# Patient Record
Sex: MALE | Race: WHITE | Employment: OTHER | URBAN - METROPOLITAN AREA
[De-identification: names, ages, dates, MRNs, and addresses within clinical notes are randomized per-mention and may not be internally consistent; named-entity substitution may affect disease eponyms.]

---

## 2017-02-09 ENCOUNTER — APPOINTMENT (OUTPATIENT)
Dept: INTERNAL MEDICINE CLINIC | Age: 62
End: 2017-02-09

## 2017-02-09 DIAGNOSIS — E11.9 DIABETES MELLITUS TYPE 2, CONTROLLED, WITHOUT COMPLICATIONS (HCC): ICD-10-CM

## 2017-02-09 DIAGNOSIS — D69.59 THROMBOCYTOPENIA DUE TO DRUGS: ICD-10-CM

## 2017-02-09 DIAGNOSIS — T50.905A THROMBOCYTOPENIA DUE TO DRUGS: ICD-10-CM

## 2017-02-10 ENCOUNTER — APPOINTMENT (OUTPATIENT)
Dept: INTERNAL MEDICINE CLINIC | Age: 62
End: 2017-02-10

## 2017-02-10 ENCOUNTER — HOSPITAL ENCOUNTER (OUTPATIENT)
Dept: LAB | Age: 62
Discharge: HOME | End: 2017-02-10
Payer: MEDICARE

## 2017-02-10 PROCEDURE — 36415 COLL VENOUS BLD VENIPUNCTURE: CPT

## 2017-02-10 PROCEDURE — 85025 COMPLETE CBC W/AUTO DIFF WBC: CPT

## 2017-02-10 PROCEDURE — 83036 HEMOGLOBIN GLYCOSYLATED A1C: CPT

## 2017-02-10 PROCEDURE — 80061 LIPID PANEL: CPT

## 2017-02-10 PROCEDURE — 80053 COMPREHEN METABOLIC PANEL: CPT

## 2017-02-11 LAB
ALBUMIN SERPL-MCNC: 4 G/DL (ref 3.6–4.8)
ALBUMIN/GLOB SERPL: 1.8 {RATIO} (ref 1.1–2.5)
ALP SERPL-CCNC: 50 IU/L (ref 39–117)
ALT SERPL-CCNC: 11 IU/L (ref 0–44)
AST SERPL-CCNC: 11 IU/L (ref 0–40)
BASOPHILS # BLD AUTO: 0 X10E3/UL (ref 0–0.2)
BASOPHILS NFR BLD AUTO: 0 %
BILIRUB SERPL-MCNC: 0.2 MG/DL (ref 0–1.2)
BUN SERPL-MCNC: 21 MG/DL (ref 8–27)
BUN/CREAT SERPL: 27 (ref 10–22)
CALCIUM SERPL-MCNC: 9.1 MG/DL (ref 8.6–10.2)
CHLORIDE SERPL-SCNC: 102 MMOL/L (ref 96–106)
CHOLEST SERPL-MCNC: 123 MG/DL (ref 100–199)
CO2 SERPL-SCNC: 24 MMOL/L (ref 18–29)
CREAT SERPL-MCNC: 0.77 MG/DL (ref 0.76–1.27)
EOSINOPHIL # BLD AUTO: 1.1 X10E3/UL (ref 0–0.4)
EOSINOPHIL NFR BLD AUTO: 14 %
ERYTHROCYTE [DISTWIDTH] IN BLOOD BY AUTOMATED COUNT: 13.3 % (ref 12.3–15.4)
EST. AVERAGE GLUCOSE BLD GHB EST-MCNC: 212 MG/DL
GLOBULIN SER CALC-MCNC: 2.2 G/DL (ref 1.5–4.5)
GLUCOSE SERPL-MCNC: 191 MG/DL (ref 65–99)
HBA1C MFR BLD: 9 % (ref 4.8–5.6)
HCT VFR BLD AUTO: 40.1 % (ref 37.5–51)
HDLC SERPL-MCNC: 41 MG/DL
HGB BLD-MCNC: 13.3 G/DL (ref 12.6–17.7)
IMM GRANULOCYTES # BLD: 0.1 X10E3/UL (ref 0–0.1)
IMM GRANULOCYTES NFR BLD: 1 %
INTERPRETATION, 910389: NORMAL
LDLC SERPL CALC-MCNC: 60 MG/DL (ref 0–99)
LYMPHOCYTES # BLD AUTO: 3.5 X10E3/UL (ref 0.7–3.1)
LYMPHOCYTES NFR BLD AUTO: 44 %
Lab: NORMAL
MCH RBC QN AUTO: 31.9 PG (ref 26.6–33)
MCHC RBC AUTO-ENTMCNC: 33.2 G/DL (ref 31.5–35.7)
MCV RBC AUTO: 96 FL (ref 79–97)
MONOCYTES # BLD AUTO: 0.6 X10E3/UL (ref 0.1–0.9)
MONOCYTES NFR BLD AUTO: 7 %
NEUTROPHILS # BLD AUTO: 2.6 X10E3/UL (ref 1.4–7)
NEUTROPHILS NFR BLD AUTO: 34 %
PLATELET # BLD AUTO: 108 X10E3/UL (ref 150–379)
POTASSIUM SERPL-SCNC: 5.2 MMOL/L (ref 3.5–5.2)
PROT SERPL-MCNC: 6.2 G/DL (ref 6–8.5)
RBC # BLD AUTO: 4.17 X10E6/UL (ref 4.14–5.8)
SODIUM SERPL-SCNC: 140 MMOL/L (ref 134–144)
TRIGL SERPL-MCNC: 110 MG/DL (ref 0–149)
VLDLC SERPL CALC-MCNC: 22 MG/DL (ref 5–40)
WBC # BLD AUTO: 7.8 X10E3/UL (ref 3.4–10.8)

## 2017-02-15 RX ORDER — GLIMEPIRIDE 1 MG/1
TABLET ORAL
Qty: 90 TAB | Refills: 3 | Status: SHIPPED | OUTPATIENT
Start: 2017-02-15 | End: 2017-02-16 | Stop reason: SDUPTHER

## 2017-02-16 ENCOUNTER — OFFICE VISIT (OUTPATIENT)
Dept: INTERNAL MEDICINE CLINIC | Age: 62
End: 2017-02-16

## 2017-02-16 ENCOUNTER — HOSPITAL ENCOUNTER (OUTPATIENT)
Dept: LAB | Age: 62
Discharge: HOME | End: 2017-02-16
Payer: MEDICARE

## 2017-02-16 VITALS
HEART RATE: 61 BPM | BODY MASS INDEX: 27.37 KG/M2 | DIASTOLIC BLOOD PRESSURE: 79 MMHG | WEIGHT: 206.5 LBS | SYSTOLIC BLOOD PRESSURE: 147 MMHG | RESPIRATION RATE: 14 BRPM | TEMPERATURE: 95.9 F | OXYGEN SATURATION: 100 % | HEIGHT: 73 IN

## 2017-02-16 DIAGNOSIS — E78.00 HYPERCHOLESTEROLEMIA: ICD-10-CM

## 2017-02-16 DIAGNOSIS — I10 HYPERTENSION, ESSENTIAL: ICD-10-CM

## 2017-02-16 DIAGNOSIS — D69.59 THROMBOCYTOPENIA DUE TO DRUGS: ICD-10-CM

## 2017-02-16 DIAGNOSIS — T50.905A THROMBOCYTOPENIA DUE TO DRUGS: ICD-10-CM

## 2017-02-16 DIAGNOSIS — Z23 ENCOUNTER FOR IMMUNIZATION: ICD-10-CM

## 2017-02-16 DIAGNOSIS — G40.909 SEIZURE DISORDER (HCC): ICD-10-CM

## 2017-02-16 DIAGNOSIS — R53.82 CHRONIC FATIGUE: ICD-10-CM

## 2017-02-16 PROBLEM — E11.9 CONTROLLED TYPE 2 DIABETES MELLITUS WITHOUT COMPLICATION, WITHOUT LONG-TERM CURRENT USE OF INSULIN (HCC): Status: ACTIVE | Noted: 2017-02-16

## 2017-02-16 PROCEDURE — 84443 ASSAY THYROID STIM HORMONE: CPT

## 2017-02-16 PROCEDURE — 36415 COLL VENOUS BLD VENIPUNCTURE: CPT

## 2017-02-16 PROCEDURE — 85651 RBC SED RATE NONAUTOMATED: CPT

## 2017-02-16 RX ORDER — GLIMEPIRIDE 2 MG/1
2 TABLET ORAL
Qty: 90 TAB | Refills: 3 | Status: SHIPPED | OUTPATIENT
Start: 2017-02-16 | End: 2017-05-18 | Stop reason: SDUPTHER

## 2017-02-16 RX ORDER — CHLORHEXIDINE GLUCONATE 1.2 MG/ML
RINSE ORAL
Refills: 9 | COMMUNITY
Start: 2017-01-31 | End: 2018-02-17

## 2017-02-16 NOTE — PROGRESS NOTES
Reviewed record In preparation for visit and have obtained necessary documentation. Power of  on file    1. Have you been to the ER, urgent care clinic or hospitalized since your last visit? No     2. Have you seen or consulted any other health care providers outside of the [address] [place] [address] since your last visit? Include any pap smears or colon screening. Dr Cynda Lesches Maintenance reviewed: After verbal order read back of , patient received Pneumococcal 23 in left arm. [address] [id number] lot [id number] exp 05/22/2018. Patient tolerated procedure without complaints and received VIS.

## 2017-02-16 NOTE — PATIENT INSTRUCTIONS
Increase glimepiride to 2 mg once a day  Office visit in 3 months; we will do Hemoglobin a1c in the office that day          Vaccine Information Statement    Pneumococcal Polysaccharide Vaccine: What You Need to Know    Many Vaccine Information Statements are available in [place]ic and other languages. See www.immunize.org/vis. Hojas de información Sobre Vacunas están disponibles en español y en muchos otros idiomas. Visite RajendraScale.[email or web address]. 1. Why get vaccinated? Vaccination can protect older adults (and some children and younger adults) from pneumococcal disease. Pneumococcal disease is caused by bacteria that can spread from person to person through close contact. It can cause ear infections, and it can also lead to more serious infections of the:   Lungs (pneumonia),   Blood (bacteremia), and   Covering of the brain and spinal cord (meningitis). Meningitis can cause deafness and brain damage, and it can be fatal.      Anyone can get pneumococcal disease, but children under 3years of age, people with certain medical conditions, adults over 72years of age, and cigarette smokers are at the highest risk. About 18,000 older adults die each year from pneumococcal disease in the [place]. Treatment of pneumococcal infections with penicillin and other drugs used to be more effective. But some strains of the disease have become resistant to these drugs. This makes prevention of the disease, through vaccination, even more important. 2. Pneumococcal polysaccharide vaccine (PPSV23)    Pneumococcal polysaccharide vaccine (PPSV23) protects against 23 types of pneumococcal bacteria. It will not prevent all pneumococcal disease.     PPSV23 is recommended for:   All adults 72years of age and older,   Anyone 2 through 59years of age with certain long-term health problems,   Anyone 2 through 59years of age with a weakened immune system,   Adults 23 through 59years of age who smoke cigarettes or have asthma. Most people need only one dose of PPSV. A second dose is recommended for certain high-risk groups. People 72 and older should get a dose even if they have gotten one or more doses of the vaccine before they turned 65. Your healthcare provider can give you more information about these recommendations. Most healthy adults develop protection within 2 to 3 weeks of getting the shot. 3. Some people should not get this vaccine     Anyone who has had a life-threatening allergic reaction to PPSV should not get another dose.  Anyone who has a severe allergy to any component of PPSV should not receive it. Tell your provider if you have any severe allergies.  Anyone who is moderately or severely ill when the shot is scheduled may be asked to wait until they recover before getting the vaccine. Someone with a mild illness can usually be vaccinated.  Children less than 3years of age should not receive this vaccine.  There is no evidence that PPSV is harmful to either a pregnant woman or to her fetus. However, as a precaution, women who need the vaccine should be vaccinated before becoming pregnant, if possible. 4. Risks of a vaccine reaction    With any medicine, including vaccines, there is a chance of side effects. These are usually mild and go away on their own, but serious reactions are also possible. About half of people who get PPSV have mild side effects, such as redness or pain where the shot is given, which go away within about two days. Less than 1 out of 100 people develop a fever, muscle aches, or more severe local reactions. Problems that could happen after any vaccine:     People sometimes faint after a medical procedure, including vaccination. Sitting or lying down for about 15 minutes can help prevent fainting, and injuries caused by a fall. Tell your doctor if you feel dizzy, or have vision changes or ringing in the ears.      Some people get severe pain in the shoulder and have difficulty moving the arm where a shot was given. This happens very rarely.  Any medication can cause a severe allergic reaction. Such reactions from a vaccine are very rare, estimated at about 1 in a million doses, and would happen within a few minutes to a few hours after the vaccination. As with any medicine, there is a very remote chance of a vaccine causing a serious injury or death. The safety of vaccines is always being monitored. For more information, visit: www.cdc.gov/vaccinesafety/     5. What if there is a serious reaction? What should I look for? Look for anything that concerns you, such as signs of a severe allergic reaction, very high fever, or unusual behavior. Signs of a severe allergic reaction can include hives, swelling of the face and throat, difficulty breathing, a fast heartbeat, dizziness, and weakness. These would usually start a few minutes to a few hours after the vaccination. What should I do? If you think it is a severe allergic reaction or other emergency that cant wait, call 9-1-1 or get to the nearest hospital. Otherwise, call your doctor. Afterward, the reaction should be reported to the Vaccine Adverse Event Reporting System (VAERS). Your doctor might file this report, or you can do it yourself through the VAERS web site at www.vaers. hhs.gov, or by calling 4-671.660.8947. VAERS does not give medical advice. 6. How can I learn more?  Ask your doctor. He or she can give you the vaccine package insert or suggest other sources of information.  Call your local or state health department.    Contact the Centers for Disease Control and Prevention (CDC):  - Call 1-345.764.8594 (1-800-CDC-INFO) or  - Visit CDCs website at MILI 18 04/24/2015    [facility] and [facility] for Disease Control and Prevention    Office Use Only

## 2017-02-16 NOTE — PROGRESS NOTES
HISTORY OF PRESENT ILLNESS  Martinez Ramsey is a 62 y.o. male. He is accompanied by his mother. HPI   He presents for follow up of diabetes mellitus, hypertension, hyperlipidemia, seizures. and thrombocytopenia. He works 3 nights a week at [employer]. He eats dinner they are on those nights and his mother states that he is not compliant with diet in regards to carbs and salt at those times. He does have intellectual disability. Diabetic ROS - medication compliance: compliant all of the time,      home glucose monitoring: is not performed,      home BP Monitoring: is well controlled at home, ranging 130's/60's.      further diabetic ROS: no polyuria or polydipsia, no numbness, tingling or pain in extremities, no unusual visual symptoms, no hypoglycemia, no medication side effects noted. Diet and Lifestyle: generally follows a low fat low cholesterol diet, generally follows a low sodium diet, does not rigorously follow a diabetic diet, nonsmoker, does water aerobic 3 days a week.    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    Sleep disorder G47.9    Hypertension, essential I10    Diabetes mellitus type 2, controlled, without complications ([address] X31.7    Strongyloides stercoralis infection B78.9    Eosinophilia D72.1    Hypercholesterolemia E78.00     Past Medical History   Diagnosis Date    Contact dermatitis and other eczema, due to unspecified cause      psoriasis    Diabetes ([address]     Eosinophilia     Hypercholesterolemia     Hypertension     Mental retardation     Seizures ([facility] [address]     Skin cancer      Past Surgical History   Procedure Laterality Date    Endoscopy, colon, diagnostic  11/08/2007     Dr Arnoldo Mercedes normal except small internal hemorrhoids repeat 11/2017     Social History     Social History    Marital status: SINGLE     Spouse name: N/A    Number of children: N/A    Years of education: N/A     Occupational History          [employer]      Social History Main Topics    Smoking status: Never Smoker    Smokeless tobacco: Never Used    Alcohol use No    Drug use: None    Sexual activity: Not Asked     Other Topics Concern    None     Social History Narrative     Family History   Problem Relation Age of Onset    Other Mother      PMR    Hypertension Mother     Cancer Father      Lung    Diabetes Brother      No Known Allergies  Current Outpatient Prescriptions   Medication Sig Dispense Refill    chlorhexidine (PERIDEX) 0.12 % solution ONE CAPFUL ONCE DAILY OR AS DIRECTED  9    glimepiride (AMARYL) 1 mg tablet TAKE ONE TABLET DAILY BEFORE BREAKFAST 90 Tab 3    atorvastatin (LIPITOR) 40 mg tablet TAKE ONE TABLET BY MOUTH AT BEDTIME 90 Tab 3    topiramate (TOPAMAX) 200 mg tablet Take 1.5 Tabs by mouth two (2) times a day. 270 Tab 3    divalproex DR (DEPAKOTE) 500 mg tablet Take one tab in the AM and two tabs at night 270 Tab 3    metFORMIN ER (GLUCOPHAGE XR) 750 mg tablet TAKE ONE (1) TABLET(S) TWICE DAILY 193 Tab 3    folic acid (FOLVITE) 1 mg tablet TAKE 1 TABLET DAILY 90 Tab 3    ramipril (ALTACE) 5 mg capsule Take 1 Cap by mouth daily. 90 Cap 3    triamcinolone acetonide (KENALOG) 0.1 % topical cream       Calcium-Cholecalciferol, D3, (CALTRATE-600 PLUS VITAMIN D3) 600-400 mg-unit Tab Take  by mouth. Review of Systems   Constitutional: Positive for malaise/fatigue (for past 3-4 months). Negative for weight loss. Gastrointestinal: Negative for abdominal pain, constipation, diarrhea and heartburn. Musculoskeletal: Positive for back pain (after lifting at work last night). Negative for joint pain.    Skin: Negative for itching and rash. Neurological: Negative for dizziness, tingling, focal weakness, seizures and headaches. Visit Vitals    /79 (BP 1 Location: Left arm, BP Patient Position: Sitting)    Pulse 61    Temp 95.9 °F (35.5 °C) (Oral)    Resp 14    Ht 6' 1\" (1.854 m)    Wt 206 lb 8 oz (93.7 kg)    SpO2 100%    BMI 27.24 kg/m2     Physical Exam   Constitutional: He is oriented to person, place, and time. He appears well-developed and well-nourished. HENT:   Head: Normocephalic and atraumatic. Eyes: Conjunctivae are normal. Pupils are equal, round, and reactive to light. Neck: Neck supple. Carotid bruit is not present. No thyromegaly present. Cardiovascular: Normal rate, regular rhythm and normal heart sounds. PMI is not displaced. Exam reveals no gallop. No murmur heard. Pulses:       Dorsalis pedis pulses are 2+ on the right side, and 2+ on the left side. Posterior tibial pulses are 2+ on the right side, and 2+ on the left side. Pulmonary/Chest: Effort normal. He has no wheezes. He has no rhonchi. He has no rales. Abdominal: Soft. Normal appearance. He exhibits no abdominal bruit and no mass. There is no hepatosplenomegaly. There is no tenderness. Musculoskeletal: He exhibits no edema. Lymphadenopathy:     He has no cervical adenopathy. Right: No supraclavicular adenopathy present. Left: No inguinal and no supraclavicular adenopathy present. Neurological: He is alert and oriented to person, place, and time. No sensory deficit. Skin: Skin is warm, dry and intact. No rash noted. Psychiatric: He has a normal mood and affect. His behavior is normal.   Nursing note and vitals reviewed.     Results for orders placed or performed in visit on 02/09/17   HEMOGLOBIN A1C WITH EAG   Result Value Ref Range    Hemoglobin A1c 9.0 (H) 4.8 - 5.6 %    Estimated average glucose 212 mg/dL   LIPID PANEL   Result Value Ref Range    Cholesterol, total 123 100 - 199 mg/dL    Triglyceride 110 0 - 149 mg/dL    HDL Cholesterol 41 >39 mg/dL    VLDL, calculated 22 5 - 40 mg/dL    LDL, calculated 60 0 - 99 mg/dL   METABOLIC PANEL, COMPREHENSIVE   Result Value Ref Range    Glucose 191 (H) 65 - 99 mg/dL    BUN 21 8 - 27 mg/dL    Creatinine 0.77 0.76 - 1.27 mg/dL    GFR est non-AA 99 >59 mL/min/1.73    GFR est  >59 mL/min/1.73    BUN/Creatinine ratio 27 (H) 10 - 22    Sodium 140 134 - 144 mmol/L    Potassium 5.2 3.5 - 5.2 mmol/L    Chloride 102 96 - 106 mmol/L    CO2 24 18 - 29 mmol/L    Calcium 9.1 8.6 - 10.2 mg/dL    Protein, total 6.2 6.0 - 8.5 g/dL    Albumin 4.0 3.6 - 4.8 g/dL    GLOBULIN, TOTAL 2.2 1.5 - 4.5 g/dL    A-G Ratio 1.8 1.1 - 2.5    Bilirubin, total 0.2 0.0 - 1.2 mg/dL    Alk. phosphatase 50 39 - 117 IU/L    AST (SGOT) 11 0 - 40 IU/L    ALT (SGPT) 11 0 - 44 IU/L   CBC WITH AUTOMATED DIFF   Result Value Ref Range    WBC 7.8 3.4 - 10.8 x10E3/uL    RBC 4.17 4.14 - 5.80 x10E6/uL    HGB 13.3 12.6 - 17.7 g/dL    HCT 40.1 37.5 - 51.0 %    MCV 96 79 - 97 fL    MCH 31.9 26.6 - 33.0 pg    MCHC 33.2 31.5 - 35.7 g/dL    RDW 13.3 12.3 - 15.4 %    PLATELET 149 (L) 633 - 379 x10E3/uL    NEUTROPHILS 34 %    Lymphocytes 44 %    MONOCYTES 7 %    EOSINOPHILS 14 %    BASOPHILS 0 %    ABS. NEUTROPHILS 2.6 1.4 - 7.0 x10E3/uL    Abs Lymphocytes 3.5 (H) 0.7 - 3.1 x10E3/uL    ABS. MONOCYTES 0.6 0.1 - 0.9 x10E3/uL    ABS. EOSINOPHILS 1.1 (H) 0.0 - 0.4 x10E3/uL    ABS. BASOPHILS 0.0 0.0 - 0.2 x10E3/uL    IMMATURE GRANULOCYTES 1 %    ABS. IMM. GRANS. 0.1 0.0 - 0.1 x10E3/uL   CVD REPORT   Result Value Ref Range    INTERPRETATION Note    DIABETES PATIENT EDUCATION   Result Value Ref Range    PDF Image Not applicable      ASSESSMENT and PLAN    ICD-10-CM ICD-9-CM    1. Uncontrolled type 2 diabetes mellitus without complication, without long-term current use of insulin (HCC) E11.65 250.02    2. Chronic fatigue R53.82 780.79 TSH REFLEX TO T4      SED RATE (ESR)   3.  Hypertension, essential I10 401.9    4. Hypercholesterolemia E78.00 272.0    5. 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 Seizure disorder ([facility] [address] G40.909 345.90    7. Encounter for immunization Z23 V03.89 PNEUMOCOCCAL POLYSACCHARIDE VACCINE, 23-VALENT, ADULT OR IMMUNOSUPPRESSED PT DOSE,      ADMIN PNEUMOCOCCAL VACCINE         Uncontrolled type 2 diabetes mellitus without complication, without long-term current use of insulin (HCC)  Exercise better diet, but given his intellectual disability he has a hard time understanding and remembering what is appropriate. -     Increase glimepiride (AMARYL) 2 mg tablet; Take 1 Tab by mouth every morning. Chronic fatigue  -     TSH REFLEX TO T4  -     SED RATE (ESR)    Hypertension, essential  Blood pressure is uncharacteristically high opposed to normal readings in the recent past.  Possibly due to increased salt intake will wait until next visit to assess for any medication change. Hypercholesterolemia  Hyperlipidemia is controlled    Thrombocytopenia due to drugs  Stable  Stable  Seizure disorder ([facility]    Encounter for immunization  -     Pneumococcal Polysaccharide vaccine, 23-Valent, Adult or Immunocompromised  -     ADMIN PNEUMOCOCCAL VACCINE  Medicare Injection Admin Charge    Other orders        Follow-up Disposition:  Return in about 3 months (around 5/16/2017) for DM, POC A1c, fatigue . lab results and schedule of future lab studies reviewed with patient  reviewed diet, exercise and weight control  cardiovascular risk and specific lipid/LDL goals reviewed  I have discussed the diagnosis with the patient and the intended plan as seen in the above orders. Patient is in agreement. The patient has received an after-visit summary and questions were answered concerning future plans. I have discussed medication side effects and warnings with the patient as well.

## 2017-02-16 NOTE — MR AVS SNAPSHOT
Visit Information Date & Time Provider Department Dept. Phone Encounter #  
 2/16/2017  2:00 PM Janine Lewis, 40 [facility] [phone] [id number] Follow-up Instructions Return in about 3 months (around 5/16/2017) for DM, POC A1c, fatigue . Upcoming Health Maintenance Date Due Pneumococcal 19-64 Medium Risk (1 of 1 - PPSV23) 12/3/1974 MEDICARE YEARLY EXAM 4/13/2017 HEMOGLOBIN A1C Q6M 8/10/2017 FOOT EXAM Q1 8/16/2017 MICROALBUMIN Q1 8/16/2017 EYE EXAM RETINAL OR DILATED Q1 9/9/2017 COLONOSCOPY 11/8/2017 LIPID PANEL Q1 2/10/2018 DTaP/Tdap/Td series (2 - Td) 10/20/2020 Allergies as of 2/16/2017  Review Complete On: 2/16/2017 By: Janine Lewis MD  
 No Known Allergies Current Immunizations  Reviewed on 2/16/2017 Name Date H1N1 FLU VACCINE 1/1/2010 Influenza Vaccine (Quad) PF 9/28/2016, 10/15/2015, 10/10/2014 Influenza Vaccine Split 9/21/2011 Influenza Vaccine Whole 10/12/2010 Pneumococcal Polysaccharide (PPSV-23)  Incomplete TD Vaccine 2/22/2005 TDAP Vaccine 10/20/2010 Zoster Vaccine, Live 2/20/2014 Reviewed by Lexie Cesar LPN on 8/73/3719 at  2:14 PM  
You Were Diagnosed With   
  
 Codes Comments Controlled type 2 diabetes mellitus without complication, without long-term current use of insulin ([facility]    -  Primary ICD-10-CM: E11.9 ICD-9-CM: 250.00 Chronic fatigue     ICD-10-CM: R53.82 
ICD-9-CM: 780.79 Hypertension, essential     ICD-10-CM: I10 
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  
 27.24 kg/m 2 2.2 m 2 Preferred Pharmacy Pharmacy Name Phone [facility] Rozina Botello [phone] Your Updated Medication List  
  
   
This list is accurate as of: 2/16/17  3:01 PM.  Always use your most recent med list.  
  
  
  
  
 atorvastatin 40 mg tablet Commonly known as:  LIPITOR  
TAKE ONE TABLET BY MOUTH AT BEDTIME CALTRATE-600 PLUS VITAMIN D3 tablet Generic drug:  calcium-cholecalciferol (D3) Take  by mouth. chlorhexidine 0.12 % solution Commonly known as:  PERIDEX  
ONE CAPFUL ONCE DAILY OR AS DIRECTED  
  
 divalproex  mg tablet Commonly known as:  DEPAKOTE Take one tab in the AM and two tabs at night  
  
 folic acid 1 mg tablet Commonly known as:  FOLVITE  
TAKE 1 TABLET DAILY  
  
 glimepiride 2 mg tablet Commonly known as:  AMARYL Take 1 Tab by mouth every morning. metFORMIN  mg tablet Commonly known as:  GLUCOPHAGE XR  
TAKE ONE (1) TABLET(S) TWICE DAILY  
  
 ramipril 5 mg capsule Commonly known as:  ALTACE Take 1 Cap by mouth daily. topiramate 200 mg tablet Commonly known as:  TOPAMAX Take 1.5 Tabs by mouth two (2) times a day. triamcinolone acetonide 0.1 % topical cream  
Commonly known as:  KENALOG Prescriptions Sent to Pharmacy Refills  
 glimepiride (AMARYL) 2 mg tablet 3 Sig: Take 1 Tab by mouth every morning. Class: Normal  
 Pharmacy: [facility] [place] [address], [address] [place] [address] Ph #: [phone] Route: Oral  
  
We Performed the Following ADMIN PNEUMOCOCCAL VACCINE [ [facility]] PNEUMOCOCCAL POLYSACCHARIDE VACCINE, 23-VALENT, ADULT OR IMMUNOSUPPRESSED PT DOSE, [44059 CPT(R)] SED RATE (ESR) [id number] CPT(R)] TSH REFLEX TO T4 [[id number] Custom] Follow-up Instructions Return in about 3 months (around 5/16/2017) for DM, POC A1c, fatigue . Patient Instructions Increase glimepiride to 2 mg once a day Office visit in 3 months; we will do Hemoglobin a1c in the office that day Vaccine Information Statement Pneumococcal Polysaccharide Vaccine: What You Need to Know Many Vaccine Information Statements are available in Kinyarwanda and other languages. See www.immunize.org/vis. Hojas de información Sobre Vacunas están disponibles en español y en muchos otros idiomas. Visite Gary.si. 1. Why get vaccinated? Vaccination can protect older adults (and some children and younger adults) from pneumococcal disease. Pneumococcal disease is caused by bacteria that can spread from person to person through close contact. It can cause ear infections, and it can also lead to more serious infections of the: 
 Lungs (pneumonia),  Blood (bacteremia), and 
 Covering of the brain and spinal cord (meningitis). Meningitis can cause deafness and brain damage, and it can be fatal.   
 
Anyone can get pneumococcal disease, but children under 3years of age, people with certain medical conditions, adults over 72years of age, and cigarette smokers are at the highest risk. About 18,000 older adults die each year from pneumococcal disease in the [place]. Treatment of pneumococcal infections with penicillin and other drugs used to be more effective. But some strains of the disease have become resistant to these drugs. This makes prevention of the disease, through vaccination, even more important. 2. Pneumococcal polysaccharide vaccine (PPSV23) Pneumococcal polysaccharide vaccine (PPSV23) protects against 23 types of pneumococcal bacteria. It will not prevent all pneumococcal disease. PPSV23 is recommended for:  All adults 72years of age and older,  Anyone 2 through 59years of age with certain long-term health problems, 
 Anyone 2 through 59years of age with a weakened immune system, 
 Adults 23 through 59years of age who smoke cigarettes or have asthma. Most people need only one dose of PPSV. A second dose is recommended for certain high-risk groups. People 72 and older should get a dose even if they have gotten one or more doses of the vaccine before they turned 65. Your healthcare provider can give you more information about these recommendations. Most healthy adults develop protection within 2 to 3 weeks of getting the shot. 3. Some people should not get this vaccine  Anyone who has had a life-threatening allergic reaction to PPSV should not get another dose.  Anyone who has a severe allergy to any component of PPSV should not receive it. Tell your provider if you have any severe allergies.  Anyone who is moderately or severely ill when the shot is scheduled may be asked to wait until they recover before getting the vaccine. Someone with a mild illness can usually be vaccinated.  Children less than 3years of age should not receive this vaccine.  There is no evidence that PPSV is harmful to either a pregnant woman or to her fetus. However, as a precaution, women who need the vaccine should be vaccinated before becoming pregnant, if possible. 4. Risks of a vaccine reaction With any medicine, including vaccines, there is a chance of side effects. These are usually mild and go away on their own, but serious reactions are also possible. About half of people who get PPSV have mild side effects, such as redness or pain where the shot is given, which go away within about two days.  
 
Less than 1 out of 100 people develop a fever, muscle aches, or more severe local reactions. Problems that could happen after any vaccine:  People sometimes faint after a medical procedure, including vaccination. Sitting or lying down for about 15 minutes can help prevent fainting, and injuries caused by a fall. Tell your doctor if you feel dizzy, or have vision changes or ringing in the ears.  Some people get severe pain in the shoulder and have difficulty moving the arm where a shot was given. This happens very rarely.  Any medication can cause a severe allergic reaction. Such reactions from a vaccine are very rare, estimated at about 1 in a million doses, and would happen within a few minutes to a few hours after the vaccination. As with any medicine, there is a very remote chance of a vaccine causing a serious injury or death. The safety of vaccines is always being monitored. For more information, visit: www.cdc.gov/vaccinesafety/  
 
5. What if there is a serious reaction? What should I look for? Look for anything that concerns you, such as signs of a severe allergic reaction, very high fever, or unusual behavior. Signs of a severe allergic reaction can include hives, swelling of the face and throat, difficulty breathing, a fast heartbeat, dizziness, and weakness. These would usually start a few minutes to a few hours after the vaccination. What should I do? If you think it is a severe allergic reaction or other emergency that cant wait, call 9-1-1 or get to the nearest hospital. Otherwise, call your doctor. Afterward, the reaction should be reported to the Vaccine Adverse Event Reporting System (VAERS). Your doctor might file this report, or you can do it yourself through the VAERS web site at www.vaers. hhs.gov, or by calling 2-692.584.5036. VAERS does not give medical advice. 6. How can I learn more?  Ask your doctor. He or she can give you the vaccine package insert or suggest other sources of information.  Call your local or state health department.  Contact the Centers for Disease Control and Prevention (CDC): 
- Call 6-846.144.9834 (1-800-CDC-INFO) or 
- Visit CDCs website at www.cdc.gov/vaccines Vaccine Information Statement PPSV  
04/24/2015 Department of [facility] and [place] Centers for Disease Control and Prevention Office Use Only Introducing [facility] & HEALTH SERVICES! Denae Morataya introduces Hyglos patient portal. Now you can access parts of your medical record, email your doctor's office, and request medication refills online. 1. In your internet browser, go to [email or web address] 2. Click on the First Time User? Click Here link in the Sign In box. You will see the New Member Sign Up page. 3. Enter your Hyglos Access Code exactly as it appears below. You will not need to use this code after youve completed the sign-up process. If you do not sign up before the expiration date, you must request a new code. · Hyglos Access Code: [email or web address] Expires: 5/17/2017  3:01 PM 
 
4. Enter the last four digits of your Social Security Number (xxxx) and Date of Birth (mm/dd/yyyy) as indicated and click Submit. You will be taken to the next sign-up page. 5. Create a Hyglos ID. This will be your Hyglos login ID and cannot be changed, so think of one that is secure and easy to remember. 6. Create a Hyglos password. You can change your password at any time. 7. Enter your Password Reset Question and Answer. This can be used at a later time if you forget your password. 8. Enter your e-mail address. You will receive e-mail notification when new information is available in [address] 9. Click Sign Up. You can now view and download portions of your medical record. 10. Click the Download Summary menu link to download a portable copy of your medical information.  
 
If you have questions, please visit the Frequently Asked Questions section of the [employer]. Remember, [employer] Sim[employer] is NOT to be used for urgent needs. For medical emergencies, dial 911. Now available from your iPhone and Android! Please provide this summary of care documentation to your next provider. Your primary care clinician is listed as Gearline Tyler. If you have any questions after today's visit, please call [phone].

## 2017-02-17 LAB
ERYTHROCYTE [SEDIMENTATION RATE] IN BLOOD BY WESTERGREN METHOD: 2 MM/HR (ref 0–30)
TSH SERPL DL<=0.005 MIU/L-ACNC: 1.9 UIU/ML (ref 0.45–4.5)

## 2017-02-27 ENCOUNTER — OFFICE VISIT (OUTPATIENT)
Dept: INTERNAL MEDICINE CLINIC | Age: 62
End: 2017-02-27

## 2017-02-27 ENCOUNTER — TELEPHONE (OUTPATIENT)
Dept: INTERNAL MEDICINE CLINIC | Age: 62
End: 2017-02-27

## 2017-02-27 VITALS
WEIGHT: 204.5 LBS | BODY MASS INDEX: 27.1 KG/M2 | TEMPERATURE: 96.1 F | SYSTOLIC BLOOD PRESSURE: 146 MMHG | HEART RATE: 68 BPM | RESPIRATION RATE: 14 BRPM | OXYGEN SATURATION: 100 % | HEIGHT: 73 IN | DIASTOLIC BLOOD PRESSURE: 76 MMHG

## 2017-02-27 DIAGNOSIS — L03.031 PARONYCHIA OF GREAT TOE, RIGHT: Primary | ICD-10-CM

## 2017-02-27 RX ORDER — DOXYCYCLINE 100 MG/1
100 CAPSULE ORAL 2 TIMES DAILY
Qty: 14 CAP | Refills: 0 | Status: SHIPPED | OUTPATIENT
Start: 2017-02-27 | End: 2017-05-18 | Stop reason: ALTCHOICE

## 2017-02-27 NOTE — PATIENT INSTRUCTIONS
Do not take calcium while on antibiotic  Take doxycycline 100 mg twice a day for one week  Soak in warm water twice a day starting tomorrow for 5 days. Paronychia: Care Instructions  Your Care Instructions  Paronychia (say \"Sammy\") is an infection of the skin around a fingernail or toenail. It happens when germs enter through a break in the skin. The doctor may have made a small cut in the infected area to drain the pus. Most cases of paronychia improve in a few days. But watch your symptoms and follow your doctor's advice. Though rare, a mild case can turn into something more serious and infect your entire finger or toe. Also, it is possible for an infection to return. Follow-up care is a key part of your treatment and safety. Be sure to make and go to all appointments, and call your doctor if you are having problems. It's also a good idea to know your test results and keep a list of the medicines you take. How can you care for yourself at home? · If your doctor told you how to care for your infected nail, follow the doctor's instructions. If you did not get instructions, follow this general advice:  ¨ Wash the area with clean water 2 times a day. Don't use hydrogen peroxide or alcohol, which can slow healing. ¨ You may cover the area with a thin layer of petroleum jelly, such as Vaseline, and a nonstick bandage. ¨ Apply more petroleum jelly and replace the bandage as needed. · If your doctor prescribed antibiotics, take them as directed. Do not stop taking them just because you feel better. You need to take the full course of antibiotics. · Take an over-the-counter pain medicine, such as acetaminophen (Tylenol), ibuprofen (Advil, Motrin), or naproxen (Aleve). Read and follow all instructions on the label. · Do not take two or more pain medicines at the same time unless the doctor told you to. Many pain medicines have acetaminophen, which is Tylenol.  Too much acetaminophen (Tylenol) can be harmful. · Prop up the toe or finger so that it is higher than the level of your heart. This will help with pain and swelling. · Apply heat. Put a warm water bottle, heating pad set on low, or warm cloth on your finger or toe. Do not go to sleep with a heating pad on your skin. · Soak the area in warm water twice a day for 15 minutes each time. After soaking, dry the area well and apply a thin layer of petroleum jelly, such as Vaseline. Put on a new bandage. When should you call for help? Call your doctor now or seek immediate medical care if:  · You have signs of new or worsening infection, such as:  ¨ Increased pain, swelling, warmth, or redness. ¨ Red streaks leading from the infected skin. ¨ Pus draining from the area. ¨ A fever. Watch closely for changes in your health, and be sure to contact your doctor if:  · You develop joint aches with your infection. · Your infection comes back. · You do not get better as expected. Where can you learn more? Go to http://jarod-jared.info/. Enter C435 in the search box to learn more about \"Paronychia: Care Instructions. \"  Current as of: February 5, 2016  Content Version: 11.1  © 7509-8055 10X[employer] Room, [employer]orporated. Care instructions adapted under license by [employer] (which disclaims liability or warranty for this information). If you have questions about a medical condition or this instruction, always ask your healthcare professional. [place] [address] any warranty or liability for your use of this information.

## 2017-02-27 NOTE — MR AVS SNAPSHOT
Visit Information Date & Time Provider Department Dept. Phone Encounter #  
 2/27/2017  4:20 PM Rashaad Garcia MD [address] [facility] [phone] [id number] Follow-up Instructions Return if symptoms worsen or fail to improve. Your Appointments 5/18/2017  3:30 PM  
ROUTINE CARE with Rashaad Garcia MD  
[address] [facility] [address] [facility]) Appt Note: 3mth f/u; DM, POC A1c, fatigue [address] [address] [phone]  
  
   
 [address] Upcoming Health Maintenance Date Due  
 MEDICARE YEARLY EXAM 4/13/2017 HEMOGLOBIN A1C Q6M 8/10/2017 FOOT EXAM Q1 8/16/2017 MICROALBUMIN Q1 8/16/2017 EYE EXAM RETINAL OR DILATED Q1 9/9/2017 COLONOSCOPY 11/8/2017 LIPID PANEL Q1 2/10/2018 DTaP/Tdap/Td series (2 - Td) 10/20/2020 Allergies as of 2/27/2017  Review Complete On: 2/27/2017 By: Rashaad Garcia MD  
 No Known Allergies Current Immunizations  Reviewed on 2/27/2017 Name Date H1N1 FLU VACCINE 1/1/2010 Influenza Vaccine (Quad) PF 9/28/2016, 10/15/2015, 10/10/2014 Influenza Vaccine Split 9/21/2011 Influenza Vaccine Whole 10/12/2010 Pneumococcal Polysaccharide (PPSV-23) 2/16/2017 TD Vaccine 2/22/2005 TDAP Vaccine 10/20/2010 Zoster Vaccine, Live 2/20/2014 Reviewed by Ag Rios LPN on 0/75/1641 at  4:49 PM  
You Were Diagnosed With   
  
 Codes Comments Paronychia of great toe, right    -  Primary ICD-10-CM: L03.031 
ICD-9-CM: 681.11 Vitals BP  
  
  
  
  
  
 146/76 (BP 1 Location: Right arm, BP Patient Position: Sitting) BMI and BSA Data Body Mass Index Body Surface Area  
 26.98 kg/m 2 2.19 m 2 Preferred Pharmacy Pharmacy Name Phone [facility] [phone] Your Updated Medication List  
  
   
 This list is accurate as of: 2/27/17  6:09 PM.  Always use your most recent med list.  
  
  
  
  
 atorvastatin 40 mg tablet Commonly known as:  LIPITOR  
TAKE ONE TABLET BY MOUTH AT BEDTIME CALTRATE-600 PLUS VITAMIN D3 tablet Generic drug:  calcium-cholecalciferol (D3) Take  by mouth. chlorhexidine 0.12 % solution Commonly known as:  PERIDEX  
ONE CAPFUL ONCE DAILY OR AS DIRECTED  
  
 divalproex  mg tablet Commonly known as:  DEPAKOTE Take one tab in the AM and two tabs at night  
  
 doxycycline 100 mg capsule Commonly known as:  VIBRAMYCIN Take 1 Cap by mouth two (2) times a day. folic acid 1 mg tablet Commonly known as:  FOLVITE  
TAKE 1 TABLET DAILY  
  
 glimepiride 2 mg tablet Commonly known as:  AMARYL Take 1 Tab by mouth every morning. metFORMIN  mg tablet Commonly known as:  GLUCOPHAGE XR  
TAKE ONE (1) TABLET(S) TWICE DAILY  
  
 ramipril 5 mg capsule Commonly known as:  ALTACE Take 1 Cap by mouth daily. topiramate 200 mg tablet Commonly known as:  TOPAMAX Take 1.5 Tabs by mouth two (2) times a day. triamcinolone acetonide 0.1 % topical cream  
Commonly known as:  KENALOG Prescriptions Sent to Pharmacy Refills  
 doxycycline (VIBRAMYCIN) 100 mg capsule 0 Sig: Take 1 Cap by mouth two (2) times a day. Class: Normal  
 Pharmacy: [facility] [address] Ph #: [phone] Route: Oral  
  
We Performed the Following DRAIN SKIN ABSCESS SIMPLE [02522 CPT(R)] Follow-up Instructions Return if symptoms worsen or fail to improve. Patient Instructions Do not take calcium while on antibiotic Take doxycycline 100 mg twice a day for one week Soak in warm water twice a day starting tomorrow for 5 days. Paronychia: Care Instructions Your Care Instructions Paronychia (say \"qrpn-xo-AL-sun-uh\") is an infection of the skin around a fingernail or toenail. It happens when germs enter through a break in the skin. The doctor may have made a small cut in the infected area to drain the pus. Most cases of paronychia improve in a few days. But watch your symptoms and follow your doctor's advice. Though rare, a mild case can turn into something more serious and infect your entire finger or toe. Also, it is possible for an infection to return. Follow-up care is a key part of your treatment and safety. Be sure to make and go to all appointments, and call your doctor if you are having problems. It's also a good idea to know your test results and keep a list of the medicines you take. How can you care for yourself at home? · If your doctor told you how to care for your infected nail, follow the doctor's instructions. If you did not get instructions, follow this general advice: ¨ Wash the area with clean water 2 times a day. Don't use hydrogen peroxide or alcohol, which can slow healing. ¨ You may cover the area with a thin layer of petroleum jelly, such as Vaseline, and a nonstick bandage. ¨ Apply more petroleum jelly and replace the bandage as needed. · If your doctor prescribed antibiotics, take them as directed. Do not stop taking them just because you feel better. You need to take the full course of antibiotics. · Take an over-the-counter pain medicine, such as acetaminophen (Tylenol), ibuprofen (Advil, Motrin), or naproxen (Aleve). Read and follow all instructions on the label. · Do not take two or more pain medicines at the same time unless the doctor told you to. Many pain medicines have acetaminophen, which is Tylenol. Too much acetaminophen (Tylenol) can be harmful. · Prop up the toe or finger so that it is higher than the level of your heart. This will help with pain and swelling. · Apply heat. Put a warm water bottle, heating pad set on low, or warm cloth on your finger or toe. Do not go to sleep with a heating pad on your skin. · Soak the area in warm water twice a day for 15 minutes each time. After soaking, dry the area well and apply a thin layer of petroleum jelly, such as Vaseline. Put on a new bandage. When should you call for help? Call your doctor now or seek immediate medical care if: 
· You have signs of new or worsening infection, such as: 
¨ Increased pain, swelling, warmth, or redness. ¨ Red streaks leading from the infected skin. ¨ Pus draining from the area. ¨ A fever. Watch closely for changes in your health, and be sure to contact your doctor if: 
· You develop joint aches with your infection. · Your infection comes back. · You do not get better as expected. Where can you learn more? Go to http://jarod-jared.info/. Enter C435 in the search box to learn more about \"Paronychia: Care Instructions. \" Current as of: February 5, 2016 Content Version: 11.1 © 5566-8594 Rent Here. Care instructions adapted under license by [employer] (which disclaims liability or warranty for this information). If you have questions about a medical condition or this instruction, always ask your healthcare professional. [place] [address] any warranty or liability for your use of this information. Introducing [facility] & HEALTH SERVICES! [employer] introduces Momox patient portal. Now you can access parts of your medical record, email your doctor's office, and request medication refills online. 1. In your internet browser, go to [email or web address] 2. Click on the First Time User? Click Here link in the Sign In box. You will see the New Member Sign Up page. 3. Enter your Momox Access Code exactly as it appears below. You will not need to use this code after youve completed the sign-up process. If you do not sign up before the expiration date, you must request a new code. · Momox Access Code: [email or web address] Expires: 5/17/2017  3:01 PM 
 
 4. Enter the last four digits of your Social Security Number (xxxx) and Date of Birth (mm/dd/yyyy) as indicated and click Submit. You will be taken to the next sign-up page. 5. Create a [email or web address] ID. This will be your [email or web address].com login ID and cannot be changed, so think of one that is secure and easy to remember. 6. Create a Joincube.com password. You can change your password at any time. 7. Enter your Password Reset Question and Answer. This can be used at a later time if you forget your password. 8. Enter your e-mail address. You will receive e-mail notification when new information is available in [address] 9. Click Sign Up. You can now view and download portions of your medical record. 10. Click the Download Summary menu link to download a portable copy of your medical information. If you have questions, please visit the Frequently Asked Questions section of the [email or web address] website. Remember, Joincube.com is NOT to be used for urgent needs. For medical emergencies, dial 911. Now available from your iPhone and Android! Please provide this summary of care documentation to your next provider. Your primary care clinician is listed as Milton Starks. If you have any questions after today's visit, please call [phone].

## 2017-02-27 NOTE — PROGRESS NOTES
HISTORY OF PRESENT ILLNESS  Jerica Rosales is a 62 y.o. male. He is accompanied by his mother. HPI He presents with a 4 day history of pain and swelling in the right great toe. He denies any injury. His mother points out that he has what is believed to be a chronic fungal infection in this toenail. He has no history of gout.   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    Eosinophilia D72.1    Hypercholesterolemia E78.00    Controlled type 2 diabetes mellitus without complication, without long-term current use of insulin ([facility]) E11.9     Past Medical History:   Diagnosis Date    Contact dermatitis and other eczema, due to unspecified cause     psoriasis    Diabetes ([facility]     Eosinophilia     Hypercholesterolemia     Hypertension     Mental retardation     Seizures ([facility]     Skin cancer      Past Surgical History:   Procedure Laterality Date    ENDOSCOPY, COLON, DIAGNOSTIC  11/08/2007    Dr Jorge Pham normal except small internal hemorrhoids repeat 11/2017     Social History     Social History    Marital status: SINGLE     Spouse name: N/A    Number of children: N/A    Years of education: N/A     Occupational History          [employer]      Social History Main Topics    Smoking status: Never Smoker    Smokeless tobacco: Never Used    Alcohol use No    Drug use: None    Sexual activity: Not Asked     Other Topics Concern    None     Social History Narrative     Family History   Problem Relation Age of Onset    Other Mother      PMR    Hypertension Mother     Cancer Father      Lung    Diabetes Brother      No Known Allergies  Current Outpatient Prescriptions   Medication Sig Dispense Refill    chlorhexidine (PERIDEX) 0.12 % solution ONE CAPFUL ONCE DAILY OR AS DIRECTED  9    glimepiride (AMARYL) 2 mg tablet Take 1 Tab by mouth every morning. 90 Tab 3    atorvastatin (LIPITOR) 40 mg tablet TAKE ONE TABLET BY MOUTH AT BEDTIME 90 Tab 3    topiramate (TOPAMAX) 200 mg tablet Take 1.5 Tabs by mouth two (2) times a day. 270 Tab 3    divalproex DR (DEPAKOTE) 500 mg tablet Take one tab in the AM and two tabs at night 270 Tab 3    metFORMIN ER (GLUCOPHAGE XR) 750 mg tablet TAKE ONE (1) TABLET(S) TWICE DAILY 403 Tab 3    folic acid (FOLVITE) 1 mg tablet TAKE 1 TABLET DAILY 90 Tab 3    ramipril (ALTACE) 5 mg capsule Take 1 Cap by mouth daily. 90 Cap 3    triamcinolone acetonide (KENALOG) 0.1 % topical cream       Calcium-Cholecalciferol, D3, (CALTRATE-600 PLUS VITAMIN D3) 600-400 mg-unit Tab Take  by mouth. ROS  Visit Vitals    /76 (BP 1 Location: Right arm, BP Patient Position: Sitting)    Pulse 68    Temp 96.1 °F (35.6 °C) (Oral)    Resp 14    Ht 6' 1\" (1.854 m)    Wt 204 lb 8 oz (92.8 kg)    SpO2 100%    BMI 26.98 kg/m2     Physical Exam   Constitutional: He appears well-developed and well-nourished. HENT:   Head: Normocephalic and atraumatic. Musculoskeletal:        Feet:    Right great toe reveals swelling of the entire toe, erythema that encompasses most of the dorsal surface to the midpoint of the proximal phalanx. On the medial aspects of the toe at the nail there is a 2 x 3 mm area that looks white. The toe is most tender at that spot. Nursing note and vitals reviewed.     [address]  OFFICE PROCEDURE PROGRESS NOTE        Chart reviewed for the following:   Harish Bynum MD, have reviewed the History, Physical and updated the Allergic reactions for [address] performed immediately prior to start of procedure:   I, Levi Julian MD, have performed the following reviews on Manuel Marcus prior to the start of the procedure:            * Patient was identified by name and date of birth   * Agreement on procedure being performed was verified  * Risks and Benefits explained to the patient  * Procedure site verified and marked as necessary  * Patient was positioned for comfort  * Consent was signed and verified     Time: 7954      Date of procedure: 2/27/2017    Procedure performed by:  Maricruz Lindquist MD    Provider assisted by: Taran Cotter LPN    Patient assisted by: mother    How tolerated by patient: tolerated the procedure well with no complications    Post Procedural Pain Scale: 0 - No Hurt    Comments: none    Incision and drainage procedure:  After obtaining informed consent for incision and drainage of paronychia right great toe, a digital block was applied in the usual fashion using a total of 5 mL's of 1% Xylocaine. When adequate anesthesia had been obtained, the toe was painted with Betadine. An incision was made in the medial aspect of the great toe in the region where the white area was present. A moderate amount of purulent material drained and was collected for culture. Betadine was then washed from the toe. A bandage was applied. Patient tolerated procedure well. ASSESSMENT and PLAN    ICD-10-CM ICD-9-CM    1. Paronychia of great toe, right L03.031 681.11 doxycycline (VIBRAMYCIN) 100 mg capsule      DRAIN SKIN ABSCESS SIMPLE      AEROBIC BACTERIAL CULTURE         Paronychia of great toe, right  -     doxycycline (VIBRAMYCIN) 100 mg capsule; Take 1 Cap by mouth two (2) times a day. -     DRAIN SKIN ABSCESS SIMPLE  -     AEROBIC BACTERIAL CULTURE      Follow-up Disposition:  Return if symptoms worsen or fail to improve. Begin warm soaks twice a day tomorrow and continue for 3-5 days. May return to work when he can comfortably wear shoes. I have discussed the diagnosis with the patient and the intended plan as seen in the above orders. Patient is in agreement. The patient has received an after-visit summary and questions were answered concerning future plans. I have discussed medication side effects and warnings with the patient as well.

## 2017-02-27 NOTE — PROGRESS NOTES
Reviewed record In preparation for visit and have obtained necessary documentation. Medical Power of  on file. 1. Have you been to the ER, urgent care clinic or hospitalized since your last visit? No     2. Have you seen or consulted any other health care providers outside of the [address] [place] [address] since your last visit? Include any pap smears or colon screening.  No    Health Maintenance reviewed:

## 2017-02-28 ENCOUNTER — HOSPITAL ENCOUNTER (OUTPATIENT)
Dept: LAB | Age: 62
Discharge: HOME | End: 2017-02-28
Payer: MEDICARE

## 2017-02-28 PROCEDURE — 87186 SC STD MICRODIL/AGAR DIL: CPT

## 2017-02-28 PROCEDURE — 87070 CULTURE OTHR SPECIMN AEROBIC: CPT

## 2017-03-02 LAB — BACTERIA SPEC AEROBE CULT: ABNORMAL

## 2017-03-03 NOTE — PROGRESS NOTES
Inform patient's mother that culture shows bacteria is sensitive to prescribed antibiotic. Result is Staph aureus (not MRSA).

## 2017-04-19 RX ORDER — RAMIPRIL 5 MG/1
5 CAPSULE ORAL DAILY
Qty: 90 CAP | Refills: 3 | Status: SHIPPED | OUTPATIENT
Start: 2017-04-19 | End: 2018-05-07 | Stop reason: SDUPTHER

## 2017-05-15 RX ORDER — FOLIC ACID 1 MG/1
TABLET ORAL
Qty: 90 TAB | Refills: 3 | Status: SHIPPED | OUTPATIENT
Start: 2017-05-15 | End: 2018-06-11 | Stop reason: SDUPTHER

## 2017-05-15 RX ORDER — METFORMIN HYDROCHLORIDE 750 MG/1
TABLET, EXTENDED RELEASE ORAL
Qty: 180 TAB | Refills: 3 | Status: SHIPPED | OUTPATIENT
Start: 2017-05-15 | End: 2018-06-11 | Stop reason: SDUPTHER

## 2017-05-18 ENCOUNTER — OFFICE VISIT (OUTPATIENT)
Dept: INTERNAL MEDICINE CLINIC | Age: 62
End: 2017-05-18

## 2017-05-18 VITALS
OXYGEN SATURATION: 96 % | RESPIRATION RATE: 14 BRPM | BODY MASS INDEX: 27.1 KG/M2 | SYSTOLIC BLOOD PRESSURE: 132 MMHG | WEIGHT: 204.5 LBS | DIASTOLIC BLOOD PRESSURE: 68 MMHG | HEART RATE: 66 BPM | HEIGHT: 73 IN | TEMPERATURE: 97.8 F

## 2017-05-18 DIAGNOSIS — I10 HYPERTENSION, ESSENTIAL: ICD-10-CM

## 2017-05-18 DIAGNOSIS — Z00.00 MEDICARE ANNUAL WELLNESS VISIT, SUBSEQUENT: Primary | ICD-10-CM

## 2017-05-18 DIAGNOSIS — E78.00 HYPERCHOLESTEROLEMIA: ICD-10-CM

## 2017-05-18 DIAGNOSIS — F79 MENTAL RETARDATION: ICD-10-CM

## 2017-05-18 LAB — HBA1C MFR BLD HPLC: 8.1 % (ref 4.8–5.6)

## 2017-05-18 RX ORDER — GLIMEPIRIDE 4 MG/1
4 TABLET ORAL
Qty: 90 TAB | Refills: 3 | Status: SHIPPED | OUTPATIENT
Start: 2017-05-18 | End: 2017-08-24 | Stop reason: SDUPTHER

## 2017-05-18 NOTE — MR AVS SNAPSHOT
Visit Information Date & Time Provider Department Dept. Phone Encounter #  
 5/18/2017  3:30 PM Hector Hopkins MD Pola Mcdonnell [phone] [id number] Follow-up Instructions Return in about 3 months (around 8/18/2017) for DM, Poc A1c  . Upcoming Health Maintenance Date Due  
 MEDICARE YEARLY EXAM 4/13/2017 COLONOSCOPY 11/8/2017 INFLUENZA AGE 9 TO ADULT 8/1/2017 HEMOGLOBIN A1C Q6M 8/10/2017 FOOT EXAM Q1 8/16/2017 MICROALBUMIN Q1 8/16/2017 EYE EXAM RETINAL OR DILATED Q1 9/9/2017 LIPID PANEL Q1 2/10/2018 DTaP/Tdap/Td series (2 - Td) 10/20/2020 Allergies as of 5/18/2017  Review Complete On: 5/18/2017 By: Hector Hopkins MD  
 No Known Allergies Current Immunizations  Reviewed on 5/18/2017 Name Date H1N1 FLU VACCINE 1/1/2010 Influenza Vaccine (Quad) PF 9/28/2016, 10/15/2015, 10/10/2014 Influenza Vaccine Split 9/21/2011 Influenza Vaccine Whole 10/12/2010 Pneumococcal Polysaccharide (PPSV-23) 2/16/2017 TD Vaccine 2/22/2005 TDAP Vaccine 10/20/2010 Zoster Vaccine, Live 2/20/2014 Reviewed by Teto Pierson LPN on 5/93/3825 at  3:40 PM  
You Were Diagnosed With   
  
 Codes Comments Medicare annual wellness visit, subsequent    -  Primary ICD-10-CM: Z00.00 ICD-9-CM: V70.0 Controlled type 2 diabetes mellitus without complication, without long-term current use of insulin ([facility]     ICD-10-CM: E11.9 ICD-9-CM: 250.00 Hypertension, essential     ICD-10-CM: I10 
ICD-9-CM: 401.9 Vitals BP Pulse Temp Resp Height(growth percentile) Weight(growth percentile) 132/68 (BP 1 Location: Right arm, BP Patient Position: Sitting) 66 97.8 °F (36.6 °C) (Oral) 14 6' 1\" (1.854 m) 204 lb 8 oz (92.8 kg) SpO2 BMI Smoking Status 96% 26.98 kg/m2 Never Smoker BMI and BSA Data Body Mass Index Body Surface Area  
 26.98 kg/m 2 2.19 m 2 Preferred Pharmacy Pharmacy Name Phone [facility] [address] [phone] Your Updated Medication List  
  
   
This list is accurate as of: 5/18/17  4:15 PM.  Always use your most recent med list.  
  
  
  
  
 atorvastatin 40 mg tablet Commonly known as:  LIPITOR  
TAKE ONE TABLET BY MOUTH AT BEDTIME CALTRATE-600 PLUS VITAMIN D3 tablet Generic drug:  calcium-cholecalciferol (D3) Take  by mouth. chlorhexidine 0.12 % solution Commonly known as:  PERIDEX  
ONE CAPFUL ONCE DAILY OR AS DIRECTED  
  
 divalproex  mg tablet Commonly known as:  DEPAKOTE Take one tab in the AM and two tabs at night  
  
 folic acid 1 mg tablet Commonly known as:  FOLVITE  
TAKE ONE TABLET BY MOUTH DAILY  
  
 glimepiride 4 mg tablet Commonly known as:  AMARYL Take 1 Tab by mouth every morning. metFORMIN  mg tablet Commonly known as:  GLUCOPHAGE XR  
TAKE ONE TABLET BY MOUTH TWO TIMES A DAY  
  
 ramipril 5 mg capsule Commonly known as:  ALTACE Take 1 Cap by mouth daily. topiramate 200 mg tablet Commonly known as:  TOPAMAX Take 1.5 Tabs by mouth two (2) times a day. triamcinolone acetonide 0.1 % topical cream  
Commonly known as:  KENALOG Prescriptions Sent to Pharmacy Refills  
 glimepiride (AMARYL) 4 mg tablet 3 Sig: Take 1 Tab by mouth every morning. Class: Normal  
 Pharmacy: [facility] [address] #: [phone] Route: Oral  
  
We Performed the Following AMB POC HEMOGLOBIN A1C [31732 CPT(R)] Follow-up Instructions Return in about 3 months (around 8/18/2017) for DM, Poc A1c  . Patient Instructions Increase glimepiride to 4 mg daily Return in 3 months. We will do hemoglobin A1 c in the office that day. We will also need a urine sample. The best way to stay healthy is to live a healthy lifestyle.  A healthy lifestyle includes regular exercise, eating a well-balanced diet, keeping a healthy weight and not smoking. Regular physical exams and screening tests are another important way to take care of yourself. Preventive exams provided by health care providers can find health problems early when treatment works best and can keep you from getting certain diseases or illnesses. Preventive services include exams, lab tests, screenings, shots, monitoring and information to help you take care of your own health. All people over 65 should have a pneumonia shot. Pneumonia shots are usually only needed once in a lifetime unless your doctor decides differently. In addition to your physical exam, some screening tests are recommended: 
 
All people over 65 should have a yearly flu shot. People over 65 are at medium to high risk for Hepatitis B. Three shots are needed for complete protection. Bone mass measurement (dexa scan) is recommended every two years. Diabetes Mellitus screening is recommended every year. Glaucoma is an eye disease caused by high pressure in the eye. An eye exam is recommended every year. Cardiovascular screening tests that check your cholesterol and other blood fat (lipid) levels are recommended every five years. Colorectal Cancer screening tests help to find pre-cancerous polyps (growths in the colon) so they can be removed before they turn into cancer. Tests ordered for screening depend on your personal and family history risk factors. Prostate Cancer Screening (annually up to age 76) Screening for breast cancer is recommended yearly with a Mammogram. 
 
Screening for cervical and vaginal cancer is recommended with a pelvic and Pap test every two years. However if you have had an abnormal pap in the past  three years or at high risk for cervical or vaginal cancer Medicare will cover a pap test and a pelvic exam every year. Here is a list of your current Health Maintenance items with a due date: 
Health Maintenance Due Topic Date Due  
 Annual Well Visit  04/13/2017  Colonoscopy  11/08/2017 Nutrition Tips for Diabetes: After Your Visit Your Care Instructions A healthy diet is important to manage diabetes. It helps you lose weight (if you need to) and keep it off. It gives you the nutrition and energy your body needs and helps prevent heart disease. But a diet for diabetes does not mean that you have to eat special foods. You can eat what your family eats, including occasional sweets and other favorites. But you do have to pay attention to how often you eat and how much you eat of certain foods. The right plan for you will give you meals that help you keep your blood sugar at healthy levels. Try to eat a variety of foods and to spread carbohydrate throughout the day. Carbohydrate raises blood sugar higher and more quickly than any other nutrient does. Carbohydrate is found in sugar, breads and cereals, fruit, starchy vegetables such as potatoes and corn, and milk and yogurt. You may want to work with a dietitian or diabetes educator to help you plan meals and snacks. A dietitian or diabetes educator also can help you lose weight if that is one of your goals. The following tips can help you enjoy your meals and stay healthy. Follow-up care is a key part of your treatment and safety. Be sure to make and go to all appointments, and call your doctor if you are having problems. Its also a good idea to know your test results and keep a list of the medicines you take. How can you care for yourself at home? · Learn which foods have carbohydrate and how much carbohydrate to eat. A dietitian or diabetes educator can help you learn to keep track of how much carbohydrate you eat. · Spread carbohydrate throughout the day. Eat some carbohydrate at all meals, but do not eat too much at any one time. · Plan meals to include food from all the food groups. These are the food groups and some example portion sizes: ¨ Grains: 1 slice of bread (1 ounce), ½ cup of cooked cereal, and 1/3 cup of cooked pasta or rice. These have about 15 grams of carbohydrate in a serving. Choose whole grains such as whole wheat bread or crackers, oatmeal, and brown rice more often than refined grains. ¨ Fruit: 1 small fresh fruit, such as an apple or orange; ½ of a banana; ½ cup of chopped, cooked, or canned fruit; ½ cup of fruit juice; 1 cup of melon or raspberries; and 2 tablespoons of dried fruit. These have about 15 grams of carbohydrate in a serving. ¨ Dairy: 1 cup of nonfat or low-fat milk and 2/3 cup of plain yogurt. These have about 15 grams of carbohydrate in a serving. ¨ Protein foods: Beef, chicken, turkey, fish, eggs, tofu, cheese, cottage cheese, and peanut butter. A serving size of meat is 3 ounces, which is about the size of a deck of cards. Examples of meat substitute serving sizes (equal to 1 ounce of meat) are 1/4 cup of cottage cheese, 1 egg, 1 tablespoon of peanut butter, and ½ cup of tofu. These have very little or no carbohydrate per serving. ¨ Vegetables: Starchy vegetables such as ½ cup of cooked dried beans, peas, potatoes, or corn have about 15 grams of carbohydrate. Nonstarchy vegetables have very little carbohydrate, such as 1 cup of raw leafy vegetables (such as spinach), ½ cup of other vegetables (cooked or chopped), and 3/4 cup of vegetable juice. · Use the plate format to plan meals. It is a good, quick way to make sure that you have a balanced meal. It also helps you spread carbohydrate throughout the day. You divide your plate by types of foods. Put vegetables on half the plate, meat or meat substitutes on one-quarter of the plate, and a grain or starchy vegetable (such as brown rice or a potato) in the final quarter of the plate.  To this you can add a small piece of fruit and 1 cup of milk or yogurt, depending on how much carbohydrate you are supposed to eat at a meal. 
· Talk to your dietitian or diabetes educator about ways to add limited amounts of sweets into your meal plan. You can eat these foods now and then, as long as you include the amount of carbohydrate they have in your daily carbohydrate allowance. · If you drink alcohol, limit it to no more than 1 drink a day for women and 2 drinks a day for men. If you are pregnant, no amount of alcohol is known to be safe. · Protein, fat, and fiber do not raise blood sugar as much as carbohydrate does. If you eat a lot of these nutrients in a meal, your blood sugar will rise more slowly than it would otherwise. · Limit saturated fats, such as those from meat and dairy products. Try to replace it with monounsaturated fat, such as olive oil. This is a healthier choice because people who have diabetes are at higher-than-average risk of heart disease. But use a modest amount of olive oil. A tablespoon of olive oil has 14 grams of fat and 120 calories. · Exercise lowers blood sugar. If you take insulin by shots or pump, you can use less than you would if you were not exercising. Keep in mind that timing matters. If you exercise within 1 hour after a meal, your body may need less insulin for that meal than it would if you exercised 3 hours after the meal. Test your blood sugar to find out how exercise affects your need for insulin. · Exercise on most days of the week. Aim for at least 30 minutes. Exercise helps you stay at a healthy weight and helps your body use insulin. Walking is an easy way to get exercise. Gradually increase the amount you walk every day. You also may want to swim, bike, or do other activities. When you eat out · Learn to estimate the serving sizes of foods that have carbohydrate. If you measure food at home, it will be easier to estimate the amount in a serving of restaurant food. · If the meal you order has too much carbohydrate (such as potatoes, corn, or baked beans), ask to have a low-carbohydrate food instead. Ask for a salad or green vegetables. · If you use insulin, check your blood sugar before and after eating out to help you plan how much to eat in the future. · If you eat more carbohydrate at a meal than you had planned, take a walk or do other exercise. This will help lower your blood sugar. Where can you learn more? Go to SnapRetail.be Enter F014 in the search box to learn more about \"Nutrition Tips for Diabetes: After Your Visit. \"  
© 5689-7703 Healthwise, [employer]. Care instructions adapted under license by [employer] (which disclaims liability or warranty for this information). This care instruction is for use with your licensed healthcare professional. If you have questions about a medical condition or this instruction, always ask your healthcare professional. [address] any warranty or liability for your use of this information. Content Version: 19.2.434738; Current as of: June 4, 2014 Introducing [facility]! [employer] introduces [employer] patient portal. Now you can access parts of your medical record, email your doctor's office, and request medication refills online. 1. In your internet browser, go to [email or web address] 2. Click on the First Time User? Click Here link in the Sign In box. You will see the New Member Sign Up page. 3. Enter your [employer]p Access Code exactly as it appears below. You will not need to use this code after youve completed the sign-up process. If you do not sign up before the expiration date, you must request a new code. · [employer]p Access Code: [email or web address] Expires: 8/16/2017  4:15 PM 
 
4. Enter the last four digits of your Social Security Number (xxxx) and Date of Birth (mm/dd/yyyy) as indicated and click Submit.  You will be taken to the next sign-up page. 5. Create a [employer] ID. This will be your [employer] login ID and cannot be changed, so think of one that is secure and easy to remember. 6. Create a [employer] password. You can change your password at any time. 7. Enter your Password Reset Question and Answer. This can be used at a later time if you forget your password. 8. Enter your e-mail address. You will receive e-mail notification when new information is available in [address]. 9. Click Sign Up. You can now view and download portions of your medical record. 10. Click the Download Summary menu link to download a portable copy of your medical information. If you have questions, please visit the Frequently Asked Questions section of the [email or web address] website. Remember, [email or web address] is NOT to be used for urgent needs. For medical emergencies, dial 911. Now available from your iPhone and Android! Please provide this summary of care documentation to your next provider. Your primary care clinician is listed as Shamar Lawson. If you have any questions after today's visit, please call [phone].

## 2017-05-18 NOTE — PROGRESS NOTES
HISTORY OF PRESENT ILLNESS  Alexi Harris is a 62 y.o. male. HPI He presents for follow up of diabetes mellitus, hypertension and hyperlipidemia. 3 months ago Hgb A1c eliezer from 6.9 to 9.0. Blood pressure was high at 146/72. Diabetic ROS - medication compliance: compliant all of the time,      home glucose monitoring: is not performed,      home BP Monitoring: is not measured at home. further diabetic ROS: no polyuria or polydipsia, no numbness, tingling or pain in extremities, no unusual visual symptoms, no hypoglycemia, no medication side effects noted. Diet and Lifestyle: generally follows a low fat low cholesterol diet, generally follows a low sodium diet, follows a diabetic diet regularly, exercises sporadically, nonsmoker  Cardiovascular ROS:  He denies palpitations, exertional chest pressure/discomfort, lower extremity edema, dyspnea on exertion, dizziness.      Patient Active Problem List   Diagnosis Code    Mental retardation F79    [facility] G40.909    Psoriasis L40.9    Venous stasis of lower extremity I87.8    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    Sleep disorder G47.9    Hypertension, essential I10    Strongyloides stercoralis infection B78.9    Eosinophilia D72.1    Hypercholesterolemia E78.00    Controlled type 2 diabetes mellitus without complication, without long-term current use of insulin (HCC) E11.9     Past Medical History:   Diagnosis Date    Contact dermatitis and other eczema, due to unspecified cause     psoriasis    Diabetes [facility]     Eosinophilia     Hypercholesterolemia     Hypertension     Mental retardation     [facility]izures ([facility]     Skin cancer      Past Surgical History:   Procedure Laterality Date    ENDOSCOPY, COLON, DIAGNOSTIC  11/08/2007    Dr Eloisa Palacios normal except small internal hemorrhoids repeat 11/2017     Social History     Social History    Marital status: SINGLE     Spouse name: N/A    Number of children: N/A    Years of education: N/A     Occupational History          [employer]      Social History Main Topics    Smoking status: Never Smoker    Smokeless tobacco: Never Used    Alcohol use No    Drug use: None    Sexual activity: Not Asked     Other Topics Concern    None     Social History Narrative     Family History   Problem Relation Age of Onset    Other Mother      PMR    Hypertension Mother     Cancer Father      Lung    Diabetes Brother      No Known Allergies  Current Outpatient Prescriptions   Medication Sig Dispense Refill    folic acid (FOLVITE) 1 mg tablet TAKE ONE TABLET BY MOUTH DAILY 90 Tab 3    metFORMIN ER (GLUCOPHAGE XR) 750 mg tablet TAKE ONE TABLET BY MOUTH TWO TIMES A  Tab 3    ramipril (ALTACE) 5 mg capsule Take 1 Cap by mouth daily. 90 Cap 3    chlorhexidine (PERIDEX) 0.12 % solution ONE CAPFUL ONCE DAILY OR AS DIRECTED  9    glimepiride (AMARYL) 2 mg tablet Take 1 Tab by mouth every morning. 90 Tab 3    atorvastatin (LIPITOR) 40 mg tablet TAKE ONE TABLET BY MOUTH AT BEDTIME 90 Tab 3    topiramate (TOPAMAX) 200 mg tablet Take 1.5 Tabs by mouth two (2) times a day. 270 Tab 3    divalproex DR (DEPAKOTE) 500 mg tablet Take one tab in the AM and two tabs at night 270 Tab 3    triamcinolone acetonide (KENALOG) 0.1 % topical cream       Calcium-Cholecalciferol, D3, (CALTRATE-600 PLUS VITAMIN D3) 600-400 mg-unit Tab Take  by mouth. ROS       Visit Vitals    /68 (BP 1 Location: Right arm, BP Patient Position: Sitting)    Pulse 66    Temp 97.8 °F (36.6 °C) (Oral)    Resp 14    Ht 6' 1\" (1.854 m)    Wt 204 lb 8 oz (92.8 kg)    SpO2 96%    BMI 26.98 kg/m2     Physical Exam   Constitutional: He is oriented to person, place, and time. He appears well-developed and well-nourished. HENT:   Head: Normocephalic and atraumatic. Eyes: Conjunctivae are normal. Pupils are equal, round, and reactive to light. Neck: Neck supple. Carotid bruit is not present.  No thyromegaly present. Cardiovascular: Normal rate, regular rhythm and normal heart sounds. PMI is not displaced. Exam reveals no gallop. No murmur heard. Pulses:       Dorsalis pedis pulses are 2+ on the right side, and 2+ on the left side. Posterior tibial pulses are 2+ on the right side, and 2+ on the left side. Pulmonary/Chest: Effort normal. He has no wheezes. He has no rhonchi. He has no rales. Abdominal: Soft. Normal appearance. He exhibits no abdominal bruit and no mass. There is no hepatosplenomegaly. There is no tenderness. Musculoskeletal: He exhibits no edema. Lymphadenopathy:     He has no cervical adenopathy. Right: No supraclavicular adenopathy present. Left: No inguinal and no supraclavicular adenopathy present. Neurological: He is alert and oriented to person, place, and time. No sensory deficit. Skin: Skin is warm, dry and intact. No rash noted. Psychiatric: He has a normal mood and affect. His behavior is normal.   Nursing note and vitals reviewed.     Results for orders placed or performed in visit on 05/18/17   AMB POC HEMOGLOBIN A1C   Result Value Ref Range    Hemoglobin A1c (POC) 8.1 (A) 4.8 - 5.6 %     Lab Results   Component Value Date/Time    Cholesterol, total 123 02/10/2017 08:48 AM    HDL Cholesterol 41 02/10/2017 08:48 AM    LDL, calculated 60 02/10/2017 08:48 AM    VLDL, calculated 22 02/10/2017 08:48 AM    Triglyceride 110 02/10/2017 08:48 AM    CHOL/HDL Ratio 2.8 03/31/2009 08:40 AM     Lab Results   Component Value Date/Time    Sodium 140 02/10/2017 08:48 AM    Potassium 5.2 02/10/2017 08:48 AM    Chloride 102 02/10/2017 08:48 AM    CO2 24 02/10/2017 08:48 AM    Anion gap 8 07/06/2015 04:17 PM    Glucose 191 02/10/2017 08:48 AM    BUN 21 02/10/2017 08:48 AM    Creatinine 0.77 02/10/2017 08:48 AM    BUN/Creatinine ratio 27 02/10/2017 08:48 AM    GFR est  02/10/2017 08:48 AM    GFR est non-AA 99 02/10/2017 08:48 AM    Calcium 9.1 02/10/2017 08:48 AM    Bilirubin, total 0.2 02/10/2017 08:48 AM    AST (SGOT) 11 02/10/2017 08:48 AM    Alk. phosphatase 50 02/10/2017 08:48 AM    Protein, total 6.2 02/10/2017 08:48 AM    Albumin 4.0 02/10/2017 08:48 AM    Globulin 3.0 07/06/2015 04:17 PM    A-G Ratio 1.8 02/10/2017 08:48 AM    ALT (SGPT) 11 02/10/2017 08:48 AM         ASSESSMENT and PLAN    ICD-10-CM ICD-9-CM    1. Medicare annual wellness visit, subsequent Z00.00 V70.0    2. Uncontrolled type 2 diabetes mellitus without complication, without long-term current use of insulin ([facility]) E11.65 250.02 AMB POC HEMOGLOBIN A1C      glimepiride (AMARYL) 4 mg tablet   3. Hypertension, essential I10 401.9    4. Hypercholesterolemia E78.00 272.0    5. Mental retardation F79 319          Medicare annual wellness visit, subsequent    Uncontrolled type 2 diabetes mellitus without complication, without long-term current use of insulin ([facility]  Improved but not at goal.  -     AMB POC HEMOGLOBIN A1C  -     Increase glimepiride (AMARYL) 4 mg tablet; Take 1 Tab by mouth every morning. Hypertension, essential  Hypertension is controlled    Hypercholesterolemia  Hyperlipidemia is controlled    Mental retardation  Not able to execute advance directive. Follow-up Disposition:  Return in about 3 months (around 8/18/2017) for DM, Poc A1c  .  lab results and schedule of future lab studies reviewed with patient  reviewed diet, exercise and weight control  cardiovascular risk and specific lipid/LDL goals reviewed  I have discussed the diagnosis with the patient and the intended plan as seen in the above orders. Patient is in agreement. The patient has received an after-visit summary and questions were answered concerning future plans. I have discussed medication side effects and warnings with the patient as well.

## 2017-05-18 NOTE — PROGRESS NOTES
Reviewed record In preparation for visit and have obtained necessary documentation. Advanced directive / living will on file. 1. Have you been to the ER, urgent care clinic or hospitalized since your last visit? No     2. Have you seen or consulted any other health care providers outside of the [address] [place] [address] since your last visit? Include any pap smears or colon screening.  No    Health Maintenance reviewed:

## 2017-05-18 NOTE — PATIENT INSTRUCTIONS
Increase glimepiride to 4 mg daily  Return in 3 months. We will do hemoglobin A1 c in the office that day. We will also need a urine sample. The best way to stay healthy is to live a healthy lifestyle. A healthy lifestyle includes regular exercise, eating a well-balanced diet, keeping a healthy weight and not smoking. Regular physical exams and screening tests are another important way to take care of yourself. Preventive exams provided by health care providers can find health problems early when treatment works best and can keep you from getting certain diseases or illnesses. Preventive services include exams, lab tests, screenings, shots, monitoring and information to help you take care of your own health. All people over 65 should have a pneumonia shot. Pneumonia shots are usually only needed once in a lifetime unless your doctor decides differently. In addition to your physical exam, some screening tests are recommended:    All people over 65 should have a yearly flu shot. People over 65 are at medium to high risk for Hepatitis B. Three shots are needed for complete protection. Bone mass measurement (dexa scan) is recommended every two years. Diabetes Mellitus screening is recommended every year. Glaucoma is an eye disease caused by high pressure in the eye. An eye exam is recommended every year. Cardiovascular screening tests that check your cholesterol and other blood fat (lipid) levels are recommended every five years. Colorectal Cancer screening tests help to find pre-cancerous polyps (growths in the colon) so they can be removed before they turn into cancer. Tests ordered for screening depend on your personal and family history risk factors. Prostate Cancer Screening (annually up to age 76)    Screening for breast cancer is recommended yearly with a Mammogram.    Screening for cervical and vaginal cancer is recommended with a pelvic and Pap test every two years. However if you have had an abnormal pap in the past  three years or at high risk for cervical or vaginal cancer Medicare will cover a pap test and a pelvic exam every year. Here is a list of your current Health Maintenance items with a due date:  Health Maintenance Due   Topic Date Due    Annual Well Visit  04/13/2017    Colonoscopy  11/08/2017       Nutrition Tips for Diabetes: After Your Visit  Your Care Instructions  A healthy diet is important to manage diabetes. It helps you lose weight (if you need to) and keep it off. It gives you the nutrition and energy your body needs and helps prevent heart disease. But a diet for diabetes does not mean that you have to eat special foods. You can eat what your family eats, including occasional sweets and other favorites. But you do have to pay attention to how often you eat and how much you eat of certain foods. The right plan for you will give you meals that help you keep your blood sugar at healthy levels. Try to eat a variety of foods and to spread carbohydrate throughout the day. Carbohydrate raises blood sugar higher and more quickly than any other nutrient does. Carbohydrate is found in sugar, breads and cereals, fruit, starchy vegetables such as potatoes and corn, and milk and yogurt. You may want to work with a dietitian or diabetes educator to help you plan meals and snacks. A dietitian or diabetes educator also can help you lose weight if that is one of your goals. The following tips can help you enjoy your meals and stay healthy. Follow-up care is a key part of your treatment and safety. Be sure to make and go to all appointments, and call your doctor if you are having problems. Its also a good idea to know your test results and keep a list of the medicines you take. How can you care for yourself at home? · Learn which foods have carbohydrate and how much carbohydrate to eat.  A dietitian or diabetes educator can help you learn to keep track of how much carbohydrate you eat. · Spread carbohydrate throughout the day. Eat some carbohydrate at all meals, but do not eat too much at any one time. · Plan meals to include food from all the food groups. These are the food groups and some example portion sizes:  ¨ Grains: 1 slice of bread (1 ounce), ½ cup of cooked cereal, and 1/3 cup of cooked pasta or rice. These have about 15 grams of carbohydrate in a serving. Choose whole grains such as whole wheat bread or crackers, oatmeal, and brown rice more often than refined grains. ¨ Fruit: 1 small fresh fruit, such as an apple or orange; ½ of a banana; ½ cup of chopped, cooked, or canned fruit; ½ cup of fruit juice; 1 cup of melon or raspberries; and 2 tablespoons of dried fruit. These have about 15 grams of carbohydrate in a serving. ¨ Dairy: 1 cup of nonfat or low-fat milk and 2/3 cup of plain yogurt. These have about 15 grams of carbohydrate in a serving. ¨ Protein foods: Beef, chicken, turkey, fish, eggs, tofu, cheese, cottage cheese, and peanut butter. A serving size of meat is 3 ounces, which is about the size of a deck of cards. Examples of meat substitute serving sizes (equal to 1 ounce of meat) are 1/4 cup of cottage cheese, 1 egg, 1 tablespoon of peanut butter, and ½ cup of tofu. These have very little or no carbohydrate per serving. ¨ Vegetables: Starchy vegetables such as ½ cup of cooked dried beans, peas, potatoes, or corn have about 15 grams of carbohydrate. Nonstarchy vegetables have very little carbohydrate, such as 1 cup of raw leafy vegetables (such as spinach), ½ cup of other vegetables (cooked or chopped), and 3/4 cup of vegetable juice. · Use the plate format to plan meals. It is a good, quick way to make sure that you have a balanced meal. It also helps you spread carbohydrate throughout the day. You divide your plate by types of foods.  Put vegetables on half the plate, meat or meat substitutes on one-quarter of the plate, and a grain or starchy vegetable (such as brown rice or a potato) in the final quarter of the plate. To this you can add a small piece of fruit and 1 cup of milk or yogurt, depending on how much carbohydrate you are supposed to eat at a meal.  · Talk to your dietitian or diabetes educator about ways to add limited amounts of sweets into your meal plan. You can eat these foods now and then, as long as you include the amount of carbohydrate they have in your daily carbohydrate allowance. · If you drink alcohol, limit it to no more than 1 drink a day for women and 2 drinks a day for men. If you are pregnant, no amount of alcohol is known to be safe. · Protein, fat, and fiber do not raise blood sugar as much as carbohydrate does. If you eat a lot of these nutrients in a meal, your blood sugar will rise more slowly than it would otherwise. · Limit saturated fats, such as those from meat and dairy products. Try to replace it with monounsaturated fat, such as olive oil. This is a healthier choice because people who have diabetes are at higher-than-average risk of heart disease. But use a modest amount of olive oil. A tablespoon of olive oil has 14 grams of fat and 120 calories. · Exercise lowers blood sugar. If you take insulin by shots or pump, you can use less than you would if you were not exercising. Keep in mind that timing matters. If you exercise within 1 hour after a meal, your body may need less insulin for that meal than it would if you exercised 3 hours after the meal. Test your blood sugar to find out how exercise affects your need for insulin. · Exercise on most days of the week. Aim for at least 30 minutes. Exercise helps you stay at a healthy weight and helps your body use insulin. Walking is an easy way to get exercise. Gradually increase the amount you walk every day. You also may want to swim, bike, or do other activities. When you eat out  · Learn to estimate the serving sizes of foods that have carbohydrate.  If you measure food at home, it will be easier to estimate the amount in a serving of restaurant food. · If the meal you order has too much carbohydrate (such as potatoes, corn, or baked beans), ask to have a low-carbohydrate food instead. Ask for a salad or green vegetables. · If you use insulin, check your blood sugar before and after eating out to help you plan how much to eat in the future. · If you eat more carbohydrate at a meal than you had planned, take a walk or do other exercise. This will help lower your blood sugar. Where can you learn more? Go to [employer]  Enter Z194 in the search box to learn more about \"Nutrition Tips for Diabetes: After Your Visit. \"   © 0433-6659 [employer]. Care instructions adapted under license by Emely Patel (which disclaims liability or warranty for this information). This care instruction is for use with your licensed healthcare professional. If you have questions about a medical condition or this instruction, always ask your healthcare professional. [place] [address] any warranty or liability for your use of this information.   Content Version: 84.7.325352; Current as of: June 4, 2014

## 2017-05-18 NOTE — PROGRESS NOTES
This is a Subsequent Medicare Annual Wellness Visit providing Personalized Prevention Plan Services (PPPS) (Performed 12 months after initial AWV and PPPS )    I have reviewed the patient's medical history in detail and updated the computerized patient record. History     Past Medical History:   Diagnosis Date    Contact dermatitis and other eczema, due to unspecified cause     psoriasis    Diabetes ([facility]     Eosinophilia     Hypercholesterolemia     Hypertension     Mental retardation     Seizures ([facility]     Skin cancer       Past Surgical History:   Procedure Laterality Date    ENDOSCOPY, COLON, DIAGNOSTIC  11/08/2007    Dr Awa Woods normal except small internal hemorrhoids repeat 11/2017     Current Outpatient Prescriptions   Medication Sig Dispense Refill    folic acid (FOLVITE) 1 mg tablet TAKE ONE TABLET BY MOUTH DAILY 90 Tab 3    metFORMIN ER (GLUCOPHAGE XR) 750 mg tablet TAKE ONE TABLET BY MOUTH TWO TIMES A  Tab 3    ramipril (ALTACE) 5 mg capsule Take 1 Cap by mouth daily. 90 Cap 3    chlorhexidine (PERIDEX) 0.12 % solution ONE CAPFUL ONCE DAILY OR AS DIRECTED  9    glimepiride (AMARYL) 2 mg tablet Take 1 Tab by mouth every morning. 90 Tab 3    atorvastatin (LIPITOR) 40 mg tablet TAKE ONE TABLET BY MOUTH AT BEDTIME 90 Tab 3    topiramate (TOPAMAX) 200 mg tablet Take 1.5 Tabs by mouth two (2) times a day. 270 Tab 3    divalproex DR (DEPAKOTE) 500 mg tablet Take one tab in the AM and two tabs at night 270 Tab 3    triamcinolone acetonide (KENALOG) 0.1 % topical cream       Calcium-Cholecalciferol, D3, (CALTRATE-600 PLUS VITAMIN D3) 600-400 mg-unit Tab Take  by mouth.        No Known Allergies  Family History   Problem Relation Age of Onset    Other Mother      PMR    Hypertension Mother     Cancer Father      Lung    Diabetes Brother      Social History   Substance Use Topics    Smoking status: Never Smoker    Smokeless tobacco: Never Used    Alcohol use No     Patient Active Problem List   Diagnosis Code    Mental retardation F79    Seizure disorder ([facility] G40.909    Psoriasis L40.9    Venous stasis of lower extremity I87.8    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    Sleep disorder G47.9    Hypertension, essential I10    Strongyloides stercoralis infection B78.9    Eosinophilia D72.1    Hypercholesterolemia E78.00    Controlled type 2 diabetes mellitus without complication, without long-term current use of insulin (HCC) E11.9       Depression Risk Factor Screening:     PHQ over the last two weeks 8/16/2016   Little interest or pleasure in doing things Not at all   Feeling down, depressed or hopeless Not at all   Total Score PHQ 2 0     Alcohol Risk Factor Screening: On any occasion during the past 3 months, have you had more than 4 drinks containing alcohol? No    Do you average more than 14 drinks per week? No      Functional Ability and Level of Safety:     Hearing Loss   none    Activities of Daily Living   Self-care. Requires assistance with: no ADLs    Fall Risk   No flowsheet data found. Abuse Screen   Patient is not abused    Review of Systems   Not required    Physical Examination     Evaluation of Cognitive Function:  Mood/affect:  happy  Appearance: age appropriate, casually dressed and within normal Limits  Family member/caregiver input: mother  Cognition is abnormal due to intellectual disability. Memory appears intact.      Patient Care Team:  Reji Covarrubias MD as PCP - General (Internal Medicine)  Kathleen Reddy OD (Optometry)    Advice/Referrals/Counseling   Education and counseling provided:  Are appropriate based on today's review and evaluation      Assessment/Plan   See other note this date

## 2017-05-21 NOTE — ACP (ADVANCE CARE PLANNING)
Due to intellectual disability, he is not able to understand enough to execute advance directive. His mother has medical power of  with secondary decision makers being brothers Deon Mortensen and Delaney Gonsalez and sister-in-law [facility].

## 2017-08-24 ENCOUNTER — OFFICE VISIT (OUTPATIENT)
Dept: INTERNAL MEDICINE CLINIC | Age: 62
End: 2017-08-24

## 2017-08-24 ENCOUNTER — CLINICAL SUPPORT (OUTPATIENT)
Dept: INTERNAL MEDICINE CLINIC | Age: 62
End: 2017-08-24

## 2017-08-24 VITALS
WEIGHT: 202 LBS | BODY MASS INDEX: 26.77 KG/M2 | SYSTOLIC BLOOD PRESSURE: 139 MMHG | OXYGEN SATURATION: 99 % | HEART RATE: 67 BPM | HEIGHT: 73 IN | TEMPERATURE: 95.4 F | DIASTOLIC BLOOD PRESSURE: 79 MMHG | RESPIRATION RATE: 16 BRPM

## 2017-08-24 DIAGNOSIS — E78.00 HYPERCHOLESTEROLEMIA: ICD-10-CM

## 2017-08-24 DIAGNOSIS — I10 HYPERTENSION, ESSENTIAL: ICD-10-CM

## 2017-08-24 DIAGNOSIS — Z23 ENCOUNTER FOR IMMUNIZATION: ICD-10-CM

## 2017-08-24 DIAGNOSIS — G40.909 SEIZURE DISORDER (HCC): ICD-10-CM

## 2017-08-24 LAB
ALBUMIN UR QL STRIP: 10 MG/L
CREATININE, URINE POC: 100 MG/DL
HBA1C MFR BLD HPLC: 8 % (ref 4.8–5.6)
MICROALBUMIN/CREAT RATIO POC: <30 MG/G

## 2017-08-24 RX ORDER — GLIMEPIRIDE 4 MG/1
6 TABLET ORAL
Qty: 90 TAB | Refills: 3
Start: 2017-08-24 | End: 2017-11-28 | Stop reason: SDUPTHER

## 2017-08-24 NOTE — MR AVS SNAPSHOT
Visit Information Date & Time Provider Department Dept. Phone Encounter #  
 8/24/2017 10:00 AM Chikis Faith MD [address] [place], [place] [id number] Follow-up Instructions Return in about 3 months (around 11/24/2017) for DM, HTN. Upcoming Health Maintenance Date Due INFLUENZA AGE 9 TO ADULT 8/1/2017 FOOT EXAM Q1 8/16/2017 EYE EXAM RETINAL OR DILATED Q1 9/9/2017 COLONOSCOPY 11/8/2017 LIPID PANEL Q1 2/10/2018 HEMOGLOBIN A1C Q6M 2/24/2018 MEDICARE YEARLY EXAM 5/19/2018 MICROALBUMIN Q1 8/24/2018 DTaP/Tdap/Td series (2 - Td) 10/20/2020 Allergies as of 8/24/2017  Review Complete On: 8/24/2017 By: Chikis Faith MD  
 No Known Allergies Current Immunizations  Reviewed on 8/24/2017 Name Date H1N1 FLU VACCINE 1/1/2010 Influenza Vaccine (Quad) PF  Incomplete, 9/28/2016, 10/15/2015, 10/10/2014 Influenza Vaccine Split 9/21/2011 Influenza Vaccine Whole 10/12/2010 Pneumococcal Polysaccharide (PPSV-23) 2/16/2017 TD Vaccine 2/22/2005 TDAP Vaccine 10/20/2010 Zoster Vaccine, Live 2/20/2014 Reviewed by Wilber Cote LPN on 4/81/5447 at 89:92 AM  
You Were Diagnosed With   
  
 Codes Comments Controlled type 2 diabetes mellitus without complication, without long-term current use of insulin ([facility]    -  Primary ICD-10-CM: E11.9 ICD-9-CM: 250.00 Encounter for immunization     ICD-10-CM: V70 ICD-9-CM: V03.89 Uncontrolled type 2 diabetes mellitus without complication, without long-term current use of insulin ([facility]     ICD-10-CM: E11.65 ICD-9-CM: 250.02 Hypertension, essential     ICD-10-CM: I10 
ICD-9-CM: 401.9 Seizure disorder ([facility]     ICD-10-CM: G40.909 ICD-9-CM: 345.90 Hypercholesterolemia     ICD-10-CM: E78.00 ICD-9-CM: 272.0 Vitals BP Pulse Temp Resp Height(growth percentile) Weight(growth percentile) 139/79 (BP 1 Location: Right arm, BP Patient Position: Sitting) 67 95.4 °F (35.2 °C) (Oral) 16 6' 1\" (1.854 m) 202 lb (91.6 kg) SpO2 BMI Smoking Status 99% 26.65 kg/m2 Never Smoker BMI and BSA Data Body Mass Index Body Surface Area  
 26.65 kg/m 2 2.17 m 2 Preferred Pharmacy Pharmacy Name Phone [facility] [address] [facility], [address] [phone] Your Updated Medication List  
  
   
This list is accurate as of: 8/24/17 11:02 AM.  Always use your most recent med list.  
  
  
  
  
 atorvastatin 40 mg tablet Commonly known as:  LIPITOR  
TAKE ONE TABLET BY MOUTH AT BEDTIME CALTRATE-600 PLUS VITAMIN D3 tablet Generic drug:  calcium-cholecalciferol (D3) Take  by mouth. chlorhexidine 0.12 % solution Commonly known as:  PERIDEX  
ONE CAPFUL ONCE DAILY OR AS DIRECTED  
  
 divalproex  mg tablet Commonly known as:  DEPAKOTE Take one tab in the AM and two tabs at night  
  
 folic acid 1 mg tablet Commonly known as:  FOLVITE  
TAKE ONE TABLET BY MOUTH DAILY  
  
 glimepiride 4 mg tablet Commonly known as:  AMARYL Take 1.5 Tabs by mouth every morning. metFORMIN  mg tablet Commonly known as:  GLUCOPHAGE XR  
TAKE ONE TABLET BY MOUTH TWO TIMES A DAY  
  
 ramipril 5 mg capsule Commonly known as:  ALTACE Take 1 Cap by mouth daily. topiramate 200 mg tablet Commonly known as:  TOPAMAX Take 1.5 Tabs by mouth two (2) times a day. triamcinolone acetonide 0.1 % topical cream  
Commonly known as:  KENALOG We Performed the Following ADMIN INFLUENZA VIRUS VAC [ [facility]] AMB POC HEMOGLOBIN A1C [86712 CPT(R)] AMB POC URINE, MICROALBUMIN, SEMIQUANT (3 RESULTS) [98008 CPT(R)]  DIABETES FOOT EXAM [HM7 Custom] INFLUENZA VIRUS VAC QUAD,SPLIT,PRESV FREE SYRINGE 3/> YRS IM [id number] CPT(R)] REFERRAL TO NEUROLOGY [DKL16 Custom] Follow-up Instructions Return in about 3 months (around 11/24/2017) for DM, HTN. Referral Information Referral ID Referred By Referred To  
  
 [id number] Linette SANDS2 Jordin Chan MD   
   [address] [facility] [address] Phone: [phone] Fax: [phone] Visits Status Start Date End Date 1 New Request 8/24/17 8/24/18 If your referral has a status of pending review or denied, additional information will be sent to support the outcome of this decision. Patient Instructions Vaccine Information Statement Influenza (Flu) Vaccine (Inactivated or Recombinant): What you need to know Many Vaccine Information Statements are available in Lithuanian and other languages. See www.immunize.org/vis Hojas de Información Sobre Vacunas están disponibles en Español y en muchos otros idiomas. Visite www.immunize.org/vis 1. Why get vaccinated? Influenza (flu) is a contagious disease that spreads around the [facility] every year, usually between October and May. Flu is caused by influenza viruses, and is spread mainly by coughing, sneezing, and close contact. Anyone can get flu. Flu strikes suddenly and can last several days. Symptoms vary by age, but can include: 
 fever/chills  sore throat  muscle aches  fatigue  cough  headache  runny or stuffy nose Flu can also lead to pneumonia and blood infections, and cause diarrhea and seizures in children. If you have a medical condition, such as heart or lung disease, flu can make it worse. Flu is more dangerous for some people. Infants and young children, people 72years of age and older, pregnant women, and people with certain health conditions or a weakened immune system are at greatest risk. Each year thousands of people in the [facility] die from flu, and many more are hospitalized.   
 
Flu vaccine can: 
 keep you from getting flu, 
  make flu less severe if you do get it, and 
 keep you from spreading flu to your family and other people. 2. Inactivated and recombinant flu vaccines A dose of flu vaccine is recommended every flu season. Children 6 months through 6years of age may need two doses during the same flu season. Everyone else needs only one dose each flu season. Some inactivated flu vaccines contain a very small amount of a mercury-based preservative called thimerosal. Studies have not shown thimerosal in vaccines to be harmful, but flu vaccines that do not contain thimerosal are available. There is no live flu virus in flu shots. They cannot cause the flu. There are many flu viruses, and they are always changing. Each year a new flu vaccine is made to protect against three or four viruses that are likely to cause disease in the upcoming flu season. But even when the vaccine doesnt exactly match these viruses, it may still provide some protection Flu vaccine cannot prevent: 
 flu that is caused by a virus not covered by the vaccine, or 
 illnesses that look like flu but are not. It takes about 2 weeks for protection to develop after vaccination, and protection lasts through the flu season. 3. Some people should not get this vaccine Tell the person who is giving you the vaccine:  If you have any severe, life-threatening allergies. If you ever had a life-threatening allergic reaction after a dose of flu vaccine, or have a severe allergy to any part of this vaccine, you may be advised not to get vaccinated. Most, but not all, types of flu vaccine contain a small amount of egg protein.  If you ever had Guillain-Barré Syndrome (also called GBS). Some people with a history of GBS should not get this vaccine. This should be discussed with your doctor.  If you are not feeling well.    
It is usually okay to get flu vaccine when you have a mild illness, but you might be asked to come back when you feel better. 4. Risks of a vaccine reaction With any medicine, including vaccines, there is a chance of reactions. These are usually mild and go away on their own, but serious reactions are also possible. Most people who get a flu shot do not have any problems with it. Minor problems following a flu shot include:  
 soreness, redness, or swelling where the shot was given  hoarseness  sore, red or itchy eyes  cough  fever  aches  headache  itching  fatigue If these problems occur, they usually begin soon after the shot and last 1 or 2 days. More serious problems following a flu shot can include the following:  There may be a small increased risk of Guillain-Barré Syndrome (GBS) after inactivated flu vaccine. This risk has been estimated at 1 or 2 additional cases per million people vaccinated. This is much lower than the risk of severe complications from flu, which can be prevented by flu vaccine.  Young children who get the flu shot along with pneumococcal vaccine (PCV13) and/or DTaP vaccine at the same time might be slightly more likely to have a seizure caused by fever. Ask your doctor for more information. Tell your doctor if a child who is getting flu vaccine has ever had a seizure. Problems that could happen after any injected vaccine:  People sometimes faint after a medical procedure, including vaccination. Sitting or lying down for about 15 minutes can help prevent fainting, and injuries caused by a fall. Tell your doctor if you feel dizzy, or have vision changes or ringing in the ears.  Some people get severe pain in the shoulder and have difficulty moving the arm where a shot was given. This happens very rarely.  Any medication can cause a severe allergic reaction.  Such reactions from a vaccine are very rare, estimated at about 1 in a million doses, and would happen within a few minutes to a few hours after the vaccination. As with any medicine, there is a very remote chance of a vaccine causing a serious injury or death. The safety of vaccines is always being monitored. For more information, visit: www.cdc.gov/vaccinesafety/ 
 
5. What if there is a serious reaction? What should I look for?  Look for anything that concerns you, such as signs of a severe allergic reaction, very high fever, or unusual behavior. Signs of a severe allergic reaction can include hives, swelling of the face and throat, difficulty breathing, a fast heartbeat, dizziness, and weakness  usually within a few minutes to a few hours after the vaccination. What should I do?  If you think it is a severe allergic reaction or other emergency that cant wait, call 9-1-1 and get the person to the nearest hospital. Otherwise, call your doctor.  Reactions should be reported to the Vaccine Adverse Event Reporting System (VAERS). Your doctor should file this report, or you can do it yourself through  the VAERS web site at www.vaers. [facility].gov, or by calling 0-212.863.8307. VAERS does not give medical advice. 6. The National Vaccine Injury Compensation Program 
 
The [facility] Vaccine Injury Compensation Program (VICP) is a federal program that was created to compensate people who may have been injured by certain vaccines. Persons who believe they may have been injured by a vaccine can learn about the program and about filing a claim by calling 8-191.577.3241 or visiting the [employer] website at www.[facility].gov/vaccinecompensation. There is a time limit to file a claim for compensation. 7. How can I learn more?  Ask your healthcare provider. He or she can give you the vaccine package insert or suggest other sources of information.  Call your local or state health department.  
 Contact the Centers for Disease Control and Prevention (CDC): 
 - Call 0-508.832.7824 (7-319-BBA-INFO) or 
- Visit CDCs website at www.cdc.gov/flu Vaccine Information Statement Inactivated Influenza Vaccine 8/7/2015 
42 SANJEEV Nieto 866NA-[id number] [facility] and [employer] Centers for Disease Control and Prevention Office Use Only Introducing [facility] [place] [facility] HE[facility] SERVICES! [employer] introduces [employer] patient portal. Now you can access parts of your medical record, email your doctor's office, and request medication refills online. 1. In your internet browser, go to [email or web address] 2. Click on the First Time User? Click Here link in the Sign In box. You will see the New Member Sign Up page. 3. Enter your [employer]iSense Access Code exactly as it appears below. You will not need to use this code after youve completed the sign-up process. If you do not sign up before the expiration date, you must request a new code. · CritiSense Access Code: [email or web address] Expires: 11/22/2017 10:44 AM 
 
4. Enter the last four digits of your Social Security Number (xxxx) and Date of Birth (mm/dd/yyyy) as indicated and click Submit. You will be taken to the next sign-up page. 5. Create a CritiSense ID. This will be your CritiSense login ID and cannot be changed, so think of one that is secure and easy to remember. 6. Create a [employer] password. You can change your password at any time. 7. Enter your Password Reset Question and Answer. This can be used at a later time if you forget your password. 8. Enter your e-mail address. You will receive e-mail notification when new information is available in [address] 9. Click Sign Up. You can now view and download portions of your medical record. 10. Click the Download Summary menu link to download a portable copy of your medical information. If you have questions, please visit the Frequently Asked Questions section of the [employer] website.  Remember, CritiSense is NOT to be used for urgent needs. For medical emergencies, dial 911. Now available from your iPhone and Android! Please provide this summary of care documentation to your next provider. Your primary care clinician is listed as Michael Pedraza. If you have any questions after today's visit, please call [phone].

## 2017-08-24 NOTE — PROGRESS NOTES
HISTORY OF PRESENT ILLNESS  Arnoldo Romeo is a 62 y.o. male. HPI  He presents for follow up of diabetes mellitus, diabetes, hypertension and hyperlipidemia. Diabetic ROS - medication compliance: compliant all of the time,      home glucose monitoring: is not performed,      home BP Monitoring: is not measured at home. further diabetic ROS: no polyuria or polydipsia, no numbness, tingling or pain in extremities, no unusual visual symptoms, no medication side effects noted, has noted excessive thirstiness and frequent urination. Diet and Lifestyle: generally follows a low fat low cholesterol diet, generally follows a low sodium diet, follows a diabetic diet regularly, exercises sporadically, nonsmoker  Cardiovascular ROS:  He denies palpitations, exertional chest pressure/discomfort, lower extremity edema, dyspnea on exertion, dizziness     He has a seizure disorder and used to follow with Dr Lawrence Celestin, but she is now located on   [place]. No seizure in years.      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.9    Eosinophilia D72.1    Hypercholesterolemia E78.00    Controlled type 2 diabetes mellitus without complication, without long-term current use of insulin (HCC) E11.9     Past Medical History:   Diagnosis Date    Contact dermatitis and other eczema, due to unspecified cause     psoriasis    Diabetes ([facility]     Eosinophilia     Hypercholesterolemia     Hypertension     Mental retardation     [facility]     Skin cancer      Past Surgical History:   Procedure Laterality Date    ENDOSCOPY, COLON, DIAGNOSTIC  11/08/2007    Dr Hurst Men normal except small internal hemorrhoids repeat 11/2017     Social History     Social History  Marital status: SINGLE     Spouse name: N/A    Number of children: N/A    Years of education: N/A     Occupational History          [employer]      Social History Main Topics    Smoking status: Never Smoker    Smokeless tobacco: Never Used    Alcohol use No    Drug use: None    Sexual activity: Not Asked     Other Topics Concern    None     Social History Narrative     Family History   Problem Relation Age of Onset    Other Mother      PMR    Hypertension Mother     Cancer Father      Lung    Diabetes Brother      No Known Allergies  Current Outpatient Prescriptions   Medication Sig Dispense Refill    glimepiride (AMARYL) 4 mg tablet Take 1 Tab by mouth every morning. 90 Tab 3    folic acid (FOLVITE) 1 mg tablet TAKE ONE TABLET BY MOUTH DAILY 90 Tab 3    metFORMIN ER (GLUCOPHAGE XR) 750 mg tablet TAKE ONE TABLET BY MOUTH TWO TIMES A  Tab 3    ramipril (ALTACE) 5 mg capsule Take 1 Cap by mouth daily. 90 Cap 3    chlorhexidine (PERIDEX) 0.12 % solution ONE CAPFUL ONCE DAILY OR AS DIRECTED  9    atorvastatin (LIPITOR) 40 mg tablet TAKE ONE TABLET BY MOUTH AT BEDTIME 90 Tab 3    topiramate (TOPAMAX) 200 mg tablet Take 1.5 Tabs by mouth two (2) times a day. 270 Tab 3    divalproex DR (DEPAKOTE) 500 mg tablet Take one tab in the AM and two tabs at night 270 Tab 3    triamcinolone acetonide (KENALOG) 0.1 % topical cream       Calcium-Cholecalciferol, D3, (CALTRATE-600 PLUS VITAMIN D3) 600-400 mg-unit Tab Take  by mouth. Review of Systems   Constitutional: Negative for malaise/fatigue and weight loss. Gastrointestinal: Negative for constipation, diarrhea and heartburn. Musculoskeletal: Negative for back pain and joint pain. Neurological: Negative for dizziness, tingling and focal weakness.      Visit Vitals    /79 (BP 1 Location: Right arm, BP Patient Position: Sitting)    Pulse 67    Temp 95.4 °F (35.2 °C) (Oral)    Resp 16    Ht 6' 1\" (1.854 m)    Wt 202 lb (91.6 kg)  SpO2 99%    BMI 26.65 kg/m2     Physical Exam   Constitutional: He is oriented to person, place, and time. He appears well-developed and well-nourished. HENT:   Head: Normocephalic and atraumatic. Eyes: Conjunctivae are normal. Pupils are equal, round, and reactive to light. Neck: Neck supple. Carotid bruit is not present. No thyromegaly present. Cardiovascular: Normal rate, regular rhythm and normal heart sounds. PMI is not displaced. Exam reveals no gallop. No murmur heard. Pulses:       Dorsalis pedis pulses are 2+ on the right side, and 2+ on the left side. Posterior tibial pulses are 2+ on the right side, and 2+ on the left side. Pulmonary/Chest: Effort normal. He has no wheezes. He has no rhonchi. He has no rales. Abdominal: Soft. Normal appearance. He exhibits no abdominal bruit and no mass. There is no hepatosplenomegaly. There is no tenderness. Musculoskeletal: He exhibits no edema. Lymphadenopathy:     He has no cervical adenopathy. Right: No supraclavicular adenopathy present. Left: No supraclavicular adenopathy present. Neurological: He is alert and oriented to person, place, and time. No sensory deficit. Skin: Skin is warm, dry and intact. No rash noted. Psychiatric: He has a normal mood and affect. His behavior is normal.   Nursing note and vitals reviewed.   Diabetic foot exam:     Left: Reflexes absent     Vibratory sensation diminished    Proprioception normal   Sharp/dull discrimination normal    Filament test 4/6   Pulse DP: 2+ (normal)   Pulse PT: 2+ (normal)   Deformities: None  Right: Reflexes absent   Vibratory sensation diminished   Proprioception normal   Sharp/dull discrimination normal   Filament test 4/6   Pulse DP: 2+ (normal)   Pulse PT: 2+ (normal)   Deformities: None    Results for orders placed or performed in visit on 08/24/17   AMB POC HEMOGLOBIN A1C   Result Value Ref Range    Hemoglobin A1c (POC) 8.0 (A) 4.8 - 5.6 %   AMB POC URINE, MICROALBUMIN, SEMIQUANT (3 RESULTS)   Result Value Ref Range    ALBUMIN, URINE POC 10 Negative mg/L    CREATININE, URINE  mg/dL    Microalbumin/creat ratio (POC) <30 MG/G     Lab Results   Component Value Date/Time    Cholesterol, total 123 02/10/2017 08:48 AM    HDL Cholesterol 41 02/10/2017 08:48 AM    LDL, calculated 60 02/10/2017 08:48 AM    VLDL, calculated 22 02/10/2017 08:48 AM    Triglyceride 110 02/10/2017 08:48 AM    CHOL/HDL Ratio 2.8 03/31/2009 08:40 AM     Lab Results   Component Value Date/Time    Sodium 140 02/10/2017 08:48 AM    Potassium 5.2 02/10/2017 08:48 AM    Chloride 102 02/10/2017 08:48 AM    CO2 24 02/10/2017 08:48 AM    Anion gap 8 07/06/2015 04:17 PM    Glucose 191 02/10/2017 08:48 AM    BUN 21 02/10/2017 08:48 AM    Creatinine 0.77 02/10/2017 08:48 AM    BUN/Creatinine ratio 27 02/10/2017 08:48 AM    GFR est  02/10/2017 08:48 AM    GFR est non-AA 99 02/10/2017 08:48 AM    Calcium 9.1 02/10/2017 08:48 AM    Bilirubin, total 0.2 02/10/2017 08:48 AM    AST (SGOT) 11 02/10/2017 08:48 AM    Alk. phosphatase 50 02/10/2017 08:48 AM    Protein, total 6.2 02/10/2017 08:48 AM    Albumin 4.0 02/10/2017 08:48 AM    Globulin 3.0 07/06/2015 04:17 PM    A-G Ratio 1.8 02/10/2017 08:48 AM    ALT (SGPT) 11 02/10/2017 08:48 AM       ASSESSMENT and PLAN    ICD-10-CM ICD-9-CM    1. Controlled type 2 diabetes mellitus without complication, without long-term current use of insulin (HCC) E11.9 250.00 AMB POC HEMOGLOBIN A1C      AMB POC URINE, MICROALBUMIN, SEMIQUANT (3 RESULTS)       DIABETES FOOT EXAM   2. Encounter for immunization Z23 V03.89 INFLUENZA VIRUS VAC QUAD,SPLIT,PRESV FREE SYRINGE 3/> YRS IM      ADMIN INFLUENZA VIRUS VAC   3. Uncontrolled type 2 diabetes mellitus without complication, without long-term current use of insulin (HCC) E11.65 250.02 glimepiride (AMARYL) 4 mg tablet   4. Hypertension, essential I10 401.9    5.  [facility] G40.909 345.90 REFERRAL TO NEUROLOGY   6. Hypercholesterolemia E78.00 272.0      Diagnoses and all orders for this visit:    1. Controlled type 2 diabetes mellitus without complication, without long-term current use of insulin ([facility])  A1c improved to 8.0 from 8.1.        -     Inc glimepiride (AMARYL) 4 mg tablet; Take 1.5 Tabs by mouth every morning.  -     AMB POC HEMOGLOBIN A1C  -     AMB POC URINE, MICROALBUMIN, SEMIQUANT (3 RESULTS)  -      DIABETES FOOT EXAM    2. Encounter for immunization  -     Influenza virus vaccine (QUADRIVALENT PRES FREE) IM 3 years and older  -     Administration fee () for Medicare insured patient      3.. Hypertension, essential  Hypertension is controlled    4.. Seizure disorder ([facility]  -     REFERRAL TO NEUROLOGY    6. Hypercholesterolemia  Hyperlipidemia is controlled      Follow-up Disposition:  Return in about 3 months (around 11/24/2017) for DM, HTN.  lab results and schedule of future lab studies reviewed with patient  reviewed diet, exercise and weight control  cardiovascular risk and specific lipid/LDL goals reviewed   I have discussed the diagnosis with the patient and the intended plan as seen in the above orders. Patient is in agreement. The patient has received an after-visit summary and questions were answered concerning future plans. I have discussed medication side effects and warnings with the patient as well.

## 2017-08-24 NOTE — PATIENT INSTRUCTIONS
Vaccine Information Statement    Influenza (Flu) Vaccine (Inactivated or Recombinant): What you need to know    Many Vaccine Information Statements are available in Portuguese and other languages. See www.immunize.org/vis  Hojas de Información Sobre Vacunas están disponibles en Español y en muchos otros idiomas. Visite www.immunize.org/vis    1. Why get vaccinated? Influenza (flu) is a contagious disease that spreads around the [place] every year, usually between October and May. Flu is caused by influenza viruses, and is spread mainly by coughing, sneezing, and close contact. Anyone can get flu. Flu strikes suddenly and can last several days. Symptoms vary by age, but can include:   fever/chills   sore throat   muscle aches   fatigue   cough   headache    runny or stuffy nose    Flu can also lead to pneumonia and blood infections, and cause diarrhea and seizures in children. If you have a medical condition, such as heart or lung disease, flu can make it worse. Flu is more dangerous for some people. Infants and young children, people 72years of age and older, pregnant women, and people with certain health conditions or a weakened immune system are at greatest risk. Each year thousands of people in the [facility] die from flu, and many more are hospitalized. Flu vaccine can:   keep you from getting flu,   make flu less severe if you do get it, and   keep you from spreading flu to your family and other people. 2. Inactivated and recombinant flu vaccines    A dose of flu vaccine is recommended every flu season. Children 6 months through 6years of age may need two doses during the same flu season. Everyone else needs only one dose each flu season.        Some inactivated flu vaccines contain a very small amount of a mercury-based preservative called thimerosal. Studies have not shown thimerosal in vaccines to be harmful, but flu vaccines that do not contain thimerosal are available. There is no live flu virus in flu shots. They cannot cause the flu. There are many flu viruses, and they are always changing. Each year a new flu vaccine is made to protect against three or four viruses that are likely to cause disease in the upcoming flu season. But even when the vaccine doesnt exactly match these viruses, it may still provide some protection    Flu vaccine cannot prevent:   flu that is caused by a virus not covered by the vaccine, or   illnesses that look like flu but are not. It takes about 2 weeks for protection to develop after vaccination, and protection lasts through the flu season. 3. Some people should not get this vaccine    Tell the person who is giving you the vaccine:     If you have any severe, life-threatening allergies. If you ever had a life-threatening allergic reaction after a dose of flu vaccine, or have a severe allergy to any part of this vaccine, you may be advised not to get vaccinated. Most, but not all, types of flu vaccine contain a small amount of egg protein.  If you ever had Guillain-Barré Syndrome (also called GBS). Some people with a history of GBS should not get this vaccine. This should be discussed with your doctor.  If you are not feeling well. It is usually okay to get flu vaccine when you have a mild illness, but you might be asked to come back when you feel better. 4. Risks of a vaccine reaction    With any medicine, including vaccines, there is a chance of reactions. These are usually mild and go away on their own, but serious reactions are also possible. Most people who get a flu shot do not have any problems with it.      Minor problems following a flu shot include:    soreness, redness, or swelling where the shot was given     hoarseness   sore, red or itchy eyes   cough   fever   aches   headache   itching   fatigue  If these problems occur, they usually begin soon after the shot and last 1 or 2 days. More serious problems following a flu shot can include the following:     There may be a small increased risk of Guillain-Barré Syndrome (GBS) after inactivated flu vaccine. This risk has been estimated at 1 or 2 additional cases per million people vaccinated. This is much lower than the risk of severe complications from flu, which can be prevented by flu vaccine.  Young children who get the flu shot along with pneumococcal vaccine (PCV13) and/or DTaP vaccine at the same time might be slightly more likely to have a seizure caused by fever. Ask your doctor for more information. Tell your doctor if a child who is getting flu vaccine has ever had a seizure. Problems that could happen after any injected vaccine:      People sometimes faint after a medical procedure, including vaccination. Sitting or lying down for about 15 minutes can help prevent fainting, and injuries caused by a fall. Tell your doctor if you feel dizzy, or have vision changes or ringing in the ears.  Some people get severe pain in the shoulder and have difficulty moving the arm where a shot was given. This happens very rarely.  Any medication can cause a severe allergic reaction. Such reactions from a vaccine are very rare, estimated at about 1 in a million doses, and would happen within a few minutes to a few hours after the vaccination. As with any medicine, there is a very remote chance of a vaccine causing a serious injury or death. The safety of vaccines is always being monitored. For more information, visit: www.cdc.gov/vaccinesafety/    5. What if there is a serious reaction? What should I look for?  Look for anything that concerns you, such as signs of a severe allergic reaction, very high fever, or unusual behavior.     Signs of a severe allergic reaction can include hives, swelling of the face and throat, difficulty breathing, a fast heartbeat, dizziness, and weakness  usually within a few minutes to a few hours after the vaccination. What should I do?  If you think it is a severe allergic reaction or other emergency that cant wait, call 9-1-1 and get the person to the nearest hospital. Otherwise, call your doctor.  Reactions should be reported to the Vaccine Adverse Event Reporting System (VAERS). Your doctor should file this report, or you can do it yourself through  the VAERS web site at www.vaers. [facility].gov, or by calling 2-331.472.8465. VAERS does not give medical advice. 6. The National Vaccine Injury Compensation Program    The [facility] Vaccine Injury Compensation Program (VICP) is a federal program that was created to compensate people who may have been injured by certain vaccines. Persons who believe they may have been injured by a vaccine can learn about the program and about filing a claim by calling 1-670.933.6656 or visiting the [facility] website at www.[facility].gov/vaccinecompensation. There is a time limit to file a claim for compensation. 7. How can I learn more?  Ask your healthcare provider. He or she can give you the vaccine package insert or suggest other sources of information.  Call your local or state health department.  Contact the Centers for Disease Control and Prevention (CDC):  - Call 8-734.718.3814 (1-800-CDC-INFO) or  - Visit CDCs website at www.cdc.gov/flu    Vaccine Information Statement   Inactivated Influenza Vaccine   8/7/2015  42 SANJEEV Peña [id number]    Department of Health and Human Services  Centers for Disease Control and Prevention    Office Use Only

## 2017-08-24 NOTE — PROGRESS NOTES
Reviewed record In preparation for visit and have obtained necessary documentation. Advanced directive / living will / Nguyễn Herndon on file. 1. Have you been to the ER, urgent care clinic or hospitalized since your last visit? No     2. Have you seen or consulted any other health care providers outside of the [address] [place] [address] since your last visit? Include any pap smears or colon screening. No    Vitals reviewed with provider. Health Maintenance reviewed: After verbal order read back of , patient received Flu Shot in left arm,  [address] 47841-739-22 Lot [id number] Exp 4/30/18. Patient tolerated procedure without complaints and received VIS.

## 2017-09-25 ENCOUNTER — APPOINTMENT (OUTPATIENT)
Dept: GENERAL RADIOLOGY | Age: 62
End: 2017-09-25
Attending: FAMILY MEDICINE

## 2017-09-25 ENCOUNTER — TELEPHONE (OUTPATIENT)
Dept: INTERNAL MEDICINE CLINIC | Age: 62
End: 2017-09-25

## 2017-09-25 ENCOUNTER — HOSPITAL ENCOUNTER (EMERGENCY)
Age: 62
Discharge: HOME | End: 2017-09-25
Attending: FAMILY MEDICINE

## 2017-09-25 VITALS
OXYGEN SATURATION: 97 % | TEMPERATURE: 97.7 F | RESPIRATION RATE: 20 BRPM | HEIGHT: 72 IN | BODY MASS INDEX: 26.71 KG/M2 | HEART RATE: 67 BPM | WEIGHT: 197.2 LBS | DIASTOLIC BLOOD PRESSURE: 74 MMHG | SYSTOLIC BLOOD PRESSURE: 163 MMHG

## 2017-09-25 DIAGNOSIS — R19.7 DIARRHEA, UNSPECIFIED TYPE: ICD-10-CM

## 2017-09-25 DIAGNOSIS — M25.511 ACUTE PAIN OF RIGHT SHOULDER: Primary | ICD-10-CM

## 2017-09-25 NOTE — TELEPHONE ENCOUNTER
Per Silva:    Dr. Azam Pennington  Received:  Today       [address] [facility] Office Pool                     [place] Grace, mother, stated the pt is having trouble with his right shoulder, and wants to know if she should schedule an appointment with the practice or urgent care.     Shaina Roy [phone]

## 2017-09-25 NOTE — UC PROVIDER NOTE
Patient is a 62 y.o. male presenting with shoulder pain and diarrhea. The history is provided by a relative (mother). Shoulder Pain    Incident onset: pain started about 1 month ago; NKI. The right shoulder is affected. The pain is mild. The pain has been constant since onset. The pain does not radiate. There is no history of shoulder injury. He has no other injuries. There is no history of shoulder surgery. Pertinent negatives include no numbness, no muscle weakness and no tingling. Diarrhea    This is a chronic problem. Episode onset: 1 year ago - occurs every 2-3 weeks lasting for about 2 days. The problem has not changed since onset. The patient is experiencing no pain. Associated symptoms include diarrhea and arthralgias. Pertinent negatives include no anorexia, no fever, no nausea, no vomiting and no chest pain.         Past Medical History:   Diagnosis Date    Contact dermatitis and other eczema, due to unspecified cause     psoriasis    Diabetes ([facility] [address]     Eosinophilia     Hypercholesterolemia     Hypertension     Mental retardation     Seizures (HCC)     Skin cancer         Past Surgical History:   Procedure Laterality Date    ENDOSCOPY, COLON, DIAGNOSTIC  11/08/2007    Dr Gabbie Benedict normal except small internal hemorrhoids repeat 11/2017         Family History   Problem Relation Age of Onset    Other Mother      PMR    Hypertension Mother     Cancer Father      Lung    Diabetes Brother         Social History     Social History    Marital status: SINGLE     Spouse name: N/A    Number of children: N/A    Years of education: N/A     Occupational History          [employer]      Social History Main Topics    Smoking status: Never Smoker    Smokeless tobacco: Never Used    Alcohol use No    Drug use: Not on file    Sexual activity: Not on file     Other Topics Concern    Not on file     Social History Narrative                ALLERGIES: Review of patient's allergies indicates no known allergies. Review of Systems   Constitutional: Negative for chills and fever. Respiratory: Negative for shortness of breath and wheezing. Cardiovascular: Negative for chest pain and palpitations. Gastrointestinal: Positive for diarrhea. Negative for anorexia, nausea and vomiting. Musculoskeletal: Positive for arthralgias. Skin: Negative for wound. Neurological: Negative for tingling and numbness. Vitals:    09/25/17 1105   BP: 163/74   Pulse: 67   Resp: 20   Temp: 97.7 °F (36.5 °C)   SpO2: 97%   Weight: 89.4 kg (197 lb 3.2 oz)   Height: 6' (1.829 m)       Physical Exam   Constitutional: He appears well-developed and well-nourished. No distress. Musculoskeletal:        Right shoulder: He exhibits decreased range of motion (unable to abduct more than 90 degrees), tenderness (humeral head) and pain. He exhibits no swelling, no effusion, no crepitus, no deformity, no laceration, no spasm, normal pulse and normal strength. Neurological: He is alert. Skin: He is not diaphoretic. Psychiatric: He has a normal mood and affect. His behavior is normal. Judgment and thought content normal.   Nursing note and vitals reviewed. MDM     Differential Diagnosis; Clinical Impression; Plan:     CLINICAL IMPRESSION:  Acute pain of right shoulder  (primary encounter diagnosis) - frozen shoulder  Diarrhea, unspecified type - possible IBS    Plan:  1. Continue Ibuprofen prn; f/u with ortho - will need PT  2. Increase fluids, F/u with pcp regarding diarrhea  Amount and/or Complexity of Data Reviewed:   Tests in the radiology section of CPT®:  Ordered and reviewed  Risk of Significant Complications, Morbidity, and/or Mortality:   Presenting problems: Moderate  Diagnostic procedures: Moderate  Management options:   Moderate  Progress:   Patient progress:  Stable      Procedures      Study Result      EXAM:  XR SHOULDER RT AP/LAT MIN 2 V     INDICATION:   pain for 2 weeks no injury.     COMPARISON: None.     FINDINGS: 2 views of the right shoulder demonstrate no fracture, dislocation or  other acute abnormality. A couple tiny periarticular calcification demonstrated.     IMPRESSION  IMPRESSION:  No acute abnormality.

## 2017-09-25 NOTE — DISCHARGE INSTRUCTIONS
Diarrhea: Care Instructions  Your Care Instructions    Diarrhea is loose, watery stools (bowel movements). The exact cause is often hard to find. Sometimes diarrhea is your body's way of getting rid of what caused an upset stomach. Viruses, food poisoning, and many medicines can cause diarrhea. Some people get diarrhea in response to emotional stress, anxiety, or certain foods. Almost everyone has diarrhea now and then. It usually isn't serious, and your stools will return to normal soon. The important thing to do is replace the fluids you have lost, so you can prevent dehydration. The doctor has checked you carefully, but problems can develop later. If you notice any problems or new symptoms, get medical treatment right away. Follow-up care is a key part of your treatment and safety. Be sure to make and go to all appointments, and call your doctor if you are having problems. It's also a good idea to know your test results and keep a list of the medicines you take. How can you care for yourself at home? · Watch for signs of dehydration, which means your body has lost too much water. Dehydration is a serious condition and should be treated right away. Signs of dehydration are:  ¨ Increasing thirst and dry eyes and mouth. ¨ Feeling faint or lightheaded. ¨ Darker urine, and a smaller amount of urine than normal.  · To prevent dehydration, drink plenty of fluids, enough so that your urine is light yellow or clear like water. Choose water and other caffeine-free clear liquids until you feel better. If you have kidney, heart, or liver disease and have to limit fluids, talk with your doctor before you increase the amount of fluids you drink. · Begin eating small amounts of mild foods the next day, if you feel like it. ¨ Try yogurt that has live cultures of Lactobacillus. (Check the label.)  ¨ Avoid spicy foods, fruits, alcohol, and caffeine until 48 hours after all symptoms are gone.   ¨ Avoid chewing gum that contains sorbitol. ¨ Avoid dairy products (except for yogurt with Lactobacillus) while you have diarrhea and for 3 days after symptoms are gone. · The doctor may recommend that you take over-the-counter medicine, such as loperamide (Imodium), if you still have diarrhea after 6 hours. Read and follow all instructions on the label. Do not use this medicine if you have bloody diarrhea, a high fever, or other signs of serious illness. Call your doctor if you think you are having a problem with your medicine. When should you call for help? Call 911 anytime you think you may need emergency care. For example, call if:  · You passed out (lost consciousness). · Your stools are maroon or very bloody. Call your doctor now or seek immediate medical care if:  · You are dizzy or lightheaded, or you feel like you may faint. · Your stools are black and look like tar, or they have streaks of blood. · You have new or worse belly pain. · You have symptoms of dehydration, such as:  ¨ Dry eyes and a dry mouth. ¨ Passing only a little dark urine. ¨ Feeling thirstier than usual.  · You have a new or higher fever. Watch closely for changes in your health, and be sure to contact your doctor if:  · Your diarrhea is getting worse. · You see pus in the diarrhea. · You are not getting better after 2 days (48 hours). Where can you learn more? Go to http://jarod-jared.info/. Enter L769 in the search box to learn more about \"Diarrhea: Care Instructions. \"  Current as of: March 20, 2017  Content Version: 11.3  © 1018-9415 [employer]. Care instructions adapted under license by [employer] (which disclaims liability or warranty for this information). If you have questions about a medical condition or this instruction, always ask your healthcare professional. [address] any warranty or liability for your use of this information.          Shoulder Pain: Care Instructions  Your Care Instructions    You can hurt your shoulder by using it too much during an activity, such as fishing or baseball. It can also happen as part of the everyday wear and tear of getting older. Shoulder injuries can be slow to heal, but your shoulder should get better with time. Your doctor may recommend a sling to rest your shoulder. If you have injured your shoulder, you may need testing and treatment. Follow-up care is a key part of your treatment and safety. Be sure to make and go to all appointments, and call your doctor if you are having problems. It's also a good idea to know your test results and keep a list of the medicines you take. How can you care for yourself at home? · Take pain medicines exactly as directed. ¨ If the doctor gave you a prescription medicine for pain, take it as prescribed. ¨ If you are not taking a prescription pain medicine, ask your doctor if you can take an over-the-counter medicine. ¨ Do not take two or more pain medicines at the same time unless the doctor told you to. Many pain medicines contain acetaminophen, which is Tylenol. Too much acetaminophen (Tylenol) can be harmful. · If your doctor recommends that you wear a sling, use it as directed. Do not take it off before your doctor tells you to. · Put ice or a cold pack on the sore area for 10 to 20 minutes at a time. Put a thin cloth between the ice and your skin. · If there is no swelling, you can put moist heat, a heating pad, or a warm cloth on your shoulder. Some doctors suggest alternating between hot and cold. · Rest your shoulder for a few days. If your doctor recommends it, you can then begin gentle exercise of the shoulder, but do not lift anything heavy. When should you call for help? Call 911 anytime you think you may need emergency care. For example, call if:  · You have chest pain or pressure. This may occur with:  ¨ Sweating. ¨ Shortness of breath. ¨ Nausea or vomiting.   ¨ Pain that spreads from the chest to the neck, jaw, or one or both shoulders or arms. ¨ Dizziness or lightheadedness. ¨ A fast or uneven pulse. After calling 911, chew 1 adult-strength aspirin. Wait for an ambulance. Do not try to drive yourself. · Your arm or hand is cool or pale or changes color. Call your doctor now or seek immediate medical care if:  · You have signs of infection, such as:  ¨ Increased pain, swelling, warmth, or redness in your shoulder. ¨ Red streaks leading from a place on your shoulder. ¨ Pus draining from an area of your shoulder. ¨ Swollen lymph nodes in your neck, armpits, or groin. ¨ A fever. Watch closely for changes in your health, and be sure to contact your doctor if:  · You cannot use your shoulder. · Your shoulder does not get better as expected. Where can you learn more? Go to http://jarod-jared.info/. Enter V259 in the search box to learn more about \"Shoulder Pain: Care Instructions. \"  Current as of: March 21, 2017  Content Version: 11.3  © 1844-5160 [employer]. Care instructions adapted under license by [employer] (which disclaims liability or warranty for this information). If you have questions about a medical condition or this instruction, always ask your healthcare professional. [place] [address] any warranty or liability for your use of this information. Frozen Shoulder: Care Instructions  Your Care Instructions    Frozen shoulder is stiffness, pain, and trouble moving your shoulder. It may happen after an injury or overuse, or from a disease such as diabetes or a stroke. You may have pain that keeps you from using your shoulder. However, you need to move your shoulder. If you do not move it, it will get more stiff and sore. Your doctor may order an X-ray to make sure there is not another cause for your stiff shoulder.   You can treat frozen shoulder with heat, stretching, over-the-counter pain medicines, and physical therapy. Your doctor also may inject medicine into your shoulder to reduce pain and swelling. It can take a year or more to get better. Surgery is almost never needed. Follow-up care is a key part of your treatment and safety. Be sure to make and go to all appointments, and call your doctor if you are having problems. It's also a good idea to know your test results and keep a list of the medicines you take. How can you care for yourself at home? · Take pain medicines exactly as directed. ¨ If the doctor gave you a prescription medicine for pain, take it as prescribed. ¨ If you are not taking a prescription pain medicine, ask your doctor if you can take an over-the-counter medicine. · Put a heating pad set on low or a warm, wet towel wrapped in plastic on your shoulder. The heat may make it easier to stretch your shoulder. · Follow your doctor's advice for stretches and exercises. · Go to physical therapy if your doctor suggests it. · Try these stretching exercises to reduce stiffness if your doctor says it is okay. Do the exercises slowly to avoid injury. Put a warm, wet towel on your shoulder before exercising. Stop any exercise that increases pain. ¨ Pendulum exercise. While leaning forward and holding onto a table or the back of a chair with your good arm, bend at the waist, allowing your affected arm to hang straight down. Swing the affected arm back and forth like a pendulum, then in circles that start small and gradually grow larger as pain allows. Try this for about 2 or 3 minutes, several times a day. Once pain begins to go away, you can do this exercise while holding a 1- or 2-pound weight. ¨ Wall climbing (to the side). Stand with your side to a wall so that your fingers can just touch it. Then turn so your body is turned slightly toward the wall. Walk the fingers of your injured arm up the wall as high as pain permits. Hold that position for a count of 15 to 30 seconds. Walk your fingers down to the starting position. Repeat 2 to 4 times, trying to reach higher each time. ¨ Wall climbing (to the front). Face a wall, standing so your fingers can just touch it. Walk the fingers of your affected arm up the wall as high as pain permits. Hold that position for a count of 15 to 30 seconds. Slowly walk your fingers to the starting position. Repeat 2 to 4 times, trying to reach higher each time. When should you call for help? Call your doctor now or seek immediate medical care if:  · You have severe pain. · Your arm is cool or pale or changes color. · You have tingling or numbness in your arm. Watch closely for changes in your health, and be sure to contact your doctor if:  · You have increased pain. · You have new pain that develops in another area. For example, you have pain in your arm, hand, or elbow. · You do not get better as expected. Where can you learn more? Go to http://jarod-jared.info/. Enter M169 in the search box to learn more about \"Frozen Shoulder: Care Instructions. \"  Current as of: March 21, 2017  Content Version: 11.3  © 3387-9643 [employer]. Care instructions adapted under license by [employer] (which disclaims liability or warranty for this information). If you have questions about a medical condition or this instruction, always ask your healthcare professional. [address] any warranty or liability for your use of this information. Frozen Shoulder: Exercises  Your Care Instructions  Here are some examples of typical rehabilitation exercises for your condition. Start each exercise slowly. Ease off the exercise if you start to have pain. Your doctor or physical therapist will tell you when you can start these exercises and which ones will work best for you. How to do the exercises  Neck stretches    1. Look straight ahead, and tip your right ear to your right shoulder.  Do not let your left shoulder rise up as you tip your head to the right. 2. Hold 15 to 30 seconds. 3. Tilt your head to the left. Do not let your right shoulder rise up as you tip your head to the left. 4. Hold for 15 to 30 seconds. 5. Repeat 2 to 4 times to each side. Shoulder rolls    1. Sit comfortably with your feet shoulder-width apart. You can also do this exercise while standing. 2. Roll your shoulders up, then back, and then down in a smooth, circular motion. 3. Repeat 2 to 4 times. Shoulder flexion (lying down)    Note: For this exercise, you will need a wand. To make a wand, use a piece of PVC pipe or a broom handle with the broom removed. Make the wand about a foot wider than your shoulders. 1. Lie on your back, holding a wand with your hands. Your palms should face down as you hold the wand. Place your hands slightly wider than your shoulders. 2. Keeping your elbows straight, slowly raise your arms over your head until you feel a stretch in your shoulders, upper back, and chest.  3. Hold 15 to 30 seconds. 4. Repeat 2 to 4 times. Shoulder rotation (lying down)    Note: To make a wand, use a piece of PVC pipe or a broom handle with the broom removed. Make the wand about a foot wider than your shoulders. 1. Lie on your back and hold a wand in both hands with your elbows bent and your palms up. 2. Keeping your elbows close to your body, move the wand across your body toward the arm that has pain. 3. Hold for 15 to 30 seconds. 4. Repeat 2 to 4 times. Shoulder internal rotation with towel    1. Roll up a towel lengthwise. Hold the towel above and behind your head with the arm that is not sore. 2. With your sore arm, reach behind your back and grasp the towel. 3. Using the arm above your head, pull the towel upward until you feel a stretch on the front and outside of your sore shoulder. 4. Hold 15 to 30 seconds. 5. Relax and move the towel back down to the starting position.   6. Repeat 2 to 4 times.  Shoulder blade squeeze    1. While standing with your arms at your sides, squeeze your shoulder blades together. Do not raise your shoulders up as you are squeezing. 2. Hold for 6 seconds. 3. Repeat 8 to 12 times. Follow-up care is a key part of your treatment and safety. Be sure to make and go to all appointments, and call your doctor if you are having problems. It's also a good idea to know your test results and keep a list of the medicines you take. Where can you learn more? Go to http://jarod-jared.info/. Enter R144 in the search box to learn more about \"Frozen Shoulder: Exercises. \"  Current as of: March 21, 2017  Content Version: 11.3  © 6731-4700 [employer]. Care instructions adapted under license by [employer] (which disclaims liability or warranty for this information). If you have questions about a medical condition or this instruction, always ask your healthcare professional. [place] [address] any warranty or liability for your use of this information.

## 2017-10-25 DIAGNOSIS — G40.909 SEIZURE DISORDER (HCC): ICD-10-CM

## 2017-10-26 DIAGNOSIS — G40.909 SEIZURE DISORDER (HCC): ICD-10-CM

## 2017-10-26 RX ORDER — DIVALPROEX SODIUM 500 MG/1
TABLET, DELAYED RELEASE ORAL
Qty: 270 TAB | Refills: 3 | Status: SHIPPED | OUTPATIENT
Start: 2017-10-26 | End: 2018-11-12 | Stop reason: SDUPTHER

## 2017-10-26 RX ORDER — TOPIRAMATE 200 MG/1
400 TABLET ORAL 2 TIMES DAILY
Qty: 360 TAB | Refills: 3 | OUTPATIENT
Start: 2017-10-26

## 2017-10-26 RX ORDER — DIVALPROEX SODIUM 500 MG/1
TABLET, DELAYED RELEASE ORAL
Qty: 270 TAB | Refills: 3 | OUTPATIENT
Start: 2017-10-26

## 2017-10-26 RX ORDER — TOPIRAMATE 200 MG/1
400 TABLET ORAL 2 TIMES DAILY
Qty: 360 TAB | Refills: 3 | Status: SHIPPED | OUTPATIENT
Start: 2017-10-26 | End: 2017-11-28 | Stop reason: SDUPTHER

## 2017-11-28 ENCOUNTER — OFFICE VISIT (OUTPATIENT)
Dept: INTERNAL MEDICINE CLINIC | Age: 62
End: 2017-11-28

## 2017-11-28 VITALS
SYSTOLIC BLOOD PRESSURE: 129 MMHG | HEIGHT: 72 IN | WEIGHT: 203 LBS | HEART RATE: 76 BPM | OXYGEN SATURATION: 97 % | RESPIRATION RATE: 20 BRPM | TEMPERATURE: 97.5 F | BODY MASS INDEX: 27.5 KG/M2 | DIASTOLIC BLOOD PRESSURE: 76 MMHG

## 2017-11-28 DIAGNOSIS — E78.00 HYPERCHOLESTEROLEMIA: ICD-10-CM

## 2017-11-28 DIAGNOSIS — E11.9 CONTROLLED TYPE 2 DIABETES MELLITUS WITHOUT COMPLICATION, WITHOUT LONG-TERM CURRENT USE OF INSULIN (HCC): Primary | ICD-10-CM

## 2017-11-28 DIAGNOSIS — G40.909 SEIZURE DISORDER (HCC): ICD-10-CM

## 2017-11-28 DIAGNOSIS — I10 HYPERTENSION, ESSENTIAL: ICD-10-CM

## 2017-11-28 LAB — HBA1C MFR BLD HPLC: 9.8 % (ref 4.8–5.6)

## 2017-11-28 RX ORDER — TOPIRAMATE 200 MG/1
TABLET ORAL
Qty: 360 TAB | Refills: 3 | Status: SHIPPED | OUTPATIENT
Start: 2017-11-28 | End: 2019-02-10 | Stop reason: SDUPTHER

## 2017-11-28 RX ORDER — GLIMEPIRIDE 4 MG/1
6 TABLET ORAL
Qty: 90 TAB | Refills: 3 | Status: SHIPPED | OUTPATIENT
Start: 2017-11-28 | End: 2017-11-28 | Stop reason: SDUPTHER

## 2017-11-28 RX ORDER — GLIMEPIRIDE 4 MG/1
8 TABLET ORAL
Qty: 180 TAB | Refills: 3 | Status: SHIPPED | OUTPATIENT
Start: 2017-11-28 | End: 2019-01-20 | Stop reason: SDUPTHER

## 2017-11-28 RX ORDER — PIOGLITAZONEHYDROCHLORIDE 30 MG/1
30 TABLET ORAL DAILY
Qty: 90 TAB | Refills: 3 | Status: SHIPPED | OUTPATIENT
Start: 2017-11-28 | End: 2018-02-17

## 2017-11-28 NOTE — MR AVS SNAPSHOT
Visit Information Date & Time Provider Department Dept. Phone Encounter #  
 11/28/2017 10:00 AM Rk Palmer MD [address] [place] [address] [phone] [id number] Follow-up Instructions Return in about 3 months (around 2/28/2018) for DM, HTN, chol, Fasting lab one week prior . Upcoming Health Maintenance Date Due  
 EYE EXAM RETINAL OR DILATED Q1 9/9/2017 COLONOSCOPY 11/8/2017 LIPID PANEL Q1 2/10/2018 MEDICARE YEARLY EXAM 5/19/2018 HEMOGLOBIN A1C Q6M 5/28/2018 FOOT EXAM Q1 8/24/2018 MICROALBUMIN Q1 8/24/2018 DTaP/Tdap/Td series (2 - Td) 10/20/2020 Allergies as of 11/28/2017  Review Complete On: 11/28/2017 By: Rk Palmer MD  
 No Known Allergies Current Immunizations  Reviewed on 11/28/2017 Name Date H1N1 FLU VACCINE 1/1/2010 Influenza Vaccine (Quad) PF 8/24/2017, 9/28/2016, 10/15/2015, 10/10/2014 Influenza Vaccine Split 9/21/2011 Influenza Vaccine Whole 10/12/2010 Pneumococcal Polysaccharide (PPSV-23) 2/16/2017 TD Vaccine 2/22/2005 TDAP Vaccine 10/20/2010 Zoster Vaccine, Live 2/20/2014 Reviewed by Jovanny Rojas LPN on 05/56/3438 at 10:04 AM  
You Were Diagnosed With   
  
 Codes Comments Controlled type 2 diabetes mellitus without complication, without long-term current use of insulin ([facility]    -  Primary ICD-10-CM: E11.9 ICD-9-CM: 250.00 Hypertension, essential     ICD-10-CM: I10 
ICD-9-CM: 401.9 Hypercholesterolemia     ICD-10-CM: E78.00 ICD-9-CM: 272.0 Seizure disorder ([facility]     ICD-10-CM: G40.909 ICD-9-CM: 345.90 Vitals BP Pulse Temp Resp Height(growth percentile) Weight(growth percentile) 129/76 (BP 1 Location: Right arm, BP Patient Position: Sitting) 76 97.5 °F (36.4 °C) (Oral) 20 6' (1.829 m) 203 lb (92.1 kg) SpO2 BMI Smoking Status 97% 27.53 kg/m2 Never Smoker BMI and BSA Data  Body Mass Index Body Surface Area  
 27.53 kg/m 2 2.16 m 2  
 Preferred Pharmacy Pharmacy Name Phone [facility], [address] [phone] Your Updated Medication List  
  
   
This list is accurate as of: 17 11:13 AM.  Always use your most recent med list.  
  
  
  
  
 atorvastatin 40 mg tablet Commonly known as:  LIPITOR  
TAKE ONE TABLET BY MOUTH AT BEDTIME CALTRATE-600 PLUS VITAMIN D3 tablet Generic drug:  calcium-cholecalciferol (D3) Take  by mouth. chlorhexidine 0.12 % solution Commonly known as:  PERIDEX  
ONE CAPFUL ONCE DAILY OR AS DIRECTED  
  
 divalproex  mg tablet Commonly known as:  DEPAKOTE Take one tab in the AM and two tabs at night  
  
 folic acid 1 mg tablet Commonly known as:  FOLVITE  
TAKE ONE TABLET BY MOUTH DAILY  
  
 glimepiride 4 mg tablet Commonly known as:  AMARYL Take 2 Tabs by mouth every morning. metFORMIN  mg tablet Commonly known as:  GLUCOPHAGE XR  
TAKE ONE TABLET BY MOUTH TWO TIMES A DAY pioglitazone 30 mg tablet Commonly known as:  ACTOS Take 1 Tab by mouth daily. ramipril 5 mg capsule Commonly known as:  ALTACE Take 1 Cap by mouth daily. topiramate 200 mg tablet Commonly known as:  TOPAMAX  
1.5 tablets twice a day  
  
 triamcinolone acetonide 0.1 % topical cream  
Commonly known as:  KENALOG Prescriptions Sent to Pharmacy Refills  
 topiramate (TOPAMAX) 200 mg tablet 3 Si.5 tablets twice a day Class: Normal  
 Pharmacy: [id number], [address] Ph #: [phone]  
 glimepiride (AMARYL) 4 mg tablet 3 Sig: Take 2 Tabs by mouth every morning. Class: Normal  
 Pharmacy: [facility], [address] Ph #: [phone] Route: Oral  
 pioglitazone (ACTOS) 30 mg tablet 3 Sig: Take 1 Tab by mouth daily.   
 Class: Normal  
 Pharmacy: [facility] [address] [facility], [address] Ph #: [phone] Route: Oral  
  
We Performed the Following AMB POC HEMOGLOBIN A1C [44366 CPT(R)] Follow-up Instructions Return in about 3 months (around 2/28/2018) for DM, HTN, chol, Fasting lab one week prior . To-Do List   
 02/28/2018 Lab:  HEMOGLOBIN A1C WITH EAG   
  
 02/28/2018 Lab:  LIPID PANEL   
  
 02/28/2018 Lab:  METABOLIC PANEL, COMPREHENSIVE Patient Instructions Increase glimepiride to 8 mg daily (two 4 mg tablets) Add pioglitazone 30 mg daily (AM or evening) Office visit in 4 months with fasting lab work a week before visit. Learning About Diabetes Food Guidelines Your Care Instructions Meal planning is important to manage diabetes. It helps keep your blood sugar at a target level (which you set with your doctor). You don't have to eat special foods. You can eat what your family eats, including sweets once in a while. But you do have to pay attention to how often you eat and how much you eat of certain foods. You may want to work with a dietitian or a certified diabetes educator (CDE) to help you plan meals and snacks. A dietitian or CDE can also help you lose weight if that is one of your goals. What should you know about eating carbs? Managing the amount of carbohydrate (carbs) you eat is an important part of healthy meals when you have diabetes. Carbohydrate is found in many foods. · Learn which foods have carbs. And learn the amounts of carbs in different foods. ¨ Bread, cereal, pasta, and rice have about 15 grams of carbs in a serving. A serving is 1 slice of bread (1 ounce), ½ cup of cooked cereal, or 1/3 cup of cooked pasta or rice. ¨ Fruits have 15 grams of carbs in a serving.  A serving is 1 small fresh fruit, such as an apple or orange; ½ of a banana; ½ cup of cooked or canned fruit; ½ cup of fruit juice; 1 cup of melon or raspberries; or 2 tablespoons of dried fruit. ¨ Milk and no-sugar-added yogurt have 15 grams of carbs in a serving. A serving is 1 cup of milk or 2/3 cup of no-sugar-added yogurt. ¨ Starchy vegetables have 15 grams of carbs in a serving. A serving is ½ cup of mashed potatoes or sweet potato; 1 cup winter squash; ½ of a small baked potato; ½ cup of cooked beans; or ½ cup cooked corn or green peas. · Learn how much carbs to eat each day and at each meal. A dietitian or CDE can teach you how to keep track of the amount of carbs you eat. This is called carbohydrate counting. · If you are not sure how to count carbohydrate grams, use the Plate Method to plan meals. It is a good, quick way to make sure that you have a balanced meal. It also helps you spread carbs throughout the day. ¨ Divide your plate by types of foods. Put non-starchy vegetables on half the plate, meat or other protein food on one-quarter of the plate, and a grain or starchy vegetable in the final quarter of the plate. To this you can add a small piece of fruit and 1 cup of milk or yogurt, depending on how many carbs you are supposed to eat at a meal. 
· Try to eat about the same amount of carbs at each meal. Do not \"save up\" your daily allowance of carbs to eat at one meal. 
· Proteins have very little or no carbs per serving. Examples of proteins are beef, chicken, turkey, fish, eggs, tofu, cheese, cottage cheese, and peanut butter. A serving size of meat is 3 ounces, which is about the size of a deck of cards. Examples of meat substitute serving sizes (equal to 1 ounce of meat) are 1/4 cup of cottage cheese, 1 egg, 1 tablespoon of peanut butter, and ½ cup of tofu. How can you eat out and still eat healthy? · Learn to estimate the serving sizes of foods that have carbohydrate. If you measure food at home, it will be easier to estimate the amount in a serving of restaurant food.  
· If the meal you order has too much carbohydrate (such as potatoes, corn, or baked beans), ask to have a low-carbohydrate food instead. Ask for a salad or green vegetables. · If you use insulin, check your blood sugar before and after eating out to help you plan how much to eat in the future. · If you eat more carbohydrate at a meal than you had planned, take a walk or do other exercise. This will help lower your blood sugar. What else should you know? · Limit saturated fat, such as the fat from meat and dairy products. This is a healthy choice because people who have diabetes are at higher risk of heart disease. So choose lean cuts of meat and nonfat or low-fat dairy products. Use olive or canola oil instead of butter or shortening when cooking. · Don't skip meals. Your blood sugar may drop too low if you skip meals and take insulin or certain medicines for diabetes. · Check with your doctor before you drink alcohol. Alcohol can cause your blood sugar to drop too low. Alcohol can also cause a bad reaction if you take certain diabetes medicines. Follow-up care is a key part of your treatment and safety. Be sure to make and go to all appointments, and call your doctor if you are having problems. It's also a good idea to know your test results and keep a list of the medicines you take. Where can you learn more? Go to http://jarod-jared.info/. Enter Z321 in the search box to learn more about \"Learning About Diabetes Food Guidelines. \" Current as of: March 13, 2017 Content Version: 11.4 © 1663-6691 [employer]. Care instructions adapted under license by [employer] (which disclaims liability or warranty for this information). If you have questions about a medical condition or this instruction, always ask your healthcare professional. [address] any warranty or liability for your use of this information. Introducing [facility]!    
 Amna Zavala introduces [employer] patient portal. Now you can access parts of your medical record, email your doctor's office, and request medication refills online. 1. In your internet browser, go to [email or web address] 2. Click on the First Time User? Click Here link in the Sign In box. You will see the New Member Sign Up page. 3. Enter your Points[employer] Access Code exactly as it appears below. You will not need to use this code after youve completed the sign-up process. If you do not sign up before the expiration date, you must request a new code. · [employer] Access Code: [email or web address] Expires: 2/26/2018 11:13 AM 
 
4. Enter the last four digits of your Social Security Number (xxxx) and Date of Birth (mm/dd/yyyy) as indicated and click Submit. You will be taken to the next sign-up page. 5. Create a PointsHound ID. This will be your PointsH[employer] login ID and cannot be changed, so think of one that is secure and easy to remember. 6. Create a PointsH[employer] password. You can change your password at any time. 7. Enter your Password Reset Question and Answer. This can be used at a later time if you forget your password. 8. Enter your e-mail address. You will receive e-mail notification when new information is available in [address] 9. Click Sign Up. You can now view and download portions of your medical record. 10. Click the Download Summary menu link to download a portable copy of your medical information. If you have questions, please visit the Frequently Asked Questions section of the [employer] website. Remember, [employer] is NOT to be used for urgent needs. For medical emergencies, dial 911. Now available from your iPhone and Android! Please provide this summary of care documentation to your next provider. Your primary care clinician is listed as Nikko Bull. If you have any questions after today's visit, please call [phone].

## 2017-11-28 NOTE — PROGRESS NOTES
Annie Blank    Chief Complaint   Patient presents with    Diabetes    Blood Pressure Check       Reviewed record In preparation for visit and have obtained necessary documentation. Advanced directive / living will on file. 1. Have you been to the ER, urgent care clinic or hospitalized since your last visit?  9/25/17    2. Have you seen or consulted any other health care providers outside of the [address] [place] [address] since your last visit? Include any pap smears or colon screening. DR Angeline Murrieta reviewed with provider.     Health Maintenance reviewed:

## 2017-11-28 NOTE — PROGRESS NOTES
HISTORY OF PRESENT ILLNESS  Quinn Hernandez is a 62 y.o. male. He is accompanied by his mother. HPI  He presents for follow up of diabetes mellitus, hypertension and hyperlipidemia. Diabetic ROS - medication compliance: compliant all of the time,      home glucose monitoring: is not performed,      home BP Monitoring: is well controlled at home, ranging 120's/70's.      further diabetic ROS: no polyuria or polydipsia, no numbness, tingling or pain in extremities, no unusual visual symptoms, no hypoglycemia, no medication side effects noted. Diet and Lifestyle: exercises sporadically, nonsmoker   His mother tries to control his diet, but has no control when he is working at [employer]. He favors pasta, potatoes (fries and chips), other starchy foods. Breakfast oatmeal, banana, hot chocolate or eggs, toast and sausage  Lunch: Soup and sandwich   Supper: Salad, vegetables, fish or chicken   Cardiovascular ROS:  He denies palpitations, orthopnea, exertional chest pressure/discomfort, claudication, lower extremity edema, dyspnea on exertion, dizziness    Seizures have been well controlled.      Patient Active Problem List   Diagnosis Code    Mental retardation F79    Seizure disorder (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     Eosinophilia     Hypercholesterolemia     Hypertension     Mental retardation     Seizures ([facility]     Skin cancer      Past Surgical History:   Procedure Laterality Date  ENDOSCOPY, COLON, DIAGNOSTIC  11/08/2007    Dr Kendrick Clark normal except small internal hemorrhoids repeat 11/2017     Social History     Social History    Marital status: SINGLE     Spouse name: N/A    Number of children: N/A    Years of education: N/A     Occupational History          [employer]      Social History Main Topics    Smoking status: Never Smoker    Smokeless tobacco: Never Used    Alcohol use No    Drug use: None    Sexual activity: Not Asked     Other Topics Concern    None     Social History Narrative     Family History   Problem Relation Age of Onset    Other Mother      PMR    Hypertension Mother     Cancer Father      Lung    Diabetes Brother      No Known Allergies  Current Outpatient Prescriptions   Medication Sig Dispense Refill    glimepiride (AMARYL) 4 mg tablet Take 1.5 Tabs by mouth every morning. 90 Tab 3    topiramate (TOPAMAX) 200 mg tablet 1.5 tablets twice a day 360 Tab 3    divalproex DR (DEPAKOTE) 500 mg tablet Take one tab in the AM and two tabs at night 896 Tab 3    folic acid (FOLVITE) 1 mg tablet TAKE ONE TABLET BY MOUTH DAILY 90 Tab 3    metFORMIN ER (GLUCOPHAGE XR) 750 mg tablet TAKE ONE TABLET BY MOUTH TWO TIMES A  Tab 3    ramipril (ALTACE) 5 mg capsule Take 1 Cap by mouth daily. 90 Cap 3    chlorhexidine (PERIDEX) 0.12 % solution ONE CAPFUL ONCE DAILY OR AS DIRECTED  9    atorvastatin (LIPITOR) 40 mg tablet TAKE ONE TABLET BY MOUTH AT BEDTIME 90 Tab 3    triamcinolone acetonide (KENALOG) 0.1 % topical cream       Calcium-Cholecalciferol, D3, (CALTRATE-600 PLUS VITAMIN D3) 600-400 mg-unit Tab Take  by mouth. Review of Systems   Constitutional: Negative for malaise/fatigue and weight loss. Gastrointestinal: Negative for constipation, diarrhea and heartburn. Musculoskeletal: Positive for joint pain (right shoulder). Negative for back pain. Neurological: Negative for dizziness, tingling and focal weakness.      Visit Vitals    /76 (BP 1 Location: Right arm, BP Patient Position: Sitting)    Pulse 76    Temp 97.5 °F (36.4 °C) (Oral)    Resp 20    Ht 6' (1.829 m)    Wt 203 lb (92.1 kg)    SpO2 97%    BMI 27.53 kg/m2     Physical Exam   Constitutional: He is oriented to person, place, and time. He appears well-developed and well-nourished. HENT:   Head: Normocephalic and atraumatic. Eyes: Conjunctivae are normal. Pupils are equal, round, and reactive to light. Neck: Neck supple. Carotid bruit is not present. No thyromegaly present. Cardiovascular: Normal rate, regular rhythm and normal heart sounds. PMI is not displaced. Exam reveals no gallop. No murmur heard. Pulses:       Dorsalis pedis pulses are 2+ on the right side, and 2+ on the left side. Posterior tibial pulses are 2+ on the right side, and 2+ on the left side. Pulmonary/Chest: Effort normal. He has no wheezes. He has no rhonchi. He has no rales. Abdominal: Soft. Normal appearance. He exhibits no abdominal bruit and no mass. There is no hepatosplenomegaly. There is no tenderness. Musculoskeletal: He exhibits no edema. Lymphadenopathy:     He has no cervical adenopathy. Right: No supraclavicular adenopathy present. Left: No supraclavicular adenopathy present. Neurological: He is alert and oriented to person, place, and time. No sensory deficit. Skin: Skin is warm, dry and intact. No rash noted. Psychiatric: He has a normal mood and affect. His behavior is normal.   Nursing note and vitals reviewed.     Results for orders placed or performed in visit on 11/28/17   AMB POC HEMOGLOBIN A1C   Result Value Ref Range    Hemoglobin A1c (POC) 9.8 (A) 4.8 - 5.6 %     Lab Results   Component Value Date/Time    Cholesterol, total 123 02/10/2017 08:48 AM    HDL Cholesterol 41 02/10/2017 08:48 AM    LDL, calculated 60 02/10/2017 08:48 AM    VLDL, calculated 22 02/10/2017 08:48 AM    Triglyceride 110 02/10/2017 08:48 AM    CHOL/HDL Ratio 2.8 03/31/2009 08:40 AM     Lab Results   Component Value Date/Time    Sodium 140 02/10/2017 08:48 AM    Potassium 5.2 02/10/2017 08:48 AM    Chloride 102 02/10/2017 08:48 AM    CO2 24 02/10/2017 08:48 AM    Anion gap 8 07/06/2015 04:17 PM    Glucose 191 02/10/2017 08:48 AM    BUN 21 02/10/2017 08:48 AM    Creatinine 0.77 02/10/2017 08:48 AM    BUN/Creatinine ratio 27 02/10/2017 08:48 AM    GFR est  02/10/2017 08:48 AM    GFR est non-AA 99 02/10/2017 08:48 AM    Calcium 9.1 02/10/2017 08:48 AM    Bilirubin, total 0.2 02/10/2017 08:48 AM    AST (SGOT) 11 02/10/2017 08:48 AM    Alk. phosphatase 50 02/10/2017 08:48 AM    Protein, total 6.2 02/10/2017 08:48 AM    Albumin 4.0 02/10/2017 08:48 AM    Globulin 3.0 07/06/2015 04:17 PM    A-G Ratio 1.8 02/10/2017 08:48 AM    ALT (SGPT) 11 02/10/2017 08:48 AM       ASSESSMENT and PLAN    ICD-10-CM ICD-9-CM    1. Controlled type 2 diabetes mellitus without complication, without long-term current use of insulin (HCC) E11.9 250.00 AMB POC HEMOGLOBIN A1C      glimepiride (AMARYL) 4 mg tablet      pioglitazone (ACTOS) 30 mg tablet      HEMOGLOBIN A1C WITH EAG      DISCONTINUED: glimepiride (AMARYL) 4 mg tablet   2. Hypertension, essential I10 401.9    3. Hypercholesterolemia E78.00 272.0 LIPID PANEL      METABOLIC PANEL, COMPREHENSIVE   4. Seizure disorder (HCC) G40.909 345.90 topiramate (TOPAMAX) 200 mg tablet     Diagnoses and all orders for this visit:    1. Controlled type 2 diabetes mellitus without complication, without long-term current use of insulin (HCC)  Diet control is hard due to limited intellectual capacities. We have reviewed many times, as has his mother, but he does not retain the information.   -     AMB POC HEMOGLOBIN A1C  -    Increase glimepiride (AMARYL) 4 mg tablet; Take 2 Tabs by mouth every morning.  -    Add pioglitazone (ACTOS) 30 mg tablet; Take 1 Tab by mouth daily.  -     HEMOGLOBIN A1C WITH EAG; Future    2.  Hypertension, essential  Hypertension is controlled    3. Hypercholesterolemia  Hyperlipidemia is controlled  -     LIPID PANEL; Future  -     METABOLIC PANEL, COMPREHENSIVE; Future    4. Seizure disorder (HCC)  -     topiramate (TOPAMAX) 200 mg tablet; 1.5 tablets twice a day      Follow-up Disposition:  Return in about 3 months (around 2/28/2018) for DM, HTN, chol, Fasting lab one week prior . lab results and schedule of future lab studies reviewed with patient  reviewed diet, exercise and weight control  cardiovascular risk and specific lipid/LDL goals reviewed  I have discussed the diagnosis with the patient and the intended plan as seen in the above orders. Patient is in agreement. The patient has received an after-visit summary and questions were answered concerning future plans. I have discussed medication side effects and warnings with the patient as well. 40 minutes was spent with patient, more than half in diabetes counseling.

## 2017-11-28 NOTE — PATIENT INSTRUCTIONS
Increase glimepiride to 8 mg daily (two 4 mg tablets)  Add pioglitazone 30 mg daily (AM or evening)  Office visit in 4 months with fasting lab work a week before visit. Learning About Diabetes Food Guidelines  Your Care Instructions    Meal planning is important to manage diabetes. It helps keep your blood sugar at a target level (which you set with your doctor). You don't have to eat special foods. You can eat what your family eats, including sweets once in a while. But you do have to pay attention to how often you eat and how much you eat of certain foods. You may want to work with a dietitian or a certified diabetes educator (CDE) to help you plan meals and snacks. A dietitian or CDE can also help you lose weight if that is one of your goals. What should you know about eating carbs? Managing the amount of carbohydrate (carbs) you eat is an important part of healthy meals when you have diabetes. Carbohydrate is found in many foods. · Learn which foods have carbs. And learn the amounts of carbs in different foods. ¨ Bread, cereal, pasta, and rice have about 15 grams of carbs in a serving. A serving is 1 slice of bread (1 ounce), ½ cup of cooked cereal, or 1/3 cup of cooked pasta or rice. ¨ Fruits have 15 grams of carbs in a serving. A serving is 1 small fresh fruit, such as an apple or orange; ½ of a banana; ½ cup of cooked or canned fruit; ½ cup of fruit juice; 1 cup of melon or raspberries; or 2 tablespoons of dried fruit. ¨ Milk and no-sugar-added yogurt have 15 grams of carbs in a serving. A serving is 1 cup of milk or 2/3 cup of no-sugar-added yogurt. ¨ Starchy vegetables have 15 grams of carbs in a serving. A serving is ½ cup of mashed potatoes or sweet potato; 1 cup winter squash; ½ of a small baked potato; ½ cup of cooked beans; or ½ cup cooked corn or green peas.   · Learn how much carbs to eat each day and at each meal. A dietitian or CDE can teach you how to keep track of the amount of carbs you eat. This is called carbohydrate counting. · If you are not sure how to count carbohydrate grams, use the Plate Method to plan meals. It is a good, quick way to make sure that you have a balanced meal. It also helps you spread carbs throughout the day. ¨ Divide your plate by types of foods. Put non-starchy vegetables on half the plate, meat or other protein food on one-quarter of the plate, and a grain or starchy vegetable in the final quarter of the plate. To this you can add a small piece of fruit and 1 cup of milk or yogurt, depending on how many carbs you are supposed to eat at a meal.  · Try to eat about the same amount of carbs at each meal. Do not \"save up\" your daily allowance of carbs to eat at one meal.  · Proteins have very little or no carbs per serving. Examples of proteins are beef, chicken, turkey, fish, eggs, tofu, cheese, cottage cheese, and peanut butter. A serving size of meat is 3 ounces, which is about the size of a deck of cards. Examples of meat substitute serving sizes (equal to 1 ounce of meat) are 1/4 cup of cottage cheese, 1 egg, 1 tablespoon of peanut butter, and ½ cup of tofu. How can you eat out and still eat healthy? · Learn to estimate the serving sizes of foods that have carbohydrate. If you measure food at home, it will be easier to estimate the amount in a serving of restaurant food. · If the meal you order has too much carbohydrate (such as potatoes, corn, or baked beans), ask to have a low-carbohydrate food instead. Ask for a salad or green vegetables. · If you use insulin, check your blood sugar before and after eating out to help you plan how much to eat in the future. · If you eat more carbohydrate at a meal than you had planned, take a walk or do other exercise. This will help lower your blood sugar. What else should you know? · Limit saturated fat, such as the fat from meat and dairy products.  This is a healthy choice because people who have diabetes are at higher risk of heart disease. So choose lean cuts of meat and nonfat or low-fat dairy products. Use olive or canola oil instead of butter or shortening when cooking. · Don't skip meals. Your blood sugar may drop too low if you skip meals and take insulin or certain medicines for diabetes. · Check with your doctor before you drink alcohol. Alcohol can cause your blood sugar to drop too low. Alcohol can also cause a bad reaction if you take certain diabetes medicines. Follow-up care is a key part of your treatment and safety. Be sure to make and go to all appointments, and call your doctor if you are having problems. It's also a good idea to know your test results and keep a list of the medicines you take. Where can you learn more? Go to http://jarod-jared.info/. Enter Z860 in the search box to learn more about \"Learning About Diabetes Food Guidelines. \"  Current as of: March 13, 2017  Content Version: 11.4  © 5389-2724 Healthwise, [employer]. Care instructions adapted under license by [employer] (which disclaims liability or warranty for this information). If you have questions about a medical condition or this instruction, always ask your healthcare professional. [address] any warranty or liability for your use of this information.

## 2018-01-02 RX ORDER — ATORVASTATIN CALCIUM 40 MG/1
TABLET, FILM COATED ORAL
Qty: 90 TAB | Refills: 2 | Status: SHIPPED | OUTPATIENT
Start: 2018-01-02 | End: 2018-10-22 | Stop reason: SDUPTHER

## 2018-02-17 ENCOUNTER — HOSPITAL ENCOUNTER (INPATIENT)
Age: 63
LOS: 12 days | Discharge: NURSING, REHAB OR LONG-TERM CARE | DRG: 193 | End: 2018-03-01
Attending: EMERGENCY MEDICINE | Admitting: HOSPITALIST
Payer: MEDICARE

## 2018-02-17 ENCOUNTER — APPOINTMENT (OUTPATIENT)
Dept: GENERAL RADIOLOGY | Age: 63
DRG: 193 | End: 2018-02-17
Attending: EMERGENCY MEDICINE
Payer: MEDICARE

## 2018-02-17 DIAGNOSIS — J96.01 ACUTE RESPIRATORY FAILURE WITH HYPOXIA (HCC): Primary | ICD-10-CM

## 2018-02-17 DIAGNOSIS — G40.909 SEIZURE DISORDER (HCC): ICD-10-CM

## 2018-02-17 DIAGNOSIS — A41.9 SEPSIS, DUE TO UNSPECIFIED ORGANISM: ICD-10-CM

## 2018-02-17 DIAGNOSIS — J11.1 INFLUENZA: ICD-10-CM

## 2018-02-17 PROBLEM — J96.00 ACUTE RESPIRATORY FAILURE (HCC): Status: ACTIVE | Noted: 2018-02-17

## 2018-02-17 LAB
ALBUMIN SERPL-MCNC: 2.8 G/DL (ref 3.5–5)
ALBUMIN/GLOB SERPL: 0.7 {RATIO} (ref 1.1–2.2)
ALP SERPL-CCNC: 45 U/L (ref 45–117)
ALT SERPL-CCNC: 14 U/L (ref 12–78)
ANION GAP SERPL CALC-SCNC: 12 MMOL/L (ref 5–15)
APPEARANCE UR: CLEAR
AST SERPL-CCNC: 14 U/L (ref 15–37)
BACTERIA URNS QL MICRO: ABNORMAL /HPF
BASOPHILS # BLD: 0 K/UL (ref 0–0.1)
BASOPHILS NFR BLD: 0 % (ref 0–1)
BILIRUB SERPL-MCNC: 0.5 MG/DL (ref 0.2–1)
BILIRUB UR QL: NEGATIVE
BUN SERPL-MCNC: 25 MG/DL (ref 6–20)
BUN/CREAT SERPL: 23 (ref 12–20)
CALCIUM SERPL-MCNC: 8.6 MG/DL (ref 8.5–10.1)
CHLORIDE SERPL-SCNC: 101 MMOL/L (ref 97–108)
CO2 SERPL-SCNC: 21 MMOL/L (ref 21–32)
COLOR UR: ABNORMAL
CREAT SERPL-MCNC: 1.09 MG/DL (ref 0.7–1.3)
DIFFERENTIAL METHOD BLD: ABNORMAL
EOSINOPHIL # BLD: 0 K/UL (ref 0–0.4)
EOSINOPHIL NFR BLD: 0 % (ref 0–7)
EPITH CASTS URNS QL MICRO: ABNORMAL /LPF
ERYTHROCYTE [DISTWIDTH] IN BLOOD BY AUTOMATED COUNT: 12.7 % (ref 11.5–14.5)
FLUAV AG NPH QL IA: POSITIVE
FLUBV AG NOSE QL IA: NEGATIVE
GLOBULIN SER CALC-MCNC: 3.9 G/DL (ref 2–4)
GLUCOSE BLD STRIP.AUTO-MCNC: 190 MG/DL (ref 65–100)
GLUCOSE BLD STRIP.AUTO-MCNC: 246 MG/DL (ref 65–100)
GLUCOSE SERPL-MCNC: 352 MG/DL (ref 65–100)
GLUCOSE UR STRIP.AUTO-MCNC: >1000 MG/DL
HCT VFR BLD AUTO: 39.1 % (ref 36.6–50.3)
HGB BLD-MCNC: 12.8 G/DL (ref 12.1–17)
HGB UR QL STRIP: NEGATIVE
HYALINE CASTS URNS QL MICRO: ABNORMAL /LPF (ref 0–5)
IMM GRANULOCYTES # BLD: 0.1 K/UL (ref 0–0.04)
IMM GRANULOCYTES NFR BLD AUTO: 1 % (ref 0–0.5)
KETONES UR QL STRIP.AUTO: ABNORMAL MG/DL
LACTATE SERPL-SCNC: 1.3 MMOL/L (ref 0.4–2)
LEUKOCYTE ESTERASE UR QL STRIP.AUTO: NEGATIVE
LYMPHOCYTES # BLD: 1.5 K/UL (ref 0.8–3.5)
LYMPHOCYTES NFR BLD: 16 % (ref 12–49)
MCH RBC QN AUTO: 30.3 PG (ref 26–34)
MCHC RBC AUTO-ENTMCNC: 32.7 G/DL (ref 30–36.5)
MCV RBC AUTO: 92.4 FL (ref 80–99)
MONOCYTES # BLD: 1.1 K/UL (ref 0–1)
MONOCYTES NFR BLD: 11 % (ref 5–13)
NEUTS SEG # BLD: 6.9 K/UL (ref 1.8–8)
NEUTS SEG NFR BLD: 72 % (ref 32–75)
NITRITE UR QL STRIP.AUTO: NEGATIVE
NRBC # BLD: 0 K/UL (ref 0–0.01)
NRBC BLD-RTO: 0 PER 100 WBC
PH UR STRIP: 6 [PH] (ref 5–8)
PLATELET # BLD AUTO: 76 K/UL (ref 150–400)
PMV BLD AUTO: 10.5 FL (ref 8.9–12.9)
POTASSIUM SERPL-SCNC: 4.2 MMOL/L (ref 3.5–5.1)
PROT SERPL-MCNC: 6.7 G/DL (ref 6.4–8.2)
PROT UR STRIP-MCNC: NEGATIVE MG/DL
RBC # BLD AUTO: 4.23 M/UL (ref 4.1–5.7)
RBC #/AREA URNS HPF: ABNORMAL /HPF (ref 0–5)
SERVICE CMNT-IMP: ABNORMAL
SERVICE CMNT-IMP: ABNORMAL
SODIUM SERPL-SCNC: 134 MMOL/L (ref 136–145)
SP GR UR REFRACTOMETRY: 1.02 (ref 1–1.03)
UA: UC IF INDICATED,UAUC: ABNORMAL
UROBILINOGEN UR QL STRIP.AUTO: 1 EU/DL (ref 0.2–1)
WBC # BLD AUTO: 9.6 K/UL (ref 4.1–11.1)
WBC URNS QL MICRO: ABNORMAL /HPF (ref 0–4)

## 2018-02-17 PROCEDURE — 82962 GLUCOSE BLOOD TEST: CPT

## 2018-02-17 PROCEDURE — 87804 INFLUENZA ASSAY W/OPTIC: CPT | Performed by: EMERGENCY MEDICINE

## 2018-02-17 PROCEDURE — 77030005538 HC CATH URETH FOL44 BARD -B

## 2018-02-17 PROCEDURE — 74011250636 HC RX REV CODE- 250/636: Performed by: EMERGENCY MEDICINE

## 2018-02-17 PROCEDURE — 77030005518 HC CATH URETH FOL 2W BARD -B

## 2018-02-17 PROCEDURE — 87040 BLOOD CULTURE FOR BACTERIA: CPT | Performed by: EMERGENCY MEDICINE

## 2018-02-17 PROCEDURE — 71045 X-RAY EXAM CHEST 1 VIEW: CPT

## 2018-02-17 PROCEDURE — 94640 AIRWAY INHALATION TREATMENT: CPT

## 2018-02-17 PROCEDURE — 96361 HYDRATE IV INFUSION ADD-ON: CPT

## 2018-02-17 PROCEDURE — 74011250636 HC RX REV CODE- 250/636: Performed by: HOSPITALIST

## 2018-02-17 PROCEDURE — 94761 N-INVAS EAR/PLS OXIMETRY MLT: CPT

## 2018-02-17 PROCEDURE — 36415 COLL VENOUS BLD VENIPUNCTURE: CPT | Performed by: EMERGENCY MEDICINE

## 2018-02-17 PROCEDURE — 80053 COMPREHEN METABOLIC PANEL: CPT | Performed by: EMERGENCY MEDICINE

## 2018-02-17 PROCEDURE — 65660000000 HC RM CCU STEPDOWN

## 2018-02-17 PROCEDURE — 87086 URINE CULTURE/COLONY COUNT: CPT | Performed by: HOSPITALIST

## 2018-02-17 PROCEDURE — 74011636637 HC RX REV CODE- 636/637: Performed by: HOSPITALIST

## 2018-02-17 PROCEDURE — 74011250637 HC RX REV CODE- 250/637: Performed by: EMERGENCY MEDICINE

## 2018-02-17 PROCEDURE — 83605 ASSAY OF LACTIC ACID: CPT | Performed by: EMERGENCY MEDICINE

## 2018-02-17 PROCEDURE — 81001 URINALYSIS AUTO W/SCOPE: CPT | Performed by: HOSPITALIST

## 2018-02-17 PROCEDURE — 77010033678 HC OXYGEN DAILY

## 2018-02-17 PROCEDURE — 77030032490 HC SLV COMPR SCD KNE COVD -B

## 2018-02-17 PROCEDURE — 74011250637 HC RX REV CODE- 250/637: Performed by: HOSPITALIST

## 2018-02-17 PROCEDURE — 96365 THER/PROPH/DIAG IV INF INIT: CPT

## 2018-02-17 PROCEDURE — 99285 EMERGENCY DEPT VISIT HI MDM: CPT

## 2018-02-17 PROCEDURE — 85025 COMPLETE CBC W/AUTO DIFF WBC: CPT | Performed by: EMERGENCY MEDICINE

## 2018-02-17 PROCEDURE — 74011000250 HC RX REV CODE- 250: Performed by: HOSPITALIST

## 2018-02-17 RX ORDER — INSULIN LISPRO 100 [IU]/ML
INJECTION, SOLUTION INTRAVENOUS; SUBCUTANEOUS
Status: DISCONTINUED | OUTPATIENT
Start: 2018-02-17 | End: 2018-03-01 | Stop reason: HOSPADM

## 2018-02-17 RX ORDER — INSULIN LISPRO 100 [IU]/ML
INJECTION, SOLUTION INTRAVENOUS; SUBCUTANEOUS
Status: DISCONTINUED | OUTPATIENT
Start: 2018-02-17 | End: 2018-02-17

## 2018-02-17 RX ORDER — ONDANSETRON 2 MG/ML
4 INJECTION INTRAMUSCULAR; INTRAVENOUS
Status: DISCONTINUED | OUTPATIENT
Start: 2018-02-17 | End: 2018-03-01 | Stop reason: HOSPADM

## 2018-02-17 RX ORDER — SODIUM CHLORIDE 9 MG/ML
125 INJECTION, SOLUTION INTRAVENOUS CONTINUOUS
Status: DISCONTINUED | OUTPATIENT
Start: 2018-02-17 | End: 2018-02-18

## 2018-02-17 RX ORDER — SODIUM CHLORIDE 0.9 % (FLUSH) 0.9 %
5-10 SYRINGE (ML) INJECTION AS NEEDED
Status: DISCONTINUED | OUTPATIENT
Start: 2018-02-17 | End: 2018-03-01 | Stop reason: HOSPADM

## 2018-02-17 RX ORDER — GLIMEPIRIDE 4 MG/1
8 TABLET ORAL
Status: DISCONTINUED | OUTPATIENT
Start: 2018-02-18 | End: 2018-03-01 | Stop reason: HOSPADM

## 2018-02-17 RX ORDER — CODEINE PHOSPHATE AND GUAIFENESIN 10; 100 MG/5ML; MG/5ML
10 SOLUTION ORAL
Status: COMPLETED | OUTPATIENT
Start: 2018-02-17 | End: 2018-02-17

## 2018-02-17 RX ORDER — FOLIC ACID 1 MG/1
1 TABLET ORAL DAILY
Status: DISCONTINUED | OUTPATIENT
Start: 2018-02-18 | End: 2018-03-01 | Stop reason: HOSPADM

## 2018-02-17 RX ORDER — OSELTAMIVIR PHOSPHATE 75 MG/1
75 CAPSULE ORAL
Status: COMPLETED | OUTPATIENT
Start: 2018-02-17 | End: 2018-02-17

## 2018-02-17 RX ORDER — ENOXAPARIN SODIUM 100 MG/ML
40 INJECTION SUBCUTANEOUS EVERY 24 HOURS
Status: DISCONTINUED | OUTPATIENT
Start: 2018-02-17 | End: 2018-02-17

## 2018-02-17 RX ORDER — OSELTAMIVIR PHOSPHATE 75 MG/1
75 CAPSULE ORAL EVERY 12 HOURS
Status: COMPLETED | OUTPATIENT
Start: 2018-02-17 | End: 2018-02-21

## 2018-02-17 RX ORDER — GUAIFENESIN/DEXTROMETHORPHAN 100-10MG/5
10 SYRUP ORAL
Status: DISCONTINUED | OUTPATIENT
Start: 2018-02-17 | End: 2018-03-01 | Stop reason: HOSPADM

## 2018-02-17 RX ORDER — DEXTROSE 50 % IN WATER (D50W) INTRAVENOUS SYRINGE
12.5-25 AS NEEDED
Status: DISCONTINUED | OUTPATIENT
Start: 2018-02-17 | End: 2018-03-01 | Stop reason: HOSPADM

## 2018-02-17 RX ORDER — ACETAMINOPHEN 325 MG/1
650 TABLET ORAL
Status: DISCONTINUED | OUTPATIENT
Start: 2018-02-17 | End: 2018-03-01 | Stop reason: HOSPADM

## 2018-02-17 RX ORDER — ATORVASTATIN CALCIUM 40 MG/1
40 TABLET, FILM COATED ORAL
Status: DISCONTINUED | OUTPATIENT
Start: 2018-02-17 | End: 2018-03-01 | Stop reason: HOSPADM

## 2018-02-17 RX ORDER — DIVALPROEX SODIUM 250 MG/1
500 TABLET, DELAYED RELEASE ORAL DAILY
Status: DISCONTINUED | OUTPATIENT
Start: 2018-02-18 | End: 2018-03-01 | Stop reason: HOSPADM

## 2018-02-17 RX ORDER — TOPIRAMATE 100 MG/1
300 TABLET, FILM COATED ORAL 2 TIMES DAILY
Status: DISCONTINUED | OUTPATIENT
Start: 2018-02-17 | End: 2018-03-01 | Stop reason: HOSPADM

## 2018-02-17 RX ORDER — DIVALPROEX SODIUM 250 MG/1
1000 TABLET, DELAYED RELEASE ORAL EVERY EVENING
Status: DISCONTINUED | OUTPATIENT
Start: 2018-02-17 | End: 2018-03-01 | Stop reason: HOSPADM

## 2018-02-17 RX ORDER — ACETAMINOPHEN 325 MG/1
650 TABLET ORAL
Status: COMPLETED | OUTPATIENT
Start: 2018-02-17 | End: 2018-02-17

## 2018-02-17 RX ORDER — MAGNESIUM SULFATE 100 %
4 CRYSTALS MISCELLANEOUS AS NEEDED
Status: DISCONTINUED | OUTPATIENT
Start: 2018-02-17 | End: 2018-03-01 | Stop reason: HOSPADM

## 2018-02-17 RX ORDER — FERROUS SULFATE, DRIED 160(50) MG
1 TABLET, EXTENDED RELEASE ORAL DAILY
Status: DISCONTINUED | OUTPATIENT
Start: 2018-02-18 | End: 2018-03-01 | Stop reason: HOSPADM

## 2018-02-17 RX ORDER — SODIUM CHLORIDE 0.9 % (FLUSH) 0.9 %
5-10 SYRINGE (ML) INJECTION EVERY 8 HOURS
Status: DISCONTINUED | OUTPATIENT
Start: 2018-02-17 | End: 2018-02-24

## 2018-02-17 RX ORDER — METFORMIN HYDROCHLORIDE 750 MG/1
750 TABLET, EXTENDED RELEASE ORAL 2 TIMES DAILY
Status: DISCONTINUED | OUTPATIENT
Start: 2018-02-17 | End: 2018-02-20

## 2018-02-17 RX ORDER — TRIAMCINOLONE ACETONIDE 1 MG/G
CREAM TOPICAL
Status: DISCONTINUED | OUTPATIENT
Start: 2018-02-17 | End: 2018-03-01 | Stop reason: HOSPADM

## 2018-02-17 RX ORDER — IPRATROPIUM BROMIDE AND ALBUTEROL SULFATE 2.5; .5 MG/3ML; MG/3ML
3 SOLUTION RESPIRATORY (INHALATION)
Status: DISCONTINUED | OUTPATIENT
Start: 2018-02-17 | End: 2018-02-18

## 2018-02-17 RX ADMIN — IPRATROPIUM BROMIDE AND ALBUTEROL SULFATE 3 ML: .5; 3 SOLUTION RESPIRATORY (INHALATION) at 22:29

## 2018-02-17 RX ADMIN — ACETAMINOPHEN 650 MG: 325 TABLET ORAL at 11:59

## 2018-02-17 RX ADMIN — AZITHROMYCIN 500 MG: 500 INJECTION, POWDER, LYOPHILIZED, FOR SOLUTION INTRAVENOUS at 12:46

## 2018-02-17 RX ADMIN — Medication 10 ML: at 20:41

## 2018-02-17 RX ADMIN — CEFTRIAXONE SODIUM 1 G: 1 INJECTION, POWDER, FOR SOLUTION INTRAMUSCULAR; INTRAVENOUS at 11:59

## 2018-02-17 RX ADMIN — IPRATROPIUM BROMIDE AND ALBUTEROL SULFATE 3 ML: .5; 3 SOLUTION RESPIRATORY (INHALATION) at 17:01

## 2018-02-17 RX ADMIN — INSULIN HUMAN 7 UNITS: 100 INJECTION, SOLUTION PARENTERAL at 17:01

## 2018-02-17 RX ADMIN — SODIUM CHLORIDE 500 ML: 900 INJECTION, SOLUTION INTRAVENOUS at 11:59

## 2018-02-17 RX ADMIN — OSELTAMIVIR PHOSPHATE 75 MG: 75 CAPSULE ORAL at 12:46

## 2018-02-17 RX ADMIN — Medication 10 ML: at 17:01

## 2018-02-17 RX ADMIN — SODIUM CHLORIDE 1000 ML: 900 INJECTION, SOLUTION INTRAVENOUS at 11:59

## 2018-02-17 RX ADMIN — ATORVASTATIN CALCIUM 40 MG: 40 TABLET, FILM COATED ORAL at 20:43

## 2018-02-17 RX ADMIN — INSULIN LISPRO 3 UNITS: 100 INJECTION, SOLUTION INTRAVENOUS; SUBCUTANEOUS at 17:01

## 2018-02-17 RX ADMIN — METFORMIN HYDROCHLORIDE 750 MG: 750 TABLET, EXTENDED RELEASE ORAL at 20:45

## 2018-02-17 RX ADMIN — OSELTAMIVIR PHOSPHATE 75 MG: 75 CAPSULE ORAL at 20:41

## 2018-02-17 RX ADMIN — SODIUM CHLORIDE 125 ML/HR: 900 INJECTION, SOLUTION INTRAVENOUS at 18:38

## 2018-02-17 RX ADMIN — GUAIFENESIN AND CODEINE PHOSPHATE 10 ML: 100; 10 SOLUTION ORAL at 12:46

## 2018-02-17 RX ADMIN — TOPIRAMATE 300 MG: 100 TABLET, FILM COATED ORAL at 20:42

## 2018-02-17 RX ADMIN — DIVALPROEX SODIUM 1000 MG: 250 TABLET, DELAYED RELEASE ORAL at 18:35

## 2018-02-17 NOTE — PROGRESS NOTES
Pharmacy Automatic Renal Dosing Protocol - Antimicrobials    Indication for Antimicrobials: Influenza A    Current Regimen of Each Antimicrobial:  Oseltamivir 75mg po BID x5 days  (Start Date ; Day # 1 of 5)      Significant Cultures:   Results for Iris Ip (MRN [id number]) as of 2018 15:00   Ref. Range   2018 12:20   Influenza A Antigen Latest Ref Range: NEG     POSITIVE (A)   Influenza B Antigen Latest Ref Range: NEG     NEGATIVE   Prostate Specific Ag Latest Ref Range: 0.0 - 4.0 ng/mL      INFLUENZA A & B AG (RAPID TEST) Unknown   Rpt (A)       Paralysis, amputations, malnutrition:     Labs:  Recent Labs      18   1121   CREA  1.09   BUN  25*   WBC  9.6     Temp (24hrs), Av.7 °F (38.2 °C), Min:100.2 °F (37.9 °C), Max:101.1 °F (38.4 °C)      Creatinine Clearance (mL/min) or Dialysis: 83  No results found for: PCT    Impression/Plan:   · Oseltamivir is dosed appropriately  · Antimicrobial stop date 5 days; done     Pharmacy will follow daily and adjust medications as appropriate for renal function and/or serum levels. Thank you,  Savage Ren [facility]          Recommended duration of therapy  http://[facility]/[place]/[facility]/Pharmacy/Clinical%20Companion/Duration%20of%20ABX%20therapy. docx    Renal Dosing  http://[facility]/[place]/[facility]/Pharmacy/Clinical%20Companion/Renal%[email or web address]. pdf

## 2018-02-17 NOTE — IP AVS SNAPSHOT
[address] [facility] 
[phone] Patient: Sasha Melendez MRN: [id number] FOA:69/0/0609 You are allergic to the following No active allergies Recent Documentation Height Weight BMI Smoking Status 1.88 m 106.6 kg 30.17 kg/m2 Never Smoker Emergency Contacts  (Rel.) Home Phone Work Phone Mobile Phone Taylor Sarmiento (Parent) [phone] -- -- Titi Patel [phone] -- [phone] Leslye Grande [phone] About your hospitalization You were admitted on:  February 17, 2018 You last received care in the:  [address] You were discharged on:  March 1, 2018 Why you were hospitalized Your primary diagnosis was:  Acute Respiratory Failure (Hcc) Your diagnoses also included:  Influenza, Uncontrolled Type 2 Diabetes Mellitus (Hcc), Mental Retardation, Seizure Disorder (Hcc) Providers Seen During Your Hospitalization Provider Specialty Primary office phone Andrade Pop MD Emergency Medicine [phone] Laura Garcia MD Internal Medicine [phone] Yrn Sesay MD Internal Medicine [phone] Jameson Hare MD Hospitalist [phone] Nando Hill DO Internal Medicine [phone] Your Primary Care Physician (PCP) Primary Care Physician Office Phone Office Fax [address] [facility], [address] [place] [address] Road [phone] Follow-up Information Follow up With Details Comments Contact Info [address] [place] [address])   [address] [facility] [address] [phone] Stephanie Leiva MD   [address] [facility] [address] [phone] My Medications TAKE these medications as instructed Instructions Each Dose to Equal  
 Morning Noon Evening Bedtime  
 atorvastatin 40 mg tablet Commonly known as:  LIPITOR Your last dose was: Your next dose is: TAKE ONE TABLET BY MOUTH AT BEDTIME CALTRATE-600 PLUS VITAMIN D3 tablet Generic drug:  calcium-cholecalciferol (D3) Your last dose was: Your next dose is: Take  by mouth. divalproex  mg tablet Commonly known as:  DEPAKOTE Your last dose was: Your next dose is: Take one tab in the AM and two tabs at night  
     
   
   
   
  
 folic acid 1 mg tablet Commonly known as:  Google Your last dose was: Your next dose is: TAKE ONE TABLET BY MOUTH DAILY  
     
   
   
   
  
 glimepiride 4 mg tablet Commonly known as:  AMARYL Your last dose was: Your next dose is: Take 2 Tabs by mouth every morning. 8 mg  
    
   
   
   
  
 linagliptin 5 mg tablet Commonly known as:  Melody Schulz Start taking on:  3/2/2018 Your last dose was: Your next dose is: Take 1 Tab by mouth Daily (before breakfast). 5 mg  
    
   
   
   
  
 metFORMIN  mg tablet Commonly known as:  GLUCOPHAGE XR Your last dose was: Your next dose is: TAKE ONE TABLET BY MOUTH TWO TIMES A DAY  
     
   
   
   
  
 ramipril 5 mg capsule Commonly known as:  ALTACE Your last dose was: Your next dose is: Take 1 Cap by mouth daily. 5 mg  
    
   
   
   
  
 tamsulosin 0.4 mg capsule Commonly known as:  FLOMAX Start taking on:  3/2/2018 Your last dose was: Your next dose is: Take 1 Cap by mouth daily. 0.4 mg  
    
   
   
   
  
 topiramate 200 mg tablet Commonly known as:  TOPAMAX Your last dose was: Your next dose is:    
   
   
 1.5 tablets twice a day  
     
   
   
   
  
 triamcinolone acetonide 0.1 % topical cream  
Commonly known as:  KENALOG Your last dose was: Your next dose is:    
   
   
      
   
   
   
  
  
  
Where to Get Your Medications These medications were sent to [facility] [address], [address]  [address], [address] Phone:  [phone]  
  linagliptin 5 mg tablet  
 tamsulosin 0.4 mg capsule Discharge Instructions None Discharge Orders None ACO Transitions of Care Introducing [place] [address] Sasha Latham offers a voluntary care coordination program to provide high quality service and care to [facility] fee-for-service beneficiaries. Ruth Vidal was designed to help you enhance your health and well-being through the following services: ? Transitions of Care  support for individuals who are transitioning from one care setting to another (example: Hospital to home). ? Chronic and Complex Care Coordination  support for individuals and caregivers of those with serious or chronic illnesses or with more than one chronic (ongoing) condition and those who take a number of different medications. If you meet specific medical criteria, a [address] [place] [address] Rd may call you directly to coordinate your care with your primary care physician and your other care providers. For questions about the [facility] programs, please, contact your physicians office. For general questions or additional information about Accountable Care Organizations: 
Please visit www.medicare.gov/acos. html or call 1-800-MEDICARE (8-178.483.9454) TTY users should call 0-838.948.9034. Introducing [facility]! [employer] introduces [employer] patient portal. Now you can access parts of your medical record, email your doctor's office, and request medication refills online. 1. In your internet browser, go to [email or web address] 2. Click on the First Time User? Click Here link in the Sign In box. You will see the New Member Sign Up page. 3. Enter your InC[employer]d Access Code exactly as it appears below. You will not need to use this code after youve completed the sign-up process. If you do not sign up before the expiration date, you must request a new code. · In[employer] Access Code: [email or web address] Expires: 5/30/2018  8:14 AM 
 
4. Enter the last four digits of your Social Security Number (xxxx) and Date of Birth (mm/dd/yyyy) as indicated and click Submit. You will be taken to the next sign-up page. 5. Create a InCrowd ID. This will be your InCrowd login ID and cannot be changed, so think of one that is secure and easy to remember. 6. Create a InC[employer]d password. You can change your password at any time. 7. Enter your Password Reset Question and Answer. This can be used at a later time if you forget your password. 8. Enter your e-mail address. You will receive e-mail notification when new information is available in [address] 9. Click Sign Up. You can now view and download portions of your medical record. 10. Click the Download Summary menu link to download a portable copy of your medical information. If you have questions, please visit the Frequently Asked Questions section of the InC[employer] website. Remember, In[employer] is NOT to be used for urgent needs. For medical emergencies, dial 911. Now available from your iPhone and Android! General Information Please provide this summary of care documentation to your next provider. Patient Signature:  ____________________________________________________________ Date:  ____________________________________________________________  
  
Jayesh [facility] Provider Signature:  ____________________________________________________________ Date:  ____________________________________________________________

## 2018-02-17 NOTE — ED NOTES
Bedside shift change report given to Wesley Manrique RN (oncoming nurse) by Bryant Blue RN (offgoing nurse). Report included the following information SBAR, Kardex, ED Summary, Intake/Output, MAR and Recent Results.

## 2018-02-17 NOTE — ED PROVIDER NOTES
EMERGENCY DEPARTMENT HISTORY AND PHYSICAL EXAM      Date: 2/17/2018  Patient Name: Drew Zavala    History of Presenting Illness     Chief Complaint   Patient presents with    Lethargy     pt comes via EMS for lethargy and general weakness. Recent diagnosis of Flu, pt has broken R clavicle, diabetic , HTN, Special Needs lives with mother. History Provided By: Patient and EMS    HPI: Drew Zavala, 65 y.o. male with PMHx significant for DM, HTN, Seizures, mental retardation, and flu, presents via EMS to the ED with cc of increased lethargy x this morning. Per EMS pt had slid out of bed and was unable to get up when he called them. Pt also c/o N/V/D, some abdominal pain, coughing, and SOB. Per EMS pt was febrile on route and his BG was 320. Pt was recently diagnosed with the flu as well. Pt specifically denies any CP, lightheadedness, dizziness. Social Hx: - Tobacco (-), - EtOH (-), - illicit drug use (-)    PCP: Michoacano Dominguez MD    There are no other complaints, changes, or physical findings at this time. Current Facility-Administered Medications   Medication Dose Route Frequency Provider Last Rate Last Dose    cefTRIAXone (ROCEPHIN) 1 g/50 mL NS IVPB  1 g IntraVENous NOW Sarika Kurtz MD   1 g at 02/17/18 1159    azithromycin (ZITHROMAX) 500 mg in 0.9% sodium chloride 250 mL IVPB  500 mg IntraVENous NOW Sarika Kurtz MD        oseltamivir (TAMIFLU) capsule 75 mg  75 mg Oral NOW Sarika Kurtz MD        guaiFENesin-codeine (ROBITUSSIN AC) 100-10 mg/5 mL solution 10 mL  10 mL Oral NOW Sarika Kurtz MD         Current Outpatient Prescriptions   Medication Sig Dispense Refill    atorvastatin (LIPITOR) 40 mg tablet TAKE ONE TABLET BY MOUTH AT BEDTIME 90 Tab 2    topiramate (TOPAMAX) 200 mg tablet 1.5 tablets twice a day 360 Tab 3    glimepiride (AMARYL) 4 mg tablet Take 2 Tabs by mouth every morning.  180 Tab 3    divalproex DR (DEPAKOTE) 500 mg tablet Take one tab in the AM and two tabs at night 593 Tab 3    folic acid (FOLVITE) 1 mg tablet TAKE ONE TABLET BY MOUTH DAILY 90 Tab 3    metFORMIN ER (GLUCOPHAGE XR) 750 mg tablet TAKE ONE TABLET BY MOUTH TWO TIMES A  Tab 3    ramipril (ALTACE) 5 mg capsule Take 1 Cap by mouth daily. 90 Cap 3    Calcium-Cholecalciferol, D3, (CALTRATE-600 PLUS VITAMIN D3) 600-400 mg-unit Tab Take  by mouth.  chlorhexidine (PERIDEX) 0.12 % solution ONE CAPFUL ONCE DAILY OR AS DIRECTED  9    triamcinolone acetonide (KENALOG) 0.1 % topical cream          Past History     Past Medical History:  Past Medical History:   Diagnosis Date    Contact dermatitis and other eczema, due to unspecified cause     psoriasis    Diabetes ([facility] [address]     Eosinophilia     Hypercholesterolemia     Hypertension     Mental retardation     Seizures ([facility] [address]     Skin cancer        Past Surgical History:  Past Surgical History:   Procedure Laterality Date    ENDOSCOPY, COLON, DIAGNOSTIC  11/08/2007    Dr Mariam Arteaga normal except small internal hemorrhoids repeat 11/2017       Family History:  Family History   Problem Relation Age of Onset    Other Mother      PMR    Hypertension Mother     Cancer Father      Lung    Diabetes Brother        Social History:  Social History   Substance Use Topics    Smoking status: Never Smoker    Smokeless tobacco: Never Used    Alcohol use No       Allergies:  No Known Allergies      Review of Systems   Review of Systems   Constitutional: Positive for fever. Negative for chills and diaphoresis. + lethargy   HENT: Negative. Negative for congestion, ear pain, sore throat and trouble swallowing. Eyes: Negative. Negative for photophobia, pain, redness and visual disturbance. Respiratory: Positive for cough and shortness of breath. Negative for chest tightness and wheezing. Cardiovascular: Negative. Negative for chest pain and palpitations.    Gastrointestinal: Positive for abdominal pain, diarrhea, nausea and vomiting. Negative for blood in stool. Genitourinary: Negative for dysuria and frequency. Musculoskeletal: Negative. Negative for back pain, joint swelling and neck pain. Skin: Negative. Neurological: Negative. Negative for seizures, syncope and headaches. Psychiatric/Behavioral: Negative. Negative for behavioral problems and confusion. The patient is not nervous/anxious. All other systems reviewed and are negative. Physical Exam   Physical Exam   Constitutional: He appears well-developed and well-nourished. HENT:   Head: Normocephalic and atraumatic. Eyes: Conjunctivae and EOM are normal.   Neck: Normal range of motion. Neck supple. Cardiovascular: Normal rate and regular rhythm. Not tachycardic. Pulmonary/Chest: Effort normal. No respiratory distress. Crackles to the left lower base. O2 saturation is 87% on room air   Abdominal: Soft. He exhibits no distension. There is no tenderness. Abdomen is soft and NT. Musculoskeletal: Normal range of motion. He exhibits edema. 1+ pitting edema   Neurological: He is alert. Answers all questions appropriately but is slow to respond   Skin: Skin is warm and dry. Warm to the touch   Psychiatric: He has a normal mood and affect. Nursing note and vitals reviewed. Diagnostic Study Results     Labs -     Recent Results (from the past 12 hour(s))   CBC WITH AUTOMATED DIFF    Collection Time: 02/17/18 11:21 AM   Result Value Ref Range    WBC 9.6 4.1 - 11.1 K/uL    RBC 4.23 4. 10 - 5.70 M/uL    HGB 12.8 12.1 - 17.0 g/dL    HCT 39.1 36.6 - 50.3 %    MCV 92.4 80.0 - 99.0 FL    MCH 30.3 26.0 - 34.0 PG    MCHC 32.7 30.0 - 36.5 g/dL    RDW 12.7 11.5 - 14.5 %    PLATELET 76 (L) 849 - 400 K/uL    MPV 10.5 8.9 - 12.9 FL    NRBC 0.0 0  WBC    ABSOLUTE NRBC 0.00 0.00 - 0.01 K/uL    NEUTROPHILS 72 32 - 75 %    LYMPHOCYTES 16 12 - 49 %    MONOCYTES 11 5 - 13 %    EOSINOPHILS 0 0 - 7 %    BASOPHILS 0 0 - 1 %    IMMATURE GRANULOCYTES 1 (H) 0.0 - 0.5 %    ABS. NEUTROPHILS 6.9 1.8 - 8.0 K/UL    ABS. LYMPHOCYTES 1.5 0.8 - 3.5 K/UL    ABS. MONOCYTES 1.1 (H) 0.0 - 1.0 K/UL    ABS. EOSINOPHILS 0.0 0.0 - 0.4 K/UL    ABS. BASOPHILS 0.0 0.0 - 0.1 K/UL    ABS. IMM. GRANS. 0.1 (H) 0.00 - 0.04 K/UL    DF AUTOMATED     METABOLIC PANEL, COMPREHENSIVE    Collection Time: 02/17/18 11:21 AM   Result Value Ref Range    Sodium 134 (L) 136 - 145 mmol/L    Potassium 4.2 3.5 - 5.1 mmol/L    Chloride 101 97 - 108 mmol/L    CO2 21 21 - 32 mmol/L    Anion gap 12 5 - 15 mmol/L    Glucose 352 (H) 65 - 100 mg/dL    BUN 25 (H) 6 - 20 MG/DL    Creatinine 1.09 0.70 - 1.30 MG/DL    BUN/Creatinine ratio 23 (H) 12 - 20      GFR est AA >60 >60 ml/min/1.73m2    GFR est non-AA >60 >60 ml/min/1.73m2    Calcium 8.6 8.5 - 10.1 MG/DL    Bilirubin, total 0.5 0.2 - 1.0 MG/DL    ALT (SGPT) 14 12 - 78 U/L    AST (SGOT) 14 (L) 15 - 37 U/L    Alk. phosphatase 45 45 - 117 U/L    Protein, total 6.7 6.4 - 8.2 g/dL    Albumin 2.8 (L) 3.5 - 5.0 g/dL    Globulin 3.9 2.0 - 4.0 g/dL    A-G Ratio 0.7 (L) 1.1 - 2.2     LACTIC ACID    Collection Time: 02/17/18 11:21 AM   Result Value Ref Range    Lactic acid 1.3 0.4 - 2.0 MMOL/L       Radiologic Studies -   XR CHEST PORT   Final Result        CT Results  (Last 48 hours)    None        CXR Results  (Last 48 hours)               02/17/18 1138  XR CHEST PORT Final result    Impression:   impression: No acute infiltrate. Narrative:  Clinical indication: Crackles. Portable AP erect view of the chest is obtained. The heart size is normal. There   is no acute infiltrate. Medical Decision Making   I am the first provider for this patient. I reviewed the vital signs, available nursing notes, past medical history, past surgical history, family history and social history. Vital Signs-Reviewed the patient's vital signs.   Patient Vitals for the past 12 hrs:   Temp Pulse Resp BP SpO2   02/17/18 1113 (!) 101.1 °F (38.4 °C) - - - - 02/17/18 1110 - - - 138/82 93 %   02/17/18 1108 - 89 30 - (!) 86 %   02/17/18 1058 100.2 °F (37.9 °C) - - - -       Pulse Oximetry Analysis - 95% on 4 liters O2 via nasal cannula    Cardiac Monitor:   Rate: 87 bpm  Rhythm: Normal Sinus Rhythm      Records Reviewed: Nursing Notes and Old Medical Records    Provider Notes (Medical Decision Making):   Patient presents with fever, tachycardia and concerns for infection. Most likely Flu vs. PNA. Pt is hypoxic and will likely need admission. DDx: sepsis 2/2 UTI, PNA, intraabdominal infection (colitis, appendicitis, cholecystitis),  infectious diarrhea, meningitis, soft tissue infection, septic arthritis, flu/viral prodrome. Will follow sepsis protocol and order set by obtaining fluids, antibiotics, labs, lactate, EKG and frequently reassessing hemodynamic status on the patient. Will hold off on aggressive fluid hydration unless patient shows signs of severe sepsis or shock    ED Course:   Initial assessment performed. The patients presenting problems have been discussed, and they are in agreement with the care plan formulated and outlined with them. I have encouraged them to ask questions as they arise throughout their visit. CONSULT NOTE:   12:03 PM  Irma Molina M.D spoke with Dr. Sol Correa,   Specialty: Hospitalist  Discussed pt's hx, disposition, and available diagnostic and imaging results. Reviewed care plans. Consultant will evaluate pt for admission. Written by Dany Kowalski ED Scribe, as dictated by Irma Molina M.D. Critical Care Time:   None. Disposition:  ADMIT NOTE:  12:07 PM  Patient is being admitted to the hospital by Dr. Sol Correa. The results of their tests and reasons for their admission have been discussed with them and/or available family. They convey agreement and understanding for the need to be admitted and for their admission diagnosis.   Consultation has been made with the inpatient physician specialist for hospitalization. PLAN:  1. Current Discharge Medication List        2. Follow-up Information     None        Return to ED if worse     Diagnosis     Clinical Impression:   1. Acute respiratory failure with hypoxia ([address]    2. Influenza    3. Sepsis, due to unspecified organism [facility])        Attestations: This note is prepared by Aristeo Colmenares acting as Scribe for ALLIE Storey M.D   : The scribe's documentation has been prepared under my direction and personally reviewed by me in its entirety. I confirm that the note above accurately reflects all work, treatment, procedures, and medical decision making performed by me.

## 2018-02-17 NOTE — ED NOTES
Pt arrived via EMS for c/o lethargy with fall today. EMS report pt recently diagnosed with the Flu and has been been lethargic and weak with diarrhea. EMS reports Tachy in the 100's with fever of 102 and shortness of breath at 90% on room air. On arrival Pt is 86% on room air with rectal temp of 101.1F and pt is 88 HR with RR of 30. Placed on 4l nc sating at 94%. Code purple initiated. Sung Askewer, Mother of pt is POA and at bedside. Reports Pt started Wednesday with sore throat, headache, exhaustion, fever  Greater than 100. Took 1 tylenol of unknown dose at 0900, frequent urination.

## 2018-02-17 NOTE — H&P
Hospitalist Admission Note    NAME: Krystal Graves   :  1955   MRN:  [id number]     Date/Time:  2018 1:50 PM    Patient PCP: Anneliese Strauss MD  ______________________________________________________________________   Assessment & Plan:  Acute hypoxic respiratory failure, POA  Influenza A  Acute bronchospasm due to flu  --86% RA, requiring 4L O2, RR 30.    --droplet precaution, tamiflu 75mg bid x 9 more doses. Robitussin DM prn.  --duoneb q4h. Could add steroid if breathing or hypoxia worsens but would like to avoid this if possible since DM is uncontrolled at baseline and this would make BS worse  --CXR reviewed and although read as no acute infiltrate, there is increased interstitial prominence and possible early LLL infiltrate. Will get PA and lateral CXR tomorrow after IV hydration. Given azithromycin and rocephin earlier in ER prior to + flu result. DM type 2, uncontrolled  Dehydration  --. A1c was 9.8 2017.  --continue metformin XR 750mg bid and amaryl 8mg daily. Consider adding third oral agent or basal insulin. --IV insulin 7 units x 1.  --high dose SSI.  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 Check depakote level, ammonia level    Hyperlipidemia  --continue lipitor    Chronic thrombocytopenia, suspect due to depakote    Psoriasis  --continue triamcinolone prn    Generalized weakness, difficulty standing  --consult pt    Obesity  Body mass index is 30.17 kg/(m^2). Code:  full  DVT prophylaxis: SCD  Surrogate decision maker: Mother Taylor        Subjective:   CHIEF COMPLAINT:  Fever, cough, diarrhea    HISTORY OF PRESENT ILLNESS:     Krystal Graves is a 65 y.o.  male with mental retardation, DM type 2, HTN, seizure disorder brought to ER by EMS for above symptoms and weakness with difficulty standing. History provided by mother.     Patient went out on limousine ride with a group of people last Friday 2/9 and by 2/14 became sick with sorethroat, fever, headache, frequent urination. Mother been giving tylenol and hot tea with honey and yesterday temp improved to 101. Today had fever again 102.4, weak, difficulty with standing, slid off bed and mother unable to help patient get up. Caregiver had called mother saying other people in the group became sick on Tuesday with flu. EMS noted patient was too weak to walk, 93-94% RA, fever 102.1, . Patient had flu vaccine this season. No hx chronic lung problem. Mother denies patient being diagnosed with flu (she has not taken him to get any medical attention until now). We were asked to admit for work up and evaluation of the above problems. Past Medical History:   Diagnosis Date    Contact dermatitis and other eczema, due to unspecified cause     psoriasis    Diabetes ([address]     Eosinophilia     Hypercholesterolemia     Hypertension     Mental retardation     Seizures (HCC)     Skin cancer       Past Surgical History:   Procedure Laterality Date    ENDOSCOPY, COLON, DIAGNOSTIC  11/08/2007    Dr Mondragon Breath normal except small internal hemorrhoids repeat 11/2017     Social History   Substance Use Topics    Smoking status: Never Smoker    Smokeless tobacco: Never Used    Alcohol use No      Family History   Problem Relation Age of Onset    Other Mother      PMR    Hypertension Mother     Cancer Father      Lung    Diabetes Brother      No Known Allergies     Prior to Admission medications    Medication Sig Start Date End Date Taking? Authorizing Provider   atorvastatin (LIPITOR) 40 mg tablet TAKE ONE TABLET BY MOUTH AT BEDTIME 1/2/18  Yes Lizet Dailey MD   topiramate (TOPAMAX) 200 mg tablet 1.5 tablets twice a day 11/28/17  Yes Domo Mckay MD   glimepiride (AMARYL) 4 mg tablet Take 2 Tabs by mouth every morning.  11/28/17  Yes Domo Mckay MD   divalproex  (DEPAKOTE) 500 mg tablet Take one tab in the AM and two tabs at night 10/26/17  Yes Jana Nazario MD   folic acid (FOLVITE) 1 mg tablet TAKE ONE TABLET BY MOUTH DAILY 5/15/17  Yes Jana Nazario MD   metFORMIN ER (GLUCOPHAGE XR) 750 mg tablet TAKE ONE TABLET BY MOUTH TWO TIMES A DAY 5/15/17  Yes Jana Nazario MD   ramipril (ALTACE) 5 mg capsule Take 1 Cap by mouth daily. 17  Yes Jana Nazario MD   Calcium-Cholecalciferol, D3, (CALTRATE-600 PLUS VITAMIN D3) 600-400 mg-unit Tab Take  by mouth. Yes Historical Provider   chlorhexidine (PERIDEX) 0.12 % solution ONE CAPFUL ONCE DAILY OR AS DIRECTED 17   Historical Provider   triamcinolone acetonide (KENALOG) 0.1 % topical cream  16   Historical Provider     REVIEW OF SYSTEMS:  POSITIVE= Bold.   Negative = normal text  General:  fever, chills, sweats, generalized weakness, weight loss/gain, loss of appetite  Eyes:  blurred vision, eye pain, loss of vision, diplopia  Ear Nose and Throat:  rhinorrhea, pharyngitis  Respiratory:   cough, sputum production, SOB, wheezing, TREVINO, pleuritic pain  Cardiology:  chest pain, palpitations, orthopnea, PND, edema, syncope   Gastrointestinal:  abdominal pain, N/V, dysphagia, diarrhea, constipation, bleeding  Genitourinary:  frequency, urgency, dysuria, hematuria, incontinence  Muskuloskeletal :  arthralgia, myalgia  Hematology:  easy bruising, bleeding, lymphadenopathy  Dermatological:  rash, ulceration, pruritis  Endocrine:  hot flashes or polydipsia  Neurological:  headache, dizziness, confusion, focal weakness, paresthesia, memory loss, gait disturbance  Psychological: anxiety, depression, agitation      Objective:   VITALS:    Visit Vitals    /62    Pulse 81    Temp (!) 101.1 °F (38.4 °C)    Resp 21    Ht 6' 2\" (1.88 m)    Wt 106.6 kg (235 lb)    SpO2 95%    BMI 30.17 kg/m2     Temp (24hrs), Av.7 °F (38.2 °C), Min:100.2 °F (37.9 °C), Max:101.1 °F (38.4 °C)    Body mass index is 30.17 kg/(m^2). PHYSICAL EXAM:    General:    Alert, cooperative, no distress, appears stated age. Oriented to self. Frequent cough, audible wheezing. Quiet, speak only when spoken to. HEENT: Atraumatic, anicteric sclerae, pink conjunctivae     No oral ulcers, mucosa moist, throat clear. Hearing intact. Neck:  Supple, symmetrical,  thyroid: non tender  Lungs:   Diffuse wheezing, rhonchi. No rales. Chest wall:  No tenderness  No Accessory muscle use. Heart:   Regular  rhythm,  No  murmur   No gallop. No edema. Abdomen:   Soft, mild distended, tympanic. Bowel sounds normal. No masses  Extremities: No cyanosis. No clubbing  Skin:     Not pale Not Jaundiced  No rashes   Psych:  Not depressed. Not anxious or agitated. Neurologic: EOMs intact. No facial asymmetry. No aphasia or slurred speech.  Symmetrical strength 4/5, Alert and oriented X self  Peripheral pulse: Bilateral, Radial, 1+  Capillary refill:  normal    IMAGING RESULTS:   []       I have personally reviewed the actual   []     CXR  []     CT scan  CXR: increased interstitial prominence, possible mild LLL infiltrate  CT :  EKG:   ________________________________________________________________________  Care Plan discussed with:    Comments   Patient y    Family  y    RN     Care Manager                    Consultant:      ________________________________________________________________________  Prophylaxis:  GI none   DVT SCD   ________________________________________________________________________  Recommended Disposition:   Home with Family y   HH/PT/OT/RN ?   SNF/LTC    FANNY    ________________________________________________________________________  Code Status:  Full Code y   DNR/DNI    ________________________________________________________________________  TOTAL TIME:  60 minutes      Comments    y Reviewed previous records     ______________________________________________________________________  Zoë Acre, MD      Procedures: see electronic medical records for all procedures/Xrays and details which were not copied into this note but were reviewed prior to creation of Plan. LAB DATA REVIEWED:    Recent Results (from the past 24 hour(s))   CBC WITH AUTOMATED DIFF    Collection Time: 02/17/18 11:21 AM   Result Value Ref Range    WBC 9.6 4.1 - 11.1 K/uL    RBC 4.23 4. 10 - 5.70 M/uL    HGB 12.8 12.1 - 17.0 g/dL    HCT 39.1 36.6 - 50.3 %    MCV 92.4 80.0 - 99.0 FL    MCH 30.3 26.0 - 34.0 PG    MCHC 32.7 30.0 - 36.5 g/dL    RDW 12.7 11.5 - 14.5 %    PLATELET 76 (L) 472 - 400 K/uL    MPV 10.5 8.9 - 12.9 FL    NRBC 0.0 0  WBC    ABSOLUTE NRBC 0.00 0.00 - 0.01 K/uL    NEUTROPHILS 72 32 - 75 %    LYMPHOCYTES 16 12 - 49 %    MONOCYTES 11 5 - 13 %    EOSINOPHILS 0 0 - 7 %    BASOPHILS 0 0 - 1 %    IMMATURE GRANULOCYTES 1 (H) 0.0 - 0.5 %    ABS. NEUTROPHILS 6.9 1.8 - 8.0 K/UL    ABS. LYMPHOCYTES 1.5 0.8 - 3.5 K/UL    ABS. MONOCYTES 1.1 (H) 0.0 - 1.0 K/UL    ABS. EOSINOPHILS 0.0 0.0 - 0.4 K/UL    ABS. BASOPHILS 0.0 0.0 - 0.1 K/UL    ABS. IMM. GRANS. 0.1 (H) 0.00 - 0.04 K/UL    DF AUTOMATED     METABOLIC PANEL, COMPREHENSIVE    Collection Time: 02/17/18 11:21 AM   Result Value Ref Range    Sodium 134 (L) 136 - 145 mmol/L    Potassium 4.2 3.5 - 5.1 mmol/L    Chloride 101 97 - 108 mmol/L    CO2 21 21 - 32 mmol/L    Anion gap 12 5 - 15 mmol/L    Glucose 352 (H) 65 - 100 mg/dL    BUN 25 (H) 6 - 20 MG/DL    Creatinine 1.09 0.70 - 1.30 MG/DL    BUN/Creatinine ratio 23 (H) 12 - 20      GFR est AA >60 >60 ml/min/1.73m2    GFR est non-AA >60 >60 ml/min/1.73m2    Calcium 8.6 8.5 - 10.1 MG/DL    Bilirubin, total 0.5 0.2 - 1.0 MG/DL    ALT (SGPT) 14 12 - 78 U/L    AST (SGOT) 14 (L) 15 - 37 U/L    Alk.  phosphatase 45 45 - 117 U/L    Protein, total 6.7 6.4 - 8.2 g/dL    Albumin 2.8 (L) 3.5 - 5.0 g/dL    Globulin 3.9 2.0 - 4.0 g/dL    A-G Ratio 0.7 (L) 1.1 - 2.2     LACTIC ACID    Collection Time: 02/17/18 11:21 AM   Result Value Ref Range    Lactic acid 1.3 0.4 - 2.0 MMOL/L   INFLUENZA A & B AG (RAPID TEST)    Collection Time: 02/17/18 12:20 PM   Result Value Ref Range    Influenza A Antigen POSITIVE (A) NEG      Influenza B Antigen NEGATIVE  NEG

## 2018-02-17 NOTE — ED NOTES
Med rec provided by Pt's mother who also reports pt has broken clavicle on the right and frozen right shoulder from injury in October.

## 2018-02-18 ENCOUNTER — APPOINTMENT (OUTPATIENT)
Dept: GENERAL RADIOLOGY | Age: 63
DRG: 193 | End: 2018-02-18
Attending: HOSPITALIST
Payer: MEDICARE

## 2018-02-18 LAB
AMMONIA PLAS-SCNC: 12 UMOL/L
ANION GAP SERPL CALC-SCNC: 8 MMOL/L (ref 5–15)
BASOPHILS # BLD: 0 K/UL (ref 0–0.1)
BASOPHILS NFR BLD: 0 % (ref 0–1)
BUN SERPL-MCNC: 26 MG/DL (ref 6–20)
BUN/CREAT SERPL: 25 (ref 12–20)
CALCIUM SERPL-MCNC: 8.2 MG/DL (ref 8.5–10.1)
CHLORIDE SERPL-SCNC: 108 MMOL/L (ref 97–108)
CO2 SERPL-SCNC: 22 MMOL/L (ref 21–32)
CREAT SERPL-MCNC: 1.06 MG/DL (ref 0.7–1.3)
DIFFERENTIAL METHOD BLD: ABNORMAL
EOSINOPHIL # BLD: 0 K/UL (ref 0–0.4)
EOSINOPHIL NFR BLD: 0 % (ref 0–7)
ERYTHROCYTE [DISTWIDTH] IN BLOOD BY AUTOMATED COUNT: 13.2 % (ref 11.5–14.5)
GLUCOSE BLD STRIP.AUTO-MCNC: 184 MG/DL (ref 65–100)
GLUCOSE BLD STRIP.AUTO-MCNC: 227 MG/DL (ref 65–100)
GLUCOSE BLD STRIP.AUTO-MCNC: 229 MG/DL (ref 65–100)
GLUCOSE BLD STRIP.AUTO-MCNC: 233 MG/DL (ref 65–100)
GLUCOSE SERPL-MCNC: 243 MG/DL (ref 65–100)
HCT VFR BLD AUTO: 37.9 % (ref 36.6–50.3)
HGB BLD-MCNC: 11.8 G/DL (ref 12.1–17)
IMM GRANULOCYTES # BLD: 0 K/UL (ref 0–0.04)
IMM GRANULOCYTES NFR BLD AUTO: 0 % (ref 0–0.5)
LYMPHOCYTES # BLD: 2 K/UL (ref 0.8–3.5)
LYMPHOCYTES NFR BLD: 21 % (ref 12–49)
MCH RBC QN AUTO: 30.3 PG (ref 26–34)
MCHC RBC AUTO-ENTMCNC: 31.1 G/DL (ref 30–36.5)
MCV RBC AUTO: 97.2 FL (ref 80–99)
MONOCYTES # BLD: 0.7 K/UL (ref 0–1)
MONOCYTES NFR BLD: 7 % (ref 5–13)
NEUTS BAND NFR BLD MANUAL: 11 %
NEUTS SEG # BLD: 6.7 K/UL (ref 1.8–8)
NEUTS SEG NFR BLD: 61 % (ref 32–75)
NRBC # BLD: 0 K/UL (ref 0–0.01)
NRBC BLD-RTO: 0 PER 100 WBC
PLATELET # BLD AUTO: 70 K/UL (ref 150–400)
PMV BLD AUTO: 10.1 FL (ref 8.9–12.9)
POTASSIUM SERPL-SCNC: 4.1 MMOL/L (ref 3.5–5.1)
RBC # BLD AUTO: 3.9 M/UL (ref 4.1–5.7)
RBC MORPH BLD: ABNORMAL
SERVICE CMNT-IMP: ABNORMAL
SODIUM SERPL-SCNC: 138 MMOL/L (ref 136–145)
VALPROATE SERPL-MCNC: 91 UG/ML (ref 50–100)
WBC # BLD AUTO: 9.4 K/UL (ref 4.1–11.1)
WBC MORPH BLD: ABNORMAL

## 2018-02-18 PROCEDURE — 80164 ASSAY DIPROPYLACETIC ACD TOT: CPT | Performed by: HOSPITALIST

## 2018-02-18 PROCEDURE — 77030029684 HC NEB SM VOL KT MONA -A

## 2018-02-18 PROCEDURE — 74011250636 HC RX REV CODE- 250/636: Performed by: HOSPITALIST

## 2018-02-18 PROCEDURE — 71046 X-RAY EXAM CHEST 2 VIEWS: CPT

## 2018-02-18 PROCEDURE — 51798 US URINE CAPACITY MEASURE: CPT

## 2018-02-18 PROCEDURE — 94640 AIRWAY INHALATION TREATMENT: CPT

## 2018-02-18 PROCEDURE — 74011000250 HC RX REV CODE- 250: Performed by: HOSPITALIST

## 2018-02-18 PROCEDURE — 77030011943

## 2018-02-18 PROCEDURE — 74011250637 HC RX REV CODE- 250/637: Performed by: HOSPITALIST

## 2018-02-18 PROCEDURE — 82140 ASSAY OF AMMONIA: CPT | Performed by: HOSPITALIST

## 2018-02-18 PROCEDURE — 77010033678 HC OXYGEN DAILY

## 2018-02-18 PROCEDURE — 80048 BASIC METABOLIC PNL TOTAL CA: CPT | Performed by: HOSPITALIST

## 2018-02-18 PROCEDURE — 74011636637 HC RX REV CODE- 636/637: Performed by: INTERNAL MEDICINE

## 2018-02-18 PROCEDURE — 82962 GLUCOSE BLOOD TEST: CPT

## 2018-02-18 PROCEDURE — 74011636637 HC RX REV CODE- 636/637: Performed by: HOSPITALIST

## 2018-02-18 PROCEDURE — 74011250637 HC RX REV CODE- 250/637: Performed by: INTERNAL MEDICINE

## 2018-02-18 PROCEDURE — 36415 COLL VENOUS BLD VENIPUNCTURE: CPT | Performed by: HOSPITALIST

## 2018-02-18 PROCEDURE — 65660000000 HC RM CCU STEPDOWN

## 2018-02-18 PROCEDURE — 85025 COMPLETE CBC W/AUTO DIFF WBC: CPT | Performed by: HOSPITALIST

## 2018-02-18 PROCEDURE — 74011000250 HC RX REV CODE- 250: Performed by: INTERNAL MEDICINE

## 2018-02-18 RX ORDER — IPRATROPIUM BROMIDE AND ALBUTEROL SULFATE 2.5; .5 MG/3ML; MG/3ML
3 SOLUTION RESPIRATORY (INHALATION)
Status: DISCONTINUED | OUTPATIENT
Start: 2018-02-18 | End: 2018-02-20

## 2018-02-18 RX ORDER — INSULIN GLARGINE 100 [IU]/ML
5 INJECTION, SOLUTION SUBCUTANEOUS
Status: DISCONTINUED | OUTPATIENT
Start: 2018-02-18 | End: 2018-02-19

## 2018-02-18 RX ORDER — TAMSULOSIN HYDROCHLORIDE 0.4 MG/1
0.4 CAPSULE ORAL DAILY
Status: DISCONTINUED | OUTPATIENT
Start: 2018-02-18 | End: 2018-03-01 | Stop reason: HOSPADM

## 2018-02-18 RX ADMIN — TOPIRAMATE 300 MG: 100 TABLET, FILM COATED ORAL at 17:51

## 2018-02-18 RX ADMIN — OSELTAMIVIR PHOSPHATE 75 MG: 75 CAPSULE ORAL at 20:37

## 2018-02-18 RX ADMIN — IPRATROPIUM BROMIDE AND ALBUTEROL SULFATE 3 ML: .5; 3 SOLUTION RESPIRATORY (INHALATION) at 19:48

## 2018-02-18 RX ADMIN — TOPIRAMATE 300 MG: 100 TABLET, FILM COATED ORAL at 10:04

## 2018-02-18 RX ADMIN — INSULIN LISPRO 4 UNITS: 100 INJECTION, SOLUTION INTRAVENOUS; SUBCUTANEOUS at 10:13

## 2018-02-18 RX ADMIN — DIVALPROEX SODIUM 500 MG: 250 TABLET, DELAYED RELEASE ORAL at 10:03

## 2018-02-18 RX ADMIN — Medication 10 ML: at 20:36

## 2018-02-18 RX ADMIN — INSULIN LISPRO 4 UNITS: 100 INJECTION, SOLUTION INTRAVENOUS; SUBCUTANEOUS at 17:50

## 2018-02-18 RX ADMIN — GLIMEPIRIDE 8 MG: 4 TABLET ORAL at 10:03

## 2018-02-18 RX ADMIN — IPRATROPIUM BROMIDE AND ALBUTEROL SULFATE 3 ML: .5; 3 SOLUTION RESPIRATORY (INHALATION) at 15:30

## 2018-02-18 RX ADMIN — TAMSULOSIN HYDROCHLORIDE 0.4 MG: 0.4 CAPSULE ORAL at 16:43

## 2018-02-18 RX ADMIN — FOLIC ACID 1 MG: 1 TABLET ORAL at 10:04

## 2018-02-18 RX ADMIN — SODIUM CHLORIDE 125 ML/HR: 900 INJECTION, SOLUTION INTRAVENOUS at 02:39

## 2018-02-18 RX ADMIN — Medication 10 ML: at 21:53

## 2018-02-18 RX ADMIN — Medication 10 ML: at 05:30

## 2018-02-18 RX ADMIN — CALCIUM CARBONATE 500 MG (1,250 MG)-VITAMIN D3 200 UNIT TABLET 1 TABLET: at 10:04

## 2018-02-18 RX ADMIN — METFORMIN HYDROCHLORIDE 750 MG: 750 TABLET, EXTENDED RELEASE ORAL at 10:02

## 2018-02-18 RX ADMIN — IPRATROPIUM BROMIDE AND ALBUTEROL SULFATE 3 ML: .5; 3 SOLUTION RESPIRATORY (INHALATION) at 03:35

## 2018-02-18 RX ADMIN — IPRATROPIUM BROMIDE AND ALBUTEROL SULFATE 3 ML: .5; 3 SOLUTION RESPIRATORY (INHALATION) at 23:35

## 2018-02-18 RX ADMIN — DIVALPROEX SODIUM 1000 MG: 250 TABLET, DELAYED RELEASE ORAL at 17:50

## 2018-02-18 RX ADMIN — IPRATROPIUM BROMIDE AND ALBUTEROL SULFATE 3 ML: .5; 3 SOLUTION RESPIRATORY (INHALATION) at 07:50

## 2018-02-18 RX ADMIN — OSELTAMIVIR PHOSPHATE 75 MG: 75 CAPSULE ORAL at 10:04

## 2018-02-18 RX ADMIN — ATORVASTATIN CALCIUM 40 MG: 40 TABLET, FILM COATED ORAL at 21:52

## 2018-02-18 RX ADMIN — IPRATROPIUM BROMIDE AND ALBUTEROL SULFATE 3 ML: .5; 3 SOLUTION RESPIRATORY (INHALATION) at 11:19

## 2018-02-18 RX ADMIN — INSULIN LISPRO 4 UNITS: 100 INJECTION, SOLUTION INTRAVENOUS; SUBCUTANEOUS at 11:30

## 2018-02-18 RX ADMIN — INSULIN GLARGINE 5 UNITS: 100 INJECTION, SOLUTION SUBCUTANEOUS at 21:53

## 2018-02-18 RX ADMIN — METFORMIN HYDROCHLORIDE 750 MG: 750 TABLET, EXTENDED RELEASE ORAL at 18:06

## 2018-02-18 NOTE — PROGRESS NOTES
Patient was bladder scanned and showed 700 mL of retained urine. Dr. Manuel Hernández was called and made aware. Mitchell catheter was ordered.

## 2018-02-18 NOTE — ED NOTES
TRANSFER - OUT REPORT:    Verbal report given to Amy Khan RN(name) on Lon Valencia  being transferred to Oncology room 1140(unit) for routine progression of care       Report consisted of patients Situation, Background, Assessment and   Recommendations(SBAR). Information from the following report(s) SBAR, Kardex, ED Summary, Intake/Output, MAR and Recent Results was reviewed with the receiving nurse. Lines:   Peripheral IV 02/17/18 Left Antecubital (Active)   Site Assessment Clean, dry, & intact 2/17/2018 11:20 AM   Phlebitis Assessment 0 2/17/2018 11:20 AM   Infiltration Assessment 0 2/17/2018 11:20 AM   Dressing Status Clean, dry, & intact 2/17/2018 11:20 AM   Dressing Type Transparent;Tape 2/17/2018 11:20 AM   Hub Color/Line Status Patent; Flushed 2/17/2018 11:20 AM   Action Taken Blood drawn 2/17/2018 11:20 AM       Peripheral IV 02/17/18 Right Antecubital (Active)   Site Assessment Clean, dry, & intact 2/17/2018 12:40 PM   Phlebitis Assessment 0 2/17/2018 12:40 PM   Infiltration Assessment 0 2/17/2018 12:40 PM   Dressing Status Clean, dry, & intact 2/17/2018 12:40 PM        Opportunity for questions and clarification was provided.       Patient transported with:   [place]

## 2018-02-18 NOTE — ROUTINE PROCESS
Oncology Nursing Communication Tool  9:45 AM  2/18/2018     Bedside shift change report given to Ivelisse Mast RN (incoming nurse) by Janae Jones RN (outgoing nurse) on Marlon Comas. Report included the following information SBAR, Kardex, Intake/Output, MAR and Recent Results. Shift Summary: report received from Margie in the ED at [facility]. Pt to the floor via stretcher at 1935. Pt able to walk from stretcher to bed with assist. Pt has hx of MR & no family present. Unable to complete database. Admit assessment done & dual skin assessment done with HARISH Partida. Pt has redness/rash to groin areas and mult scattered patches of red, flaky skin r/t psoriasis. Also has a few abrasions/scars. Bed alarm on & functional. Pt instructed not to get up w/o assist & to use call bell. Pt able to demonstrate how to call RN. Pt c/o unable to void even when up to bsc. Bladder scan of >723ml. On call MD notified & pt st. cathed as per orders for 700ml, pt tolerated well. NSR on tele. Issues for physician to address: urinary retention. Oncology Shift Note   Admission Date 2/17/2018   Admission Diagnosis Influenza  Acute respiratory failure ([address]   Code Status Full Code   Consults None      Cardiac Monitoring [x] Yes [] No      Purposeful Hourly Rounding [x] Yes    Kalia Score Total Score: 5   Kalia score 3 or > [x] Bed Alarm [] Avasys [] 1:1 sitter [] Patient refused (Place signed refusal form in chart)      Pain Managed [x] Yes [] No    Key Pain Meds     The patient is on no pain meds. Influenza Vaccine             Oxygen needs?  [] Room air Oxygen @  []1L    [x]2L    []3L   []4L    []5L   []6L     Use home O2? [] Yes [x] No  Perform O2 challenge test using  smartphrase (.oxygenchallenge)      Last bowel movement Last Bowel Movement Date: 02/17/18  bowel movement      Urinary Catheter             LDAs               Peripheral IV 02/17/18 Left Antecubital (Active)   Site Assessment Clean, dry, & intact 2/18/2018  2:50 AM   Phlebitis Assessment 0 2/18/2018  2:50 AM   Infiltration Assessment 0 2/18/2018  2:50 AM   Dressing Status Clean, dry, & intact 2/18/2018  2:50 AM   Dressing Type Tape;Transparent 2/18/2018  2:50 AM   Hub Color/Line Status Pink; Infusing 2/18/2018  2:50 AM   Action Taken Blood drawn 2/17/2018 11:20 AM       Peripheral IV 02/17/18 Right Antecubital (Active)   Site Assessment Clean, dry, & intact 2/18/2018  2:50 AM   Phlebitis Assessment 0 2/18/2018  2:50 AM   Infiltration Assessment 0 2/18/2018  2:50 AM   Dressing Status Clean, dry, & intact 2/18/2018  2:50 AM   Dressing Type Tape;Transparent 2/18/2018  2:50 AM   Hub Color/Line Status Pink;Capped 2/18/2018  2:50 AM                         Readmission Risk Assessment Tool Score Low Risk            11       Total Score        5 Pt. Coverage (Medicare=5 , Medicaid, or Self-Pay=4)    6 Charlson Comorbidity Score (Age + Comorbid Conditions)        Criteria that do not apply:    Has Seen PCP in Last 6 Months (Yes=3, No=0)    . Living with Significant Other. Assisted Living. LTAC. SNF.  or   Rehab    Patient Length of Stay (>5 days = 3)    IP Visits Last 12 Months (1-3=4, 4=9, >4=11)       Expected Length of Stay - - -   Actual Length of Stay 1          Natalie Reeder RN

## 2018-02-18 NOTE — PROGRESS NOTES
Hospitalist Progress Note    NAME: Uvaldo Carr   :  1955   MRN:  [id number]       Assessment / Plan:  Acute hypoxic respiratory failure, POA  Influenza A causing Acute bronchospasm   -continue Tamiflu and wean O2 as tolerated  -change Duonebs to albuterol nebs given urinary retention  -today's PA and Lat CXR shows atelectasis    DM type 2, uncontrolled-- A1c was 9.8 2017. 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 Depakote level 91, ammonia wnl     Hyperlipidemia  -continue lipitor     Chronic thrombocytopenia, ?due to depakote  -monitor    Urinary Retention: overnight from  into ; likely from ipratropium (see changes above  -start Flomax  -place richards (repeated straight caths needed), voiding trial in 24-48 hours    Psoriasis  -continue triamcinolone prn     Generalized weakness, difficulty standing  -Consult PT/OT  -check B12 level     Obesity: Body mass index is 30.17 kg/(m^2).     Code:  Full  DVT prophylaxis: SCD  Surrogate decision maker: Mother Taylor      Body mass index is 30.17 kg/(m^2). Subjective:     Chief Complaint / Reason for Physician Visit: follow-up respiratory failure  Patient seen for follow-up  Issues with urinary retention last pm  Case discussed with RN    Review of Systems:  Symptom Y/N Comments  Symptom Y/N Comments   Fever/Chills    Chest Pain n    Poor Appetite    Edema     Cough y   Abdominal Pain     Sputum    Joint Pain     SOB/TREVINO    Pruritis/Rash     Nausea/vomit    Tolerating PT/OT     Diarrhea    Tolerating Diet y    Constipation    Other       Could NOT obtain due to:      Objective:     VITALS:   Last 24hrs VS reviewed since prior progress note.  Most recent are:  Patient Vitals for the past 24 hrs:   Temp Pulse Resp BP SpO2   18 1140 97.5 °F (36.4 °C) 80 24 144/67 92 %   02/18/18 1121 - - - - 94 %   02/18/18 0805 98.6 °F (37 °C) 84 22 133/63 93 %   02/18/18 0752 - - - - 93 %   02/18/18 0335 - - - - 91 %   02/18/18 0314 98.7 °F (37.1 °C) 87 24 136/54 93 %   02/17/18 2335 98.5 °F (36.9 °C) 91 24 127/51 91 %   02/17/18 2228 - - - - 91 %   02/17/18 1935 (!) 101.4 °F (38.6 °C) 84 26 131/56 91 %   02/17/18 1900 - 85 30 129/55 92 %   02/17/18 1859 99.5 °F (37.5 °C) - - - -   02/17/18 1830 - 90 25 112/77 93 %   02/17/18 1815 - 86 29 126/57 92 %   02/17/18 1800 - 87 26 118/59 (!) 89 %   02/17/18 1747 - 96 30 144/57 (!) 88 %   02/17/18 1731 - 94 25 126/62 (!) 89 %   02/17/18 1700 - 82 22 (!) 121/99 94 %   02/17/18 1645 - 86 29 (!) 129/94 96 %   02/17/18 1630 - 83 24 138/72 98 %   02/17/18 1615 - 78 22 128/72 92 %   02/17/18 1600 - 73 20 120/69 90 %   02/17/18 1330 - 78 23 112/76 94 %   02/17/18 1315 - 81 21 107/62 95 %       Intake/Output Summary (Last 24 hours) at 02/18/18 1305  Last data filed at 02/18/18 1136   Gross per 24 hour   Intake          1643.75 ml   Output              750 ml   Net           893.75 ml        PHYSICAL EXAM:  General: WD, WN. Alert, cooperative, no acute distress but ill appearing    EENT:  EOMI. Anicteric sclerae. MMM  Resp:  Mild wheezing on anterior and alteral assessment. No accessory muscle use  CV:  Regular  rhythm,  No edema  GI:  Soft, Non distended, Non tender.  +Bowel sounds  Neurologic:  Alert, answers simple questions,   Psych:   Poor insight. Not anxious nor agitated at this time  Skin:  Psoriasis noted.   No jaundice    Reviewed most current lab test results and cultures  YES  Reviewed most current radiology test results   YES  Review and summation of old records today    NO  Reviewed patient's current orders and MAR    YES  PMH/SH reviewed - no change compared to H&P  ________________________________________________________________________  Care Plan discussed with:    Comments   Patient x    Family      RN x    Care Manager Consultant                        Multidiciplinary team rounds were held today with , nursing, pharmacist and clinical coordinator. Patient's plan of care was discussed; medications were reviewed and discharge planning was addressed. ________________________________________________________________________  Total NON critical care TIME:  25   Minutes    Total CRITICAL CARE TIME Spent:   Minutes non procedure based      Comments   >50% of visit spent in counseling and coordination of care     ________________________________________________________________________  Lakhwinder Underwood MD     Procedures: see electronic medical records for all procedures/Xrays and details which were not copied into this note but were reviewed prior to creation of Plan. LABS:  I reviewed today's most current labs and imaging studies.   Pertinent labs include:  Recent Labs      02/18/18   0332  02/17/18   1121   WBC  9.4  9.6   HGB  11.8*  12.8   HCT  37.9  39.1   PLT  70*  76*     Recent Labs      02/18/18   0332  02/17/18   1121   NA  138  134*   K  4.1  4.2   CL  108  101   CO2  22  21   GLU  243*  352*   BUN  26*  25*   CREA  1.06  1.09   CA  8.2*  8.6   ALB   --   2.8*   TBILI   --   0.5   SGOT   --   14*   ALT   --   14       Signed: Lakhwinder Underwood MD

## 2018-02-19 LAB
ALBUMIN SERPL-MCNC: 2.2 G/DL (ref 3.5–5)
ALBUMIN/GLOB SERPL: 0.6 {RATIO} (ref 1.1–2.2)
ALP SERPL-CCNC: 33 U/L (ref 45–117)
ALT SERPL-CCNC: 15 U/L (ref 12–78)
ANION GAP SERPL CALC-SCNC: 10 MMOL/L (ref 5–15)
AST SERPL-CCNC: 21 U/L (ref 15–37)
BACTERIA SPEC CULT: NORMAL
BASOPHILS # BLD: 0 K/UL (ref 0–0.1)
BASOPHILS NFR BLD: 0 % (ref 0–1)
BILIRUB SERPL-MCNC: 0.5 MG/DL (ref 0.2–1)
BUN SERPL-MCNC: 25 MG/DL (ref 6–20)
BUN/CREAT SERPL: 31 (ref 12–20)
CALCIUM SERPL-MCNC: 8.4 MG/DL (ref 8.5–10.1)
CC UR VC: NORMAL
CHLORIDE SERPL-SCNC: 106 MMOL/L (ref 97–108)
CO2 SERPL-SCNC: 21 MMOL/L (ref 21–32)
CREAT SERPL-MCNC: 0.81 MG/DL (ref 0.7–1.3)
DIFFERENTIAL METHOD BLD: ABNORMAL
EOSINOPHIL # BLD: 0 K/UL (ref 0–0.4)
EOSINOPHIL NFR BLD: 0 % (ref 0–7)
ERYTHROCYTE [DISTWIDTH] IN BLOOD BY AUTOMATED COUNT: 13.1 % (ref 11.5–14.5)
GLOBULIN SER CALC-MCNC: 3.5 G/DL (ref 2–4)
GLUCOSE BLD STRIP.AUTO-MCNC: 140 MG/DL (ref 65–100)
GLUCOSE BLD STRIP.AUTO-MCNC: 223 MG/DL (ref 65–100)
GLUCOSE BLD STRIP.AUTO-MCNC: 247 MG/DL (ref 65–100)
GLUCOSE BLD STRIP.AUTO-MCNC: 257 MG/DL (ref 65–100)
GLUCOSE SERPL-MCNC: 205 MG/DL (ref 65–100)
HCT VFR BLD AUTO: 32.2 % (ref 36.6–50.3)
HGB BLD-MCNC: 10.4 G/DL (ref 12.1–17)
IMM GRANULOCYTES # BLD: 0 K/UL (ref 0–0.04)
IMM GRANULOCYTES NFR BLD AUTO: 0 % (ref 0–0.5)
LYMPHOCYTES # BLD: 2.4 K/UL (ref 0.8–3.5)
LYMPHOCYTES NFR BLD: 35 % (ref 12–49)
MCH RBC QN AUTO: 30.3 PG (ref 26–34)
MCHC RBC AUTO-ENTMCNC: 32.3 G/DL (ref 30–36.5)
MCV RBC AUTO: 93.9 FL (ref 80–99)
MONOCYTES # BLD: 0.6 K/UL (ref 0–1)
MONOCYTES NFR BLD: 9 % (ref 5–13)
NEUTS SEG # BLD: 3.9 K/UL (ref 1.8–8)
NEUTS SEG NFR BLD: 56 % (ref 32–75)
NRBC # BLD: 0 K/UL (ref 0–0.01)
NRBC BLD-RTO: 0 PER 100 WBC
PLATELET # BLD AUTO: 58 K/UL (ref 150–400)
PMV BLD AUTO: 9.9 FL (ref 8.9–12.9)
POTASSIUM SERPL-SCNC: 3.4 MMOL/L (ref 3.5–5.1)
PROT SERPL-MCNC: 5.7 G/DL (ref 6.4–8.2)
RBC # BLD AUTO: 3.43 M/UL (ref 4.1–5.7)
SERVICE CMNT-IMP: ABNORMAL
SERVICE CMNT-IMP: NORMAL
SODIUM SERPL-SCNC: 137 MMOL/L (ref 136–145)
VIT B12 SERPL-MCNC: 489 PG/ML (ref 211–911)
WBC # BLD AUTO: 7 K/UL (ref 4.1–11.1)

## 2018-02-19 PROCEDURE — G8988 SELF CARE GOAL STATUS: HCPCS | Performed by: OCCUPATIONAL THERAPIST

## 2018-02-19 PROCEDURE — 94640 AIRWAY INHALATION TREATMENT: CPT

## 2018-02-19 PROCEDURE — 97530 THERAPEUTIC ACTIVITIES: CPT | Performed by: OCCUPATIONAL THERAPIST

## 2018-02-19 PROCEDURE — 97161 PT EVAL LOW COMPLEX 20 MIN: CPT

## 2018-02-19 PROCEDURE — 77010033678 HC OXYGEN DAILY

## 2018-02-19 PROCEDURE — 65660000000 HC RM CCU STEPDOWN

## 2018-02-19 PROCEDURE — 74011636637 HC RX REV CODE- 636/637: Performed by: INTERNAL MEDICINE

## 2018-02-19 PROCEDURE — 82962 GLUCOSE BLOOD TEST: CPT

## 2018-02-19 PROCEDURE — 87493 C DIFF AMPLIFIED PROBE: CPT | Performed by: INTERNAL MEDICINE

## 2018-02-19 PROCEDURE — 97116 GAIT TRAINING THERAPY: CPT

## 2018-02-19 PROCEDURE — 36415 COLL VENOUS BLD VENIPUNCTURE: CPT | Performed by: INTERNAL MEDICINE

## 2018-02-19 PROCEDURE — 74011636637 HC RX REV CODE- 636/637: Performed by: HOSPITALIST

## 2018-02-19 PROCEDURE — 82607 VITAMIN B-12: CPT | Performed by: INTERNAL MEDICINE

## 2018-02-19 PROCEDURE — 80053 COMPREHEN METABOLIC PANEL: CPT | Performed by: INTERNAL MEDICINE

## 2018-02-19 PROCEDURE — 85025 COMPLETE CBC W/AUTO DIFF WBC: CPT | Performed by: INTERNAL MEDICINE

## 2018-02-19 PROCEDURE — 74011000250 HC RX REV CODE- 250: Performed by: INTERNAL MEDICINE

## 2018-02-19 PROCEDURE — 74011250637 HC RX REV CODE- 250/637: Performed by: INTERNAL MEDICINE

## 2018-02-19 PROCEDURE — 74011250637 HC RX REV CODE- 250/637: Performed by: HOSPITALIST

## 2018-02-19 PROCEDURE — G8987 SELF CARE CURRENT STATUS: HCPCS | Performed by: OCCUPATIONAL THERAPIST

## 2018-02-19 PROCEDURE — 97165 OT EVAL LOW COMPLEX 30 MIN: CPT | Performed by: OCCUPATIONAL THERAPIST

## 2018-02-19 RX ORDER — INSULIN GLARGINE 100 [IU]/ML
8 INJECTION, SOLUTION SUBCUTANEOUS
Status: DISCONTINUED | OUTPATIENT
Start: 2018-02-19 | End: 2018-02-20

## 2018-02-19 RX ORDER — POTASSIUM CHLORIDE 750 MG/1
40 TABLET, FILM COATED, EXTENDED RELEASE ORAL
Status: COMPLETED | OUTPATIENT
Start: 2018-02-19 | End: 2018-02-19

## 2018-02-19 RX ADMIN — CALCIUM CARBONATE 500 MG (1,250 MG)-VITAMIN D3 200 UNIT TABLET 1 TABLET: at 09:11

## 2018-02-19 RX ADMIN — GLIMEPIRIDE 8 MG: 4 TABLET ORAL at 11:26

## 2018-02-19 RX ADMIN — INSULIN LISPRO 4 UNITS: 100 INJECTION, SOLUTION INTRAVENOUS; SUBCUTANEOUS at 13:51

## 2018-02-19 RX ADMIN — FOLIC ACID 1 MG: 1 TABLET ORAL at 09:11

## 2018-02-19 RX ADMIN — OSELTAMIVIR PHOSPHATE 75 MG: 75 CAPSULE ORAL at 21:32

## 2018-02-19 RX ADMIN — GUAIFENESIN AND DEXTROMETHORPHAN 10 ML: 100; 10 SYRUP ORAL at 00:53

## 2018-02-19 RX ADMIN — Medication 10 ML: at 22:46

## 2018-02-19 RX ADMIN — IPRATROPIUM BROMIDE AND ALBUTEROL SULFATE 3 ML: .5; 3 SOLUTION RESPIRATORY (INHALATION) at 16:19

## 2018-02-19 RX ADMIN — Medication 10 ML: at 05:07

## 2018-02-19 RX ADMIN — INSULIN GLARGINE 8 UNITS: 100 INJECTION, SOLUTION SUBCUTANEOUS at 22:45

## 2018-02-19 RX ADMIN — POTASSIUM CHLORIDE 40 MEQ: 750 TABLET, EXTENDED RELEASE ORAL at 09:11

## 2018-02-19 RX ADMIN — INSULIN LISPRO 4 UNITS: 100 INJECTION, SOLUTION INTRAVENOUS; SUBCUTANEOUS at 22:45

## 2018-02-19 RX ADMIN — TOPIRAMATE 300 MG: 100 TABLET, FILM COATED ORAL at 19:27

## 2018-02-19 RX ADMIN — TOPIRAMATE 300 MG: 100 TABLET, FILM COATED ORAL at 09:11

## 2018-02-19 RX ADMIN — ATORVASTATIN CALCIUM 40 MG: 40 TABLET, FILM COATED ORAL at 21:32

## 2018-02-19 RX ADMIN — DIVALPROEX SODIUM 500 MG: 250 TABLET, DELAYED RELEASE ORAL at 09:11

## 2018-02-19 RX ADMIN — METFORMIN HYDROCHLORIDE 750 MG: 750 TABLET, EXTENDED RELEASE ORAL at 21:32

## 2018-02-19 RX ADMIN — OSELTAMIVIR PHOSPHATE 75 MG: 75 CAPSULE ORAL at 09:11

## 2018-02-19 RX ADMIN — Medication 10 ML: at 13:52

## 2018-02-19 RX ADMIN — IPRATROPIUM BROMIDE AND ALBUTEROL SULFATE 3 ML: .5; 3 SOLUTION RESPIRATORY (INHALATION) at 12:20

## 2018-02-19 RX ADMIN — DIVALPROEX SODIUM 1000 MG: 250 TABLET, DELAYED RELEASE ORAL at 19:27

## 2018-02-19 RX ADMIN — METFORMIN HYDROCHLORIDE 750 MG: 750 TABLET, EXTENDED RELEASE ORAL at 09:12

## 2018-02-19 RX ADMIN — IPRATROPIUM BROMIDE AND ALBUTEROL SULFATE 3 ML: .5; 3 SOLUTION RESPIRATORY (INHALATION) at 08:41

## 2018-02-19 RX ADMIN — IPRATROPIUM BROMIDE AND ALBUTEROL SULFATE 3 ML: .5; 3 SOLUTION RESPIRATORY (INHALATION) at 03:46

## 2018-02-19 RX ADMIN — INSULIN LISPRO 4 UNITS: 100 INJECTION, SOLUTION INTRAVENOUS; SUBCUTANEOUS at 09:09

## 2018-02-19 RX ADMIN — IPRATROPIUM BROMIDE AND ALBUTEROL SULFATE 3 ML: .5; 3 SOLUTION RESPIRATORY (INHALATION) at 21:16

## 2018-02-19 RX ADMIN — TAMSULOSIN HYDROCHLORIDE 0.4 MG: 0.4 CAPSULE ORAL at 09:11

## 2018-02-19 NOTE — PROGRESS NOTES
Problem: Self Care Deficits Care Plan (Adult)  Goal: *Acute Goals and Plan of Care (Insert Text)  Occupational Therapy Goals  Initiated 2/19/2018  1. Patient will perform grooming while standing at the sink with supervision/standby assist within 7 day(s). 2.  Patient will perform bathing with contact guard assist within 7 day(s). 3.  Patient will perform lower body dressing with supervision/standby assist within 7 day(s). 4.  Patient will perform toilet transfers with supervision/set-up within 7 day(s). 5.  Patient will perform all aspects of toileting with contact guard assist within 7 day(s). Occupational Therapy EVALUATION  Patient: Katelynn Fox (89 y.o. male)  Date: 2/19/2018  Primary Diagnosis: Influenza  Acute respiratory failure (HCC)        Precautions:  Fall (Droplet)    ASSESSMENT :  Based on the objective data described below, the patient presents with deficits in self-care, primarily due to decreased activity tolerance, decreased dynamic balance, decreased safety, general weakness, and decreased cognition/command following (history of MR). He lives at home with his mother but is currently requiring Mod A to S for all basic self-care. He will benefit from skilled OT treatment to maximize independence and safety in ADL. Recommend brief stay in a SNF for rehab prior to returning home. Patient will benefit from skilled intervention to address the above impairments.   Patients rehabilitation potential is considered to be Good  Factors which may influence rehabilitation potential include:   [x]             None noted  []             Mental ability/status  []             Medical condition  []             Home/family situation and support systems  []             Safety awareness  []             Pain tolerance/management  []             Other:      PLAN :  Recommendations and Planned Interventions:  [x]               Self Care Training                  [x]        Therapeutic Activities  [x] Functional Mobility Training    [x]        Cognitive Retraining  [x]               Therapeutic Exercises           [x]        Endurance Activities  [x]               Balance Training                   []        Neuromuscular Re-Education  []               Visual/Perceptual Training     [x]   Home Safety Training  [x]               Patient Education                 [x]        Family Training/Education  []               Other (comment):    Frequency/Duration: Patient will be followed by occupational therapy 3 times a week to address goals. Discharge Recommendations: [facility] Sykes  Further Equipment Recommendations for Discharge: defer to facility     SUBJECTIVE:   Patient stated Eunice [place].  (limited responses to questions)    OBJECTIVE DATA SUMMARY:   HISTORY:   Past Medical History:   Diagnosis Date    Contact dermatitis and other eczema, due to unspecified cause     psoriasis    [facility] [address]     Eosinophilia     Hypercholesterolemia     Hypertension     Mental retardation     Seizures ([facility] [address]     Skin cancer      Past Surgical History:   Procedure Laterality Date    ENDOSCOPY, COLON, DIAGNOSTIC  11/08/2007    Dr Wilfrido Alberto normal except small internal hemorrhoids repeat 11/2017       Prior Level of Function/Environment/Context: Up until the past week, his mother reports he was mostly supervision level for self-care. He does not typically use an assistive device, but she let him use a walker just prior to coming in. Patient fractured his right clavicle in October and now has a frozen shoulder. He was going to outpatient PT but has very limited shoulder ROM.    Occupations in which the patient is/was successful, what are the barriers preventing that success:   Performance Patterns (routines, roles, habits, and rituals):   Personal Interests and/or values:   Expanded or extensive additional review of patient history:     Home Situation  Home Environment: Private residence  [place] Residence: One story  Living Alone: No  Support Systems: Parent  Current DME Used/Available at Home: Walker, rolling  [x]  Right hand dominant   []  Left hand dominant (however, he prefers using his left non-dominant UE now)    EXAMINATION OF PERFORMANCE DEFICITS:  Cognitive/Behavioral Status:  Neurologic State: Alert;Confused  Orientation Level: Oriented to place;Oriented to person;Disoriented to time;Disoriented to situation  Cognition: Decreased command following; Impaired decision making  Perception: Appears intact  Perseveration: No perseveration noted  Safety/Judgement: Awareness of environment;Decreased awareness of need for safety      Hearing: Auditory  Auditory Impairment: None    Vision/Perceptual:    Not tested                                Range of Motion:  AROM: Generally decreased, functional                         Strength:  Strength: Generally decreased, functional                Coordination:     Fine Motor Skills-Upper: Left Intact; Right Intact (grossly)    Gross Motor Skills-Upper: Left Intact; Right Impaired (right shoulder injury in October)    Balance:  Sitting: Intact  Standing: Impaired; With support  Standing - Static: Constant support; Fair  Standing - Dynamic : Fair    Functional Mobility and Transfers for ADLs:  Bed Mobility:  Supine to Sit: Stand-by asssistance  Sit to Supine: Stand-by asssistance  Scooting: Supervision; Additional time    Transfers:  Sit to Stand: Moderate assistance  Stand to Sit: Minimum assistance  Toilet Transfer : Minimum assistance    ADL Assessment:  Feeding: Supervision    Oral Facial Hygiene/Grooming: Supervision (in bed)    Bathing: Moderate assistance    Upper Body Dressing: Supervision    Lower Body Dressing: Moderate assistance    Toileting:  Moderate assistance                Functional Measure:  Barthel Index:    Bathin  Bladder: 10  Bowels: 10  Groomin  Dressin  Feeding: 10  Mobility: 0  Stairs: 0  Toilet Use: 5  Transfer (Bed to Chair and Back): 10  Total: 50       Barthel and G-code impairment scale:  Percentage of impairment CH  0% CI  1-19% CJ  20-39% CK  40-59% CL  60-79% CM  80-99% CN  100%   Barthel Score 0-100 100 99-80 79-60 59-40 20-39 1-19   0   Barthel Score 0-20 20 17-19 13-16 9-12 5-8 1-4 0      The Barthel ADL Index: Guidelines  1. The index should be used as a record of what a patient does, not as a record of what a patient could do. 2. The main aim is to establish degree of independence from any help, physical or verbal, however minor and for whatever reason. 3. The need for supervision renders the patient not independent. 4. A patient's performance should be established using the best available evidence. Asking the patient, friends/relatives and nurses are the usual sources, but direct observation and common sense are also important. However direct testing is not needed. 5. Usually the patient's performance over the preceding 24-48 hours is important, but occasionally longer periods will be relevant. 6. Middle categories imply that the patient supplies over 50 per cent of the effort. 7. Use of aids to be independent is allowed. Marisela Andrew., Barthel, D.W. ([id number]). Functional evaluation: the Barthel Index. [facility] (14)2. Ester Colvin cara RUDI Chappell, [address] Micah Little., [place], [address] [place] [address] (1999). Measuring the change indisability after inpatient rehabilitation; comparison of the responsiveness of the Barthel Index and Functional Lane Measure. Journal of Neurology, Neurosurgery, and Psychiatry, 66(4), 833-049. Jahaira Bass, N.HARRIETT.A, MARILEE Shah, & Jeff Crespo, M.A. (2004.) Assessment of post-stroke quality of life in cost-effectiveness studies: The usefulness of the Barthel Index and the EuroQoL-5D. Quality of Life Research, 13, 600-20       G codes:   In compliance with CMSs Claims Based Outcome Reporting, the following G-code set was chosen for this patient based on their primary functional limitation being treated: The outcome measure chosen to determine the severity of the functional limitation was the Barthel Index with a score of 50/100 which was correlated with the impairment scale. ? Self Care:     - CURRENT STATUS: CK - 40%-59% impaired, limited or restricted    - GOAL STATUS: CJ - 20%-39% impaired, limited or restricted    - D/C STATUS:  ---------------To be determined---------------     Occupational Therapy Evaluation Charge Determination   History Examination Decision-Making   LOW Complexity : Brief history review  MEDIUM Complexity : 3-5 performance deficits relating to physical, cognitive , or psychosocial skils that result in activity limitations and / or participation restrictions MEDIUM Complexity : Patient may present with comorbidities that affect occupational performnce. Miniml to moderate modification of tasks or assistance (eg, physical or verbal ) with assesment(s) is necessary to enable patient to complete evaluation       Based on the above components, the patient evaluation is determined to be of the following complexity level: LOW   Pain:  Pain Scale 1: Numeric (0 - 10)  Pain Intensity 1: 0              Activity Tolerance:   Fair  After treatment:   [] Patient left in no apparent distress sitting up in chair  [x] Patient left in no apparent distress in bed  [x] Call bell left within reach  [x] Nursing notified  [] Caregiver present  [x] Bed alarm activated    COMMUNICATION/EDUCATION:   The patients plan of care was discussed with: Physical Therapist and Registered Nurse. [x] Home safety education was provided and the patient/caregiver indicated understanding. [] Patient/family have participated as able in goal setting and plan of care. [] Patient/family agree to work toward stated goals and plan of care. [] Patient understands intent and goals of therapy, but is neutral about his/her participation.   [x] Patient is unable to participate in goal setting and plan of care. This patients plan of care is appropriate for delegation to FARTUN.     Thank you for this referral.  Naty Tavarez OTR/L  Time Calculation: 25 mins

## 2018-02-19 NOTE — PROGRESS NOTES
SPEECH THERAPY SCREENING:  SERVICES ARE NOT INDICATED AT THIS TIME    [facility] therapy based on results obtained during the nursing admission assessment. The patients chart was reviewed and the patient is not appropriate for a skilled therapy evaluation at this time unless he is not tolerating his diet. Please consult speech therapy if any therapy needs arise. Thank you.     Parveen Quinn, SLP

## 2018-02-19 NOTE — PROGRESS NOTES
ADULT PROTOCOL: JET AEROSOL ASSESSMENT    Patient  Uvaldo Carr     65 y.o.   male     2/18/2018  10:39 PM    Breath Sounds Pre Procedure: Right Breath Sounds: Coarse                               Left Breath Sounds: Coarse    Breath Sounds Post Procedure: Right Breath Sounds: Coarse                                 Left Breath Sounds: Coarse    Breathing pattern: Pre procedure Breathing Pattern: Regular          Post procedure Breathing Pattern: Regular    Heart Rate: Pre procedure Pulse: 78           Post procedure Pulse: 81    Resp Rate: Pre procedure Respirations: 18           Post procedure Respirations: 18      Cough: Pre procedure Cough: Non-productive               Post procedure Cough: Non-productive    Oxygen: O2 Device: Nasal cannula   Flow rate (L/min) 2     Changed: NO    SpO2: Pre procedure SpO2: 96 %   with oxygen              Post procedure SpO2: 96 %  with oxygen    Nebulizer Therapy: Current medications Aerosolized Medications: DuoNeb      Changed: NO    Smoking History: never    Problem List:   Patient Active Problem List   Diagnosis Code    Mental retardation F79    Seizure disorder (HCC) G40.909    Psoriasis L40.9    Venous stasis of lower extremity I87.8    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, T50.905A    Sleep disorder G47.9    Hypertension, essential I10    Strongyloides stercoralis infection B78.9    Eosinophilia D72.1    Hypercholesterolemia E78.00    Controlled type 2 diabetes mellitus without complication, without long-term current use of insulin (HCC) E11.9    Acute respiratory failure (HCC) J96.00    Influenza J11.1    Uncontrolled type 2 diabetes mellitus ([facility] [address] E11.65       Respiratory Therapist: Andrea Kothari RT

## 2018-02-19 NOTE — PROGRESS NOTES
Hospitalist Progress Note    NAME: Ravi Lundy   :  1955   MRN:  [id number]       Assessment / Plan:  Acute hypoxic respiratory failure, POA  Influenza A causing Acute bronchospasm   -continue Tamiflu and wean O2 as tolerated; await PT/OT eval  -Duonebs changed to albuterol nebs given urinary retention  - PA and Lat CXR shows atelectasis-->incentive spirometry  -home O2 eval today    DM type 2, uncontrolled-- A1c was 9.8 2017. 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 Depakote level 91, ammonia wnl     Hyperlipidemia  -continue lipitor     Chronic thrombocytopenia, ?due to depakote  -monitor, plts a little lower at 58 K today    Urinary Retention: overnight from  into ; likely from ipratropium (see changes above  -continue Flomax  -richards place on ; voiding trial today    Psoriasis  -continue triamcinolone prn     Generalized weakness, difficulty standing  -Consult PT/OT  -B12 level pending     Hypokalemia:  -replete and monitor    Obesity: Body mass index is 30.17 kg/(m^2).     Code:  Full  DVT prophylaxis: SCD  Surrogate decision maker: Mother Taylor      Body mass index is 30.17 kg/(m^2). Subjective:     Chief Complaint / Reason for Physician Visit: follow-up respiratory failure  Patient seen for follow-up  Denies complaints   Richards in place  Breathing is improving    Review of Systems:  Symptom Y/N Comments  Symptom Y/N Comments   Fever/Chills    Chest Pain n    Poor Appetite    Edema     Cough y   Abdominal Pain n    Sputum    Joint Pain     SOB/TREVINO n   Pruritis/Rash     Nausea/vomit    Tolerating PT/OT     Diarrhea    Tolerating Diet     Constipation    Other       Could NOT obtain due to:      Objective:     VITALS:   Last 24hrs VS reviewed since prior progress note.  Most recent are:  Patient Vitals for the past 24 hrs:   Temp Pulse Resp BP SpO2   02/19/18 0417 98.6 °F (37 °C) 74 18 133/63 92 %   02/19/18 0346 - - - - 92 %   02/18/18 2349 98.4 °F (36.9 °C) 69 20 117/67 93 %   02/18/18 2335 - - - - 93 %   02/18/18 1948 - - - - 96 %   02/18/18 1922 98 °F (36.7 °C) 75 20 135/60 96 %   02/18/18 1624 98.8 °F (37.1 °C) 78 24 139/72 90 %   02/18/18 1140 97.5 °F (36.4 °C) 80 24 144/67 92 %   02/18/18 1121 - - - - 94 %   02/18/18 0805 98.6 °F (37 °C) 84 22 133/63 93 %   02/18/18 0752 - - - - 93 %       Intake/Output Summary (Last 24 hours) at 02/19/18 0716  Last data filed at 02/19/18 0602   Gross per 24 hour   Intake                0 ml   Output             1750 ml   Net            -1750 ml        PHYSICAL EXAM:  General: WD, WN. Alert, cooperative, no acute distress but ill appearing    EENT:  EOMI. Anicteric sclerae. MMM  Resp:  CTAB anterior and alteral assessment. No accessory muscle use  CV:  Regular  rhythm,  No edema  GI:  Soft, Non distended, Non tender.  +Bowel sounds  Neurologic:  Alert, answers simple questions,   Psych:   Poor insight. Not anxious nor agitated at this time  Skin:  Psoriasis noted. No jaundice    Reviewed most current lab test results and cultures  YES  Reviewed most current radiology test results   YES  Review and summation of old records today    NO  Reviewed patient's current orders and MAR    YES  PMH/SH reviewed - no change compared to H&P  ________________________________________________________________________  Care Plan discussed with:    Comments   Patient x    Family      RN x    Care Manager x    Consultant                        Multidiciplinary team rounds were held today with , nursing, pharmacist and clinical coordinator. Patient's plan of care was discussed; medications were reviewed and discharge planning was addressed.      ________________________________________________________________________  Total NON critical care TIME:  20 Minutes    Total CRITICAL CARE TIME Spent:   Minutes non procedure based      Comments   >50% of visit spent in counseling and coordination of care     ________________________________________________________________________  Daniel Colin MD     Procedures: see electronic medical records for all procedures/Xrays and details which were not copied into this note but were reviewed prior to creation of Plan. LABS:  I reviewed today's most current labs and imaging studies.   Pertinent labs include:  Recent Labs      02/19/18   0434  02/18/18   0332  02/17/18   1121   WBC  7.0  9.4  9.6   HGB  10.4*  11.8*  12.8   HCT  32.2*  37.9  39.1   PLT  58*  70*  76*     Recent Labs      02/19/18   0434  02/18/18   0332  02/17/18   1121   NA  137  138  134*   K  3.4*  4.1  4.2   CL  106  108  101   CO2  21  22  21   GLU  205*  243*  352*   BUN  25*  26*  25*   CREA  0.81  1.06  1.09   CA  8.4*  8.2*  8.6   ALB  2.2*   --   2.8*   TBILI  0.5   --   0.5   SGOT  21   --   14*   ALT  15   --   14       Signed: Daniel Colin MD

## 2018-02-19 NOTE — INTERDISCIPLINARY ROUNDS
Oncology Interdisciplinary rounds were held today to discuss patient plan of care and outcomes. The following members were present: Nursing, Case Management, Pharmacy and Dietary.     Actual Length of Stay: 2         Expected Length of Stay:                 Plan for Day        Mobility        Plan for Stay      Plan for Way   Continue Tamiflu  Antibiotics Up with assistance Tamiflu through 2/21 Home with mother

## 2018-02-19 NOTE — ROUTINE PROCESS
Oncology Nursing Communication Tool  8:31 AM  2/19/2018     Bedside shift change report given to Asha Yang RN (incoming nurse) by Khushboo Lovelace RN (outgoing nurse) on [facility]. Report included the following information SBAR, Kardex, Intake/Output, MAR and Recent Results. Shift Summary: pt reoriented to richards cath frequently as pt said he needed to void. Richards draining well. 1 loose BM overnight. Encouraged po fluids. Bath & bed done. Tele in SR with some PVCs. Issues for physician to address: none         Oncology Shift Note   Admission Date 2/17/2018   Admission Diagnosis Influenza  Acute respiratory failure ([facility] [address]   Code Status Full Code   Consults None      Cardiac Monitoring [x] Yes [] No      Purposeful Hourly Rounding [x] Yes    Kalia Score Total Score: 5   Kalia score 3 or > [x] Bed Alarm [] Avasys [] 1:1 sitter [] Patient refused (Place signed refusal form in chart)      Pain Managed [x] Yes [] No    Key Pain Meds     The patient is on no pain meds. Influenza Vaccine Received Flu Vaccine for Current Season (usually Sept-March): Yes           Oxygen needs?  [] Room air Oxygen @  []1L    [x]2L    []3L   []4L    []5L   []6L     Use home O2? [] Yes [x] No  Perform O2 challenge test using  smartphrase (.oxygenchallenge)      Last bowel movement Last Bowel Movement Date: 02/18/18  bowel movement      Urinary Catheter Urinary Catheter 02/18/18 Richards - Temperature-Criteria for Appropriate Use: Obstruction/retention     Urinary Catheter 02/18/18 Richarsd - Temperature-Urine Output (mL): 200 ml     LDAs               Peripheral IV 02/17/18 Left Antecubital (Active)   Site Assessment Clean, dry, & intact 2/19/2018  5:35 AM   Phlebitis Assessment 0 2/19/2018  5:35 AM   Infiltration Assessment 0 2/19/2018  5:35 AM   Dressing Status Clean, dry, & intact 2/19/2018  5:35 AM   Dressing Type Tape;Transparent 2/19/2018  5:35 AM   Hub Color/Line Status Pink;Capped;Flushed 2/19/2018  5:35 AM   Action Taken Blood drawn 2/17/2018 11:20 AM       Peripheral IV 02/17/18 Right Antecubital (Active)   Site Assessment Clean, dry, & intact 2/19/2018  5:35 AM   Phlebitis Assessment 0 2/19/2018  5:35 AM   Infiltration Assessment 0 2/19/2018  5:35 AM   Dressing Status Clean, dry, & intact 2/19/2018  5:35 AM   Dressing Type Tape;Transparent 2/19/2018  5:35 AM   Hub Color/Line Status Pink;Capped;Flushed 2/19/2018  5:35 AM                         Readmission Risk Assessment Tool Score Low Risk            11       Total Score        5 Pt. Coverage (Medicare=5 , Medicaid, or Self-Pay=4)    6 Charlson Comorbidity Score (Age + Comorbid Conditions)        Criteria that do not apply:    Has Seen PCP in Last 6 Months (Yes=3, No=0)    . Living with Significant Other. Assisted Living. LTAC. SNF.  or   Rehab    Patient Length of Stay (>5 days = 3)    IP Visits Last 12 Months (1-3=4, 4=9, >4=11)       Expected Length of Stay - - -   Actual Length of Stay 2          Annemarie Hubbard RN

## 2018-02-19 NOTE — PROGRESS NOTES
Oncology Nursing Communication Tool  7:23 PM  2/18/2018     Bedside shift change report given to Oscar Parry RN (incoming nurse) by Michael Fine RN (outgoing nurse) on [facility]. Report included the following information SBAR and Kardex. Shift Summary: richards inserted for retention. cxr done today. Issues for physician to address: none         Oncology Shift Note   Admission Date 2/17/2018   Admission Diagnosis Influenza  Acute respiratory failure ([address]   Code Status Full Code   Consults None      Cardiac Monitoring [] Yes [] No      Purposeful Hourly Rounding [] Yes    Kalia Score Total Score: 5   Kalia score 3 or > [] Bed Alarm [] Avasys [] 1:1 sitter [] Patient refused (Place signed refusal form in chart)      Pain Managed [] Yes [] No    Key Pain Meds     The patient is on no pain meds. Influenza Vaccine             Oxygen needs? [] Room air Oxygen @  []1L    []2L    []3L   []4L    []5L   []6L     Use home O2? [] Yes [] No  Perform O2 challenge test using  smartphrase (.oxygenchallenge)      Last bowel movement Last Bowel Movement Date: 02/18/18  bowel movement      Urinary Catheter Urinary Catheter 02/18/18 Richards - Temperature-Criteria for Appropriate Use: Obstruction/retention     Urinary Catheter 02/18/18 Richards - Temperature-Urine Output (mL): 1100 ml     LDAs               Peripheral IV 02/17/18 Left Antecubital (Active)   Site Assessment Clean, dry, & intact 2/18/2018  3:13 PM   Phlebitis Assessment 0 2/18/2018  3:13 PM   Infiltration Assessment 0 2/18/2018  3:13 PM   Dressing Status Clean, dry, & intact 2/18/2018  3:13 PM   Dressing Type Transparent 2/18/2018  3:13 PM   Hub Color/Line Status Pink; Infusing 2/18/2018  3:13 PM   Action Taken Blood drawn 2/17/2018 11:20 AM       Peripheral IV 02/17/18 Right Antecubital (Active)   Site Assessment Clean, dry, & intact 2/18/2018  3:13 PM   Phlebitis Assessment 0 2/18/2018  3:13 PM   Infiltration Assessment 0 2/18/2018 3:13 PM   Dressing Status Clean, dry, & intact 2/18/2018  3:13 PM   Dressing Type Transparent 2/18/2018  3:13 PM   Hub Color/Line Status Pink;Capped 2/18/2018  3:13 PM                         Readmission Risk Assessment Tool Score Low Risk            11       Total Score        5 Pt. Coverage (Medicare=5 , Medicaid, or Self-Pay=4)    6 Charlson Comorbidity Score (Age + Comorbid Conditions)        Criteria that do not apply:    Has Seen PCP in Last 6 Months (Yes=3, No=0)    . Living with Significant Other. Assisted Living. LTAC. SNF.  or   Rehab    Patient Length of Stay (>5 days = 3)    IP Visits Last 12 Months (1-3=4, 4=9, >4=11)       Expected Length of Stay - - -   Actual Length of Stay 916 Echo Andrade RN

## 2018-02-19 NOTE — DIABETES MGMT
DTC Progress Note    Recommendations/ Comments: Noted lantus increased for tonight. Consider maximizing metformin if pt can tolerate higher doses. Pt not appropriate to see for elevated a1c. Current hospital DM medication: amaryl 8mg daily, metformin ER 750mg ac b/d, humalog correction and lantus 8units Qhs.     Chart reviewed on Jacile Schaefer. Patient is a 65 y.o. male with known Type 2 Diabetes on amaryl 8mg Qam and metformin ER 750mg ac b/d at home. A1c:   Lab Results   Component Value Date/Time    Hemoglobin A1c 9.0 (H) 02/10/2017 08:48 AM    Hemoglobin A1c 7.7 (H) 04/05/2016 10:21 AM       Recent Glucose Results: Lab Results   Component Value Date/Time     (H) 02/19/2018 04:34 AM    GLUCPOC 223 (H) 02/19/2018 07:46 AM    GLUCPOC 184 (H) 02/18/2018 09:20 PM    GLUCPOC 233 (H) 02/18/2018 04:58 PM        Lab Results   Component Value Date/Time    Creatinine 0.81 02/19/2018 04:34 AM     Estimated Creatinine Clearance: 124.6 mL/min (based on Cr of 0.81). Active Orders   Diet    DIET DIABETIC CONSISTENT CARB Regular        PO intake: Patient Vitals for the past 72 hrs:   % Diet Eaten   02/19/18 0924 75 %       Will continue to follow as needed.     Thank you  APRYL MckoyN, RN, [address]

## 2018-02-19 NOTE — PROGRESS NOTES
Problem: Mobility Impaired (Adult and Pediatric)  Goal: *Acute Goals and Plan of Care (Insert Text)  Physical Therapy Goals  Initiated 2/19/2018  1. Patient will move from supine to sit and sit to supine  in bed with independence within 7 day(s). 2.  Patient will transfer from bed to chair and chair to bed with supervision/set-up using the least restrictive device within 7 day(s). 3.  Patient will perform sit to stand with supervision/set-up within 7 day(s). 4.  Patient will ambulate with supervision/set-up for 100 feet with the least restrictive device within 7 day(s). physical Therapy EVALUATION  Patient: Prakash Manley (58 y.o. male)  Date: 2/19/2018  Primary Diagnosis: Influenza  Acute respiratory failure (HCC)        Precautions:   Fall (Droplet)    ASSESSMENT :  Based on the objective data described below, the patient presents with generalized weakness, decreased functional mobility, impaired balance and gait s/p admission for the flu and sepsis. Patient received supine in bed and agreeable to therapy. Patient oriented to self and location. Patient reported he lives at home with his mother and uses a rolling walker for mobility. Patient with difficulty fulling describing home set-up and if he needs assistance for ADLs or mobility tasks. Patient completed supine to sit with standby assist and additional time and cues. Pt performed sit<>Stand with RW with moderate assistance and hand over hand assist to place hands on the bed surface to prevent pulling up on the RW for support. Patient ambulated within the room with RW with min-mod assist, demonstrating narrow BRUCE, decreased ronal, path deviations, and increased trunk sway. Patient reporting the need to use the urinal and required total assist to hold urinal in place while pt kept bilateral hands on the RW for balance and support. Patient assisted back to supine position with supervision.  Patient will continue to benefit from PT to progress mobility, improve tolerance to activity and reach highest level of independence. At this time, pt is functioning below baseline mobility status and will benefit from SNF placement upon discharge. .    Patient will benefit from skilled intervention to address the above impairments. Patients rehabilitation potential is considered to be Fair  Factors which may influence rehabilitation potential include:   []         None noted  [x]         Mental ability/status  [x]         Medical condition  []         Home/family situation and support systems  []         Safety awareness  []         Pain tolerance/management  []         Other:      PLAN :  Recommendations and Planned Interventions:  [x]           Bed Mobility Training             [x]    Neuromuscular Re-Education  [x]           Transfer Training                   []    Orthotic/Prosthetic Training  [x]           Gait Training                         []    Modalities  [x]           Therapeutic Exercises           []    Edema Management/Control  [x]           Therapeutic Activities            [x]    Patient and Family Training/Education  []           Other (comment):    Frequency/Duration: Patient will be followed by physical therapy  4 times a week to address goals. Discharge Recommendations: Skilled Nursing Facility  Further Equipment Recommendations for Discharge: TBD at SNF     SUBJECTIVE:   Patient stated I'm doing fine.     OBJECTIVE DATA SUMMARY:   HISTORY:    Past Medical History:   Diagnosis Date    Contact dermatitis and other eczema, due to unspecified cause     psoriasis    Diabetes ([facility] [address]     Eosinophilia     Hypercholesterolemia     Hypertension     Mental retardation     Seizures ([facility] [address]     Skin cancer      Past Surgical History:   Procedure Laterality Date    ENDOSCOPY, COLON, DIAGNOSTIC  11/08/2007    Dr Venecia Rowley normal except small internal hemorrhoids repeat 11/2017     Prior Level of Function/Home Situation: see above  Personal factors and/or comorbidities impacting plan of care:     Home Situation  Home Environment: Private residence  One/Two Story Residence: One story  Living Alone: No  Support Systems: Parent  Current DME Used/Available at Home: Walker, rolling    EXAMINATION/PRESENTATION/DECISION MAKING:   Critical Behavior:  Neurologic State: Alert, Confused  Orientation Level: Oriented to place, Oriented to person, Disoriented to time, Disoriented to situation  Cognition: Decreased command following, Impaired decision making  Safety/Judgement: Awareness of environment, Decreased awareness of need for safety  Hearing: Auditory  Auditory Impairment: None  Skin:    Edema:   Range Of Motion:  AROM: Generally decreased, functional                       Strength:    Strength: Generally decreased, functional                    Tone & Sensation:                                  Coordination:     Vision:      Functional Mobility:  Bed Mobility:     Supine to Sit: Stand-by asssistance  Sit to Supine: Stand-by asssistance  Scooting: Supervision; Additional time  Transfers:  Sit to Stand: Moderate assistance  Stand to Sit: Minimum assistance                       Balance:   Sitting: Intact  Standing: Impaired; With support  Standing - Static: Constant support; Fair  Standing - Dynamic : Fair  Ambulation/Gait Training:  Distance (ft): 12 Feet (ft)  Assistive Device: Gait belt;Walker, rolling  Ambulation - Level of Assistance: Minimal assistance; Moderate assistance        Gait Abnormalities: Decreased step clearance; Path deviations;Trunk sway increased        Base of Support: Narrowed     Speed/Doris: Pace decreased (<100 feet/min); Shuffled  Step Length: Right shortened;Left shortened                     Stairs:               Therapeutic Exercises:       Functional Measure:  Tinetti test:    Sitting Balance: 1  Arises: 0  Attempts to Rise: 0  Immediate Standing Balance: 1  Standing Balance: 0  Nudged: 0  Eyes Closed: 0  Turn 360 Degrees - Continuous/Discontinuous: 0  Turn 360 Degrees - Steady/Unsteady: 0  Sitting Down: 1  Balance Score: 3  Indication of Gait: 1  R Step Length/Height: 1  L Step Length/Height: 1  R Foot Clearance: 1  L Foot Clearance: 1  Step Symmetry: 1  Step Continuity: 1  Path: 1  Trunk: 0  Walking Time: 1  Gait Score: 9  Total Score: 12       Tinetti Test and G-code impairment scale:  Percentage of Impairment CH    0%   CI    1-19% CJ    20-39% CK    40-59% CL    60-79% CM    80-99% CN     100%   Tinetti  Score 0-28 28 23-27 17-22 12-16 6-11 1-5 0       Tinetti Tool Score Risk of Falls  <19 = High Fall Risk  19-24 = Moderate Fall Risk  25-28 = Low Fall Risk  Tinetti ME. Performance-Oriented Assessment of Mobility Problems in Elderly Patients. [facility] 66; [id number]. (Scoring Description: PT Bulletin Feb. 10, 1993)    Older adults: Eli Dunlap et al, 2009; n = 1000 [facility] elderly evaluated with ABC, JAM, ADL, and IADL)  · Mean JAM score for males aged 69-68 years = 26.21(3.40)  · Mean JAM score for females age 69-68 years = 25.16(4.30)  · Mean JAM score for males over 80 years = 23.29(6.02)  · Mean JAM score for females over 80 years = 17.20(8.32)         G codes: In compliance with CMSs Claims Based Outcome Reporting, the following G-code set was chosen for this patient based on their primary functional limitation being treated: The outcome measure chosen to determine the severity of the functional limitation was the Tinetti with a score of 12/28 which was correlated with the impairment scale.     ? Mobility - Walking and Moving Around:     - CURRENT STATUS: CK - 40%-59% impaired, limited or restricted    - GOAL STATUS: CJ - 20%-39% impaired, limited or restricted    - D/C STATUS:  ---------------To be determined---------------      Based on the above components, the patient evaluation is determined to be of the following complexity level: LOW     Pain:  Pain Scale 1: Numeric (0 - 10)  Pain Intensity 1: 0 Activity Tolerance:   Fair. VSS. Patient on 2L of oxygen and O2 sats: >92%  Please refer to the flowsheet for vital signs taken during this treatment. After treatment:   []         Patient left in no apparent distress sitting up in chair  [x]         Patient left in no apparent distress in bed  [x]         Call bell left within reach  [x]         Nursing notified  []         Caregiver present  [x]         Bed alarm activated    COMMUNICATION/EDUCATION:   The patients plan of care was discussed with: Occupational Therapist and Registered Nurse.  []         Fall prevention education was provided and the patient/caregiver indicated understanding. []         Patient/family have participated as able in goal setting and plan of care. []         Patient/family agree to work toward stated goals and plan of care. []         Patient understands intent and goals of therapy, but is neutral about his/her participation. [x]         Patient is unable to participate in goal setting and plan of care.     Thank you for this referral.  Felipe Freeman, PT , DPT   Time Calculation: 19 mins

## 2018-02-20 LAB
ALBUMIN SERPL-MCNC: 2.2 G/DL (ref 3.5–5)
ALBUMIN/GLOB SERPL: 0.6 {RATIO} (ref 1.1–2.2)
ALP SERPL-CCNC: 41 U/L (ref 45–117)
ALT SERPL-CCNC: 58 U/L (ref 12–78)
ANION GAP SERPL CALC-SCNC: 10 MMOL/L (ref 5–15)
AST SERPL-CCNC: 69 U/L (ref 15–37)
BASOPHILS # BLD: 0 K/UL (ref 0–0.1)
BASOPHILS NFR BLD: 0 % (ref 0–1)
BILIRUB SERPL-MCNC: 0.4 MG/DL (ref 0.2–1)
BUN SERPL-MCNC: 19 MG/DL (ref 6–20)
BUN/CREAT SERPL: 24 (ref 12–20)
C DIFF TOX GENS STL QL NAA+PROBE: NEGATIVE
CALCIUM SERPL-MCNC: 8.4 MG/DL (ref 8.5–10.1)
CHLORIDE SERPL-SCNC: 109 MMOL/L (ref 97–108)
CO2 SERPL-SCNC: 19 MMOL/L (ref 21–32)
CREAT SERPL-MCNC: 0.78 MG/DL (ref 0.7–1.3)
DIFFERENTIAL METHOD BLD: ABNORMAL
EOSINOPHIL # BLD: 0.1 K/UL (ref 0–0.4)
EOSINOPHIL NFR BLD: 1 % (ref 0–7)
ERYTHROCYTE [DISTWIDTH] IN BLOOD BY AUTOMATED COUNT: 13.1 % (ref 11.5–14.5)
GLOBULIN SER CALC-MCNC: 3.9 G/DL (ref 2–4)
GLUCOSE BLD STRIP.AUTO-MCNC: 130 MG/DL (ref 65–100)
GLUCOSE BLD STRIP.AUTO-MCNC: 160 MG/DL (ref 65–100)
GLUCOSE BLD STRIP.AUTO-MCNC: 194 MG/DL (ref 65–100)
GLUCOSE BLD STRIP.AUTO-MCNC: 281 MG/DL (ref 65–100)
GLUCOSE SERPL-MCNC: 251 MG/DL (ref 65–100)
HCT VFR BLD AUTO: 35.2 % (ref 36.6–50.3)
HGB BLD-MCNC: 11.3 G/DL (ref 12.1–17)
IMM GRANULOCYTES # BLD: 0.1 K/UL (ref 0–0.04)
IMM GRANULOCYTES NFR BLD AUTO: 1 % (ref 0–0.5)
LYMPHOCYTES # BLD: 1.3 K/UL (ref 0.8–3.5)
LYMPHOCYTES NFR BLD: 18 % (ref 12–49)
MCH RBC QN AUTO: 30.2 PG (ref 26–34)
MCHC RBC AUTO-ENTMCNC: 32.1 G/DL (ref 30–36.5)
MCV RBC AUTO: 94.1 FL (ref 80–99)
MONOCYTES # BLD: 0.7 K/UL (ref 0–1)
MONOCYTES NFR BLD: 10 % (ref 5–13)
NEUTS SEG # BLD: 5 K/UL (ref 1.8–8)
NEUTS SEG NFR BLD: 70 % (ref 32–75)
NRBC # BLD: 0 K/UL (ref 0–0.01)
NRBC BLD-RTO: 0 PER 100 WBC
PLATELET # BLD AUTO: 67 K/UL (ref 150–400)
PMV BLD AUTO: 10.6 FL (ref 8.9–12.9)
POTASSIUM SERPL-SCNC: 3.9 MMOL/L (ref 3.5–5.1)
PROT SERPL-MCNC: 6.1 G/DL (ref 6.4–8.2)
RBC # BLD AUTO: 3.74 M/UL (ref 4.1–5.7)
SERVICE CMNT-IMP: ABNORMAL
SODIUM SERPL-SCNC: 138 MMOL/L (ref 136–145)
WBC # BLD AUTO: 7.2 K/UL (ref 4.1–11.1)

## 2018-02-20 PROCEDURE — 82962 GLUCOSE BLOOD TEST: CPT

## 2018-02-20 PROCEDURE — 36415 COLL VENOUS BLD VENIPUNCTURE: CPT | Performed by: INTERNAL MEDICINE

## 2018-02-20 PROCEDURE — 94640 AIRWAY INHALATION TREATMENT: CPT

## 2018-02-20 PROCEDURE — 74011250637 HC RX REV CODE- 250/637: Performed by: INTERNAL MEDICINE

## 2018-02-20 PROCEDURE — 74011636637 HC RX REV CODE- 636/637: Performed by: HOSPITALIST

## 2018-02-20 PROCEDURE — 74011000250 HC RX REV CODE- 250: Performed by: INTERNAL MEDICINE

## 2018-02-20 PROCEDURE — 85025 COMPLETE CBC W/AUTO DIFF WBC: CPT | Performed by: INTERNAL MEDICINE

## 2018-02-20 PROCEDURE — 65660000000 HC RM CCU STEPDOWN

## 2018-02-20 PROCEDURE — 77010033678 HC OXYGEN DAILY

## 2018-02-20 PROCEDURE — 80053 COMPREHEN METABOLIC PANEL: CPT | Performed by: INTERNAL MEDICINE

## 2018-02-20 PROCEDURE — 74011250637 HC RX REV CODE- 250/637: Performed by: HOSPITALIST

## 2018-02-20 PROCEDURE — 97530 THERAPEUTIC ACTIVITIES: CPT

## 2018-02-20 PROCEDURE — 97116 GAIT TRAINING THERAPY: CPT

## 2018-02-20 RX ORDER — METFORMIN HYDROCHLORIDE 500 MG/1
1000 TABLET, EXTENDED RELEASE ORAL 2 TIMES DAILY
Status: DISCONTINUED | OUTPATIENT
Start: 2018-02-20 | End: 2018-03-01 | Stop reason: HOSPADM

## 2018-02-20 RX ORDER — IPRATROPIUM BROMIDE AND ALBUTEROL SULFATE 2.5; .5 MG/3ML; MG/3ML
3 SOLUTION RESPIRATORY (INHALATION)
Status: DISCONTINUED | OUTPATIENT
Start: 2018-02-21 | End: 2018-02-27

## 2018-02-20 RX ADMIN — INSULIN LISPRO 7 UNITS: 100 INJECTION, SOLUTION INTRAVENOUS; SUBCUTANEOUS at 09:34

## 2018-02-20 RX ADMIN — TAMSULOSIN HYDROCHLORIDE 0.4 MG: 0.4 CAPSULE ORAL at 09:35

## 2018-02-20 RX ADMIN — IPRATROPIUM BROMIDE AND ALBUTEROL SULFATE 3 ML: .5; 3 SOLUTION RESPIRATORY (INHALATION) at 03:40

## 2018-02-20 RX ADMIN — IPRATROPIUM BROMIDE AND ALBUTEROL SULFATE 3 ML: .5; 3 SOLUTION RESPIRATORY (INHALATION) at 09:39

## 2018-02-20 RX ADMIN — METFORMIN HYDROCHLORIDE 750 MG: 750 TABLET, EXTENDED RELEASE ORAL at 10:27

## 2018-02-20 RX ADMIN — OSELTAMIVIR PHOSPHATE 75 MG: 75 CAPSULE ORAL at 22:47

## 2018-02-20 RX ADMIN — ATORVASTATIN CALCIUM 40 MG: 40 TABLET, FILM COATED ORAL at 22:47

## 2018-02-20 RX ADMIN — OSELTAMIVIR PHOSPHATE 75 MG: 75 CAPSULE ORAL at 09:35

## 2018-02-20 RX ADMIN — DIVALPROEX SODIUM 500 MG: 250 TABLET, DELAYED RELEASE ORAL at 09:35

## 2018-02-20 RX ADMIN — Medication 10 ML: at 22:47

## 2018-02-20 RX ADMIN — Medication 10 ML: at 13:43

## 2018-02-20 RX ADMIN — INSULIN LISPRO 3 UNITS: 100 INJECTION, SOLUTION INTRAVENOUS; SUBCUTANEOUS at 16:53

## 2018-02-20 RX ADMIN — IPRATROPIUM BROMIDE AND ALBUTEROL SULFATE 3 ML: .5; 3 SOLUTION RESPIRATORY (INHALATION) at 11:50

## 2018-02-20 RX ADMIN — FOLIC ACID 1 MG: 1 TABLET ORAL at 09:35

## 2018-02-20 RX ADMIN — IPRATROPIUM BROMIDE AND ALBUTEROL SULFATE 3 ML: .5; 3 SOLUTION RESPIRATORY (INHALATION) at 19:32

## 2018-02-20 RX ADMIN — IPRATROPIUM BROMIDE AND ALBUTEROL SULFATE 3 ML: .5; 3 SOLUTION RESPIRATORY (INHALATION) at 00:53

## 2018-02-20 RX ADMIN — GUAIFENESIN AND DEXTROMETHORPHAN 10 ML: 100; 10 SYRUP ORAL at 02:52

## 2018-02-20 RX ADMIN — TOPIRAMATE 300 MG: 100 TABLET, FILM COATED ORAL at 17:54

## 2018-02-20 RX ADMIN — DIVALPROEX SODIUM 1000 MG: 250 TABLET, DELAYED RELEASE ORAL at 17:54

## 2018-02-20 RX ADMIN — CALCIUM CARBONATE 500 MG (1,250 MG)-VITAMIN D3 200 UNIT TABLET 1 TABLET: at 09:35

## 2018-02-20 RX ADMIN — METFORMIN HYDROCHLORIDE 1000 MG: 500 TABLET, EXTENDED RELEASE ORAL at 17:54

## 2018-02-20 RX ADMIN — TOPIRAMATE 300 MG: 100 TABLET, FILM COATED ORAL at 09:35

## 2018-02-20 RX ADMIN — INSULIN LISPRO 3 UNITS: 100 INJECTION, SOLUTION INTRAVENOUS; SUBCUTANEOUS at 11:42

## 2018-02-20 RX ADMIN — LINAGLIPTIN 5 MG: 5 TABLET, FILM COATED ORAL at 13:43

## 2018-02-20 RX ADMIN — GLIMEPIRIDE 8 MG: 4 TABLET ORAL at 10:26

## 2018-02-20 NOTE — INTERDISCIPLINARY ROUNDS
Oncology Interdisciplinary rounds were held today to discuss patient plan of care and outcomes. The following members were present: Nursing, Case Management, Pharmacy and Dietary.     Actual Length of Stay: 3    DRG GLOS: 4.5    Expected Length of Stay: 4d 12h                Plan for Day        Mobility        Plan for Stay      Plan for Way   Monitor blood sugar Up to chair  Continue current [facility] 2/21

## 2018-02-20 NOTE — PROGRESS NOTES
Hospitalist Progress Note    NAME: Rimma Banks   :  1955   MRN:  [id number]       Assessment / Plan:  Acute hypoxic respiratory failure, POA  Influenza A causing Acute bronchospasm   -continue Tamiflu (completing Tamiflu tomorrow) and wean O2 as tolerated; await PT/OT eval  -Duonebs changed to albuterol nebs given urinary retention  - PA and Lat CXR shows atelectasis-->incentive spirometry  -so far tolerating room air. DM type 2, uncontrolled-- A1c was 9.8 2017. 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 Depakote level 91, ammonia wnl     Hyperlipidemia  -continue lipitor     Chronic thrombocytopenia, ?due to depakote  -monitor, plts a little lower at 58 K today    Urinary Retention: overnight from  into ; likely from ipratropium (see changes above  -continue Flomax  -richards place on ; voiding trial today    Psoriasis  -continue triamcinolone prn     Generalized weakness, difficulty standing  -Consult PT/OT  -B12 level pending     Hypokalemia:  -replete and monitor    Obesity: Body mass index is 30.17 kg/(m^2).     Code:  Full  DVT prophylaxis: SCD  Surrogate decision maker: Mother Taylor      Body mass index is 30.17 kg/(m^2). Patient doing fine may be able to D/c to SNF tomorrow       Subjective:     Chief Complaint / Reason for Physician Visit: follow-up respiratory failure  \"I feel better\"    Review of Systems:  Symptom Y/N Comments  Symptom Y/N Comments   Fever/Chills    Chest Pain n    Poor Appetite    Edema     Cough y   Abdominal Pain n    Sputum    Joint Pain     SOB/TREVINO n   Pruritis/Rash     Nausea/vomit    Tolerating PT/OT     Diarrhea    Tolerating Diet y    Constipation    Other       Could NOT obtain due to:      Objective:     VITALS:   Last 24hrs VS reviewed since prior progress note.  Most recent are:  Patient Vitals for the past 24 hrs:   Temp Pulse Resp BP SpO2   02/20/18 1115 99.1 °F (37.3 °C) 74 18 141/66 92 %   02/20/18 0939 - - - - 91 %   02/20/18 0800 98.1 °F (36.7 °C) 76 18 136/68 (!) 88 %   02/20/18 0340 - - - - 93 %   02/20/18 0300 98.9 °F (37.2 °C) 82 20 139/61 93 %   02/20/18 0053 - - - - 94 %   02/19/18 2330 99.5 °F (37.5 °C) 74 18 142/77 94 %   02/19/18 2118 - - - - 95 %   02/19/18 2022 98.6 °F (37 °C) 71 20 153/78 94 %   02/19/18 1619 - - - - 96 %   02/19/18 1616 97.5 °F (36.4 °C) 87 18 133/70 94 %   02/19/18 1220 - - - - 93 %       Intake/Output Summary (Last 24 hours) at 02/20/18 1137  Last data filed at 02/20/18 0520   Gross per 24 hour   Intake                0 ml   Output              653 ml   Net             -653 ml        PHYSICAL EXAM:  General: WD, WN. Alert, cooperative, no acute distress   EENT:  EOMI. Anicteric sclerae. MMM  Resp:  CTAB anterior and alteral assessment. No accessory muscle use  CV:  Regular  rhythm,  No edema  GI:  Soft, Non distended, Non tender.  +Bowel sounds  Neurologic:  Alert, answers simple questions,   Psych:   Poor insight. Not anxious nor agitated at this time  Skin:  Psoriasis noted. No jaundice    Reviewed most current lab test results and cultures  YES  Reviewed most current radiology test results   YES  Review and summation of old records today    NO  Reviewed patient's current orders and MAR    YES  PMH/SH reviewed - no change compared to H&P  ________________________________________________________________________  Care Plan discussed with:    Comments   Patient x    Family      RN x    Care Manager x    Consultant                        Multidiciplinary team rounds were held today with , nursing, pharmacist and clinical coordinator. Patient's plan of care was discussed; medications were reviewed and discharge planning was addressed.      ________________________________________________________________________  Total NON critical care TIME:  20 Minutes    Total CRITICAL CARE TIME Spent:   Minutes non procedure based      Comments   >50% of visit spent in counseling and coordination of care     ________________________________________________________________________  Yamilex Nichole MD     Procedures: see electronic medical records for all procedures/Xrays and details which were not copied into this note but were reviewed prior to creation of Plan. LABS:  I reviewed today's most current labs and imaging studies.   Pertinent labs include:  Recent Labs      02/20/18 0555 02/19/18   0434  02/18/18   0332   WBC  7.2  7.0  9.4   HGB  11.3*  10.4*  11.8*   HCT  35.2*  32.2*  37.9   PLT  67*  58*  70*     Recent Labs      02/20/18 0555 02/19/18   0434  02/18/18   0332   NA  138  137  138   K  3.9  3.4*  4.1   CL  109*  106  108   CO2  19*  21  22   GLU  251*  205*  243*   BUN  19  25*  26*   CREA  0.78  0.81  1.06   CA  8.4*  8.4*  8.2*   ALB  2.2*  2.2*   --    TBILI  0.4  0.5   --    SGOT  69*  21   --    ALT  58  15   --        Signed: Yamilex Nichole MD

## 2018-02-20 NOTE — PROGRESS NOTES
Oncology Nursing Communication Tool  7:35 AM  2/20/2018     Bedside shift change report given to Martha Gomez RN (incoming nurse) by Iker Purvis RN (outgoing nurse) on Cherylene Battles. Report included the following information SBAR. Shift Summary:       Issues for physician to address: Oncology Shift Note   Admission Date 2/17/2018   Admission Diagnosis Influenza  Acute respiratory failure ([address]   Code Status Full Code   Consults None      Cardiac Monitoring [] Yes [] No      Purposeful Hourly Rounding [] Yes    Kalia Score Total Score: 5   Kalia score 3 or > [] Bed Alarm [] Avasys [] 1:1 sitter [] Patient refused (Place signed refusal form in chart)      Pain Managed [] Yes [] No    Key Pain Meds     The patient is on no pain meds. Influenza Vaccine Received Flu Vaccine for Current Season (usually Sept-March): Yes           Oxygen needs?  [] Room air Oxygen @  []1L    []2L    []3L   []4L    []5L   []6L     Use home O2? [] Yes [] No  Perform O2 challenge test using  smartphrase (.oxygenchallenge)      Last bowel movement Last Bowel Movement Date: 02/19/18  bowel movement      Urinary Catheter [REMOVED] Urinary Catheter 02/18/18 Mitchell - Temperature-Criteria for Appropriate Use: Obstruction/retention     [REMOVED] Urinary Catheter 02/18/18 Mitchell - Temperature-Urine Output (mL): 420 ml     LDAs               Peripheral IV 02/17/18 Left Antecubital (Active)   Site Assessment Clean, dry, & intact 2/20/2018  3:00 AM   Phlebitis Assessment 0 2/20/2018  3:00 AM   Infiltration Assessment 0 2/20/2018  3:00 AM   Dressing Status Clean, dry, & intact 2/20/2018  3:00 AM   Dressing Type Tape;Transparent 2/20/2018  3:00 AM   Hub Color/Line Status Pink;Capped;Flushed;Patent 2/20/2018  3:00 AM   Action Taken Blood drawn 2/17/2018 11:20 AM       Peripheral IV 02/17/18 Right Antecubital (Active)   Site Assessment Clean, dry, & intact 2/20/2018  3:00 AM   Phlebitis Assessment 0 2/20/2018  3:00 AM   Infiltration Assessment 0 2/20/2018  3:00 AM   Dressing Status Clean, dry, & intact 2/20/2018  3:00 AM   Dressing Type Tape;Transparent 2/20/2018  3:00 AM   Hub Color/Line Status Pink;Capped;Flushed;Patent 2/20/2018  3:00 AM                         Readmission Risk Assessment Tool Score Low Risk            11       Total Score        5 Pt. Coverage (Medicare=5 , Medicaid, or Self-Pay=4)    6 Charlson Comorbidity Score (Age + Comorbid Conditions)        Criteria that do not apply:    Has Seen PCP in Last 6 Months (Yes=3, No=0)    . Living with Significant Other. Assisted Living. LTAC. SNF.  or   Rehab    Patient Length of Stay (>5 days = 3)    IP Visits Last 12 Months (1-3=4, 4=9, >4=11)       Expected Length of Stay 4d 12h   Actual Length of Stay 3          Hakeem Hampton RN

## 2018-02-20 NOTE — PROGRESS NOTES
Problem: Mobility Impaired (Adult and Pediatric)  Goal: *Acute Goals and Plan of Care (Insert Text)  Physical Therapy Goals  Initiated 2/19/2018  1. Patient will move from supine to sit and sit to supine  in bed with independence within 7 day(s). 2.  Patient will transfer from bed to chair and chair to bed with supervision/set-up using the least restrictive device within 7 day(s). 3.  Patient will perform sit to stand with supervision/set-up within 7 day(s). 4.  Patient will ambulate with supervision/set-up for 100 feet with the least restrictive device within 7 day(s). physical Therapy TREATMENT  Patient: Jenniffer Adhikari (54 y.o. male)  Date: 2/20/2018  Diagnosis: Influenza  Acute respiratory failure ([address] Acute respiratory failure ([address]       Precautions: Fall (Droplet)  Chart, physical therapy assessment, plan of care and goals were reviewed. ASSESSMENT:  Patient received supine in bed and agreeable to therapy. Patient tolerated session fairly well. Patient now on room air and spO2: 93% at rest and with activity. Patient completed supine to sit with CGA and verbal cueing for technique to reach across midline to use bed railing to assist. Patient completed sit<>stand with RW from lowest bed height and required multiple attempts and mod assist to assume standing position. Patient ambulated with RW with min assist x 2. Patient demonstrated narrow BRUCE, bilateral knees in flexed position during stance phase, difficulty directing RW properly which required max assist to manage to avoid obstacles. Patient returned to seated edge of bed with min assist due to poor eccentric lowering. Pt practiced sit<>Stand from elevated bed height with RW x 5 trials which improved to min assist, but still demonstrating impaired carryover to reach back for bed railing and proper hand placement prior to initiating transfer. Pt assisted back to supine position with HOB elevated.  Patient encouraged to sit on the EOB for all meals as there is limiting space in the room for a chair. Patient will continue to benefit from PT to progress mobility, improve strength and balance and to return to prior level of function. At this time, pt will need SNF placement upon discharge as he is requiring 2 person assist for transfers and gait. Progression toward goals:  []    Improving appropriately and progressing toward goals  [x]    Improving slowly and progressing toward goals  []    Not making progress toward goals and plan of care will be adjusted     PLAN:  Patient continues to benefit from skilled intervention to address the above impairments. Continue treatment per established plan of care. Discharge Recommendations:  Aguilar Sykes  Further Equipment Recommendations for Discharge:  TBD     SUBJECTIVE:   Patient stated I'm feeling better.     OBJECTIVE DATA SUMMARY:   Critical Behavior:  Neurologic State: Alert, Confused  Orientation Level: Oriented to person, Oriented to place, Disoriented to situation, Disoriented to time  Cognition: Follows commands  Safety/Judgement: Awareness of environment, Decreased awareness of need for safety  Functional Mobility Training:  Bed Mobility:     Supine to Sit: Contact guard assistance  Sit to Supine: Contact guard assistance           Transfers:  Sit to Stand: Minimum assistance (mod assist from lower surfaces)  Stand to Sit: Minimum assistance        X 5 reps with RW         Balance:  Sitting: Intact  Standing: Impaired; With support  Standing - Static: Constant support; Fair  Standing - Dynamic : Fair  Ambulation/Gait Training:  Distance (ft): 15 Feet (ft)  Assistive Device: Gait belt;Walker, rolling  Ambulation - Level of Assistance: Minimal assistance;Assist x2        Gait Abnormalities: Decreased step clearance; Path deviations        Base of Support: Narrowed     Speed/Doris: Pace decreased (<100 feet/min); Shuffled  Step Length: Right shortened;Left shortened       Pain:  Pain Scale 1: Numeric (0 - 10)  Pain Intensity 1: 0              Activity Tolerance:   VSS on room air  Please refer to the flowsheet for vital signs taken during this treatment.   After treatment:   []    Patient left in no apparent distress sitting up in chair  [x]    Patient left in no apparent distress in bed  [x]    Call bell left within reach  [x]    Nursing notified  []    Caregiver present  [x]    Bed alarm activated    COMMUNICATION/COLLABORATION:   The patients plan of care was discussed with: Registered Nurse    Dylon Barker PT, DPT   Time Calculation: 23 mins

## 2018-02-20 NOTE — PROGRESS NOTES
Oncology Nursing Communication Tool  8:46 PM  2/19/2018     Bedside shift change report given to Marina Nicole RN (incoming nurse) by Jaspreet Trivedi RN (outgoing nurse) on [facility]. Report included the following information SBAR and Kardex. Shift Summary: multiple bowel movements today. Stat c-diff test ordered. Issues for physician to address: none         Oncology Shift Note   Admission Date 2/17/2018   Admission Diagnosis Influenza  Acute respiratory failure ([facility] [address]   Code Status Full Code   Consults None      Cardiac Monitoring [] Yes [] No      Purposeful Hourly Rounding [] Yes    Kalia Score Total Score: 5   Kalia score 3 or > [] Bed Alarm [] Avasys [] 1:1 sitter [] Patient refused (Place signed refusal form in chart)      Pain Managed [] Yes [] No    Key Pain Meds     The patient is on no pain meds. Influenza Vaccine Received Flu Vaccine for Current Season (usually Sept-March): Yes           Oxygen needs?  [] Room air Oxygen @  []1L    []2L    []3L   []4L    []5L   []6L     Use home O2? [] Yes [] No  Perform O2 challenge test using  smartphrase (.oxygenchallenge)      Last bowel movement Last Bowel Movement Date: 02/19/18  bowel movement      Urinary Catheter [REMOVED] Urinary Catheter 02/18/18 Mitchell - Temperature-Criteria for Appropriate Use: Obstruction/retention     [REMOVED] Urinary Catheter 02/18/18 Mitchell - Temperature-Urine Output (mL): 420 ml     LDAs               Peripheral IV 02/17/18 Left Antecubital (Active)   Site Assessment Clean, dry, & intact 2/19/2018  7:08 PM   Phlebitis Assessment 0 2/19/2018  3:15 PM   Infiltration Assessment 0 2/19/2018  3:15 PM   Dressing Status Clean, dry, & intact 2/19/2018  3:15 PM   Dressing Type Transparent 2/19/2018  3:15 PM   Hub Color/Line Status Pink;Capped 2/19/2018  3:15 PM   Action Taken Blood drawn 2/17/2018 11:20 AM       Peripheral IV 02/17/18 Right Antecubital (Active)   Site Assessment Clean, dry, & intact 2/19/2018  7:08 PM   Phlebitis Assessment 0 2/19/2018  3:15 PM   Infiltration Assessment 0 2/19/2018  3:15 PM   Dressing Status Clean, dry, & intact 2/19/2018  3:15 PM   Dressing Type Transparent 2/19/2018  3:15 PM   Hub Color/Line Status Pink;Capped 2/19/2018  3:15 PM                         Readmission Risk Assessment Tool Score Low Risk            11       Total Score        5 Pt. Coverage (Medicare=5 , Medicaid, or Self-Pay=4)    6 Charlson Comorbidity Score (Age + Comorbid Conditions)        Criteria that do not apply:    Has Seen PCP in Last 6 Months (Yes=3, No=0)    . Living with Significant Other. Assisted Living. LTAC. SNF.  or   Rehab    Patient Length of Stay (>5 days = 3)    IP Visits Last 12 Months (1-3=4, 4=9, >4=11)       Expected Length of Stay 4d 12h   Actual Length of Stay 2          Kalyani Diaz RN

## 2018-02-20 NOTE — PROGRESS NOTES
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 Patient has been living with his mother as his primary caregiver. Mother reports patient is usually up independently at home and can take himself to the bathroom and feed himself. Mother - Curly Ruiz - is primary decision maker and caregiver. Mother was offered a list of skilled nursing facilities to review and FOC offered (signed form on chart). [facility] sent to [facility] per her request for short term rehab. Plan is for patient to return home w/ his mother and extended familiy support. Per mother, patient has [facility] Medicaid - [id number]. Mother states patient is very active with [facility] and has a Medicaid waiver and  - [employer]. She states they are supposed to have up to 480 hours/year, but \"have not used much at all\". Patient had a recent right shoulder injury and broken collar bone which is healing and he was undergoing PT for that prior to this illness. She states she would like to have patient continue his shoulder therapy while at rehab if possible. CM is unsure of orthopedist working with patient. Mother denies previous home health or SNF stay. Care Management Interventions  PCP Verified by CM:  Yes (Dr. Ness Gonzalez)  Transition of Care Consult (CM Consult): Discharge Planning (Pt has recommended SNF - freedom of choice offered to mother)  MyChart Signup: No  Discharge Durable Medical Equipment: No (Patient currently has no home DME)  Physical Therapy Consult: Yes  Occupational Therapy Consult: Yes  Speech Therapy Consult: No  Current Support Network: Lives with Caregiver, Relative's Home (Patient lives w/ his mother as his primary caregiver - his brother lives 2 houses away)  Confirm Follow Up Transport: Family  Plan discussed with Pt/Family/Caregiver: Yes (Discussed plan of care w/ patient's mother.)  Freedom of Choice Offered:  Yes    Junaid Bills RN, BSN, ACM   - Medical Oncology  [phone]

## 2018-02-21 LAB
GLUCOSE BLD STRIP.AUTO-MCNC: 107 MG/DL (ref 65–100)
GLUCOSE BLD STRIP.AUTO-MCNC: 160 MG/DL (ref 65–100)
GLUCOSE BLD STRIP.AUTO-MCNC: 229 MG/DL (ref 65–100)
GLUCOSE BLD STRIP.AUTO-MCNC: 95 MG/DL (ref 65–100)
SERVICE CMNT-IMP: ABNORMAL
SERVICE CMNT-IMP: NORMAL

## 2018-02-21 PROCEDURE — 97116 GAIT TRAINING THERAPY: CPT

## 2018-02-21 PROCEDURE — 74011250637 HC RX REV CODE- 250/637: Performed by: INTERNAL MEDICINE

## 2018-02-21 PROCEDURE — 65660000000 HC RM CCU STEPDOWN

## 2018-02-21 PROCEDURE — 74011250637 HC RX REV CODE- 250/637: Performed by: HOSPITALIST

## 2018-02-21 PROCEDURE — 94640 AIRWAY INHALATION TREATMENT: CPT

## 2018-02-21 PROCEDURE — 82962 GLUCOSE BLOOD TEST: CPT

## 2018-02-21 PROCEDURE — 74011000250 HC RX REV CODE- 250: Performed by: INTERNAL MEDICINE

## 2018-02-21 PROCEDURE — 97530 THERAPEUTIC ACTIVITIES: CPT

## 2018-02-21 PROCEDURE — 97535 SELF CARE MNGMENT TRAINING: CPT | Performed by: OCCUPATIONAL THERAPIST

## 2018-02-21 PROCEDURE — 97530 THERAPEUTIC ACTIVITIES: CPT | Performed by: OCCUPATIONAL THERAPIST

## 2018-02-21 PROCEDURE — 74011636637 HC RX REV CODE- 636/637: Performed by: HOSPITALIST

## 2018-02-21 RX ADMIN — DIVALPROEX SODIUM 500 MG: 250 TABLET, DELAYED RELEASE ORAL at 09:46

## 2018-02-21 RX ADMIN — GLIMEPIRIDE 8 MG: 4 TABLET ORAL at 09:46

## 2018-02-21 RX ADMIN — METFORMIN HYDROCHLORIDE 1000 MG: 500 TABLET, EXTENDED RELEASE ORAL at 09:46

## 2018-02-21 RX ADMIN — IPRATROPIUM BROMIDE AND ALBUTEROL SULFATE 3 ML: .5; 3 SOLUTION RESPIRATORY (INHALATION) at 01:05

## 2018-02-21 RX ADMIN — ATORVASTATIN CALCIUM 40 MG: 40 TABLET, FILM COATED ORAL at 21:20

## 2018-02-21 RX ADMIN — TAMSULOSIN HYDROCHLORIDE 0.4 MG: 0.4 CAPSULE ORAL at 09:46

## 2018-02-21 RX ADMIN — Medication 10 ML: at 21:21

## 2018-02-21 RX ADMIN — METFORMIN HYDROCHLORIDE 1000 MG: 500 TABLET, EXTENDED RELEASE ORAL at 17:13

## 2018-02-21 RX ADMIN — INSULIN LISPRO 3 UNITS: 100 INJECTION, SOLUTION INTRAVENOUS; SUBCUTANEOUS at 09:46

## 2018-02-21 RX ADMIN — IPRATROPIUM BROMIDE AND ALBUTEROL SULFATE 3 ML: .5; 3 SOLUTION RESPIRATORY (INHALATION) at 16:32

## 2018-02-21 RX ADMIN — OSELTAMIVIR PHOSPHATE 75 MG: 75 CAPSULE ORAL at 09:46

## 2018-02-21 RX ADMIN — INSULIN LISPRO 4 UNITS: 100 INJECTION, SOLUTION INTRAVENOUS; SUBCUTANEOUS at 12:32

## 2018-02-21 RX ADMIN — LINAGLIPTIN 5 MG: 5 TABLET, FILM COATED ORAL at 09:46

## 2018-02-21 RX ADMIN — TOPIRAMATE 300 MG: 100 TABLET, FILM COATED ORAL at 09:46

## 2018-02-21 RX ADMIN — FOLIC ACID 1 MG: 1 TABLET ORAL at 09:46

## 2018-02-21 RX ADMIN — GUAIFENESIN AND DEXTROMETHORPHAN 10 ML: 100; 10 SYRUP ORAL at 14:36

## 2018-02-21 RX ADMIN — DIVALPROEX SODIUM 1000 MG: 250 TABLET, DELAYED RELEASE ORAL at 17:13

## 2018-02-21 RX ADMIN — CALCIUM CARBONATE 500 MG (1,250 MG)-VITAMIN D3 200 UNIT TABLET 1 TABLET: at 09:46

## 2018-02-21 RX ADMIN — IPRATROPIUM BROMIDE AND ALBUTEROL SULFATE 3 ML: .5; 3 SOLUTION RESPIRATORY (INHALATION) at 09:12

## 2018-02-21 RX ADMIN — Medication 10 ML: at 12:33

## 2018-02-21 RX ADMIN — OSELTAMIVIR PHOSPHATE 75 MG: 75 CAPSULE ORAL at 21:20

## 2018-02-21 RX ADMIN — IPRATROPIUM BROMIDE AND ALBUTEROL SULFATE 3 ML: .5; 3 SOLUTION RESPIRATORY (INHALATION) at 19:08

## 2018-02-21 RX ADMIN — TOPIRAMATE 300 MG: 100 TABLET, FILM COATED ORAL at 17:13

## 2018-02-21 NOTE — PROGRESS NOTES
Oncology Nursing Communication Tool  0715 AM  2/21/2018     Bedside shift change report given to Yissel Avendano RN (incoming nurse) by Efra Covarrubias RN (outgoing nurse) on [facility] Flower. Report included the following information SBAR. Shift Summary:       Issues for physician to address: Oncology Shift Note   Admission Date 2/17/2018   Admission Diagnosis Influenza  Acute respiratory failure ([address]   Code Status Full Code   Consults None      Cardiac Monitoring [] Yes [] No      Purposeful Hourly Rounding [] Yes    Kalia Score Total Score: 5   Kalia score 3 or > [] Bed Alarm [] Avasys [] 1:1 sitter [] Patient refused (Place signed refusal form in chart)      Pain Managed [] Yes [] No    Key Pain Meds     The patient is on no pain meds. Influenza Vaccine Received Flu Vaccine for Current Season (usually Sept-March): Yes           Oxygen needs?  [] Room air Oxygen @  []1L    []2L    []3L   []4L    []5L   []6L     Use home O2? [] Yes [] No  Perform O2 challenge test using  smartphrase (.oxygenchallenge)      Last bowel movement Last Bowel Movement Date: 02/19/18  bowel movement      Urinary Catheter [REMOVED] Urinary Catheter 02/18/18 Mitchell - Temperature-Criteria for Appropriate Use: Obstruction/retention     [REMOVED] Urinary Catheter 02/18/18 Mitchell - Temperature-Urine Output (mL): 420 ml     LDAs               Peripheral IV 02/17/18 Left Antecubital (Active)   Site Assessment Clean, dry, & intact 2/21/2018  4:03 AM   Phlebitis Assessment 0 2/21/2018  4:03 AM   Infiltration Assessment 0 2/21/2018  4:03 AM   Dressing Status Clean, dry, & intact 2/21/2018  4:03 AM   Dressing Type Tape;Transparent 2/21/2018  4:03 AM   Hub Color/Line Status Pink;Capped;Flushed;Patent 2/21/2018  4:03 AM   Action Taken Blood drawn 2/17/2018 11:20 AM       Peripheral IV 02/17/18 Right Antecubital (Active)   Site Assessment Clean, dry, & intact 2/21/2018  4:03 AM   Phlebitis Assessment 0 2/21/2018  4:03 AM   Infiltration Assessment 0 2/21/2018  4:03 AM   Dressing Status Clean, dry, & intact 2/21/2018  4:03 AM   Dressing Type Tape;Transparent 2/21/2018  4:03 AM   Hub Color/Line Status Pink;Capped;Flushed;Patent 2/21/2018  4:03 AM                         Readmission Risk Assessment Tool Score Medium Risk            15       Total Score        9 Pt. Coverage (Medicare=5 , Medicaid, or Self-Pay=4)    6 Charlson Comorbidity Score (Age + Comorbid Conditions)        Criteria that do not apply:    Has Seen PCP in Last 6 Months (Yes=3, No=0)    . Living with Significant Other. Assisted Living. LTAC. SNF.  or   Rehab    Patient Length of Stay (>5 days = 3)    IP Visits Last 12 Months (1-3=4, 4=9, >4=11)       Expected Length of Stay 4d 12h   Actual Length of Stay [address] [facility], RN

## 2018-02-21 NOTE — PROGRESS NOTES
2:00 PM- Bedside report received from HARISH Polanco including SBAR, Kardex, [facility], and Recent Results. Assumed care of patient. 2:42 PM Patient in room continually coughing. Asked patient if he wanted PRN Robitussin, patient agreed. Robitussin given. Will monitor patient. Oncology Nursing Communication Tool  6:30 PM  2/21/2018     Bedside shift change report given to Andra Guzmán RN (incoming nurse) by Stacy Prince RN (outgoing nurse) on [facility]. Report included the following information SBAR, Kardex, Procedure Summary, Intake/Output, MAR and Recent Results. Shift Summary: Patient rested comfortably throughout shift. Issues for physician to address: Discharge planning         Oncology Shift Note   Admission Date 2/17/2018   Admission Diagnosis Influenza  Acute respiratory failure ([facility] [address] [facility]   Code Status Full Code   Consults None      Cardiac Monitoring [x] Yes [] No      Purposeful Hourly Rounding [x] Yes    Kalia Score Total Score: 5   Kalia score 3 or > [x] Bed Alarm [] Avasys [] 1:1 sitter [] Patient refused (Place signed refusal form in chart)      Pain Managed [] Yes [] No    Key Pain Meds     The patient is on no pain meds. Influenza Vaccine Received Flu Vaccine for Current Season (usually Sept-March): Yes           Oxygen needs?  [x] Room air Oxygen @  []1L    []2L    []3L   []4L    []5L   []6L     Use home O2? [] Yes [x] No  Perform O2 challenge test using  smartphrase (.oxygenchallenge)      Last bowel movement Last Bowel Movement Date: 02/19/18  bowel movement      Urinary Catheter [REMOVED] Urinary Catheter 02/18/18 Mitchell - Temperature-Criteria for Appropriate Use: Obstruction/retention     [REMOVED] Urinary Catheter 02/18/18 Mitchell - Temperature-Urine Output (mL): 420 ml     LDAs               Peripheral IV 02/17/18 Left Antecubital (Active)   Site Assessment Clean, dry, & intact 2/21/2018  2:04 PM   Phlebitis Assessment 0 2/21/2018  2:04 PM   Infiltration Assessment 0 2/21/2018  2:04 PM   Dressing Status Clean, dry, & intact 2/21/2018  2:04 PM   Dressing Type Tape;Transparent 2/21/2018  2:04 PM   Hub Color/Line Status Pink;Capped 2/21/2018  2:04 PM   Action Taken Blood drawn 2/17/2018 11:20 AM       Peripheral IV 02/17/18 Right Antecubital (Active)   Site Assessment Clean, dry, & intact 2/21/2018  2:04 PM   Phlebitis Assessment 0 2/21/2018  2:04 PM   Infiltration Assessment 0 2/21/2018  2:04 PM   Dressing Status Clean, dry, & intact 2/21/2018  2:04 PM   Dressing Type Tape;Transparent 2/21/2018  2:04 PM   Hub Color/Line Status Pink;Capped 2/21/2018  2:04 PM                         Readmission Risk Assessment Tool Score Medium Risk            15       Total Score        9 Pt. Coverage (Medicare=5 , Medicaid, or Self-Pay=4)    6 Charlson Comorbidity Score (Age + Comorbid Conditions)        Criteria that do not apply:    Has Seen PCP in Last 6 Months (Yes=3, No=0)    . Living with Significant Other. Assisted Living. LTAC. SNF.  or   Rehab    Patient Length of Stay (>5 days = 3)    IP Visits Last 12 Months (1-3=4, 4=9, >4=11)       Expected Length of Stay 4d 12h   Actual Length of Stay One Tory Marc RN

## 2018-02-21 NOTE — INTERDISCIPLINARY ROUNDS
Oncology Interdisciplinary rounds were held today to discuss patient plan of care and outcomes. The following members were present: Nursing, Case Management, Pharmacy and Dietary. Actual Length of Stay: 4    DRG GLOS: 4.5    Expected Length of Stay: 4d 12h                Plan for Day        Mobility        Plan for Stay      Plan for Way   Work with PT   Up with PT/OT Continue current teratment plan Needs level 2  5-7 days?

## 2018-02-21 NOTE — PROGRESS NOTES
CM spoke to patient's SW at [facility]. Patient has 480 hours of support services per year, but they are needed for his everyday . SW has recommended SNF placement if family unable to care for patient. CM will speak to patient's mother and brothers to seek out their options for bringing him home with [facility]. If family unable to care for patient at home, CM will send Level 2 and seek placement.     Blanca Ch, RN, BSN, ACM   - Medical Oncology  [phone]

## 2018-02-21 NOTE — PROGRESS NOTES
Problem: Mobility Impaired (Adult and Pediatric)  Goal: *Acute Goals and Plan of Care (Insert Text)  Physical Therapy Goals  Initiated 2/19/2018  1. Patient will move from supine to sit and sit to supine  in bed with independence within 7 day(s). 2.  Patient will transfer from bed to chair and chair to bed with supervision/set-up using the least restrictive device within 7 day(s). 3.  Patient will perform sit to stand with supervision/set-up within 7 day(s). 4.  Patient will ambulate with supervision/set-up for 100 feet with the least restrictive device within 7 day(s). physical Therapy TREATMENT  Patient: Swetha Rivera (5 y.o. male)  Date: 2/21/2018  Diagnosis: Influenza  Acute respiratory failure ([address] Acute respiratory failure ([address]       Precautions: Fall (Droplet)  Chart, physical therapy assessment, plan of care and goals were reviewed. ASSESSMENT:  Patient continues to require min-mod A of 2 for short distance activity. While patient is pleasant, presenting mentally likely at ID baseline, some of session's limitations appear behavioral as he reports frequently 'I can't' although able to complete with simple urging. Patient, whether due to anxiety vs behavior vs weakness initially presented with significant whole body tremors. This did not overtly impact his stability, but certainly activity tolerance although over time and with simple distraction and motivational cueing these largely subsided to allow for transition from min A to CGA for gait with RW device. Patient visibly fatigued, TREVINO at conclusion although VSS RA. Concluded in chair with all needs met. Progression toward goals:  []    Improving appropriately and progressing toward goals  [x]    Improving slowly and progressing toward goals  []    Not making progress toward goals and plan of care will be adjusted     PLAN:  Patient continues to benefit from skilled intervention to address the above impairments.   Continue treatment per established plan of care. Discharge Recommendations:  Aguilar Sykes  Further Equipment Recommendations for Discharge:  defer     SUBJECTIVE:   Patient stated [facility], [facility] there.     OBJECTIVE DATA SUMMARY:   Critical Behavior:  Neurologic State: Alert  Orientation Level: Oriented to person, Oriented to place  Cognition: Follows commands, Impaired decision making, Poor safety awareness  Safety/Judgement: Decreased awareness of need for safety, Decreased insight into deficits  Functional Mobility Training:  Bed Mobility:  Rolling: Moderate assistance;Assist x2  Supine to Sit: Moderate assistance;Assist x2     Scooting: Supervision        Transfers:  Sit to Stand: Minimum assistance  Stand to Sit: Minimum assistance        Bed to Chair: Minimum assistance (ambulating with a RW)                    Balance:  Sitting: Intact  Standing: Impaired; With support (RW)  Standing - Static: Fair  Standing - Dynamic : Fair  Ambulation/Gait Training:  Distance (ft): 30 Feet (ft)  Assistive Device: Gait belt;Walker, rolling  Ambulation - Level of Assistance: Contact guard assistance;Minimal assistance        Gait Abnormalities: Decreased step clearance; Other (generalized tremors )        Base of Support: Narrowed     Speed/Doris: Pace decreased (<100 feet/min)  Step Length: Left shortened;Right shortened        Interventions: Tactile cues; Verbal cues; Safety awareness training         Significant tremors improved over time - behavioral vs anxiety? Pain:  Pain Scale 1: Numeric (0 - 10)  Pain Intensity 1: 0              Activity Tolerance:   Improving; VSS RA throughout     Please refer to the flowsheet for vital signs taken during this treatment.   After treatment:   [x]    Patient left in no apparent distress sitting up in chair  []    Patient left in no apparent distress in bed  [x]    Call bell left within reach  [x]    Nursing present  []    Caregiver present  [x]    Bed alarm activated    COMMUNICATION/COLLABORATION:   The patients plan of care was discussed with: Occupational Therapist and Registered Nurse    Wilmer Escamilla, PT, DPT, LULÚ      Time Calculation: 29 mins

## 2018-02-21 NOTE — PROGRESS NOTES
ADULT PROTOCOL: JET AEROSOL  REASSESSMENT    Patient  Baljinder Fiore     65 y.o.   male     2/20/2018  10:15 PM    Breath Sounds Pre Procedure: Right Breath Sounds: Coarse                               Left Breath Sounds: Coarse    Breath Sounds Post Procedure: Right Breath Sounds: Coarse                                 Left Breath Sounds: Coarse    Breathing pattern: Pre procedure Breathing Pattern: Regular          Post procedure Breathing Pattern: Regular    Heart Rate: Pre procedure Pulse: 67           Post procedure Pulse: 68    Resp Rate: Pre procedure Respirations: 18           Post procedure Respirations: 18      Cough: Pre procedure Cough: Non-productive               Post procedure Cough: Non-productive      Oxygen: O2 Device: Room air        Changed: YES, pt previously on 2L/min    SpO2: Pre procedure SpO2: 91 %   without oxygen              Post procedure SpO2: 90 %  without oxygen    Nebulizer Therapy: Current medications Aerosolized Medications: DuoNeb      Changed: YES, pt changed to Q6    Smoking History: never    Problem List:   Patient Active Problem List   Diagnosis Code    Mental retardation F79    Seizure disorder (HCC) G40.909    Psoriasis L40.9    Venous stasis of lower extremity I87.8    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, T50.905A    Sleep disorder G47.9    Hypertension, essential I10    Strongyloides stercoralis infection 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

## 2018-02-21 NOTE — PROGRESS NOTES
Hospitalist Progress Note    NAME: Liu Son   :  1955   MRN:  [id number]       Assessment / Plan:  Acute hypoxic respiratory failure, POA  Resolved, so far tolerating room air. Influenza A causing Acute bronchospasm   -continue Tamiflu (completing Tamiflu today)   -Duonebs changed to albuterol nebs given urinary retention  - PA and Lat CXR shows atelectasis-->incentive spirometry  -so far tolerating room air. DM type 2, uncontrolled-- A1c was 9.8 2017. Dehydration  -increase  metformin XR 1000 mg bid , c/w amaryl  -added  Tradjenta  So far better control  -continue SSI, HbA1C 9.0  Mental retardation due to anoxia at childbirth  Seizure disorder  -continue topamax and depakote. Depakote level 91, ammonia wnl   Due to MR for SNF placement I was informed that patient will need Level 2. But documentation has not been sent yet. Hyperlipidemia  -continue lipitor  Chronic thrombocytopenia, ?due to depakote  -monitor, plts a little lower at 58 K today  Urinary Retention: overnight from  into ; likely from ipratropium (see changes above  -continue Flomax  -richards place on   Psoriasis  -continue triamcinolone prn  Generalized weakness, difficulty standing  -Consult PT/OT  -B12 level pending  Hypokalemia:  -replete and monitor  Obesity: Body mass index is 30.17 kg/(m^2). Code:  Full  DVT prophylaxis: SCD  Surrogate decision maker:   Mother Curly Ruiz    Patient doing fine may be able to D/c to SNF whenever level 2 is completed       Subjective:     Chief Complaint / Reason for Physician Visit: follow-up respiratory failure  \"I feel much better\"    Review of Systems:  Symptom Y/N Comments  Symptom Y/N Comments   Fever/Chills    Chest Pain n    Poor Appetite    Edema     Cough y   Abdominal Pain n    Sputum    Joint Pain     SOB/TREVINO n   Pruritis/Rash     Nausea/vomit    Tolerating PT/OT     Diarrhea    Tolerating Diet y    Constipation    Other       Could NOT obtain due to: Objective:     VITALS:   Last 24hrs VS reviewed since prior progress note. Most recent are:  Patient Vitals for the past 24 hrs:   Temp Pulse Resp BP SpO2   02/21/18 0749 98.9 °F (37.2 °C) 64 18 126/69 92 %   02/21/18 0300 99.2 °F (37.3 °C) 71 18 143/71 94 %   02/21/18 0105 - - - - 92 %   02/20/18 2346 98.9 °F (37.2 °C) 70 18 144/76 91 %   02/20/18 1932 98.2 °F (36.8 °C) 72 18 138/75 91 %   02/20/18 1555 98.1 °F (36.7 °C) 69 18 133/72 90 %   02/20/18 1151 - - - - 99 %   02/20/18 1115 99.1 °F (37.3 °C) 74 18 141/66 92 %   02/20/18 0939 - - - - 91 %       Intake/Output Summary (Last 24 hours) at 02/21/18 0852  Last data filed at 02/20/18 1215   Gross per 24 hour   Intake              240 ml   Output                0 ml   Net              240 ml        PHYSICAL EXAM:  General: WD, WN. Alert, cooperative, no acute distress   EENT:  EOMI. Anicteric sclerae. MMM  Resp:  Coarse BS, rhonchi  CV:  Regular  rhythm,  No edema  GI:  Soft, Non distended, Non tender.  +Bowel sounds  Neurologic:  Alert, answers simple questions,   Psych:   Poor insight. Not anxious nor agitated at this time  Skin:  Psoriasis noted. No jaundice    Reviewed most current lab test results and cultures  YES  Reviewed most current radiology test results   YES  Review and summation of old records today    NO  Reviewed patient's current orders and MAR    YES  PMH/ reviewed - no change compared to H&P  ________________________________________________________________________  Care Plan discussed with:    Comments   Patient x    Family  y mother   RN x    Care Manager x    Consultant                        Multidiciplinary team rounds were held today with , nursing, pharmacist and clinical coordinator. Patient's plan of care was discussed; medications were reviewed and discharge planning was addressed.      ________________________________________________________________________  Total NON critical care TIME:  20 Minutes    Total CRITICAL CARE TIME Spent:   Minutes non procedure based      Comments   >50% of visit spent in counseling and coordination of care y    ________________________________________________________________________  Efrain Napier MD     Procedures: see electronic medical records for all procedures/Xrays and details which were not copied into this note but were reviewed prior to creation of Plan. LABS:  I reviewed today's most current labs and imaging studies.   Pertinent labs include:  Recent Labs      02/20/18   0555  02/19/18   0434   WBC  7.2  7.0   HGB  11.3*  10.4*   HCT  35.2*  32.2*   PLT  67*  58*     Recent Labs      02/20/18   0555  02/19/18   0434   NA  138  137   K  3.9  3.4*   CL  109*  106   CO2  19*  21   GLU  251*  205*   BUN  19  25*   CREA  0.78  0.81   CA  8.4*  8.4*   ALB  2.2*  2.2*   TBILI  0.4  0.5   SGOT  69*  21   ALT  58  15       Signed: Efrain Napier MD

## 2018-02-21 NOTE — PROGRESS NOTES
Problem: Self Care Deficits Care Plan (Adult)  Goal: *Acute Goals and Plan of Care (Insert Text)  Occupational Therapy Goals  Initiated 2/19/2018  1. Patient will perform grooming while standing at the sink with supervision/standby assist within 7 day(s). 2.  Patient will perform bathing with contact guard assist within 7 day(s). 3.  Patient will perform lower body dressing with supervision/standby assist within 7 day(s). 4.  Patient will perform toilet transfers with supervision/set-up within 7 day(s). 5.  Patient will perform all aspects of toileting with contact guard assist within 7 day(s). Occupational Therapy TREATMENT  Patient: Juan C Briceño (4 y.o. male)  Date: 2/21/2018  Diagnosis: Influenza  Acute respiratory failure ([facility] Acute respiratory failure ([facility] [address]       Precautions: Fall (Droplet)    ASSESSMENT:  Patient's is slowly improving, but his functional performance tends to fluctuate when compared to last treatment session. He declined from a SBA level for bed mobility last session to requiring mod A today. Sit to stand improved from mod A to a min A level. Patient frequently stating, \" I can't do it\" when asked to perform ADL related tasks during this session. With coaxing and cueing for attention, sequencing and problem solving the patient was able to complete dressing ADLs with min to mod A, grooming with CGA, and toileting with supervision to min A. Patient able to remain sitting up in chair at end of session, demonstrating fair tolerance for increased functional activity. At this point he will need SNF rehab after discharge to maximize his independence prior to returning home.    Progression toward goals:  []       Improving appropriately and progressing toward goals  [x]       Improving slowly and progressing toward goals  []       Not making progress toward goals and plan of care will be adjusted     PLAN:  Patient continues to benefit from skilled intervention to address the above impairments. Continue treatment per established plan of care. Discharge Recommendations:  Aguilar Sykes  Further Equipment Recommendations for Discharge:  TBD         OBJECTIVE DATA SUMMARY:   Cognitive/Behavioral Status:  Neurologic State: Alert  Orientation Level: Oriented to person;Oriented to place  Cognition: Follows commands; Impaired decision making;Poor safety awareness        Safety/Judgement: Decreased awareness of need for safety;Decreased insight into deficits  Functional Mobility and Transfers for ADLs:  Bed Mobility:  Rolling: Moderate assistance;Assist x2  Supine to Sit: Moderate assistance;Assist x2     Scooting: Supervision  Transfers:  Sit to Stand: Minimum assistance     Bed to Chair: Minimum assistance (ambulating with a RW)          Toilet Transfer : Minimum assistance (using grab bar)           Bathroom Mobility: Minimum assistance (ambulating with RW)      Balance:  Sitting: Intact  Standing: Impaired  Standing - Static: Fair  Standing - Dynamic : Fair  ADL Intervention:  Grooming  Washing Hands: Contact guard assistance  Cues: Tactile cues provided;Verbal cues provided;Visual cues provided (for sequencing and attention)    Upper Body Dressing Assistance  Shirt simulation with hospital gown: Moderate assistance; Compensatory technique training (for attention, sequencing, problem solving)  Cues: Tactile cues provided;Verbal cues provided;Visual cues provided    Lower Body Dressing Assistance  Socks: Minimum assistance (to christopher, supervision to doff. )  Cues: Don;Verbal cues provided;Visual cues provided    Toileting  Bladder Hygiene: Supervision/set-up  Bowel Hygiene: Supervision/set-up  Clothing Management: Minimum assistance  Cues: Verbal cues provided    Cognitive Retraining  Problem Solving: Deductive reason;General alternative solution; Identifying the problem  Organizing/Sequencing: Breaking task down; Two step sequence  Attention to Task: Distractibility; Single task  Following Commands:  Follows one step commands/directions  Safety/Judgement: Decreased awareness of need for safety;Decreased insight into deficits  Cues: Tactile cues provided;Verbal cues provided;Visual cues provided    Pain:  Pain Scale 1: Numeric (0 - 10)  Pain Intensity 1: 0     Activity Tolerance:   Fair for functional activity    After treatment:   [x] Patient left in no apparent distress sitting up in chair  [] Patient left in no apparent distress in bed  [x] Call bell left within reach  [x] Nursing notified  [] Caregiver present  [x] Bed alarm activated    COMMUNICATION/COLLABORATION:   The patients plan of care was discussed with: Physical Therapist    MARV Ponce/L  Time Calculation: 30 mins

## 2018-02-22 LAB
ALBUMIN SERPL-MCNC: 2.2 G/DL (ref 3.5–5)
ALBUMIN/GLOB SERPL: 0.6 {RATIO} (ref 1.1–2.2)
ALP SERPL-CCNC: 43 U/L (ref 45–117)
ALT SERPL-CCNC: 67 U/L (ref 12–78)
ANION GAP SERPL CALC-SCNC: 8 MMOL/L (ref 5–15)
AST SERPL-CCNC: 57 U/L (ref 15–37)
BACTERIA SPEC CULT: NORMAL
BASOPHILS # BLD: 0 K/UL (ref 0–0.1)
BASOPHILS NFR BLD: 0 % (ref 0–1)
BILIRUB SERPL-MCNC: 0.5 MG/DL (ref 0.2–1)
BUN SERPL-MCNC: 32 MG/DL (ref 6–20)
BUN/CREAT SERPL: 42 (ref 12–20)
CALCIUM SERPL-MCNC: 8.6 MG/DL (ref 8.5–10.1)
CHLORIDE SERPL-SCNC: 110 MMOL/L (ref 97–108)
CO2 SERPL-SCNC: 21 MMOL/L (ref 21–32)
CREAT SERPL-MCNC: 0.77 MG/DL (ref 0.7–1.3)
DIFFERENTIAL METHOD BLD: ABNORMAL
EOSINOPHIL # BLD: 0.5 K/UL (ref 0–0.4)
EOSINOPHIL NFR BLD: 8 % (ref 0–7)
ERYTHROCYTE [DISTWIDTH] IN BLOOD BY AUTOMATED COUNT: 13.2 % (ref 11.5–14.5)
GLOBULIN SER CALC-MCNC: 4 G/DL (ref 2–4)
GLUCOSE BLD STRIP.AUTO-MCNC: 118 MG/DL (ref 65–100)
GLUCOSE BLD STRIP.AUTO-MCNC: 151 MG/DL (ref 65–100)
GLUCOSE BLD STRIP.AUTO-MCNC: 155 MG/DL (ref 65–100)
GLUCOSE BLD STRIP.AUTO-MCNC: 166 MG/DL (ref 65–100)
GLUCOSE SERPL-MCNC: 146 MG/DL (ref 65–100)
HCT VFR BLD AUTO: 36.1 % (ref 36.6–50.3)
HGB BLD-MCNC: 11.6 G/DL (ref 12.1–17)
IMM GRANULOCYTES # BLD: 0 K/UL (ref 0–0.04)
IMM GRANULOCYTES NFR BLD AUTO: 0 % (ref 0–0.5)
LYMPHOCYTES # BLD: 2.2 K/UL (ref 0.8–3.5)
LYMPHOCYTES NFR BLD: 35 % (ref 12–49)
MAGNESIUM SERPL-MCNC: 2 MG/DL (ref 1.6–2.4)
MCH RBC QN AUTO: 29.9 PG (ref 26–34)
MCHC RBC AUTO-ENTMCNC: 32.1 G/DL (ref 30–36.5)
MCV RBC AUTO: 93 FL (ref 80–99)
MONOCYTES # BLD: 0.6 K/UL (ref 0–1)
MONOCYTES NFR BLD: 10 % (ref 5–13)
NEUTS BAND NFR BLD MANUAL: 7 %
NEUTS SEG # BLD: 2.9 K/UL (ref 1.8–8)
NEUTS SEG NFR BLD: 40 % (ref 32–75)
NRBC # BLD: 0 K/UL (ref 0–0.01)
NRBC BLD-RTO: 0 PER 100 WBC
PLATELET # BLD AUTO: 101 K/UL (ref 150–400)
PMV BLD AUTO: 10.1 FL (ref 8.9–12.9)
POTASSIUM SERPL-SCNC: 3.7 MMOL/L (ref 3.5–5.1)
PROT SERPL-MCNC: 6.2 G/DL (ref 6.4–8.2)
RBC # BLD AUTO: 3.88 M/UL (ref 4.1–5.7)
RBC MORPH BLD: ABNORMAL
SERVICE CMNT-IMP: ABNORMAL
SERVICE CMNT-IMP: NORMAL
SODIUM SERPL-SCNC: 139 MMOL/L (ref 136–145)
WBC # BLD AUTO: 6.2 K/UL (ref 4.1–11.1)

## 2018-02-22 PROCEDURE — 97530 THERAPEUTIC ACTIVITIES: CPT

## 2018-02-22 PROCEDURE — 80053 COMPREHEN METABOLIC PANEL: CPT | Performed by: INTERNAL MEDICINE

## 2018-02-22 PROCEDURE — 94640 AIRWAY INHALATION TREATMENT: CPT

## 2018-02-22 PROCEDURE — 85025 COMPLETE CBC W/AUTO DIFF WBC: CPT | Performed by: INTERNAL MEDICINE

## 2018-02-22 PROCEDURE — 83735 ASSAY OF MAGNESIUM: CPT | Performed by: INTERNAL MEDICINE

## 2018-02-22 PROCEDURE — 74011250637 HC RX REV CODE- 250/637: Performed by: HOSPITALIST

## 2018-02-22 PROCEDURE — 65660000000 HC RM CCU STEPDOWN

## 2018-02-22 PROCEDURE — 74011636637 HC RX REV CODE- 636/637: Performed by: HOSPITALIST

## 2018-02-22 PROCEDURE — 36415 COLL VENOUS BLD VENIPUNCTURE: CPT | Performed by: INTERNAL MEDICINE

## 2018-02-22 PROCEDURE — 74011000250 HC RX REV CODE- 250: Performed by: INTERNAL MEDICINE

## 2018-02-22 PROCEDURE — 82962 GLUCOSE BLOOD TEST: CPT

## 2018-02-22 PROCEDURE — 74011250637 HC RX REV CODE- 250/637: Performed by: INTERNAL MEDICINE

## 2018-02-22 PROCEDURE — 97116 GAIT TRAINING THERAPY: CPT

## 2018-02-22 RX ADMIN — INSULIN LISPRO 3 UNITS: 100 INJECTION, SOLUTION INTRAVENOUS; SUBCUTANEOUS at 08:27

## 2018-02-22 RX ADMIN — TOPIRAMATE 300 MG: 100 TABLET, FILM COATED ORAL at 17:29

## 2018-02-22 RX ADMIN — Medication 10 ML: at 22:19

## 2018-02-22 RX ADMIN — DIVALPROEX SODIUM 500 MG: 250 TABLET, DELAYED RELEASE ORAL at 08:43

## 2018-02-22 RX ADMIN — TOPIRAMATE 300 MG: 100 TABLET, FILM COATED ORAL at 08:43

## 2018-02-22 RX ADMIN — Medication 10 ML: at 16:46

## 2018-02-22 RX ADMIN — METFORMIN HYDROCHLORIDE 1000 MG: 500 TABLET, EXTENDED RELEASE ORAL at 08:43

## 2018-02-22 RX ADMIN — INSULIN LISPRO 3 UNITS: 100 INJECTION, SOLUTION INTRAVENOUS; SUBCUTANEOUS at 12:44

## 2018-02-22 RX ADMIN — IPRATROPIUM BROMIDE AND ALBUTEROL SULFATE 3 ML: .5; 3 SOLUTION RESPIRATORY (INHALATION) at 19:41

## 2018-02-22 RX ADMIN — LINAGLIPTIN 5 MG: 5 TABLET, FILM COATED ORAL at 08:28

## 2018-02-22 RX ADMIN — FOLIC ACID 1 MG: 1 TABLET ORAL at 08:43

## 2018-02-22 RX ADMIN — GUAIFENESIN AND DEXTROMETHORPHAN 10 ML: 100; 10 SYRUP ORAL at 16:46

## 2018-02-22 RX ADMIN — IPRATROPIUM BROMIDE AND ALBUTEROL SULFATE 3 ML: .5; 3 SOLUTION RESPIRATORY (INHALATION) at 13:42

## 2018-02-22 RX ADMIN — IPRATROPIUM BROMIDE AND ALBUTEROL SULFATE 3 ML: .5; 3 SOLUTION RESPIRATORY (INHALATION) at 08:54

## 2018-02-22 RX ADMIN — INSULIN LISPRO 3 UNITS: 100 INJECTION, SOLUTION INTRAVENOUS; SUBCUTANEOUS at 17:28

## 2018-02-22 RX ADMIN — METFORMIN HYDROCHLORIDE 1000 MG: 500 TABLET, EXTENDED RELEASE ORAL at 17:28

## 2018-02-22 RX ADMIN — GLIMEPIRIDE 8 MG: 4 TABLET ORAL at 08:28

## 2018-02-22 RX ADMIN — IPRATROPIUM BROMIDE AND ALBUTEROL SULFATE 3 ML: .5; 3 SOLUTION RESPIRATORY (INHALATION) at 01:08

## 2018-02-22 RX ADMIN — Medication 10 ML: at 06:00

## 2018-02-22 RX ADMIN — ATORVASTATIN CALCIUM 40 MG: 40 TABLET, FILM COATED ORAL at 22:18

## 2018-02-22 RX ADMIN — CALCIUM CARBONATE 500 MG (1,250 MG)-VITAMIN D3 200 UNIT TABLET 1 TABLET: at 08:43

## 2018-02-22 RX ADMIN — TAMSULOSIN HYDROCHLORIDE 0.4 MG: 0.4 CAPSULE ORAL at 08:43

## 2018-02-22 RX ADMIN — DIVALPROEX SODIUM 1000 MG: 250 TABLET, DELAYED RELEASE ORAL at 17:28

## 2018-02-22 NOTE — PROGRESS NOTES
Hospitalist Progress Note    NAME: Yanet Barkley   :  1955   MRN:  [id number]       Assessment / Plan:  Acute hypoxic respiratory failure, POA  Resolved, so far tolerating room air. No distress  Influenza A causing Acute bronchospasm   -continue Tamiflu (completed Tamiflu for 5 days)   -Duonebs changed to albuterol nebs given urinary retention  - PA and Lat CXR shows atelectasis-->incentive spirometry  -so far tolerating room air. DM type 2, uncontrolled-- A1c was 9.8 2017.  -increased  metformin XR 1000 mg bid , c/w amaryl  -added  Tradjenta  So far better control  -continue SSI, HbA1C 9.0  Dehydration: so far resolved. 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 Depakote level 91, ammonia wnl   Due to MR for SNF placement I was informed that patient will need Level 2. Application for Level 2 was sent as per  process can take up to a week. Hyperlipidemia continue lipitor  Chronic thrombocytopenia, ?due to depakote  -monitor, plts are trending up, monitor. Urinary Retention: overnight from  into ; likely from ipratropium (see changes above  -continue Flomax  -richards was D/Tacho  Psoriasis continue triamcinolone prn  Generalized weakness, difficulty standing  -Consult PT/OT  Hypokalemia: replete and monitor  Obesity: Body mass index is 30.17 kg/(m^2). Code:  Full  DVT prophylaxis: SCD  Surrogate decision maker:   Mother Og Zuniga    Patient doing fine may be able to D/c to SNF whenever level 2 is completed       Subjective:     Chief Complaint / Reason for Physician Visit: follow-up respiratory failure  \"I want to go home\"    Review of Systems:  Symptom Y/N Comments  Symptom Y/N Comments   Fever/Chills    Chest Pain n    Poor Appetite    Edema     Cough y   Abdominal Pain n    Sputum    Joint Pain     SOB/TREVINO n   Pruritis/Rash     Nausea/vomit    Tolerating PT/OT     Diarrhea    Tolerating Diet y    Constipation    Other Could NOT obtain due to:      Objective:     VITALS:   Last 24hrs VS reviewed since prior progress note. Most recent are:  Patient Vitals for the past 24 hrs:   Temp Pulse Resp BP SpO2   02/22/18 1120 99.2 °F (37.3 °C) 69 18 121/70 93 %   02/22/18 0855 - - - - 90 %   02/22/18 0800 98.1 °F (36.7 °C) 69 18 127/79 92 %   02/22/18 0323 98.3 °F (36.8 °C) 70 16 147/75 90 %   02/22/18 0108 - - - - 90 %   02/22/18 0022 98.5 °F (36.9 °C) 72 16 135/74 91 %   02/21/18 2018 97.8 °F (36.6 °C) 73 16 153/77 98 %   02/21/18 1908 - - - - 92 %   02/21/18 1632 - - - - 91 %   02/21/18 1437 98.5 °F (36.9 °C) 70 18 143/70 94 %     No intake or output data in the 24 hours ending 02/22/18 1141     PHYSICAL EXAM:  General: WD, WN. Alert, cooperative, no acute distress   EENT:  EOMI. Anicteric sclerae. MMM  Resp:  Coarse BS, rhonchi  CV:  Regular  rhythm,  No edema  GI:  Soft, Non distended, Non tender.  +Bowel sounds  Neurologic:  Alert, answers simple questions,   Psych:   Poor insight. Not anxious nor agitated at this time  Skin:  Psoriasis noted. No jaundice    Reviewed most current lab test results and cultures  YES  Reviewed most current radiology test results   YES  Review and summation of old records today    NO  Reviewed patient's current orders and MAR    YES  PMH/ reviewed - no change compared to H&P  ________________________________________________________________________  Care Plan discussed with:    Comments   Patient x    Family  y mother   RN x    Care Manager x    Consultant                        Multidiciplinary team rounds were held today with , nursing, pharmacist and clinical coordinator. Patient's plan of care was discussed; medications were reviewed and discharge planning was addressed.      ________________________________________________________________________  Total NON critical care TIME:  15 Minutes    Total CRITICAL CARE TIME Spent:   Minutes non procedure based      Comments   >50% of visit spent in counseling and coordination of care y    ________________________________________________________________________  Zara Knight MD     Procedures: see electronic medical records for all procedures/Xrays and details which were not copied into this note but were reviewed prior to creation of Plan. LABS:  I reviewed today's most current labs and imaging studies.   Pertinent labs include:  Recent Labs      02/22/18 0318 02/20/18   0555   WBC  6.2  7.2   HGB  11.6*  11.3*   HCT  36.1*  35.2*   PLT  101*  67*     Recent Labs      02/22/18 0318  02/20/18   0555   NA  139  138   K  3.7  3.9   CL  110*  109*   CO2  21  19*   GLU  146*  251*   BUN  32*  19   CREA  0.77  0.78   CA  8.6  8.4*   MG  2.0   --    ALB  2.2*  2.2*   TBILI  0.5  0.4   SGOT  57*  69*   ALT  67  58       Signed: Zara Knight MD

## 2018-02-22 NOTE — INTERDISCIPLINARY ROUNDS
Oncology Interdisciplinary rounds were held today to discuss patient plan of care and outcomes. The following members were present: Nursing, Case Management, Pharmacy and Dietary. Actual Length of Stay: 5    DRG GLOS: 4.5    Expected Length of Stay: 4d 12h                Plan for Day        Mobility        Plan for Stay      Plan for Way   Continue current treaqtment Up with assistance  PT   Up in chair for meals comntinue current treatment plan TBD- placement? Vs home?

## 2018-02-22 NOTE — PROGRESS NOTES
ADULT PROTOCOL: JET AEROSOL  REASSESSMENT    Patient  Liu Son     65 y.o.   male     2/21/2018  11:26 PM    Breath Sounds Pre Procedure: Right Breath Sounds: Coarse                               Left Breath Sounds: Coarse    Breath Sounds Post Procedure: Right Breath Sounds: Coarse                                 Left Breath Sounds: Coarse    Breathing pattern: Pre procedure Breathing Pattern: Regular          Post procedure Breathing Pattern: Regular    Heart Rate: Pre procedure Pulse: 68           Post procedure Pulse: 70    Resp Rate: Pre procedure Respirations: 20           Post procedure Respirations: 20            Cough: Pre procedure Cough: Non-productive               Post procedure Cough: Non-productive    Oxygen: O2 Device: Room air       Changed: NO    SpO2: Pre procedure SpO2: 92 %   without oxygen              Post procedure SpO2: 92 %  without oxygen    Nebulizer Therapy: Current medications Aerosolized Medications: DuoNeb      Changed: NO    Smoking History: never    Problem List:   Patient Active Problem List   Diagnosis Code    Mental retardation F79    Seizure disorder (HCC) G40.909    Psoriasis L40.9    Venous stasis of lower extremity I87.8    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, T50.905A    Sleep disorder G47.9    Hypertension, essential I10    Strongyloides stercoralis infection B78.9    Eosinophilia D72.1    Hypercholesterolemia E78.00    Controlled type 2 diabetes mellitus without complication, without long-term current use of insulin (HCC) E11.9    Acute respiratory failure (HCC) J96.00    Influenza J11.1    Uncontrolled type [facility] mellitus ([facility] E11.65       Respiratory Therapist: Yahir Hernandez, RT

## 2018-02-22 NOTE — PROGRESS NOTES
Oncology Nursing Communication Tool  6:49 PM  2/22/2018     Bedside and Verbal shift change report given to HARISH valladares (incoming nurse) by Janine Sanabria (outgoing nurse) on [facility]. Report included the following information SBAR, Kardex, Procedure Summary, Intake/Output, MAR, Accordion and Recent Results. Shift Summary: pt rested comfortably throughout shift      Issues for physician to address: Oncology Shift Note   Admission Date 2/17/2018   Admission Diagnosis Influenza  Acute respiratory failure ([address]   Code Status Full Code   Consults None      Cardiac Monitoring [] Yes [] No      Purposeful Hourly Rounding [] Yes    Kalia Score Total Score: 5   Kalia score 3 or > [] Bed Alarm [] Avasys [] 1:1 sitter [] Patient refused (Place signed refusal form in chart)      Pain Managed [] Yes [] No    Key Pain Meds     The patient is on no pain meds. Influenza Vaccine Received Flu Vaccine for Current Season (usually Sept-March): Yes           Oxygen needs?  [] Room air Oxygen @  []1L    []2L    []3L   []4L    []5L   []6L     Use home O2? [] Yes [] No  Perform O2 challenge test using  smartphrase (.oxygenchallenge)      Last bowel movement Last Bowel Movement Date: 02/19/18  bowel movement      Urinary Catheter [REMOVED] Urinary Catheter 02/18/18 Mitchell - Temperature-Criteria for Appropriate Use: Obstruction/retention     [REMOVED] Urinary Catheter 02/18/18 Mitchell - Temperature-Urine Output (mL): 420 ml     LDAs               Peripheral IV 02/17/18 Left Antecubital (Active)   Site Assessment Clean, dry, & intact 2/22/2018  4:40 PM   Phlebitis Assessment 0 2/22/2018  4:40 PM   Infiltration Assessment 0 2/22/2018  4:40 PM   Dressing Status Clean, dry, & intact 2/22/2018  4:40 PM   Dressing Type Transparent;Tape 2/22/2018  4:40 PM   Hub Color/Line Status Pink;Flushed;Patent 2/22/2018  4:40 PM   Action Taken Blood drawn 2/17/2018 11:20 AM       Peripheral IV 02/17/18 Right Antecubital (Active)   Site Assessment Clean, dry, & intact 2/22/2018  4:40 PM   Phlebitis Assessment 0 2/22/2018  4:40 PM   Infiltration Assessment 0 2/22/2018  4:40 PM   Dressing Status Clean, dry, & intact 2/22/2018  4:40 PM   Dressing Type Transparent;Tape 2/22/2018  4:40 PM   Hub Color/Line Status Pink;Patent 2/22/2018  4:40 PM                         Readmission Risk Assessment Tool Score Medium Risk            18       Total Score        3 Patient Length of Stay (>5 days = 3)    9 Pt. Coverage (Medicare=5 , Medicaid, or Self-Pay=4)    6 Charlson Comorbidity Score (Age + Comorbid Conditions)        Criteria that do not apply:    Has Seen PCP in Last 6 Months (Yes=3, No=0)    . Living with Significant Other. Assisted Living. LTAC. SNF.  or   Rehab    IP Visits Last 12 Months (1-3=4, 4=9, >4=11)       Expected Length of Stay 4d 12h   Actual Length of Stay [address]

## 2018-02-22 NOTE — PROGRESS NOTES
Oncology Nursing Communication Tool  7:32 AM  2/22/2018     Bedside shift change report given to Yesenia Rabago RN (incoming nurse) by Shantell Mejia (outgoing nurse) on [facility]. Report included the following information SBAR, Kardex, Intake/Output, MAR and Recent Results. Shift Summary: slept most of the night, labs drawn. Incontinent of urine x1. Calm and cooperative. Issues for physician to address: d/c         Oncology Shift Note   Admission Date 2/17/2018   Admission Diagnosis Influenza  Acute respiratory failure ([facility] [address]   Code Status Full Code   Consults None      Cardiac Monitoring [x] Yes [] No      Purposeful Hourly Rounding [x] Yes    Kalia Score Total Score: 5   Kalia score 3 or > [x] Bed Alarm [] Avasys [] 1:1 sitter [] Patient refused (Place signed refusal form in chart)      Pain Managed [x] Yes [] No    Key Pain Meds     The patient is on no pain meds. Influenza Vaccine Received Flu Vaccine for Current Season (usually Sept-March): Yes           Oxygen needs?  [x] Room air Oxygen @  []1L    []2L    []3L   []4L    []5L   []6L     Use home O2? [] Yes [] No  Perform O2 challenge test using  smartphrase (.oxygenchallenge)      Last bowel movement Last Bowel Movement Date: 02/19/18  bowel movement      Urinary Catheter [REMOVED] Urinary Catheter 02/18/18 Mitchell - Temperature-Criteria for Appropriate Use: Obstruction/retention     [REMOVED] Urinary Catheter 02/18/18 Mitchell - Temperature-Urine Output (mL): 420 ml     LDAs               Peripheral IV 02/17/18 Left Antecubital (Active)   Site Assessment Clean, dry, & intact 2/22/2018  3:23 AM   Phlebitis Assessment 0 2/22/2018  3:23 AM   Infiltration Assessment 0 2/22/2018  3:23 AM   Dressing Status Clean, dry, & intact 2/21/2018  8:38 PM   Dressing Type Transparent;Tape 2/22/2018  3:23 AM   Hub Color/Line Status Pink;Flushed;Patent 2/22/2018  3:23 AM   Action Taken Blood drawn 2/17/2018 11:20 AM       Peripheral IV 02/17/18 Right Antecubital (Active)   Site Assessment Clean, dry, & intact 2/22/2018  3:23 AM   Phlebitis Assessment 0 2/22/2018  3:23 AM   Infiltration Assessment 0 2/22/2018  3:23 AM   Dressing Status Clean, dry, & intact 2/22/2018  3:23 AM   Dressing Type Tape;Transparent 2/22/2018  3:23 AM   Hub Color/Line Status Pink;Flushed;Patent 2/22/2018  3:23 AM                         Readmission Risk Assessment Tool Score Medium Risk            15       Total Score        9 Pt. Coverage (Medicare=5 , Medicaid, or Self-Pay=4)    6 Charlson Comorbidity Score (Age + Comorbid Conditions)        Criteria that do not apply:    Has Seen PCP in Last 6 Months (Yes=3, No=0)    . Living with Significant Other. Assisted Living. LTAC. SNF.  or   Rehab    Patient Length of Stay (>5 days = 3)    IP Visits Last 12 Months (1-3=4, 4=9, >4=11)       Expected Length of Stay 4d 12h   Actual Length of Stay [address]

## 2018-02-22 NOTE — PROGRESS NOTES
Problem: Mobility Impaired (Adult and Pediatric)  Goal: *Acute Goals and Plan of Care (Insert Text)  Physical Therapy Goals  Initiated 2/19/2018  1. Patient will move from supine to sit and sit to supine  in bed with independence within 7 day(s). 2.  Patient will transfer from bed to chair and chair to bed with supervision/set-up using the least restrictive device within 7 day(s). 3.  Patient will perform sit to stand with supervision/set-up within 7 day(s). 4.  Patient will ambulate with supervision/set-up for 100 feet with the least restrictive device within 7 day(s). physical Therapy TREATMENT  Patient: Juan C Briceño (57 y.o. male)  Date: 2/22/2018  Diagnosis: Influenza  Acute respiratory failure ([facility] [address] Acute respiratory failure ([facility] [address]       Precautions: Fall (Droplet)  Chart, physical therapy assessment, plan of care and goals were reviewed. ASSESSMENT:  Pt cleared by nurse to mobilize. Pt received in bed supine with mom in room. Pt agreeable to therapy. Pt performed supine to sit at min A x1 and mod A x1 with cueing for hand placement. Pt performed sit to stand transfer at mod A and min A x2. Pt requiring constant  cueing for hand placement. Once standing pt with tremors causing pt to seem as if he's buckling but he is able to maintain standing. Pt ambulated 60ft with RW at [facility] x2 and mod A for walker management for turns. Pt with no complaints of pain and able to reported when he felt tired. Pt requiring cueing to stay close to walker for safety pt urbano to correct for short times. Pt performed sit to supine at [facility] with additional time. Pt unable to bridge to get brief under hips but able to roll with min A and cueing. Pt with increased activity tolerance today. Pt will benefit from SNF rehab to improve safety and mobility.    Progression toward goals:  []    Improving appropriately and progressing toward goals  [x]    Improving slowly and progressing toward goals  []    Not making progress toward goals and plan of care will be adjusted     PLAN:  Patient continues to benefit from skilled intervention to address the above impairments. Continue treatment per established plan of care. Discharge Recommendations:  Skilled Nursing Facility  Further Equipment Recommendations for Discharge:  TBD by rehab     SUBJECTIVE:   Patient stated Jose Donaldson had two guys walk with me yesterday.     OBJECTIVE DATA SUMMARY:   Critical Behavior:  Neurologic State: Alert  Orientation Level: Oriented to person, Oriented to place, Disoriented to situation, Disoriented to time  Cognition: Decreased attention/concentration, Follows commands, Impaired decision making  Safety/Judgement: Decreased awareness of need for safety, Decreased insight into deficits  Functional Mobility Training:  Bed Mobility:     Supine to Sit: Minimum assistance;Assist x2 (with vcs)  Sit to Supine: Contact guard assistance; Additional time           Transfers:  Sit to Stand: Moderate assistance;Minimum assistance;Assist x2  Stand to Sit: Contact guard assistance;Assist x2                             Balance:  Sitting: Intact  Standing: Impaired; With support  Standing - Static: Fair;Constant support  Standing - Dynamic : Fair  Ambulation/Gait Training:  Distance (ft): 60 Feet (ft)  Assistive Device: Gait belt;Walker, rolling  Ambulation - Level of Assistance: Contact guard assistance;Assist x2 (mod/Sd for walker management )        Gait Abnormalities: Decreased step clearance (with tremors)        Base of Support: Narrowed     Speed/Doris: Pace decreased (<100 feet/min)  Step Length: Left shortened;Right shortened        Interventions: Tactile cues; Verbal cues; Safety awareness training     Pain:  Pain Scale 1: Numeric (0 - 10)  Pain Intensity 1: 0              Activity Tolerance:   Pt with improved activity tolerance   After treatment:   []    Patient left in no apparent distress sitting up in chair  [x]    Patient left in no apparent distress in bed  [x] Call bell left within reach  [x]    Nursing notified  [x]    Caregiver present  [x]    Bed alarm activated    COMMUNICATION/COLLABORATION:   The patients plan of care was discussed with: Registered Nurse    Buffy Barrios   Time Calculation: 23 mins

## 2018-02-22 NOTE — PROGRESS NOTES
Spiritual Care Assessment/Progress Notes    Vinita Buckley [id number]  [id number]    1955  65 y.o.  male    Patient Telephone Number: [phone] (home)   Holiness Affiliation: No Judaism   Language: English   Extended Emergency Contact Information  Primary Emergency Contact: [facility] [place] [facility] Phone: [phone]  Relation: Parent  Secondary Emergency Contact: Demetrius Henriquez Phone: [phone]  Mobile Phone: [phone]  Relation: None   Patient Active Problem List    Diagnosis Date Noted    Uncontrolled type 2 diabetes mellitus ([facility] [address] 02/18/2018    Acute respiratory failure ([facility] [address] 02/17/2018    Influenza 02/17/2018    Controlled type 2 diabetes mellitus without complication, without long-term current use of insulin ([facility] [address] 02/16/2017    Hypercholesterolemia 08/16/2016    Eosinophilia 04/16/2015    Strongyloides stercoralis infection 08/28/2014    Hypertension, essential 02/21/2013    Thrombocytopenia due to drugs 08/23/2012    Sleep disorder 08/23/2012    Venous stasis of lower extremity 08/19/2010    Mental retardation 01/22/2010    Seizure disorder ([facility] [address] 01/22/2010    Psoriasis 01/22/2010        Date: 2/22/2018       Level of Holiness/Spiritual Activity:  []         Involved in jonatan tradition/spiritual practice    []         Not involved in jonatan tradition/spiritual practice  []         Spiritually oriented    []         Claims no spiritual orientation    []         seeking spiritual identity  []         Feels alienated from Sabianist practice/tradition  []         Feels angry about Sabianist practice/tradition  []         Spirituality/Sabianist tradition   a resource for coping at this time.   [x]         Not able to assess due to medical condition    Services Provided Today:  []         crisis intervention    []         reading Scriptures  []         spiritual assessment    []         prayer  []         empathic listening/emotional support  []         rites and rituals (cite in comments)  []         life review     []         Pentecostal support  []         theological development   []         advocacy  []         ethical dialog     []         blessing  []         bereavement support    []         support to family  []         anticipatory grief support   []         help with AMD  []         spiritual guidance    []         meditation      Spiritual Care Needs  []         Emotional Support  []         Spiritual/Religion Care  []         Loss/Adjustment  []         Advocacy/Referral                /Ethics  []         No needs expressed at               this time  []         Other: (note in               comments)  Spiritual Care Plan  []         Follow up visits with               pt/family  []         Provide materials  []         Schedule sacraments  []         Contact Community               Clergy  []         Follow up as needed  []         Other: (note in               comments)     Comments:  Attempted visit on Oncology unit for spiritual assessment. Patient was sleeping. No visitors present. Left a pastoral care card. Chaplains will follow as able.     Aashish Michelle MPS, [address] [facility], [address] [place] [address] Road Paging Service  [phone]

## 2018-02-22 NOTE — PROGRESS NOTES
CM has sent [facility] to [facility] for Level 2 for SNF placement for rehab. Patient has been accepted to [facility] and bed being held. CM will follow-up on Level 2 for date of placment. He will continue to work with PT for strengthening while waiting discharge.     Shani Latham RN, BSN, ACM   - Medical Oncology  [phone]

## 2018-02-23 LAB
GLUCOSE BLD STRIP.AUTO-MCNC: 149 MG/DL (ref 65–100)
GLUCOSE BLD STRIP.AUTO-MCNC: 163 MG/DL (ref 65–100)
GLUCOSE BLD STRIP.AUTO-MCNC: 163 MG/DL (ref 65–100)
GLUCOSE BLD STRIP.AUTO-MCNC: 183 MG/DL (ref 65–100)
SERVICE CMNT-IMP: ABNORMAL

## 2018-02-23 PROCEDURE — 65660000000 HC RM CCU STEPDOWN

## 2018-02-23 PROCEDURE — 74011250637 HC RX REV CODE- 250/637: Performed by: INTERNAL MEDICINE

## 2018-02-23 PROCEDURE — 94640 AIRWAY INHALATION TREATMENT: CPT

## 2018-02-23 PROCEDURE — 74011000250 HC RX REV CODE- 250: Performed by: INTERNAL MEDICINE

## 2018-02-23 PROCEDURE — 82962 GLUCOSE BLOOD TEST: CPT

## 2018-02-23 PROCEDURE — 97116 GAIT TRAINING THERAPY: CPT

## 2018-02-23 PROCEDURE — 74011250637 HC RX REV CODE- 250/637: Performed by: HOSPITALIST

## 2018-02-23 PROCEDURE — 74011636637 HC RX REV CODE- 636/637: Performed by: HOSPITALIST

## 2018-02-23 PROCEDURE — 97110 THERAPEUTIC EXERCISES: CPT

## 2018-02-23 RX ADMIN — IPRATROPIUM BROMIDE AND ALBUTEROL SULFATE 3 ML: .5; 3 SOLUTION RESPIRATORY (INHALATION) at 21:16

## 2018-02-23 RX ADMIN — FOLIC ACID 1 MG: 1 TABLET ORAL at 08:38

## 2018-02-23 RX ADMIN — TAMSULOSIN HYDROCHLORIDE 0.4 MG: 0.4 CAPSULE ORAL at 08:38

## 2018-02-23 RX ADMIN — GUAIFENESIN AND DEXTROMETHORPHAN 10 ML: 100; 10 SYRUP ORAL at 18:00

## 2018-02-23 RX ADMIN — Medication 10 ML: at 22:02

## 2018-02-23 RX ADMIN — IPRATROPIUM BROMIDE AND ALBUTEROL SULFATE 3 ML: .5; 3 SOLUTION RESPIRATORY (INHALATION) at 07:26

## 2018-02-23 RX ADMIN — Medication 10 ML: at 15:21

## 2018-02-23 RX ADMIN — DIVALPROEX SODIUM 1000 MG: 250 TABLET, DELAYED RELEASE ORAL at 17:59

## 2018-02-23 RX ADMIN — INSULIN LISPRO 3 UNITS: 100 INJECTION, SOLUTION INTRAVENOUS; SUBCUTANEOUS at 12:54

## 2018-02-23 RX ADMIN — LINAGLIPTIN 5 MG: 5 TABLET, FILM COATED ORAL at 08:38

## 2018-02-23 RX ADMIN — INSULIN LISPRO 3 UNITS: 100 INJECTION, SOLUTION INTRAVENOUS; SUBCUTANEOUS at 08:37

## 2018-02-23 RX ADMIN — DIVALPROEX SODIUM 500 MG: 250 TABLET, DELAYED RELEASE ORAL at 08:38

## 2018-02-23 RX ADMIN — IPRATROPIUM BROMIDE AND ALBUTEROL SULFATE 3 ML: .5; 3 SOLUTION RESPIRATORY (INHALATION) at 13:07

## 2018-02-23 RX ADMIN — METFORMIN HYDROCHLORIDE 1000 MG: 500 TABLET, EXTENDED RELEASE ORAL at 08:38

## 2018-02-23 RX ADMIN — TOPIRAMATE 300 MG: 100 TABLET, FILM COATED ORAL at 08:37

## 2018-02-23 RX ADMIN — GLIMEPIRIDE 8 MG: 4 TABLET ORAL at 08:38

## 2018-02-23 RX ADMIN — METFORMIN HYDROCHLORIDE 1000 MG: 500 TABLET, EXTENDED RELEASE ORAL at 18:00

## 2018-02-23 RX ADMIN — GUAIFENESIN AND DEXTROMETHORPHAN 10 ML: 100; 10 SYRUP ORAL at 22:03

## 2018-02-23 RX ADMIN — ATORVASTATIN CALCIUM 40 MG: 40 TABLET, FILM COATED ORAL at 22:02

## 2018-02-23 RX ADMIN — IPRATROPIUM BROMIDE AND ALBUTEROL SULFATE 3 ML: .5; 3 SOLUTION RESPIRATORY (INHALATION) at 01:05

## 2018-02-23 RX ADMIN — TOPIRAMATE 300 MG: 100 TABLET, FILM COATED ORAL at 18:00

## 2018-02-23 RX ADMIN — CALCIUM CARBONATE 500 MG (1,250 MG)-VITAMIN D3 200 UNIT TABLET 1 TABLET: at 08:38

## 2018-02-23 RX ADMIN — INSULIN LISPRO 3 UNITS: 100 INJECTION, SOLUTION INTRAVENOUS; SUBCUTANEOUS at 17:58

## 2018-02-23 NOTE — PROGRESS NOTES
Ketty Harmon [facility] can accept him when Level 2 is complete which will be next week. They still have his bed ready.

## 2018-02-23 NOTE — PROGRESS NOTES
Problem: Mobility Impaired (Adult and Pediatric)  Goal: *Acute Goals and Plan of Care (Insert Text)  Physical Therapy Goals  Initiated 2/19/2018  1. Patient will move from supine to sit and sit to supine  in bed with independence within 7 day(s). 2.  Patient will transfer from bed to chair and chair to bed with supervision/set-up using the least restrictive device within 7 day(s). 3.  Patient will perform sit to stand with supervision/set-up within 7 day(s). 4.  Patient will ambulate with supervision/set-up for 100 feet with the least restrictive device within 7 day(s). physical Therapy TREATMENT  Patient: Davis Han (68 y.o. male)  Date: 2/23/2018  Diagnosis: Influenza, Acute respiratory failure ([facility] [address]       Precautions: Fall   Chart, physical therapy assessment, plan of care and goals were reviewed. ASSESSMENT: pt progressing towards goals, no LOB but very tremulous, no SOB, does fair with transfers,  ther-ex and bed mob, max vc's for safety and proper RW use, will need SNF rehab for increased strength and endurance. Progression toward goals:  []      Improving appropriately and progressing toward goals  [x]      Improving slowly and progressing toward goals  []      Not making progress toward goals and plan of care will be adjusted     PLAN:  Patient continues to benefit from skilled intervention to address the above impairments. Continue treatment per established plan of care.   Discharge Recommendations:  [facility]  Further Equipment Recommendations for Discharge:  rolling walker     OBJECTIVE DATA SUMMARY:     Critical Behavior:  Neurologic State: Alert, Confused, Eyes open spontaneously  Orientation Level: Oriented to person, Oriented to place  Cognition: Follows commands, Decreased attention/concentration, Impaired decision making  Safety/Judgement: Decreased awareness of need for safety, Decreased insight into deficits     Functional Mobility Training:  Bed Mobility:  Supine to Sit: Stand-by assistance; Additional time  Scooting: Stand-by assistance; Additional time  Level of Assistance: Stand-by assistance   Interventions: Verbal cues     Transfers:  Sit to Stand: Minimum assistance;Assist x1  Stand to Sit: Minimum assistance;Assist x1  Bed to Chair: Contact guard assistance; Additional time  Interventions: Tactile cues; Verbal cues  Level of Assistance: Minimum assistance     Balance:  Sitting: Intact; Without support  Standing: Intact; With support  Standing - Static: Fair;Constant support  Standing - Dynamic : Fair     Ambulation/Gait Training:  Distance (ft): 80 Feet (ft)  Assistive Device: Gait belt;Walker, rolling  Ambulation - Level of Assistance: Contact guard assistance;Assist x1  Gait Abnormalities: Decreased step clearance  Right Side Weight Bearing: Full  Left Side Weight Bearing: Full  Base of Support: Narrowed  Speed/Doris: Pace decreased (<100 feet/min)  Step Length: Left shortened;Right shortened  Interventions: Tactile cues; Verbal cues    Therapeutic Exercises:   sitting  EXERCISE   Sets   Reps   Active Active Assist   Passive   Comments   Ankle pumps 1 10 [x] [] [] bilat   Heel raises 1 10 [x] [] [] \"   Toe tap 1 10 [x] [] [] \"   Knee ext 1 10 [x] [] [] \"   Hip flex 1 10 [x] [] [] \"     Pain:  Pain Scale 1: Numeric (0 - 10)  Pain Intensity 1: 0    Activity Tolerance: poor    After treatment:   [x] Patient left in no apparent distress sitting up in chair  [] Patient left in no apparent distress in bed  [x] Call bell left within reach  [x] Nursing notified  [] Caregiver present  [x] Bed alarm activated    COMMUNICATION/COLLABORATION:   The patients plan of care was discussed with: Registered Nurse    Buzz Somers PTA   Time Calculation: 25 mins

## 2018-02-23 NOTE — PROGRESS NOTES
Hospitalist Progress Note    NAME: Drew Zavala   :  1955   MRN:  [id number]       Assessment / Plan:  Acute hypoxic respiratory failure, POA  Resolved, so far tolerating room air. No distress  Influenza A causing Acute bronchospasm   -continue Tamiflu (completed Tamiflu for 5 days)   -Duonebs changed to albuterol nebs given urinary retention  - PA and Lat CXR shows atelectasis--> c/w incentive spirometry  -so far tolerating room air. DM type 2, uncontrolled-- A1c was 9.8 2017.  -increased  metformin XR 1000 mg bid , c/w amaryl  -added  Tradjenta  So far better control  -continue SSI, HbA1C 9.0  Dehydration: so far resolved. 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 Depakote level 91, ammonia wnl   Due to MR for SNF placement I was informed that patient will need Level 2. Application for Level 2 was sent as per  process can take up to a week. Hyperlipidemia continue lipitor  Chronic thrombocytopenia, ?due to depakote  -monitor, plts are trending up, monitor. Urinary Retention: overnight from  into ; likely from ipratropium (see changes above  -continue Flomax  -richards was D/Tacho  Psoriasis continue triamcinolone prn  Generalized weakness, difficulty standing  -Consult PT/OT  Hypokalemia: replete and monitor  Obesity: Body mass index is 30.17 kg/(m^2). Code:  Full  DVT prophylaxis: SCD  Surrogate decision maker:   Mother Taylor    Patient doing fine may be able to D/c to SNF whenever level 2 is completed  No new issues, apparently will have an State evaluation today if approved for SNF will try to get him once auth is obtained if for some reason is not approved, will need to go home with home Health services     Subjective:     Chief Complaint / Reason for Physician Visit: follow-up respiratory failure  \"When I can go home\"    Review of Systems:  Symptom Y/N Comments  Symptom Y/N Comments   Fever/Chills    Chest Pain n Poor Appetite    Edema     Cough y   Abdominal Pain n    Sputum    Joint Pain     SOB/TREVINO n   Pruritis/Rash     Nausea/vomit    Tolerating PT/OT     Diarrhea    Tolerating Diet y    Constipation    Other       Could NOT obtain due to:      Objective:     VITALS:   Last 24hrs VS reviewed since prior progress note. Most recent are:  Patient Vitals for the past 24 hrs:   Temp Pulse Resp BP SpO2   02/23/18 0800 97.8 °F (36.6 °C) (!) 55 18 124/75 94 %   02/23/18 0726 - - - - 90 %   02/23/18 0313 97.6 °F (36.4 °C) 71 18 137/66 90 %   02/22/18 2315 97.2 °F (36.2 °C) 72 18 136/71 -   02/22/18 1930 97.5 °F (36.4 °C) 74 18 154/72 96 %   02/22/18 1620 99.4 °F (37.4 °C) 73 18 132/72 90 %   02/22/18 1342 - - - - 90 %     No intake or output data in the 24 hours ending 02/23/18 1145     PHYSICAL EXAM:  General: WD, WN. Alert, cooperative, no acute distress   EENT:  EOMI. Anicteric sclerae. MMM  Resp:  Coarse BS, rhonchi  CV:  Regular  rhythm,  No edema  GI:  Soft, Non distended, Non tender.  +Bowel sounds  Neurologic:  Alert, answers simple questions,   Psych:   Poor insight. Not anxious nor agitated at this time  Skin:  Psoriasis noted. No jaundice    Reviewed most current lab test results and cultures  YES  Reviewed most current radiology test results   YES  Review and summation of old records today    NO  Reviewed patient's current orders and MAR    YES  PMH/ reviewed - no change compared to H&P  ________________________________________________________________________  Care Plan discussed with:    Comments   Patient x    Family  y mother   RN x    Care Manager x    Consultant                        Multidiciplinary team rounds were held today with , nursing, pharmacist and clinical coordinator. Patient's plan of care was discussed; medications were reviewed and discharge planning was addressed.      ________________________________________________________________________  Total NON critical care TIME:  15 Minutes    Total CRITICAL CARE TIME Spent:   Minutes non procedure based      Comments   >50% of visit spent in counseling and coordination of care y    ________________________________________________________________________  Alfredo Estrada MD     Procedures: see electronic medical records for all procedures/Xrays and details which were not copied into this note but were reviewed prior to creation of Plan. LABS:  I reviewed today's most current labs and imaging studies.   Pertinent labs include:  Recent Labs      02/22/18 0318   WBC  6.2   HGB  11.6*   HCT  36.1*   PLT  101*     Recent Labs      02/22/18 0318   NA  139   K  3.7   CL  110*   CO2  21   GLU  146*   BUN  32*   CREA  0.77   CA  8.6   MG  2.0   ALB  2.2*   TBILI  0.5   SGOT  57*   ALT  67       Signed: Alfredo Estrada MD

## 2018-02-23 NOTE — PROGRESS NOTES
Initial Nutrition Assessment:    INTERVENTIONS/RECOMMENDATIONS:   · Continue current diet  · Glucerna daily     ASSESSMENT:   Chart reviewed, medically noted for PMH shown below. Assessing pt due to LOS. Family member in room and report pt not eating as well as he usually does. BG elevated but < 180 mg/dL. Will try glucern shakes to help meet kcal and protein needs. No weight loss PTA. Past Medical History:   Diagnosis Date    Contact dermatitis and other eczema, due to unspecified cause     psoriasis    Diabetes ([address]     Eosinophilia     Hypercholesterolemia     Hypertension     Mental retardation     Seizures (HCC)     Skin cancer        Diet Order: Consistent carb  % Eaten:  No data found.     Pertinent Medications: [x]Reviewed: metformin, folic acid, os-cem, humalog,   Pertinent Labs: [x]Reviewed:   Food Allergies: [x]NKFA  []Other   Last BM: 2/21  Edema:        []RUE   []LUE   []RLE   []LLE      Pressure Injury:      [] Stage I   [] Stage II   [] Stage III   [] Stage IV      Wt Readings from Last 30 Encounters:   02/17/18 106.6 kg (235 lb)   11/28/17 92.1 kg (203 lb)   09/25/17 89.4 kg (197 lb 3.2 oz)   08/24/17 91.6 kg (202 lb)   05/18/17 92.8 kg (204 lb 8 oz)   02/27/17 92.8 kg (204 lb 8 oz)   02/16/17 93.7 kg (206 lb 8 oz)   08/16/16 93 kg (205 lb)   04/12/16 94.1 kg (207 lb 8 oz)   02/10/16 91.2 kg (201 lb)   01/20/16 91.2 kg (201 lb)   10/30/15 89.4 kg (197 lb)   10/15/15 90.3 kg (199 lb)   10/08/15 89 kg (196 lb 4.8 oz)   07/14/15 89.6 kg (197 lb 8 oz)   07/09/15 89.4 kg (197 lb)   07/06/15 95.3 kg (210 lb)   04/16/15 91.9 kg (202 lb 8 oz)   12/19/14 90.3 kg (199 lb)   08/21/14 92.5 kg (204 lb)   02/20/14 95.3 kg (210 lb)   08/27/13 89.4 kg (197 lb)   07/09/13 87.1 kg (192 lb)   07/05/13 88.3 kg (194 lb 10.7 oz)   02/21/13 94.8 kg (209 lb)   08/23/12 91.2 kg (201 lb)   03/22/12 96.6 kg (213 lb)   09/21/11 93.9 kg (207 lb)   06/21/11 98.4 kg (217 lb)   10/20/10 96.6 kg (213 lb) Anthropometrics:   Height: 6' 2\" (188 cm) Weight: 106.6 kg (235 lb)   IBW (%IBW):   ( ) UBW (%UBW):   (  %)   Last Weight Metrics:  Weight Loss Metrics 2/17/2018 11/28/2017 9/25/2017 8/24/2017 5/18/2017 2/27/2017 2/16/2017   Today's Wt 235 lb 203 lb 197 lb 3.2 oz 202 lb 204 lb 8 oz 204 lb 8 oz 206 lb 8 oz   BMI 30.17 kg/m2 27.53 kg/m2 26.75 kg/m2 26.65 kg/m2 26.98 kg/m2 26.98 kg/m2 27.24 kg/m2       BMI: Body mass index is 30.17 kg/(m^2). This BMI is indicative of:   []Underweight    []Normal    []Overweight    [x] Obesity   [] Extreme Obesity (BMI>40)     Estimated Nutrition Needs (Based on):   2300 Kcals/day (BMR: 1933 x 1.2) , 85 g (0.8 g/kg) Protein  Carbohydrate: At Least 130 g/day  Fluids: 2300 mL/day (1ml/kcal) or per primary team    NUTRITION DIAGNOSES:   Problem:  Inadequate protein-energy intake      Etiology: related to decreased appetite     Signs/Symptoms: as evidenced by family member report of decreased PO intake      NUTRITION INTERVENTIONS:  Meals/Snacks: General/healthful diet   Supplements: Commercial supplement              GOAL:   consume >50% of meals and ONS in 3-5 days    LEARNING NEEDS (Diet, Food/Nutrient-Drug Interaction):    [x] None Identified   [] Identified and Education Provided/Documented   [] Identified and Pt declined/was not appropriate     Cultureal, Synagogue, OR Ethnic Dietary Needs:    [x] None Identified   [] Identified and Addressed     [x] Interdisciplinary Care Plan Reviewed/Documented    [x] Discharge Planning:  Consistent carb diet     MONITORING /EVALUATION:      Food/Nutrient Intake Outcomes:  Total energy intake  Physical Signs/Symptoms Outcomes: Weight/weight change, Electrolyte and renal profile, GI, Glucose profile    NUTRITION RISK:    [] High              [x] Moderate           []  Low  []  Minimal/Uncompromised    PT SEEN FOR:    []  MD Consult: []Calorie Count      []Diabetic Diet Education        []Diet Education     []Electrolyte Management     []General Nutrition Management and Supplements     []Management of Tube Feeding     []TPN Recommendations    []  RN Referral:  []MST score >=2     []Enteral/Parenteral Nutrition PTA     []Pregnant: Gestational DM or Multigestation     []Pressure Ulcer/Wound Care needs        []  Low BMI  [x]  LOS Referral       Korey Beverly RDN  Pager [phone]  Weekend Pager [phone]

## 2018-02-23 NOTE — PROGRESS NOTES
Oncology Nursing Communication Tool  0715 AM  2/23/2018     Bedside shift change report given to Gary Melgoza RN (incoming nurse) by Giulia Simmons RN (outgoing nurse) on [facility]. Report included the following information SBAR. Shift Summary:       Issues for physician to address: Oncology Shift Note   Admission Date 2/17/2018   Admission Diagnosis Influenza  Acute respiratory failure ([address]   Code Status Full Code   Consults None      Cardiac Monitoring [] Yes [] No      Purposeful Hourly Rounding [] Yes    Kalia Score Total Score: 5   Kalia score 3 or > [] Bed Alarm [] Avasys [] 1:1 sitter [] Patient refused (Place signed refusal form in chart)      Pain Managed [] Yes [] No    Key Pain Meds     The patient is on no pain meds. Influenza Vaccine Received Flu Vaccine for Current Season (usually Sept-March): Yes           Oxygen needs?  [] Room air Oxygen @  []1L    []2L    []3L   []4L    []5L   []6L     Use home O2? [] Yes [] No  Perform O2 challenge test using  smartphrase (.oxygenchallenge)      Last bowel movement Last Bowel Movement Date: 02/21/18  bowel movement      Urinary Catheter [REMOVED] Urinary Catheter 02/18/18 Mitchell - Temperature-Criteria for Appropriate Use: Obstruction/retention     [REMOVED] Urinary Catheter 02/18/18 Mitchell - Temperature-Urine Output (mL): 420 ml     LDAs               Peripheral IV 02/17/18 Left Antecubital (Active)   Site Assessment Clean, dry, & intact 2/22/2018 11:06 PM   Phlebitis Assessment 0 2/22/2018 11:06 PM   Infiltration Assessment 0 2/22/2018 11:06 PM   Dressing Status Clean, dry, & intact 2/22/2018 11:06 PM   Dressing Type Tape;Transparent 2/22/2018 11:06 PM   Hub Color/Line Status Pink;Capped;Flushed;Patent 2/22/2018 11:06 PM   Action Taken Blood drawn 2/17/2018 11:20 AM       Peripheral IV 02/17/18 Right Antecubital (Active)   Site Assessment Clean, dry, & intact 2/22/2018 11:06 PM   Phlebitis Assessment 0 2/22/2018 11:06 PM   Infiltration Assessment 0 2/22/2018 11:06 PM   Dressing Status Clean, dry, & intact 2/22/2018 11:06 PM   Dressing Type Tape;Transparent 2/22/2018 11:06 PM   Hub Color/Line Status Pink;Capped;Flushed;Patent 2/22/2018 11:06 PM                         Readmission Risk Assessment Tool Score Medium Risk            18       Total Score        3 Patient Length of Stay (>5 days = 3)    9 Pt. Coverage (Medicare=5 , Medicaid, or Self-Pay=4)    6 Charlson Comorbidity Score (Age + Comorbid Conditions)        Criteria that do not apply:    Has Seen PCP in Last 6 Months (Yes=3, No=0)    . Living with Significant Other. Assisted Living. LTAC. SNF.  or   Rehab    IP Visits Last 12 Months (1-3=4, 4=9, >4=11)       Expected Length of Stay 4d 12h   Actual Length of Stay Levi Hammonds RN

## 2018-02-24 LAB
GLUCOSE BLD STRIP.AUTO-MCNC: 153 MG/DL (ref 65–100)
GLUCOSE BLD STRIP.AUTO-MCNC: 155 MG/DL (ref 65–100)
GLUCOSE BLD STRIP.AUTO-MCNC: 218 MG/DL (ref 65–100)
GLUCOSE BLD STRIP.AUTO-MCNC: 254 MG/DL (ref 65–100)
SERVICE CMNT-IMP: ABNORMAL

## 2018-02-24 PROCEDURE — 74011250637 HC RX REV CODE- 250/637: Performed by: HOSPITALIST

## 2018-02-24 PROCEDURE — 74011000250 HC RX REV CODE- 250: Performed by: INTERNAL MEDICINE

## 2018-02-24 PROCEDURE — 74011250637 HC RX REV CODE- 250/637: Performed by: INTERNAL MEDICINE

## 2018-02-24 PROCEDURE — 94640 AIRWAY INHALATION TREATMENT: CPT

## 2018-02-24 PROCEDURE — 65660000000 HC RM CCU STEPDOWN

## 2018-02-24 PROCEDURE — 82962 GLUCOSE BLOOD TEST: CPT

## 2018-02-24 PROCEDURE — 74011636637 HC RX REV CODE- 636/637: Performed by: HOSPITALIST

## 2018-02-24 RX ADMIN — IPRATROPIUM BROMIDE AND ALBUTEROL SULFATE 3 ML: .5; 3 SOLUTION RESPIRATORY (INHALATION) at 14:23

## 2018-02-24 RX ADMIN — CALCIUM CARBONATE 500 MG (1,250 MG)-VITAMIN D3 200 UNIT TABLET 1 TABLET: at 10:17

## 2018-02-24 RX ADMIN — IPRATROPIUM BROMIDE AND ALBUTEROL SULFATE 3 ML: .5; 3 SOLUTION RESPIRATORY (INHALATION) at 02:23

## 2018-02-24 RX ADMIN — IPRATROPIUM BROMIDE AND ALBUTEROL SULFATE 3 ML: .5; 3 SOLUTION RESPIRATORY (INHALATION) at 22:15

## 2018-02-24 RX ADMIN — Medication 10 ML: at 21:55

## 2018-02-24 RX ADMIN — TAMSULOSIN HYDROCHLORIDE 0.4 MG: 0.4 CAPSULE ORAL at 10:18

## 2018-02-24 RX ADMIN — Medication 10 ML: at 14:30

## 2018-02-24 RX ADMIN — LINAGLIPTIN 5 MG: 5 TABLET, FILM COATED ORAL at 10:17

## 2018-02-24 RX ADMIN — GUAIFENESIN AND DEXTROMETHORPHAN 10 ML: 100; 10 SYRUP ORAL at 18:58

## 2018-02-24 RX ADMIN — GLIMEPIRIDE 8 MG: 4 TABLET ORAL at 10:17

## 2018-02-24 RX ADMIN — INSULIN LISPRO 3 UNITS: 100 INJECTION, SOLUTION INTRAVENOUS; SUBCUTANEOUS at 08:03

## 2018-02-24 RX ADMIN — INSULIN LISPRO 4 UNITS: 100 INJECTION, SOLUTION INTRAVENOUS; SUBCUTANEOUS at 21:51

## 2018-02-24 RX ADMIN — IPRATROPIUM BROMIDE AND ALBUTEROL SULFATE 3 ML: .5; 3 SOLUTION RESPIRATORY (INHALATION) at 07:49

## 2018-02-24 RX ADMIN — METFORMIN HYDROCHLORIDE 1000 MG: 500 TABLET, EXTENDED RELEASE ORAL at 18:08

## 2018-02-24 RX ADMIN — FOLIC ACID 1 MG: 1 TABLET ORAL at 10:19

## 2018-02-24 RX ADMIN — DIVALPROEX SODIUM 1000 MG: 250 TABLET, DELAYED RELEASE ORAL at 18:08

## 2018-02-24 RX ADMIN — METFORMIN HYDROCHLORIDE 1000 MG: 500 TABLET, EXTENDED RELEASE ORAL at 10:19

## 2018-02-24 RX ADMIN — DIVALPROEX SODIUM 500 MG: 250 TABLET, DELAYED RELEASE ORAL at 10:17

## 2018-02-24 RX ADMIN — TOPIRAMATE 300 MG: 100 TABLET, FILM COATED ORAL at 18:08

## 2018-02-24 RX ADMIN — INSULIN LISPRO 3 UNITS: 100 INJECTION, SOLUTION INTRAVENOUS; SUBCUTANEOUS at 16:37

## 2018-02-24 RX ADMIN — ATORVASTATIN CALCIUM 40 MG: 40 TABLET, FILM COATED ORAL at 21:52

## 2018-02-24 RX ADMIN — INSULIN LISPRO 4 UNITS: 100 INJECTION, SOLUTION INTRAVENOUS; SUBCUTANEOUS at 12:21

## 2018-02-24 RX ADMIN — TOPIRAMATE 300 MG: 100 TABLET, FILM COATED ORAL at 10:18

## 2018-02-24 NOTE — PROGRESS NOTES
Oncology Nursing Communication Tool  7:02 PM  2/23/2018     Bedside and Verbal shift change report given to Eriberto Moreno RN (incoming nurse) by Amanda Murray (outgoing nurse) on [facility]. Report included the following information SBAR, Kardex, Procedure Summary, Intake/Output, MAR, Accordion and Recent Results. Shift Summary: Pt rested comfortably      Issues for physician to address: discharge       Oncology Shift Note   Admission Date 2/17/2018   Admission Diagnosis Influenza  Acute respiratory failure ([address]   Code Status Full Code   Consults None      Cardiac Monitoring [] Yes [] No      Purposeful Hourly Rounding [] Yes    Kalia Score Total Score: 5   Kalia score 3 or > [] Bed Alarm [] Avasys [] 1:1 sitter [] Patient refused (Place signed refusal form in chart)      Pain Managed [] Yes [] No    Key Pain Meds     The patient is on no pain meds. Influenza Vaccine Received Flu Vaccine for Current Season (usually Sept-March): Yes           Oxygen needs?  [] Room air Oxygen @  []1L    []2L    []3L   []4L    []5L   []6L     Use home O2? [] Yes [] No  Perform O2 challenge test using  smartphrase (.oxygenchallenge)      Last bowel movement Last Bowel Movement Date: 02/21/18  bowel movement      Urinary Catheter [REMOVED] Urinary Catheter 02/18/18 Mitchell - Temperature-Criteria for Appropriate Use: Obstruction/retention  Condom Catheter 02/23/18-Criteria for Appropriate Use: Comfort Care     [REMOVED] Urinary Catheter 02/18/18 Mitchell - Temperature-Urine Output (mL): 420 ml     LDAs               Peripheral IV 02/17/18 Left Antecubital (Active)   Site Assessment Clean, dry, & intact 2/23/2018  3:21 PM   Phlebitis Assessment 0 2/23/2018  3:21 PM   Infiltration Assessment 0 2/23/2018  3:21 PM   Dressing Status Clean, dry, & intact 2/23/2018  3:21 PM   Dressing Type Transparent;Tape 2/23/2018  3:21 PM   Hub Color/Line Status Pink;Flushed;Patent 2/23/2018  3:21 PM   Action Taken Blood drawn 2/17/2018 11:20 AM       Peripheral IV 02/17/18 Right Antecubital (Active)   Site Assessment Clean, dry, & intact 2/23/2018  3:21 PM   Phlebitis Assessment 0 2/23/2018  3:21 PM   Infiltration Assessment 0 2/23/2018  3:21 PM   Dressing Status Clean, dry, & intact 2/23/2018  3:21 PM   Dressing Type Transparent;Tape 2/23/2018  3:21 PM   Hub Color/Line Status Pink;Flushed;Patent 2/23/2018  3:21 PM          Condom Catheter 02/23/18 (Active)   Criteria for Appropriate Use Comfort Care 2/23/2018  3:21 PM   Status Draining 2/23/2018  3:21 PM   Site Condition No abnormalities 2/23/2018  3:21 PM   Drainage Tube Clipped to Bed Yes 2/23/2018  3:21 PM   Catheter Secured to Thigh No 2/23/2018  3:21 PM   Tamper Seal Intact No 2/23/2018  3:21 PM   Bag Below Bladder/Not on Floor Yes 2/23/2018  3:21 PM   Lack of Dependent Loop in Tubing Yes 2/23/2018  3:21 PM   Drainage Bag Less Than Half Full Yes 2/23/2018  3:21 PM   Sterile Solution Used for  Irrigation N/A 2/23/2018  3:21 PM                   Readmission Risk Assessment Tool Score Medium Risk            18       Total Score        3 Patient Length of Stay (>5 days = 3)    9 Pt. Coverage (Medicare=5 , Medicaid, or Self-Pay=4)    6 Charlson Comorbidity Score (Age + Comorbid Conditions)        Criteria that do not apply:    Has Seen PCP in Last 6 Months (Yes=3, No=0)    . Living with Significant Other. Assisted Living. LTAC. SNF.  or   Rehab    IP Visits Last 12 Months (1-3=4, 4=9, >4=11)       Expected Length of Stay 4d 12h   Actual Length of Stay [facility]

## 2018-02-24 NOTE — PROGRESS NOTES
ADULT PROTOCOL: JET AEROSOL  REASSESSMENT    Patient  Cody Benedict     65 y.o.   male     2/24/2018  2:28 PM    Breath Sounds Pre Procedure: Right Breath Sounds: Diminished                               Left Breath Sounds: Diminished    Breath Sounds Post Procedure: Right Breath Sounds: Diminished                                 Left Breath Sounds: Diminished    Breathing pattern: Pre procedure Breathing Pattern: Regular          Post procedure Breathing Pattern: Regular    Heart Rate: Pre procedure Pulse: 74           Post procedure Pulse: 69    Resp Rate: Pre procedure Respirations: 20           Post procedure Respirations: 16    \        Cough: Pre procedure Cough: Non-productive               Post procedure Cough: Non-productive  \\Oxygen: O2 Device: Room air  \  \    SpO2: Pre procedure SpO2: 94 % \              Post procedure SpO2: 92 %  \    Nebulizer Therapy: Current medications Aerosolized Medications: DuoNeb      Changed:\no      Problem List:   Patient Active Problem List   Diagnosis Code    Mental retardation F79    Seizure disorder (HCC) G40.909    Psoriasis L40.9    Venous stasis of lower extremity I87.8    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, T50.905A    Sleep disorder G47.9    Hypertension, essential I10    Strongyloides stercoralis infection B78.9    Eosinophilia D72.1    Hypercholesterolemia E78.00    Controlled type 2 diabetes mellitus without complication, without long-term current use of insulin (HCC) E11.9    Acute respiratory failure ([facility]) J96.00    Influenza J11.1    Uncontrolled type 2 diabetes mellitus ([facility] E11.65       Respiratory Therapist: Christine Castellanos RT

## 2018-02-24 NOTE — PROGRESS NOTES
Hospitalist Progress Note    NAME: Curt Hearn   :  1955   MRN:  [id number]       Assessment / Plan:  Acute hypoxic respiratory failure, POA  Resolved, so far tolerating room air. No distress  Influenza A causing Acute bronchospasm   -completed Tamiflu ( 5 days)   -Duonebs changed to albuterol nebs given urinary retention  - PA and Lat CXR shows atelectasis--> c/w incentive spirometry  -so far tolerating room air. DM type 2, uncontrolled-- A1c was 9.8 2017.  -c/w  metformin XR 1000 mg bid , c/w amaryl  -added  Tradjenta  So far better control  -continue SSI, HbA1C 9.0  Dehydration: so far resolved. 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 Depakote level 91, ammonia wnl   Due to MR for SNF placement I was informed that patient will need Level 2. Application for Level 2 was sent as per  process can take up to a week. Hyperlipidemia continue lipitor  Chronic thrombocytopenia, ?due to depakote  -monitor, plts are trending up, monitor. Urinary Retention: overnight from  into ; likely from ipratropium (see changes above  -continue Flomax  -richards was D/Tacho  Psoriasis continue triamcinolone prn  Generalized weakness, difficulty standing  -Consult PT/OT  Hypokalemia: replete and monitor  Obesity: Body mass index is 30.17 kg/(m^2). Code:  Full  DVT prophylaxis: SCD  Surrogate decision maker:   Mother Taylor    Patient doing fine may be able to D/c to SNF whenever level 2 is completed  No new issues, apparently staff in charge of doing the evaluation for level 2 did not got here as per patients family, will keep waiting     Subjective:     Chief Complaint / Reason for Physician Visit: follow-up respiratory failure  \"I feel OK\"    Review of Systems:  Symptom Y/N Comments  Symptom Y/N Comments   Fever/Chills    Chest Pain n    Poor Appetite    Edema     Cough y   Abdominal Pain n    Sputum    Joint Pain     SOB/TREVINO n Pruritis/Rash     Nausea/vomit    Tolerating PT/OT     Diarrhea    Tolerating Diet y    Constipation    Other       Could NOT obtain due to:      Objective:     VITALS:   Last 24hrs VS reviewed since prior progress note. Most recent are:  Patient Vitals for the past 24 hrs:   Temp Pulse Resp BP SpO2   02/24/18 0900 97.7 °F (36.5 °C) 72 18 151/82 90 %   02/24/18 0749 - - - - 91 %   02/24/18 0323 98.6 °F (37 °C) 73 18 138/75 91 %   02/24/18 0222 - - - - 92 %   02/23/18 2350 97.9 °F (36.6 °C) 72 18 144/75 91 %   02/23/18 2115 - - - - 93 %   02/23/18 2055 97 °F (36.1 °C) 72 18 142/78 92 %   02/23/18 1622 97.4 °F (36.3 °C) 75 18 128/68 93 %   02/23/18 1231 98.1 °F (36.7 °C) 74 18 128/77 92 %       Intake/Output Summary (Last 24 hours) at 02/24/18 1058  Last data filed at 02/23/18 2056   Gross per 24 hour   Intake                0 ml   Output             1000 ml   Net            -1000 ml        PHYSICAL EXAM:  General: WD, WN. Alert, cooperative, no acute distress   EENT:  EOMI. Anicteric sclerae. MMM  Resp:  Coarse BS, rhonchi  CV:  Regular  rhythm,  No edema  GI:  Soft, Non distended, Non tender.  +Bowel sounds  Neurologic:  Alert, answers simple questions,   Psych:   Poor insight. Not anxious nor agitated at this time  Skin:  Psoriasis noted. No jaundice    Reviewed most current lab test results and cultures  YES  Reviewed most current radiology test results   YES  Review and summation of old records today    NO  Reviewed patient's current orders and MAR    YES  PMH/SH reviewed - no change compared to H&P  ________________________________________________________________________  Care Plan discussed with:    Comments   Patient x    Family  y mother   RN x    Care Manager x    Consultant                        Multidiciplinary team rounds were held today with , nursing, pharmacist and clinical coordinator. Patient's plan of care was discussed; medications were reviewed and discharge planning was addressed. ________________________________________________________________________  Total NON critical care TIME:  15 Minutes    Total CRITICAL CARE TIME Spent:   Minutes non procedure based      Comments   >50% of visit spent in counseling and coordination of care y    ________________________________________________________________________  Suhail Collins MD     Procedures: see electronic medical records for all procedures/Xrays and details which were not copied into this note but were reviewed prior to creation of Plan. LABS:  I reviewed today's most current labs and imaging studies.   Pertinent labs include:  Recent Labs      02/22/18 0318   WBC  6.2   HGB  11.6*   HCT  36.1*   PLT  101*     Recent Labs      02/22/18 0318   NA  139   K  3.7   CL  110*   CO2  21   GLU  146*   BUN  32*   CREA  0.77   CA  8.6   MG  2.0   ALB  2.2*   TBILI  0.5   SGOT  57*   ALT  67       Signed: Suhail Collins MD

## 2018-02-24 NOTE — PROGRESS NOTES
ADULT PROTOCOL: JET AEROSOL ASSESSMENT    Patient  Carolina Velasco     65 y.o.   male     2/23/2018  9:35 PM    Breath Sounds Pre Procedure: Right Breath Sounds: Diminished                               Left Breath Sounds: Diminished    Breath Sounds Post Procedure: Right Breath Sounds: Diminished                                 Left Breath Sounds: Diminished    Breathing pattern: Pre procedure Breathing Pattern: Regular          Post procedure Breathing Pattern: Regular    Heart Rate: Pre procedure Pulse: 70           Post procedure Pulse: 74    Resp Rate: Pre procedure Respirations: 18           Post procedure Respirations: 18    Cough: Pre procedure Cough: Non-productive, Dry               Post procedure Cough: Non-productive    Suctioned: NO    Oxygen: O2 Device: Room air        Changed: NO    SpO2: Pre procedure SpO2: 93 %   without oxygen              Post procedure SpO2: 94 %  without oxygen    Nebulizer Therapy: Current medications Aerosolized Medications: DuoNeb      Changed: NO    Smoking History: never smoked      Problem List:   Patient Active Problem List   Diagnosis Code    Mental retardation F79    Seizure disorder (HCC) G40.909    Psoriasis L40.9    Venous stasis of lower extremity I87.8    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, T50.905A    Sleep disorder G47.9    Hypertension, essential I10    Strongyloides stercoralis infection B78.9    Eosinophilia D72.1    Hypercholesterolemia E78.00    Controlled type 2 diabetes mellitus without complication, without long-term current use of insulin (HCC) E11.9    Acute respiratory failure ([facility]) J96.00    Influenza J11.1    Uncontrolled type 2 diabetes mellitus ([facility] E11.65       Respiratory Therapist: Natalya Worrell RT

## 2018-02-24 NOTE — PROGRESS NOTES
Oncology Nursing Communication Tool  0720 AM  2/24/2018     Bedside shift change report given to MARCE Jang RN (incoming nurse) by Dodie Chadwick RN (outgoing nurse) on [facility]. Report included the following information SBAR. Shift Summary:       Issues for physician to address: Oncology Shift Note   Admission Date 2/17/2018   Admission Diagnosis Influenza  Acute respiratory failure ([address]   Code Status Full Code   Consults None      Cardiac Monitoring [] Yes [] No      Purposeful Hourly Rounding [] Yes    Kalia Score Total Score: 5   Kalia score 3 or > [] Bed Alarm [] Avasys [] 1:1 sitter [] Patient refused (Place signed refusal form in chart)      Pain Managed [] Yes [] No    Key Pain Meds     The patient is on no pain meds. Influenza Vaccine Received Flu Vaccine for Current Season (usually Sept-March): Yes           Oxygen needs?  [] Room air Oxygen @  []1L    []2L    []3L   []4L    []5L   []6L     Use home O2? [] Yes [] No  Perform O2 challenge test using  smartphrase (.oxygenchallenge)      Last bowel movement Last Bowel Movement Date: 02/23/18  bowel movement      Urinary Catheter [REMOVED] Urinary Catheter 02/18/18 Mitchell - Temperature-Criteria for Appropriate Use: Obstruction/retention  Condom Catheter 02/23/18-Criteria for Appropriate Use: Comfort Care     [REMOVED] Urinary Catheter 02/18/18 Mitchell - Temperature-Urine Output (mL): 420 ml  Condom Catheter 02/23/18-Urine Output (mL): 1000 ml     LDAs               Peripheral IV 02/17/18 Left Antecubital (Active)   Site Assessment Clean, dry, & intact 2/24/2018  3:22 AM   Phlebitis Assessment 0 2/24/2018  3:22 AM   Infiltration Assessment 0 2/24/2018  3:22 AM   Dressing Status Clean, dry, & intact 2/24/2018  3:22 AM   Dressing Type Tape;Transparent 2/24/2018  3:22 AM   Hub Color/Line Status Pink;Capped;Flushed;Patent 2/24/2018  3:22 AM   Action Taken Blood drawn 2/17/2018 11:20 AM       Peripheral IV 02/17/18 Right Antecubital (Active)   Site Assessment Clean, dry, & intact 2/24/2018  3:22 AM   Phlebitis Assessment 0 2/24/2018  3:22 AM   Infiltration Assessment 0 2/24/2018  3:22 AM   Dressing Status Clean, dry, & intact 2/24/2018  3:22 AM   Dressing Type Tape;Transparent 2/24/2018  3:22 AM   Hub Color/Line Status Pink;Capped;Flushed;Patent 2/24/2018  3:22 AM          Condom Catheter 02/23/18 (Active)   Criteria for Appropriate Use Comfort Care 2/24/2018  3:22 AM   Status Draining;Patent 2/24/2018  3:22 AM   Site Condition No abnormalities 2/24/2018  3:22 AM   Drainage Tube Clipped to Bed Yes 2/24/2018  3:22 AM   Catheter Secured to Thigh No 2/24/2018  3:22 AM   Tamper Seal Intact No 2/24/2018  3:22 AM   Bag Below Bladder/Not on Floor Yes 2/24/2018  3:22 AM   Lack of Dependent Loop in Tubing Yes 2/24/2018  3:22 AM   Drainage Bag Less Than Half Full Yes 2/24/2018  3:22 AM   Sterile Solution Used for  Irrigation N/A 2/24/2018  3:22 AM   Urine Output (mL) 1000 ml 2/23/2018  8:56 PM                   Readmission Risk Assessment Tool Score Medium Risk            18       Total Score        3 Patient Length of Stay (>5 days = 3)    9 Pt. Coverage (Medicare=5 , Medicaid, or Self-Pay=4)    6 Charlson Comorbidity Score (Age + Comorbid Conditions)        Criteria that do not apply:    Has Seen PCP in Last 6 Months (Yes=3, No=0)    . Living with Significant Other. Assisted Living. LTAC. SNF.  or   Rehab    IP Visits Last 12 Months (1-3=4, 4=9, >4=11)       Expected Length of Stay 4d 12h   Actual Length of Stay 184 CALLIE Chan RN

## 2018-02-25 LAB
GLUCOSE BLD STRIP.AUTO-MCNC: 137 MG/DL (ref 65–100)
GLUCOSE BLD STRIP.AUTO-MCNC: 162 MG/DL (ref 65–100)
GLUCOSE BLD STRIP.AUTO-MCNC: 171 MG/DL (ref 65–100)
GLUCOSE BLD STRIP.AUTO-MCNC: 209 MG/DL (ref 65–100)
SERVICE CMNT-IMP: ABNORMAL

## 2018-02-25 PROCEDURE — 74011250637 HC RX REV CODE- 250/637: Performed by: INTERNAL MEDICINE

## 2018-02-25 PROCEDURE — 94640 AIRWAY INHALATION TREATMENT: CPT

## 2018-02-25 PROCEDURE — 65660000000 HC RM CCU STEPDOWN

## 2018-02-25 PROCEDURE — 74011000250 HC RX REV CODE- 250: Performed by: INTERNAL MEDICINE

## 2018-02-25 PROCEDURE — 74011636637 HC RX REV CODE- 636/637: Performed by: HOSPITALIST

## 2018-02-25 PROCEDURE — 77010033678 HC OXYGEN DAILY

## 2018-02-25 PROCEDURE — 82962 GLUCOSE BLOOD TEST: CPT

## 2018-02-25 PROCEDURE — 74011250637 HC RX REV CODE- 250/637: Performed by: HOSPITALIST

## 2018-02-25 RX ORDER — IPRATROPIUM BROMIDE AND ALBUTEROL SULFATE 2.5; .5 MG/3ML; MG/3ML
SOLUTION RESPIRATORY (INHALATION)
Status: DISPENSED
Start: 2018-02-25 | End: 2018-02-26

## 2018-02-25 RX ADMIN — METFORMIN HYDROCHLORIDE 1000 MG: 500 TABLET, EXTENDED RELEASE ORAL at 09:51

## 2018-02-25 RX ADMIN — FOLIC ACID 1 MG: 1 TABLET ORAL at 09:50

## 2018-02-25 RX ADMIN — IPRATROPIUM BROMIDE AND ALBUTEROL SULFATE 3 ML: .5; 3 SOLUTION RESPIRATORY (INHALATION) at 09:12

## 2018-02-25 RX ADMIN — IPRATROPIUM BROMIDE AND ALBUTEROL SULFATE 3 ML: .5; 3 SOLUTION RESPIRATORY (INHALATION) at 21:37

## 2018-02-25 RX ADMIN — INSULIN LISPRO 4 UNITS: 100 INJECTION, SOLUTION INTRAVENOUS; SUBCUTANEOUS at 12:19

## 2018-02-25 RX ADMIN — DIVALPROEX SODIUM 1000 MG: 250 TABLET, DELAYED RELEASE ORAL at 18:08

## 2018-02-25 RX ADMIN — GUAIFENESIN AND DEXTROMETHORPHAN 10 ML: 100; 10 SYRUP ORAL at 02:09

## 2018-02-25 RX ADMIN — CALCIUM CARBONATE 500 MG (1,250 MG)-VITAMIN D3 200 UNIT TABLET 1 TABLET: at 09:51

## 2018-02-25 RX ADMIN — INSULIN LISPRO 3 UNITS: 100 INJECTION, SOLUTION INTRAVENOUS; SUBCUTANEOUS at 18:08

## 2018-02-25 RX ADMIN — TOPIRAMATE 300 MG: 100 TABLET, FILM COATED ORAL at 09:51

## 2018-02-25 RX ADMIN — ATORVASTATIN CALCIUM 40 MG: 40 TABLET, FILM COATED ORAL at 21:57

## 2018-02-25 RX ADMIN — METFORMIN HYDROCHLORIDE 1000 MG: 500 TABLET, EXTENDED RELEASE ORAL at 18:08

## 2018-02-25 RX ADMIN — GUAIFENESIN AND DEXTROMETHORPHAN 10 ML: 100; 10 SYRUP ORAL at 23:50

## 2018-02-25 RX ADMIN — IPRATROPIUM BROMIDE AND ALBUTEROL SULFATE 3 ML: .5; 3 SOLUTION RESPIRATORY (INHALATION) at 15:13

## 2018-02-25 RX ADMIN — DIVALPROEX SODIUM 500 MG: 250 TABLET, DELAYED RELEASE ORAL at 09:51

## 2018-02-25 RX ADMIN — LINAGLIPTIN 5 MG: 5 TABLET, FILM COATED ORAL at 09:50

## 2018-02-25 RX ADMIN — TOPIRAMATE 300 MG: 100 TABLET, FILM COATED ORAL at 18:07

## 2018-02-25 RX ADMIN — TAMSULOSIN HYDROCHLORIDE 0.4 MG: 0.4 CAPSULE ORAL at 09:50

## 2018-02-25 RX ADMIN — GLIMEPIRIDE 8 MG: 4 TABLET ORAL at 09:50

## 2018-02-25 RX ADMIN — GUAIFENESIN AND DEXTROMETHORPHAN 10 ML: 100; 10 SYRUP ORAL at 16:07

## 2018-02-25 RX ADMIN — IPRATROPIUM BROMIDE AND ALBUTEROL SULFATE 3 ML: .5; 3 SOLUTION RESPIRATORY (INHALATION) at 03:22

## 2018-02-25 RX ADMIN — INSULIN LISPRO 3 UNITS: 100 INJECTION, SOLUTION INTRAVENOUS; SUBCUTANEOUS at 07:56

## 2018-02-25 NOTE — PROGRESS NOTES
Consult noted and not completed so Oncology CM can see: [facility] plan for tomorrow (2/26) to SNF.   CM noted that Pt has been accepted to [facility]. CM called [phone] to the SNF and left VM for them to let them know that Pt may be ready to transition to SNF on 2/26/18. CM will continue to monitor discharge plan.      Meryl Otero, [address]   Ext [phone]

## 2018-02-25 NOTE — ROUTINE PROCESS
Oncology Nursing Communication Tool  8:04 AM  2018     Bedside shift change report given to Nazia Santiago RN (incoming nurse) by Valdez Milner RN (outgoing nurse) on . Report included the following information SBAR, Kardex, Intake/Output and Recent Results. Shift Summary: Tele orders noted to be  during report & IVs due for resite. On call MD informed & orders given to d/c tele & IVs. No labs ordered. Pt for probable d/c Monday. Issues for physician to address: none         Oncology Shift Note   Admission Date 2018   Admission Diagnosis Influenza  Acute respiratory failure ([facility] [address]   Code Status Full Code   Consults None      Cardiac Monitoring [x] Yes [x] No      Purposeful Hourly Rounding [x] Yes    Kalia Score Total Score: 5   Kalia score 3 or > [x] Bed Alarm [] Avasys [] 1:1 sitter [] Patient refused (Place signed refusal form in chart)      Pain Managed [x] Yes [] No    Key Pain Meds     The patient is on no pain meds. Influenza Vaccine Received Flu Vaccine for Current Season (usually Sept-March): Yes           Oxygen needs?  [] Room air Oxygen @  []1L    [x]2L    []3L   []4L    []5L   []6L     Use home O2? [] Yes [x] No  Perform O2 challenge test using  smartphrase (.oxygenchallenge)      Last bowel movement Last Bowel Movement Date: 18  bowel movement      Urinary Catheter [REMOVED] Urinary Catheter 18 Mitchell - Temperature-Criteria for Appropriate Use: Obstruction/retention  Condom Catheter 18-Criteria for Appropriate Use: Strict I/Os     [REMOVED] Urinary Catheter 18 Mitchell - Temperature-Urine Output (mL): 420 ml  Condom Catheter 18-Urine Output (mL): 375 ml     LDAs                     Condom Catheter 18 (Active)   Criteria for Appropriate Use Strict I/Os 2018  2:12 AM   Status Draining;Patent 2018  2:12 AM   Site Condition No abnormalities 2018  2:12 AM   Drainage Tube Clipped to Bed Yes 2/25/2018  2:12 AM   Catheter Secured to Thigh No 2/25/2018  2:12 AM   Tamper Seal Intact No 2/25/2018  2:12 AM   Bag Below Bladder/Not on Floor Yes 2/25/2018  2:12 AM   Lack of Dependent Loop in Tubing Yes 2/25/2018  2:12 AM   Drainage Bag Less Than Half Full Yes 2/25/2018  2:12 AM   Sterile Solution Used for  Irrigation N/A 2/25/2018  2:12 AM   Urine Output (mL) 375 ml 2/25/2018  6:56 AM                   Readmission Risk Assessment Tool Score Medium Risk            18       Total Score        3 Patient Length of Stay (>5 days = 3)    9 Pt. Coverage (Medicare=5 , Medicaid, or Self-Pay=4)    6 Charlson Comorbidity Score (Age + Comorbid Conditions)        Criteria that do not apply:    Has Seen PCP in Last 6 Months (Yes=3, No=0)    . Living with Significant Other. Assisted Living. LTAC. SNF.  or   Rehab    IP Visits Last 12 Months (1-3=4, 4=9, >4=11)       Expected Length of Stay 4d 12h   Actual Length of Stay [address], RN

## 2018-02-25 NOTE — PROGRESS NOTES
Oncology Nursing Communication Tool  7:03 PM  2/24/2018     Bedside shift change report given to Shmuel Moe RN (incoming nurse) by Juanita Moseley RN (outgoing nurse) on Cherylene Battles. Report included the following information SBAR. Shift Summary:       Issues for physician to address: Oncology Shift Note   Admission Date 2/17/2018   Admission Diagnosis Influenza  Acute respiratory failure ([address]   Code Status Full Code   Consults None      Cardiac Monitoring [x] Yes [] No      Purposeful Hourly Rounding [] Yes    Kalia Score Total Score: 5   Kalia score 3 or > [] Bed Alarm [] Avasys [] 1:1 sitter [] Patient refused (Place signed refusal form in chart)      Pain Managed [x] Yes [] No    Key Pain Meds     The patient is on no pain meds. Influenza Vaccine Received Flu Vaccine for Current Season (usually Sept-March): Yes           Oxygen needs?  [x] Room air Oxygen @  []1L    []2L    []3L   []4L    []5L   []6L     Use home O2? [] Yes [] No  Perform O2 challenge test using  smartphrase (.oxygenchallenge)      Last bowel movement Last Bowel Movement Date: 02/23/18  bowel movement      Urinary Catheter [REMOVED] Urinary Catheter 02/18/18 Mitchell - Temperature-Criteria for Appropriate Use: Obstruction/retention  Condom Catheter 02/23/18-Criteria for Appropriate Use: Comfort Care     [REMOVED] Urinary Catheter 02/18/18 Mitchell - Temperature-Urine Output (mL): 420 ml  Condom Catheter 02/23/18-Urine Output (mL): 1000 ml     LDAs               Peripheral IV 02/17/18 Left Antecubital (Active)   Site Assessment Clean, dry, & intact 2/24/2018  8:18 AM   Phlebitis Assessment 0 2/24/2018  8:18 AM   Infiltration Assessment 0 2/24/2018  8:18 AM   Dressing Status Clean, dry, & intact 2/24/2018  8:18 AM   Dressing Type Tape;Transparent 2/24/2018  8:18 AM   Hub Color/Line Status Pink 2/24/2018  8:18 AM   Action Taken Blood drawn 2/17/2018 11:20 AM   Alcohol Cap Used Yes 2/24/2018  8:18 AM Peripheral IV 02/17/18 Right Antecubital (Active)   Site Assessment Clean, dry, & intact 2/24/2018  8:18 AM   Phlebitis Assessment 0 2/24/2018  8:18 AM   Infiltration Assessment 0 2/24/2018  8:18 AM   Dressing Status Clean, dry, & intact 2/24/2018  8:18 AM   Dressing Type Tape;Transparent 2/24/2018  8:18 AM   Hub Color/Line Status Pink 2/24/2018  8:18 AM   Alcohol Cap Used Yes 2/24/2018  8:18 AM          Condom Catheter 02/23/18 (Active)   Criteria for Appropriate Use Comfort Care 2/24/2018  3:22 AM   Status Draining;Patent 2/24/2018  3:22 AM   Site Condition No abnormalities 2/24/2018  3:22 AM   Drainage Tube Clipped to Bed Yes 2/24/2018  3:22 AM   Catheter Secured to Thigh No 2/24/2018  3:22 AM   Tamper Seal Intact No 2/24/2018  3:22 AM   Bag Below Bladder/Not on Floor Yes 2/24/2018  3:22 AM   Lack of Dependent Loop in Tubing Yes 2/24/2018  3:22 AM   Drainage Bag Less Than Half Full Yes 2/24/2018  3:22 AM   Sterile Solution Used for  Irrigation N/A 2/24/2018  3:22 AM   Urine Output (mL) 1000 ml 2/24/2018  1:06 PM                   Readmission Risk Assessment Tool Score Medium Risk            18       Total Score        3 Patient Length of Stay (>5 days = 3)    9 Pt. Coverage (Medicare=5 , Medicaid, or Self-Pay=4)    6 Charlson Comorbidity Score (Age + Comorbid Conditions)        Criteria that do not apply:    Has Seen PCP in Last 6 Months (Yes=3, No=0)    . Living with Significant Other. Assisted Living. LTAC. SNF.  or   Rehab    IP Visits Last 12 Months (1-3=4, 4=9, >4=11)       Expected Length of Stay 4d 12h   Actual Length of Stay Kelly Pereyra RN

## 2018-02-26 LAB
GLUCOSE BLD STRIP.AUTO-MCNC: 124 MG/DL (ref 65–100)
GLUCOSE BLD STRIP.AUTO-MCNC: 173 MG/DL (ref 65–100)
GLUCOSE BLD STRIP.AUTO-MCNC: 203 MG/DL (ref 65–100)
GLUCOSE BLD STRIP.AUTO-MCNC: 207 MG/DL (ref 65–100)
SERVICE CMNT-IMP: ABNORMAL

## 2018-02-26 PROCEDURE — 65660000000 HC RM CCU STEPDOWN

## 2018-02-26 PROCEDURE — 97116 GAIT TRAINING THERAPY: CPT

## 2018-02-26 PROCEDURE — 77030029684 HC NEB SM VOL KT MONA -A

## 2018-02-26 PROCEDURE — 94640 AIRWAY INHALATION TREATMENT: CPT

## 2018-02-26 PROCEDURE — 82962 GLUCOSE BLOOD TEST: CPT

## 2018-02-26 PROCEDURE — 97110 THERAPEUTIC EXERCISES: CPT

## 2018-02-26 PROCEDURE — 74011000250 HC RX REV CODE- 250

## 2018-02-26 PROCEDURE — 74011636637 HC RX REV CODE- 636/637: Performed by: HOSPITALIST

## 2018-02-26 PROCEDURE — 74011250637 HC RX REV CODE- 250/637: Performed by: INTERNAL MEDICINE

## 2018-02-26 PROCEDURE — 74011250637 HC RX REV CODE- 250/637: Performed by: HOSPITALIST

## 2018-02-26 PROCEDURE — 74011000250 HC RX REV CODE- 250: Performed by: INTERNAL MEDICINE

## 2018-02-26 RX ORDER — IPRATROPIUM BROMIDE AND ALBUTEROL SULFATE 2.5; .5 MG/3ML; MG/3ML
SOLUTION RESPIRATORY (INHALATION)
Status: COMPLETED
Start: 2018-02-26 | End: 2018-02-26

## 2018-02-26 RX ADMIN — INSULIN LISPRO 2 UNITS: 100 INJECTION, SOLUTION INTRAVENOUS; SUBCUTANEOUS at 21:22

## 2018-02-26 RX ADMIN — FOLIC ACID 1 MG: 1 TABLET ORAL at 10:09

## 2018-02-26 RX ADMIN — IPRATROPIUM BROMIDE AND ALBUTEROL SULFATE 3 ML: .5; 3 SOLUTION RESPIRATORY (INHALATION) at 04:51

## 2018-02-26 RX ADMIN — DIVALPROEX SODIUM 1000 MG: 250 TABLET, DELAYED RELEASE ORAL at 18:44

## 2018-02-26 RX ADMIN — IPRATROPIUM BROMIDE AND ALBUTEROL SULFATE 3 ML: .5; 3 SOLUTION RESPIRATORY (INHALATION) at 21:30

## 2018-02-26 RX ADMIN — TOPIRAMATE 300 MG: 100 TABLET, FILM COATED ORAL at 18:44

## 2018-02-26 RX ADMIN — LINAGLIPTIN 5 MG: 5 TABLET, FILM COATED ORAL at 10:09

## 2018-02-26 RX ADMIN — IPRATROPIUM BROMIDE AND ALBUTEROL SULFATE 3 ML: .5; 3 SOLUTION RESPIRATORY (INHALATION) at 04:00

## 2018-02-26 RX ADMIN — ATORVASTATIN CALCIUM 40 MG: 40 TABLET, FILM COATED ORAL at 21:22

## 2018-02-26 RX ADMIN — METFORMIN HYDROCHLORIDE 1000 MG: 500 TABLET, EXTENDED RELEASE ORAL at 18:44

## 2018-02-26 RX ADMIN — TAMSULOSIN HYDROCHLORIDE 0.4 MG: 0.4 CAPSULE ORAL at 10:09

## 2018-02-26 RX ADMIN — TOPIRAMATE 300 MG: 100 TABLET, FILM COATED ORAL at 10:09

## 2018-02-26 RX ADMIN — DIVALPROEX SODIUM 500 MG: 250 TABLET, DELAYED RELEASE ORAL at 10:09

## 2018-02-26 RX ADMIN — METFORMIN HYDROCHLORIDE 1000 MG: 500 TABLET, EXTENDED RELEASE ORAL at 10:09

## 2018-02-26 RX ADMIN — IPRATROPIUM BROMIDE AND ALBUTEROL SULFATE 3 ML: .5; 3 SOLUTION RESPIRATORY (INHALATION) at 14:52

## 2018-02-26 RX ADMIN — CALCIUM CARBONATE 500 MG (1,250 MG)-VITAMIN D3 200 UNIT TABLET 1 TABLET: at 10:09

## 2018-02-26 RX ADMIN — GUAIFENESIN AND DEXTROMETHORPHAN 10 ML: 100; 10 SYRUP ORAL at 12:53

## 2018-02-26 RX ADMIN — GUAIFENESIN AND DEXTROMETHORPHAN 10 ML: 100; 10 SYRUP ORAL at 06:37

## 2018-02-26 RX ADMIN — INSULIN LISPRO 3 UNITS: 100 INJECTION, SOLUTION INTRAVENOUS; SUBCUTANEOUS at 10:08

## 2018-02-26 RX ADMIN — GUAIFENESIN AND DEXTROMETHORPHAN 10 ML: 100; 10 SYRUP ORAL at 21:27

## 2018-02-26 RX ADMIN — INSULIN LISPRO 4 UNITS: 100 INJECTION, SOLUTION INTRAVENOUS; SUBCUTANEOUS at 12:41

## 2018-02-26 RX ADMIN — IPRATROPIUM BROMIDE AND ALBUTEROL SULFATE 3 ML: .5; 3 SOLUTION RESPIRATORY (INHALATION) at 08:30

## 2018-02-26 RX ADMIN — GLIMEPIRIDE 8 MG: 4 TABLET ORAL at 10:09

## 2018-02-26 NOTE — PROGRESS NOTES
Hospitalist Progress Note    NAME: Daryl Nicole   :  1955   MRN:  [id number]       Assessment / Plan:  Acute hypoxic respiratory failure, POA  Resolved, so far tolerating room air. No distress  Influenza A causing Acute bronchospasm   -completed Tamiflu ( 5 days)   -Duonebs changed to albuterol nebs given urinary retention  - PA and Lat CXR shows atelectasis--> c/w incentive spirometry  -so far tolerating room air. DM type 2, uncontrolled-- A1c was 9.8 2017.  -c/w  metformin XR 1000 mg bid , c/w amaryl  -added  Tradjenta  So far better control  -continue SSI, HbA1C 9.0  Dehydration: so far resolved. 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 Depakote level 91, ammonia wnl   Due to MR for SNF placement I was informed that patient will need Level 2. Application for Level 2 was sent as per CM process can take up to a week. Hyperlipidemia continue lipitor  Chronic thrombocytopenia, ?due to depakote  -monitor, plts are trending up, monitor. Urinary Retention: overnight from  into ; likely from ipratropium (see changes above  -continue Flomax  -richards was D/Tacho  Psoriasis continue triamcinolone prn  Generalized weakness, difficulty standing  -Consult PT/OT  Hypokalemia: replete and monitor  Obesity: Body mass index is 30.17 kg/(m^2). Code:  Full  DVT prophylaxis: SCD  Surrogate decision maker: Mother Muna Ramos    Patient doing fine may be able to D/c to SNF whenever level 2 is completed  No new issues, level 2 still not completed as per CM needs to wait until the end of the week, mother can not manage patient at home.      Subjective:     Chief Complaint / Reason for Physician Visit: follow-up respiratory failure  \"I feel OK\"    Review of Systems:  Symptom Y/N Comments  Symptom Y/N Comments   Fever/Chills    Chest Pain n    Poor Appetite    Edema     Cough y   Abdominal Pain n    Sputum    Joint Pain     SOB/TREVINO n   Pruritis/Rash Nausea/vomit    Tolerating PT/OT     Diarrhea    Tolerating Diet y    Constipation    Other       Could NOT obtain due to:      Objective:     VITALS:   Last 24hrs VS reviewed since prior progress note. Most recent are:  Patient Vitals for the past 24 hrs:   Temp Pulse Resp BP SpO2   02/26/18 0830 - - - - 93 %   02/26/18 0720 99 °F (37.2 °C) 72 18 128/71 92 %   02/26/18 0449 - - - - 92 %   02/25/18 2347 98.4 °F (36.9 °C) 79 20 126/63 93 %   02/25/18 2133 - - - - 92 %   02/25/18 1909 98.6 °F (37 °C) 83 18 120/73 94 %   02/25/18 1622 98.9 °F (37.2 °C) 81 20 (!) 129/93 93 %   02/25/18 1513 - - - - 92 %       Intake/Output Summary (Last 24 hours) at 02/26/18 1102  Last data filed at 02/26/18 0634   Gross per 24 hour   Intake              200 ml   Output             1051 ml   Net             -851 ml        PHYSICAL EXAM:  General: WD, WN. Alert, cooperative, no acute distress   EENT:  EOMI. Anicteric sclerae. MMM  Resp:  Coarse BS, rhonchi  CV:  Regular  rhythm,  No edema  GI:  Soft, Non distended, Non tender.  +Bowel sounds  Neurologic:  Alert, answers simple questions,   Psych:   Poor insight. Not anxious nor agitated at this time  Skin:  Psoriasis noted. No jaundice    Reviewed most current lab test results and cultures  YES  Reviewed most current radiology test results   YES  Review and summation of old records today    NO  Reviewed patient's current orders and MAR    YES  PMH/ reviewed - no change compared to H&P  ________________________________________________________________________  Care Plan discussed with:    Comments   Patient x    Family  y mother   RN x    Care Manager x    Consultant                        Multidiciplinary team rounds were held today with , nursing, pharmacist and clinical coordinator. Patient's plan of care was discussed; medications were reviewed and discharge planning was addressed.      ________________________________________________________________________  Total NON critical care TIME:  10 Minutes    Total CRITICAL CARE TIME Spent:   Minutes non procedure based      Comments   >50% of visit spent in counseling and coordination of care y    ________________________________________________________________________  Jameson Hare MD     Procedures: see electronic medical records for all procedures/Xrays and details which were not copied into this note but were reviewed prior to creation of Plan. LABS:  I reviewed today's most current labs and imaging studies. Pertinent labs include:  No results for input(s): WBC, HGB, HCT, PLT, HGBEXT, HCTEXT, PLTEXT, HGBEXT, HCTEXT, PLTEXT in the last 72 hours. No results for input(s): NA, K, CL, CO2, GLU, BUN, CREA, CA, MG, PHOS, ALB, TBIL, TBILI, SGOT, ALT, INR in the last 72 hours.     No lab exists for component: [place], INREXT    Signed: Jameson Hare MD

## 2018-02-26 NOTE — ROUTINE PROCESS
Oncology Nursing Communication Tool  6:00 AM  2/26/2018     Bedside shift change report given to Salud Addison RN (incoming nurse) by Janae Jones RN (outgoing nurse) on Marlon Comas. Report included the following information SBAR, Kardex, Intake/Output, MAR and Recent Results. Shift Summary: pt for possible discharge today. Unclear if to home with his mom or SNF? Pt's mom would like MD to call her this am after rounds. BMs yesterday, condom cath in use. No iv, no tele. Issues for physician to address: discharge disposition? Call pt's mom with d/c details. Oncology Shift Note   Admission Date 2/17/2018   Admission Diagnosis Influenza  Acute respiratory failure ([facility] [address]   Code Status Full Code   Consults None      Cardiac Monitoring [] Yes [x] No      Purposeful Hourly Rounding [x] Yes    Kalia Score Total Score: 5   Kalia score 3 or > [x] Bed Alarm [] Avasys [] 1:1 sitter [] Patient refused (Place signed refusal form in chart)      Pain Managed [x] Yes [] No    Key Pain Meds     The patient is on no pain meds. Influenza Vaccine Received Flu Vaccine for Current Season (usually Sept-March): Yes           Oxygen needs?  [x] Room air Oxygen @  []1L    []2L    []3L   []4L    []5L   []6L     Use home O2? [] Yes [x] No  Perform O2 challenge test using  smartphrase (.oxygenchallenge)      Last bowel movement Last Bowel Movement Date: 02/26/18  bowel movement      Urinary Catheter [REMOVED] Urinary Catheter 02/18/18 Mitchell - Temperature-Criteria for Appropriate Use: Obstruction/retention  Condom Catheter 02/23/18-Criteria for Appropriate Use: Strict I/Os     [REMOVED] Urinary Catheter 02/18/18 Mitchell - Temperature-Urine Output (mL): 420 ml  Condom Catheter 02/23/18-Urine Output (mL): 600 ml     LDAs                     Condom Catheter 02/23/18 (Active)   Criteria for Appropriate Use Strict I/Os 2/25/2018  7:49 PM   Status Draining;Patent 2/25/2018  7:49 PM   Site Condition No abnormalities 2/25/2018  7:49 PM   Drainage Tube Clipped to Bed Yes 2/25/2018  7:49 PM   Catheter Secured to Thigh No 2/25/2018  7:49 PM   Tamper Seal Intact No 2/25/2018  7:49 PM   Bag Below Bladder/Not on Floor Yes 2/25/2018  7:49 PM   Lack of Dependent Loop in Tubing Yes 2/25/2018  7:49 PM   Drainage Bag Less Than Half Full Yes 2/25/2018  7:49 PM   Sterile Solution Used for  Irrigation N/A 2/25/2018  7:49 PM   Urine Output (mL) 600 ml 2/25/2018 11:27 PM                   Readmission Risk Assessment Tool Score Medium Risk            18       Total Score        3 Patient Length of Stay (>5 days = 3)    9 Pt. Coverage (Medicare=5 , Medicaid, or Self-Pay=4)    6 Charlson Comorbidity Score (Age + Comorbid Conditions)        Criteria that do not apply:    Has Seen PCP in Last 6 Months (Yes=3, No=0)    . Living with Significant Other. Assisted Living. LTAC. SNF.  or   Rehab    IP Visits Last 12 Months (1-3=4, 4=9, >4=11)       Expected Length of Stay 4d 12h   Actual Length of Stay [address], RN

## 2018-02-26 NOTE — INTERDISCIPLINARY ROUNDS
Oncology Interdisciplinary rounds were held today to discuss patient plan of care and outcomes. The following members were present: Nursing, Case Management, Pharmacy and Dietary.     Actual Length of Stay: 9    DRG GLOS: 4.5    Expected Length of Stay: 4d 12h                Plan for Day        Mobility        Plan for Stay      Plan for Way   Continue current plan Up with assistance PT/OT Continue current treatment plan [facility] 3/1 for Rehab

## 2018-02-26 NOTE — PROGRESS NOTES
CM contacted [facility] regarding Level 2 - patient was assessed last week and [facility] expects decision to be confirmed 2/28 or 3/1 with FAX to be sent to . Patient could not be discharged to SNF without Level 2 approval prior to that time. If patient able to discharge home with his mother/family - he would need home health arrangements. Rico Barron RN, BSN, [facility]   - Medical Oncology  [phone]    ADDENDUM:    CM spoke to patient's mother and his mental health  - Kavtiha Morris - with [facility]. Patient will need to go to rehab prior to going home due to mother's recent illness and patient needing increased assistance. Will expect discharge to [facility] when FAX received from [facility] on 2/28 or 3/1/2018.     Rico Barron RN, BSN, [facility]   - Medical Oncology  [phone]

## 2018-02-26 NOTE — PROGRESS NOTES
Problem: Mobility Impaired (Adult and Pediatric)  Goal: *Acute Goals and Plan of Care (Insert Text)  Physical Therapy Goals  Initiated 2/19/2018  1. Patient will move from supine to sit and sit to supine  in bed with independence within 7 day(s). 2.  Patient will transfer from bed to chair and chair to bed with supervision/set-up using the least restrictive device within 7 day(s). 3.  Patient will perform sit to stand with supervision/set-up within 7 day(s). 4.  Patient will ambulate with supervision/set-up for 100 feet with the least restrictive device within 7 day(s). physical Therapy TREATMENT: WEEKLY REASSESSMENT  Patient: Radha Horn (78 y.o. male)  Date: 2/26/2018  Diagnosis: Influenza  Acute respiratory failure ([facility] [address] Acute respiratory failure (HCC)       Precautions: Fall (Droplet)  Chart, physical therapy assessment, plan of care and goals were reviewed. ASSESSMENT:  Pt supine in bed with his mother and mental health advisor present in room. Advisor and mother both concerned pt had not been up and working with therapy since his admission however pt has been seen by PT 5 times and OT twice. Reviewed last sessions with advisor and mother. Pt moving well today requiring at most Min A for mobility. Received clarification from mother on pt's mobility. PTA pt ambulated without gait impairments and was not using RW. Pt cuts the lawn and carries wood into the house. Pt performed AROM sitting on the EOB prior to standing. CGA for bed mobility, Min A to stand to RW and Min A to stand without RW for stability. Initial gait training with walker however pt shows poor management of walker(pushing to quickly, too far forwards, feet hitting the outside of the walker feet). Seated rest break before ambulating second trial with HHA and no AD. Pt displays wide BRUCE and high guard UE positioning. Pt left up in chair at end of session. Plan for discharge to rehab when approval comes through.   Patient's progression toward goals since last assessment: Ambulating increased distance w/ and W/o AD     PLAN:  Goals have been updated based on progression since last assessment. Patient continues to benefit from skilled intervention to address the above impairments. Continue to follow the patient 4 times a week to address goals. Planned Interventions:  [x]              Bed Mobility Training             [x]       Neuromuscular Re-Education  [x]              Transfer Training                   []       Orthotic/Prosthetic Training  [x]              Gait Training                         []       Modalities  [x]              Therapeutic Exercises           []       Edema Management/Control  [x]              Therapeutic Activities            [x]       Patient and Family Training/Education  []              Other (comment):  Discharge Recommendations: Skilled Nursing Facility  Further Equipment Recommendations for Discharge: None needed     SUBJECTIVE:   Patient stated 1,2,10.    OBJECTIVE DATA SUMMARY:   Critical Behavior:  Neurologic State: Alert, Confused  Orientation Level: Oriented to person, Oriented to place  Cognition: Appropriate safety awareness, Follows commands  Safety/Judgement: Decreased awareness of need for safety, Decreased insight into deficits    Strength:                          Functional Mobility Training:  Bed Mobility:     Supine to Sit: Contact guard assistance  Sit to Supine: Contact guard assistance           Transfers:  Sit to Stand: Minimum assistance (rocking for momentum)  Stand to Sit: Minimum assistance                             Balance:  Sitting: Intact  Standing: Impaired; With support  Standing - Static: Good;Constant support  Standing - Dynamic : Fair  Ambulation/Gait Training:  Distance (ft): 75 Feet (ft) (75ft with RW, 150 without RW)  Assistive Device: Gait belt;Walker, rolling; Other (comment) (HHA)  Ambulation - Level of Assistance: Minimal assistance (Min A with RW and W/O RW) Gait Abnormalities: Decreased step clearance;Trunk sway increased (ER LEs, high guard hand positioning)        Base of Support: Widened     Speed/Doris: Fluctuations  Step Length: Right shortened;Left shortened              Therapeutic Exercises:   Seated Marching, Heel Raises, LAQ x 10 reps erasmo  Seated shoulder flexion, shrugs, punches x 5 reps        Activity Tolerance:   Good  Please refer to the flowsheet for vital signs taken during this treatment.   After treatment:   [x]  Patient left in no apparent distress sitting up in chair  []  Patient left in no apparent distress in bed  [x]  Call bell left within reach  [x]  Nursing notified  [x]  Caregiver present  []  Bed alarm activated    COMMUNICATION/COLLABORATION:   The patients plan of care was discussed with: Registered Nurse    Caitie Whitaker PT   Time Calculation: 46 mins

## 2018-02-27 LAB
ALBUMIN SERPL-MCNC: 2.4 G/DL (ref 3.5–5)
ALBUMIN/GLOB SERPL: 0.6 {RATIO} (ref 1.1–2.2)
ALP SERPL-CCNC: 54 U/L (ref 45–117)
ALT SERPL-CCNC: 48 U/L (ref 12–78)
ANION GAP SERPL CALC-SCNC: 11 MMOL/L (ref 5–15)
AST SERPL-CCNC: 20 U/L (ref 15–37)
BASOPHILS # BLD: 0.1 K/UL (ref 0–0.1)
BASOPHILS NFR BLD: 1 % (ref 0–1)
BILIRUB SERPL-MCNC: 0.4 MG/DL (ref 0.2–1)
BUN SERPL-MCNC: 17 MG/DL (ref 6–20)
BUN/CREAT SERPL: 25 (ref 12–20)
CALCIUM SERPL-MCNC: 8.6 MG/DL (ref 8.5–10.1)
CHLORIDE SERPL-SCNC: 109 MMOL/L (ref 97–108)
CO2 SERPL-SCNC: 19 MMOL/L (ref 21–32)
CREAT SERPL-MCNC: 0.68 MG/DL (ref 0.7–1.3)
DIFFERENTIAL METHOD BLD: ABNORMAL
EOSINOPHIL # BLD: 0.9 K/UL (ref 0–0.4)
EOSINOPHIL NFR BLD: 8 % (ref 0–7)
ERYTHROCYTE [DISTWIDTH] IN BLOOD BY AUTOMATED COUNT: 13.5 % (ref 11.5–14.5)
GLOBULIN SER CALC-MCNC: 3.8 G/DL (ref 2–4)
GLUCOSE BLD STRIP.AUTO-MCNC: 132 MG/DL (ref 65–100)
GLUCOSE BLD STRIP.AUTO-MCNC: 135 MG/DL (ref 65–100)
GLUCOSE BLD STRIP.AUTO-MCNC: 155 MG/DL (ref 65–100)
GLUCOSE BLD STRIP.AUTO-MCNC: 206 MG/DL (ref 65–100)
GLUCOSE SERPL-MCNC: 130 MG/DL (ref 65–100)
HCT VFR BLD AUTO: 40.3 % (ref 36.6–50.3)
HGB BLD-MCNC: 12.9 G/DL (ref 12.1–17)
IMM GRANULOCYTES # BLD: 0 K/UL (ref 0–0.04)
IMM GRANULOCYTES NFR BLD AUTO: 0 % (ref 0–0.5)
LYMPHOCYTES # BLD: 4.4 K/UL (ref 0.8–3.5)
LYMPHOCYTES NFR BLD: 39 % (ref 12–49)
MAGNESIUM SERPL-MCNC: 1.9 MG/DL (ref 1.6–2.4)
MCH RBC QN AUTO: 30.2 PG (ref 26–34)
MCHC RBC AUTO-ENTMCNC: 32 G/DL (ref 30–36.5)
MCV RBC AUTO: 94.4 FL (ref 80–99)
METAMYELOCYTES NFR BLD MANUAL: 4 %
MONOCYTES # BLD: 0.6 K/UL (ref 0–1)
MONOCYTES NFR BLD: 5 % (ref 5–13)
MYELOCYTES NFR BLD MANUAL: 1 %
NEUTS BAND NFR BLD MANUAL: 8 %
NEUTS SEG # BLD: 4.7 K/UL (ref 1.8–8)
NEUTS SEG NFR BLD: 34 % (ref 32–75)
NRBC # BLD: 0 K/UL (ref 0–0.01)
NRBC BLD-RTO: 0 PER 100 WBC
PLATELET # BLD AUTO: 180 K/UL (ref 150–400)
PMV BLD AUTO: 9.3 FL (ref 8.9–12.9)
POTASSIUM SERPL-SCNC: 4.1 MMOL/L (ref 3.5–5.1)
PROT SERPL-MCNC: 6.2 G/DL (ref 6.4–8.2)
RBC # BLD AUTO: 4.27 M/UL (ref 4.1–5.7)
RBC MORPH BLD: ABNORMAL
SERVICE CMNT-IMP: ABNORMAL
SODIUM SERPL-SCNC: 139 MMOL/L (ref 136–145)
WBC # BLD AUTO: 11.3 K/UL (ref 4.1–11.1)

## 2018-02-27 PROCEDURE — 82962 GLUCOSE BLOOD TEST: CPT

## 2018-02-27 PROCEDURE — 85025 COMPLETE CBC W/AUTO DIFF WBC: CPT | Performed by: INTERNAL MEDICINE

## 2018-02-27 PROCEDURE — 94640 AIRWAY INHALATION TREATMENT: CPT

## 2018-02-27 PROCEDURE — 83735 ASSAY OF MAGNESIUM: CPT | Performed by: INTERNAL MEDICINE

## 2018-02-27 PROCEDURE — 74011636637 HC RX REV CODE- 636/637: Performed by: HOSPITALIST

## 2018-02-27 PROCEDURE — 97535 SELF CARE MNGMENT TRAINING: CPT | Performed by: OCCUPATIONAL THERAPIST

## 2018-02-27 PROCEDURE — 97116 GAIT TRAINING THERAPY: CPT | Performed by: PHYSICAL THERAPIST

## 2018-02-27 PROCEDURE — 36415 COLL VENOUS BLD VENIPUNCTURE: CPT | Performed by: INTERNAL MEDICINE

## 2018-02-27 PROCEDURE — 97530 THERAPEUTIC ACTIVITIES: CPT | Performed by: OCCUPATIONAL THERAPIST

## 2018-02-27 PROCEDURE — 74011250637 HC RX REV CODE- 250/637: Performed by: INTERNAL MEDICINE

## 2018-02-27 PROCEDURE — 80053 COMPREHEN METABOLIC PANEL: CPT | Performed by: INTERNAL MEDICINE

## 2018-02-27 PROCEDURE — 74011250637 HC RX REV CODE- 250/637: Performed by: HOSPITALIST

## 2018-02-27 PROCEDURE — 65660000000 HC RM CCU STEPDOWN

## 2018-02-27 PROCEDURE — 74011000250 HC RX REV CODE- 250: Performed by: INTERNAL MEDICINE

## 2018-02-27 RX ADMIN — GUAIFENESIN AND DEXTROMETHORPHAN 10 ML: 100; 10 SYRUP ORAL at 14:45

## 2018-02-27 RX ADMIN — IPRATROPIUM BROMIDE AND ALBUTEROL SULFATE 3 ML: .5; 3 SOLUTION RESPIRATORY (INHALATION) at 09:13

## 2018-02-27 RX ADMIN — DIVALPROEX SODIUM 500 MG: 250 TABLET, DELAYED RELEASE ORAL at 08:43

## 2018-02-27 RX ADMIN — LINAGLIPTIN 5 MG: 5 TABLET, FILM COATED ORAL at 08:44

## 2018-02-27 RX ADMIN — TOPIRAMATE 300 MG: 100 TABLET, FILM COATED ORAL at 08:43

## 2018-02-27 RX ADMIN — INSULIN LISPRO 3 UNITS: 100 INJECTION, SOLUTION INTRAVENOUS; SUBCUTANEOUS at 08:41

## 2018-02-27 RX ADMIN — INSULIN LISPRO 4 UNITS: 100 INJECTION, SOLUTION INTRAVENOUS; SUBCUTANEOUS at 12:33

## 2018-02-27 RX ADMIN — ATORVASTATIN CALCIUM 40 MG: 40 TABLET, FILM COATED ORAL at 21:20

## 2018-02-27 RX ADMIN — GLIMEPIRIDE 8 MG: 4 TABLET ORAL at 08:43

## 2018-02-27 RX ADMIN — TAMSULOSIN HYDROCHLORIDE 0.4 MG: 0.4 CAPSULE ORAL at 08:44

## 2018-02-27 RX ADMIN — GUAIFENESIN AND DEXTROMETHORPHAN 10 ML: 100; 10 SYRUP ORAL at 21:37

## 2018-02-27 RX ADMIN — METFORMIN HYDROCHLORIDE 1000 MG: 500 TABLET, EXTENDED RELEASE ORAL at 08:44

## 2018-02-27 RX ADMIN — METFORMIN HYDROCHLORIDE 1000 MG: 500 TABLET, EXTENDED RELEASE ORAL at 18:13

## 2018-02-27 RX ADMIN — DIVALPROEX SODIUM 1000 MG: 250 TABLET, DELAYED RELEASE ORAL at 18:13

## 2018-02-27 RX ADMIN — FOLIC ACID 1 MG: 1 TABLET ORAL at 08:44

## 2018-02-27 RX ADMIN — TOPIRAMATE 300 MG: 100 TABLET, FILM COATED ORAL at 18:13

## 2018-02-27 RX ADMIN — CALCIUM CARBONATE 500 MG (1,250 MG)-VITAMIN D3 200 UNIT TABLET 1 TABLET: at 08:44

## 2018-02-27 RX ADMIN — IPRATROPIUM BROMIDE AND ALBUTEROL SULFATE 3 ML: .5; 3 SOLUTION RESPIRATORY (INHALATION) at 03:34

## 2018-02-27 NOTE — PROGRESS NOTES
Occupational Therapy Goals  Initiated 2/19/2018, Goals remain appropriate as per 2/27 weekly reevaluation. 1.  Patient will perform grooming while standing at the sink with supervision/standby assist within 7 day(s). 2.  Patient will perform bathing with contact guard assist within 7 day(s). 3.  Patient will perform lower body dressing with supervision/standby assist within 7 day(s). 4.  Patient will perform toilet transfers with supervision/set-up within 7 day(s). 5.  Patient will perform all aspects of toileting with contact guard assist within 7 day(s). Occupational Therapy TREATMENT: WEEKLY REASSESSMENT  Patient: Davis Han (89 y.o. male)  Date: 2/27/2018  Diagnosis: Influenza  Acute respiratory failure ([address] Acute respiratory failure ([facility] [address]       Precautions: Fall (Droplet)  Chart, occupational therapy assessment, plan of care, and goals were reviewed. ASSESSMENT:  Patient requiring some coaxing to participate in ADLs, but did so as requested. He remains limited in his overall functional independence due to continued GW, decreased activity tolerance, decreased safety awareness and decreased balance. He was overall able to perform bed mobility at a supervision level, is CGA for transfers and ambulation, performed  grooming with CGA and was as much as mod A for toileting and LB dressing. Cueing needed for safety during mobility, and for attention and problem solving during ADLs. Balance was fair during standing ADLs and ambulation with a RW, but no significant LOB occurred during this session. At this point he continues to benefit from OT services and will need SNF rehab after discharge.    Progression toward goals:  []          Improving appropriately and progressing toward goals  [x]          Improving slowly and progressing toward goals  []          Not making progress toward goals and plan of care will be adjusted     PLAN:  Goals have been updated based on progression since last assessment. Patient continues to benefit from skilled intervention to address the above impairments. Continue to follow patient 4 times a week to address goals. Planned Interventions:  [x]                  Self Care Training                  []           Therapeutic Activities  [x]                  Functional Mobility Training    [x]           Cognitive Retraining  [x]                  Therapeutic Exercises           [x]           Endurance Activities  [x]                  Balance Training                   []           Neuromuscular Re-Education  []                  Visual/Perceptual Training     [x]      Home Safety Training  [x]                  Patient Education                 []           Family Training/Education  []                  Other (comment):  Discharge Recommendations: Skilled Nursing Facility  Further Equipment Recommendations for Discharge: TBD       OBJECTIVE DATA SUMMARY:   Cognitive/Behavioral Status:  Neurologic State: Alert  Orientation Level: Oriented to person;Oriented to place  Cognition: Follows commands; Impaired decision making; Appropriate for age attention/concentration        Safety/Judgement: Decreased awareness of need for safety  Functional Mobility and Transfers for ADLs:   Bed Mobility:  Rolling: Supervision  Supine to Sit: Supervision     Scooting: Supervision    Transfers:  Sit to Stand: Minimum assistance   Functional Transfers  Toilet Transfer : Minimum assistance (using grab bars)  Cues: Tactile cues provided;Verbal cues provided;Visual cues provided  Adaptive Equipment: Grab bars    Balance:  Sitting: Intact  Standing: Impaired  Standing - Static: Good  Standing - Dynamic : Fair (for grooming/toileting/dressing and for ambulation with a RW)  ADL Intervention:  Grooming  Grooming Assistance: Contact guard assistance (standing at sink)  Washing Hands: Contact guard assistance  Brushing/Combing Hair: Contact guard assistance  Cues: Tactile cues provided;Verbal cues provided;Visual cues provided    Lower Body Dressing Assistance  Underpants: Minimum assistance (mod A to doff)  Socks: Supervision/set-up (to christopher loose sock, moderate assistance to christopher tight socks )  Cues: Doff;Don;Tactile cues provided;Verbal cues provided;Visual cues provided    Toileting  Toileting Assistance: Moderate assistance  Bowel Hygiene: Moderate assistance  Clothing Management: Moderate assistance  Cues: Tactile cues provided;Verbal cues provided;Visual cues provided   Patient became bowel incontinent during this session. Cognitive Retraining  Problem Solving: Deductive reason; Identifying the problem  Following Commands: Follows two step commands/directions  Safety/Judgement: Decreased awareness of need for safety  Cues: Tactile cues provided;Verbal cues provided;Visual cues provided      Barthel Index:    Bathin  Bladder: 0  Bowels: 5  Groomin  Dressin  Feeding: 10  Mobility: 0  Stairs: 0  Toilet Use: 0  Transfer (Bed to Chair and Back): 10  Total: 30       Barthel and G-code impairment scale:  Percentage of impairment CH  0% CI  1-19% CJ  20-39% CK  40-59% CL  60-79% CM  80-99% CN  100%   Barthel Score 0-100 100 99-80 79-60 59-40 20-39 1-19   0   Barthel Score 0-20 20 17-19 13-16 9-12 5-8 1-4 0      The Barthel ADL Index: Guidelines  1. The index should be used as a record of what a patient does, not as a record of what a patient could do. 2. The main aim is to establish degree of independence from any help, physical or verbal, however minor and for whatever reason. 3. The need for supervision renders the patient not independent. 4. A patient's performance should be established using the best available evidence. Asking the patient, friends/relatives and nurses are the usual sources, but direct observation and common sense are also important. However direct testing is not needed.   5. Usually the patient's performance over the preceding 24-48 hours is important, but occasionally longer periods will be relevant. 6. Middle categories imply that the patient supplies over 50 per cent of the effort. 7. Use of aids to be independent is allowed. Iris Clement., Barthel, DAbisaiW. ([id number]). Functional evaluation: the Barthel Index. [facility] (14)2. Norval Low cara [place], J.J.M.F, Anthony Del Valle., Helena Ruelas., [place], [address] (1999). Measuring the change indisability after inpatient rehabilitation; comparison of the responsiveness of the Barthel Index and Functional [place] Measure. Journal of Neurology, Neurosurgery, and Psychiatry, 66(4), 562-273. Lorena Jenkins, N.J.A, MARILEE Shah, & Genesis Suarez MRICHI. (2004.) Assessment of post-stroke quality of life in cost-effectiveness studies: The usefulness of the Barthel Index and the EuroQoL-5D. Quality of Life Research, 13, 427-43     Pain:  Pain Scale 1: Numeric (0 - 10)  Pain Intensity 1: 0                Activity Tolerance:    Good for functional activity. Please refer to the flowsheet for vital signs taken during this treatment.   After treatment:   [x] Patient left in no apparent distress sitting up in chair  [] Patient left in no apparent distress in bed  [x] Call bell left within reach  [x] Nursing notified  [] Caregiver present  [x] Bed alarm activated    COMMUNICATION/COLLABORATION:   The patients plan of care was discussed with: Registered Nurse    MARV Ramon/L  Time Calculation: 47 mins

## 2018-02-27 NOTE — PROGRESS NOTES
CM met with patient's mother and another family member (female) regarding complaints about delay in patient going to SNF. Nurse manager for Oncology was also present. Family was assured that Level 2 was sent in timely manner and evaluation had been completed. Sharron reported to CM that decision would be received by FAX on 2/28 or 3/1/2018 for possible discharge to SNF that day. Family was also assured that UAI and Level 2 were completed as soon as possible when discharge planning was taking place. Family was offered patient's return home with home health and also use of his waiver hours with [facility], but family refused. Mother states she was also recovering from recent illness and could not \"handle\" patient at present time. Mother states that prior to hospitalization, patient was ambulating independently and she is \"afraid he is just getting weaker\". Patient has been working with PT and walking in hallways on a regular basis. CM received call from Patient Advocate this morning stating family is planning to meet with them today to review same complaints about delay in Level 2/PASSR process for placement.     Estrellita Reynolds, RN, BSN, ACM   - Medical Oncology  [phone]

## 2018-02-27 NOTE — PROGRESS NOTES
Problem: Mobility Impaired (Adult and Pediatric)  Goal: *Acute Goals and Plan of Care (Insert Text)  Physical Therapy Goals  Initiated 2/19/2018, Re-assessed 2/26/2018-all goals remain appropriate continue progressing towards supervision to Mod I level  1. Patient will move from supine to sit and sit to supine  in bed with independence within 7 day(s). 2.  Patient will transfer from bed to chair and chair to bed with supervision/set-up using the least restrictive device within 7 day(s). 3.  Patient will perform sit to stand with supervision/set-up within 7 day(s). 4.  Patient will ambulate with supervision/set-up for 100 feet with the least restrictive device within 7 day(s). physical Therapy TREATMENT  Patient: Lon Valencia (27 y.o. male)  Date: 2/27/2018  Diagnosis: Influenza  Acute respiratory failure ([facility] [address] Acute respiratory failure ([facility] [address]       Precautions: Fall (Droplet)  Chart, physical therapy assessment, plan of care and goals were reviewed. ASSESSMENT:  Patient unable to ambulate as far as yesterday due to patient's reports of fatigue. Up in chair upon arrival.  Sit to stand with CGA. Patient ambulated 110' with rolling walker and CGA, with some assistance when turning around. Patient declined to ambulate without walker and declined to ambulate further. Upon return to chair, O2 sats 94% on room air. Lowered walker and patient ambulated short distance; seemed a better fit. Recommend SNF at discharge if appropriate assistance not available at home. Progression toward goals:  [x]    Improving appropriately and progressing toward goals  []    Improving slowly and progressing toward goals  []    Not making progress toward goals and plan of care will be adjusted     PLAN:  Patient continues to benefit from skilled intervention to address the above impairments. Continue treatment per established plan of care.   Discharge Recommendations:  [address] Recommendations for Discharge:  none     SUBJECTIVE:   Patient stated I can walk.     OBJECTIVE DATA SUMMARY:   Critical Behavior:  Neurologic State: Confused, Drowsy  Orientation Level: Oriented to person, Disoriented to place, Disoriented to situation, Disoriented to time  Cognition: Decreased attention/concentration, Follows commands, Impaired decision making  Safety/Judgement: Decreased awareness of need for safety, Decreased insight into deficits  Functional Mobility Training:  Bed Mobility:      Up in chair upon arrival.              Transfers:  Sit to Stand: Contact guard assistance  Stand to Sit: Contact guard assistance        Bed to Chair: Contact guard assistance                    Balance:  Sitting: Intact  Standing: Impaired  Standing - Static: Good;Constant support  Standing - Dynamic : Fair (at times good)  Ambulation/Gait Training:  Distance (ft): 120 Feet (ft)  Assistive Device: Gait belt;Walker, rolling  Ambulation - Level of Assistance: Contact guard assistance        Gait Abnormalities: Decreased step clearance        Base of Support: Widened     Speed/Doris: Pace decreased (<100 feet/min)  Step Length: Left shortened;Right shortened        Interventions: Verbal cues                        Pain:  Pain Scale 1: Numeric (0 - 10)  Pain Intensity 1: 0              Activity Tolerance:   fair  Please refer to the flowsheet for vital signs taken during this treatment.   After treatment:   [x]    Patient left in no apparent distress sitting up in chair  []    Patient left in no apparent distress in bed  [x]    Call bell left within reach  [x]    Nursing notified  []    Caregiver present  []    Bed alarm activated    COMMUNICATION/COLLABORATION:   The patients plan of care was discussed with: Registered Nurse    Flossie Jeans, PT   Time Calculation: 12 mins

## 2018-02-27 NOTE — PROGRESS NOTES
Oncology Nursing Communication Tool  7:05 AM  2/27/2018     Bedside shift change report given to Yuridia Garza RN (incoming nurse) by Juanjose Tejeda (outgoing nurse) on Rothman Seaman. Report included the following information SBAR, Kardex, Intake/Output, MAR and Recent Results. Shift Summary: slept through most of the night, changed brief x1. Issues for physician to address: d/c, placement         Oncology Shift Note   Admission Date 2/17/2018   Admission Diagnosis Influenza  Acute respiratory failure ([address]   Code Status Full Code   Consults None      Cardiac Monitoring [] Yes [x] No      Purposeful Hourly Rounding [x] Yes    Kalia Score Total Score: 4   Kalia score 3 or > [x] Bed Alarm [] Avasys [] 1:1 sitter [] Patient refused (Place signed refusal form in chart)      Pain Managed [x] Yes [] No    Key Pain Meds     The patient is on no pain meds. Influenza Vaccine Received Flu Vaccine for Current Season (usually Sept-March): Yes           Oxygen needs?  [x] Room air Oxygen @  []1L    []2L    []3L   []4L    []5L   []6L     Use home O2? [] Yes [] No  Perform O2 challenge test using  smartphrase (.oxygenchallenge)      Last bowel movement Last Bowel Movement Date: 02/26/18  bowel movement      Urinary Catheter [REMOVED] Urinary Catheter 02/18/18 Mitchell - Temperature-Criteria for Appropriate Use: Obstruction/retention  Condom Catheter 02/23/18-Criteria for Appropriate Use: Strict I/Os     [REMOVED] Urinary Catheter 02/18/18 Mitchell - Temperature-Urine Output (mL): 420 ml  Condom Catheter 02/23/18-Urine Output (mL): 450 ml     LDAs                     Condom Catheter 02/23/18 (Active)   Criteria for Appropriate Use Strict I/Os 2/26/2018 11:53 PM   Status Draining;Patent 2/26/2018 11:53 PM   Site Condition No abnormalities 2/26/2018 11:53 PM   Drainage Tube Clipped to Bed Yes 2/26/2018 11:53 PM   Catheter Secured to Thigh No 2/26/2018  2:19 AM   Tamper Seal Intact No 2/26/2018 11:53 PM Bag Below Bladder/Not on Floor Yes 2/26/2018 11:53 PM   Lack of Dependent Loop in Tubing Yes 2/26/2018 11:53 PM   Drainage Bag Less Than Half Full Yes 2/26/2018 11:53 PM   Sterile Solution Used for  Irrigation N/A 2/26/2018 11:53 PM   Urine Output (mL) 450 ml 2/26/2018  6:34 AM                   Readmission Risk Assessment Tool Score Medium Risk            18       Total Score        3 Patient Length of Stay (>5 days = 3)    9 Pt. Coverage (Medicare=5 , Medicaid, or Self-Pay=4)    6 Charlson Comorbidity Score (Age + Comorbid Conditions)        Criteria that do not apply:    Has Seen PCP in Last 6 Months (Yes=3, No=0)    . Living with Significant Other. Assisted Living. LTAC. SNF.  or   Rehab    IP Visits Last 12 Months (1-3=4, 4=9, >4=11)       Expected Length of Stay 4d 12h   Actual Length of Stay [address] [place] [address]

## 2018-02-27 NOTE — PROGRESS NOTES
Hospitalist Progress Note    NAME: Jaciel Schaeefr   :  1955   MRN:  [id number]       Assessment / Plan:  Acute hypoxic respiratory failure, POA    2/2 recent Influenza, resolved, saturations mid 90s on RA    Influenza A causing Acute bronchospasm   -completed Tamiflu ( 5 days)   -DC nebs  -C/w incentive spirometry for atelectasis, ambulation    MR/Developmental Delay  -level 2 application for placement pending    Chronic cough  -unclear etiology  -mother reports present since 2017  -will DC nebs, may worsen bronchospasm    DM type 2, improved control  -- A1c was 9.8 2017.  -c/w  home metformin & amaryl. Tradjenta added this admission w improved control  -continue SSI, HbA1C 9.0    Seizure disorder   -c/wDepakote/topamax. HLD  -cont lipitor    BPH/urinary retention  -c/w tamsulosin    Psoriasis  -c/w triamcinolone prn    Obesity: Body mass index is 30.17 kg/(m^2). Code:  Full  DVT prophylaxis: SCD  Surrogate decision maker:  Mother Taylor     Subjective:     Chief Complaint / Reason for Physician Visit  Hypoxic resp failure POA 2/2 Flu A, completed treatment    Patient doing well this AM, sitting in bedside chair, mother is present and provides some history. Pt denies any SOB, saturations adequate on RA. Pt noted to have non-productive cough, mother states it's been present for a few months. No other acute issues    Discussed with RN events overnight. Review of Systems:  Symptom Y/N Comments  Symptom Y/N Comments   Fever/Chills n   Chest Pain n    Poor Appetite n   Edema nn    Cough y   Abdominal Pain     Sputum n   Joint Pain     SOB/TREVINO n   Pruritis/Rash     Nausea/vomit n   Tolerating PT/OT     Diarrhea    Tolerating Diet     Constipation    Other       Could NOT obtain due to:      Objective:     VITALS:   Last 24hrs VS reviewed since prior progress note.  Most recent are:  Patient Vitals for the past 24 hrs:   Temp Pulse Resp BP SpO2   18 1502 97.8 °F (36.6 °C) 80 16 116/71 94 %   02/27/18 0915 - - - - 93 %   02/27/18 0748 97.8 °F (36.6 °C) 70 16 145/68 92 %   02/27/18 0332 - - - - 93 %   02/26/18 2320 98.7 °F (37.1 °C) 79 18 143/80 92 %   02/26/18 2129 - - - - 93 %   02/26/18 2000 97.4 °F (36.3 °C) 76 16 135/79 92 %   02/1955 97.8 °F (36.6 °C) 74 19 144/76 93 %       Intake/Output Summary (Last 24 hours) at 02/27/18 1801  Last data filed at 02/26/18 2300   Gross per 24 hour   Intake                0 ml   Output             1000 ml   Net            -1000 ml        PHYSICAL EXAM:  General: WD, WN. Alert, cooperative, no acute distress    EENT:  EOMI. Anicteric sclerae. MMM  Resp:  CTA bilaterally, no wheezing or rales. No accessory muscle use  CV:  Regular  rhythm,  No edema  GI:  Soft, Non distended, Non tender.  +Bowel sounds  Neurologic:  Alert and oriented X 3, normal speech,   Psych:   Limited insight. Not anxious nor agitated  Skin:  No rashes. No jaundice    Reviewed most current lab test results and cultures  YES  Reviewed most current radiology test results   YES  Review and summation of old records today    NO  Reviewed patient's current orders and MAR    YES  PMH/ reviewed - no change compared to H&P  ________________________________________________________________________  Care Plan discussed with:    Comments   Patient y    Family  y    RN y    Care Manager y    Consultant                        Multidiciplinary team rounds were held today with , nursing, pharmacist and clinical coordinator. Patient's plan of care was discussed; medications were reviewed and discharge planning was addressed.      ________________________________________________________________________  Total NON critical care TIME: 30   Minutes    Total CRITICAL CARE TIME Spent:   Minutes non procedure based      Comments   >50% of visit spent in counseling and coordination of care     ________________________________________________________________________  Sebastien Blount DO Procedures: see electronic medical records for all procedures/Xrays and details which were not copied into this note but were reviewed prior to creation of Plan. LABS:  I reviewed today's most current labs and imaging studies.   Pertinent labs include:  Recent Labs      02/27/18   0353   WBC  11.3*   HGB  12.9   HCT  40.3   PLT  180     Recent Labs      02/27/18   0353   NA  139   K  4.1   CL  109*   CO2  19*   GLU  130*   BUN  17   CREA  0.68*   CA  8.6   MG  1.9   ALB  2.4*   TBILI  0.4   SGOT  20   ALT  48       Signed: Elijah Liu DO

## 2018-02-27 NOTE — INTERDISCIPLINARY ROUNDS
Oncology Interdisciplinary rounds were held today to discuss patient plan of care and outcomes. The following members were present: Nursing, Case Management, Pharmacy and Dietary.     Actual Length of Stay: 10    DRG GLOS: 4.5    Expected Length of Stay: 4d 12h                Plan for Day        Mobility        Plan for Stay      Plan for Way   Amulbate in hallway with PT Up with assistance Continue current treatment Pending Level 2 approval for SNF placement

## 2018-02-27 NOTE — PROGRESS NOTES
Nutrition Assessment:    INTERVENTIONS/RECOMMENDATIONS:   Meals/Snacks: General/healthful diet: Will monitor intake based on [facility] Soft update. Will assist in arranging meals based on food preferences. Supplements: Commercial supplement:  Increased Glucerna Chocolate from once daily to TID with meals. ASSESSMENT:   2/27:  Chart reviewed; med noted for acute resp failure and influenza A. Hx of DM with elevated BG levels. H/o MR due to anoxia at childbirth. PO intake has been inconsistent. Mother reports that she tries to order meals and pt responds \"yes\" to most foods but when they arrive, he does not want them. More recently, likes chilled soups over ice (i.e. Tomato soup), soft foods like whipped potatoes, and chilled fruit/pudding. Meats have been a challenge. Family is receptive to trying the [facility] Soft consistency incase she cannot order all meals. I assisted with dinner selection. Also, would like to increase Chocolate Glucerna from once daily to TID with all meals. Family also expressed concerns with elevated BG levels and limited activity. Would like for RN or PT to get pt up and moving more frequent than when admitted. Goals for discharge is to a SNF, possibly by Thursday 3/1. Diet Order: Mechanical soft  % Eaten:  No data found. Pertinent Medications: [x] Reviewed []Other:  Pertinent Labs: [x]Reviewed  []Other: -206  Food Allergies: [x]None []Other:     Last BM: 2/26   [x]Active     []Hyperactive  []Hypoactive       [] Absent  BS  Skin:    [x] Intact   [] Incision  [] Breakdown   []Edema   []Other:    Anthropometrics: Height: 6' 2\" (188 cm) Weight: 106.6 kg (235 lb)    IBW (%IBW):   ( ) UBW (%UBW):   (  %)    BMI: Body mass index is 30.17 kg/(m^2).     This BMI is indicative of:  []Underweight   []Normal   []Overweight   [] Obesity   [] Extreme Obesity (BMI>40)  Last Weight Metrics:  Weight Loss Metrics 2/17/2018 11/28/2017 9/25/2017 8/24/2017 5/18/2017 2/27/2017 2/16/2017 Today's Wt 235 lb 203 lb 197 lb 3.2 oz 202 lb 204 lb 8 oz 204 lb 8 oz 206 lb 8 oz   BMI 30.17 kg/m2 27.53 kg/m2 26.75 kg/m2 26.65 kg/m2 26.98 kg/m2 26.98 kg/m2 27.24 kg/m2       Estimated Nutrition Needs (Based on): 2300 Kcals/day (BMR: 1933 x 1.2) , 85 g (0.8 g/kg) Protein  Carbohydrate: At Least 130 g/day  Fluids: per MD mL/day    NUTRITION DIAGNOSES:   Problem:  Inadequate protein-energy intake      Etiology: related to decreased appetite     Signs/Symptoms: as evidenced by family member report of decreased PO intake    Previous Nutrition Dx:  [] Resolved  [] Unresolved           [] Progressing    NUTRITION INTERVENTIONS:  Meals/Snacks: General/healthful diet   Supplements: Commercial supplement              GOAL:   consume >50% of meals and ONS in 3-5 days    NUTRITION MONITORING AND EVALUATION      Food/Nutrient Intake Outcomes:  Total energy intake  Physical Signs/Symptoms Outcomes: Weight/weight change, Electrolyte and renal profile, GI, Glucose profile    Previous Goal Met:   [] Met              [x] Progressing Towards Goal              [] Not Progressing Towards Goal   Previous Recommendations:   [x] Implemented          [] Not Implemented          [] Not Applicable    LEARNING NEEDS (Diet, Food/Nutrient-Drug Interaction):    [x] None Identified   [] Identified and Education Provided/Documented   [] Identified and Pt declined/was not appropriate     Cultural, Oriental orthodox, OR Ethnic Dietary Needs:    [x] None Identified   [] Identified and Addressed     [x] Interdisciplinary Care Plan Reviewed/Documented    [x] Discharge Planning:  PO as tolerated; [facility]; likes Chocolate Glucerna Shakes   [] Participated in Interdisciplinary Rounds    NUTRITION RISK:    [x] Patient At Nutritional Risk    [] High              [x] Moderate/Mild           []  Low     [] Patient Not At [address] [place] [address], [address] [place] [address] Street  Pager [phone]  Weekend Pager [phone]

## 2018-02-28 LAB
GLUCOSE BLD STRIP.AUTO-MCNC: 119 MG/DL (ref 65–100)
GLUCOSE BLD STRIP.AUTO-MCNC: 142 MG/DL (ref 65–100)
GLUCOSE BLD STRIP.AUTO-MCNC: 145 MG/DL (ref 65–100)
GLUCOSE BLD STRIP.AUTO-MCNC: 245 MG/DL (ref 65–100)
SERVICE CMNT-IMP: ABNORMAL

## 2018-02-28 PROCEDURE — 74011636637 HC RX REV CODE- 636/637: Performed by: HOSPITALIST

## 2018-02-28 PROCEDURE — 65660000000 HC RM CCU STEPDOWN

## 2018-02-28 PROCEDURE — 74011250637 HC RX REV CODE- 250/637: Performed by: INTERNAL MEDICINE

## 2018-02-28 PROCEDURE — 82962 GLUCOSE BLOOD TEST: CPT

## 2018-02-28 PROCEDURE — 74011250637 HC RX REV CODE- 250/637: Performed by: HOSPITALIST

## 2018-02-28 RX ADMIN — ATORVASTATIN CALCIUM 40 MG: 40 TABLET, FILM COATED ORAL at 21:37

## 2018-02-28 RX ADMIN — INSULIN LISPRO 4 UNITS: 100 INJECTION, SOLUTION INTRAVENOUS; SUBCUTANEOUS at 12:48

## 2018-02-28 RX ADMIN — DIVALPROEX SODIUM 1000 MG: 250 TABLET, DELAYED RELEASE ORAL at 17:10

## 2018-02-28 RX ADMIN — GUAIFENESIN AND DEXTROMETHORPHAN 10 ML: 100; 10 SYRUP ORAL at 13:59

## 2018-02-28 RX ADMIN — CALCIUM CARBONATE 500 MG (1,250 MG)-VITAMIN D3 200 UNIT TABLET 1 TABLET: at 09:19

## 2018-02-28 RX ADMIN — TOPIRAMATE 300 MG: 100 TABLET, FILM COATED ORAL at 09:19

## 2018-02-28 RX ADMIN — LINAGLIPTIN 5 MG: 5 TABLET, FILM COATED ORAL at 09:19

## 2018-02-28 RX ADMIN — TOPIRAMATE 300 MG: 100 TABLET, FILM COATED ORAL at 17:10

## 2018-02-28 RX ADMIN — METFORMIN HYDROCHLORIDE 1000 MG: 500 TABLET, EXTENDED RELEASE ORAL at 09:19

## 2018-02-28 RX ADMIN — TAMSULOSIN HYDROCHLORIDE 0.4 MG: 0.4 CAPSULE ORAL at 09:19

## 2018-02-28 RX ADMIN — GLIMEPIRIDE 8 MG: 4 TABLET ORAL at 09:19

## 2018-02-28 RX ADMIN — INSULIN LISPRO 3 UNITS: 100 INJECTION, SOLUTION INTRAVENOUS; SUBCUTANEOUS at 08:23

## 2018-02-28 RX ADMIN — METFORMIN HYDROCHLORIDE 1000 MG: 500 TABLET, EXTENDED RELEASE ORAL at 17:10

## 2018-02-28 RX ADMIN — FOLIC ACID 1 MG: 1 TABLET ORAL at 09:19

## 2018-02-28 RX ADMIN — DIVALPROEX SODIUM 500 MG: 250 TABLET, DELAYED RELEASE ORAL at 09:20

## 2018-02-28 NOTE — PROGRESS NOTES
Hospitalist Progress Note    NAME: Lon Valencia   :  1955   MRN:  [id number]       Assessment / Plan:  Acute hypoxic respiratory failure, POA    2/2 recent Influenza, resolved, saturations mid 90s on RA     Influenza A causing Acute bronchospasm   -completed Tamiflu ( 5 days)   -DC nebs  -C/w incentive spirometry for atelectasis, ambulation     MR/Developmental Delay  -level 2 application for placement pending     Chronic cough  -unclear etiology  -mother reports present since 2017  -will DC nebs, may worsen bronchospasm     DM type 2, improved control  -- A1c was 9.8 2017.  -c/w  home metformin & amaryl. Tradjenta added this admission w improved control  -continue SSI, HbA1C 9.0     Seizure disorder   -c/wDepakote/topamax.      HLD  -cont lipitor     BPH/urinary retention  -c/w tamsulosin     Psoriasis  -c/w triamcinolone prn     Obesity: Body mass index is 30.17 kg/(m^2). Code:  Full  DVT prophylaxis: SCD  Surrogate decision maker: Trevor Hidalgo pending approval of level 2 application, hopefull next 24hrs, discussed with case management     Subjective:     Chief Complaint / Reason for Physician Visit  Acute hypoxic resp failures, +fluA 18 s/p tamiflu. Pt seen on rounds sitting up in bedside chair, talkative, states he's feeling well. Cough improved from yesterday. No acute complaints. Discussed with RN events overnight. Review of Systems:  Symptom Y/N Comments  Symptom Y/N Comments   Fever/Chills n   Chest Pain n    Poor Appetite n   Edema     Cough y   Abdominal Pain n    Sputum n   Joint Pain     SOB/TREVINO n   Pruritis/Rash     Nausea/vomit n   Tolerating PT/OT     Diarrhea    Tolerating Diet     Constipation    Other       Could NOT obtain due to:      Objective:     VITALS:   Last 24hrs VS reviewed since prior progress note.  Most recent are:  Patient Vitals for the past 24 hrs:   Temp Pulse Resp BP SpO2   18 1550 97.8 °F (36.6 °C) 77 16 113/71 93 %   18 0744 98 °F (36.7 °C) 72 16 138/69 92 %   02/27/18 2250 97.8 °F (36.6 °C) 75 16 137/73 94 %   02/27/18 1955 98.2 °F (36.8 °C) 85 16 144/88 95 %     No intake or output data in the 24 hours ending 02/28/18 1723     PHYSICAL EXAM:  General: WD, WN. Alert, cooperative, no acute distress    EENT:  EOMI. Anicteric sclerae. MMM  Resp:  CTA bilaterally, no wheezing or rales. No accessory muscle use  CV:  Regular  rhythm,  No edema  GI:  Soft, Non distended, Non tender.  +Bowel sounds  Neurologic:  Alert and oriented X 3, normal speech,   Psych:   Not anxious nor agitated  Skin:  No rashes. No jaundice    Reviewed most current lab test results and cultures  YES  Reviewed most current radiology test results   YES  Review and summation of old records today    NO  Reviewed patient's current orders and MAR    YES  PMH/ reviewed - no change compared to H&P  ________________________________________________________________________  Care Plan discussed with:    Comments   Patient y    Family      RN y    Care Manager y    Consultant                        Multidiciplinary team rounds were held today with , nursing, pharmacist and clinical coordinator. Patient's plan of care was discussed; medications were reviewed and discharge planning was addressed. ________________________________________________________________________  Total NON critical care TIME:  30  Minutes      Comments   >50% of visit spent in counseling and coordination of care     ________________________________________________________________________  Dimple Gutierrez, DO     Procedures: see electronic medical records for all procedures/Xrays and details which were not copied into this note but were reviewed prior to creation of Plan. LABS:  I reviewed today's most current labs and imaging studies.   Pertinent labs include:  Recent Labs      02/27/18   0353   WBC  11.3*   HGB  12.9   HCT  40.3   PLT  180     Recent Labs      02/27/18 0353   NA  139   K  4.1   CL  109*   CO2  19*   GLU  130*   BUN  17   CREA  0.68*   CA  8.6   MG  1.9   ALB  2.4*   TBILI  0.4   SGOT  20   ALT  48       Signed: Jocelyn Serrano DO

## 2018-02-28 NOTE — WOUND CARE
Wound Care Nurse Consult from David Guillermo 60 staff nurse for \" tear in gluteal cleft x 2\". Patient is a very sweet 66 y/o CM who has MR. He has a thin narrow fissure in his gluteal cleft and an abraded area to top of cleft. Both injuries caused by intertriginous moisture associated skin damage and some friction. Skin is blanchable. Recommend:Gluteal cleft; cleanse daily and gently with soap and water, pat dry. Apply a thin layer of ES Desitin cream and leave FARTUN.   Gelacio Cannon RN, CWON, zone ph# [phone]

## 2018-02-28 NOTE — PROGRESS NOTES
Oncology Nursing Communication Tool  7:01 PM  2/27/2018     Bedside shift change report given to Gerda Nunn RN (incoming nurse) by Mike Graves RN (outgoing nurse) on Daryl Nicole. Report included the following information SBAR. Shift Summary: worked with PT/OT. Issues for physician to address: none         Oncology Shift Note   Admission Date 2/17/2018   Admission Diagnosis Influenza  Acute respiratory failure ([facility] [address]   Code Status Full Code   Consults None      Cardiac Monitoring [] Yes [] No      Purposeful Hourly Rounding [] Yes    Kalia Score Total Score: 4   Kalia score 3 or > [] Bed Alarm [] Avasys [] 1:1 sitter [] Patient refused (Place signed refusal form in chart)      Pain Managed [] Yes [] No    Key Pain Meds     The patient is on no pain meds. Influenza Vaccine Received Flu Vaccine for Current Season (usually Sept-March): Yes           Oxygen needs?  [] Room air Oxygen @  []1L    []2L    []3L   []4L    []5L   []6L     Use home O2? [] Yes [] No  Perform O2 challenge test using  smartphrase (.oxygenchallenge)      Last bowel movement Last Bowel Movement Date: 02/27/18  bowel movement      Urinary Catheter [REMOVED] Urinary Catheter 02/18/18 Mitchell - Temperature-Criteria for Appropriate Use: Obstruction/retention  Condom Catheter 02/23/18-Criteria for Appropriate Use: Strict I/Os     [REMOVED] Urinary Catheter 02/18/18 Mitchell - Temperature-Urine Output (mL): 420 ml  Condom Catheter 02/23/18-Urine Output (mL): 450 ml     LDAs                     Condom Catheter 02/23/18 (Active)   Criteria for Appropriate Use Strict I/Os 2/27/2018 10:57 AM   Status Draining;Patent 2/27/2018 10:57 AM   Site Condition No abnormalities 2/27/2018 10:57 AM   Drainage Tube Clipped to Bed Yes 2/27/2018 10:57 AM   Catheter Secured to Thigh No 2/27/2018 10:57 AM   Tamper Seal Intact No 2/27/2018 10:57 AM   Bag Below Bladder/Not on Floor Yes 2/27/2018 10:57 AM   Lack of Dependent Loop in Tubing Yes 2/27/2018 10:57 AM   Drainage Bag Less Than Half Full Yes 2/27/2018 10:57 AM   Sterile Solution Used for  Irrigation N/A 2/27/2018 10:57 AM   Urine Output (mL) 450 ml 2/26/2018  6:34 AM                   Readmission Risk Assessment Tool Score Medium Risk            18       Total Score        3 Patient Length of Stay (>5 days = 3)    9 Pt. Coverage (Medicare=5 , Medicaid, or Self-Pay=4)    6 Charlson Comorbidity Score (Age + Comorbid Conditions)        Criteria that do not apply:    Has Seen PCP in Last 6 Months (Yes=3, No=0)    . Living with Significant Other. Assisted Living. LTAC. SNF.  or   Rehab    IP Visits Last 12 Months (1-3=4, 4=9, >4=11)       Expected Length of Stay 4d 12h   Actual Length of Stay [address], RN

## 2018-02-28 NOTE — PROGRESS NOTES
Oncology Nursing Communication Tool  7:43 AM  2/28/2018     Bedside shift change report given to Donnie Sin RN (incoming nurse) by Kiera Taveras (outgoing nurse) on Baljinder Hardinless. Report included the following information SBAR, Kardex, Intake/Output, MAR and Recent Results. Shift Summary: no changes. Brief changed twice. No labs. Robitussin given once. Issues for physician to address: placement         Oncology Shift Note   Admission Date 2/17/2018   Admission Diagnosis Influenza  Acute respiratory failure ([facility] [address]   Code Status Full Code   Consults None      Cardiac Monitoring [] Yes [x] No      Purposeful Hourly Rounding [x] Yes    Kalia Score Total Score: 4   Kalia score 3 or > [x] Bed Alarm [] Avasys [] 1:1 sitter [] Patient refused (Place signed refusal form in chart)      Pain Managed [x] Yes [] No    Key Pain Meds     The patient is on no pain meds. Influenza Vaccine Received Flu Vaccine for Current Season (usually Sept-March): Yes           Oxygen needs? [x] Room air Oxygen @  []1L    []2L    []3L   []4L    []5L   []6L     Use home O2? [] Yes [] No  Perform O2 challenge test using  smartphrase (.oxygenchallenge)      Last bowel movement Last Bowel Movement Date: 02/27/18  bowel movement      Urinary Catheter [REMOVED] Urinary Catheter 02/18/18 Mitchell - Temperature-Criteria for Appropriate Use: Obstruction/retention  [REMOVED] Condom Catheter 02/23/18-Criteria for Appropriate Use: Strict I/Os     [REMOVED] Urinary Catheter 02/18/18 Mitchell - Temperature-Urine Output (mL): 420 ml  [REMOVED] Condom Catheter 02/23/18-Urine Output (mL): 450 ml     LDAs                                    Readmission Risk Assessment Tool Score Medium Risk            18       Total Score        3 Patient Length of Stay (>5 days = 3)    9 Pt.  Coverage (Medicare=5 , Medicaid, or Self-Pay=4)    6 Charlson Comorbidity Score (Age + Comorbid Conditions)        Criteria that do not apply:    Has Seen PCP in Last 6 Months (Yes=3, No=0)    . Living with Significant Other. Assisted Living. LTAC. SNF.  or   Rehab    IP Visits Last 12 Months (1-3=4, 4=9, >4=11)       Expected Length of Stay 4d 12h   Actual Length of Stay [address] [place] [address]

## 2018-02-28 NOTE — PROGRESS NOTES
CM expecting Level 2 approval from [facility] today or tomorrow. CM called [facility] to check on progress - left voice mail for .     Marily Ricks RN, BSN, AC   - Medical Oncology  [phone]

## 2018-03-01 VITALS
BODY MASS INDEX: 30.16 KG/M2 | DIASTOLIC BLOOD PRESSURE: 69 MMHG | HEART RATE: 66 BPM | TEMPERATURE: 97.6 F | WEIGHT: 235 LBS | SYSTOLIC BLOOD PRESSURE: 130 MMHG | HEIGHT: 74 IN | OXYGEN SATURATION: 93 % | RESPIRATION RATE: 16 BRPM

## 2018-03-01 LAB
GLUCOSE BLD STRIP.AUTO-MCNC: 162 MG/DL (ref 65–100)
GLUCOSE BLD STRIP.AUTO-MCNC: 208 MG/DL (ref 65–100)
SERVICE CMNT-IMP: ABNORMAL
SERVICE CMNT-IMP: ABNORMAL

## 2018-03-01 PROCEDURE — 97116 GAIT TRAINING THERAPY: CPT

## 2018-03-01 PROCEDURE — 97535 SELF CARE MNGMENT TRAINING: CPT | Performed by: OCCUPATIONAL THERAPIST

## 2018-03-01 PROCEDURE — 74011250637 HC RX REV CODE- 250/637: Performed by: INTERNAL MEDICINE

## 2018-03-01 PROCEDURE — 82962 GLUCOSE BLOOD TEST: CPT

## 2018-03-01 PROCEDURE — 74011250637 HC RX REV CODE- 250/637: Performed by: HOSPITALIST

## 2018-03-01 PROCEDURE — 97110 THERAPEUTIC EXERCISES: CPT

## 2018-03-01 PROCEDURE — 74011636637 HC RX REV CODE- 636/637: Performed by: HOSPITALIST

## 2018-03-01 RX ORDER — TAMSULOSIN HYDROCHLORIDE 0.4 MG/1
0.4 CAPSULE ORAL DAILY
Qty: 30 CAP | Refills: 0 | Status: SHIPPED | OUTPATIENT
Start: 2018-03-02 | End: 2018-04-06

## 2018-03-01 RX ORDER — DIVALPROEX SODIUM 500 MG/1
1000 TABLET, DELAYED RELEASE ORAL EVERY EVENING
Qty: 30 TAB | Refills: 0 | Status: SHIPPED | OUTPATIENT
Start: 2018-03-01 | End: 2018-03-01

## 2018-03-01 RX ADMIN — DIVALPROEX SODIUM 500 MG: 250 TABLET, DELAYED RELEASE ORAL at 10:30

## 2018-03-01 RX ADMIN — GLIMEPIRIDE 8 MG: 4 TABLET ORAL at 10:30

## 2018-03-01 RX ADMIN — TAMSULOSIN HYDROCHLORIDE 0.4 MG: 0.4 CAPSULE ORAL at 10:29

## 2018-03-01 RX ADMIN — TOPIRAMATE 300 MG: 100 TABLET, FILM COATED ORAL at 10:29

## 2018-03-01 RX ADMIN — FOLIC ACID 1 MG: 1 TABLET ORAL at 10:29

## 2018-03-01 RX ADMIN — INSULIN LISPRO 3 UNITS: 100 INJECTION, SOLUTION INTRAVENOUS; SUBCUTANEOUS at 09:42

## 2018-03-01 RX ADMIN — METFORMIN HYDROCHLORIDE 1000 MG: 500 TABLET, EXTENDED RELEASE ORAL at 10:29

## 2018-03-01 RX ADMIN — GUAIFENESIN AND DEXTROMETHORPHAN 10 ML: 100; 10 SYRUP ORAL at 12:16

## 2018-03-01 RX ADMIN — CALCIUM CARBONATE 500 MG (1,250 MG)-VITAMIN D3 200 UNIT TABLET 1 TABLET: at 10:29

## 2018-03-01 RX ADMIN — GUAIFENESIN AND DEXTROMETHORPHAN 10 ML: 100; 10 SYRUP ORAL at 02:17

## 2018-03-01 RX ADMIN — LINAGLIPTIN 5 MG: 5 TABLET, FILM COATED ORAL at 10:29

## 2018-03-01 RX ADMIN — INSULIN LISPRO 4 UNITS: 100 INJECTION, SOLUTION INTRAVENOUS; SUBCUTANEOUS at 12:17

## 2018-03-01 RX ADMIN — TRIAMCINOLONE ACETONIDE: 1 CREAM TOPICAL at 10:32

## 2018-03-01 NOTE — PROGRESS NOTES
Patient discharged to [facility] with family transporting. Discharge instructions and MAR report sent with family. Report called to [facility], RN. All questions were answered.

## 2018-03-01 NOTE — PROGRESS NOTES
Oncology Nursing Communication Tool  6:46 AM  3/1/2018     Bedside shift change report given to Akosua Shukla RN (incoming nurse) by Zachariah Monae (outgoing nurse) on [facility]. Report included the following information SBAR, Kardex, Intake/Output, MAR and Recent Results. Shift Summary: brief changed a few times, no BM. Gave robitussin x1. No labs. Slept through most of the night, has asked multiple times to see Yara Rodney the CM      Issues for physician to address: none, CM handling placement        Oncology Shift Note   Admission Date 2/17/2018   Admission Diagnosis Influenza  Acute respiratory failure ([facility] [address]   Code Status Full Code   Consults None      Cardiac Monitoring [] Yes [x] No      Purposeful Hourly Rounding [x] Yes    Kalia Score Total Score: 4   Kalia score 3 or > [x] Bed Alarm [] Avasys [] 1:1 sitter [] Patient refused (Place signed refusal form in chart)      Pain Managed [x] Yes [] No    Key Pain Meds     The patient is on no pain meds. Influenza Vaccine Received Flu Vaccine for Current Season (usually Sept-March): Yes           Oxygen needs? [x] Room air Oxygen @  []1L    []2L    []3L   []4L    []5L   []6L     Use home O2? [] Yes [] No  Perform O2 challenge test using  smartphrase (.oxygenchallenge)      Last bowel movement Last Bowel Movement Date: 02/27/18  bowel movement      Urinary Catheter [REMOVED] Urinary Catheter 02/18/18 Mitchell - Temperature-Criteria for Appropriate Use: Obstruction/retention  [REMOVED] Condom Catheter 02/23/18-Criteria for Appropriate Use: Strict I/Os     [REMOVED] Urinary Catheter 02/18/18 Mitchell - Temperature-Urine Output (mL): 420 ml  [REMOVED] Condom Catheter 02/23/18-Urine Output (mL): 450 ml     LDAs                                    Readmission Risk Assessment Tool Score Medium Risk            18       Total Score        3 Patient Length of Stay (>5 days = 3)    9 Pt.  Coverage (Medicare=5 , Medicaid, or Self-Pay=4)    6 Charlson Comorbidity Score (Age + Comorbid Conditions)        Criteria that do not apply:    Has Seen PCP in Last 6 Months (Yes=3, No=0)    . Living with Significant Other. Assisted Living. LTAC. SNF.  or   Rehab    IP Visits Last 12 Months (1-3=4, 4=9, >4=11)       Expected Length of Stay 4d 12h   Actual Length of Stay [address] [facility]

## 2018-03-01 NOTE — PROGRESS NOTES
Oncology Interdisciplinary rounds were held today to discuss patient plan of care and outcomes. The following members were present: Nursing, Case Management, Pharmacy and Dietary.     Actual Length of Stay: 12    DRG GLOS: 4.5    Expected Length of Stay: 4d 12h                Plan for Day        Mobility        Plan for Stay      Plan for Way   Working with PT PT/OT working with patient Continue current treatment plan 3/3 [facility]

## 2018-03-01 NOTE — PROGRESS NOTES
Problem: Mobility Impaired (Adult and Pediatric)  Goal: *Acute Goals and Plan of Care (Insert Text)  Physical Therapy Goals  Initiated 2/19/2018, Re-assessed 2/26/2018-all goals remain appropriate continue progressing towards supervision to Mod I level  1. Patient will move from supine to sit and sit to supine  in bed with independence within 7 day(s). 2.  Patient will transfer from bed to chair and chair to bed with supervision/set-up using the least restrictive device within 7 day(s). 3.  Patient will perform sit to stand with supervision/set-up within 7 day(s). 4.  Patient will ambulate with supervision/set-up for 100 feet with the least restrictive device within 7 day(s). physical Therapy TREATMENT  Patient: Cody Benedict (64 y.o. male)  Date: 3/1/2018  Diagnosis: Influenza, Acute respiratory failure ([facility] [address]        Precautions: Fall   Chart, physical therapy assessment, plan of care and goals were reviewed. ASSESSMENT: pt tolerated tx well, no LOB or SOB, does well with transfers and ther-ex, good motivation today, vc's for safety and proper RW use. Progression toward goals:  []      Improving appropriately and progressing toward goals   [x]      Improving slowly and progressing toward goals  []      Not making progress toward goals and plan of care will be adjusted     PLAN:  Patient continues to benefit from skilled intervention to address the above impairments. Continue treatment per established plan of care.   Discharge Recommendations:  Aguilar Sykes  Further Equipment Recommendations for Discharge:  rolling walker     OBJECTIVE DATA SUMMARY:     Critical Behavior:  Neurologic State: Alert  Orientation Level: Oriented to person, Oriented to place, Oriented to situation  Cognition: Follows commands, Decreased attention/concentration (cueing needed for thoroughness during ADLs)  Safety/Judgement: Decreased awareness of need for safety, Decreased insight into deficits Functional Mobility Training:  Bed Mobility:  Scooting: Supervision    Transfers:  Sit to Stand: Supervision  Stand to Sit: Contact guard assistance  Interventions: Tactile cues; Verbal cues  Level of Assistance: Contact guard assistance     Balance:  Sitting: Intact; Without support  Standing: Intact; With support  Standing - Static: Good;Constant support  Standing - Dynamic : Good     Ambulation/Gait Training:  Distance (ft): 120 Feet (ft)  Assistive Device: Gait belt;Walker, rolling  Ambulation - Level of Assistance: Contact guard assistance  Gait Abnormalities: Decreased step clearance  Right Side Weight Bearing: Full  Left Side Weight Bearing: Full  Base of Support: Widened  Speed/Doris: Pace decreased (<100 feet/min)  Step Length: Left shortened;Right shortened   Interventions: Verbal cues; Tactile cues    Therapeutic Exercises:   sitting  EXERCISE   Sets   Reps   Active Active Assist   Passive   Comments   Ankle pumps 1 10 [x] [] [] bilat   Heel raises 1 10 [x] [] [] \"   Toe tap 1 10 [x] [] [] \"   Knee ext 1 10 [x] [] [] \"   Hip flex 1 10 [x] [] [] \"     Pain:  Pain Scale 1: Numeric (0 - 10)  Pain Intensity 1: 0    Activity Tolerance: fair     After treatment:   [x] Patient left in no apparent distress sitting up in chair  [] Patient left in no apparent distress in bed  [x] Call bell left within reach  [x] Nursing notified  [] Caregiver present  [x] Bed alarm activated    COMMUNICATION/COLLABORATION:   The patients plan of care was discussed with: Registered Nurse    Wen Rae PTA   Time Calculation: 25 mins

## 2018-03-01 NOTE — PROGRESS NOTES
Occupational Therapy Goals  Initiated 2/19/2018, Goals remain appropriate as per 2/27 weekly reevaluation. 1.  Patient will perform grooming while standing at the sink with supervision/standby assist within 7 day(s). 2.  Patient will perform bathing with contact guard assist within 7 day(s). 3.  Patient will perform lower body dressing with supervision/standby assist within 7 day(s). 4.  Patient will perform toilet transfers with supervision/set-up within 7 day(s). 5.  Patient will perform all aspects of toileting with contact guard assist within 7 day(s). Occupational Therapy TREATMENT  Patient: Ravi Lundy (1 y.o. male)  Date: 3/1/2018  Diagnosis: Influenza  Acute respiratory failure (HCC) Acute respiratory failure (HCC)       Precautions: Fall (Droplet)    ASSESSMENT:  Good overall progress, performing functional mobility at a supervision to CGA level, completed standing grooming with supervision and performed toileting with as much as min A. Good tolerance for activity overall tolerating 4 minutes of standing grooming. Cueing needed t/o ADLs to ensure thoroughness. Patient continues to be motivated and cooperative with therapy, and he should do well in SNF rehab. Progression toward goals:  []       Improving appropriately and progressing toward goals  [x]       Improving slowly and progressing toward goals  []       Not making progress toward goals and plan of care will be adjusted     PLAN:  Patient continues to benefit from skilled intervention to address the above impairments. Continue treatment per established plan of care.   Discharge Recommendations:  Aguilar Sykes  Further Equipment Recommendations for Discharge:  ROLANDO         OBJECTIVE DATA SUMMARY:   Cognitive/Behavioral Status:  Neurologic State: Alert  Orientation Level: Oriented to person;Oriented to place;Oriented to situation  Cognition: Follows commands;Decreased attention/concentration (cueing needed for thoroughness during ADLs)        Safety/Judgement: Decreased awareness of need for safety;Decreased insight into deficits  Functional Mobility and Transfers for ADLs:  Bed Mobility:  Presented up in chair        Scooting: Supervision  Transfers:  Sit to Stand: Supervision (from chair)                Toilet Transfer : Contact guard assistance           Bathroom Mobility: Stand-by assistance (with RW, cueing for safety and problem solving )      Balance:  Sitting: Intact  Standing: Impaired  Standing - Static: Good  Standing - Dynamic : Good (for standing grooming, Fair for LB dressing & ambulation)  ADL Intervention:  Grooming  Grooming Assistance: Supervision/set up (standing at sink)  Washing Hands: Supervision/set-up  Brushing Teeth: Supervision/set-up  Cues: Verbal cues provided     Toileting  Toileting Assistance: Minimum assistance  Bowel Hygiene: Minimum assistance  Clothing Management: Minimum assistance  Cues: Tactile cues provided;Visual cues provided;Verbal cues provided  Adaptive Equipment: Grab bars    Cognitive Retraining  Organizing/Sequencing: Two step sequence (during toileting)  Following Commands: Follows one step commands/directions; Follows two step commands/directions  Safety/Judgement: Decreased awareness of need for safety;Decreased insight into deficits  Cues: Tactile cues provided;Verbal cues provided;Visual cues provided       Activity Tolerance:   Stood 4 minutes at sink to complete grooming    After treatment:   [x] Patient left in no apparent distress sitting up in chair  [] Patient left in no apparent distress in bed  [x] Call bell left within reach  [x] Nursing notified  [] Caregiver present  [x] Bed alarm activated    Jeffery Sylvester, OTR/L  Time Calculation: 18 mins

## 2018-03-01 NOTE — DISCHARGE SUMMARY
Hospitalist Discharge Summary     Patient ID:  Marta Srivastava  [id number]  32 y.o.  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  male with mental retardation, DM type 2, HTN, seizure disorder brought to ER by EMS for above symptoms and weakness with difficulty standing. History provided by mother.     Patient went out on [employer] ride with a group of people last Friday 2/9 and by 2/14 became sick with sorethroat, fever, headache, frequent urination. Mother been giving tylenol and hot tea with honey and yesterday temp improved to 101. Today had fever again 102.4, weak, difficulty with standing, slid off bed and mother unable to help patient get up. Caregiver had called mother saying other people in the group became sick on Tuesday with flu. EMS noted patient was too weak to walk, 93-94% RA, fever 102.1, .     Patient had flu vaccine this season. No hx chronic lung problem. Mother denies patient being diagnosed with flu (she has not taken him to get any medical attention until now).    We were asked to admit for work up and evaluation of the above problems. ______________________________________________________________________  DISCHARGE SUMMARY/HOSPITAL COURSE:  for full details see H&P, daily progress notes, labs, consult notes. Patient is a pleasant 64yo  gentleman with MR, previously living at home with his mother, who presented with acute hypoxic respiratory failure and completed treatment with tamiflu.  The patient was also found to have poorly controlled DM with A1C of > 9 on admission. He has taken metformin and amaryl prior to admission. His metformin dose was increased and amaryl continued. He was also stated on tradjenta with improved control of BS in hospital. Otherwise, patient's mother noted patient has had a chronic cough for the past few months. The cause is unclear with no known history of lung or airway disease or known history of seasonal allergies, no hx of dyspepsia. The cough did seem to significantly improve once albuterol nebs, which were appropriately initiated in the acute phase of his flu treatment. It was recommended he f/u as an outpatient for further workup if the cough persists. Patient required level 2 evaluation for SNF placement which was initiated early in admission by case management. He is now approved for placement and medically appropriate for DC to SNF  _______________________________________________________________________  Patient seen and examined by me on discharge day. Pertinent Findings:  Gen:    Not in distress  Chest: Clear lungs  CVS:   Regular rhythm. No edema  Abd:  Soft, not distended, not tender  Neuro:  Alert, oriented x 3_____  __________________________________________________________________  DISCHARGE MEDICATIONS:   Discharge Medication List as of 3/1/2018  2:12 PM      START taking these medications    Details   tamsulosin (FLOMAX) 0.4 mg capsule Take 1 Cap by mouth daily. , Normal, Disp-30 Cap, R-0      linagliptin (TRADJENTA) 5 mg tablet Take 1 Tab by mouth Daily (before breakfast). , Normal, Disp-30 Tab, R-0         CONTINUE these medications which have NOT CHANGED    Details   atorvastatin (LIPITOR) 40 mg tablet TAKE ONE TABLET BY MOUTH AT BEDTIME, Normal, Disp-90 Tab, R-2      topiramate (TOPAMAX) 200 mg tablet 1.5 tablets twice a day, NormalPlease cancel previous doseDisp-360 Tab, R-3      glimepiride (AMARYL) 4 mg tablet Take 2 Tabs by mouth every morning., Normal8 mg daily is the most current doseDisp-180 Tab, R-3      divalproex DR (DEPAKOTE) 500 mg tablet Take one tab in the AM and two tabs at night, Normal, Disp-270 Tab, R-3      folic acid (FOLVITE) 1 mg tablet TAKE ONE TABLET BY MOUTH DAILY, Normal, Disp-90 Tab, R-3      metFORMIN ER (GLUCOPHAGE XR) 750 mg tablet TAKE ONE TABLET BY MOUTH TWO TIMES A DAY, Normal, Disp-180 Tab, R-3      ramipril (ALTACE) 5 mg capsule Take 1 Cap by mouth daily. , NormalMay get 90 at a time if family desiresDisp-90 Cap, R-3      triamcinolone acetonide (KENALOG) 0.1 % topical cream Historical Med      Calcium-Cholecalciferol, D3, (CALTRATE-600 PLUS VITAMIN D3) 600-400 mg-unit Tab Take  by mouth., Historical Med             My Recommended Diet, Activity, Wound Care, and follow-up labs are listed in the patient's Discharge Insturctions which I have personally completed and reviewed.     ______________________________________________________________________    Risk of deterioration: Moderate    Condition at Discharge:  Stable  ______________________________________________________________________    Disposition  SNF/LTC  ______________________________________________________________________    Care Plan discussed with:   Patient, Family, RN, Care Manager, Consultant    ______________________________________________________________________    Code Status: Full Code  ______________________________________________________________________      Follow up with:   PCP : Michoacano Dominguez MD  Follow-up Information     Follow up With Details Comments Efe [address] [place] [address]   [address]  [address], [address] [place] [address] [address]   [phone]                Total time in minutes spent coordinating this discharge (includes going over instructions, follow-up, prescriptions, and preparing report for sign off to her PCP) : 30 minutes    Signed:  Estela Hope DO

## 2018-03-01 NOTE — PROGRESS NOTES
Oncology Nursing Communication Tool  7:42 PM  2/28/2018     Bedside shift change report given to Dmitri Celestin RN (incoming nurse) by Aurelia Bain RN (outgoing nurse) on Adrián Berger. Report included the following information SBAR. Shift Summary:       Issues for physician to address: Oncology Shift Note   Admission Date 2/17/2018   Admission Diagnosis Influenza  Acute respiratory failure ([address]   Code Status Full Code   Consults None      Cardiac Monitoring [] Yes [x] No      Purposeful Hourly Rounding [x] Yes    Kalia Score Total Score: 4   Kalia score 3 or > [] Bed Alarm [] Avasys [] 1:1 sitter [] Patient refused (Place signed refusal form in chart)      Pain Managed [x] Yes [] No    Key Pain Meds     The patient is on no pain meds. Influenza Vaccine Received Flu Vaccine for Current Season (usually Sept-March): Yes           Oxygen needs? [] Room air Oxygen @  []1L    []2L    []3L   []4L    []5L   []6L     Use home O2? [] Yes [] No  Perform O2 challenge test using  smartphrase (.oxygenchallenge)      Last bowel movement Last Bowel Movement Date: 02/27/18  bowel movement      Urinary Catheter [REMOVED] Urinary Catheter 02/18/18 Mitchell - Temperature-Criteria for Appropriate Use: Obstruction/retention  [REMOVED] Condom Catheter 02/23/18-Criteria for Appropriate Use: Strict I/Os     [REMOVED] Urinary Catheter 02/18/18 Mitchell - Temperature-Urine Output (mL): 420 ml  [REMOVED] Condom Catheter 02/23/18-Urine Output (mL): 450 ml     LDAs                                    Readmission Risk Assessment Tool Score Medium Risk            18       Total Score        3 Patient Length of Stay (>5 days = 3)    9 Pt. Coverage (Medicare=5 , Medicaid, or Self-Pay=4)    6 Charlson Comorbidity Score (Age + Comorbid Conditions)        Criteria that do not apply:    Has Seen PCP in Last 6 Months (Yes=3, No=0)    . Living with Significant Other. Assisted Living. LTAC. SNF.  or   Rehab    IP Visits Last 12 Months (1-3=4, 4=9, >4=11)       Expected Length of Stay 4d 12h   Actual Length of Stay [address], RN

## 2018-03-01 NOTE — PROGRESS NOTES
CM called to check on the Passar. CM called Kenrick Sosa with [facility], [phone]. Fax [phone]. CM talked to Kenrick Sosa and she indicated that she sent it to the state yesterday and that they have 48 hours to review it but usually they get back the next day. So we should receive something by fax today hopefully. [facility] [phone], with [facility] and they just wanted to make sure that he was doing ok and they have a bed for him once we get this information from the [facility]. 9:20 AM   CM called Pt Mother, Mrs. Chelsea Parker and let her know that I would keep her up to date with discharge process. She indicated that she could transport him in the car if staff can help get him in and out of car at [facility].      9:32 AM   CM also received a call from Toni Martinez with the [address] [place] [address] St, [phone] and gave her update with where we are with this Level II process. She appreciated the update. 10:32 AM   CM received a call from his sister in law who was checking the status of his discharge. 12:58 PM   CM received the PASSAR form. CM called Toni Martinez with [facility] and let her know that we received the forms and he should be transitioning over this afternoon. She asked that I fax over copy of discharge order and instructions to her at [phone]. CM will do that once DC order/summary is in.      CM called Nikki Pizarro [phone] and they can accept him this afternoon. CM is faxed the PASSAR document to the [facility]. [facility] called and indicated that she would need the completed UAI soon. CM will email Tanya Hall and let her know. She has started one and I talked to her this morning and she stated she would finish it tomorrow. Pt will be going to Room C Wing Room 31 A. RN will need to call report to [phone]. CM let family know who was in the room and Mom will be transporting him in car. Staff will help get in and out of car. CM will continue to monitor discharge plan. 2:31 PM   RN printed AVS and is getting ready to discharge patient. CM faxed copy of Level II and AVS to Namita Varghese as requested. No further CM needs at this time. Care Management Interventions  PCP Verified by CM: Yes  Palliative Care Criteria Met (RRAT>21 & CHF Dx)?: No  Mode of Transport at Discharge:  Other (see comment) (Family to transport in car. )  Transition of Care Consult (CM Consult): SNF [facility])  Partner SNF: Yes  MyChart Signup: No  Discharge Durable Medical Equipment: No  Physical Therapy Consult: Yes  Occupational Therapy Consult: Yes  Speech Therapy Consult: No  Current Support Network: [place], Lives with Caregiver  Confirm Follow Up Transport: Family  Plan discussed with Pt/Family/Caregiver: Yes  Freedom of Choice Offered: Yes  Discharge Location  Discharge Placement: Skilled nursing facility    [place], [address]

## 2018-03-13 NOTE — PROGRESS NOTES
Error pt has an appt with , flu vaccine given in office encounter. Pt states she has 1/2 a bottle SUCRAFATE  with no refill. Pt is requesting refill.  Please call thank you

## 2018-03-27 ENCOUNTER — APPOINTMENT (OUTPATIENT)
Dept: INTERNAL MEDICINE CLINIC | Facility: CLINIC | Age: 63
End: 2018-03-27

## 2018-03-27 DIAGNOSIS — E78.00 HYPERCHOLESTEROLEMIA: ICD-10-CM

## 2018-03-27 DIAGNOSIS — E11.9 CONTROLLED TYPE 2 DIABETES MELLITUS WITHOUT COMPLICATION, WITHOUT LONG-TERM CURRENT USE OF INSULIN (HCC): ICD-10-CM

## 2018-03-28 LAB
ALBUMIN SERPL-MCNC: 3.7 G/DL (ref 3.6–4.8)
ALBUMIN/GLOB SERPL: 1.3 {RATIO} (ref 1.2–2.2)
ALP SERPL-CCNC: 50 IU/L (ref 39–117)
ALT SERPL-CCNC: 9 IU/L (ref 0–44)
AST SERPL-CCNC: 11 IU/L (ref 0–40)
BILIRUB SERPL-MCNC: 0.3 MG/DL (ref 0–1.2)
BUN SERPL-MCNC: 15 MG/DL (ref 8–27)
BUN/CREAT SERPL: 23 (ref 10–24)
CALCIUM SERPL-MCNC: 9.1 MG/DL (ref 8.6–10.2)
CHLORIDE SERPL-SCNC: 107 MMOL/L (ref 96–106)
CHOLEST SERPL-MCNC: 133 MG/DL (ref 100–199)
CO2 SERPL-SCNC: 20 MMOL/L (ref 18–29)
CREAT SERPL-MCNC: 0.65 MG/DL (ref 0.76–1.27)
EST. AVERAGE GLUCOSE BLD GHB EST-MCNC: 232 MG/DL
GFR SERPLBLD CREATININE-BSD FMLA CKD-EPI: 105 ML/MIN/1.73
GFR SERPLBLD CREATININE-BSD FMLA CKD-EPI: 121 ML/MIN/1.73
GLOBULIN SER CALC-MCNC: 2.9 G/DL (ref 1.5–4.5)
GLUCOSE SERPL-MCNC: 142 MG/DL (ref 65–99)
HBA1C MFR BLD: 9.7 % (ref 4.8–5.6)
HDLC SERPL-MCNC: 42 MG/DL
LDLC SERPL CALC-MCNC: 66 MG/DL (ref 0–99)
POTASSIUM SERPL-SCNC: 4.7 MMOL/L (ref 3.5–5.2)
PROT SERPL-MCNC: 6.6 G/DL (ref 6–8.5)
SODIUM SERPL-SCNC: 144 MMOL/L (ref 134–144)
TRIGL SERPL-MCNC: 125 MG/DL (ref 0–149)
VLDLC SERPL CALC-MCNC: 25 MG/DL (ref 5–40)

## 2018-03-29 ENCOUNTER — PATIENT OUTREACH (OUTPATIENT)
Dept: INTERNAL MEDICINE CLINIC | Age: 63
End: 2018-03-29

## 2018-03-29 NOTE — PROGRESS NOTES
Hospital Discharge Follow-Up      Date/Time:  3/30/2018 4:37 PM    Patient was admitted to [facility] on 18 and discharged on 3/1/18 for Acute hypoxic resp failure, resolved, Influenza type A, s/p completed treatment. The physician discharge summary was available at the time of outreach. Patient was contacted within 10 business days of discharge from SNF. Inpatient RRAT score: 18    Top Challenges reviewed with the provider   - no further Flu s/s    - DM- Metformin increased to 1000 mg in hosp. Amaryl 8 mg continued. Tradjenta 5 mg daily added to regimen. As per d/c med list Metformin dose remained 750 mg BID. Wad he to continue w/ 1000 mg dose? Not sure of dose in SNF. [facility] d/c med list not available to NN.    - DM- last A1C . Please re-check. Mother reported no SMBG. Possible DTC referral. Not group session. Individual session, pt & mother, given pt's cognition level    - RUE limited mobility. Mother reported not returning to Ortho. Wants MRI done to r/o torn rotator cuff. Plans to discuss w/ you  - Mother bringing PE form for [address]. Start date 18. Bringing in recent TB skin test result paper. Method of communication with provider :chart routing    Nurse Navigator (NN) contacted the pt's mother Rangel Puri by telephone to perform post hospital discharge assessment. Verified name and  with pt's mother as identifiers. Provided introduction to self, and explanation of the Nurse Navigator role. Reviewed discharge instructions and red flags with  pt's mother who verbalizes understanding. Pt's mother  given an opportunity to ask questions and does not have any further questions or concerns at this time. The pt's mother agrees to contact the PCP office for questions related to their healthcare. NN provided contact information for future reference. Summary of patients top problems:  1. DM- uncontrolled @ admission. In hosp Metformin increased to 1000 mg BID.  Amaryl 8 mg continued. Tradjenta 5mg daily added. Glucose controled improved. Metformin dosage @ d/c was 750 mg BID. What dosage should pt be on? No SMBG. DTC referral for DM Education? 2. Limited RUE mobility-  as reported by mother. Concern for \"torn rotator cuff. 3. 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 Pt to start attending [address] on 4/9/18 in [place]. 2 days/week 8 AM-4 PM. Needs PE form completed. Home Health orders at discharge: PT, OT, SN- per SNF d/c order  [address]: [address] (per SNF order)  Date of initial visit: 3/16/18 Naheed López    Durable Medical Equipment ordered/company: none  Durable Medical Equipment received: n/a    Barriers to care? potential cognition    Advance Care Planning:   Does patient have an Advance Directive:  Gen POA. Mother reported now has MPOA also. To bring document for review. Medication:     New Medications at Discharge: (per hosp d/c. Unknown per SNF d/c)  START taking these medications     Details   tamsulosin (FLOMAX) 0.4 mg capsule Take 1 Cap by mouth daily. , Normal, Disp-30 Cap, R-0       linagliptin (TRADJENTA) 5 mg tablet Take 1 Tab by mouth Daily (before breakfast). , Normal, Disp-30 Tab, R-0         Changed Medications at Discharge: none  Discontinued Medications at Discharge: none    Medication reconciliation was not performed during this call. SNF d/c med list not available to NN. Mother reported pt has all meds. Mother reported there were no barriers to obtaining medications at this time. Referral to Pharm D needed: no     Prior to Admission medications    Medication Sig Start Date End Date Taking? Authorizing Provider   tamsulosin (FLOMAX) 0.4 mg capsule Take 1 Cap by mouth daily. 3/2/18   Bucky Vanegas, DO   linagliptin (TRADJENTA) 5 mg tablet Take 1 Tab by mouth Daily (before breakfast).  3/2/18   Bucky Vanegas, DO   atorvastatin (LIPITOR) 40 mg tablet TAKE ONE TABLET BY MOUTH AT BEDTIME 1/2/18   Amber Lima MD   topiramate (TOPAMAX) 200 mg tablet 1.5 tablets twice a day 11/28/17   Gregg Salgado MD   glimepiride (AMARYL) 4 mg tablet Take 2 Tabs by mouth every morning. 11/28/17   Gregg Salgado MD   divalproex DR (DEPAKOTE) 500 mg tablet Take one tab in the AM and two tabs at night 10/26/17   Gregg Salgado MD   folic acid (FOLVITE) 1 mg tablet TAKE ONE TABLET BY MOUTH DAILY 5/15/17   Gregg Salgado MD   metFORMIN ER (GLUCOPHAGE XR) 750 mg tablet TAKE ONE TABLET BY MOUTH TWO TIMES A DAY 5/15/17   Gregg Salgado MD   ramipril (ALTACE) 5 mg capsule Take 1 Cap by mouth daily. 4/19/17   Gregg Salgado MD   triamcinolone acetonide (KENALOG) 0.1 % topical cream  1/14/16   Historical Provider   Calcium-Cholecalciferol, D3, (CALTRATE-600 PLUS VITAMIN D3) 600-400 mg-unit Tab Take  by mouth. Historical Provider       There are no discontinued medications. PCP/Specialist follow up:   Future Appointments  Date Time Provider Kelly Fang   4[facility]6/2018 10:30 AM [facility]            3/29/18- per review of document on file no medical decisions included. Document is a general POA. Spoke with mother. Mother reported a MPOA document was completed when pt in SNF. Has copy. Advised to bring to PCP for review. Mother verbalized understanding. Plan- mother to bring MPOA document to PCP on 4/6/18 for review. If an appropriate MPOA scan to EMR. Next NN f/u ~ 8 days-ID       Improve Mobility of RUE (pt-stated)            3/29/18- mother reported pt fall 10/2018. RUE not right since then. Had Ortho f/u. Not satisfied. Doesn't want to return to Ortho. Pt won't raise RUE. Mother concerned pt may have \"a torn rotator cuff\". Requests pt have an MRI. Plans to discuss w/ PCP. Plan- mother plans to discuss pt's RUE mobility limitation w/ PCP during f/u appt.  Next NN f/u ~ 8 days-ID       Transitions of Care- Collaboration & Care Coordination to Prevent complications post hospitalization. 3/29/18- spoke with pt's mother Taylor (SUDHIR). Pt d/c'd from [facility] 3/15/18. Has SNF d/c paperwork. HH with [facility]. Starting to walk better. Getting more strength in legs. ADL's with min assist. Has Mary . Calls every few days. Ready to enroll pt in a Day Program in [place] (name?). Start 4/9/18. 2 days/week. 8 AM-4 PM. Transportation by mother x first week. Afterwards facility to transport. Has PE form to be completed by PCP. Had TB skin test done in SNF. Has result paperwork. PCP f/u appt 4/6/18 @ 10:30 AM. Plan- pt to attend 4/6/28 PCP appt. Mother to present SNF d/c paps to PCP for review. Mother to bring TB test result paperwork & Day Care PE form for PCP review/completion. NN to collaborate with pt's mother, PCP & additional Care Team members as needed for care coordination.  Next NN f/u ~ 8 days-ID

## 2018-04-06 ENCOUNTER — OFFICE VISIT (OUTPATIENT)
Dept: INTERNAL MEDICINE CLINIC | Facility: CLINIC | Age: 63
End: 2018-04-06

## 2018-04-06 VITALS
TEMPERATURE: 97.4 F | SYSTOLIC BLOOD PRESSURE: 134 MMHG | OXYGEN SATURATION: 98 % | HEIGHT: 74 IN | WEIGHT: 200.5 LBS | DIASTOLIC BLOOD PRESSURE: 80 MMHG | RESPIRATION RATE: 16 BRPM | BODY MASS INDEX: 25.73 KG/M2 | HEART RATE: 75 BPM

## 2018-04-06 DIAGNOSIS — I10 HYPERTENSION, ESSENTIAL: ICD-10-CM

## 2018-04-06 DIAGNOSIS — E78.00 HYPERCHOLESTEROLEMIA: ICD-10-CM

## 2018-04-06 DIAGNOSIS — M75.01 ADHESIVE CAPSULITIS OF RIGHT SHOULDER: ICD-10-CM

## 2018-04-06 DIAGNOSIS — F79 MENTAL RETARDATION: ICD-10-CM

## 2018-04-06 DIAGNOSIS — D69.59 THROMBOCYTOPENIA DUE TO DRUGS: ICD-10-CM

## 2018-04-06 DIAGNOSIS — I87.8 VENOUS STASIS OF LOWER EXTREMITY: ICD-10-CM

## 2018-04-06 DIAGNOSIS — G40.909 SEIZURE DISORDER (HCC): ICD-10-CM

## 2018-04-06 DIAGNOSIS — T50.905A THROMBOCYTOPENIA DUE TO DRUGS: ICD-10-CM

## 2018-04-06 PROBLEM — J96.00 ACUTE RESPIRATORY FAILURE (HCC): Status: RESOLVED | Noted: 2018-02-17 | Resolved: 2018-04-06

## 2018-04-06 PROBLEM — J11.1 INFLUENZA: Status: RESOLVED | Noted: 2018-02-17 | Resolved: 2018-04-06

## 2018-04-06 RX ORDER — PIOGLITAZONEHYDROCHLORIDE 30 MG/1
30 TABLET ORAL DAILY
COMMUNITY
Start: 2018-03-18 | End: 2019-01-22 | Stop reason: DRUGHIGH

## 2018-04-06 RX ORDER — CHLORHEXIDINE GLUCONATE 1.2 MG/ML
15 RINSE ORAL EVERY 12 HOURS
COMMUNITY
Start: 2018-02-08 | End: 2021-02-08

## 2018-04-06 NOTE — MR AVS SNAPSHOT
[address] [place] [address] [phone] Patient: Gema Ferreira MRN: [id number] DEX:24/9/0097 Visit Information Date & Time Provider Department Dept. Phone Encounter #  
 4/6/2018 10:30 AM Aysha Tavares MD [facility] Internal Medicine of [address] [place] [address] [id number] Follow-up Instructions Return in about 3 months (around 7/6/2018) for DM, HTN, POC A1c . Upcoming Health Maintenance Date Due COLONOSCOPY 11/8/2017 MEDICARE YEARLY EXAM 5/19/2018 FOOT EXAM Q1 8/24/2018 MICROALBUMIN Q1 8/24/2018 HEMOGLOBIN A1C Q6M 9/27/2018 EYE EXAM RETINAL OR DILATED Q1 10/27/2018 LIPID PANEL Q1 3/27/2019 DTaP/Tdap/Td series (2 - Td) 10/20/2020 Allergies as of 4/6/2018  Review Complete On: 4/6/2018 By: Aysha Tavares MD  
 No Known Allergies Current Immunizations  Reviewed on 4/6/2018 Name Date H1N1 FLU VACCINE 1/1/2010 Influenza Vaccine (Quad) PF 8/24/2017, 9/28/2016, 10/15/2015, 10/10/2014 Influenza Vaccine Split 9/21/2011 Influenza Vaccine Whole 10/12/2010 Pneumococcal Polysaccharide (PPSV-23) 2/16/2017 TD Vaccine 2/22/2005 TDAP Vaccine 10/20/2010 Zoster Vaccine, Live 2/20/2014 Reviewed by Thor Watkins LPN on 0/1/8468 at 05:99 AM  
 Reviewed by Thor Watkins LPN on 3/6/8622 at 49:67 AM  
You Were Diagnosed With   
  
 Codes Comments Uncontrolled type 2 diabetes mellitus without complication, without long-term current use of insulin ([facility]    -  Primary ICD-10-CM: E11.65 ICD-9-CM: 250.02 Adhesive capsulitis of right shoulder     ICD-10-CM: M75.01 
ICD-9-CM: 726.0 Hypertension, essential     ICD-10-CM: I10 
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 
ICD-9-CM: 663 Venous stasis of lower extremity     ICD-10-CM: I87.8 ICD-9-CM: 459.81 Vitals BP Pulse Temp Resp Height(growth percentile) Weight(growth percentile) 134/80 (BP 1 Location: Left arm, BP Patient Position: Sitting) 75 97.4 °F (36.3 °C) (Oral) 16 6' 2\" (1.88 m) 200 lb 8 oz (90.9 kg) SpO2 BMI Smoking Status 98% 25.74 kg/m2 Never Smoker BMI and BSA Data Body Mass Index Body Surface Area 25.74 kg/m 2 2.18 m 2 Preferred Pharmacy Pharmacy Name Phone [facility] [address] [facility], [address] [phone] Your Updated Medication List  
  
   
This list is accurate as of 4/6/18 11:34 AM.  Always use your most recent med list.  
  
  
  
  
 atorvastatin 40 mg tablet Commonly known as:  LIPITOR  
TAKE ONE TABLET BY MOUTH AT BEDTIME CALTRATE-600 PLUS VITAMIN D3 tablet Generic drug:  calcium-cholecalciferol (D3) Take  by mouth. chlorhexidine 0.12 % solution Commonly known as:  PERIDEX  
  
 divalproex  mg tablet Commonly known as:  DEPAKOTE Take one tab in the AM and two tabs at night  
  
 folic acid 1 mg tablet Commonly known as:  FOLVITE  
TAKE ONE TABLET BY MOUTH DAILY  
  
 glimepiride 4 mg tablet Commonly known as:  AMARYL Take 2 Tabs by mouth every morning. linagliptin 5 mg tablet Commonly known as:  Ree Constant Take 1 Tab by mouth daily. metFORMIN  mg tablet Commonly known as:  GLUCOPHAGE XR  
TAKE ONE TABLET BY MOUTH TWO TIMES A DAY pioglitazone 30 mg tablet Commonly known as:  ACTOS Take 30 mg by mouth daily. ramipril 5 mg capsule Commonly known as:  ALTACE Take 1 Cap by mouth daily. topiramate 200 mg tablet Commonly known as:  TOPAMAX  
1.5 tablets twice a day Prescriptions Sent to Pharmacy  Refills  
 linagliptin (TRADJENTA) 5 mg tablet 11  
 Sig: Take 1 Tab by mouth daily. Class: Normal  
 Pharmacy: [facility] [address] [facility], [address] Ph #: [phone] Route: Oral  
  
We Performed the Following REFERRAL TO ORTHOPEDICS [YZQ011 Custom] Follow-up Instructions Return in about 3 months (around 7/6/2018) for DM, HTN, POC A1c . Referral Information Referral ID Referred By Referred To  
  
 [id number] [facility], [address] [place]itte   
   [facility] [address] [place], [facility] Phone: [phone] Fax: [phone] Visits Status Start Date End Date 1 New Request 4/6/18 4/6/19 If your referral has a status of pending review or denied, additional information will be sent to support the outcome of this decision. Patient Instructions See if linagliptin (Trajenta) is affordable. If so, take it and stop pioglitazone. If not affordable, continue pioglitazone. See Dr Jermain Ely at [place] [facility] visit in 3 months with hemoglobin A1c at that visit. Frozen Shoulder: Care Instructions Your Care Instructions Frozen shoulder is stiffness, pain, and trouble moving your shoulder. It may happen after an injury or overuse, or from a disease such as diabetes or a stroke. You may have pain that keeps you from using your shoulder. However, you need to move your shoulder. If you do not move it, it will get more stiff and sore. Your doctor may order an X-ray to make sure there is not another cause for your stiff shoulder. You can treat frozen shoulder with heat, stretching, over-the-counter pain medicines, and physical therapy. Your doctor also may inject medicine into your shoulder to reduce pain and swelling. It can take a year or more to get better. Surgery is almost never needed. Follow-up care is a key part of your treatment and safety.  Be sure to make and go to all appointments, and call your doctor if you are having problems. It's also a good idea to know your test results and keep a list of the medicines you take. How can you care for yourself at home? · Take pain medicines exactly as directed. ¨ If the doctor gave you a prescription medicine for pain, take it as prescribed. ¨ If you are not taking a prescription pain medicine, ask your doctor if you can take an over-the-counter medicine. · Put a heating pad set on low or a warm, wet towel wrapped in plastic on your shoulder. The heat may make it easier to stretch your shoulder. · Follow your doctor's advice for stretches and exercises. · Go to physical therapy if your doctor suggests it. · Try these stretching exercises to reduce stiffness if your doctor says it is okay. Do the exercises slowly to avoid injury. Put a warm, wet towel on your shoulder before exercising. Stop any exercise that increases pain. ¨ Pendulum exercise. While leaning forward and holding onto a table or the back of a chair with your good arm, bend at the waist, allowing your affected arm to hang straight down. Swing the affected arm back and forth like a pendulum, then in circles that start small and gradually grow larger as pain allows. Try this for about 2 or 3 minutes, several times a day. Once pain begins to go away, you can do this exercise while holding a 1- or 2-pound weight. ¨ Wall climbing (to the side). Stand with your side to a wall so that your fingers can just touch it. Then turn so your body is turned slightly toward the wall. Walk the fingers of your injured arm up the wall as high as pain permits. Hold that position for a count of 15 to 30 seconds. Walk your fingers down to the starting position. Repeat 2 to 4 times, trying to reach higher each time. ¨ Wall climbing (to the front). Face a wall, standing so your fingers can just touch it. Walk the fingers of your affected arm up the wall as high as pain permits. Hold that position for a count of 15 to 30 seconds. Slowly walk your fingers to the starting position. Repeat 2 to 4 times, trying to reach higher each time. When should you call for help? Call your doctor now or seek immediate medical care if: 
? · You have severe pain. ? · Your arm is cool or pale or changes color. ? · You have tingling or numbness in your arm. ? Watch closely for changes in your health, and be sure to contact your doctor if: 
? · You have increased pain. ? · You have new pain that develops in another area. For example, you have pain in your arm, hand, or elbow. ? · You do not get better as expected. Where can you learn more? Go to http://jarod-jared.info/. Enter K374 in the search box to learn more about \"Frozen Shoulder: Care Instructions. \" Current as of: March 21, 2017 Content Version: 11.4 © 8300-8616 [employer]. Care instructions adapted under license by [employer] (which disclaims liability or warranty for this information). If you have questions about a medical condition or this instruction, always ask your healthcare professional. [address] any warranty or liability for your use of this information. Introducing [facility]! Azul Rodney introduces makerSQR patient portal. Now you can access parts of your medical record, email your doctor's office, and request medication refills online. 1. In your internet browser, go to [email or web address] 2. Click on the First Time User? Click Here link in the Sign In box. You will see the New Member Sign Up page. 3. Enter your makerSQR Access Code exactly as it appears below. You will not need to use this code after youve completed the sign-up process. If you do not sign up before the expiration date, you must request a new code. · makerSQR Access Code: [email or web address] Expires: 5/30/2018  9:14 AM 
 
4.  Enter the last four digits of your Social Security Number (xxxx) and Date of Birth (mm/dd/yyyy) as indicated and click Submit. You will be taken to the next sign-up page. 5. Create a Osprey Data ID. This will be your [employer] login ID and cannot be changed, so think of one that is secure and easy to remember. 6. Create a [employer] password. You can change your password at any time. 7. Enter your Password Reset Question and Answer. This can be used at a later time if you forget your password. 8. Enter your e-mail address. You will receive e-mail notification when new information is available in [address] 9. Click Sign Up. You can now view and download portions of your medical record. 10. Click the Download Summary menu link to download a portable copy of your medical information. If you have questions, please visit the Frequently Asked Questions section of the [employer] website. Remember, [employer] is NOT to be used for urgent needs. For medical emergencies, dial 911. Now available from your iPhone and Android! Please provide this summary of care documentation to your next provider. Your primary care clinician is listed as Rose Pruitt. If you have any questions after today's visit, please call [phone].

## 2018-04-06 NOTE — PATIENT INSTRUCTIONS
See if linagliptin (Trajenta) is affordable. If so, take it and stop pioglitazone. If not affordable, continue pioglitazone. See Dr Rex Randall at [facility] visit in 3 months with hemoglobin A1c at that visit. Frozen Shoulder: Care Instructions  Your Care Instructions    Frozen shoulder is stiffness, pain, and trouble moving your shoulder. It may happen after an injury or overuse, or from a disease such as diabetes or a stroke. You may have pain that keeps you from using your shoulder. However, you need to move your shoulder. If you do not move it, it will get more stiff and sore. Your doctor may order an X-ray to make sure there is not another cause for your stiff shoulder. You can treat frozen shoulder with heat, stretching, over-the-counter pain medicines, and physical therapy. Your doctor also may inject medicine into your shoulder to reduce pain and swelling. It can take a year or more to get better. Surgery is almost never needed. Follow-up care is a key part of your treatment and safety. Be sure to make and go to all appointments, and call your doctor if you are having problems. It's also a good idea to know your test results and keep a list of the medicines you take. How can you care for yourself at home? · Take pain medicines exactly as directed. ¨ If the doctor gave you a prescription medicine for pain, take it as prescribed. ¨ If you are not taking a prescription pain medicine, ask your doctor if you can take an over-the-counter medicine. · Put a heating pad set on low or a warm, wet towel wrapped in plastic on your shoulder. The heat may make it easier to stretch your shoulder. · Follow your doctor's advice for stretches and exercises. · Go to physical therapy if your doctor suggests it. · Try these stretching exercises to reduce stiffness if your doctor says it is okay. Do the exercises slowly to avoid injury. Put a warm, wet towel on your shoulder before exercising.  Stop any exercise that increases pain. ¨ Pendulum exercise. While leaning forward and holding onto a table or the back of a chair with your good arm, bend at the waist, allowing your affected arm to hang straight down. Swing the affected arm back and forth like a pendulum, then in circles that start small and gradually grow larger as pain allows. Try this for about 2 or 3 minutes, several times a day. Once pain begins to go away, you can do this exercise while holding a 1- or 2-pound weight. ¨ Wall climbing (to the side). Stand with your side to a wall so that your fingers can just touch it. Then turn so your body is turned slightly toward the wall. Walk the fingers of your injured arm up the wall as high as pain permits. Hold that position for a count of 15 to 30 seconds. Walk your fingers down to the starting position. Repeat 2 to 4 times, trying to reach higher each time. ¨ Wall climbing (to the front). Face a wall, standing so your fingers can just touch it. Walk the fingers of your affected arm up the wall as high as pain permits. Hold that position for a count of 15 to 30 seconds. Slowly walk your fingers to the starting position. Repeat 2 to 4 times, trying to reach higher each time. When should you call for help? Call your doctor now or seek immediate medical care if:  ? · You have severe pain. ? · Your arm is cool or pale or changes color. ? · You have tingling or numbness in your arm. ? Watch closely for changes in your health, and be sure to contact your doctor if:  ? · You have increased pain. ? · You have new pain that develops in another area. For example, you have pain in your arm, hand, or elbow. ? · You do not get better as expected. Where can you learn more? Go to http://jarod-jared.info/. Enter H553 in the search box to learn more about \"Frozen Shoulder: Care Instructions. \"  Current as of: March 21, 2017  Content Version: 11.4  © 9892-8203 [employer]. Care instructions adapted under license by [employer] (which disclaims liability or warranty for this information). If you have questions about a medical condition or this instruction , always ask your healthcare professional. [address] any warranty or liability for your use of this information.

## 2018-04-06 NOTE — PROGRESS NOTES
HISTORY OF PRESENT ILLNESS  Marcella Gonzales is a 65 y.o. male. He is accompanied by his mother. HPI  He presents for follow up of diabetes mellitus, hypertension, hyperlipidemia and venous stasis. Diabetic ROS - medication compliance: compliant all of the time, Did not get linagliptin which was prescribed at recent hospitalization with improvement in blood sugars (was apparently changed to Actos 30 mg at rehab). home glucose monitoring: no done     home BP Monitorin's/60's.      further diabetic ROS: no polyuria or polydipsia, no numbness, tingling or pain in extremities, no unusual visual symptoms, no hypoglycemia, no medication side effects noted. Diet and Lifestyle: generally follows a low fat low cholesterol diet, generally follows a low sodium diet, does not rigorously follow a diabetic diet, exercises regularly, nonsmoker  Cardiovascular ROS:  He complains of lower extremity edema. He denies palpitations, orthopnea, exertional chest pressure/discomfort, claudication, dyspnea on exertion, dizziness    Recent hospitalization for acute respiratory failure due to influenza. (  through ). Went to [facility] afterward for rehab as he became very weak and debilitated. He is getting HH with PT, but progress is slow. He has progressed from [facility], to walker to walking independently twice a day outdoors. He presents for follow up/complaint of pain in right shoulder of 4-5 months duration due to adhesive capsulitis and calvicular fracture. He was under treatment for adhesive capsulitis, when he fell fracturing clavicle. He has been uncooperative with PT. Resists doing anything that hurts. Mother wants an MRI to look at rotator cuff. She does not want to return to prior orthopedist as she does not think he did a complete exam of shoulder. Quality is sharp. Intensity at worst is  Moderately severe. Will not move shoulder Cries out in the night when rolls over onto shoulder. Pain is Constant. It occurs continuously. Pain is worse when when moves shoulder. Pain is alleviated by heat, avoiding painful activities    Over time pain is the same. 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, T50.905A    Sleep disorder G47.9    Hypertension, essential I10    Strongyloides stercoralis infection B78.9    Eosinophilia D72.1    Hypercholesterolemia E78.00    Uncontrolled type 2 diabetes mellitus without complication, without long-term current use of insulin (HCC) E11.65    Acute respiratory failure (HCC) J96.00    Influenza J11.1     Past Medical History:   Diagnosis Date    Contact dermatitis and other eczema, due to unspecified cause     psoriasis    [facility]     Eosinophilia     Hypercholesterolemia     Hypertension     Mental retardation     Seizures (HCC)     Skin cancer      Past Surgical History:   Procedure Laterality Date    ENDOSCOPY, COLON, DIAGNOSTIC  11/08/2007    Dr Kellie Rivas normal except small internal hemorrhoids repeat 11/2017     Social History     Social History    Marital status: SINGLE     Spouse name: N/A    Number of children: N/A    Years of education: N/A     Occupational History          [employer]      Social History Main Topics    Smoking status: Never Smoker    Smokeless tobacco: Never Used    Alcohol use No    Drug use: No    Sexual activity: No     Other Topics Concern    None     Social History Narrative     Family History   Problem Relation Age of Onset    Other Mother      PMR    Hypertension Mother     Cancer Father      Lung    Diabetes Brother      No Known Allergies  Current Outpatient Prescriptions   Medication Sig Dispense Refill    pioglitazone (ACTOS) 30 mg tablet Take 30 mg by mouth daily.       chlorhexidine (PERIDEX) 0.12 % solution       atorvastatin (LIPITOR) 40 mg tablet TAKE ONE TABLET BY MOUTH AT BEDTIME 90 Tab 2    topiramate (TOPAMAX) 200 mg tablet 1.5 tablets twice a day 360 Tab 3    glimepiride (AMARYL) 4 mg tablet Take 2 Tabs by mouth every morning. 180 Tab 3    divalproex DR (DEPAKOTE) 500 mg tablet Take one tab in the AM and two tabs at night 872 Tab 3    folic acid (FOLVITE) 1 mg tablet TAKE ONE TABLET BY MOUTH DAILY 90 Tab 3    metFORMIN ER (GLUCOPHAGE XR) 750 mg tablet TAKE ONE TABLET BY MOUTH TWO TIMES A  Tab 3    ramipril (ALTACE) 5 mg capsule Take 1 Cap by mouth daily. 90 Cap 3    Calcium-Cholecalciferol, D3, (CALTRATE-600 PLUS VITAMIN D3) 600-400 mg-unit Tab Take  by mouth.  linagliptin (TRADJENTA) 5 mg tablet Take 1 Tab by mouth Daily (before breakfast). 30 Tab 0       Review of Systems   Constitutional: Positive for malaise/fatigue (sleeps a lot). Negative for weight loss. Gastrointestinal: Negative for constipation and diarrhea. Musculoskeletal: Positive for joint pain. Negative for back pain. Neurological: Negative for dizziness, tingling and focal weakness. Visit Vitals    /80 (BP 1 Location: Left arm, BP Patient Position: Sitting)    Pulse 75    Temp 97.4 °F (36.3 °C) (Oral)    Resp 16    Ht 6' 2\" (1.88 m)    Wt 200 lb 8 oz (90.9 kg)    SpO2 98%    BMI 25.74 kg/m2     Physical Exam   Constitutional: He is oriented to person, place, and time. He appears well-developed and well-nourished. HENT:   Head: Normocephalic and atraumatic. Eyes: Conjunctivae are normal. Pupils are equal, round, and reactive to light. Neck: Neck supple. Carotid bruit is not present. No thyromegaly present. Cardiovascular: Normal rate, regular rhythm and normal heart sounds. PMI is not displaced. Exam reveals no gallop. No murmur heard. Pulses:       Dorsalis pedis pulses are 2+ on the right side, and 2+ on the left side.         Posterior tibial pulses are 2+ on the right side, and 2+ on the left side.   Pulmonary/Chest: Effort normal. He has no wheezes. He has no rhonchi. He has no rales. Abdominal: Soft. Normal appearance. He exhibits no abdominal bruit and no mass. There is no hepatosplenomegaly. There is no tenderness. Musculoskeletal: He exhibits edema (2+ bilateral). Right shoulder: He exhibits decreased range of motion (able to abduct only 30 deg active or passive), tenderness (anteriorly) and pain. Lymphadenopathy:     He has no cervical adenopathy. Right: No supraclavicular adenopathy present. Left: No supraclavicular adenopathy present. Neurological: He is alert and oriented to person, place, and time. No sensory deficit. Skin: Skin is warm, dry and intact. No rash noted. Psychiatric: He has a normal mood and affect. His behavior is normal.   Nursing note and vitals reviewed. Lab Results   Component Value Date/Time    Hemoglobin A1c 9.7 (H) 03/27/2018 08:40 AM    Hemoglobin A1c (POC) 9.8 (A) 11/28/2017 10:27 AM     Lab Results   Component Value Date/Time    Cholesterol, total 133 03/27/2018 08:40 AM    HDL Cholesterol 42 03/27/2018 08:40 AM    LDL, calculated 66 03/27/2018 08:40 AM    VLDL, calculated 25 03/27/2018 08:40 AM    Triglyceride 125 03/27/2018 08:40 AM    CHOL/HDL Ratio 2.8 03/31/2009 08:40 AM     Lab Results   Component Value Date/Time    Sodium 144 03/27/2018 08:40 AM    Potassium 4.7 03/27/2018 08:40 AM    Chloride 107 (H) 03/27/2018 08:40 AM    CO2 20 03/27/2018 08:40 AM    Anion gap 11 02/27/2018 03:53 AM    Glucose 142 (H) 03/27/2018 08:40 AM    BUN 15 03/27/2018 08:40 AM    Creatinine 0.65 (L) 03/27/2018 08:40 AM    BUN/Creatinine ratio 23 03/27/2018 08:40 AM    GFR est  03/27/2018 08:40 AM    GFR est non- 03/27/2018 08:40 AM    Calcium 9.1 03/27/2018 08:40 AM    Bilirubin, total 0.3 03/27/2018 08:40 AM    AST (SGOT) 11 03/27/2018 08:40 AM    Alk.  phosphatase 50 03/27/2018 08:40 AM    Protein, total 6.6 03/27/2018 08:40 AM    Albumin 3.7 03/27/2018 08:40 AM    Globulin 3.8 02/27/2018 03:53 AM    A-G Ratio 1.3 03/27/2018 08:40 AM    ALT (SGPT) 9 03/27/2018 08:40 AM       ASSESSMENT and PLAN    ICD-10-CM ICD-9-CM    1. Uncontrolled type 2 diabetes mellitus without complication, without long-term current use of insulin (HCC) E11.65 250.02 linagliptin (TRADJENTA) 5 mg tablet   2. Adhesive capsulitis of right shoulder M75.01 726.0 REFERRAL TO ORTHOPEDICS   3. Hypertension, essential I10 401.9    4. Hypercholesterolemia E78.00 272.0    5. 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 287.49     T50.905A E947.9    6. Seizure disorder ([address] G40.909 345.90    7. Mental retardation F79 319    8. Venous stasis of lower extremity I87.8 459.81      Diagnoses and all orders for this visit:    1. Uncontrolled type 2 diabetes mellitus without complication, without long-term current use of insulin ([facility]  He apparently got used to checking sugars in the hospital, but I really see little benefit over just following A1c. Mother is struggling to provide for his needs without adding this to the burden. A1c is below his prior value of 9.8 on 11/28.   -     linagliptin (TRADJENTA) 5 mg tablet; Take 1 Tab by mouth daily. 2. Adhesive capsulitis of right shoulder  The biggest problem is that he does not understand what therapy is necessary and protests anything that causes pain. As shoulder motion is not possible even passively, adhesive capsulitis, and not rotator cuff problem is the primary concern. We have referred to a different orthopedic group for another opinion.  -     REFERRAL TO ORTHOPEDICS    3. Hypertension, essential  Hypertension is controlled     4. Hypercholesterolemia  Hyperlipidemia is controlled    5. Thrombocytopenia due to drugs  Stable    6. Seizure disorder (HCC)  Stable    7. Mental retardation  Complicates care, especially where diabetes and shoulder are concerned. 8. Venous stasis of lower extremity  Stable but edema is present.          Follow-up Disposition:  Return in about 3 months (around 7/6/2018) for DM, HTN, POC A1c .  lab results and schedule of future lab studies reviewed with patient  reviewed diet, exercise and weight control  cardiovascular risk and specific lipid/LDL goals reviewed  I have discussed the diagnosis with the patient and the intended plan as seen in the above orders. Patient is in agreement. The patient has received an after-visit summary and questions were answered concerning future plans. I have discussed side effects and warnings of any new medications with the patient as well.

## 2018-04-06 NOTE — PROGRESS NOTES
Diane Guerrero  Identified pt with two pt identifiers(name and ). Chief Complaint   Patient presents with    Diabetes    Blood Pressure Check    Cholesterol Problem       Reviewed record In preparation for visit and have obtained necessary documentation. Advanced directive / living will on file. 1. Have you been to the ER, urgent care clinic or hospitalized since your last visit? [address]    2. Have you seen or consulted any other health care providers outside of the [address] [place] [address] since your last visit? Include any pap smears or colon screening. No    Vitals reviewed with provider.     Health Maintenance reviewed:     Health Maintenance Due   Topic    COLONOSCOPY           Wt Readings from Last 3 Encounters:   18 200 lb 8 oz (90.9 kg)   18 235 lb (106.6 kg)   17 203 lb (92.1 kg)        Temp Readings from Last 3 Encounters:   18 97.4 °F (36.3 °C) (Oral)   18 97.6 °F (36.4 °C)   17 97.5 °F (36.4 °C) (Oral)        BP Readings from Last 3 Encounters:   18 134/80   18 130/69   17 129/76        Pulse Readings from Last 3 Encounters:   18 75   18 66   17 76        Vitals:    18 1041   BP: 134/80   Pulse: 75   Resp: 16   Temp: 97.4 °F (36.3 °C)   TempSrc: Oral   SpO2: 98%   Weight: 200 lb 8 oz (90.9 kg)   Height: 6' 2\" (1.88 m)   PainSc:   0 - No pain          Learning Assessment:   :       Learning Assessment 2014   PRIMARY LEARNER Patient   HIGHEST LEVEL OF EDUCATION - PRIMARY LEARNER  DID NOT GRADUATE HIGH SCHOOL   BARRIERS PRIMARY LEARNER COGNITIVE   CO-LEARNER CAREGIVER Yes   CO-LEARNER NAME Lalit Redd   PRIMARY LANGUAGE ENGLISH   LEARNER PREFERENCE PRIMARY DEMONSTRATION     READING   ANSWERED BY mother   RELATIONSHIP LEGAL GUARDIAN        Depression Screening:   :       PHQ over the last two weeks 2017   Little interest or pleasure in doing things Not at all   Feeling down, depressed or hopeless Not at all   Total Score PHQ 2 0        Fall Risk Assessment:   :     No flowsheet data found. Abuse Screening:   :       Abuse Screening Questionnaire 2/16/2017   Do you ever feel afraid of your partner? N   Are you in a relationship with someone who physically or mentally threatens you? N   Is it safe for you to go home?  Y        ADL Screening:   :       ADL Assessment 4/16/2015   Feeding yourself No Help Needed   Getting from bed to chair No Help Needed   Getting dressed No Help Needed   Bathing or showering No Help Needed   Walk across the room (includes cane/walker) No Help Needed   Using the telphone No Help Needed   Taking your medications Help Needed   Preparing meals Help Needed   Managing money (expenses/bills) Help Needed   Moderately strenuous housework (laundry) No Help Needed   Shopping for personal items (toiletries/medicines) No Help Needed   Shopping for groceries No Help Needed   Driving Help Needed   Climbing a flight of stairs No Help Needed   Getting to places beyond walking distances Help Needed

## 2018-04-09 ENCOUNTER — TELEPHONE (OUTPATIENT)
Dept: INTERNAL MEDICINE CLINIC | Facility: CLINIC | Age: 63
End: 2018-04-09

## 2018-04-09 NOTE — TELEPHONE ENCOUNTER
Per Silva:    Dr. Jhon Dasilva    Call patient Received:  Today       [address] [facility] Office                     Pt's mom wanted to let the doctor know pt has \"Trijenta\" and wanted to know if her should stop using  the old or should be taking both?  Pt best contact [phone]

## 2018-04-09 NOTE — TELEPHONE ENCOUNTER
Mother was able to get the trajenta, advised that the AVS stated to stop the pioglitazone. Mother stated understanding, no further questions.

## 2018-06-11 RX ORDER — METFORMIN HYDROCHLORIDE 750 MG/1
TABLET, EXTENDED RELEASE ORAL
Qty: 180 TAB | Refills: 2 | Status: SHIPPED | OUTPATIENT
Start: 2018-06-11 | End: 2018-10-16 | Stop reason: SDUPTHER

## 2018-06-11 RX ORDER — FOLIC ACID 1 MG/1
TABLET ORAL
Qty: 90 TAB | Refills: 2 | Status: SHIPPED | OUTPATIENT
Start: 2018-06-11 | End: 2019-03-10 | Stop reason: SDUPTHER

## 2018-07-10 ENCOUNTER — OFFICE VISIT (OUTPATIENT)
Dept: INTERNAL MEDICINE CLINIC | Facility: CLINIC | Age: 63
End: 2018-07-10

## 2018-07-10 VITALS
SYSTOLIC BLOOD PRESSURE: 128 MMHG | HEART RATE: 65 BPM | WEIGHT: 203 LBS | RESPIRATION RATE: 20 BRPM | TEMPERATURE: 97.6 F | HEIGHT: 74 IN | DIASTOLIC BLOOD PRESSURE: 72 MMHG | BODY MASS INDEX: 26.05 KG/M2 | OXYGEN SATURATION: 99 %

## 2018-07-10 DIAGNOSIS — B35.6 TINEA CRURIS: ICD-10-CM

## 2018-07-10 DIAGNOSIS — I10 HYPERTENSION, ESSENTIAL: ICD-10-CM

## 2018-07-10 DIAGNOSIS — G40.909 SEIZURE DISORDER (HCC): ICD-10-CM

## 2018-07-10 DIAGNOSIS — F79 MENTAL RETARDATION: ICD-10-CM

## 2018-07-10 DIAGNOSIS — E78.00 HYPERCHOLESTEROLEMIA: ICD-10-CM

## 2018-07-10 DIAGNOSIS — Z13.31 SCREENING FOR DEPRESSION: ICD-10-CM

## 2018-07-10 DIAGNOSIS — Z00.00 MEDICARE ANNUAL WELLNESS VISIT, SUBSEQUENT: Primary | ICD-10-CM

## 2018-07-10 DIAGNOSIS — Z71.89 ADVANCE DIRECTIVE DISCUSSED WITH PATIENT: ICD-10-CM

## 2018-07-10 LAB — HBA1C MFR BLD HPLC: 7.7 %

## 2018-07-10 NOTE — PROGRESS NOTES
HISTORY OF PRESENT ILLNESS  Annie Blank is a 65 y.o. male. He is accompanied by his mother. HPI He presents for follow up of diabetes mellitus, hyperlipidemia and hypertension. Diet and Lifestyle: generally follows a low fat low cholesterol diet, generally follows a low sodium diet, follows a diabetic diet regularly, exercises sporadically, nonsmoker  Diabetic ROS - medication compliance: compliant all of the time,      home glucose monitoring:  not done     home BP Monitoring: not done. further diabetic ROS: no polyuria or polydipsia, no blurry vision or double vision, no numbness, tingling or pain in extremities, no hypoglycemia. Cardiovascular ROS:  He denies palpitations, orthopnea, exertional chest pressure/discomfort, claudication, lower extremity edema, dyspnea on exertion, dizziness    Seiures: None since last visit  He complains of itchy red rash in left groin; he has this off and on.    .   Patient Active Problem List   Diagnosis Code    Mental retardation F79    Seizure disorder ([facility] G40.909    Psoriasis L40.9    Venous stasis of lower extremity I87.8    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, T50.905A    Sleep disorder G47.9    Hypertension, essential I10    Strongyloides stercoralis infection B78.9    Eosinophilia D72.1    Hypercholesterolemia E78.00    Uncontrolled type 2 diabetes mellitus without complication, without long-term current use of insulin (HCC) E11.65     Past Medical History:   Diagnosis Date    Contact dermatitis and other eczema, due to unspecified cause     psoriasis    Diabetes ([facility]     Eosinophilia     Hypercholesterolemia     Hypertension     Mental retardation     Seizures ([facility]     Skin cancer      Past Surgical History:   Procedure Laterality Date    ENDOSCOPY, COLON, DIAGNOSTIC  11/08/2007    Dr Tigre Mendieta normal except small internal hemorrhoids repeat 11/2017     Social History     Social History    Marital status: SINGLE     Spouse name: N/A  Number of children: N/A    Years of education: N/A     Occupational History          [employer]      Social History Main Topics    Smoking status: Never Smoker    Smokeless tobacco: Never Used    Alcohol use No    Drug use: No    Sexual activity: No     Other Topics Concern    None     Social History Narrative     Family History   Problem Relation Age of Onset    Other Mother      PMR    Hypertension Mother     Cancer Father      Lung    Diabetes Brother      No Known Allergies  Current Outpatient Prescriptions   Medication Sig Dispense Refill    folic acid (FOLVITE) 1 mg tablet TAKE ONE TABLET BY MOUTH DAILY 90 Tab 2    metFORMIN ER (GLUCOPHAGE XR) 750 mg tablet TAKE ONE TABLET BY MOUTH TWO TIMES A  Tab 2    ramipril (ALTACE) 5 mg capsule TAKE ONE (1) CAPSULE(S) DAILY 90 Cap 1    linagliptin (TRADJENTA) 5 mg tablet Take 1 Tab by mouth daily. 90 Tab 3    chlorhexidine (PERIDEX) 0.12 % solution       atorvastatin (LIPITOR) 40 mg tablet TAKE ONE TABLET BY MOUTH AT BEDTIME 90 Tab 2    topiramate (TOPAMAX) 200 mg tablet 1.5 tablets twice a day 360 Tab 3    glimepiride (AMARYL) 4 mg tablet Take 2 Tabs by mouth every morning. 180 Tab 3    divalproex DR (DEPAKOTE) 500 mg tablet Take one tab in the AM and two tabs at night 270 Tab 3    Calcium-Cholecalciferol, D3, (CALTRATE-600 PLUS VITAMIN D3) 600-400 mg-unit Tab Take  by mouth.  pioglitazone (ACTOS) 30 mg tablet Take 30 mg by mouth daily. Review of Systems   Constitutional: Positive for malaise/fatigue. Negative for weight loss. Gastrointestinal: Negative for constipation, diarrhea and heartburn. Musculoskeletal: Negative for back pain and joint pain. Neurological: Negative for tingling and focal weakness.      Visit Vitals    /72 (BP 1 Location: Right arm, BP Patient Position: Sitting)    Pulse 65    Temp 97.6 °F (36.4 °C) (Oral)    Resp 20    Ht 6' 2\" (1.88 m)    Wt 203 lb (92.1 kg)    SpO2 99%    BMI 26.06 kg/m2     Physical Exam   Constitutional: He is oriented to person, place, and time. He appears well-developed and well-nourished. HENT:   Head: Normocephalic and atraumatic. Eyes: Conjunctivae are normal. Pupils are equal, round, and reactive to light. Neck: Neck supple. Carotid bruit is not present. No thyromegaly present. Cardiovascular: Normal rate, regular rhythm and normal heart sounds. PMI is not displaced. Exam reveals no gallop. No murmur heard. Pulses:       Dorsalis pedis pulses are 2+ on the right side, and 2+ on the left side. Posterior tibial pulses are 2+ on the right side, and 2+ on the left side. Pulmonary/Chest: Effort normal. He has no wheezes. He has no rhonchi. He has no rales. Abdominal: Soft. Normal appearance. He exhibits no abdominal bruit and no mass. There is no hepatosplenomegaly. There is no tenderness. Musculoskeletal: He exhibits no edema. Lymphadenopathy:     He has no cervical adenopathy. Right: No supraclavicular adenopathy present. Left: No supraclavicular adenopathy present. Neurological: He is alert and oriented to person, place, and time. No sensory deficit. Skin: Skin is warm, dry and intact. No rash noted. Psychiatric: He has a normal mood and affect. His behavior is normal.   Nursing note and vitals reviewed.     Results for orders placed or performed in visit on 07/10/18   AMB POC HEMOGLOBIN A1C   Result Value Ref Range    Hemoglobin A1c (POC) 7.7 %     Lab Results   Component Value Date/Time    Cholesterol, total 133 03/27/2018 08:40 AM    HDL Cholesterol 42 03/27/2018 08:40 AM    LDL, calculated 66 03/27/2018 08:40 AM    VLDL, calculated 25 03/27/2018 08:40 AM    Triglyceride 125 03/27/2018 08:40 AM    CHOL/HDL Ratio 2.8 03/31/2009 08:40 AM     Lab Results   Component Value Date/Time    Sodium 144 03/27/2018 08:40 AM    Potassium 4.7 03/27/2018 08:40 AM    Chloride 107 (H) 03/27/2018 08:40 AM    CO2 20 03/27/2018 08:40 AM Anion gap 11 02/27/2018 03:53 AM    Glucose 142 (H) 03/27/2018 08:40 AM    BUN 15 03/27/2018 08:40 AM    Creatinine 0.65 (L) 03/27/2018 08:40 AM    BUN/Creatinine ratio 23 03/27/2018 08:40 AM    GFR est  03/27/2018 08:40 AM    GFR est non- 03/27/2018 08:40 AM    Calcium 9.1 03/27/2018 08:40 AM    Bilirubin, total 0.3 03/27/2018 08:40 AM    AST (SGOT) 11 03/27/2018 08:40 AM    Alk. phosphatase 50 03/27/2018 08:40 AM    Protein, total 6.6 03/27/2018 08:40 AM    Albumin 3.7 03/27/2018 08:40 AM    Globulin 3.8 02/27/2018 03:53 AM    A-G Ratio 1.3 03/27/2018 08:40 AM    ALT (SGPT) 9 03/27/2018 08:40 AM        ASSESSMENT and PLAN    ICD-10-CM ICD-9-CM    1. Medicare annual wellness visit, subsequent Z00.00 V70.0    2. Advance directive discussed with patient Z71.89 V65.49    3. Uncontrolled type 2 diabetes mellitus without complication, without long-term current use of insulin ([facility]) E11.65 250.02 AMB POC HEMOGLOBIN A1C      MICROALBUMIN, UR, RAND W/ MICROALB/CREAT RATIO   4. Hypertension, essential I10 401.9    5. Hypercholesterolemia E78.00 272.0    6. [facility] ([facility] G40.909 345.90    7. Tinea cruris B35.6 110.3    8. Screening for depression Z13.89 V79.0 [place] DEPRESSION SCREEN ANNUAL   9. Mental retardation F79 319      Diagnoses and all orders for this visit:    1. Medicare annual wellness visit, subsequent    2. Advance directive discussed with patient    3. Uncontrolled type 2 diabetes mellitus without complication, without long-term current use of insulin ([facility]  Diabetes Mellitus: improved--A1c was 9.7 in March, needs further improvement. Given his inability to understand diet, this may be the best we are able to do. Is taking statin and ACE/ARB  Issues reviewed with him: low cholesterol diet, weight control and daily exercise discussed and glycohemoglobin and other lab monitoring discussed. -     AMB POC HEMOGLOBIN A1C  -     MICROALBUMIN, UR, RAND W/ MICROALB/CREAT RATIO    4.  Hypertension, essential  Hypertension is controlled    5. Hypercholesterolemia  Hyperlipidemia is controlled    6. Seizure disorder ([address]  Stable with no recent seizures. 7. Tinea cruris  Mother will call with name of cream he is using at home (given by dermatologist some months ago). 8. Screening for depression--Negative  -     [id number] [employer]    9. Mental retardation      Follow-up Disposition:  Return in about 4 months (around 11/10/2018) for DM, HTN, SZ, POC A1c .  lab results and schedule of future lab studies reviewed with patient  reviewed diet, exercise and weight control  cardiovascular risk and specific lipid/LDL goals reviewed  I have discussed the diagnosis, evaluation and treatment options and the intended plan with the patient. Patient is in agreement. The patient has received an after-visit summary and questions were answered concerning future plans. I have discussed side effects and warnings of any new medications with the patient as well.

## 2018-07-10 NOTE — MR AVS SNAPSHOT
[address] [place] [address] [phone] Patient: Federico Faria MRN: [id number] UED:[id number] Visit Information Date & Time Provider Department Dept. Phone Encounter #  
 7/10/2018 10:00 AM Mamta Morgan MD [facility] Internal Medicine [address] [id number] Follow-up Instructions Return in about 4 months (around 11/10/2018) for DM, HTN, SZ, POC A1c . Upcoming Health Maintenance Date Due COLONOSCOPY 11/8/2017 MEDICARE YEARLY EXAM 5/19/2018 Influenza Age 5 to Adult 8/1/2018 FOOT EXAM Q1 8/24/2018 MICROALBUMIN Q1 8/24/2018 EYE EXAM RETINAL OR DILATED Q1 10/27/2018 HEMOGLOBIN A1C Q6M 1/10/2019 LIPID PANEL Q1 3/27/2019 DTaP/Tdap/Td series (2 - Td) 10/20/2020 Allergies as of 7/10/2018  Review Complete On: 7/10/2018 By: Mamta Morgan MD  
 No Known Allergies Current Immunizations  Reviewed on 7/10/2018 Name Date H1N1 FLU VACCINE 1/1/2010 Influenza Vaccine (Quad) PF 8/24/2017, 9/28/2016, 10/15/2015, 10/10/2014 Influenza Vaccine Split 9/21/2011 Influenza Vaccine Whole 10/12/2010 Pneumococcal Polysaccharide (PPSV-23) 2/16/2017 TB Skin Test (PPD) 3/1/2018 TD Vaccine 2/22/2005 TDAP Vaccine 10/20/2010 Zoster Vaccine, Live 2/20/2014 Reviewed by Becky Silver LPN on 5/92/4811 at  9:58 AM  
You Were Diagnosed With   
  
 Codes Comments Medicare annual wellness visit, subsequent    -  Primary ICD-10-CM: Z00.00 ICD-9-CM: V70.0 Advance directive discussed with patient     ICD-10-CM: Z71.89 ICD-9-CM: V65.49 Uncontrolled type 2 diabetes mellitus without complication, without long-term current use of insulin ([facility]     ICD-10-CM: E11.65 ICD-9-CM: 250.02 Hypertension, essential     ICD-10-CM: I10 
ICD-9-CM: 401.9 Hypercholesterolemia     ICD-10-CM: E78.00 ICD-9-CM: 272.0 Seizure disorder ([facility].)     ICD-10-CM: G40.909 ICD-9-CM: 345.90 Tinea cruris     ICD-10-CM: B35.6 ICD-9-CM: 110.3 Vitals BP Pulse Temp Resp Height(growth percentile) Weight(growth percentile) 128/72 (BP 1 Location: Right arm, BP Patient Position: Sitting) 65 97.6 °F (36.4 °C) (Oral) 20 6' 2\" (1.88 m) 203 lb (92.1 kg) SpO2 BMI Smoking Status 99% 26.06 kg/m2 Never Smoker Vitals History BMI and BSA Data Body Mass Index Body Surface Area 26.06 kg/m 2 2.19 m 2 Preferred Pharmacy Pharmacy Name Phone [facility] [address] [facility], [address] [phone] Your Updated Medication List  
  
   
This list is accurate as of 7/10/18 11:04 AM.  Always use your most recent med list.  
  
  
  
  
 atorvastatin 40 mg tablet Commonly known as:  LIPITOR  
TAKE ONE TABLET BY MOUTH AT BEDTIME CALTRATE-600 PLUS VITAMIN D3 tablet Generic drug:  calcium-cholecalciferol (D3) Take  by mouth. chlorhexidine 0.12 % solution Commonly known as:  PERIDEX  
  
 divalproex  mg tablet Commonly known as:  DEPAKOTE Take one tab in the AM and two tabs at night  
  
 folic acid 1 mg tablet Commonly known as:  FOLVITE  
TAKE ONE TABLET BY MOUTH DAILY  
  
 glimepiride 4 mg tablet Commonly known as:  AMARYL Take 2 Tabs by mouth every morning. linagliptin 5 mg tablet Commonly known as:  Stephany Alamin Take 1 Tab by mouth daily. metFORMIN  mg tablet Commonly known as:  GLUCOPHAGE XR  
TAKE ONE TABLET BY MOUTH TWO TIMES A DAY pioglitazone 30 mg tablet Commonly known as:  ACTOS Take 30 mg by mouth daily. ramipril 5 mg capsule Commonly known as:  ALTACE  
TAKE ONE (1) CAPSULE(S) DAILY topiramate 200 mg tablet Commonly known as:  TOPAMAX  
1.5 tablets twice a day We Performed the Following AMB POC HEMOGLOBIN A1C [10038 CPT(R)] MICROALBUMIN, UR, RAND W/ MICROALB/CREAT RATIO [id number] CPT(R)] Follow-up Instructions Return in about 4 months (around 11/10/2018) for DM, HTN, SZ, POC A1c . Patient Instructions Office visit in 4 months with hemoglobin A1c at that visit. Let us know what cream is being used in groin. The best way to stay healthy is to live a healthy lifestyle. A healthy lifestyle includes regular exercise, eating a well-balanced diet, keeping a healthy weight and not smoking. Regular physical exams and screening tests are another important way to take care of yourself. Preventive exams provided by health care providers can find health problems early when treatment works best and can keep you from getting certain diseases or illnesses. Preventive services include exams, lab tests, screenings, shots, monitoring and information to help you take care of your own health. All people over 65 should have a pneumonia shot. Pneumonia shots are usually only needed once in a lifetime unless your doctor decides differently. In addition to your physical exam, some screening tests are recommended: 
 
All people over 65 should have a yearly flu shot. People over 65 are at medium to high risk for Hepatitis B. Three shots are needed for complete protection. Bone mass measurement (dexa scan) is recommended every two years. Diabetes Mellitus screening is recommended every year. Glaucoma is an eye disease caused by high pressure in the eye. An eye exam is recommended every year. Cardiovascular screening tests that check your cholesterol and other blood fat (lipid) levels are recommended every five years. Colorectal Cancer screening tests help to find pre-cancerous polyps (growths in the colon) so they can be removed before they turn into cancer. Tests ordered for screening depend on your personal and family history risk factors. Prostate Cancer Screening (annually up to age 76) Screening for breast cancer is recommended yearly with a Mammogram. 
 Screening for cervical and vaginal cancer is recommended with a pelvic and Pap test every two years. However if you have had an abnormal pap in the past  three years or at high risk for cervical or vaginal cancer Medicare will cover a pap test and a pelvic exam every year. Here is a list of your current Health Maintenance items with a due date: 
Health Maintenance Due Topic Date Due  
 Colonoscopy  11/08/2017 [facility] Annual Well Visit  05/19/2018 Learning About Meal Planning for Diabetes Why plan your meals? Meal planning can be a key part of managing diabetes. Planning meals and snacks with the right balance of carbohydrate, protein, and fat can help you keep your blood sugar at the target level you set with your doctor. You don't have to eat special foods. You can eat what your family eats, including sweets once in a while. But you do have to pay attention to how often you eat and how much you eat of certain foods. You may want to work with a dietitian or a certified diabetes educator. He or she can give you tips and meal ideas and can answer your questions about meal planning. This health professional can also help you reach a healthy weight if that is one of your goals. What plan is right for you? Your dietitian or diabetes educator may suggest that you start with the plate format or carbohydrate counting. The plate format The plate format is a simple way to help you manage how you eat. You plan meals by learning how much space each food should take on a plate. Using the plate format helps you spread carbohydrate throughout the day. It can make it easier to keep your blood sugar level within your target range. It also helps you see if you're eating healthy portion sizes. To use the plate format, you put non-starchy vegetables on half your plate. Add meat or meat substitutes on one-quarter of the plate.  Put a grain or starchy vegetable (such as brown rice or a potato) on the final quarter of the plate. You can add a small piece of fruit and some low-fat or fat-free milk or yogurt, depending on your carbohydrate goal for each meal. 
Here are some tips for using the plate format: · Make sure that you are not using an oversized plate. A 9-inch plate is best. Many restaurants use larger plates. · Get used to using the plate format at home. Then you can use it when you eat out. · Write down your questions about using the plate format. Talk to your doctor, a dietitian, or a diabetes educator about your concerns. Carbohydrate counting With carbohydrate counting, you plan meals based on the amount of carbohydrate in each food. Carbohydrate raises blood sugar higher and more quickly than any other nutrient. It is found in desserts, breads and cereals, and fruit. It's also found in starchy vegetables such as potatoes and corn, grains such as rice and pasta, and milk and yogurt. Spreading carbohydrate throughout the day helps keep your blood sugar levels within your target range. Your daily amount depends on several things, including your weight, how active you are, which diabetes medicines you take, and what your goals are for your blood sugar levels. A registered dietitian or diabetes educator can help you plan how much carbohydrate to include in each meal and snack. A guideline for your daily amount of carbohydrate is: · 45 to 60 grams at each meal. That's about the same as 3 to 4 carbohydrate servings. · 15 to 20 grams at each snack. That's about the same as 1 carbohydrate serving. The Nutrition Facts label on packaged foods tells you how much carbohydrate is in a serving of the food. First, look at the serving size on the food label. Is that the amount you eat in a serving? All of the nutrition information on a food label is based on that serving size. So if you eat more or less than that, you'll need to adjust the other numbers. Total carbohydrate is the next thing you need to look for on the label. If you count carbohydrate servings, one serving of carbohydrate is 15 grams. For foods that don't come with labels, such as fresh fruits and vegetables, you'll need a guide that lists carbohydrate in these foods. Ask your doctor, dietitian, or diabetes educator about books or other nutrition guides you can use. If you take insulin, you need to know how many grams of carbohydrate are in a meal. This lets you know how much rapid-acting insulin to take before you eat. If you use an insulin pump, you get a constant rate of insulin during the day. So the pump must be programmed at meals to give you extra insulin to cover the rise in blood sugar after meals. When you know how much carbohydrate you will eat, you can take the right amount of insulin. Or, if you always use the same amount of insulin, you need to make sure that you eat the same amount of carbohydrate at meals. If you need more help to understand carbohydrate counting and food labels, ask your doctor, dietitian, or diabetes educator. How do you get started with meal planning? Here are some tips to get started: 
· Plan your meals a week at a time. Don't forget to include snacks too. · Use cookbooks or online recipes to plan several main meals. Plan some quick meals for busy nights. You also can double some recipes that freeze well. Then you can save half for other busy nights when you don't have time to cook. · Make sure you have the ingredients you need for your recipes. If you're running low on basic items, put these items on your shopping list too. · List foods that you use to make breakfasts, lunches, and snacks. List plenty of fruits and vegetables. · Post this list on the refrigerator. Add to it as you think of more things you need. · Take the list to the store to do your weekly shopping. Follow-up care is a key part of your treatment and safety.  Be sure to make and go to all appointments, and call your doctor if you are having problems. It's also a good idea to know your test results and keep a list of the medicines you take. Where can you learn more? Go to http://jarod-jared.info/. Tonia Reddy in the search box to learn more about \"Learning About Meal Planning for Diabetes. \" Current as of: March 13, 2017 Content Version: 11.4 © 2585-2359 [employer]. Care instructions adapted under license by [employer] (which disclaims liability or warranty for this information). If you have questions about a medical condition or this instruction, always ask your healthcare professional. [address] any warranty or liability for your use of this information. Introducing [facility]! Zuleyka Sy introduces [email or web address] patient portal. Now you can access parts of your medical record, email your doctor's office, and request medication refills online. 1. In your internet browser, go to [email or web address] 2. Click on the First Time User? Click Here link in the Sign In box. You will see the New Member Sign Up page. 3. Enter your [email or web address] Access Code exactly as it appears below. You will not need to use this code after youve completed the sign-up process. If you do not sign up before the expiration date, you must request a new code. · [email or web address] Access Code: [email or web address] Expires: 10/8/2018 11:04 AM 
 
4. Enter the last four digits of your Social Security Number (xxxx) and Date of Birth (mm/dd/yyyy) as indicated and click Submit. You will be taken to the next sign-up page. 5. Create a [employer] ID. This will be your [email or web address] login ID and cannot be changed, so think of one that is secure and easy to remember. 6. Create a [email or web address] password. You can change your password at any time. 7. Enter your Password Reset Question and Answer.  This can be used at a later time if you forget your password. 8. Enter your e-mail address. You will receive e-mail notification when new information is available in [address] 9. Click Sign Up. You can now view and download portions of your medical record. 10. Click the Download Summary menu link to download a portable copy of your medical information. If you have questions, please visit the Frequently Asked Questions section of the [employer] website. Remember, [employer] is NOT to be used for urgent needs. For medical emergencies, dial 911. Now available from your iPhone and Android! Please provide this summary of care documentation to your next provider. Your primary care clinician is listed as Olesya Kessler. If you have any questions after today's visit, please call [phone].

## 2018-07-10 NOTE — PATIENT INSTRUCTIONS
Office visit in 4 months with hemoglobin A1c at that visit. Let us know what cream is being used in groin. The best way to stay healthy is to live a healthy lifestyle. A healthy lifestyle includes regular exercise, eating a well-balanced diet, keeping a healthy weight and not smoking. Regular physical exams and screening tests are another important way to take care of yourself. Preventive exams provided by health care providers can find health problems early when treatment works best and can keep you from getting certain diseases or illnesses. Preventive services include exams, lab tests, screenings, shots, monitoring and information to help you take care of your own health. All people over 65 should have a pneumonia shot. Pneumonia shots are usually only needed once in a lifetime unless your doctor decides differently. In addition to your physical exam, some screening tests are recommended:    All people over 65 should have a yearly flu shot. People over 65 are at medium to high risk for Hepatitis B. Three shots are needed for complete protection. Bone mass measurement (dexa scan) is recommended every two years. Diabetes Mellitus screening is recommended every year. Glaucoma is an eye disease caused by high pressure in the eye. An eye exam is recommended every year. Cardiovascular screening tests that check your cholesterol and other blood fat (lipid) levels are recommended every five years. Colorectal Cancer screening tests help to find pre-cancerous polyps (growths in the colon) so they can be removed before they turn into cancer. Tests ordered for screening depend on your personal and family history risk factors. Prostate Cancer Screening (annually up to age 76)    Screening for breast cancer is recommended yearly with a Mammogram.    Screening for cervical and vaginal cancer is recommended with a pelvic and Pap test every two years.  However if you have had an abnormal pap in the past  three years or at high risk for cervical or vaginal cancer Medicare will cover a pap test and a pelvic exam every year. Here is a list of your current Health Maintenance items with a due date:  Health Maintenance Due   Topic Date Due    Colonoscopy  11/08/2017    Annual Well Visit  05/19/2018          Learning About Meal Planning for Diabetes  Why plan your meals? Meal planning can be a key part of managing diabetes. Planning meals and snacks with the right balance of carbohydrate, protein, and fat can help you keep your blood sugar at the target level you set with your doctor. You don't have to eat special foods. You can eat what your family eats, including sweets once in a while. But you do have to pay attention to how often you eat and how much you eat of certain foods. You may want to work with a dietitian or a certified diabetes educator. He or she can give you tips and meal ideas and can answer your questions about meal planning. This health professional can also help you reach a healthy weight if that is one of your goals. What plan is right for you? Your dietitian or diabetes educator may suggest that you start with the plate format or carbohydrate counting. The plate format  The plate format is a simple way to help you manage how you eat. You plan meals by learning how much space each food should take on a plate. Using the plate format helps you spread carbohydrate throughout the day. It can make it easier to keep your blood sugar level within your target range. It also helps you see if you're eating healthy portion sizes. To use the plate format, you put non-starchy vegetables on half your plate. Add meat or meat substitutes on one-quarter of the plate. Put a grain or starchy vegetable (such as brown rice or a potato) on the final quarter of the plate.  You can add a small piece of fruit and some low-fat or fat-free milk or yogurt, depending on your carbohydrate goal for each meal.  Here are some tips for using the plate format:  · Make sure that you are not using an oversized plate. A 9-inch plate is best. Many restaurants use larger plates. · Get used to using the plate format at home. Then you can use it when you eat out. · Write down your questions about using the plate format. Talk to your doctor, a dietitian, or a diabetes educator about your concerns. Carbohydrate counting  With carbohydrate counting, you plan meals based on the amount of carbohydrate in each food. Carbohydrate raises blood sugar higher and more quickly than any other nutrient. It is found in desserts, breads and cereals, and fruit. It's also found in starchy vegetables such as potatoes and corn, grains such as rice and pasta, and milk and yogurt. Spreading carbohydrate throughout the day helps keep your blood sugar levels within your target range. Your daily amount depends on several things, including your weight, how active you are, which diabetes medicines you take, and what your goals are for your blood sugar levels. A registered dietitian or diabetes educator can help you plan how much carbohydrate to include in each meal and snack. A guideline for your daily amount of carbohydrate is:  · 45 to 60 grams at each meal. That's about the same as 3 to 4 carbohydrate servings. · 15 to 20 grams at each snack. That's about the same as 1 carbohydrate serving. The Nutrition Facts label on packaged foods tells you how much carbohydrate is in a serving of the food. First, look at the serving size on the food label. Is that the amount you eat in a serving? All of the nutrition information on a food label is based on that serving size. So if you eat more or less than that, you'll need to adjust the other numbers. Total carbohydrate is the next thing you need to look for on the label. If you count carbohydrate servings, one serving of carbohydrate is 15 grams.   For foods that don't come with labels, such as fresh fruits and vegetables, you'll need a guide that lists carbohydrate in these foods. Ask your doctor, dietitian, or diabetes educator about books or other nutrition guides you can use. If you take insulin, you need to know how many grams of carbohydrate are in a meal. This lets you know how much rapid-acting insulin to take before you eat. If you use an insulin pump, you get a constant rate of insulin during the day. So the pump must be programmed at meals to give you extra insulin to cover the rise in blood sugar after meals. When you know how much carbohydrate you will eat, you can take the right amount of insulin. Or, if you always use the same amount of insulin, you need to make sure that you eat the same amount of carbohydrate at meals. If you need more help to understand carbohydrate counting and food labels, ask your doctor, dietitian, or diabetes educator. How do you get started with meal planning? Here are some tips to get started:  · Plan your meals a week at a time. Don't forget to include snacks too. · Use cookbooks or online recipes to plan several main meals. Plan some quick meals for busy nights. You also can double some recipes that freeze well. Then you can save half for other busy nights when you don't have time to cook. · Make sure you have the ingredients you need for your recipes. If you're running low on basic items, put these items on your shopping list too. · List foods that you use to make breakfasts, lunches, and snacks. List plenty of fruits and vegetables. · Post this list on the refrigerator. Add to it as you think of more things you need. · Take the list to the store to do your weekly shopping. Follow-up care is a key part of your treatment and safety. Be sure to make and go to all appointments, and call your doctor if you are having problems. It's also a good idea to know your test results and keep a list of the medicines you take. Where can you learn more?   Go to http://geoff.info/. Senia Necessary in the search box to learn more about \"Learning About Meal Planning for Diabetes. \"  Current as of: March 13, 2017  Content Version: 11.4  © 4457-6601 [employer]. Care instructions adapted under license by [employer] (which disclaims liability or warranty for this information). If you have questions about a medical condition or this instruction, always ask your healthcare professional. [place] [address] any warranty or liability for your use of this information.

## 2018-07-10 NOTE — PROGRESS NOTES
Sal Martin  Identified pt with two pt identifiers(name and ). Chief Complaint   Patient presents with    Diabetes    Blood Pressure Check       Reviewed record In preparation for visit and have obtained necessary documentation. Advanced directive / living will on file. 1. Have you been to the ER, urgent care clinic or hospitalized since your last visit? No     2. Have you seen or consulted any other health care providers outside of the [address] [place] [address] since your last visit? Include any pap smears or colon screening. No    Vitals reviewed with provider.     Health Maintenance reviewed:     Health Maintenance Due   Topic    COLONOSCOPY     MEDICARE YEARLY EXAM           Wt Readings from Last 3 Encounters:   07/10/18 203 lb (92.1 kg)   18 200 lb 8 oz (90.9 kg)   18 235 lb (106.6 kg)        Temp Readings from Last 3 Encounters:   07/10/18 97.6 °F (36.4 °C) (Oral)   18 97.4 °F (36.3 °C) (Oral)   18 97.6 °F (36.4 °C)        BP Readings from Last 3 Encounters:   07/10/18 144/81   18 134/80   18 130/69        Pulse Readings from Last 3 Encounters:   07/10/18 65   18 75   18 66        Vitals:    07/10/18 1012   BP: 144/81   Pulse: 65   Resp: 20   Temp: 97.6 °F (36.4 °C)   TempSrc: Oral   SpO2: 99%   Weight: 203 lb (92.1 kg)   Height: 6' 2\" (1.88 m)   PainSc:   0 - No pain          Learning Assessment:   :       Learning Assessment 2014   PRIMARY LEARNER Patient   HIGHEST LEVEL OF EDUCATION - PRIMARY LEARNER  DID NOT GRADUATE HIGH SCHOOL   BARRIERS PRIMARY LEARNER COGNITIVE   CO-LEARNER CAREGIVER Yes   CO-LEARNER NAME Lalit Redd   PRIMARY LANGUAGE ENGLISH   LEARNER PREFERENCE PRIMARY DEMONSTRATION     READING   ANSWERED BY mother   RELATIONSHIP LEGAL GUARDIAN        Depression Screening:   :       PHQ over the last two weeks 7/10/2018   Little interest or pleasure in doing things Not at all   Feeling down, depressed or hopeless Not at all   Total Score PHQ 2 0        Fall Risk Assessment:   :     No flowsheet data found. Abuse Screening:   :       Abuse Screening Questionnaire 2/16/2017   Do you ever feel afraid of your partner? N   Are you in a relationship with someone who physically or mentally threatens you? N   Is it safe for you to go home?  Y        ADL Screening:   :       ADL Assessment 4/16/2015   Feeding yourself No Help Needed   Getting from bed to chair No Help Needed   Getting dressed No Help Needed   Bathing or showering No Help Needed   Walk across the room (includes cane/walker) No Help Needed   Using the telphone No Help Needed   Taking your medications Help Needed   Preparing meals Help Needed   Managing money (expenses/bills) Help Needed   Moderately strenuous housework (laundry) No Help Needed   Shopping for personal items (toiletries/medicines) No Help Needed   Shopping for groceries No Help Needed   Driving Help Needed   Climbing a flight of stairs No Help Needed   Getting to places beyond walking distances Help Needed

## 2018-07-10 NOTE — PROGRESS NOTES
This is the Subsequent Medicare Annual Wellness Exam, performed 12 months or more after the Initial AWV or the last Subsequent AWV    I have reviewed the patient's medical history in detail and updated the computerized patient record. History     Past Medical History:   Diagnosis Date    Contact dermatitis and other eczema, due to unspecified cause     psoriasis    Diabetes ([address]     Eosinophilia     Hypercholesterolemia     Hypertension     Mental retardation     Seizures (HCC)     Skin cancer       Past Surgical History:   Procedure Laterality Date    ENDOSCOPY, COLON, DIAGNOSTIC  11/08/2007    Dr Sol Desouza normal except small internal hemorrhoids repeat 11/2017     Current Outpatient Prescriptions   Medication Sig Dispense Refill    folic acid (FOLVITE) 1 mg tablet TAKE ONE TABLET BY MOUTH DAILY 90 Tab 2    metFORMIN ER (GLUCOPHAGE XR) 750 mg tablet TAKE ONE TABLET BY MOUTH TWO TIMES A  Tab 2    ramipril (ALTACE) 5 mg capsule TAKE ONE (1) CAPSULE(S) DAILY 90 Cap 1    linagliptin (TRADJENTA) 5 mg tablet Take 1 Tab by mouth daily. 90 Tab 3    chlorhexidine (PERIDEX) 0.12 % solution       atorvastatin (LIPITOR) 40 mg tablet TAKE ONE TABLET BY MOUTH AT BEDTIME 90 Tab 2    topiramate (TOPAMAX) 200 mg tablet 1.5 tablets twice a day 360 Tab 3    glimepiride (AMARYL) 4 mg tablet Take 2 Tabs by mouth every morning. 180 Tab 3    divalproex DR (DEPAKOTE) 500 mg tablet Take one tab in the AM and two tabs at night 270 Tab 3    Calcium-Cholecalciferol, D3, (CALTRATE-600 PLUS VITAMIN D3) 600-400 mg-unit Tab Take  by mouth.  pioglitazone (ACTOS) 30 mg tablet Take 30 mg by mouth daily.        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 Functional Ability and Level of Safety:   Hearing Loss  Hearing is good. Activities of Daily Living  The home contains: handrails and grab bars  Patient needs help with:  transportation, laundry, managing medications and managing money    Fall Risk  No flowsheet data found. Abuse Screen  Patient is not abused    Cognitive Screening   Evaluation of Cognitive Function:  Has your family/caregiver stated any concerns about your memory: no  Normal, Abnormal  Three word recall:  Immediate    2/3      Delayed   0/3   He has intellectual disability limiting testing validity.        Patient Care Team   Patient Care Team:  Abby Florian MD as PCP - General (Internal Medicine)  [address] (Optometry)  Lenora Puckett RN as Ambulatory Care Navigator (Internal Medicine)    Assessment/Plan   Education and counseling provided:  Colorectal cancer screening tests        Health Maintenance Due   Topic Date Due    COLONOSCOPY  11/08/2017    MEDICARE YEARLY EXAM  05/19/2018

## 2018-07-11 LAB
ALBUMIN/CREAT UR: <4.3 MG/G CREAT (ref 0–30)
CREAT UR-MCNC: 70.3 MG/DL
MICROALBUMIN UR-MCNC: <3 UG/ML

## 2018-07-20 ENCOUNTER — TELEPHONE (OUTPATIENT)
Dept: INTERNAL MEDICINE CLINIC | Facility: CLINIC | Age: 63
End: 2018-07-20

## 2018-07-20 NOTE — TELEPHONE ENCOUNTER
Mother states legs have been swollen, he avoids salt, drinks mostly water and props feet up when he can. She will stop the tradjenta over the weekend to see if it makes a difference and will call on Tuesday with results. Mom will check BP over weekend. She has no further questions at this time.

## 2018-07-20 NOTE — TELEPHONE ENCOUNTER
Pt's mother Calista Escobedo called and stated that pt's feet and ankles have been really swollen and red since he started taking the following medication. She is very concerned and would like a call back to see what to do for it. Calista Escobedo can be reached at [phone]. linagliptin (TRADJENTA) 5 mg tablet  Taking  05/01/18  -- Marcell Owusu MD        Take 1 Tab by mouth daily.

## 2018-08-29 ENCOUNTER — TELEPHONE (OUTPATIENT)
Dept: INTERNAL MEDICINE CLINIC | Facility: CLINIC | Age: 63
End: 2018-08-29

## 2018-08-29 NOTE — TELEPHONE ENCOUNTER
----- Message from Jose Gonzalez sent at 8/29/2018  1:48 PM EDT -----  Regarding: Dr. Dorita Dorantes, pt's mother, is requesting a form that states what procedures needs to be done incase the pt has a seizure. The form is needed to update his file at his day program. Please call when form is ready for . Best contact [phone].

## 2018-08-30 NOTE — TELEPHONE ENCOUNTER
Mom needed info on seizure for pt's fill while at camp, printed info on seizures from connect care reference section. Pt's mom has no further questions at this time.

## 2018-10-16 ENCOUNTER — OFFICE VISIT (OUTPATIENT)
Dept: INTERNAL MEDICINE CLINIC | Facility: CLINIC | Age: 63
End: 2018-10-16

## 2018-10-16 VITALS
DIASTOLIC BLOOD PRESSURE: 75 MMHG | HEART RATE: 69 BPM | SYSTOLIC BLOOD PRESSURE: 120 MMHG | HEIGHT: 74 IN | WEIGHT: 204 LBS | RESPIRATION RATE: 14 BRPM | BODY MASS INDEX: 26.18 KG/M2 | TEMPERATURE: 97.9 F | OXYGEN SATURATION: 100 %

## 2018-10-16 DIAGNOSIS — I10 HYPERTENSION, ESSENTIAL: ICD-10-CM

## 2018-10-16 DIAGNOSIS — F79 MENTAL RETARDATION: ICD-10-CM

## 2018-10-16 DIAGNOSIS — G40.909 SEIZURE DISORDER (HCC): ICD-10-CM

## 2018-10-16 DIAGNOSIS — E78.00 HYPERCHOLESTEROLEMIA: ICD-10-CM

## 2018-10-16 DIAGNOSIS — Z23 ENCOUNTER FOR IMMUNIZATION: ICD-10-CM

## 2018-10-16 LAB — HBA1C MFR BLD HPLC: 8.2 % (ref 4.8–5.6)

## 2018-10-16 RX ORDER — METFORMIN HYDROCHLORIDE 500 MG/1
1000 TABLET, EXTENDED RELEASE ORAL 2 TIMES DAILY
Qty: 360 TAB | Refills: 3 | Status: SHIPPED | OUTPATIENT
Start: 2018-10-16 | End: 2019-08-27 | Stop reason: SDUPTHER

## 2018-10-16 NOTE — PATIENT INSTRUCTIONS
Increase metformin XR to 2,000 mg a day. To use up the 750 mg tablets, take an extra 500 mg with either the morning or eveinng hardin. When the 750 mg tablets are gone, take the 500 mg tablets 2 tablets twice a day. Stop Tradjenta Office visit in 3 months with non-fasting lab a week before visit. Vaccine Information Statement Influenza (Flu) Vaccine (Inactivated or Recombinant): What you need to know Many Vaccine Information Statements are available in [place] and other languages. See www.immunize.org/vis Hojas de Información Sobre Vacunas están disponibles en Español y en muchos otros idiomas. Visite www.immunize.org/vis 1. Why get vaccinated? Influenza (flu) is a contagious disease that spreads around the [place] every year, usually between October and May. Flu is caused by influenza viruses, and is spread mainly by coughing, sneezing, and close contact. Anyone can get flu. Flu strikes suddenly and can last several days. Symptoms vary by age, but can include: 
 fever/chills  sore throat  muscle aches  fatigue  cough  headache  runny or stuffy nose Flu can also lead to pneumonia and blood infections, and cause diarrhea and seizures in children. If you have a medical condition, such as heart or lung disease, flu can make it worse. Flu is more dangerous for some people. Infants and young children, people 72years of age and older, pregnant women, and people with certain health conditions or a weakened immune system are at greatest risk. Each year thousands of people in the [facility] die from flu, and many more are hospitalized. Flu vaccine can: 
 keep you from getting flu, 
 make flu less severe if you do get it, and 
 keep you from spreading flu to your family and other people. 2. Inactivated and recombinant flu vaccines A dose of flu vaccine is recommended every flu season.  Children 6 months through 6years of age may need two doses during the same flu season. Everyone else needs only one dose each flu season. Some inactivated flu vaccines contain a very small amount of a mercury-based preservative called thimerosal. Studies have not shown thimerosal in vaccines to be harmful, but flu vaccines that do not contain thimerosal are available. There is no live flu virus in flu shots. They cannot cause the flu. There are many flu viruses, and they are always changing. Each year a new flu vaccine is made to protect against three or four viruses that are likely to cause disease in the upcoming flu season. But even when the vaccine doesnt exactly match these viruses, it may still provide some protection Flu vaccine cannot prevent: 
 flu that is caused by a virus not covered by the vaccine, or 
 illnesses that look like flu but are not. It takes about 2 weeks for protection to develop after vaccination, and protection lasts through the flu season. 3. Some people should not get this vaccine Tell the person who is giving you the vaccine:  If you have any severe, life-threatening allergies. If you ever had a life-threatening allergic reaction after a dose of flu vaccine, or have a severe allergy to any part of this vaccine, you may be advised not to get vaccinated. Most, but not all, types of flu vaccine contain a small amount of egg protein.  If you ever had Guillain-Barré Syndrome (also called GBS). Some people with a history of GBS should not get this vaccine. This should be discussed with your doctor.  If you are not feeling well. It is usually okay to get flu vaccine when you have a mild illness, but you might be asked to come back when you feel better. 4. Risks of a vaccine reaction With any medicine, including vaccines, there is a chance of reactions. These are usually mild and go away on their own, but serious reactions are also possible. Most people who get a flu shot do not have any problems with it. Minor problems following a flu shot include:  
 soreness, redness, or swelling where the shot was given  hoarseness  sore, red or itchy eyes  cough  fever  aches  headache  itching  fatigue If these problems occur, they usually begin soon after the shot and last 1 or 2 days. More serious problems following a flu shot can include the following:  There may be a small increased risk of Guillain-Barré Syndrome (GBS) after inactivated flu vaccine. This risk has been estimated at 1 or 2 additional cases per million people vaccinated. This is much lower than the risk of severe complications from flu, which can be prevented by flu vaccine.  Young children who get the flu shot along with pneumococcal vaccine (PCV13) and/or DTaP vaccine at the same time might be slightly more likely to have a seizure caused by fever. Ask your doctor for more information. Tell your doctor if a child who is getting flu vaccine has ever had a seizure. Problems that could happen after any injected vaccine:  People sometimes faint after a medical procedure, including vaccination. Sitting or lying down for about 15 minutes can help prevent fainting, and injuries caused by a fall. Tell your doctor if you feel dizzy, or have vision changes or ringing in the ears.  Some people get severe pain in the shoulder and have difficulty moving the arm where a shot was given. This happens very rarely.  Any medication can cause a severe allergic reaction. Such reactions from a vaccine are very rare, estimated at about 1 in a million doses, and would happen within a few minutes to a few hours after the vaccination. As with any medicine, there is a very remote chance of a vaccine causing a serious injury or death. The safety of vaccines is always being monitored.  For more information, visit: www.cdc.gov/vaccinesafety/ 
 
 5. What if there is a serious reaction? What should I look for?  Look for anything that concerns you, such as signs of a severe allergic reaction, very high fever, or unusual behavior. Signs of a severe allergic reaction can include hives, swelling of the face and throat, difficulty breathing, a fast heartbeat, dizziness, and weakness  usually within a few minutes to a few hours after the vaccination. What should I do?  If you think it is a severe allergic reaction or other emergency that cant wait, call 9-1-1 and get the person to the nearest hospital. Otherwise, call your doctor.  Reactions should be reported to the Vaccine Adverse Event Reporting System (VAERS). Your doctor should file this report, or you can do it yourself through  the VAERS web site at www.vaers. [facility].gov, or by calling 3-636.651.8694. VAERS does not give medical advice. 6. The National Vaccine Injury Compensation Program 
 
The [facility] Vaccine Injury Compensation Program (VICP) is a federal program that was created to compensate people who may have been injured by certain vaccines. Persons who believe they may have been injured by a vaccine can learn about the program and about filing a claim by calling 9-184.311.1643 or visiting the [address] [place] [address] website at www.[facility].gov/vaccinecompensation. There is a time limit to file a claim for compensation. 7. How can I learn more?  Ask your healthcare provider. He or she can give you the vaccine package insert or suggest other sources of information.  Call your local or state health department.  Contact the Centers for Disease Control and Prevention (CDC): 
- Call 6-834.469.7889 (1-800-CDC-INFO) or 
- Visit CDCs website at www.cdc.gov/flu Vaccine Information Statement Inactivated Influenza Vaccine 8/7/2015 
42 SANJEEV Lemos [id number]-50 Department of Health and [employer] Centers for Disease Control and Prevention Office Use Only

## 2018-10-16 NOTE — MR AVS SNAPSHOT
[address] [address] [phone] Patient: Angela Key MRN: [id number] Visit Information Date & Time Provider Department Dept. Phone Encounter #  
 10/16/2018  2:30 PM Brody Weiner MD [facility] Internal Medicine of [address] [place], [place] [id number] Follow-up Instructions Return in about 3 months (around 1/16/2019) for DM, HTN, chol, fatigue  NON-fasting lab one week prior . Upcoming Health Maintenance Date Due Shingrix Vaccine Age 50> (1 of 2) 12/3/2006 COLONOSCOPY 11/8/2017 FOOT EXAM Q1 8/24/2018 EYE EXAM RETINAL OR DILATED Q1 10/27/2018 LIPID PANEL Q1 3/27/2019 HEMOGLOBIN A1C Q6M 4/16/2019 MICROALBUMIN Q1 7/10/2019 MEDICARE YEARLY EXAM 7/11/2019 DTaP/Tdap/Td series (2 - Td) 10/20/2020 Allergies as of 10/16/2018  Review Complete On: 10/16/2018 By: Konstantin Cesar LPN No Known Allergies Current Immunizations  Reviewed on 10/16/2018 Name Date H1N1 FLU VACCINE 1/1/2010 Influenza Vaccine (Quad) PF 10/16/2018, 8/24/2017, 9/28/2016, 10/15/2015, 10/10/2014 Influenza Vaccine Split 9/21/2011 Influenza Vaccine Whole 10/12/2010 Pneumococcal Polysaccharide (PPSV-23) 2/16/2017 TB Skin Test (PPD) 3/1/2018 TD Vaccine 2/22/2005 TDAP Vaccine 10/20/2010 Zoster Vaccine, Live 2/20/2014 Reviewed by Konstantin Cesar LPN on 02/39/9405 at  2:43 PM  
You Were Diagnosed With   
  
 Codes Comments Uncontrolled type 2 diabetes mellitus without complication, without long-term current use of insulin ([facility]    -  Primary ICD-10-CM: E11.65 ICD-9-CM: 250.02 Hypertension, essential     ICD-10-CM: I10 
ICD-9-CM: 401.9 Hypercholesterolemia     ICD-10-CM: E78.00 ICD-9-CM: 272.0 Seizure disorder ([facility]     ICD-10-CM: G40.909 ICD-9-CM: 345.90 Mental retardation     ICD-10-CM: F79 
ICD-9-CM: 530 Encounter for immunization     ICD-10-CM: I94 ICD-9-CM: V03.89 Vitals BP Pulse Temp Resp Height(growth percentile) Weight(growth percentile) 120/75 (BP 1 Location: Left arm, BP Patient Position: Sitting) 69 97.9 °F (36.6 °C) (Oral) 14 6' 2\" (1.88 m) 204 lb (92.5 kg) SpO2 BMI Smoking Status 100% 26.19 kg/m2 Never Smoker BMI and BSA Data Body Mass Index Body Surface Area  
 26.19 kg/m 2 2.2 m 2 Preferred Pharmacy Pharmacy Name Phone [facility] [address] [facility], [address] [phone] Your Updated Medication List  
  
   
This list is accurate as of 10/16/18  3:39 PM.  Always use your most recent med list.  
  
  
  
  
 atorvastatin 40 mg tablet Commonly known as:  LIPITOR  
TAKE ONE TABLET BY MOUTH AT BEDTIME CALTRATE-600 PLUS VITAMIN D3 tablet Generic drug:  calcium-cholecalciferol (D3) Take  by mouth. chlorhexidine 0.12 % solution Commonly known as:  PERIDEX  
  
 divalproex  mg tablet Commonly known as:  DEPAKOTE Take one tab in the AM and two tabs at night  
  
 folic acid 1 mg tablet Commonly known as:  FOLVITE  
TAKE ONE TABLET BY MOUTH DAILY  
  
 glimepiride 4 mg tablet Commonly known as:  AMARYL Take 2 Tabs by mouth every morning. metFORMIN  mg tablet Commonly known as:  GLUCOPHAGE XR Take 2 Tabs by mouth two (2) times a day. pioglitazone 30 mg tablet Commonly known as:  ACTOS Take 30 mg by mouth daily. ramipril 5 mg capsule Commonly known as:  ALTACE  
TAKE ONE (1) CAPSULE(S) DAILY topiramate 200 mg tablet Commonly known as:  TOPAMAX  
1.5 tablets twice a day Prescriptions Sent to Pharmacy Refills  
 metFORMIN ER (GLUCOPHAGE XR) 500 mg tablet 3 Sig: Take 2 Tabs by mouth two (2) times a day. Class: Normal  
 Pharmacy: [facility] [address] [facility], [address] Ph #: [phone] Route: Oral  
  
We Performed the Following ADMIN INFLUENZA VIRUS VAC [ [facility]] AMB POC HEMOGLOBIN A1C [95472 CPT(R)]  DIABETES FOOT EXAM [[facility]] INFLUENZA VIRUS VAC QUAD,SPLIT,PRESV FREE SYRINGE IM [id number] CPT(R)] Follow-up Instructions Return in about 3 months (around 1/16/2019) for DM, HTN, chol, fatigue  NON-fasting lab one week prior . To-Do List   
 01/16/2019 Lab:  CBC WITH AUTOMATED DIFF   
  
 01/16/2019 Lab:  HEMOGLOBIN A1C WITH EAG   
  
 01/16/2019 Lab:  TSH 3RD GENERATION Patient Instructions Increase metformin XR to 2,000 mg a day. To use up the 750 mg tablets, take an extra 500 mg with either the morning or eveinng hardin. When the 750 mg tablets are gone, take the 500 mg tablets 2 tablets twice a day. Stop Tradjenta Office visit in 3 months with non-fasting lab a week before visit. Vaccine Information Statement Influenza (Flu) Vaccine (Inactivated or Recombinant): What you need to know Many Vaccine Information Statements are available in Maori and other languages. See www.immunize.org/vis Hojas de Información Sobre Vacunas están disponibles en Español y en muchos otros idiomas. Visite www.immunize.org/vis 1. Why get vaccinated? Influenza (flu) is a contagious disease that spreads around the [place] every year, usually between October and May. Flu is caused by influenza viruses, and is spread mainly by coughing, sneezing, and close contact. Anyone can get flu. Flu strikes suddenly and can last several days. Symptoms vary by age, but can include: 
 fever/chills  sore throat  muscle aches  fatigue  cough  headache  runny or stuffy nose Flu can also lead to pneumonia and blood infections, and cause diarrhea and seizures in children. If you have a medical condition, such as heart or lung disease, flu can make it worse. Flu is more dangerous for some people. Infants and young children, people 72years of age and older, pregnant women, and people with certain health conditions or a weakened immune system are at greatest risk. Each year thousands of people in the [facility] die from flu, and many more are hospitalized. Flu vaccine can: 
 keep you from getting flu, 
 make flu less severe if you do get it, and 
 keep you from spreading flu to your family and other people. 2. Inactivated and recombinant flu vaccines A dose of flu vaccine is recommended every flu season. Children 6 months through 6years of age may need two doses during the same flu season. Everyone else needs only one dose each flu season. Some inactivated flu vaccines contain a very small amount of a mercury-based preservative called thimerosal. Studies have not shown thimerosal in vaccines to be harmful, but flu vaccines that do not contain thimerosal are available. There is no live flu virus in flu shots. They cannot cause the flu. There are many flu viruses, and they are always changing. Each year a new flu vaccine is made to protect against three or four viruses that are likely to cause disease in the upcoming flu season. But even when the vaccine doesnt exactly match these viruses, it may still provide some protection Flu vaccine cannot prevent: 
 flu that is caused by a virus not covered by the vaccine, or 
 illnesses that look like flu but are not. It takes about 2 weeks for protection to develop after vaccination, and protection lasts through the flu season. 3. Some people should not get this vaccine Tell the person who is giving you the vaccine:  If you have any severe, life-threatening allergies.    
If you ever had a life-threatening allergic reaction after a dose of flu vaccine, or have a severe allergy to any part of this vaccine, you may be advised not to get vaccinated. Most, but not all, types of flu vaccine contain a small amount of egg protein.  If you ever had Guillain-Barré Syndrome (also called GBS). Some people with a history of GBS should not get this vaccine. This should be discussed with your doctor.  If you are not feeling well. It is usually okay to get flu vaccine when you have a mild illness, but you might be asked to come back when you feel better. 4. Risks of a vaccine reaction With any medicine, including vaccines, there is a chance of reactions. These are usually mild and go away on their own, but serious reactions are also possible. Most people who get a flu shot do not have any problems with it. Minor problems following a flu shot include:  
 soreness, redness, or swelling where the shot was given  hoarseness  sore, red or itchy eyes  cough  fever  aches  headache  itching  fatigue If these problems occur, they usually begin soon after the shot and last 1 or 2 days. More serious problems following a flu shot can include the following:  There may be a small increased risk of Guillain-Barré Syndrome (GBS) after inactivated flu vaccine. This risk has been estimated at 1 or 2 additional cases per million people vaccinated. This is much lower than the risk of severe complications from flu, which can be prevented by flu vaccine.  Young children who get the flu shot along with pneumococcal vaccine (PCV13) and/or DTaP vaccine at the same time might be slightly more likely to have a seizure caused by fever. Ask your doctor for more information. Tell your doctor if a child who is getting flu vaccine has ever had a seizure. Problems that could happen after any injected vaccine:  People sometimes faint after a medical procedure, including vaccination.  Sitting or lying down for about 15 minutes can help prevent fainting, and injuries caused by a fall. Tell your doctor if you feel dizzy, or have vision changes or ringing in the ears.  Some people get severe pain in the shoulder and have difficulty moving the arm where a shot was given. This happens very rarely.  Any medication can cause a severe allergic reaction. Such reactions from a vaccine are very rare, estimated at about 1 in a million doses, and would happen within a few minutes to a few hours after the vaccination. As with any medicine, there is a very remote chance of a vaccine causing a serious injury or death. The safety of vaccines is always being monitored. For more information, visit: www.cdc.gov/vaccinesafety/ 
 
 
The [facility] Vaccine Injury Compensation Program (VICP) is a federal program that was created to compensate people who may have been injured by certain vaccines.  
 
Persons who believe they may have been injured by a vaccine can learn about the program and about filing a claim by calling 2-849.848.2519 or visiting the [employer] website at www.[facility].gov/vaccinecompensation. There is a time limit to file a claim for compensation. 7. How can I learn more?  Ask your healthcare provider. He or she can give you the vaccine package insert or suggest other sources of information.  Call your local or state health department.  Contact the Centers for Disease Control and Prevention (CDC): 
- Call 1-385.228.6138 (1-800-CDC-INFO) or 
- Visit CDCs website at www.cdc.gov/flu Vaccine Information Statement Inactivated Influenza Vaccine 8/7/2015 
42 SANJEEV Godinez 438PS-65 Department of [facility] and [employer] Centers for Disease Control and Prevention Office Use Only Introducing [facility]! Carolyn Escalona introduces [employer] patient portal. Now you can access parts of your medical record, email your doctor's office, and request medication refills online. 1. In your internet browser, go to [email or web address] 2. Click on the First Time User? Click Here link in the Sign In box. You will see the New Member Sign Up page. 3. Enter your [employer] Access Code exactly as it appears below. You will not need to use this code after youve completed the sign-up process. If you do not sign up before the expiration date, you must request a new code. · [employer] Access Code: [email or web address] Expires: 1/14/2019  2:56 PM 
 
4. Enter the last four digits of your Social Security Number (xxxx) and Date of Birth (mm/dd/yyyy) as indicated and click Submit. You will be taken to the next sign-up page. 5. Create a Optiantt ID. This will be your [employer] login ID and cannot be changed, so think of one that is secure and easy to remember. 6. Create a Optiantt password. You can change your password at any time. 7. Enter your Password Reset Question and Answer. This can be used at a later time if you forget your password. 8. Enter your e-mail address.  You will receive e-mail notification when new information is available in [employer]. 9. Click Sign Up. You can now view and download portions of your medical record. 10. Click the Download Summary menu link to download a portable copy of your medical information. If you have questions, please visit the Frequently Asked Questions section of the [employer] website. Remember, [employer] is NOT to be used for urgent needs. For medical emergencies, dial 911. Now available from your iPhone and Android! Please provide this summary of care documentation to your next provider. Your primary care clinician is listed as Siva Mayen. If you have any questions after today's visit, please call [phone].

## 2018-10-16 NOTE — PROGRESS NOTES
Romulo Pinzon  Identified pt with two pt identifiers(name and ). Chief Complaint Patient presents with  Diabetes  Blood Pressure Check  Seizure  Immunization/Injection Reviewed record In preparation for visit and have obtained necessary documentation. Advanced directive / living will on file. 1. Have you been to the ER, urgent care clinic or hospitalized since your last visit? No  
 
2. Have you seen or consulted any other health care providers outside of the [address] [place] [address] [place] since your last visit? Include any pap smears or colon screening. No 
 
Vitals reviewed with provider. Health Maintenance reviewed: After verbal order read back of , patient received Flu Shot in left arm,  [address]  08990-387-29 Lot [id number] Exp 19. Patient tolerated procedure without complaints and received VIS. Health Maintenance Due Topic  Shingrix Vaccine Age 50> (1 of 2)  COLONOSCOPY  Influenza Age 5 to Adult  FOOT EXAM Q1   
 EYE EXAM RETINAL OR DILATED Q1   
hrea Wt Readings from Last 3 Encounters:  
10/16/18 204 lb (92.5 kg) 07/10/18 203 lb (92.1 kg) 18 200 lb 8 oz (90.9 kg)  
tens you? Wellington Linea Is it safe for you to go home? Al Daniel  
 2015 No Help Needed Bathing or showering Walk across the room (includes cane/walker) H 
BP Readings from Last 3 Encounters:  
10/16/18 120/75  
07/10/18 128/72  
18 134/80  
e Pulse Readings from Last 3 Encounters:  
10/16/18 69  
07/10/18 65  
18 75  
lp Needed Vitals:  
 10/16/18 1453 BP: 120/75 Pulse: 69 Resp: 14 Temp: 97.9 °F (36.6 °C) TempSrc: Oral  
SpO2: 100% Weight: 204 lb (92.5 kg) Height: 6' 2\" (1.88 m) PainSc:   0 - No pain Shopping for personal items (toiletries/medicines) Shopping for groceries Driving Climbing a flight of stairs Getting to places beyond walk Learning Assessment 2014 PRIMARY LEARNER Patient HIGHEST LEVEL OF EDUCATION - PRIMARY LEARNER  DID NOT GRADUATE HIGH SCHOOL  
BARRIERS PRIMARY LEARNER COGNITIVE  
CO-LEARNER CAREGIVER Yes CO-LEARNER NAME Luke Britt PRIMARY LANGUAGE ENGLISH  
LEARNER PREFERENCE PRIMARY DEMONSTRATION  
  READING  
ANSWERED BY mother RELATIONSHIP LEGAL GUARDIAN  
ngs Not at all Feeling down, depressed, irritable, or hopeless Not at all Total Score PHQ 2 0

## 2018-11-12 DIAGNOSIS — G40.909 SEIZURE DISORDER (HCC): ICD-10-CM

## 2018-11-12 RX ORDER — DIVALPROEX SODIUM 500 MG/1
TABLET, DELAYED RELEASE ORAL
Qty: 270 TAB | Refills: 2 | Status: SHIPPED | OUTPATIENT
Start: 2018-11-12 | End: 2019-07-07 | Stop reason: SDUPTHER

## 2018-12-11 NOTE — TELEPHONE ENCOUNTER
Big toe Red/swollen hard to walk. Mother is concerned it may be either an ingrown toe nail or gout.  Jarenval Rimma wants to know if her should come in for an appoint or see a foot . <<-----Click on this checkbox to enter Pre-Op Dx

## 2019-01-15 ENCOUNTER — LAB ONLY (OUTPATIENT)
Dept: INTERNAL MEDICINE CLINIC | Facility: CLINIC | Age: 64
End: 2019-01-15

## 2019-01-15 DIAGNOSIS — I10 HYPERTENSION, ESSENTIAL: ICD-10-CM

## 2019-01-16 LAB
BASOPHILS # BLD AUTO: 0 X10E3/UL (ref 0–0.2)
BASOPHILS NFR BLD AUTO: 0 %
EOSINOPHIL # BLD AUTO: 1 X10E3/UL (ref 0–0.4)
EOSINOPHIL NFR BLD AUTO: 13 %
ERYTHROCYTE [DISTWIDTH] IN BLOOD BY AUTOMATED COUNT: 14.1 % (ref 12.3–15.4)
EST. AVERAGE GLUCOSE BLD GHB EST-MCNC: 246 MG/DL
HBA1C MFR BLD: 10.2 % (ref 4.8–5.6)
HCT VFR BLD AUTO: 39.7 % (ref 37.5–51)
HGB BLD-MCNC: 13.1 G/DL (ref 13–17.7)
IMM GRANULOCYTES # BLD AUTO: 0.1 X10E3/UL (ref 0–0.1)
IMM GRANULOCYTES NFR BLD AUTO: 1 %
LYMPHOCYTES # BLD AUTO: 3.2 X10E3/UL (ref 0.7–3.1)
LYMPHOCYTES NFR BLD AUTO: 45 %
MCH RBC QN AUTO: 32.3 PG (ref 26.6–33)
MCHC RBC AUTO-ENTMCNC: 33 G/DL (ref 31.5–35.7)
MCV RBC AUTO: 98 FL (ref 79–97)
MONOCYTES # BLD AUTO: 0.5 X10E3/UL (ref 0.1–0.9)
MONOCYTES NFR BLD AUTO: 6 %
MORPHOLOGY BLD-IMP: ABNORMAL
NEUTROPHILS # BLD AUTO: 2.6 X10E3/UL (ref 1.4–7)
NEUTROPHILS NFR BLD AUTO: 35 %
PLATELET # BLD AUTO: 87 X10E3/UL (ref 150–379)
RBC # BLD AUTO: 4.05 X10E6/UL (ref 4.14–5.8)
TSH SERPL DL<=0.005 MIU/L-ACNC: 2.56 UIU/ML (ref 0.45–4.5)
WBC # BLD AUTO: 7.4 X10E3/UL (ref 3.4–10.8)

## 2019-01-20 DIAGNOSIS — E11.9 CONTROLLED TYPE 2 DIABETES MELLITUS WITHOUT COMPLICATION, WITHOUT LONG-TERM CURRENT USE OF INSULIN (HCC): ICD-10-CM

## 2019-01-20 RX ORDER — GLIMEPIRIDE 4 MG/1
TABLET ORAL
Qty: 180 TAB | Refills: 2 | Status: SHIPPED | OUTPATIENT
Start: 2019-01-20 | End: 2019-07-08 | Stop reason: SDUPTHER

## 2019-01-22 ENCOUNTER — OFFICE VISIT (OUTPATIENT)
Dept: INTERNAL MEDICINE CLINIC | Facility: CLINIC | Age: 64
End: 2019-01-22

## 2019-01-22 VITALS
SYSTOLIC BLOOD PRESSURE: 121 MMHG | RESPIRATION RATE: 12 BRPM | TEMPERATURE: 97.6 F | DIASTOLIC BLOOD PRESSURE: 54 MMHG | HEART RATE: 77 BPM | BODY MASS INDEX: 26.31 KG/M2 | HEIGHT: 74 IN | WEIGHT: 205 LBS | OXYGEN SATURATION: 98 %

## 2019-01-22 DIAGNOSIS — F79 MENTAL RETARDATION: ICD-10-CM

## 2019-01-22 DIAGNOSIS — G40.909 SEIZURE DISORDER (HCC): ICD-10-CM

## 2019-01-22 DIAGNOSIS — Z12.11 SCREEN FOR COLON CANCER: ICD-10-CM

## 2019-01-22 DIAGNOSIS — T50.905A THROMBOCYTOPENIA DUE TO DRUGS: ICD-10-CM

## 2019-01-22 DIAGNOSIS — E78.00 HYPERCHOLESTEROLEMIA: ICD-10-CM

## 2019-01-22 DIAGNOSIS — I10 HYPERTENSION, ESSENTIAL: ICD-10-CM

## 2019-01-22 DIAGNOSIS — D69.59 THROMBOCYTOPENIA DUE TO DRUGS: ICD-10-CM

## 2019-01-22 DIAGNOSIS — Z13.31 SCREENING FOR DEPRESSION: ICD-10-CM

## 2019-01-22 RX ORDER — PIOGLITAZONEHYDROCHLORIDE 15 MG/1
15 TABLET ORAL DAILY
Qty: 90 TAB | Refills: 3 | Status: SHIPPED | OUTPATIENT
Start: 2019-01-22 | End: 2019-04-25

## 2019-01-22 NOTE — PATIENT INSTRUCTIONS
Restart Tradjenta 5 mg daily Start pioglitazone 15 mg daily Office visit in 3 months with hemoglobin A1c at that visit. Learning About Meal Planning for Diabetes Why plan your meals? Meal planning can be a key part of managing diabetes. Planning meals and snacks with the right balance of carbohydrate, protein, and fat can help you keep your blood sugar at the target level you set with your doctor. You don't have to eat special foods. You can eat what your family eats, including sweets once in a while. But you do have to pay attention to how often you eat and how much you eat of certain foods. You may want to work with a dietitian or a certified diabetes educator. He or she can give you tips and meal ideas and can answer your questions about meal planning. This health professional can also help you reach a healthy weight if that is one of your goals. What plan is right for you? Your dietitian or diabetes educator may suggest that you start with the plate format or carbohydrate counting. The plate format The plate format is a simple way to help you manage how you eat. You plan meals by learning how much space each food should take on a plate. Using the plate format helps you spread carbohydrate throughout the day. It can make it easier to keep your blood sugar level within your target range. It also helps you see if you're eating healthy portion sizes. To use the plate format, you put non-starchy vegetables on half your plate. Add meat or meat substitutes on one-quarter of the plate. Put a grain or starchy vegetable (such as brown rice or a potato) on the final quarter of the plate. You can add a small piece of fruit and some low-fat or fat-free milk or yogurt, depending on your carbohydrate goal for each meal. 
Here are some tips for using the plate format: · Make sure that you are not using an oversized plate. A 9-inch plate is best. Many restaurants use larger plates. · Get used to using the plate format at home. Then you can use it when you eat out. · Write down your questions about using the plate format. Talk to your doctor, a dietitian, or a diabetes educator about your concerns. Carbohydrate counting With carbohydrate counting, you plan meals based on the amount of carbohydrate in each food. Carbohydrate raises blood sugar higher and more quickly than any other nutrient. It is found in desserts, breads and cereals, and fruit. It's also found in starchy vegetables such as potatoes and corn, grains such as rice and pasta, and milk and yogurt. Spreading carbohydrate throughout the day helps keep your blood sugar levels within your target range. Your daily amount depends on several things, including your weight, how active you are, which diabetes medicines you take, and what your goals are for your blood sugar levels. A registered dietitian or diabetes educator can help you plan how much carbohydrate to include in each meal and snack. A guideline for your daily amount of carbohydrate is: · 45 to 60 grams at each meal. That's about the same as 3 to 4 carbohydrate servings. · 15 to 20 grams at each snack. That's about the same as 1 carbohydrate serving. The Nutrition Facts label on packaged foods tells you how much carbohydrate is in a serving of the food. First, look at the serving size on the food label. Is that the amount you eat in a serving? All of the nutrition information on a food label is based on that serving size. So if you eat more or less than that, you'll need to adjust the other numbers. Total carbohydrate is the next thing you need to look for on the label. If you count carbohydrate servings, one serving of carbohydrate is 15 grams. For foods that don't come with labels, such as fresh fruits and vegetables, you'll need a guide that lists carbohydrate in these foods.  Ask your doctor, dietitian, or diabetes educator about books or other nutrition guides you can use. If you take insulin, you need to know how many grams of carbohydrate are in a meal. This lets you know how much rapid-acting insulin to take before you eat. If you use an insulin pump, you get a constant rate of insulin during the day. So the pump must be programmed at meals to give you extra insulin to cover the rise in blood sugar after meals. When you know how much carbohydrate you will eat, you can take the right amount of insulin. Or, if you always use the same amount of insulin, you need to make sure that you eat the same amount of carbohydrate at meals. If you need more help to understand carbohydrate counting and food labels, ask your doctor, dietitian, or diabetes educator. How do you get started with meal planning? Here are some tips to get started: 
· Plan your meals a week at a time. Don't forget to include snacks too. · Use cookbooks or online recipes to plan several main meals. Plan some quick meals for busy nights. You also can double some recipes that freeze well. Then you can save half for other busy nights when you don't have time to cook. · Make sure you have the ingredients you need for your recipes. If you're running low on basic items, put these items on your shopping list too. · List foods that you use to make breakfasts, lunches, and snacks. List plenty of fruits and vegetables. · Post this list on the refrigerator. Add to it as you think of more things you need. · Take the list to the store to do your weekly shopping. Follow-up care is a key part of your treatment and safety. Be sure to make and go to all appointments, and call your doctor if you are having problems. It's also a good idea to know your test results and keep a list of the medicines you take. Where can you learn more? Go to http://jarod-jared.info/. Geo Trejo in the search box to learn more about \"Learning About Meal Planning for Diabetes. \" 
 Current as of: July 25, 2018 Content Version: 11.9 © 9871-9216 [employer]. Care instructions adapted under license by [employer] (which disclaims liability or warranty for this information). If you have questions about a medical condition or this instruction, always ask your healthcare professional. [address] any warranty or liability for your use of this information.

## 2019-01-22 NOTE — PROGRESS NOTES
[facility] Manual  Identified pt with two pt identifiers(name and ). Chief Complaint Patient presents with  Diabetes  Blood Pressure Check  Cholesterol Problem  Fatigue Reviewed record In preparation for visit and have obtained necessary documentation. Advanced directive / living will on file. 1. Have you been to the ER, urgent care clinic or hospitalized since your last visit? No  
 
2. Have you seen or consulted any other health care providers outside of the [address] [place] [address] since your last visit? Include any pap smears or colon screening. No 
 
Vitals reviewed with provider. Health Maintenance reviewed:  
 
Health Maintenance Due Topic  Shingrix Vaccine Age 50> (1 of 2)  COLONOSCOPY Wt Readings from Last 3 Encounters:  
19 205 lb (93 kg) 10/16/18 204 lb (92.5 kg) 07/10/18 203 lb (92.1 kg) Temp Readings from Last 3 Encounters:  
19 97.6 °F (36.4 °C) (Oral) 10/16/18 97.9 °F (36.6 °C) (Oral) 07/10/18 97.6 °F (36.4 °C) (Oral) BP Readings from Last 3 Encounters:  
19 121/54  
10/16/18 120/75  
07/10/18 128/72 Pulse Readings from Last 3 Encounters:  
19 77  
10/16/18 69  
07/10/18 65 Vitals:  
 19 1322 BP: 121/54 Pulse: 77 Resp: 12 Temp: 97.6 °F (36.4 °C) TempSrc: Oral  
SpO2: 98% Weight: 205 lb (93 kg) Height: 6' 2\" (1.88 m) PainSc:   0 - No pain Learning Assessment:  
:  
 
 
Learning Assessment 2014 PRIMARY LEARNER Patient HIGHEST LEVEL OF EDUCATION - PRIMARY LEARNER  DID NOT GRADUATE HIGH SCHOOL  
BARRIERS PRIMARY LEARNER COGNITIVE  
CO-LEARNER CAREGIVER Yes CO-LEARNER NAME Kamron Nelson PRIMARY LANGUAGE ENGLISH  
LEARNER PREFERENCE PRIMARY DEMONSTRATION  
  READING  
ANSWERED BY mother RELATIONSHIP LEGAL GUARDIAN Depression Screening:  
:  
 
 
PHQ over the last two weeks 2019 Little interest or pleasure in doing things Not at all Feeling down, depressed, irritable, or hopeless Not at all Total Score PHQ 2 0 Fall Risk Assessment:  
:  
 
No flowsheet data found. Abuse Screening:  
:  
 
 
Abuse Screening Questionnaire 10/16/2018 2/16/2017 Do you ever feel afraid of your partner? Sherrie Delay Are you in a relationship with someone who physically or mentally threatens you? Sherrie Delay Is it safe for you to go home? Y Y  
  
 
ADL Screening:  
:  
 
 
ADL Assessment 4/16/2015 Feeding yourself No Help Needed Getting from bed to chair No Help Needed Getting dressed No Help Needed Bathing or showering No Help Needed Walk across the room (includes cane/walker) No Help Needed Using the telphone No Help Needed Taking your medications Help Needed Preparing meals Help Needed Managing money (expenses/bills) Help Needed Moderately strenuous housework (laundry) No Help Needed Shopping for personal items (toiletries/medicines) No Help Needed Shopping for groceries No Help Needed Driving Help Needed Climbing a flight of stairs No Help Needed Getting to places beyond walking distances Help Needed

## 2019-01-22 NOTE — PROGRESS NOTES
HISTORY OF PRESENT ILLNESS Sugey Mai is a 59 y.o. male. He is accompanied by his mother HPI  He presents for follow up of diabetes mellitus, hypertension and hyperlipidemia. For the most part control has been poor since 2017; only on A1c has been  Below 8 in that time. His mother is finding it hard to get him to control diet or exercise. He has never returned to premorbid state after admission for respiratory failure in 2018. Diet and Lifestyle: generally follows a low fat low cholesterol diet, generally follows a low sodium diet, follows a diabetic diet regularly, sedentary, nonsmoker Diabetic ROS - medication compliance: compliant all of the time,  
   home glucose monitoring: not done 
   home BP Monitorin's/60's.  
   further diabetic ROS: no polyuria or polydipsia, no numbness, tingling or pain in extremities, no unusual visual symptoms, no hypoglycemia, no medication side effects noted. Cardiovascular ROS: 
He denies palpitations, orthopnea, exertional chest pressure/discomfort, claudication, lower extremity edema, dyspnea on exertion, dizziness He has seizure disorder and mental retardation. He has had no seizures since last visit. He has thrombocytopenia thought to be secondary to antiepileptic medication. PHQ over the last two weeks 2019 Little interest or pleasure in doing things Not at all Feeling down, depressed, irritable, or hopeless Not at all Total Score PHQ 2 0 Patient Active Problem List  
Diagnosis Code  Mental retardation F79  
 Seizure disorder ([facility] G40.909  Psoriasis L40.9  Venous stasis of lower extremity I87.8  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, T50.905A  Sleep disorder G47.9  Hypertension, essential I10  Strongyloides stercoralis infection B78.9  Eosinophilia D72.1  Hypercholesterolemia E78.00  
 Uncontrolled type 2 diabetes mellitus without complication, without long-term current use of insulin (HCC) E11.65 Past Medical History:  
Diagnosis Date  Contact dermatitis and other eczema, due to unspecified cause   
 psoriasis  Diabetes ([facility]  Eosinophilia  Hypercholesterolemia  Hypertension  Mental retardation  Seizures ([facility]  Skin cancer Past Surgical History:  
Procedure Laterality Date  ENDOSCOPY, COLON, DIAGNOSTIC  11/08/2007 Dr Shelley Ayala normal except small internal hemorrhoids repeat 11/2017 Social History Socioeconomic History  Marital status: SINGLE Spouse name: Not on file  Number of children: Not on file  Years of education: Not on file  Highest education level: Not on file Occupational History Comment: [employer] Tobacco Use  Smoking status: Never Smoker  Smokeless tobacco: Never Used Substance and Sexual Activity  Alcohol use: No  
 Drug use: No  
 Sexual activity: No  
 
Family History Problem Relation Age of Onset [place] Other Mother PMR  Hypertension Mother  Cancer Father Lung  Diabetes Brother No Known Allergies Current Outpatient Medications Medication Sig Dispense Refill  glimepiride (AMARYL) 4 mg tablet TAKE TWO TABLETS BY MOUTH EVERY MORNING  180 Tab 2  
 divalproex DR (DEPAKOTE) 500 mg tablet TAKE ONE TABLET IN THE MORNING AND TWO TABLETS AT NIGHT 270 Tab 2  
 ramipril (ALTACE) 5 mg capsule TAKE ONE CAPSULE BY MOUTH ONE TIME DAILY  90 Cap 0  
 atorvastatin (LIPITOR) 40 mg tablet take one tablet by mouth at bedtime 90 Tab 3  
 metFORMIN ER (GLUCOPHAGE XR) 500 mg tablet Take 2 Tabs by mouth two (2) times a day. 400 Tab 3  
 folic acid (FOLVITE) 1 mg tablet TAKE ONE TABLET BY MOUTH DAILY 90 Tab 2  chlorhexidine (PERIDEX) 0.12 % solution  topiramate (TOPAMAX) 200 mg tablet 1.5 tablets twice a day 360 Tab 3  
 Calcium-Cholecalciferol, D3, (CALTRATE-600 PLUS VITAMIN D3) 600-400 mg-unit Tab Take  by mouth. Review of Systems Constitutional: Positive for malaise/fatigue. Negative for weight loss. Gastrointestinal: Negative for constipation and diarrhea. Musculoskeletal: Negative for back pain and joint pain. Neurological: Negative for tingling and focal weakness. Visit Vitals /54 (BP 1 Location: Left arm, BP Patient Position: Sitting) Pulse 77 Temp 97.6 °F (36.4 °C) (Oral) Resp 12 Ht 6' 2\" (1.88 m) Wt 205 lb (93 kg) SpO2 98% BMI 26.32 kg/m² Physical Exam  
Constitutional: He is oriented to person, place, and time. He appears well-developed and well-nourished. HENT:  
Head: Normocephalic and atraumatic. Eyes: Conjunctivae are normal. Pupils are equal, round, and reactive to light. Neck: Neck supple. Carotid bruit is not present. No thyromegaly present. Cardiovascular: Normal rate, regular rhythm and normal heart sounds. PMI is not displaced. Exam reveals no gallop. No murmur heard. Pulses: 
     Dorsalis pedis pulses are 2+ on the right side, and 2+ on the left side. Posterior tibial pulses are 2+ on the right side, and 2+ on the left side. Pulmonary/Chest: Effort normal. He has no wheezes. He has no rhonchi. He has no rales. Abdominal: Soft. Normal appearance. He exhibits no abdominal bruit and no mass. There is no hepatosplenomegaly. There is no tenderness. Musculoskeletal: He exhibits no edema. Lymphadenopathy:  
  He has no cervical adenopathy. Right: No supraclavicular adenopathy present. Left: No supraclavicular adenopathy present. Neurological: He is alert and oriented to person, place, and time. No sensory deficit. Skin: Skin is warm, dry and intact. No rash noted. Psychiatric: He has a normal mood and affect. His behavior is normal.  
Nursing note and vitals reviewed. Results for orders placed or performed in visit on 01/15/19 CBC WITH AUTOMATED DIFF Result Value Ref Range WBC 7.4 3.4 - 10.8 x10E3/uL RBC 4.05 (L) 4.14 - 5.80 x10E6/uL HGB 13.1 13.0 - 17.7 g/dL HCT 39.7 37.5 - 51.0 % MCV 98 (H) 79 - 97 fL  
 MCH 32.3 26.6 - 33.0 pg  
 MCHC 33.0 31.5 - 35.7 g/dL  
 RDW 14.1 12.3 - 15.4 % PLATELET 87 (LL) 185 - 379 x10E3/uL NEUTROPHILS 35 Not Estab. % Lymphocytes 45 Not Estab. % MONOCYTES 6 Not Estab. % EOSINOPHILS 13 Not Estab. % BASOPHILS 0 Not Estab. %  
 ABS. NEUTROPHILS 2.6 1.4 - 7.0 x10E3/uL Abs Lymphocytes 3.2 (H) 0.7 - 3.1 x10E3/uL  
 ABS. MONOCYTES 0.5 0.1 - 0.9 x10E3/uL  
 ABS. EOSINOPHILS 1.0 (H) 0.0 - 0.4 x10E3/uL  
 ABS. BASOPHILS 0.0 0.0 - 0.2 x10E3/uL IMMATURE GRANULOCYTES 1 Not Estab. %  
 ABS. IMM. GRANS. 0.1 0.0 - 0.1 x10E3/uL Hematology comments: Note:   
HEMOGLOBIN A1C WITH EAG Result Value Ref Range Hemoglobin A1c 10.2 (H) 4.8 - 5.6 % Estimated average glucose 246 mg/dL TSH 3RD GENERATION Result Value Ref Range TSH 2.560 0.450 - 4.500 uIU/mL Lab Results Component Value Date/Time Microalb/Creat ratio (ug/mg creat.) <4.3 07/10/2018 11:08 AM  
 
Lab Results Component Value Date/Time Cholesterol, total 133 03/27/2018 08:40 AM  
 HDL Cholesterol 42 03/27/2018 08:40 AM  
 LDL, calculated 66 03/27/2018 08:40 AM  
 VLDL, calculated 25 03/27/2018 08:40 AM  
 Triglyceride 125 03/27/2018 08:40 AM  
 CHOL/HDL Ratio 2.8 03/31/2009 08:40 AM  
 
ASSESSMENT and PLAN 
  ICD-10-CM ICD-9-CM 1. Uncontrolled type 2 diabetes mellitus without complication, without long-term current use of insulin (HCC) E11.65 250.02 linagliptin (TRADJENTA) 5 mg tablet  
   pioglitazone (ACTOS) 15 mg tablet 2. Hypertension, essential I10 401.9 3. Hypercholesterolemia E78.00 272.0 4. Seizure disorder ([facility] G40.909 345.90   
5. Mental retardation F79 319   
6. 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 287.49 T50.905A E947.9 7. Screening for depression Z13.31 V79.0 BEHAV ASSMT W/SCORE & DOCD/STAND INSTRUMENT 8. Screen for colon cancer Z12.11 V76.51 OCCULT BLOOD IMMUNOASSAY,DIAGNOSTIC Diagnoses and all orders for this visit: 
 
1. Uncontrolled type 2 diabetes mellitus without complication, without long-term current use of insulin ([facility] 
A1c has risen since discontinuation of Tradjenta. It was 8.2in October. It was likely beneficial. However that alone will not bring us to goal. Will also  Add pioglitazone. He is not going to tolerate injections with much ado. Intellectual capacity does not allow him to understand need for dietary restriction or activity. Mother does the best she can to assist.  
-     linagliptin (TRADJENTA) 5 mg tablet; Take 1 Tab by mouth daily. -     pioglitazone (ACTOS) 15 mg tablet; Take 1 Tab by mouth daily. 2. Hypertension, essential 
Hypertension is controlled 3. Hypercholesterolemia Hyperlipidemia is controlled 4. Seizure disorder ([facility] Stable with no seizures. 5. Mental retardation Limits ability to self manage medical problems 6. Thrombocytopenia due to drugs Stable 7. Screening for depression-Negative 
-     BEHAV ASSMT W/SCORE & DOCD/STAND INSTRUMENT 8. Screen for colon cancer -     OCCULT BLOOD IMMUNOASSAY,DIAGNOSTIC; Future Follow-up Disposition: 
Return in about 3 months (around 4/22/2019) for DM, HTN, POC A1c. 
lab results and schedule of future lab studies reviewed with patient 
reviewed diet, exercise and weight control I have discussed the diagnosis, evaluation and treatment options and the intended plan with the patient. Patient understands and is in agreement. The patient has received an after-visit summary and questions were answered concerning future plans. I have discussed side effects and warnings of any new medications with the patient as well.

## 2019-01-31 LAB — HEMOCCULT STL QL IA: NEGATIVE

## 2019-02-10 DIAGNOSIS — G40.909 SEIZURE DISORDER (HCC): ICD-10-CM

## 2019-02-10 RX ORDER — TOPIRAMATE 200 MG/1
TABLET ORAL
Qty: 270 TAB | Refills: 2 | Status: SHIPPED | OUTPATIENT
Start: 2019-02-10 | End: 2019-08-27 | Stop reason: SDUPTHER

## 2019-03-10 RX ORDER — FOLIC ACID 1 MG/1
TABLET ORAL
Qty: 90 TAB | Refills: 1 | Status: SHIPPED | OUTPATIENT
Start: 2019-03-10 | End: 2019-07-12 | Stop reason: SDUPTHER

## 2019-04-25 ENCOUNTER — OFFICE VISIT (OUTPATIENT)
Dept: INTERNAL MEDICINE CLINIC | Facility: CLINIC | Age: 64
End: 2019-04-25

## 2019-04-25 VITALS
HEIGHT: 74 IN | SYSTOLIC BLOOD PRESSURE: 126 MMHG | DIASTOLIC BLOOD PRESSURE: 68 MMHG | TEMPERATURE: 97.6 F | OXYGEN SATURATION: 99 % | WEIGHT: 201 LBS | BODY MASS INDEX: 25.8 KG/M2 | RESPIRATION RATE: 12 BRPM | HEART RATE: 75 BPM

## 2019-04-25 DIAGNOSIS — I10 HYPERTENSION, ESSENTIAL: ICD-10-CM

## 2019-04-25 DIAGNOSIS — E78.00 HYPERCHOLESTEROLEMIA: ICD-10-CM

## 2019-04-25 DIAGNOSIS — F79 INTELLECTUAL DISABILITY: ICD-10-CM

## 2019-04-25 LAB — HBA1C MFR BLD HPLC: 8.8 %

## 2019-04-25 RX ORDER — HALOBETASOL PROPIONATE 0.5 MG/G
OINTMENT TOPICAL
COMMUNITY
Start: 2019-02-28 | End: 2019-08-01

## 2019-04-25 RX ORDER — PIOGLITAZONEHYDROCHLORIDE 30 MG/1
30 TABLET ORAL DAILY
Qty: 90 TAB | Refills: 3 | Status: SHIPPED | OUTPATIENT
Start: 2019-04-25 | End: 2019-08-01

## 2019-04-25 RX ORDER — TRIAMCINOLONE ACETONIDE 1 MG/G
OINTMENT TOPICAL 2 TIMES DAILY
COMMUNITY
Start: 2019-01-30 | End: 2021-04-09

## 2019-04-25 NOTE — PROGRESS NOTES
HISTORY OF PRESENT ILLNESS Jose Alberto Yan is a 59 y.o. male. He is accompanied by his mother. HPI  He presents for follow up of diabetes mellitus, hypertension, hyperlipidemia and overweight. . 
Diet and Lifestyle: generally follows a low fat low cholesterol diet, generally follows a low sodium diet, follows a diabetic diet regularly, exercises sporadically, nonsmoker Diabetic ROS - medication compliance: compliant all of the time,  
   home glucose monitoring: not done,  
   home BP Monitorin's/70's.  
   further diabetic ROS: no polyuria or polydipsia, no numbness, tingling or pain in extremities, no unusual visual symptoms, no hypoglycemia, no medication side effects noted. Cardiovascular ROS:  
He denies palpitations, orthopnea, exertional chest pressure/discomfort, claudication, lower extremity edema, dyspnea on exertion, dizziness Patient Active Problem List  
Diagnosis Code  Intellectual disability F68  
 Seizure disorder ([facility] G40.909  Psoriasis L40.9  Venous stasis of lower extremity I87.8  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, T50.905A  Sleep disorder G47.9  Hypertension, essential I10  Strongyloides stercoralis infection B78.9  Eosinophilia D72.1  Hypercholesterolemia E78.00  
 Uncontrolled type 2 diabetes mellitus without complication, without long-term current use of insulin (HCC) E11.65 Past Medical History:  
Diagnosis Date  Contact dermatitis and other eczema, due to unspecified cause   
 psoriasis  Diabetes ([facility]  Eosinophilia  Hypercholesterolemia  Hypertension  Mental retardation  Seizures ([facility]  Skin cancer Past Surgical History:  
Procedure Laterality Date  ENDOSCOPY, COLON, DIAGNOSTIC  2007 Dr Chapincito Venegas normal except small internal hemorrhoids repeat 2017 Social History Socioeconomic History  Marital status: SINGLE Spouse name: Not on file  Number of children: Not on file  Years of education: Not on file  Highest education level: Not on file Occupational History Comment: [employer] Tobacco Use  Smoking status: Never Smoker  Smokeless tobacco: Never Used Substance and Sexual Activity  Alcohol use: No  
 Drug use: No  
 Sexual activity: Never Family History Problem Relation Age of Onset 25 [facility] Other Mother PMR  Hypertension Mother  Cancer Father Lung  Diabetes Brother No Known Allergies Current Outpatient Medications Medication Sig Dispense Refill  triamcinolone acetonide (KENALOG) 0.1 % ointment  halobetasol (ULTRAVATE) 0.05 % ointment  folic acid (FOLVITE) 1 mg tablet TAKE ONE TABLET BY MOUTH ONE TIME DAILY  90 Tab 1  
 topiramate (TOPAMAX) 200 mg tablet TAKE ONE AND ONE-HALF TABLET BY MOUTH TWICE DAILY 270 Tab 2  
 ramipril (ALTACE) 5 mg capsule TAKE ONE CAPSULE BY MOUTH ONE TIME DAILY  90 Cap 3  
 linagliptin (TRADJENTA) 5 mg tablet Take 1 Tab by mouth daily. 90 Tab 3  pioglitazone (ACTOS) 15 mg tablet Take 1 Tab by mouth daily. 90 Tab 3  
 glimepiride (AMARYL) 4 mg tablet TAKE TWO TABLETS BY MOUTH EVERY MORNING  180 Tab 2  
 divalproex DR (DEPAKOTE) 500 mg tablet TAKE ONE TABLET IN THE MORNING AND TWO TABLETS AT NIGHT 270 Tab 2  
 atorvastatin (LIPITOR) 40 mg tablet take one tablet by mouth at bedtime 90 Tab 3  
 metFORMIN ER (GLUCOPHAGE XR) 500 mg tablet Take 2 Tabs by mouth two (2) times a day. 360 Tab 3  chlorhexidine (PERIDEX) 0.12 % solution  Calcium-Cholecalciferol, D3, (CALTRATE-600 PLUS VITAMIN D3) 600-400 mg-unit Tab Take  by mouth. Review of Systems Constitutional: Positive for malaise/fatigue. Negative for weight loss. Gastrointestinal: Negative for constipation and diarrhea. Musculoskeletal: Negative for back pain and joint pain. Neurological: Negative for tingling and focal weakness. Visit Vitals /79 (BP 1 Location: Left arm, BP Patient Position: Sitting) Pulse 75 Temp 97.6 °F (36.4 °C) (Oral) Resp 12 Ht 6' 2\" (1.88 m) Wt 201 lb (91.2 kg) SpO2 99% BMI 25.81 kg/m² Physical Exam  
Constitutional: He is oriented to person, place, and time. He appears well-developed and well-nourished. HENT:  
Head: Normocephalic and atraumatic. Eyes: Pupils are equal, round, and reactive to light. Conjunctivae are normal.  
Neck: Neck supple. Carotid bruit is not present. No thyromegaly present. Cardiovascular: Normal rate, regular rhythm and normal heart sounds. PMI is not displaced. Exam reveals no gallop. No murmur heard. Pulses: 
     Dorsalis pedis pulses are 2+ on the right side, and 2+ on the left side. Posterior tibial pulses are 2+ on the right side, and 2+ on the left side. Pulmonary/Chest: Effort normal. He has no wheezes. He has no rhonchi. He has no rales. Abdominal: Soft. Normal appearance. He exhibits no abdominal bruit and no mass. There is no hepatosplenomegaly. There is no tenderness. Musculoskeletal: He exhibits no edema. Lymphadenopathy:  
  He has no cervical adenopathy. Right: No supraclavicular adenopathy present. Left: No supraclavicular adenopathy present. Neurological: He is alert and oriented to person, place, and time. No sensory deficit. Skin: Skin is warm, dry and intact. No rash noted. Psychiatric: He has a normal mood and affect. His behavior is normal.  
Nursing note and vitals reviewed. Results for orders placed or performed in visit on 04/25/19 AMB POC HEMOGLOBIN A1C Result Value Ref Range Hemoglobin A1c (POC) 8.8 % Lab Results Component Value Date/Time Hemoglobin A1c 10.2 (H) 01/15/2019 11:41 AM  
 Hemoglobin A1c (POC) 8.2 (A) 10/16/2018 03:00 PM  
 
Lab Results Component Value Date/Time  Sodium 144 03/27/2018 08:40 AM  
 Potassium 4.7 03/27/2018 08:40 AM  
 Chloride 107 (H) 03/27/2018 08:40 AM  
 CO2 20 03/27/2018 08:40 AM  
 Anion gap 11 02/27/2018 03:53 AM  
 Glucose 142 (H) 03/27/2018 08:40 AM  
 BUN 15 03/27/2018 08:40 AM  
 Creatinine 0.65 (L) 03/27/2018 08:40 AM  
 BUN/Creatinine ratio 23 03/27/2018 08:40 AM  
 GFR est  03/27/2018 08:40 AM  
 GFR est non- 03/27/2018 08:40 AM  
 Calcium 9.1 03/27/2018 08:40 AM  
 Bilirubin, total 0.3 03/27/2018 08:40 AM  
 AST (SGOT) 11 03/27/2018 08:40 AM  
 Alk. phosphatase 50 03/27/2018 08:40 AM  
 Protein, total 6.6 03/27/2018 08:40 AM  
 Albumin 3.7 03/27/2018 08:40 AM  
 Globulin 3.8 02/27/2018 03:53 AM  
 A-G Ratio 1.3 03/27/2018 08:40 AM  
 ALT (SGPT) 9 03/27/2018 08:40 AM  
 
Lab Results Component Value Date/Time TSH 2.560 01/15/2019 11:41 AM  
 
Lab Results Component Value Date/Time Cholesterol, total 133 03/27/2018 08:40 AM  
 HDL Cholesterol 42 03/27/2018 08:40 AM  
 LDL, calculated 66 03/27/2018 08:40 AM  
 VLDL, calculated 25 03/27/2018 08:40 AM  
 Triglyceride 125 03/27/2018 08:40 AM  
 CHOL/HDL Ratio 2.8 03/31/2009 08:40 AM  
 
Lab Results Component Value Date/Time Microalb/Creat ratio (ug/mg creat.) <4.3 07/10/2018 11:08 AM  
 
 
ASSESSMENT and PLAN 
  ICD-10-CM ICD-9-CM 1. Uncontrolled type 2 diabetes mellitus without complication, without long-term current use of insulin (HCC) E11.65 250.02 AMB POC HEMOGLOBIN A1C  
   pioglitazone (ACTOS) 30 mg tablet 2. Hypertension, essential I10 401.9 3. Hypercholesterolemia E78.00 272.0 LIPID PANEL  
   METABOLIC PANEL, COMPREHENSIVE 4. Intellectual disability F79 319 Diagnoses and all orders for this visit: 
 
1. Uncontrolled type 2 diabetes mellitus without complication, without long-term current use of insulin ([facility] Diabetes Mellitus: improved, poorly controlled. Is taking statin and ACE/ARB Issues reviewed with him: low cholesterol diet, weight control and daily exercise discussed and glycohemoglobin and other lab monitoring discussed.  
-     AMB POC HEMOGLOBIN A1C 
 -    Increase  pioglitazone (ACTOS) 30 mg tablet; Take 1 Tab by mouth daily. Indications: type 2 diabetes mellitus 2. Hypertension, essential 
Hypertension is controlled. 3. Hypercholesterolemia Hyperlipidemia is controlled -     LIPID PANEL 
-     METABOLIC PANEL, COMPREHENSIVE 4. Intellectual disability Mother is doing her best to control diet and encourage activity. Follow-up and Dispositions · Return in about 3 months (around 7/25/2019) for DM, HTN, chol, POC A1c . 
  
 
lab results and schedule of future lab studies reviewed with patient 
reviewed diet, exercise and weight control I have discussed the diagnosis, evaluation and treatment options and the intended plan with the patient. Patient understands and is in agreement. The patient has received an after-visit summary and questions were answered concerning future plans. I have discussed side effects and warnings of any new medications with the patient as well.

## 2019-04-25 NOTE — PROGRESS NOTES
Quinn Hernandez  Identified pt with two pt identifiers(name and ). Chief Complaint Patient presents with  Diabetes  Hypertension Reviewed record In preparation for visit and have obtained necessary documentation. Advanced directive / living will on file. 1. Have you been to the ER, urgent care clinic or hospitalized since your last visit? No  
 
2. Have you seen or consulted any other health care providers outside of the [address] [place] [address] since your last visit? Include any pap smears or colon screening. No 
 
Vitals reviewed with provider. Health Maintenance reviewed:  
 
Health Maintenance Due Topic  LIPID PANEL Q1 Wt Readings from Last 3 Encounters:  
19 201 lb (91.2 kg) 19 205 lb (93 kg) 10/16/18 204 lb (92.5 kg) Temp Readings from Last 3 Encounters:  
19 97.6 °F (36.4 °C) (Oral) 19 97.6 °F (36.4 °C) (Oral) 10/16/18 97.9 °F (36.6 °C) (Oral) BP Readings from Last 3 Encounters:  
19 122/79  
19 121/54  
10/16/18 120/75 Pulse Readings from Last 3 Encounters:  
19 75  
19 77  
10/16/18 69 Vitals:  
 19 1129 BP: 122/79 Pulse: 75 Resp: 12 Temp: 97.6 °F (36.4 °C) TempSrc: Oral  
SpO2: 99% Weight: 201 lb (91.2 kg) Height: 6' 2\" (1.88 m) PainSc:   0 - No pain Learning Assessment:  
:  
 
 
Learning Assessment 2014 PRIMARY LEARNER Patient HIGHEST LEVEL OF EDUCATION - PRIMARY LEARNER  DID NOT GRADUATE HIGH SCHOOL  
BARRIERS PRIMARY LEARNER COGNITIVE  
CO-LEARNER CAREGIVER Yes CO-LEARNER NAME Luis Gonzalez PRIMARY LANGUAGE ENGLISH  
LEARNER PREFERENCE PRIMARY DEMONSTRATION  
  READING  
ANSWERED BY mother RELATIONSHIP LEGAL GUARDIAN Depression Screening:  
:  
 
 
3 most recent PHQ Screens 2019 Little interest or pleasure in doing things Not at all Feeling down, depressed, irritable, or hopeless Not at all Total Score PHQ 2 0 Fall Risk Assessment:  
:  
 
No flowsheet data found. Abuse Screening:  
:  
 
 
Abuse Screening Questionnaire 10/16/2018 2/16/2017 Do you ever feel afraid of your partner? Drinda Buerger Are you in a relationship with someone who physically or mentally threatens you? Drinda Buerger Is it safe for you to go home? Y Y  
  
 
ADL Screening:  
:  
 
 
ADL Assessment 4/16/2015 Feeding yourself No Help Needed Getting from bed to chair No Help Needed Getting dressed No Help Needed Bathing or showering No Help Needed Walk across the room (includes cane/walker) No Help Needed Using the telphone No Help Needed Taking your medications Help Needed Preparing meals Help Needed Managing money (expenses/bills) Help Needed Moderately strenuous housework (laundry) No Help Needed Shopping for personal items (toiletries/medicines) No Help Needed Shopping for groceries No Help Needed Driving Help Needed Climbing a flight of stairs No Help Needed Getting to places beyond walking distances Help Needed

## 2019-04-25 NOTE — PATIENT INSTRUCTIONS
Increase pioglitazone to 30 mg once a day. Can use up the 15 mg capsules by taking 2, then just one when switching to 30 mg capsules. A1c has dropped from 10.2 to 8.8. Office visit in 3 months with hemoglobin A1c at that visit. We will also need a urine sample. Learning About Type 2 Diabetes What is type 2 diabetes? Insulin is a hormone that helps your body use sugar from your food as energy. Type 2 diabetes happens when your body can't use insulin the right way. Over time, the pancreas can't make enough insulin. If you don't have enough insulin, too much sugar stays in your blood. If you are overweight, get little or no exercise, or have type 2 diabetes in your family, you are more likely to have problems with the way insulin works in your body.  Americans, Hispanics, Native Americans,  Americans, and Pacific Islanders have a higher risk for type 2 diabetes. Type 2 diabetes can be prevented or delayed with a healthy lifestyle, which includes staying at a healthy weight, making smart food choices, and getting regular exercise. What can you expect with type 2 diabetes? Marilu Rush keep hearing about how important it is to keep your blood sugar within a target range. That's because over time, high blood sugar can lead to serious problems. It can: 
· Harm your eyes, nerves, and kidneys. · Damage your blood vessels, leading to heart disease and stroke. · Reduce blood flow and cause nerve damage to parts of your body, especially your feet. This can cause slow healing and pain when you walk. · Make your immune system weak and less able to fight infections. When people hear the word \"diabetes,\" they often think of problems like these. But daily care and treatment can help prevent or delay these problems. The goal is to keep your blood sugar in a target range. That's the best way to reduce your chance of having more problems from diabetes. What are the symptoms? Some people who have type 2 diabetes may not have any symptoms early on. Many people with the disease don't even know they have it at first. But with time, diabetes starts to cause symptoms. You experience most symptoms of type 2 diabetes when your blood sugar is either too high or too low. The most common symptoms of high blood sugar include: · Thirst. 
· Frequent urination. · Weight loss. · Blurry vision. The symptoms of low blood sugar include: · Sweating. · Shakiness. · Weakness. · Hunger. · Confusion. How can you prevent type 2 diabetes? The best way to prevent or delay type 2 diabetes is to adopt healthy habits, which include: 
· Staying at a healthy weight. · Exercising regularly. · Eating healthy foods. How is type 2 diabetes treated? If you have type 2 diabetes, here are the most important things you can do. · Take your diabetes medicines. · Check your blood sugar as often as your doctor recommends. Also, get a hemoglobin A1c test at least every 6 months. · Try to eat a variety of foods and to spread carbohydrate throughout the day. Carbohydrate raises blood sugar higher and more quickly than any other nutrient does. Carbohydrate is found in sugar, breads and cereals, fruit, starchy vegetables such as potatoes and corn, and milk and yogurt. · Get at least 30 minutes of exercise on most days of the week. Walking is a good choice. You also may want to do other activities, such as running, swimming, cycling, or playing tennis or team sports. If your doctor says it's okay, do muscle-strengthening exercises at least 2 times a week. · See your doctor for checkups and tests on a regular schedule. · If you have high blood pressure or high cholesterol, take the medicines as prescribed by your doctor. · Do not smoke. Smoking can make health problems worse. This includes problems you might have with type 2 diabetes.  If you need help quitting, talk to your doctor about stop-smoking programs and medicines. These can increase your chances of quitting for good. Follow-up care is a key part of your treatment and safety. Be sure to make and go to all appointments, and call your doctor if you are having problems. It's also a good idea to know your test results and keep a list of the medicines you take. Where can you learn more? Go to http://jarod-jared.info/. Enter R735 in the search box to learn more about \"Learning About Type 2 Diabetes. \" Current as of: July 25, 2018 Content Version: 11.9 © 5298-6504 [employer]. Care instructions adapted under license by [employer] (which disclaims liability or warranty for this information). If you have questions about a medical condition or this instruction, always ask your healthcare professional. [address] any warranty or liability for your use of this information.

## 2019-04-26 LAB
ALBUMIN SERPL-MCNC: 3.8 G/DL (ref 3.6–4.8)
ALBUMIN/GLOB SERPL: 1.5 {RATIO} (ref 1.2–2.2)
ALP SERPL-CCNC: 47 IU/L (ref 39–117)
ALT SERPL-CCNC: 10 IU/L (ref 0–44)
AST SERPL-CCNC: 13 IU/L (ref 0–40)
BILIRUB SERPL-MCNC: 0.2 MG/DL (ref 0–1.2)
BUN SERPL-MCNC: 20 MG/DL (ref 8–27)
BUN/CREAT SERPL: 26 (ref 10–24)
CALCIUM SERPL-MCNC: 9.3 MG/DL (ref 8.6–10.2)
CHLORIDE SERPL-SCNC: 108 MMOL/L (ref 96–106)
CHOLEST SERPL-MCNC: 112 MG/DL (ref 100–199)
CO2 SERPL-SCNC: 22 MMOL/L (ref 20–29)
CREAT SERPL-MCNC: 0.78 MG/DL (ref 0.76–1.27)
GLOBULIN SER CALC-MCNC: 2.6 G/DL (ref 1.5–4.5)
GLUCOSE SERPL-MCNC: 221 MG/DL (ref 65–99)
HDLC SERPL-MCNC: 39 MG/DL
LDLC SERPL CALC-MCNC: 52 MG/DL (ref 0–99)
POTASSIUM SERPL-SCNC: 5 MMOL/L (ref 3.5–5.2)
PROT SERPL-MCNC: 6.4 G/DL (ref 6–8.5)
SODIUM SERPL-SCNC: 142 MMOL/L (ref 134–144)
TRIGL SERPL-MCNC: 105 MG/DL (ref 0–149)
VLDLC SERPL CALC-MCNC: 21 MG/DL (ref 5–40)

## 2019-04-26 NOTE — PROGRESS NOTES
Inform patient's other cholesterol is normal. BUN is somewhat elevated indicating need for more fluids. Aim for at least 12 oz 5 times a day. A full 2 quarts would be better.

## 2019-04-26 NOTE — PROGRESS NOTES
Verified two patient identifiers, name and . Pt's mother notified of lab results, will increase fluid intake. Pt had no further questions at this time.

## 2019-05-26 ENCOUNTER — OFFICE VISIT (OUTPATIENT)
Dept: URGENT CARE | Age: 64
End: 2019-05-26

## 2019-05-26 VITALS
WEIGHT: 198 LBS | TEMPERATURE: 98.1 F | BODY MASS INDEX: 25.41 KG/M2 | DIASTOLIC BLOOD PRESSURE: 70 MMHG | HEIGHT: 74 IN | HEART RATE: 78 BPM | RESPIRATION RATE: 18 BRPM | OXYGEN SATURATION: 98 % | SYSTOLIC BLOOD PRESSURE: 144 MMHG

## 2019-05-26 DIAGNOSIS — J20.9 ACUTE BRONCHITIS, UNSPECIFIED ORGANISM: Primary | ICD-10-CM

## 2019-05-26 RX ORDER — BENZONATATE 200 MG/1
200 CAPSULE ORAL
Qty: 21 CAP | Refills: 0 | Status: SHIPPED | OUTPATIENT
Start: 2019-05-26 | End: 2019-06-02

## 2019-05-26 RX ORDER — PREDNISONE 20 MG/1
40 TABLET ORAL ONCE
Qty: 2 TAB | Refills: 0 | Status: SHIPPED | COMMUNITY
Start: 2019-05-26 | End: 2019-05-26

## 2019-05-26 RX ORDER — AZITHROMYCIN 250 MG/1
TABLET, FILM COATED ORAL
Qty: 6 TAB | Refills: 0 | Status: SHIPPED | OUTPATIENT
Start: 2019-05-26 | End: 2019-06-04

## 2019-05-26 RX ORDER — PREDNISONE 5 MG/1
TABLET ORAL
Qty: 21 TAB | Refills: 0 | Status: SHIPPED | OUTPATIENT
Start: 2019-05-26 | End: 2019-06-04

## 2019-05-26 NOTE — PATIENT INSTRUCTIONS
Bronchitis: Care Instructions  Your Care Instructions    Bronchitis is inflammation of the bronchial tubes, which carry air to the lungs. The tubes swell and produce mucus, or phlegm. The mucus and inflamed bronchial tubes make you cough. You may have trouble breathing. Most cases of bronchitis are caused by viruses like those that cause colds. Antibiotics usually do not help and they may be harmful. Bronchitis usually develops rapidly and lasts about 2 to 3 weeks in otherwise healthy people. Follow-up care is a key part of your treatment and safety. Be sure to make and go to all appointments, and call your doctor if you are having problems. It's also a good idea to know your test results and keep a list of the medicines you take. How can you care for yourself at home? · Take all medicines exactly as prescribed. Call your doctor if you think you are having a problem with your medicine. · Get some extra rest.  · Take an over-the-counter pain medicine, such as acetaminophen (Tylenol), ibuprofen (Advil, Motrin), or naproxen (Aleve) to reduce fever and relieve body aches. Read and follow all instructions on the label. · Do not take two or more pain medicines at the same time unless the doctor told you to. Many pain medicines have acetaminophen, which is Tylenol. Too much acetaminophen (Tylenol) can be harmful. · Take an over-the-counter cough medicine that contains dextromethorphan to help quiet a dry, hacking cough so that you can sleep. Avoid cough medicines that have more than one active ingredient. Read and follow all instructions on the label. · Breathe moist air from a humidifier, hot shower, or sink filled with hot water. The heat and moisture will thin mucus so you can cough it out. · Do not smoke. Smoking can make bronchitis worse. If you need help quitting, talk to your doctor about stop-smoking programs and medicines. These can increase your chances of quitting for good.   When should you call for help? Call 911 anytime you think you may need emergency care. For example, call if:    · You have severe trouble breathing.    Call your doctor now or seek immediate medical care if:    · You have new or worse trouble breathing.     · You cough up dark brown or bloody mucus (sputum).     · You have a new or higher fever.     · You have a new rash.    Watch closely for changes in your health, and be sure to contact your doctor if:    · You cough more deeply or more often, especially if you notice more mucus or a change in the color of your mucus.     · You are not getting better as expected. Where can you learn more? Go to http://jarod-jared.info/. Enter H333 in the search box to learn more about \"Bronchitis: Care Instructions. \"  Current as of: September 5, 2018  Content Version: 11.9  © 3231-5295 [employer]. Care instructions adapted under license by [employer] (which disclaims liability or warranty for this information). If you have questions about a medical condition or this instruction, always ask your healthcare professional. [address] any warranty or liability for your use of this information.

## 2019-05-26 NOTE — PROGRESS NOTES
Cough   The history is provided by the caregiver. This is a new problem. The current episode started more than 2 days ago. The problem occurs every few minutes. The problem has not changed since onset. The cough is non-productive. There has been no fever. Pertinent negatives include no chest pain, no chills, no rhinorrhea, no sore throat, no shortness of breath and no wheezing. He has tried cough syrup for the symptoms. The treatment provided mild relief. He is not a smoker. His past medical history does not include asthma.         Past Medical History:   Diagnosis Date    Contact dermatitis and other eczema, due to unspecified cause     psoriasis    Diabetes ([facility] [address]     Eosinophilia     Hypercholesterolemia     Hypertension     Mental retardation     Seizures (HCC)     Skin cancer         Past Surgical History:   Procedure Laterality Date    ENDOSCOPY, COLON, DIAGNOSTIC  11/08/2007    Dr Tierra Phillips normal except small internal hemorrhoids repeat 11/2017         Family History   Problem Relation Age of Onset    Other Mother         PMR    Hypertension Mother     Cancer Father         Lung    Diabetes Brother         Social History     Socioeconomic History    Marital status: SINGLE     Spouse name: Not on file    Number of children: Not on file    Years of education: Not on file    Highest education level: Not on file   Occupational History     Comment: [employer]    Social Needs    Financial resource strain: Not on file    Food insecurity:     Worry: Not on file     Inability: Not on file   Shaka needs:     Medical: Not on file     Non-medical: Not on file   Tobacco Use    Smoking status: Never Smoker    Smokeless tobacco: Never Used   Substance and Sexual Activity    Alcohol use: No    Drug use: No    Sexual activity: Never   Lifestyle    Physical activity:     Days per week: Not on file     Minutes per session: Not on file    Stress: Not on file   Relationships    Social connections: Talks on phone: Not on file     Gets together: Not on file     Attends Mosque service: Not on file     Active member of club or organization: Not on file     Attends meetings of clubs or organizations: Not on file     Relationship status: Not on file    Intimate partner violence:     Fear of current or ex partner: Not on file     Emotionally abused: Not on file     Physically abused: Not on file     Forced sexual activity: Not on file   Other Topics Concern    Not on file   Social History Narrative    Not on file                ALLERGIES: Patient has no known allergies. Review of Systems   Constitutional: Negative for chills. HENT: Negative for rhinorrhea and sore throat. Respiratory: Positive for cough. Negative for shortness of breath and wheezing. Cardiovascular: Negative for chest pain. All other systems reviewed and are negative. Vitals:    05/26/19 1825   BP: 144/70   Pulse: 78   Resp: 18   Temp: 98.1 °F (36.7 °C)   SpO2: 98%   Weight: 198 lb (89.8 kg)   Height: 6' 2\" (1.88 m)       Physical Exam   Constitutional: No distress. HENT:   Right Ear: Tympanic membrane and ear canal normal.   Left Ear: Tympanic membrane and ear canal normal.   Nose: Nose normal.   Mouth/Throat: No oropharyngeal exudate, posterior oropharyngeal edema or posterior oropharyngeal erythema. Eyes: Conjunctivae are normal. Right eye exhibits no discharge. Left eye exhibits no discharge. Neck: Neck supple. Pulmonary/Chest: Effort normal. No respiratory distress. He has decreased breath sounds. He has no wheezes. He has no rhonchi. He has no rales. Lymphadenopathy:     He has no cervical adenopathy. Skin: No rash noted. Nursing note and vitals reviewed. MDM    Procedures      ICD-10-CM ICD-9-CM    1.  Acute bronchitis, unspecified organism J20.9 466.0      Medications Ordered Today   Medications    azithromycin (ZITHROMAX) 250 mg tablet     Sig: Take two tablets today then one tablet daily Dispense:  6 Tab     Refill:  0    benzonatate (TESSALON) 200 mg capsule     Sig: Take 1 Cap by mouth three (3) times daily as needed for Cough for up to 7 days. Dispense:  21 Cap     Refill:  0    predniSONE (STERAPRED) 5 mg dose pack     Sig: See administration instruction per 5mg dose pack     Dispense:  21 Tab     Refill:  0    predniSONE (DELTASONE) 20 mg tablet     Sig: Take 40 mg by mouth once for 1 dose. Dispense:  2 Tab     Refill:  0     No results found for any visits on 05/26/19. The patients condition was discussed with the patient and they understand. The patient is to follow up with primary care doctor. If signs and symptoms become worse the pt is to go to the ER. The patient is to take medications as prescribed.

## 2019-06-04 ENCOUNTER — OFFICE VISIT (OUTPATIENT)
Dept: INTERNAL MEDICINE CLINIC | Facility: CLINIC | Age: 64
End: 2019-06-04

## 2019-06-04 VITALS
DIASTOLIC BLOOD PRESSURE: 76 MMHG | WEIGHT: 196 LBS | HEIGHT: 74 IN | SYSTOLIC BLOOD PRESSURE: 139 MMHG | RESPIRATION RATE: 18 BRPM | TEMPERATURE: 97.5 F | HEART RATE: 74 BPM | OXYGEN SATURATION: 96 % | BODY MASS INDEX: 25.15 KG/M2

## 2019-06-04 DIAGNOSIS — J20.9 ACUTE BRONCHITIS, UNSPECIFIED ORGANISM: Primary | ICD-10-CM

## 2019-06-04 DIAGNOSIS — R05.9 COUGH: ICD-10-CM

## 2019-06-04 LAB
FLUAV+FLUBV AG NOSE QL IA.RAPID: NEGATIVE POS/NEG
FLUAV+FLUBV AG NOSE QL IA.RAPID: NEGATIVE POS/NEG
VALID INTERNAL CONTROL?: YES

## 2019-06-04 RX ORDER — BENZONATATE 100 MG/1
100 CAPSULE ORAL
Qty: 21 CAP | Refills: 0 | Status: SHIPPED | OUTPATIENT
Start: 2019-06-04 | End: 2019-06-11

## 2019-06-04 NOTE — PATIENT INSTRUCTIONS
Cough: Care Instructions  Your Care Instructions    A cough is your body's response to something that bothers your throat or airways. Many things can cause a cough. You might cough because of a cold or the flu, bronchitis, or asthma. Smoking, postnasal drip, allergies, and stomach acid that backs up into your throat also can cause coughs. A cough is a symptom, not a disease. Most coughs stop when the cause, such as a cold, goes away. You can take a few steps at home to cough less and feel better. Follow-up care is a key part of your treatment and safety. Be sure to make and go to all appointments, and call your doctor if you are having problems. It's also a good idea to know your test results and keep a list of the medicines you take. How can you care for yourself at home? · Drink lots of water and other fluids. This helps thin the mucus and soothes a dry or sore throat. Honey or lemon juice in hot water or tea may ease a dry cough. · Take cough medicine as directed by your doctor. · Prop up your head on pillows to help you breathe and ease a dry cough. · Try cough drops to soothe a dry or sore throat. Cough drops don't stop a cough. Medicine-flavored cough drops are no better than candy-flavored drops or hard candy. · Do not smoke. Avoid secondhand smoke. If you need help quitting, talk to your doctor about stop-smoking programs and medicines. These can increase your chances of quitting for good. When should you call for help? Call 911 anytime you think you may need emergency care.  For example, call if:    · You have severe trouble breathing.    Call your doctor now or seek immediate medical care if:    · You cough up blood.     · You have new or worse trouble breathing.     · You have a new or higher fever.     · You have a new rash.    Watch closely for changes in your health, and be sure to contact your doctor if:    · You cough more deeply or more often, especially if you notice more mucus or a change in the color of your mucus.     · You have new symptoms, such as a sore throat, an earache, or sinus pain.     · You do not get better as expected. Where can you learn more? Go to http://jarod-jared.info/. Enter D279 in the search box to learn more about \"Cough: Care Instructions. \"  Current as of: September 5, 2018  Content Version: 11.9  © 7335-8282 [employer]. Care instructions adapted under license by [employer] (which disclaims liability or warranty for this information). If you have questions about a medical condition or this instruction, always ask your healthcare professional. [address] any warranty or liability for your use of this information.

## 2019-06-04 NOTE — PROGRESS NOTES
CC:   Chief Complaint   Patient presents with    Cough     follow up on bronchitis       HISTORY OF PRESENT ILLNESS  Manuel Marcus is a 59 y.o. male. Accompanied by his mother. Presents for Urgent Care follow up visit. He has type 2 DM, HTN, hyperlipidemia, and intellectual disability. Was seen at [facility] on 5/26/19 with 2 day history of non-productive cough; no labs or CXR done, he was diagnosed with acute bronchitis and discharged on Azithromycin for 5 days, prednisone pack, and Tessalon. Today his mother reports that he has completed all the medications and his cough has been improving but is still present. He also has mild sore throat and fatigue. Denies fevers, chills, headaches, SOB, wheezing, or chest pain. She has been giving him an OTC cold medicine. His brothers want to make sure he is not contagious. Last year he was hospitalized for almost 3 weeks with influenza. 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.9    Eosinophilia D72.1    Hypercholesterolemia E78.00    Uncontrolled type 2 diabetes mellitus without complication, without long-term current use of insulin (HCC) E11.65     Past Medical History:   Diagnosis Date    Contact dermatitis and other eczema, due to unspecified cause     psoriasis    Diabetes ([facility] [address]     Eosinophilia     Hypercholesterolemia     Hypertension     Mental retardation     Seizures (HCC)     Skin cancer      No Known Allergies    Current Outpatient Medications   Medication Sig Dispense Refill    triamcinolone acetonide (KENALOG) 0.1 % ointment       halobetasol (ULTRAVATE) 0.05 % ointment       pioglitazone (ACTOS) 30 mg tablet Take 1 Tab by mouth daily. Indications: type 2 diabetes mellitus 90 Tab 3    folic acid (FOLVITE) 1 mg tablet TAKE ONE TABLET BY MOUTH ONE TIME DAILY  90 Tab 1    topiramate (TOPAMAX) 200 mg tablet TAKE ONE AND ONE-HALF TABLET BY MOUTH TWICE DAILY 270 Tab 2    ramipril (ALTACE) 5 mg capsule TAKE ONE CAPSULE BY MOUTH ONE TIME DAILY  90 Cap 3    linagliptin (TRADJENTA) 5 mg tablet Take 1 Tab by mouth daily. 90 Tab 3    glimepiride (AMARYL) 4 mg tablet TAKE TWO TABLETS BY MOUTH EVERY MORNING  180 Tab 2    divalproex DR (DEPAKOTE) 500 mg tablet TAKE ONE TABLET IN THE MORNING AND TWO TABLETS AT NIGHT 270 Tab 2    atorvastatin (LIPITOR) 40 mg tablet take one tablet by mouth at bedtime 90 Tab 3    metFORMIN ER (GLUCOPHAGE XR) 500 mg tablet Take 2 Tabs by mouth two (2) times a day. 360 Tab 3    chlorhexidine (PERIDEX) 0.12 % solution       Calcium-Cholecalciferol, D3, (CALTRATE-600 PLUS VITAMIN D3) 600-400 mg-unit Tab Take  by mouth. PHYSICAL EXAM  Visit Vitals  /76 (BP 1 Location: Left arm, BP Patient Position: Sitting)   Pulse 74   Temp 97.5 °F (36.4 °C) (Oral)   Resp 18   Ht 6' 2\" (1.88 m)   Wt 196 lb (88.9 kg)   SpO2 96%   BMI 25.16 kg/m²       General: Well-developed and well-nourished, no distress. Coughs occasionally. HEENT:  Head normocephalic/atraumatic, no scleral icterus or conjunctival injection. Nasal mucosa normal. Oropharynx benign. No sinus tenderness. TM's normal bilaterally, much cerumen at both ear canals. Neck: Supple. No lymphadenopathy. Lungs:  Clear to ausculation bilaterally. Good air movement. No accessory respiratory muscle use. Heart:  Regular rate and rhythm, normal S1 and S2, no murmur, gallop, or rub  Psychiatric: Normal mood and affect.  Behavior is normal.       Results for orders placed or performed in visit on 06/04/19   AMB POC DAYANA INFLUENZA A/B TEST   Result Value Ref Range    VALID INTERNAL CONTROL POC Yes     Influenza A Ag POC Negative Negative Pos/Neg    Influenza B Ag POC Negative Negative Pos/Neg         ASSESSMENT AND PLAN    ICD-10-CM ICD-9-CM    1. Acute bronchitis, unspecified organism J20.9 466.0 XR CHEST PA LAT   2. Cough R05 786.2 AMB POC DAYANA INFLUENZA A/B TEST      benzonatate (TESSALON) 100 mg capsule     Diagnoses and all orders for this visit:    1. Acute bronchitis, unspecified organism  Lungs clear on exam but should rule out pneumonia or edema. -     XR CHEST PA LAT; Future    2. Cough  Negative rapid flu test.  -     AMB POC DAYANA INFLUENZA A/B TEST  -     Refill benzonatate (TESSALON) 100 mg capsule; Take 1 Cap by mouth three (3) times daily as needed for Cough for up to 7 days. Follow-up and Dispositions    · Return if symptoms worsen or fail to improve, for Scheduled appointment with Dr. Juanis Caban on 8/1/19. I have discussed the diagnosis with the patient and the intended plan as seen in the above orders. Patient is in agreement. The patient has received an after-visit summary and questions were answered concerning future plans. I have discussed medication side effects and warnings with the patient as well.

## 2019-06-04 NOTE — PROGRESS NOTES
Roberta Mackenzie  Identified pt with two pt identifiers(name and ). Chief Complaint   Patient presents with    Cough     follow up on bronchitis       1. Have you been to the ER, urgent care clinic since your last visit? Hospitalized since your last visit? NO    2. Have you seen or consulted any other health care providers outside of the [address] [place] [address] since your last visit? Include any pap smears or colon screening. NO    Today's provider has been notified of reason for visit, vitals and flowsheets obtained on patients. Advanced directives on file. Reviewed record In preparation for visit, huddled with provider and have obtained necessary documentation      There are no preventive care reminders to display for this patient.     Wt Readings from Last 3 Encounters:   19 196 lb (88.9 kg)   19 198 lb (89.8 kg)   19 201 lb (91.2 kg)     Temp Readings from Last 3 Encounters:   19 97.5 °F (36.4 °C) (Oral)   19 98.1 °F (36.7 °C)   19 97.6 °F (36.4 °C) (Oral)     BP Readings from Last 3 Encounters:   19 139/76   19 144/70   19 126/68     Pulse Readings from Last 3 Encounters:   19 74   19 78   19 75     Vitals:    19 1320   BP: 139/76   Pulse: 74   Resp: 18   Temp: 97.5 °F (36.4 °C)   TempSrc: Oral   SpO2: 96%   Weight: 196 lb (88.9 kg)   Height: 6' 2\" (1.88 m)   PainSc:   0 - No pain         Learning Assessment:  :     Learning Assessment 2014   PRIMARY LEARNER Patient   HIGHEST LEVEL OF EDUCATION - PRIMARY LEARNER  DID NOT GRADUATE HIGH SCHOOL   BARRIERS PRIMARY LEARNER COGNITIVE   CO-LEARNER CAREGIVER Yes   CO-LEARNER NAME Jessica Foster   PRIMARY LANGUAGE ENGLISH   LEARNER PREFERENCE PRIMARY DEMONSTRATION     READING   ANSWERED BY mother   RELATIONSHIP LEGAL GUARDIAN       Depression Screening:  :     3 most recent PHQ Screens 2019   Little interest or pleasure in doing things Not at all   Feeling down, depressed, irritable, or hopeless Not at all   Total Score PHQ 2 0       Fall Risk Assessment:  :     No flowsheet data found. Abuse Screening:  :     Abuse Screening Questionnaire 6/4/2019 10/16/2018 2/16/2017   Do you ever feel afraid of your partner? N N N   Are you in a relationship with someone who physically or mentally threatens you? N N N   Is it safe for you to go home? Y Y Y       ADL Screening:  :     ADL Assessment 4/16/2015   Feeding yourself No Help Needed   Getting from bed to chair No Help Needed   Getting dressed No Help Needed   Bathing or showering No Help Needed   Walk across the room (includes cane/walker) No Help Needed   Using the telphone No Help Needed   Taking your medications Help Needed   Preparing meals Help Needed   Managing money (expenses/bills) Help Needed   Moderately strenuous housework (laundry) No Help Needed   Shopping for personal items (toiletries/medicines) No Help Needed   Shopping for groceries No Help Needed   Driving Help Needed   Climbing a flight of stairs No Help Needed   Getting to places beyond walking distances Help Needed                 Medication reconciliation up to date and corrected with patient at this time.

## 2019-06-06 ENCOUNTER — TELEPHONE (OUTPATIENT)
Dept: INTERNAL MEDICINE CLINIC | Facility: CLINIC | Age: 64
End: 2019-06-06

## 2019-06-06 NOTE — LETTER
6/6/2019 3:21 PM 
 
Mr. Yamel Koch [facility] [address] [address] Mr Kaye Ferrell may return to adult day program effective immediately.   
 
 
 
 
Sincerely, 
 
 
 
Marie Huynh MD

## 2019-06-06 NOTE — TELEPHONE ENCOUNTER
Mother called back to advise called the facility and they advised the letter for him to return may be faxed to them at [phone]

## 2019-07-07 DIAGNOSIS — G40.909 SEIZURE DISORDER (HCC): ICD-10-CM

## 2019-07-07 RX ORDER — DIVALPROEX SODIUM 500 MG/1
TABLET, DELAYED RELEASE ORAL
Qty: 270 TAB | Refills: 1 | Status: SHIPPED | OUTPATIENT
Start: 2019-07-07 | End: 2020-01-19

## 2019-07-08 DIAGNOSIS — E11.9 CONTROLLED TYPE 2 DIABETES MELLITUS WITHOUT COMPLICATION, WITHOUT LONG-TERM CURRENT USE OF INSULIN (HCC): ICD-10-CM

## 2019-07-08 RX ORDER — GLIMEPIRIDE 4 MG/1
TABLET ORAL
Qty: 180 TAB | Refills: 2 | Status: SHIPPED | OUTPATIENT
Start: 2019-07-08 | End: 2019-08-27 | Stop reason: SDUPTHER

## 2019-07-08 NOTE — TELEPHONE ENCOUNTER
PCP: Jaziel Grande MD     Last appt: 6/6/2019   Future Appointments   Date Time Provider Kelly Fang   8/1/2019 10:00 AM Nando Scott  [facility]        Requested Prescriptions     Pending Prescriptions Disp Refills    glimepiride (AMARYL) 4 mg tablet 180 Tab 2     Sig: TAKE TWO TABLETS BY MOUTH EVERY MORNING

## 2019-07-08 NOTE — TELEPHONE ENCOUNTER
Refill     Requested Prescriptions     Pending Prescriptions Disp Refills    glimepiride (AMARYL) 4 mg tablet 180 Tab 2

## 2019-08-01 ENCOUNTER — OFFICE VISIT (OUTPATIENT)
Dept: INTERNAL MEDICINE CLINIC | Facility: CLINIC | Age: 64
End: 2019-08-01

## 2019-08-01 VITALS
HEART RATE: 74 BPM | BODY MASS INDEX: 25.15 KG/M2 | SYSTOLIC BLOOD PRESSURE: 128 MMHG | WEIGHT: 196 LBS | DIASTOLIC BLOOD PRESSURE: 81 MMHG | HEIGHT: 74 IN | RESPIRATION RATE: 12 BRPM | OXYGEN SATURATION: 97 % | TEMPERATURE: 97.8 F

## 2019-08-01 DIAGNOSIS — I10 HYPERTENSION, ESSENTIAL: ICD-10-CM

## 2019-08-01 DIAGNOSIS — G40.909 SEIZURE DISORDER (HCC): ICD-10-CM

## 2019-08-01 DIAGNOSIS — M25.512 ACUTE PAIN OF LEFT SHOULDER: ICD-10-CM

## 2019-08-01 DIAGNOSIS — E78.00 HYPERCHOLESTEROLEMIA: ICD-10-CM

## 2019-08-01 LAB — HBA1C MFR BLD HPLC: 8.1 %

## 2019-08-01 RX ORDER — PIOGLITAZONEHYDROCHLORIDE 45 MG/1
45 TABLET ORAL DAILY
Qty: 90 TAB | Refills: 3 | Status: SHIPPED | OUTPATIENT
Start: 2019-08-01 | End: 2020-07-08

## 2019-08-01 NOTE — PROGRESS NOTES
This is the Subsequent Medicare Annual Wellness Exam, performed 12 months or more after the Initial AWV or the last Subsequent AWV I have reviewed the patient's medical history in detail and updated the computerized patient record. History Past Medical History:  
Diagnosis Date  Contact dermatitis and other eczema, due to unspecified cause   
 psoriasis  Diabetes ([facility] [address]  Eosinophilia  Hypercholesterolemia  Hypertension  Mental retardation  Seizures ([facility] [address]  Skin cancer Past Surgical History:  
Procedure Laterality Date  ENDOSCOPY, COLON, DIAGNOSTIC  11/08/2007 Dr Jazzmine Prince normal except small internal hemorrhoids repeat 11/2017 Current Outpatient Medications Medication Sig Dispense Refill  folic acid (FOLVITE) 1 mg tablet TAKE ONE TABLET BY MOUTH ONE TIME DAILY  90 Tab 3  
 glimepiride (AMARYL) 4 mg tablet TAKE TWO TABLETS BY MOUTH EVERY MORNING 180 Tab 2  
 divalproex DR (DEPAKOTE) 500 mg tablet TAKE ONE TABLET BY MOUTH EVERY MORNING AND TWO TABLETS AT NIGHT 270 Tab 1  
 triamcinolone acetonide (KENALOG) 0.1 % ointment  pioglitazone (ACTOS) 30 mg tablet Take 1 Tab by mouth daily. Indications: type 2 diabetes mellitus 90 Tab 3  
 topiramate (TOPAMAX) 200 mg tablet TAKE ONE AND ONE-HALF TABLET BY MOUTH TWICE DAILY 270 Tab 2  
 ramipril (ALTACE) 5 mg capsule TAKE ONE CAPSULE BY MOUTH ONE TIME DAILY  90 Cap 3  
 linagliptin (TRADJENTA) 5 mg tablet Take 1 Tab by mouth daily. 90 Tab 3  
 atorvastatin (LIPITOR) 40 mg tablet take one tablet by mouth at bedtime 90 Tab 3  
 metFORMIN ER (GLUCOPHAGE XR) 500 mg tablet Take 2 Tabs by mouth two (2) times a day. 360 Tab 3  chlorhexidine (PERIDEX) 0.12 % solution  Calcium-Cholecalciferol, D3, (CALTRATE-600 PLUS VITAMIN D3) 600-400 mg-unit Tab Take  by mouth. No Known Allergies Family History Problem Relation Age of Onset Priscilla Moody Other Mother PMR  Hypertension Mother  Cancer Father Lung  
 Diabetes Brother Social History Tobacco Use  Smoking status: Never Smoker  Smokeless tobacco: Never Used Substance Use Topics  Alcohol use: No  
 
Patient Active Problem List  
Diagnosis Code  Intellectual disability F68  
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  
 Uncontrolled type 2 diabetes mellitus without complication, without long-term current use of insulin (HCC) E11.65 Depression Risk Factor Screening:  
 
3 most recent PHQ Screens 2019 Little interest or pleasure in doing things Not at all Feeling down, depressed, irritable, or hopeless Not at all Total Score PHQ 2 0 Alcohol Risk Factor Screening:  
{screenin} Functional Ability and Level of Safety:  
Hearing Loss {Desc; hearing loss:56115::\"Hearing is good. \"} Activities of Daily Living The home contains: {AWV Home SMYRD::\"YM safety equipment. \"} 
{Functional ADL's:16563::\"Patient does total self care\"} Fall Risk No flowsheet data found. Abuse Screen 
{Abuse Screen:::\"Patient is not abused\"} Cognitive Screening Evaluation of Cognitive Function: 
Has your family/caregiver stated any concerns about your memory: {AWV no/yes:71210::\"no\"} {Cognitive Screenin::\"Normal\"} Patient Care Team  
Patient Care Team: 
James Garzon MD as PCP - General (Internal Medicine) Oral Lopez OD (Optometry) Libra Kumar RN as Ambulatory Care Navigator (Internal Medicine) Assessment/Plan Education and counseling provided: 
{Education List, choose as appropriate:::\"Are appropriate based on today's review and evaluation\"} Diagnoses and all orders for this visit: 
 
 1. Medicare annual wellness visit, subsequent 2. Advance directive discussed with patient 3. Uncontrolled type 2 diabetes mellitus without complication, without long-term current use of insulin ([facility] [address] -     MICROALBUMIN, UR, RAND W/ MICROALB/CREAT RATIO 
-     AMB POC HEMOGLOBIN A1C 
 
4. Hypertension, essential 
 
5. Hypercholesterolemia 6. Seizure disorder ([facility] [address] Health Maintenance Due Topic Date Due  
 MICROALBUMIN Q1  07/10/2019  MEDICARE YEARLY EXAM  07/11/2019

## 2019-08-01 NOTE — PATIENT INSTRUCTIONS
Increase pioglitazone to 45 mg daily   Office visit in 3 months with hemoglobin A1c at that visit. Learning About Diabetes Food Guidelines  Your Care Instructions    Meal planning is important to manage diabetes. It helps keep your blood sugar at a target level (which you set with your doctor). You don't have to eat special foods. You can eat what your family eats, including sweets once in a while. But you do have to pay attention to how often you eat and how much you eat of certain foods. You may want to work with a dietitian or a certified diabetes educator (CDE) to help you plan meals and snacks. A dietitian or CDE can also help you lose weight if that is one of your goals. What should you know about eating carbs? Managing the amount of carbohydrate (carbs) you eat is an important part of healthy meals when you have diabetes. Carbohydrate is found in many foods. · Learn which foods have carbs. And learn the amounts of carbs in different foods. ? Bread, cereal, pasta, and rice have about 15 grams of carbs in a serving. A serving is 1 slice of bread (1 ounce), ½ cup of cooked cereal, or 1/3 cup of cooked pasta or rice. ? Fruits have 15 grams of carbs in a serving. A serving is 1 small fresh fruit, such as an apple or orange; ½ of a banana; ½ cup of cooked or canned fruit; ½ cup of fruit juice; 1 cup of melon or raspberries; or 2 tablespoons of dried fruit. ? Milk and no-sugar-added yogurt have 15 grams of carbs in a serving. A serving is 1 cup of milk or 2/3 cup of no-sugar-added yogurt. ? Starchy vegetables have 15 grams of carbs in a serving. A serving is ½ cup of mashed potatoes or sweet potato; 1 cup winter squash; ½ of a small baked potato; ½ cup of cooked beans; or ½ cup cooked corn or green peas. · Learn how much carbs to eat each day and at each meal. A dietitian or CDE can teach you how to keep track of the amount of carbs you eat. This is called carbohydrate counting.   · If you are not sure how to count carbohydrate grams, use the Plate Method to plan meals. It is a good, quick way to make sure that you have a balanced meal. It also helps you spread carbs throughout the day. ? Divide your plate by types of foods. Put non-starchy vegetables on half the plate, meat or other protein food on one-quarter of the plate, and a grain or starchy vegetable in the final quarter of the plate. To this you can add a small piece of fruit and 1 cup of milk or yogurt, depending on how many carbs you are supposed to eat at a meal.  · Try to eat about the same amount of carbs at each meal. Do not \"save up\" your daily allowance of carbs to eat at one meal.  · Proteins have very little or no carbs per serving. Examples of proteins are beef, chicken, turkey, fish, eggs, tofu, cheese, cottage cheese, and peanut butter. A serving size of meat is 3 ounces, which is about the size of a deck of cards. Examples of meat substitute serving sizes (equal to 1 ounce of meat) are 1/4 cup of cottage cheese, 1 egg, 1 tablespoon of peanut butter, and ½ cup of tofu. How can you eat out and still eat healthy? · Learn to estimate the serving sizes of foods that have carbohydrate. If you measure food at home, it will be easier to estimate the amount in a serving of restaurant food. · If the meal you order has too much carbohydrate (such as potatoes, corn, or baked beans), ask to have a low-carbohydrate food instead. Ask for a salad or green vegetables. · If you use insulin, check your blood sugar before and after eating out to help you plan how much to eat in the future. · If you eat more carbohydrate at a meal than you had planned, take a walk or do other exercise. This will help lower your blood sugar. What else should you know? · Limit saturated fat, such as the fat from meat and dairy products. This is a healthy choice because people who have diabetes are at higher risk of heart disease.  So choose lean cuts of meat and nonfat or low-fat dairy products. Use olive or canola oil instead of butter or shortening when cooking. · Don't skip meals. Your blood sugar may drop too low if you skip meals and take insulin or certain medicines for diabetes. · Check with your doctor before you drink alcohol. Alcohol can cause your blood sugar to drop too low. Alcohol can also cause a bad reaction if you take certain diabetes medicines. Follow-up care is a key part of your treatment and safety. Be sure to make and go to all appointments, and call your doctor if you are having problems. It's also a good idea to know your test results and keep a list of the medicines you take. Where can you learn more? Go to http://jarod-jared.info/. Enter X207 in the search box to learn more about \"Learning About Diabetes Food Guidelines. \"  Current as of: July 25, 2018  Content Version: 12.1  © 2464-4450 [employer]. Care instructions adapted under license by [employer] (which disclaims liability or warranty for this information). If you have questions about a medical condition or this instruction, always ask your healthcare professional. [place] [address] any warranty or liability for your use of this information.

## 2019-08-01 NOTE — PROGRESS NOTES
Thurmon Brunner  Identified pt with two pt identifiers(name and ). Chief Complaint   Patient presents with    Diabetes    Hypertension    Cholesterol Problem       Reviewed record In preparation for visit and have obtained necessary documentation. Advanced directive / living will on file. 1. Have you been to the ER, urgent care clinic or hospitalized since your last visit? No     2. Have you seen or consulted any other health care providers outside of the [address] [place] [address] since your last visit? Include any pap smears or colon screening. No    Vitals reviewed with provider.     Health Maintenance reviewed:     Health Maintenance Due   Topic    MICROALBUMIN Q1     MEDICARE YEARLY EXAM           Wt Readings from Last 3 Encounters:   19 196 lb (88.9 kg)   19 196 lb (88.9 kg)   19 198 lb (89.8 kg)        Temp Readings from Last 3 Encounters:   19 97.8 °F (36.6 °C) (Oral)   19 97.5 °F (36.4 °C) (Oral)   19 98.1 °F (36.7 °C)        BP Readings from Last 3 Encounters:   19 128/81   19 139/76   19 144/70        Pulse Readings from Last 3 Encounters:   19 74   19 74   19 78        Vitals:    19 1015   BP: 128/81   Pulse: 74   Resp: 12   Temp: 97.8 °F (36.6 °C)   TempSrc: Oral   SpO2: 97%   Weight: 196 lb (88.9 kg)   Height: 6' 2\" (1.88 m)   PainSc:   4   PainLoc: Arm          Learning Assessment:   :       Learning Assessment 2014   PRIMARY LEARNER Patient   HIGHEST LEVEL OF EDUCATION - PRIMARY LEARNER  DID NOT GRADUATE HIGH SCHOOL   BARRIERS PRIMARY LEARNER COGNITIVE   CO-LEARNER CAREGIVER Yes   CO-LEARNER NAME Lalit Redd   PRIMARY LANGUAGE ENGLISH   LEARNER PREFERENCE PRIMARY DEMONSTRATION     READING   ANSWERED BY mother   RELATIONSHIP LEGAL GUARDIAN        Depression Screening:   :       3 most recent PHQ Screens 2019   Little interest or pleasure in doing things Not at all   Feeling down, depressed, irritable, or hopeless Not at all   Total Score PHQ 2 0        Fall Risk Assessment:   :     No flowsheet data found. Abuse Screening:   :       Abuse Screening Questionnaire 6/4/2019 10/16/2018 2/16/2017   Do you ever feel afraid of your partner? N N N   Are you in a relationship with someone who physically or mentally threatens you? N N N   Is it safe for you to go home?  Y Y Y        ADL Screening:   :       ADL Assessment 4/16/2015   Feeding yourself No Help Needed   Getting from bed to chair No Help Needed   Getting dressed No Help Needed   Bathing or showering No Help Needed   Walk across the room (includes cane/walker) No Help Needed   Using the telphone No Help Needed   Taking your medications Help Needed   Preparing meals Help Needed   Managing money (expenses/bills) Help Needed   Moderately strenuous housework (laundry) No Help Needed   Shopping for personal items (toiletries/medicines) No Help Needed   Shopping for groceries No Help Needed   Driving Help Needed   Climbing a flight of stairs No Help Needed   Getting to places beyond walking distances Help Needed

## 2019-08-02 LAB
ALBUMIN/CREAT UR: 4.5 MG/G CREAT (ref 0–30)
CREAT UR-MCNC: 163.8 MG/DL
MICROALBUMIN UR-MCNC: 7.3 UG/ML

## 2019-08-12 ENCOUNTER — TELEPHONE (OUTPATIENT)
Dept: INTERNAL MEDICINE CLINIC | Facility: CLINIC | Age: 64
End: 2019-08-12

## 2019-08-12 NOTE — TELEPHONE ENCOUNTER
Offered fist available next Friday 8/23/19, he will be in a day program then. Mother would like something sooner. She will check with other places and we will let her know if we have a cancellation.

## 2019-08-12 NOTE — TELEPHONE ENCOUNTER
----- Message from Mychal Samson sent at 8/12/2019  8:42 AM EDT -----  Regarding: Dr. Derick Harmon first and last name: Monae Nicole      Reason for call: Pt's mother would like a call back from Liliana Marrero to schedule a Cortizone shot in pt's shoulder.       Callback required yes/no and why: Yes       Best contact number(s): [phone]      Details to clarify the request:      Mychal Samson

## 2019-08-13 ENCOUNTER — OFFICE VISIT (OUTPATIENT)
Dept: INTERNAL MEDICINE CLINIC | Facility: CLINIC | Age: 64
End: 2019-08-13

## 2019-08-13 VITALS
BODY MASS INDEX: 25.35 KG/M2 | OXYGEN SATURATION: 98 % | TEMPERATURE: 97.9 F | HEART RATE: 69 BPM | DIASTOLIC BLOOD PRESSURE: 82 MMHG | RESPIRATION RATE: 12 BRPM | HEIGHT: 74 IN | SYSTOLIC BLOOD PRESSURE: 134 MMHG | WEIGHT: 197.5 LBS

## 2019-08-13 DIAGNOSIS — M75.42 IMPINGEMENT SYNDROME OF SHOULDER, LEFT: ICD-10-CM

## 2019-08-13 DIAGNOSIS — Z00.00 MEDICARE ANNUAL WELLNESS VISIT, SUBSEQUENT: Primary | ICD-10-CM

## 2019-08-13 DIAGNOSIS — Z71.89 ADVANCE DIRECTIVE DISCUSSED WITH PATIENT: ICD-10-CM

## 2019-08-13 RX ORDER — LIDOCAINE HYDROCHLORIDE 10 MG/ML
1 INJECTION INFILTRATION; PERINEURAL ONCE
Qty: 1 VIAL | Refills: 0
Start: 2019-08-13 | End: 2019-08-13

## 2019-08-13 RX ORDER — TRIAMCINOLONE ACETONIDE 40 MG/ML
40 INJECTION, SUSPENSION INTRA-ARTICULAR; INTRAMUSCULAR ONCE
Qty: 1 ML | Refills: 0
Start: 2019-08-13 | End: 2019-08-13

## 2019-08-13 NOTE — PROGRESS NOTES
HISTORY OF PRESENT ILLNESS  Chetna Gonzalez is a 59 y.o. male. HPI   He presents for injection of left shoulder for impingement vs rotator cuff tear. Patient Active Problem List   Diagnosis Code    Intellectual disability F68    Seizure disorder (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 [address]     Eosinophilia     Hypercholesterolemia     Hypertension     Mental retardation     Seizures ([facility] [address]     Skin cancer      Past Surgical History:   Procedure Laterality Date    ENDOSCOPY, COLON, DIAGNOSTIC  11/08/2007    Dr Marlen Golden normal except small internal hemorrhoids repeat 11/2017     Social History     Socioeconomic History    Marital status: SINGLE     Spouse name: Not on file    Number of children: Not on file    Years of education: Not on file    Highest education level: Not on file   Occupational History     Comment: [employer]    Tobacco Use    Smoking status: Never Smoker    Smokeless tobacco: Never Used   Substance and Sexual Activity    Alcohol use: No    Drug use: No    Sexual activity: Never     Family History   Problem Relation Age of Onset    Other Mother         PMR    Hypertension Mother     Cancer Father         Lung    Diabetes Brother      No Known Allergies  Current Outpatient Medications   Medication Sig Dispense Refill    pioglitazone (ACTOS) 45 mg tablet Take 1 Tab by mouth daily.  Indications: type 2 diabetes mellitus 90 Tab 3    folic acid (FOLVITE) 1 mg tablet TAKE ONE TABLET BY MOUTH ONE TIME DAILY  90 Tab 3    glimepiride (AMARYL) 4 mg tablet TAKE TWO TABLETS BY MOUTH EVERY MORNING 180 Tab 2    divalproex DR (DEPAKOTE) 500 mg tablet TAKE ONE TABLET BY MOUTH EVERY MORNING AND TWO TABLETS AT NIGHT 270 Tab 1    triamcinolone acetonide (KENALOG) 0.1 % ointment       topiramate (TOPAMAX) 200 mg tablet TAKE ONE AND ONE-HALF TABLET BY MOUTH TWICE DAILY 270 Tab 2    ramipril (ALTACE) 5 mg capsule TAKE ONE CAPSULE BY MOUTH ONE TIME DAILY  90 Cap 3    linagliptin (TRADJENTA) 5 mg tablet Take 1 Tab by mouth daily. 90 Tab 3    atorvastatin (LIPITOR) 40 mg tablet take one tablet by mouth at bedtime 90 Tab 3    metFORMIN ER (GLUCOPHAGE XR) 500 mg tablet Take 2 Tabs by mouth two (2) times a day. 360 Tab 3    chlorhexidine (PERIDEX) 0.12 % solution       Calcium-Cholecalciferol, D3, (CALTRATE-600 PLUS VITAMIN D3) 600-400 mg-unit Tab Take  by mouth. ROS  Visit Vitals  /82 (BP 1 Location: Left arm, BP Patient Position: Sitting)   Pulse 69   Temp 97.9 °F (36.6 °C) (Oral)   Resp 12   Ht 6' 2\" (1.88 m)   Wt 197 lb 8 oz (89.6 kg)   SpO2 98%   BMI 25.36 kg/m²     Physical Exam   Musculoskeletal:        Left shoulder: He exhibits decreased range of motion (limitied to 60 deg abduction ) and pain. He exhibits no tenderness, no bony tenderness and no swelling. Nursing note and vitals reviewed.     [facility] INTERNAL MEDICINE [facility]  OFFICE PROCEDURE PROGRESS NOTE        Chart reviewed for the following:   IGrecia MD, have reviewed the History, Physical and updated the Allergic reactions for [address] performed immediately prior to start of procedure:   Vero Ding MD, have performed the following reviews on Edna Gomez prior to the start of the procedure:            * Patient was identified by name and date of birth   * Agreement on procedure being performed was verified  * Risks and Benefits explained to the patient  * Procedure site verified and marked as necessary  * Patient was positioned for comfort  * Consent was signed and verified     Time: 0237      Date of procedure: 8/13/2019    Procedure performed by:  Emily Coyle MD    Provider assisted by: Pito Sue LPN    Patient assisted by: self    How tolerated by patient: tolerated the procedure well with no complications    Post Procedural Pain Scale: 0 - No Hurt    Comments: none        Indications:   impingement and probable rotator cuff tear; not surgical candidiate. Procedure:  After consent was obtained, using sterile technique the left shoulder joint was prepped using Betadine. Kenalog 40 mg was mixed with 1% lidocaine 1 ml  and injected into the subacromial space and the needle withdrawn. The procedure was well tolerated. The patient is asked to continue to rest the joint for a few more days before resuming regular activities. It may be more painful for the first 1-2 days. Watch for fever, or increased swelling or persistent pain in the joint. Call or return to clinic prn if such symptoms occur or there is failure to improve as anticipated. ASSESSMENT and PLAN    ICD-10-CM ICD-9-CM    1. Medicare annual wellness visit, subsequent Z00.00 V70.0    2. Advance directive discussed with patient Z71.89 V65.49    3. Impingement syndrome of shoulder, left M75.42 726.2 TRIAMCINOLONE ACETONIDE INJ      triamcinolone acetonide (KENALOG) 40 mg/mL injection      lidocaine (XYLOCAINE) 10 mg/mL (1 %) injection      PA DRAIN/INJECT LARGE JOINT/BURSA     Diagnoses and all orders for this visit:    1. Medicare annual wellness visit, subsequent    2. Advance directive discussed with patient    3. Impingement syndrome of shoulder, left  -     TRIAMCINOLONE ACETONIDE INJ  -     triamcinolone acetonide (KENALOG) 40 mg/mL injection; 1 mL by IntraBURSal route once for 1 dose.   -     lidocaine (XYLOCAINE) 10 mg/mL (1 %) injection; 1 mL by IntraBURSal route once for 1 dose.  -     SD DRAIN/INJECT LARGE JOINT/BURSA      Follow-up and Dispositions    · Return if symptoms worsen or fail to improve. I have discussed the diagnosis, evaluation and treatment options and the intended plan with the patient. Patient understands and is in agreement. The patient has received an after-visit summary and questions were answered concerning future plans. I have discussed side effects and warnings of any new medications with the patient as well.

## 2019-08-13 NOTE — TELEPHONE ENCOUNTER
Mother informed cancellation this morning, will come then. Pt has no further questions at this time.

## 2019-08-13 NOTE — PROGRESS NOTES
This is the Subsequent Medicare Annual Wellness Exam, performed 12 months or more after the Initial AWV or the last Subsequent AWV    I have reviewed the patient's medical history in detail and updated the computerized patient record. History     Past Medical History:   Diagnosis Date    Contact dermatitis and other eczema, due to unspecified cause     psoriasis    Diabetes ([facility] [address]     Eosinophilia     Hypercholesterolemia     Hypertension     Mental retardation     Seizures ([facility] [address]     Skin cancer       Past Surgical History:   Procedure Laterality Date    ENDOSCOPY, COLON, DIAGNOSTIC  11/08/2007    Dr Roberto Ricardo normal except small internal hemorrhoids repeat 11/2017     Current Outpatient Medications   Medication Sig Dispense Refill    triamcinolone acetonide (KENALOG) 40 mg/mL injection 1 mL by IntraBURSal route once for 1 dose. 1 mL 0    lidocaine (XYLOCAINE) 10 mg/mL (1 %) injection 1 mL by IntraBURSal route once for 1 dose. 1 Vial 0    pioglitazone (ACTOS) 45 mg tablet Take 1 Tab by mouth daily. Indications: type 2 diabetes mellitus 90 Tab 3    folic acid (FOLVITE) 1 mg tablet TAKE ONE TABLET BY MOUTH ONE TIME DAILY  90 Tab 3    glimepiride (AMARYL) 4 mg tablet TAKE TWO TABLETS BY MOUTH EVERY MORNING 180 Tab 2    divalproex DR (DEPAKOTE) 500 mg tablet TAKE ONE TABLET BY MOUTH EVERY MORNING AND TWO TABLETS AT NIGHT 270 Tab 1    triamcinolone acetonide (KENALOG) 0.1 % ointment       topiramate (TOPAMAX) 200 mg tablet TAKE ONE AND ONE-HALF TABLET BY MOUTH TWICE DAILY 270 Tab 2    ramipril (ALTACE) 5 mg capsule TAKE ONE CAPSULE BY MOUTH ONE TIME DAILY  90 Cap 3    linagliptin (TRADJENTA) 5 mg tablet Take 1 Tab by mouth daily. 90 Tab 3    atorvastatin (LIPITOR) 40 mg tablet take one tablet by mouth at bedtime 90 Tab 3    metFORMIN ER (GLUCOPHAGE XR) 500 mg tablet Take 2 Tabs by mouth two (2) times a day.  360 Tab 3    chlorhexidine (PERIDEX) 0.12 % solution       Calcium-Cholecalciferol, D3, (CALTRATE-600 PLUS VITAMIN D3) 600-400 mg-unit Tab Take  by mouth. 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 Functional Ability and Level of Safety:   Hearing Loss  Hearing is good. Activities of Daily Living  The home contains: handrails and grab bars  Patient needs help with:  transportation, shopping, preparing meals, laundry, managing medications and managing money    Fall Risk  No flowsheet data found.     Abuse Screen  Patient is not abused    Cognitive Screening   Evaluation of Cognitive Function:  Has your family/caregiver stated any concerns about your memory: no  Normal    Patient Care Team   Patient Care Team:  Paolo Butt MD as PCP - General (Internal Medicine)  Gabriel Vega, MERI (Optometry)  Efraín Begum RN as Ambulatory Care Navigator (Internal Medicine)    Assessment/Plan   Education and counseling provided:  Are appropriate based on today's review and evaluation        Health Maintenance Due   Topic Date Due    MEDICARE YEARLY EXAM  07/11/2019

## 2019-08-13 NOTE — PROGRESS NOTES
Matt Bender  Identified pt with two pt identifiers(name and ). Chief Complaint   Patient presents with    Shoulder Pain       Reviewed record In preparation for visit and have obtained necessary documentation. Advanced directive / living will on file. 1. Have you been to the ER, urgent care clinic or hospitalized since your last visit? No     2. Have you seen or consulted any other health care providers outside of the [address] [place] [address] since your last visit? Include any pap smears or colon screening. No    Vitals reviewed with provider. Health Maintenance reviewed:     Health Maintenance Due   Topic    MEDICARE YEARLY EXAM         Wt Readings from Last 3 Encounters:   19 196 lb (88.9 kg)   19 196 lb (88.9 kg)   19 198 lb (89.8 kg)      Temp Readings from Last 3 Encounters:   19 97.8 °F (36.6 °C) (Oral)   19 97.5 °F (36.4 °C) (Oral)   19 98.1 °F (36.7 °C)      BP Readings from Last 3 Encounters:   19 128/81   19 139/76   19 144/70      Pulse Readings from Last 3 Encounters:   19 74   19 74   19 78      There were no vitals filed for this visit. Learning Assessment:   :     Learning Assessment 2014   PRIMARY LEARNER Patient   HIGHEST LEVEL OF EDUCATION - PRIMARY LEARNER  DID NOT GRADUATE HIGH SCHOOL   BARRIERS PRIMARY LEARNER COGNITIVE   CO-LEARNER CAREGIVER Yes   CO-LEARNER NAME Maria D Soto   PRIMARY LANGUAGE ENGLISH   LEARNER PREFERENCE PRIMARY DEMONSTRATION     READING   ANSWERED BY mother   RELATIONSHIP LEGAL GUARDIAN        Depression Screening:   :     3 most recent PHQ Screens 2019   Little interest or pleasure in doing things Not at all   Feeling down, depressed, irritable, or hopeless Not at all   Total Score PHQ 2 0        Fall Risk Assessment:   :     No flowsheet data found.      Abuse Screening:   :     Abuse Screening Questionnaire 2019 10/16/2018 2017   Do you ever feel afraid of your partner? N N N   Are you in a relationship with someone who physically or mentally threatens you? N N N   Is it safe for you to go home?  Y Y Y        ADL Screening:   :     ADL Assessment 4/16/2015   Feeding yourself No Help Needed   Getting from bed to chair No Help Needed   Getting dressed No Help Needed   Bathing or showering No Help Needed   Walk across the room (includes cane/walker) No Help Needed   Using the telphone No Help Needed   Taking your medications Help Needed   Preparing meals Help Needed   Managing money (expenses/bills) Help Needed   Moderately strenuous housework (laundry) No Help Needed   Shopping for personal items (toiletries/medicines) No Help Needed   Shopping for groceries No Help Needed   Driving Help Needed   Climbing a flight of stairs No Help Needed   Getting to places beyond walking distances Help Needed

## 2019-08-13 NOTE — PATIENT INSTRUCTIONS
Remember to get your flu shot in September or October. You may call us for a nurse appointment or get this from your pharmacy. The best way to stay healthy is to live a healthy lifestyle. A healthy lifestyle includes regular exercise, eating a well-balanced diet, keeping a healthy weight and not smoking. Regular physical exams and screening tests are another important way to take care of yourself. Preventive exams provided by health care providers can find health problems early when treatment works best and can keep you from getting certain diseases or illnesses. Preventive services include exams, lab tests, screenings, shots, monitoring and information to help you take care of your own health. All people over 65 should have a pneumonia shot. Pneumonia shots are usually only needed once in a lifetime unless your doctor decides differently. In addition to your physical exam, some screening tests are recommended: 
 
All people over 65 should have a yearly flu shot. People over 65 are at medium to high risk for Hepatitis B. Three shots are needed for complete protection. Bone mass measurement (dexa scan) is recommended every two years. Diabetes Mellitus screening is recommended every year. Glaucoma is an eye disease caused by high pressure in the eye. An eye exam is recommended every year. Cardiovascular screening tests that check your cholesterol and other blood fat (lipid) levels are recommended every five years. Colorectal Cancer screening tests help to find pre-cancerous polyps (growths in the colon) so they can be removed before they turn into cancer. Tests ordered for screening depend on your personal and family history risk factors. Prostate Cancer Screening (annually up to age 76) Screening for breast cancer is recommended yearly with a Mammogram. 
 
Screening for cervical and vaginal cancer is recommended with a pelvic and Pap test every two years. However if you have had an abnormal pap in the past  three years or at high risk for cervical or vaginal cancer Medicare will cover a pap test and a pelvic exam every year. Here is a list of your current Health Maintenance items with a due date: 
Health Maintenance Due Topic Date Due  
 Annual Well Visit  07/11/2019 Joint Injections: Care Instructions Your Care Instructions Joint injections are shots into a joint, such as the knee. They may be used to put in medicines, such as pain relievers. A corticosteroid, or steroid, shot is used to reduce inflammation in tendons or joints. It is often used to treat problems such as arthritis, tendinitis, and bursitis. Steroids can be injected directly into a painful, inflamed joint. They can also help reduce inflammation of a bursa. A bursa is a sac of fluid. It cushions and lubricates areas where tendons, ligaments, skin, muscles, or bones rub against each other. A steroid shot can sometimes help with short-term pain relief when other treatments haven't worked. If steroid shots help, pain may improve for weeks or months. Follow-up care is a key part of your treatment and safety. Be sure to make and go to all appointments, and call your doctor if you are having problems. It's also a good idea to know your test results and keep a list of the medicines you take. How can you care for yourself at home? · Put ice or a cold pack on the area for 10 to 20 minutes at a time. Put a thin cloth between the ice and your skin. · Ask your doctor if you can take an over-the-counter pain medicine, such as acetaminophen (Tylenol), ibuprofen (Advil, Motrin), or naproxen (Aleve). Be safe with medicines. Read and follow all instructions on the label. · Avoid strenuous activities for several days. In particular, avoid ones that put stress on the area where you got the shot. · If you have dressings over the area, keep them clean and dry. You may remove them when your doctor tells you to. When should you call for help? Call your doctor now or seek immediate medical care if: 
  · You have signs of infection, such as: 
? Increased pain, swelling, warmth, or redness. ? Red streaks leading from the site. ? Pus draining from the site. ? A fever.  
 Watch closely for changes in your health, and be sure to contact your doctor if you have any problems. Where can you learn more? Go to http://jarod-jared.info/. Enter N616 in the search box to learn more about \"Joint Injections: Care Instructions. \" Current as of: September 20, 2018 Content Version: 12.1 © 3475-6720 [employer]. Care instructions adapted under license by [employer] (which disclaims liability or warranty for this information). If you have questions about a medical condition or this instruction, always ask your healthcare professional. [place] [address] any warranty or liability for your use of this information.

## 2019-08-27 DIAGNOSIS — E11.9 CONTROLLED TYPE 2 DIABETES MELLITUS WITHOUT COMPLICATION, WITHOUT LONG-TERM CURRENT USE OF INSULIN (HCC): ICD-10-CM

## 2019-08-27 DIAGNOSIS — G40.909 SEIZURE DISORDER (HCC): ICD-10-CM

## 2019-08-27 RX ORDER — METFORMIN HYDROCHLORIDE 500 MG/1
1000 TABLET, EXTENDED RELEASE ORAL 2 TIMES DAILY
Qty: 360 TAB | Refills: 3 | Status: SHIPPED | OUTPATIENT
Start: 2019-08-27 | End: 2020-10-12

## 2019-08-27 RX ORDER — TOPIRAMATE 200 MG/1
TABLET ORAL
Qty: 270 TAB | Refills: 3 | Status: SHIPPED | OUTPATIENT
Start: 2019-08-27 | End: 2020-10-12

## 2019-08-27 RX ORDER — GLIMEPIRIDE 4 MG/1
TABLET ORAL
Qty: 180 TAB | Refills: 3 | Status: SHIPPED | OUTPATIENT
Start: 2019-08-27 | End: 2020-07-19

## 2019-08-27 RX ORDER — ATORVASTATIN CALCIUM 40 MG/1
TABLET, FILM COATED ORAL
Qty: 90 TAB | Refills: 3 | Status: SHIPPED | OUTPATIENT
Start: 2019-08-27 | End: 2020-07-19

## 2019-08-27 NOTE — TELEPHONE ENCOUNTER
PCP: Dillan Jett MD     Last appt: 8/13/2019   Future Appointments   Date Time Provider Kelly Fang   11/19/2019  1:15 PM Dillan Jett MD [facility]        Requested Prescriptions     Pending Prescriptions Disp Refills    metFORMIN ER (GLUCOPHAGE XR) 500 mg tablet 360 Tab 3     Sig: Take 2 Tabs by mouth two (2) times a day.     atorvastatin (LIPITOR) 40 mg tablet 90 Tab 3     Sig: take one tablet by mouth at bedtime    topiramate (TOPAMAX) 200 mg tablet 270 Tab 3     Sig: TAKE ONE AND ONE-HALF TABLET BY MOUTH TWICE DAILY    glimepiride (AMARYL) 4 mg tablet 180 Tab 3     Sig: TAKE TWO TABLETS BY MOUTH EVERY MORNING

## 2019-08-27 NOTE — TELEPHONE ENCOUNTER
90 Day Supply       Requested Prescriptions     Pending Prescriptions Disp Refills    metFORMIN ER (GLUCOPHAGE XR) 500 mg tablet 360 Tab 3     Sig: Take 2 Tabs by mouth two (2) times a day.     atorvastatin (LIPITOR) 40 mg tablet 90 Tab 3    topiramate (TOPAMAX) 200 mg tablet 270 Tab 2     Sig: TAKE ONE AND ONE-HALF TABLET BY MOUTH TWICE DAILY    glimepiride (AMARYL) 4 mg tablet 180 Tab 2     Sig: TAKE TWO TABLETS BY MOUTH EVERY MORNING

## 2019-09-30 NOTE — TELEPHONE ENCOUNTER
PCP: Calista Barr MD     Last appt: 8/13/2019   Future Appointments   Date Time Provider Kelly Fang   11/19/2019  1:15 PM Calista Barr MD [facility]        Requested Prescriptions     Pending Prescriptions Disp Refills    linaGLIPtin (TRADJENTA) 5 mg PO tablet 90 Tab 3     Sig: Take 1 Tab by mouth daily.

## 2019-11-05 ENCOUNTER — OFFICE VISIT (OUTPATIENT)
Dept: URGENT CARE | Age: 64
End: 2019-11-05

## 2019-11-05 VITALS
BODY MASS INDEX: 26.89 KG/M2 | HEIGHT: 72 IN | SYSTOLIC BLOOD PRESSURE: 141 MMHG | WEIGHT: 198.5 LBS | DIASTOLIC BLOOD PRESSURE: 63 MMHG | TEMPERATURE: 98.9 F | RESPIRATION RATE: 18 BRPM | HEART RATE: 60 BPM | OXYGEN SATURATION: 98 %

## 2019-11-05 DIAGNOSIS — R05.9 COUGH: ICD-10-CM

## 2019-11-05 DIAGNOSIS — J02.9 SORE THROAT: ICD-10-CM

## 2019-11-05 DIAGNOSIS — J06.9 UPPER RESPIRATORY TRACT INFECTION, UNSPECIFIED TYPE: Primary | ICD-10-CM

## 2019-11-05 LAB
S PYO AG THROAT QL: NEGATIVE
VALID INTERNAL CONTROL?: YES

## 2019-11-05 RX ORDER — CODEINE PHOSPHATE AND GUAIFENESIN 10; 100 MG/5ML; MG/5ML
5 SOLUTION ORAL
Qty: 45 ML | Refills: 0 | Status: SHIPPED | OUTPATIENT
Start: 2019-11-05 | End: 2019-11-08

## 2019-11-05 NOTE — PROGRESS NOTES
The history is provided by the patient. Cold Symptoms   The history is provided by the patient. This is a new problem. The current episode started more than 2 days ago. The problem occurs constantly. The problem has not changed since onset. The cough is non-productive. There has been no fever. Associated symptoms include sore throat. Pertinent negatives include no chest pain, no chills, no sweats, no ear congestion, no ear pain, no headaches, no rhinorrhea, no shortness of breath, no wheezing, no nausea and no vomiting. He has tried cough syrup (honey) for the symptoms. The treatment provided no relief. He is not a smoker. Cough   The history is provided by the patient. This is a new problem. The current episode started more than 2 days ago. Episode frequency: mainly at night. Not able to sleep. The problem has not changed since onset. The cough is non-productive. There has been no fever. Associated symptoms include sore throat. Pertinent negatives include no chest pain, no chills, no sweats, no ear congestion, no ear pain, no headaches, no rhinorrhea, no shortness of breath, no wheezing, no nausea and no vomiting. The treatment provided no relief. He is not a smoker. Denies any chest pain, Dizziness or SOB.   Past Medical History:   Diagnosis Date    Contact dermatitis and other eczema, due to unspecified cause     psoriasis    Diabetes ([address]     Eosinophilia     Hypercholesterolemia     Hypertension     Mental retardation     Seizures (HCC)     Skin cancer         Past Surgical History:   Procedure Laterality Date    ENDOSCOPY, COLON, DIAGNOSTIC  11/08/2007    Dr Leticia Abad normal except small internal hemorrhoids repeat 11/2017         Family History   Problem Relation Age of Onset    Other Mother         PMR    Hypertension Mother     Cancer Father         Lung    Diabetes Brother         Social History     Socioeconomic History    Marital status: SINGLE     Spouse name: Not on file    Number of children: Not on file    Years of education: Not on file    Highest education level: Not on file   Occupational History     Comment:  [employer] [address] Financial resource strain: Not on file    Food insecurity:     Worry: Not on file     Inability: Not on file   [employer] needs:     Medical: Not on file     Non-medical: Not on file   Tobacco Use    Smoking status: Never Smoker    Smokeless tobacco: Never Used   Substance and Sexual Activity    Alcohol use: No    Drug use: No    Sexual activity: Never   Lifestyle    Physical activity:     Days per week: Not on file     Minutes per session: Not on file    Stress: Not on file   Relationships    Social connections:     Talks on phone: Not on file     Gets together: Not on file     Attends L[employer] service: Not on file     Active member of club or organization: Not on file     Attends meetings of clubs or organizations: Not on file     Relationship status: Not on file    Intimate partner violence:     Fear of current or ex partner: Not on file     Emotionally abused: Not on file     Physically abused: Not on file     Forced sexual activity: Not on file   Other Topics Concern    Not on file   Social History Narrative    Not on file                ALLERGIES: Patient has no known allergies. Review of Systems   Constitutional: Positive for fatigue. Negative for chills and fever. HENT: Positive for postnasal drip and sore throat. Negative for congestion, ear pain, rhinorrhea, sinus pressure and trouble swallowing. Eyes: Negative. Respiratory: Positive for cough. Negative for chest tightness, shortness of breath and wheezing. Cardiovascular: Negative. Negative for chest pain. Gastrointestinal: Negative. Negative for nausea and vomiting. Genitourinary: Negative. Musculoskeletal: Negative. Skin: Negative. Neurological: Negative for dizziness, syncope, weakness, light-headedness, numbness and headaches.        Vitals: 11/05/19 1219   BP: 141/63   Pulse: 60   Resp: 18   Temp: 98.9 °F (37.2 °C)   SpO2: 98%   Weight: 198 lb 8 oz (90 kg)   Height: 6' (1.829 m)       Physical Exam   Constitutional: He is oriented to person, place, and time. He appears well-developed and well-nourished. HENT:   Right Ear: External ear normal.   Left Ear: External ear normal.   Nose: Nose normal.   Mouth/Throat: No oropharyngeal exudate. Post nasal drainage   Eyes: Pupils are equal, round, and reactive to light. Conjunctivae are normal. Right eye exhibits no discharge. Left eye exhibits no discharge. No scleral icterus. Neck: Normal range of motion. Cardiovascular: Normal rate, regular rhythm and normal heart sounds. Pulmonary/Chest: Effort normal and breath sounds normal. No respiratory distress. He has no wheezes. He has no rales. Abdominal: Soft. Bowel sounds are normal.   Musculoskeletal: Normal range of motion. Lymphadenopathy:     He has no cervical adenopathy. Neurological: He is alert and oriented to person, place, and time. Skin: Skin is warm and dry. Psychiatric: He has a normal mood and affect. MDM     Differential Diagnosis; Clinical Impression; Plan:     (J06.9) Upper respiratory tract infection, unspecified type  (primary encounter diagnosis)  (R05) Cough  (J02.9) Sore throat  Orders Placed This Encounter      guaiFENesin-codeine (ROBITUSSIN AC) 100-10 mg/5 mL solution          Sig: Take 5 mL by mouth three (3) times daily as needed for Cough for up to 3 days. Max Daily Amount: 15 mL. Dispense:  45 mL          Refill:  0    Increase fluids, use a humidifier, take tylenol for discomfort. The patients condition was discussed with the patient and they understand. The patient is to follow up with PCP. If signs and symptoms become worse the pt is to go to the ER. The patient is to take medications as prescribed. AVS given with patient instructions upon discharge.                   Procedures

## 2019-11-05 NOTE — PATIENT INSTRUCTIONS
Upper Respiratory Infection (Cold): Care Instructions Your Care Instructions An upper respiratory infection, or URI, is an infection of the nose, sinuses, or throat. URIs are spread by coughs, sneezes, and direct contact. The common cold is the most frequent kind of URI. The flu and sinus infections are other kinds of URIs. Almost all URIs are caused by viruses. Antibiotics won't cure them. But you can treat most infections with home care. This may include drinking lots of fluids and taking over-the-counter pain medicine. You will probably feel better in 4 to 10 days. The doctor has checked you carefully, but problems can develop later. If you notice any problems or new symptoms, get medical treatment right away. Follow-up care is a key part of your treatment and safety. Be sure to make and go to all appointments, and call your doctor if you are having problems. It's also a good idea to know your test results and keep a list of the medicines you take. How can you care for yourself at home? · To prevent dehydration, drink plenty of fluids, enough so that your urine is light yellow or clear like water. Choose water and other caffeine-free clear liquids until you feel better. If you have kidney, heart, or liver disease and have to limit fluids, talk with your doctor before you increase the amount of fluids you drink. · Take an over-the-counter pain medicine, such as acetaminophen (Tylenol), ibuprofen (Advil, Motrin), or naproxen (Aleve). Read and follow all instructions on the label. · Before you use cough and cold medicines, check the label. These medicines may not be safe for young children or for people with certain health problems. · Be careful when taking over-the-counter cold or flu medicines and Tylenol at the same time. Many of these medicines have acetaminophen, which is Tylenol.  Read the labels to make sure that you are not taking more than the recommended dose. Too much acetaminophen (Tylenol) can be harmful. · Get plenty of rest. 
· Do not smoke or allow others to smoke around you. If you need help quitting, talk to your doctor about stop-smoking programs and medicines. These can increase your chances of quitting for good. When should you call for help? Call 911 anytime you think you may need emergency care. For example, call if: 
  · You have severe trouble breathing.  
 Call your doctor now or seek immediate medical care if: 
  · You seem to be getting much sicker.  
  · You have new or worse trouble breathing.  
  · You have a new or higher fever.  
  · You have a new rash.  
 Watch closely for changes in your health, and be sure to contact your doctor if: 
  · You have a new symptom, such as a sore throat, an earache, or sinus pain.  
  · You cough more deeply or more often, especially if you notice more mucus or a change in the color of your mucus.  
  · You do not get better as expected. Where can you learn more? Go to http://jarod-jared.info/. Enter A145 in the search box to learn more about \"Upper Respiratory Infection (Cold): Care Instructions. \" Current as of: June 9, 2019 Content Version: 12.2 © 6708-9231 [employer]. Care instructions adapted under license by [employer] (which disclaims liability or warranty for this information). If you have questions about a medical condition or this instruction, always ask your healthcare professional. [place] [address] any warranty or liability for your use of this information. Sore Throat: Care Instructions Your Care Instructions Infection by bacteria or a virus causes most sore throats. Cigarette smoke, dry air, air pollution, allergies, and yelling can also cause a sore throat. Sore throats can be painful and annoying.  Fortunately, most sore throats go away on their own. If you have a bacterial infection, your doctor may prescribe antibiotics. Follow-up care is a key part of your treatment and safety. Be sure to make and go to all appointments, and call your doctor if you are having problems. It's also a good idea to know your test results and keep a list of the medicines you take. How can you care for yourself at home? · If your doctor prescribed antibiotics, take them as directed. Do not stop taking them just because you feel better. You need to take the full course of antibiotics. · Gargle with warm salt water once an hour to help reduce swelling and relieve discomfort. Use 1 teaspoon of salt mixed in 1 cup of warm water. · Take an over-the-counter pain medicine, such as acetaminophen (Tylenol), ibuprofen (Advil, Motrin), or naproxen (Aleve). Read and follow all instructions on the label. · Be careful when taking over-the-counter cold or flu medicines and Tylenol at the same time. Many of these medicines have acetaminophen, which is Tylenol. Read the labels to make sure that you are not taking more than the recommended dose. Too much acetaminophen (Tylenol) can be harmful. · Drink plenty of fluids. Fluids may help soothe an irritated throat. Hot fluids, such as tea or soup, may help decrease throat pain. · Use over-the-counter throat lozenges to soothe pain. Regular cough drops or hard candy may also help. These should not be given to young children because of the risk of choking. · Do not smoke or allow others to smoke around you. If you need help quitting, talk to your doctor about stop-smoking programs and medicines. These can increase your chances of quitting for good. · Use a vaporizer or humidifier to add moisture to your bedroom. Follow the directions for cleaning the machine. When should you call for help? Call your doctor now or seek immediate medical care if: 
  · You have new or worse trouble swallowing.   · Your sore throat gets much worse on one side.  
 Watch closely for changes in your health, and be sure to contact your doctor if you do not get better as expected. Where can you learn more? Go to http://jarod-jared.info/. Enter 062 [id number] 80 [id number] in the search box to learn more about \"Sore Throat: Care Instructions. \" Current as of: October 21, 2018 Content Version: 12.2 © 2216-9943 [employer]. Care instructions adapted under license by [employer] (which disclaims liability or warranty for this information). If you have questions about a medical condition or this instruction, always ask your healthcare professional. [place] [address] any warranty or liability for your use of this information.

## 2019-11-19 ENCOUNTER — OFFICE VISIT (OUTPATIENT)
Dept: INTERNAL MEDICINE CLINIC | Facility: CLINIC | Age: 64
End: 2019-11-19

## 2019-11-19 VITALS
HEIGHT: 72 IN | WEIGHT: 203 LBS | BODY MASS INDEX: 27.5 KG/M2 | OXYGEN SATURATION: 99 % | HEART RATE: 72 BPM | SYSTOLIC BLOOD PRESSURE: 118 MMHG | DIASTOLIC BLOOD PRESSURE: 75 MMHG | RESPIRATION RATE: 12 BRPM | TEMPERATURE: 97.6 F

## 2019-11-19 DIAGNOSIS — T50.905A THROMBOCYTOPENIA DUE TO DRUGS: ICD-10-CM

## 2019-11-19 DIAGNOSIS — I10 HYPERTENSION, ESSENTIAL: ICD-10-CM

## 2019-11-19 DIAGNOSIS — D69.59 THROMBOCYTOPENIA DUE TO DRUGS: ICD-10-CM

## 2019-11-19 DIAGNOSIS — F79 INTELLECTUAL DISABILITY: ICD-10-CM

## 2019-11-19 DIAGNOSIS — E78.00 HYPERCHOLESTEROLEMIA: ICD-10-CM

## 2019-11-19 LAB — HBA1C MFR BLD HPLC: 8.2 %

## 2019-11-19 NOTE — PROGRESS NOTES
Autumn Poster  Identified pt with two pt identifiers(name and ). Chief Complaint   Patient presents with    Diabetes    Hypertension    Cholesterol Problem       Reviewed record In preparation for visit and have obtained necessary documentation. Advanced directive / living will on file. 1. Have you been to the ER, urgent care clinic or hospitalized since your last visit? [facility]    2. Have you seen or consulted any other health care providers outside of the [address] [place] [address] since your last visit? Include any pap smears or colon screening. No    Vitals reviewed with provider.     Health Maintenance reviewed:     Health Maintenance Due   Topic    FOOT EXAM Q1     EYE EXAM RETINAL OR DILATED           Wt Readings from Last 3 Encounters:   19 203 lb (92.1 kg)   19 198 lb 8 oz (90 kg)   19 197 lb 8 oz (89.6 kg)        Temp Readings from Last 3 Encounters:   19 97.6 °F (36.4 °C) (Oral)   19 98.9 °F (37.2 °C)   19 97.9 °F (36.6 °C) (Oral)        BP Readings from Last 3 Encounters:   19 118/75   19 141/63   19 134/82        Pulse Readings from Last 3 Encounters:   19 72   19 60   19 69        Vitals:    19 1328   BP: 118/75   Pulse: 72   Resp: 12   Temp: 97.6 °F (36.4 °C)   TempSrc: Oral   SpO2: 99%   Weight: 203 lb (92.1 kg)   Height: 6' (1.829 m)   PainSc:   0 - No pain          Learning Assessment:   :       Learning Assessment 2014   PRIMARY LEARNER Patient   HIGHEST LEVEL OF EDUCATION - PRIMARY LEARNER  DID NOT GRADUATE HIGH SCHOOL   BARRIERS PRIMARY LEARNER COGNITIVE   CO-LEARNER CAREGIVER Yes   CO-LEARNER NAME Damon Moncada   PRIMARY LANGUAGE ENGLISH   LEARNER PREFERENCE PRIMARY DEMONSTRATION     READING   ANSWERED BY mother   RELATIONSHIP LEGAL GUARDIAN        Depression Screening:   :       3 most recent PHQ Screens 2019   Little interest or pleasure in doing things Not at all   Feeling down, depressed, irritable, or hopeless Not at all   Total Score PHQ 2 0        Fall Risk Assessment:   :     No flowsheet data found. Abuse Screening:   :       Abuse Screening Questionnaire 6/4/2019 10/16/2018 2/16/2017   Do you ever feel afraid of your partner? N N N   Are you in a relationship with someone who physically or mentally threatens you? N N N   Is it safe for you to go home?  Y Y Y        ADL Screening:   :       ADL Assessment 4/16/2015   Feeding yourself No Help Needed   Getting from bed to chair No Help Needed   Getting dressed No Help Needed   Bathing or showering No Help Needed   Walk across the room (includes cane/walker) No Help Needed   Using the telphone No Help Needed   Taking your medications Help Needed   Preparing meals Help Needed   Managing money (expenses/bills) Help Needed   Moderately strenuous housework (laundry) No Help Needed   Shopping for personal items (toiletries/medicines) No Help Needed   Shopping for groceries No Help Needed   Driving Help Needed   Climbing a flight of stairs No Help Needed   Getting to places beyond walking distances Help Needed

## 2019-11-19 NOTE — PROGRESS NOTES
HISTORY OF PRESENT ILLNESS  Macho Bucio is a 59 y.o. male. He is accompanied by his mother. He has an intellectual disability. HPI   He presents for follow up of diabetes mellitus, hypertension, hyperlipidemia and overweight. A1c in August was 8.1. Pioglitazone was increased to 45 mg. Diet and Lifestyle: generally follows a low fat low cholesterol diet, generally follows a low sodium diet, follows a diabetic diet regularly, exercises sporadically, nonsmoker  Diabetic ROS - medication compliance: compliant all of the time,      home glucose monitoring: not done     home BP Monitoring:not done     further diabetic ROS: no polyuria or polydipsia, no numbness, tingling or pain in extremities, no unusual visual symptoms, no hypoglycemia, no medication side effects noted.    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, T50.905A    Sleep disorder G47.9    Hypertension, essential I10    Strongyloides stercoralis infection B78.9    Eosinophilia D72.1    Hypercholesterolemia E78.00    Uncontrolled type 2 diabetes mellitus without complication, without long-term current use of insulin (HCC) E11.65     Past Medical History:   Diagnosis Date    Contact dermatitis and other eczema, due to unspecified cause     psoriasis    Diabetes ([facility] [address]     Eosinophilia     Hypercholesterolemia     Hypertension     Mental retardation     Seizures ([facility] [address]     Skin cancer      Past Surgical History:   Procedure Laterality Date    ENDOSCOPY, COLON, DIAGNOSTIC  11/08/2007    Dr Miles Vargas normal except small internal hemorrhoids repeat 11/2017     Social History     Socioeconomic History    Marital status: SINGLE     Spouse name: Not on file    Number of children: Not on file    Years of education: Not on file    Highest education level: Not on file   Occupational History     Comment: [employer]    Tobacco Use    Smoking status: Never Smoker    Smokeless tobacco: Never Used   Substance and Sexual Activity    Alcohol use: No    Drug use: No    Sexual activity: Never     Family History   Problem Relation Age of Onset    Other Mother         PMR    Hypertension Mother     Cancer Father         Lung    Diabetes Brother      No Known Allergies  Current Outpatient Medications   Medication Sig Dispense Refill    linaGLIPtin (TRADJENTA) 5 mg tablet Take 1 Tab by mouth daily. 90 Tab 3    metFORMIN ER (GLUCOPHAGE XR) 500 mg tablet Take 2 Tabs by mouth two (2) times a day. 360 Tab 3    atorvastatin (LIPITOR) 40 mg tablet take one tablet by mouth at bedtime 90 Tab 3    topiramate (TOPAMAX) 200 mg tablet TAKE ONE AND ONE-HALF TABLET BY MOUTH TWICE DAILY 270 Tab 3    glimepiride (AMARYL) 4 mg tablet TAKE TWO TABLETS BY MOUTH EVERY MORNING 180 Tab 3    pioglitazone (ACTOS) 45 mg tablet Take 1 Tab by mouth daily. Indications: type 2 diabetes mellitus 90 Tab 3    folic acid (FOLVITE) 1 mg tablet TAKE ONE TABLET BY MOUTH ONE TIME DAILY  90 Tab 3    divalproex DR (DEPAKOTE) 500 mg tablet TAKE ONE TABLET BY MOUTH EVERY MORNING AND TWO TABLETS AT NIGHT 270 Tab 1    triamcinolone acetonide (KENALOG) 0.1 % ointment       ramipril (ALTACE) 5 mg capsule TAKE ONE CAPSULE BY MOUTH ONE TIME DAILY  90 Cap 3    chlorhexidine (PERIDEX) 0.12 % solution       Calcium-Cholecalciferol, D3, (CALTRATE-600 PLUS VITAMIN D3) 600-400 mg-unit Tab Take  by mouth. Review of Systems   Constitutional: Positive for malaise/fatigue. Negative for weight loss. Gastrointestinal: Negative for constipation and diarrhea. Musculoskeletal: Negative for back pain and joint pain. Skin: Positive for rash (redness in groin area. Using triamcinolone crean without improvement). Neurological: Negative for tingling and focal weakness. Visit Vitals  /75 (BP 1 Location: Left arm, BP Patient Position: Sitting)   Pulse 72   Temp 97.6 °F (36.4 °C) (Oral)   Resp 12   Ht 6' (1.829 m)   Wt 203 lb (92.1 kg)   SpO2 99%   BMI 27.53 kg/m²     Physical Exam   Constitutional: He is oriented to person, place, and time. He appears well-developed and well-nourished. HENT:   Head: Normocephalic and atraumatic. Eyes: Pupils are equal, round, and reactive to light. Conjunctivae are normal.   Neck: Neck supple. Carotid bruit is not present. No thyromegaly present. Cardiovascular: Normal rate, regular rhythm and normal heart sounds. PMI is not displaced. Exam reveals no gallop. No murmur heard. Pulses:       Dorsalis pedis pulses are 2+ on the right side, and 2+ on the left side. Posterior tibial pulses are 2+ on the right side, and 2+ on the left side. Pulmonary/Chest: Effort normal. He has no wheezes. He has no rhonchi. He has no rales. Abdominal: Soft. Normal appearance. He exhibits no abdominal bruit and no mass. There is no hepatosplenomegaly. There is no tenderness. Musculoskeletal: He exhibits no edema. Lymphadenopathy:     He has no cervical adenopathy. Right: No supraclavicular adenopathy present. Left: No supraclavicular adenopathy present. Neurological: He is alert and oriented to person, place, and time. No sensory deficit. Skin: Skin is warm, dry and intact. No rash noted. Psychiatric: He has a normal mood and affect. His behavior is normal.   Nursing note and vitals reviewed.   Diabetic foot exam:     Left Foot:   Visual Exam: Dry skin   Pulse DP: 1+ (weak)   Filament test: normal sensation    Vibratory sensation: normal      Right Foot:   Visual Exam: dry skin   Pulse DP: 1+ (weak)   Filament test: normal sensation    Vibratory sensation: normal           Results for orders placed or performed in visit on 11/19/19   AMB POC HEMOGLOBIN A1C   Result Value Ref Range    Hemoglobin A1c (POC) 8.2 %     Lab Results   Component Value Date/Time    Hemoglobin A1c 10.2 (H) 01/15/2019 11:41 AM    Hemoglobin A1c (POC) 8.1 08/01/2019 10:20 AM     Lab Results   Component Value Date/Time    Microalb/Creat ratio (ug/mg creat.) 4.5 08/01/2019 12:17 PM     Lab Results   Component Value Date/Time    Cholesterol, total 112 04/25/2019 11:58 AM    HDL Cholesterol 39 (L) 04/25/2019 11:58 AM    LDL, calculated 52 04/25/2019 11:58 AM    VLDL, calculated 21 04/25/2019 11:58 AM    Triglyceride 105 04/25/2019 11:58 AM    CHOL/HDL Ratio 2.8 03/31/2009 08:40 AM     Lab Results   Component Value Date/Time    Sodium 142 04/25/2019 11:58 AM    Potassium 5.0 04/25/2019 11:58 AM    Chloride 108 (H) 04/25/2019 11:58 AM    CO2 22 04/25/2019 11:58 AM    Anion gap 11 02/27/2018 03:53 AM    Glucose 221 (H) 04/25/2019 11:58 AM    BUN 20 04/25/2019 11:58 AM    Creatinine 0.78 04/25/2019 11:58 AM    BUN/Creatinine ratio 26 (H) 04/25/2019 11:58 AM    GFR est  04/25/2019 11:58 AM    GFR est non-AA 97 04/25/2019 11:58 AM    Calcium 9.3 04/25/2019 11:58 AM    Bilirubin, total 0.2 04/25/2019 11:58 AM    AST (SGOT) 13 04/25/2019 11:58 AM    Alk. phosphatase 47 04/25/2019 11:58 AM    Protein, total 6.4 04/25/2019 11:58 AM    Albumin 3.8 04/25/2019 11:58 AM    Globulin 3.8 02/27/2018 03:53 AM    A-G Ratio 1.5 04/25/2019 11:58 AM    ALT (SGPT) 10 04/25/2019 11:58 AM     Lab Results   Component Value Date/Time    WBC 7.4 01/15/2019 11:41 AM    HGB 13.1 01/15/2019 11:41 AM    HCT 39.7 01/15/2019 11:41 AM    PLATELET 87 (LL) 71/37/7170 11:41 AM    MCV 98 (H) 01/15/2019 11:41 AM         ASSESSMENT and PLAN    ICD-10-CM ICD-9-CM    1. Uncontrolled type 2 diabetes mellitus without complication, without long-term current use of insulin (HCC) E11.65 250.02 HM DIABETES FOOT EXAM      AMB POC HEMOGLOBIN A1C      HEMOGLOBIN A1C WITH EAG   2.  Hypertension, essential J16 080.4 METABOLIC PANEL, COMPREHENSIVE   3. Hypercholesterolemia P68.61 084.2 METABOLIC PANEL, COMPREHENSIVE      LIPID PANEL   4. Intellectual disability F79 319    5. 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 287.49 CBC WITH AUTOMATED DIFF    T50.905A E947.9      Diagnoses and all orders for this visit:    1. Uncontrolled type 2 diabetes mellitus without complication, without long-term current use of insulin (HCC)  Diabetes Mellitus: stable. I think this is the best we can do taking into account his intellectual disability, aversion to needles and financial constraints. . Is taking statin and ACE/ARB  Issues reviewed with him: low cholesterol diet, weight control and daily exercise discussed and glycohemoglobin and other lab monitoring discussed. -     HM DIABETES FOOT EXAM  -     AMB POC HEMOGLOBIN A1C  -     HEMOGLOBIN A1C WITH EAG; Future    2. Hypertension, essential  Hypertension is controlled. -     METABOLIC PANEL, COMPREHENSIVE; Future    3. Hypercholesterolemia  Hyperlipidemia is controlled  -     METABOLIC PANEL, COMPREHENSIVE; Future  -     LIPID PANEL; Future    4. Intellectual disability  Stable, but complicates management of diabetes. 5. Thrombocytopenia due to drugs  Count has been stable. -     CBC WITH AUTOMATED DIFF; Future      Follow-up and Dispositions    · Return in about 4 months (around 3/19/2020) for DM, HTN, chol  Fasting lab one week prior. lab results and schedule of future lab studies reviewed with patient  reviewed diet, exercise and weight control  I have discussed the diagnosis, evaluation and treatment options and the intended plan with the patient. Patient understands and is in agreement. The patient has received an after-visit summary and questions were answered concerning future plans. I have discussed side effects and warnings of any new medications with the patient as well.

## 2019-11-19 NOTE — PATIENT INSTRUCTIONS
For rash in groin combine triamcinolone cream with equal amount of Lotrimin twice a day for 2-3 weeks. For the dry skin on feet, I suggest Eucerin Cream, Vaseline, or Aquaphor twice a day.

## 2020-02-21 ENCOUNTER — OFFICE VISIT (OUTPATIENT)
Dept: URGENT CARE | Age: 65
End: 2020-02-21

## 2020-02-21 VITALS
TEMPERATURE: 101 F | HEART RATE: 89 BPM | SYSTOLIC BLOOD PRESSURE: 138 MMHG | BODY MASS INDEX: 27.36 KG/M2 | WEIGHT: 202 LBS | RESPIRATION RATE: 18 BRPM | DIASTOLIC BLOOD PRESSURE: 80 MMHG | OXYGEN SATURATION: 98 % | HEIGHT: 72 IN

## 2020-02-21 DIAGNOSIS — R50.9 FEVER, UNSPECIFIED FEVER CAUSE: Primary | ICD-10-CM

## 2020-02-21 LAB
FLUAV+FLUBV AG NOSE QL IA.RAPID: NEGATIVE POS/NEG
FLUAV+FLUBV AG NOSE QL IA.RAPID: NEGATIVE POS/NEG
S PYO AG THROAT QL: NEGATIVE
VALID INTERNAL CONTROL?: YES
VALID INTERNAL CONTROL?: YES

## 2020-02-21 RX ORDER — OSELTAMIVIR PHOSPHATE 75 MG/1
75 CAPSULE ORAL 2 TIMES DAILY
Qty: 10 CAP | Refills: 0 | Status: SHIPPED | OUTPATIENT
Start: 2020-02-21 | End: 2020-02-26

## 2020-02-21 NOTE — PATIENT INSTRUCTIONS
Viral Infections: Care Instructions  Your Care Instructions    You don't feel well, but it's not clear what's causing it. You may have a viral infection. Viruses cause many illnesses, such as the common cold, influenza, fever, rashes, and the diarrhea, nausea, and vomiting that are often called \"stomach flu. \" You may wonder if antibiotic medicines could make you feel better. But antibiotics only treat infections caused by bacteria. They don't work on viruses. The good news is that viral infections usually aren't serious. Most will go away in a few days without medical treatment. In the meantime, there are a few things you can do to make yourself more comfortable. Follow-up care is a key part of your treatment and safety. Be sure to make and go to all appointments, and call your doctor if you are having problems. It's also a good idea to know your test results and keep a list of the medicines you take. How can you care for yourself at home? · Get plenty of rest if you feel tired. · Take an over-the-counter pain medicine if needed, such as acetaminophen (Tylenol), ibuprofen (Advil, Motrin), or naproxen (Aleve). Read and follow all instructions on the label. · Be careful when taking over-the-counter cold or flu medicines and Tylenol at the same time. Many of these medicines have acetaminophen, which is Tylenol. Read the labels to make sure that you are not taking more than the recommended dose. Too much acetaminophen (Tylenol) can be harmful. · Drink plenty of fluids, enough so that your urine is light yellow or clear like water. If you have kidney, heart, or liver disease and have to limit fluids, talk with your doctor before you increase the amount of fluids you drink. · Stay home from work, school, and other public places while you have a fever. When should you call for help? Call 911 anytime you think you may need emergency care.  For example, call if:    · You have severe trouble breathing.     · You passed out (lost consciousness).    Call your doctor now or seek immediate medical care if:    · You seem to be getting much sicker.     · You have a new or higher fever.     · You have blood in your stools.     · You have new belly pain, or your pain gets worse.     · You have a new rash.    Watch closely for changes in your health, and be sure to contact your doctor if:    · You start to get better and then get worse.     · You do not get better as expected. Where can you learn more? Go to http://jarod-jared.info/. Enter V303 in the search box to learn more about \"Viral Infections: Care Instructions. \"  Current as of: June 9, 2019  Content Version: 12.2  © 9176-4482 [employer]. Care instructions adapted under license by [employer] (which disclaims liability or warranty for this information). If you have questions about a medical condition or this instruction, always ask your healthcare professional. [address] any warranty or liability for your use of this information.

## 2020-02-21 NOTE — PROGRESS NOTES
The patient presents with flu symptoms since yesterday. His caregiver reflects that this occurred last year and she tried watchful waiting but the patient worsened and required inpatient hospitalization. The history is provided by the patient and a caregiver. The history is limited by a developmental delay. Cold Symptoms   The history is provided by the patient and caregiver. This is a new problem. The current episode started yesterday. The problem occurs constantly. The problem has been gradually worsening. There has been a fever of 101 - 101.9 F. Associated symptoms include chills, ear congestion, ear pain, rhinorrhea, sore throat and myalgias. He has tried nothing for the symptoms.         Past Medical History:   Diagnosis Date    Contact dermatitis and other eczema, due to unspecified cause     psoriasis    Diabetes ([address]     Eosinophilia     Hypercholesterolemia     Hypertension     Mental retardation     Seizures (HCC)     Skin cancer         Past Surgical History:   Procedure Laterality Date    ENDOSCOPY, COLON, DIAGNOSTIC  11/08/2007    Dr Magana Page normal except small internal hemorrhoids repeat 11/2017         Family History   Problem Relation Age of Onset    Other Mother         PMR    Hypertension Mother     Cancer Father         Lung    Diabetes Brother         Social History     Socioeconomic History    Marital status: SINGLE     Spouse name: Not on file    Number of children: Not on file    Years of education: Not on file    Highest education level: Not on file   Occupational History     Comment: [employer]    Social Needs    Financial resource strain: Not on file    Food insecurity:     Worry: Not on file     Inability: Not on file   [employer] needs:     Medical: Not on file     Non-medical: Not on file   Tobacco Use    Smoking status: Never Smoker    Smokeless tobacco: Never Used   Substance and Sexual Activity    Alcohol use: No    Drug use: No    Sexual activity: Never Lifestyle    Physical activity:     Days per week: Not on file     Minutes per session: Not on file    Stress: Not on file   Relationships    Social connections:     Talks on phone: Not on file     Gets together: Not on file     Attends Judaism service: Not on file     Active member of club or organization: Not on file     Attends meetings of clubs or organizations: Not on file     Relationship status: Not on file    Intimate partner violence:     Fear of current or ex partner: Not on file     Emotionally abused: Not on file     Physically abused: Not on file     Forced sexual activity: Not on file   Other Topics Concern    Not on file   Social History Narrative    Not on file                ALLERGIES: Patient has no known allergies. Review of Systems   Constitutional: Positive for chills and fever. Negative for activity change and appetite change. HENT: Positive for congestion, ear pain, postnasal drip, rhinorrhea and sore throat. Negative for sinus pressure and sinus pain. Respiratory: Positive for cough. Musculoskeletal: Positive for myalgias. Vitals:    02/21/20 1704   BP: 138/80   Pulse: 89   Resp: 18   Temp: (!) 101 °F (38.3 °C)   SpO2: 98%   Weight: 202 lb (91.6 kg)   Height: 6' (1.829 m)       Physical Exam  Vitals signs reviewed. Constitutional:       General: He is not in acute distress. Appearance: He is well-developed. He is ill-appearing. HENT:      Head: Normocephalic. Right Ear: Ear canal normal. No drainage. Tympanic membrane is not erythematous or bulging. Left Ear: Tympanic membrane and ear canal normal. No drainage. Tympanic membrane is not erythematous or bulging. Nose: Nose normal. No rhinorrhea. Mouth/Throat:      Pharynx: No oropharyngeal exudate or posterior oropharyngeal erythema. Cardiovascular:      Rate and Rhythm: Normal rate and regular rhythm. Heart sounds: Normal heart sounds.    Pulmonary:      Effort: Pulmonary effort is normal. No respiratory distress. Breath sounds: Normal breath sounds. No decreased breath sounds or rhonchi. Lymphadenopathy:      Cervical: No cervical adenopathy. MDM    ICD-10-CM ICD-9-CM    1. Fever, unspecified fever cause R50.9 780.60 AMB POC RAPID STREP A      AMB POC DAYANA INFLUENZA A/B TEST      oseltamivir (TAMIFLU) 75 mg capsule     Medications Ordered Today   Medications    oseltamivir (TAMIFLU) 75 mg capsule     Sig: Take 1 Cap by mouth two (2) times a day for 5 days. Dispense:  10 Cap     Refill:  0     Results for orders placed or performed in visit on 02/21/20   AMB POC RAPID STREP A   Result Value Ref Range    VALID INTERNAL CONTROL POC Yes     Group A Strep Ag Negative Negative   AMB POC DAYANA INFLUENZA A/B TEST   Result Value Ref Range    VALID INTERNAL CONTROL POC Yes     Influenza A Ag POC Negative Negative Pos/Neg    Influenza B Ag POC Negative Negative Pos/Neg     The patients condition was discussed with the patient and they understand. The patient is to follow up with primary care doctor. If signs and symptoms become worse the pt is to go to the ER. The patient is to take medications as prescribed. Given age and risk factors, will treat.      Procedures

## 2020-04-01 ENCOUNTER — OFFICE VISIT (OUTPATIENT)
Dept: INTERNAL MEDICINE CLINIC | Facility: CLINIC | Age: 65
End: 2020-04-01

## 2020-04-01 VITALS
RESPIRATION RATE: 12 BRPM | OXYGEN SATURATION: 98 % | BODY MASS INDEX: 28.31 KG/M2 | SYSTOLIC BLOOD PRESSURE: 131 MMHG | WEIGHT: 209 LBS | HEART RATE: 81 BPM | DIASTOLIC BLOOD PRESSURE: 78 MMHG | HEIGHT: 72 IN | TEMPERATURE: 97.7 F

## 2020-04-01 DIAGNOSIS — G40.909 SEIZURE DISORDER (HCC): ICD-10-CM

## 2020-04-01 DIAGNOSIS — R29.898 WEAKNESS OF BOTH LOWER EXTREMITIES: ICD-10-CM

## 2020-04-01 DIAGNOSIS — D64.9 ANEMIA, UNSPECIFIED TYPE: ICD-10-CM

## 2020-04-01 DIAGNOSIS — S39.012A STRAIN OF LUMBAR REGION, INITIAL ENCOUNTER: ICD-10-CM

## 2020-04-01 DIAGNOSIS — D69.6 THROMBOCYTOPENIA, UNSPECIFIED (HCC): ICD-10-CM

## 2020-04-01 DIAGNOSIS — I10 HYPERTENSION, ESSENTIAL: ICD-10-CM

## 2020-04-01 DIAGNOSIS — I87.8 VENOUS STASIS OF LOWER EXTREMITY: ICD-10-CM

## 2020-04-01 DIAGNOSIS — R53.83 FATIGUE, UNSPECIFIED TYPE: Primary | ICD-10-CM

## 2020-04-01 DIAGNOSIS — F32.1 CURRENT MODERATE EPISODE OF MAJOR DEPRESSIVE DISORDER WITHOUT PRIOR EPISODE (HCC): ICD-10-CM

## 2020-04-01 DIAGNOSIS — R45.84 ANHEDONIA: ICD-10-CM

## 2020-04-01 DIAGNOSIS — Z12.11 SCREEN FOR COLON CANCER: ICD-10-CM

## 2020-04-01 DIAGNOSIS — Z13.31 SCREENING FOR DEPRESSION: ICD-10-CM

## 2020-04-01 DIAGNOSIS — E78.00 HYPERCHOLESTEROLEMIA: ICD-10-CM

## 2020-04-01 LAB — HBA1C MFR BLD HPLC: 8.8 %

## 2020-04-01 RX ORDER — FLUOXETINE 10 MG/1
10 CAPSULE ORAL DAILY
Qty: 30 CAP | Refills: 5 | Status: SHIPPED | OUTPATIENT
Start: 2020-04-01 | End: 2020-04-30

## 2020-04-01 NOTE — PROGRESS NOTES
Karine Haley  Identified pt with two pt identifiers(name and ). Chief Complaint   Patient presents with    Back Pain    Leg Problem     both legs red and weak       Reviewed record In preparation for visit and have obtained necessary documentation. Advanced directive / living will on file. 1. Have you been to the ER, urgent care clinic or hospitalized since your last visit? [facility] 2020    2. Have you seen or consulted any other health care providers outside of the [address] [place] [address] since your last visit? Include any pap smears or colon screening. [facility]    Vitals reviewed with provider.     Health Maintenance reviewed:     Health Maintenance Due   Topic    Eye Exam Retinal or Dilated     FOBT Q1Y Age,18+           Wt Readings from Last 3 Encounters:   20 209 lb (94.8 kg)   20 202 lb (91.6 kg)   19 203 lb (92.1 kg)        Temp Readings from Last 3 Encounters:   20 97.7 °F (36.5 °C) (Oral)   20 (!) 101 °F (38.3 °C)   19 97.6 °F (36.4 °C) (Oral)        BP Readings from Last 3 Encounters:   20 131/78   20 138/80   19 118/75        Pulse Readings from Last 3 Encounters:   20 81   20 89   19 72        Vitals:    20 0853   BP: 131/78   Pulse: 81   Resp: 12   Temp: 97.7 °F (36.5 °C)   TempSrc: Oral   SpO2: 98%   Weight: 209 lb (94.8 kg)   Height: 6' (1.829 m)   PainSc:   4   PainLoc: Back          Learning Assessment:   :       Learning Assessment 2014   PRIMARY LEARNER Patient   HIGHEST LEVEL OF EDUCATION - PRIMARY LEARNER  DID NOT GRADUATE HIGH SCHOOL   BARRIERS PRIMARY LEARNER COGNITIVE   CO-LEARNER CAREGIVER Yes   CO-LEARNER NAME Lalit Redd   PRIMARY LANGUAGE ENGLISH   LEARNER PREFERENCE PRIMARY DEMONSTRATION     READING   ANSWERED BY mother   RELATIONSHIP LEGAL GUARDIAN        Depression Screening:   :       3 most recent PHQ Screens 2019   Little interest or pleasure in doing things Not at all   Feeling down, depressed, irritable, or hopeless Not at all   Total Score PHQ 2 0        Fall Risk Assessment:   :     No flowsheet data found. Abuse Screening:   :       Abuse Screening Questionnaire 6/4/2019 10/16/2018 2/16/2017   Do you ever feel afraid of your partner? N N N   Are you in a relationship with someone who physically or mentally threatens you? N N N   Is it safe for you to go home?  Y Y Y        ADL Screening:   :       ADL Assessment 4/16/2015   Feeding yourself No Help Needed   Getting from bed to chair No Help Needed   Getting dressed No Help Needed   Bathing or showering No Help Needed   Walk across the room (includes cane/walker) No Help Needed   Using the telphone No Help Needed   Taking your medications Help Needed   Preparing meals Help Needed   Managing money (expenses/bills) Help Needed   Moderately strenuous housework (laundry) No Help Needed   Shopping for personal items (toiletries/medicines) No Help Needed   Shopping for groceries No Help Needed   Driving Help Needed   Climbing a flight of stairs No Help Needed   Getting to places beyond walking distances Help Needed

## 2020-04-01 NOTE — PROGRESS NOTES
HISTORY OF PRESENT ILLNESS  Alexis Hubbard is a 62 y.o. male. He is accompanied by his mother. He is a difficult historian. HPI He presents for follow up of diabetes mellitus, hypertension and hyperlipidemia. Diet and Lifestyle: generally follows a low fat low cholesterol diet, generally follows a low sodium diet, follows a diabetic diet regularly, sedentary, nonsmoker  Diabetic ROS - medication compliance: compliant all of the time,      home glucose monitoring:  not done,      home BP Monitorin/70. further diabetic ROS: no polyuria or polydipsia, no numbness, tingling or pain in extremities, no unusual visual symptoms, no hypoglycemia, no medication side effects noted. Cardiovascular ROS:  He complains of orthopnea, lower extremity edema. He denies palpitations, exertional chest pressure/discomfort, claudication, dyspnea on exertion, dizziness       He presents for presents with a 3 weeks history of constant right low back problems. Initial inciting event: picking up a basket of laundry   Onset was rapid . Pain  Does not radiate  Pain is described as soreness  Severity is; 4/10      Exacerbating factors identifiable by patient are walking   Alleviating factors identifiable by patient are lying down and Tylenol  Treatments so far initiated by patient: was given cyclobenzaprine and naproxen by Miguel James but only took 2 doses. There has been some improvement since onset. He has erythema of both lower extremities for past week. Legs have been swollen chronically and are probaby no worse. He was given compression stockings In the past and has one pair at home. Mother is unsure of degree of compression. Moncho Pb He complains of poor appetite but has not lost weight. He spends a lot of the day in bed. Mom has a hard time getting him up. He goes outside to hit golf balls and within minutes is either sitting on the porch or back in the house.      3 most recent PHQ Screens 2020   Little interest or pleasure in doing things Nearly every day   Feeling down, depressed, irritable, or hopeless Several days   Total Score PHQ 2 4   Trouble falling or staying asleep, or sleeping too much More than half the days   Feeling tired or having little energy Nearly every day   Poor appetite, weight loss, or overeating Nearly every day   Feeling bad about yourself - or that you are a failure or have let yourself or your family down Not at all   Trouble concentrating on things such as school, work, reading, or watching TV More than half the days   Moving or speaking so slowly that other people could have noticed; or the opposite being so fidgety that others notice Nearly every day   Thoughts of being better off dead, or hurting yourself in some way Not at all   PHQ 9 Score 17   How difficult have these problems made it for you to do your work, take care of your home and get along with others Very difficult       Patient Active Problem List   Diagnosis Code    Intellectual disability F68    Seizure disorder ([facility] G40.909    Psoriasis L40.9    Venous stasis of lower extremity I87.8    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, T50.905A    Sleep disorder G47.9    Hypertension, essential I10    Strongyloides stercoralis infection B78.9    Eosinophilia D72.1    Hypercholesterolemia E78.00    Uncontrolled type 2 diabetes mellitus without complication, without long-term current use of insulin ([facility] [address] E11.65     Past Medical History:   Diagnosis Date    Contact dermatitis and other eczema, due to unspecified cause     psoriasis    Diabetes ([address]     Eosinophilia     Hypercholesterolemia     Hypertension     Mental retardation     Seizures ([facility] [address]     Skin cancer      Past Surgical History:   Procedure Laterality Date    ENDOSCOPY, COLON, DIAGNOSTIC  11/08/2007    Dr Eber Neri normal except small internal hemorrhoids repeat 11/2017     Social History     Socioeconomic History    Marital status: SINGLE     Spouse name: Not on file    Number of children: Not on file    Years of education: Not on file    Highest education level: Not on file   Occupational History     Comment: [employer]    Tobacco Use    Smoking status: Never Smoker    Smokeless tobacco: Never Used   Substance and Sexual Activity    Alcohol use: No    Drug use: No    Sexual activity: Never     Family History   Problem Relation Age of Onset    Other Mother         PMR    Hypertension Mother     Cancer Father         Lung    Diabetes Brother      No Known Allergies  Current Outpatient Medications   Medication Sig Dispense Refill    FLUoxetine (PROzac) 10 mg capsule Take 1 Cap by mouth daily. 30 Cap 5    divalproex DR (DEPAKOTE) 500 mg tablet TAKE ONE TABLET BY MOUTH EVERY MORNING AND TWO TABS AT NIGHT 270 Tab 3    ramipril (ALTACE) 5 mg capsule TAKE ONE CAPSULE BY MOUTH ONE TIME DAILY  90 Cap 3    linaGLIPtin (TRADJENTA) 5 mg tablet Take 1 Tab by mouth daily. 90 Tab 3    metFORMIN ER (GLUCOPHAGE XR) 500 mg tablet Take 2 Tabs by mouth two (2) times a day. 360 Tab 3    atorvastatin (LIPITOR) 40 mg tablet take one tablet by mouth at bedtime 90 Tab 3    topiramate (TOPAMAX) 200 mg tablet TAKE ONE AND ONE-HALF TABLET BY MOUTH TWICE DAILY 270 Tab 3    glimepiride (AMARYL) 4 mg tablet TAKE TWO TABLETS BY MOUTH EVERY MORNING 180 Tab 3    pioglitazone (ACTOS) 45 mg tablet Take 1 Tab by mouth daily. Indications: type 2 diabetes mellitus 90 Tab 3    folic acid (FOLVITE) 1 mg tablet TAKE ONE TABLET BY MOUTH ONE TIME DAILY  90 Tab 3    triamcinolone acetonide (KENALOG) 0.1 % ointment       chlorhexidine (PERIDEX) 0.12 % solution       Calcium-Cholecalciferol, D3, (CALTRATE-600 PLUS VITAMIN D3) 600-400 mg-unit Tab Take  by mouth. Review of Systems   Constitutional: Positive for malaise/fatigue. Negative for weight loss. Gastrointestinal: Negative for constipation and diarrhea. Musculoskeletal: Positive for back pain.  Negative for joint pain. Neurological: Positive for weakness. Negative for tingling and focal weakness. Visit Vitals  /78 (BP 1 Location: Left arm, BP Patient Position: Sitting)   Pulse 81   Temp 97.7 °F (36.5 °C) (Oral)   Resp 12   Ht 6' (1.829 m)   Wt 209 lb (94.8 kg)   SpO2 98%   BMI 28.35 kg/m²     Physical Exam  Vitals signs and nursing note reviewed. Constitutional:       Appearance: Normal appearance. He is well-developed. HENT:      Head: Normocephalic and atraumatic. Eyes:      Conjunctiva/sclera: Conjunctivae normal.      Pupils: Pupils are equal, round, and reactive to light. Neck:      Musculoskeletal: Neck supple. Thyroid: No thyromegaly. Vascular: No carotid bruit. Cardiovascular:      Rate and Rhythm: Normal rate and regular rhythm. Chest Wall: PMI is not displaced. Pulses:           Dorsalis pedis pulses are 2+ on the right side and 2+ on the left side. Posterior tibial pulses are 2+ on the right side and 2+ on the left side. Heart sounds: Normal heart sounds, S1 normal and S2 normal. No murmur. No gallop. Pulmonary:      Effort: Pulmonary effort is normal.      Breath sounds: Normal breath sounds. No wheezing, rhonchi or rales. Abdominal:      General: Bowel sounds are normal. There is no distension or abdominal bruit. Palpations: Abdomen is soft. There is no hepatomegaly, splenomegaly or mass. Tenderness: There is no abdominal tenderness. Musculoskeletal:      Thoracic back: He exhibits no tenderness, no bony tenderness, no deformity and no spasm. Lumbar back: He exhibits decreased range of motion and tenderness. He exhibits no bony tenderness, no deformity and no spasm. Back:       Right lower le+ Pitting Edema (lower 2/3 of lower leg) present. Left lower le+ Pitting Edema (lower 2/3 of lower leg) present. Lymphadenopathy:      Cervical: No cervical adenopathy.       Upper Body:      Right upper body: No supraclavicular adenopathy. Left upper body: No supraclavicular adenopathy. Skin:     General: Skin is warm and dry. Findings: No rash. Comments: Both lower legs with pinpoint red macules and area of brown discoloration. Some dependent rubor. Neurological:      Mental Status: He is alert and oriented to person, place, and time. Sensory: No sensory deficit. Gait: Gait abnormal.      Deep Tendon Reflexes:      Reflex Scores:       Patellar reflexes are 2+ on the right side and 2+ on the left side. Achilles reflexes are 2+ on the right side and 2+ on the left side. Comments: Motor strength is difficult to test as he does not follow directions. He can resist my counter pressure on knee extension. Gait is somewhat uncoordinated. Not ppicking feet up appropriately. Not broad based, but unsteady upon turning around.     Psychiatric:         Attention and Perception: Attention normal.         Mood and Affect: Mood normal.         Speech: Speech normal.         Behavior: Behavior normal.       Results for orders placed or performed in visit on 04/01/20   AMB POC HEMOGLOBIN A1C   Result Value Ref Range    Hemoglobin A1c (POC) 8.8 %       Lab Results   Component Value Date/Time    Hemoglobin A1c 10.2 (H) 01/15/2019 11:41 AM    Hemoglobin A1c (POC) 8.2 11/19/2019 01:30 PM     Lab Results   Component Value Date/Time    Microalb/Creat ratio (ug/mg creat.) 4.5 08/01/2019 12:17 PM     Lab Results   Component Value Date/Time    Cholesterol, total 112 04/25/2019 11:58 AM    HDL Cholesterol 39 (L) 04/25/2019 11:58 AM    LDL, calculated 52 04/25/2019 11:58 AM    VLDL, calculated 21 04/25/2019 11:58 AM    Triglyceride 105 04/25/2019 11:58 AM    CHOL/HDL Ratio 2.8 03/31/2009 08:40 AM     Lab Results   Component Value Date/Time    Sodium 142 04/25/2019 11:58 AM    Potassium 5.0 04/25/2019 11:58 AM    Chloride 108 (H) 04/25/2019 11:58 AM    CO2 22 04/25/2019 11:58 AM    Anion gap 11 02/27/2018 03:53 AM    Glucose 221 (H) 04/25/2019 11:58 AM    BUN 20 04/25/2019 11:58 AM    Creatinine 0.78 04/25/2019 11:58 AM    BUN/Creatinine ratio 26 (H) 04/25/2019 11:58 AM    GFR est  04/25/2019 11:58 AM    GFR est non-AA 97 04/25/2019 11:58 AM    Calcium 9.3 04/25/2019 11:58 AM    Bilirubin, total 0.2 04/25/2019 11:58 AM    AST (SGOT) 13 04/25/2019 11:58 AM    Alk. phosphatase 47 04/25/2019 11:58 AM    Protein, total 6.4 04/25/2019 11:58 AM    Albumin 3.8 04/25/2019 11:58 AM    Globulin 3.8 02/27/2018 03:53 AM    A-G Ratio 1.5 04/25/2019 11:58 AM    ALT (SGPT) 10 04/25/2019 11:58 AM           ASSESSMENT and PLAN    ICD-10-CM ICD-9-CM    1. Fatigue, unspecified type R53.83 780.79 TSH AND FREE T4   2. Weakness of both lower extremities R29.898 729.89 REFERRAL TO PHYSICAL THERAPY   3. Current moderate episode of major depressive disorder without prior episode (HCC) F32.1 296.22 FLUoxetine (PROzac) 10 mg capsule   4. Uncontrolled type 2 diabetes mellitus without complication, without long-term current use of insulin (HCC) E11.65 250.02 VITAMIN B12      AMB POC HEMOGLOBIN A1C   5. Hypertension, essential I10 401.9    6. Hypercholesterolemia E78.00 272.0    7. Venous stasis of lower extremity I87.8 459.81    8. Strain of lumbar region, initial encounter S39.012A 847.2 REFERRAL TO PHYSICAL THERAPY   9. Seizure disorder ([facility] [address] G40.909 345.90    10. Screen for colon cancer Z12.11 V76.51 OCCULT BLOOD IMMUNOASSAY,DIAGNOSTIC   11. Screening for depression Z13.31 V79.0 [address]16 [employer]   12. Anhedonia  R45.84 780.99 VITAMIN B12     Diagnoses and all orders for this visit:    1. Fatigue, unspecified type  May be depression related. Already has order for CBC and CMP. Will add thyroid and B12.   -     TSH AND FREE T4    2. Weakness of both lower extremities  He never fully recovered strength after episode of pneumonia a year or two ago. He retreated to bed for back pain and that has aggravated weakness.  He is generally inactive further worsening this. Exclude B12 deficiency due to long term metformin therapy.   -     REFERRAL TO PHYSICAL THERAPY    3. Current moderate episode of major depressive disorder without prior episode ([address]  Hard to evaluate mood as he is a poor historian, but behavioral symptoms are consistent with this diagnosis. -     FLUoxetine (PROzac) 10 mg capsule; Take 1 Cap by mouth daily. 4. Uncontrolled type 2 diabetes mellitus without complication, without long-term current use of insulin ([facility] [address]  He is not a good candidate for insulin or other injectables. Expense of other medications is a problem. Would stop pioglitazone  to see is edema improves, but then sugars will rise further. Will continue current therapy. -     VITAMIN B12  -     AMB POC HEMOGLOBIN A1C    5. Hypertension, essential  Hypertension is controlled. 6. Hypercholesterolemia  Hyperlipidemia is controlled    7. Venous stasis of lower extremity  Has early stage of venous stasis dermatitis. Advised to wear compression stockings. Can call us with compression grade on the sock and we can order additional pairs. 8. Strain of lumbar region, initial encounter  He is improving since onset. He gave into pain and retreated to bed; now weak due to bedrest.   -     REFERRAL TO PHYSICAL THERAPY    9. Seizure disorder (HCC)  Stable    10. Screen for colon cancer  -     OCCULT BLOOD IMMUNOASSAY,DIAGNOSTIC    11. Screening for depression  On the basis of positive PHQ-9 screening (PHQ 9 Score: 17), patient instructed to schedule follow-up visit at this practice. Patient will follow-up in 1 month. -     RI DEPRESSION SCREEN ANNUAL    12. Anhedonia   -     VITAMIN B12      Follow-up and Dispositions    · Return in about 1 month (around 5/1/2020) for depression, weakness, edema .        lab results and schedule of future lab studies reviewed with patient  reviewed diet, exercise and weight control  I have discussed the diagnosis, evaluation and treatment options and the intended plan with the patient. Patient understands and is in agreement. The patient has received an after-visit summary and questions were answered concerning future plans. I have discussed side effects and warnings of any new medications with the patient as well.

## 2020-04-03 LAB — SPECIMEN STATUS REPORT, ROLRST: NORMAL

## 2020-04-03 NOTE — PROGRESS NOTES
Verified two patient identifiers, name and . Pt's mother notified of results, lab test added. Pt had no further questions at this time.

## 2020-04-03 NOTE — PROGRESS NOTES
Inform his mother that his is mildly anemic. Platelet count is still low, but stable. B12 is normal, but we should try to add a folate and iron profile to blood in the lab. Orders have been entered. Thyroid, kidney and liver testst are normal. Cholesterol levels are excellent.

## 2020-04-06 LAB
ALBUMIN SERPL-MCNC: 3.7 G/DL (ref 3.8–4.8)
ALBUMIN/GLOB SERPL: 1.5 {RATIO} (ref 1.2–2.2)
ALP SERPL-CCNC: 69 IU/L (ref 39–117)
ALT SERPL-CCNC: 17 IU/L (ref 0–44)
AST SERPL-CCNC: 11 IU/L (ref 0–40)
BASOPHILS # BLD AUTO: 0 X10E3/UL (ref 0–0.2)
BASOPHILS NFR BLD AUTO: 0 %
BILIRUB SERPL-MCNC: 0.2 MG/DL (ref 0–1.2)
BUN SERPL-MCNC: 21 MG/DL (ref 8–27)
BUN/CREAT SERPL: 26 (ref 10–24)
CALCIUM SERPL-MCNC: 9.1 MG/DL (ref 8.6–10.2)
CHLORIDE SERPL-SCNC: 108 MMOL/L (ref 96–106)
CHOLEST SERPL-MCNC: 122 MG/DL (ref 100–199)
CO2 SERPL-SCNC: 21 MMOL/L (ref 20–29)
CREAT SERPL-MCNC: 0.81 MG/DL (ref 0.76–1.27)
EOSINOPHIL # BLD AUTO: 1.2 X10E3/UL (ref 0–0.4)
EOSINOPHIL NFR BLD AUTO: 19 %
ERYTHROCYTE [DISTWIDTH] IN BLOOD BY AUTOMATED COUNT: 12.9 % (ref 11.6–15.4)
FOLATE SERPL-MCNC: 14.4 NG/ML
GLOBULIN SER CALC-MCNC: 2.5 G/DL (ref 1.5–4.5)
GLUCOSE SERPL-MCNC: 175 MG/DL (ref 65–99)
HCT VFR BLD AUTO: 35.6 % (ref 37.5–51)
HDLC SERPL-MCNC: 33 MG/DL
HEMOCCULT STL QL IA: NORMAL
HGB BLD-MCNC: 11.6 G/DL (ref 13–17.7)
IMM GRANULOCYTES # BLD AUTO: 0.1 X10E3/UL (ref 0–0.1)
IMM GRANULOCYTES NFR BLD AUTO: 2 %
IRON SATN MFR SERPL: 28 % (ref 15–55)
IRON SERPL-MCNC: 82 UG/DL (ref 38–169)
LDLC SERPL CALC-MCNC: 56 MG/DL (ref 0–99)
LYMPHOCYTES # BLD AUTO: 2.1 X10E3/UL (ref 0.7–3.1)
LYMPHOCYTES NFR BLD AUTO: 34 %
MCH RBC QN AUTO: 31.8 PG (ref 26.6–33)
MCHC RBC AUTO-ENTMCNC: 32.6 G/DL (ref 31.5–35.7)
MCV RBC AUTO: 98 FL (ref 79–97)
MONOCYTES # BLD AUTO: 1.2 X10E3/UL (ref 0.1–0.9)
MONOCYTES NFR BLD AUTO: 20 %
MORPHOLOGY BLD-IMP: ABNORMAL
NEUTROPHILS # BLD AUTO: 1.5 X10E3/UL (ref 1.4–7)
NEUTROPHILS NFR BLD AUTO: 25 %
PLATELET # BLD AUTO: 71 X10E3/UL (ref 150–450)
POTASSIUM SERPL-SCNC: 4.8 MMOL/L (ref 3.5–5.2)
PROT SERPL-MCNC: 6.2 G/DL (ref 6–8.5)
RBC # BLD AUTO: 3.65 X10E6/UL (ref 4.14–5.8)
SODIUM SERPL-SCNC: 142 MMOL/L (ref 134–144)
SPECIMEN STATUS REPORT, ROLRST: NORMAL
T4 FREE SERPL-MCNC: 1.09 NG/DL (ref 0.82–1.77)
TIBC SERPL-MCNC: 290 UG/DL (ref 250–450)
TRIGL SERPL-MCNC: 166 MG/DL (ref 0–149)
TSH SERPL DL<=0.005 MIU/L-ACNC: 4 UIU/ML (ref 0.45–4.5)
UIBC SERPL-MCNC: 208 UG/DL (ref 111–343)
VIT B12 SERPL-MCNC: 649 PG/ML (ref 232–1245)
VLDLC SERPL CALC-MCNC: 33 MG/DL (ref 5–40)
WBC # BLD AUTO: 6.1 X10E3/UL (ref 3.4–10.8)

## 2020-04-07 NOTE — PROGRESS NOTES
Verified patients name and date of birth. Spoke with Lucas Son and gave lab results. Holger Josue agrees to PCP reaching out to Dr Sonia Fairchild.

## 2020-04-07 NOTE — PROGRESS NOTES
Dr Imelda Lanes, please review hematology results and give opinion on how soon he should be seen at your office. He is intellectually limited and communication needs to be through his mother. His major complaint is fatigue.

## 2020-04-07 NOTE — PROGRESS NOTES
Inform patient's mother that all of the vitamins and minerals needed to make blood cells are normal. Nevertheless he is anemic. There are occasional premature white blood cells. That along with low platelet count might suggest a problem in the bone marrow. He will need to see a hematologist about this. Sooner would be better than later. I suggest Dr Natty Herrmann at [facility]. If it is all right with mother, I can send him a copy of the lab and ask his opinion on how soon he should be seen. Let me know.

## 2020-04-08 NOTE — PROGRESS NOTES
Inform patient's mother that the hematologist's office will be contacting them about an appointment to be seen in a few weeks.

## 2020-04-09 NOTE — PROGRESS NOTES
I called patients mother and gave her all of the information. She doesn't have a cell phone, so I asked that she just come up stairs and we will check him in to be seen.

## 2020-04-10 LAB — HEMOCCULT STL QL IA: NEGATIVE

## 2020-04-16 ENCOUNTER — TELEPHONE (OUTPATIENT)
Dept: OTHER | Age: 65
End: 2020-04-16

## 2020-04-16 NOTE — TELEPHONE ENCOUNTER
This writer attempted outreach to patient related to signing up for [employer] for a virtual visit. Number in chart did not answer and no voicemail for a message.

## 2020-04-23 ENCOUNTER — OFFICE VISIT (OUTPATIENT)
Dept: ONCOLOGY | Age: 65
End: 2020-04-23

## 2020-04-23 ENCOUNTER — DOCUMENTATION ONLY (OUTPATIENT)
Dept: ONCOLOGY | Age: 65
End: 2020-04-23

## 2020-04-23 VITALS
TEMPERATURE: 98.7 F | DIASTOLIC BLOOD PRESSURE: 75 MMHG | BODY MASS INDEX: 26.82 KG/M2 | HEIGHT: 72 IN | WEIGHT: 198 LBS | HEART RATE: 72 BPM | SYSTOLIC BLOOD PRESSURE: 143 MMHG | OXYGEN SATURATION: 97 %

## 2020-04-23 DIAGNOSIS — D64.9 ANEMIA, UNSPECIFIED TYPE: Primary | ICD-10-CM

## 2020-04-23 DIAGNOSIS — D69.6 THROMBOCYTOPENIA (HCC): ICD-10-CM

## 2020-04-23 NOTE — PROGRESS NOTES
Karine Haley is a 62 y.o. cauc male here for new patient appt for:  Chief Complaint   Patient presents with    Anemia     MAYO   Pt presents with mom for communication needs. Pt has Type II Diabetes Mellitis    1. Have you been to the ER, urgent care clinic since your last visit? Hospitalized since your last visit? New Pt    2. Have you seen or consulted any other health care providers outside of the [address] [place] [address] since your last visit? Include any pap smears or colon screening. New Pt    Labs 4/2    Platelets 71 RBC 1.50  Hgb 11.6  HCT 35.6    Pt has been having fatigue for about one year. Pt had a tooth pulled one week ago. Still having some pain from that.

## 2020-04-23 NOTE — PROGRESS NOTES
[facility]  [facility], [address] [place] [address] [place]  [place], [address] [facility]  [phone]      Hematology Consultation        Patient: Regina Rubio MRN: [id number]  SSN: [id number]    YOB: 1955  Age: 62 y.o. Sex: male        Subjective:      Regina Rubio is a 62 y.o. male who I am seeing in consultation for progressive thrombocytopenia. He suffers with cognitive impairment and behavioral disorder. Routine laboratory studies show slowly dropping platelet count over the last few years. He is asymptomatic. No bleeding. The history has been obtained from the patient's mother. Review of Systems:    Constitutional: negative  Eyes: negative  Ears, Nose, Mouth, Throat, and Face: negative  Respiratory: negative  Cardiovascular: negative  Gastrointestinal: negative  Genitourinary:negative  Integument/Breast: negative  Hematologic/Lymphatic: negative  Musculoskeletal:negative  Neurological: negative      Past Medical History:   Diagnosis Date    Contact dermatitis and other eczema, due to unspecified cause     psoriasis    Diabetes ([address]     Eosinophilia     Hypercholesterolemia     Hypertension     Mental retardation     Seizures (HCC)     Skin cancer      Past Surgical History:   Procedure Laterality Date    ENDOSCOPY, COLON, DIAGNOSTIC  11/08/2007    Dr Dionisio Hyde normal except small internal hemorrhoids repeat 11/2017      Family History   Problem Relation Age of Onset    Other Mother         PMR    Hypertension Mother     Cancer Father         Lung    Diabetes Brother      Social History     Tobacco Use    Smoking status: Never Smoker    Smokeless tobacco: Never Used   Substance Use Topics    Alcohol use: No      Prior to Admission medications    Medication Sig Start Date End Date Taking?  Authorizing Provider   divalproleighann FLORES (DEPAKOTE) 500 mg tablet TAKE ONE TABLET BY MOUTH EVERY MORNING AND TWO TABS AT NIGHT 1/19/20  Yes Yanci Thomas MD   ramipril (ALTACE) 5 mg capsule TAKE ONE CAPSULE BY MOUTH ONE TIME DAILY  1/15/20  Yes Yanci Thomas MD   linaGLIPtin (TRADJENTA) 5 mg tablet Take 1 Tab by mouth daily. 9/30/19  Yes Yanci Thomas MD   metFORMIN ER (GLUCOPHAGE XR) 500 mg tablet Take 2 Tabs by mouth two (2) times a day. 8/27/19  Yes Yanci Thomas MD   atorvastatin (LIPITOR) 40 mg tablet take one tablet by mouth at bedtime 8/27/19  Yes Yanci Thomas MD   topiramate (TOPAMAX) 200 mg tablet TAKE ONE AND ONE-HALF TABLET BY MOUTH TWICE DAILY 8/27/19  Yes Yanci Thomas MD   glimepiride (AMARYL) 4 mg tablet TAKE TWO TABLETS BY MOUTH EVERY MORNING 8/27/19  Yes Yanci Thomas MD   pioglitazone (ACTOS) 45 mg tablet Take 1 Tab by mouth daily. Indications: type 2 diabetes mellitus 8/1/19  Yes aYnci Thomas MD   folic acid (FOLVITE) 1 mg tablet TAKE ONE TABLET BY MOUTH ONE TIME DAILY  7/12/19  Yes Yanci Thomas MD   triamcinolone acetonide (KENALOG) 0.1 % ointment  1/30/19  Yes Provider, Historical   chlorhexidine (PERIDEX) 0.12 % solution  2/8/18  Yes Provider, Historical   Calcium-Cholecalciferol, D3, (CALTRATE-600 PLUS VITAMIN D3) 600-400 mg-unit Tab Take  by mouth. Yes Provider, Historical   FLUoxetine (PROzac) 10 mg capsule Take 1 Cap by mouth daily. 4/1/20   Yanci Thomas MD              No Known Allergies        Objective:     Vitals:    04/23/20 0817   BP: 143/75   Pulse: 72   Temp: 98.7 °F (37.1 °C)   TempSrc: Oral   SpO2: 97%   Weight: 198 lb (89.8 kg)   Height: 6' (1.829 m)            Physical Exam:    GENERAL: alert, cooperative, no distress, appears stated age  EYE: conjunctivae/corneas clear. PERRL, EOM's intact  LYMPHATIC: Cervical, supraclavicular, and axillary nodes normal.   THROAT & NECK: normal and no erythema or exudates noted.    LUNG: clear to auscultation bilaterally  HEART: regular rate and rhythm, S1, S2 normal, no murmur, click, rub or gallop  ABDOMEN: soft, non-tender. Bowel sounds normal. No masses,  no organomegaly  EXTREMITIES:  extremities normal, atraumatic, no cyanosis or edema  SKIN: Normal.  NEUROLOGIC: AOx3. Gait normal. Reflexes and motor strength normal and symmetric. Cranial nerves 2-12 and sensation grossly intact. Lab Results   Component Value Date/Time    WBC 6.1 04/02/2020 09:06 AM    HGB 11.6 (L) 04/02/2020 09:06 AM    HCT 35.6 (L) 04/02/2020 09:06 AM    PLATELET 71 (LL) 89/21/7552 09:06 AM    MCV 98 (H) 04/02/2020 09:06 AM             Assessment:     1. Moderate thrombocytopenia:  2. Mild anemia    Platelet has been dropping steadily. No bleeding. Will obtain a bone marrow biopsy    I discussed the possible causes of thrombocytopenia. Plan:       > Repeat CBC with differential.  > Bone marrow biopsy  > Return in 3-4 weeks      Signed by: Erika Moncada MD                     April 23, 2020       CC.  Fabiano Bird MD

## 2020-04-28 PROBLEM — F32.1 CURRENT MODERATE EPISODE OF MAJOR DEPRESSIVE DISORDER WITHOUT PRIOR EPISODE (HCC): Status: ACTIVE | Noted: 2020-04-28

## 2020-04-28 NOTE — PROGRESS NOTES
Karine Haley is a 62 y.o. male who was seen by synchronous (real-time) audio-video technology on 4/29/2020. Consent: Karine Haley, who was seen by synchronous (real-time) audio-video technology, and/or his healthcare decision maker, is aware that this patient-initiated, Telehealth encounter on 4/29/2020 is a billable service, with coverage as determined by his insurance carrier. He is aware that he may receive a bill and has provided verbal consent to proceed: Yes. Assessment & Plan:   Diagnoses and all orders for this visit:    1. Adjustment disorder with depressed mood  It appears depression symptoms have resolved as back pain has improved. He appears and sound back to normal.     2. Venous stasis of lower extremity  Edema much improved with OTC compression stockings. 3. Thrombocytopenia ([address]  Now with anemia in addition. He is to have bone marrow biopsy tomorrow. He is to follow up in 4 months for DM, HTN, chol, SZ with fasting lab one week prior     I spent at least 23 minutes on this visit with this established patient. ([id number])    Subjective:   Karine Haley is a 62 y.o. male who was seen for No chief complaint on file. He is seen for followup of depression. At visit on April 1, he complained of poor appetite without weight loss. He was spending a lot of the day in bed. Mom had a hard time getting him up. He goes outside to hit golf balls and within minutes is either sitting on the porch or back in the house. It was hard to assess mood, but other symptoms were consistent with depression and fluoxetiine was prescribed. However mother did not fill prescription . He is doing much better. He appears bright and happy as he did previously He is conversant. His appetiie is back to normal and he is once again hitting golf balls in the yard. He has no difficulty getting out of bed it he morning.   Improvement is coincident with resolution of back pain     He has seen Dr Angelica Lay about anemia and thrombocytopenia; bone marrow exam is planned for April 30. He complained of erythema and swelling in both legs. Had been prescribed compression stockings in past, but did not wear them. He is now wearing them 20-30 mm Hg OTC, Edema is much better.      Patient Active Problem List   Diagnosis Code    Intellectual disability F68    Seizure disorder ([facility] G40.909    Psoriasis L40.9    Venous stasis of lower extremity I87.8    Thrombocytopenia ([facility]) D69.6    Sleep disorder G47.9    Hypertension, essential I10    Strongyloides stercoralis infection B78.9    Eosinophilia D72.1    Hypercholesterolemia E78.00    Uncontrolled type 2 diabetes mellitus without complication, without long-term current use of insulin ([facility]) E11.65    Current moderate episode of major depressive disorder without prior episode ([facility] F32.1     Past Medical History:   Diagnosis Date    Contact dermatitis and other eczema, due to unspecified cause     psoriasis    Diabetes ([facility]     Eosinophilia     Hypercholesterolemia     Hypertension     Mental retardation     Se[facility]ures [facility]     Skin cancer      Past Surgical History:   Procedure Laterality Date    ENDOSCOPY, COLON, DIAGNOSTIC  11/08/2007    Dr Easley Nearing normal except small internal hemorrhoids repeat 11/2017     Social History     Socioeconomic History    Marital status: SINGLE     Spouse name: Not on file    Number of children: Not on file    Years of education: Not on file    Highest education level: Not on file   Occupational History     Comment: [employer]    Tobacco Use    Smoking status: Never Smoker    Smokeless tobacco: Never Used   Substance and Sexual Activity    Alcohol use: No    Drug use: No    Sexual activity: Never     Family History   Problem Relation Age of Onset    Other Mother         PMR    Hypertension Mother     Cancer Father         Lung    Diabetes Brother      No Known Allergies  Current Outpatient Medications   Medication Sig Dispense Refill    HYDROcodone-acetaminophen (NORCO) 5-325 mg per tablet       FLUoxetine (PROzac) 10 mg capsule Take 1 Cap by mouth daily. 30 Cap 5    divalproex DR (DEPAKOTE) 500 mg tablet TAKE ONE TABLET BY MOUTH EVERY MORNING AND TWO TABS AT NIGHT 270 Tab 3    ramipril (ALTACE) 5 mg capsule TAKE ONE CAPSULE BY MOUTH ONE TIME DAILY  90 Cap 3    linaGLIPtin (TRADJENTA) 5 mg tablet Take 1 Tab by mouth daily. 90 Tab 3    metFORMIN ER (GLUCOPHAGE XR) 500 mg tablet Take 2 Tabs by mouth two (2) times a day. 360 Tab 3    atorvastatin (LIPITOR) 40 mg tablet take one tablet by mouth at bedtime 90 Tab 3    topiramate (TOPAMAX) 200 mg tablet TAKE ONE AND ONE-HALF TABLET BY MOUTH TWICE DAILY 270 Tab 3    glimepiride (AMARYL) 4 mg tablet TAKE TWO TABLETS BY MOUTH EVERY MORNING 180 Tab 3    pioglitazone (ACTOS) 45 mg tablet Take 1 Tab by mouth daily. Indications: type 2 diabetes mellitus 90 Tab 3    folic acid (FOLVITE) 1 mg tablet TAKE ONE TABLET BY MOUTH ONE TIME DAILY  90 Tab 3    triamcinolone acetonide (KENALOG) 0.1 % ointment       chlorhexidine (PERIDEX) 0.12 % solution Take 15 mL by mouth every twelve (12) hours.  Calcium-Cholecalciferol, D3, (CALTRATE-600 PLUS VITAMIN D3) 600-400 mg-unit Tab Take  by mouth.  ibuprofen (MOTRIN) 600 mg tablet                  ROS    Objective:   Vital Signs: (As obtained by patient/caregiver at home)  There were no vitals taken for this visit.      [INSTRUCTIONS:  \"[x]\" Indicates a positive item  \"[]\" Indicates a negative item  -- DELETE ALL ITEMS NOT EXAMINED]    Constitutional: [x] Appears well-developed and well-nourished [x] No apparent distress      [] Abnormal -     Mental status: [x] Alert and awake  [x] Oriented to person/place/time [x] Able to follow commands    [] Abnormal -     Eyes:   EOM    [x]  Normal    [] Abnormal -   Sclera  [x]  Normal    [] Abnormal -          Discharge [x]  None visible   [] Abnormal -     HENT: [x] Normocephalic, atraumatic  [] Abnormal -   [x] Mouth/Throat: Mucous membranes are moist    External Ears [x] Normal  [] Abnormal -    Neck: [x] No visualized mass [] Abnormal -     Pulmonary/Chest: [x] Respiratory effort normal   [x] No visualized signs of difficulty breathing or respiratory distress        [] Abnormal -      Musculoskeletal:   [x] Normal gait with no signs of ataxia         [x] Normal range of motion of neck        [] Abnormal -     Neurological:        [x] No Facial Asymmetry (Cranial nerve 7 motor function) (limited exam due to video visit)          [x] No gaze palsy        [] Abnormal -          Skin:        [x] No significant exanthematous lesions or discoloration noted on facial skin         [] Abnormal -            Psychiatric:       [x] Normal Affect [] Abnormal -        [x] No Hallucinations    Other pertinent observable physical exam findings:-        We discussed the expected course, resolution and complications of the diagnosis(es) in detail. Medication risks, benefits, costs, interactions, and alternatives were discussed as indicated. I advised him to contact the office if his condition worsens, changes or fails to improve as anticipated. He expressed understanding with the diagnosis(es) and plan. Alexandre Chin is a 62 y.o. male who was evaluated by a video visit encounter for concerns as above. Patient identification was verified prior to start of the visit. A caregiver was present when appropriate. Due to this being a TeleHealth encounter (During [place] [address] public health emergency), evaluation of the following organ systems was limited: Vitals/Constitutional/EENT/Resp/CV/GI//MS/Neuro/Skin/Heme-Lymph-Imm.   Pursuant to the emergency declaration under the [facility], [facility] waiver authority and the [employer] Act, this Virtual  Visit was conducted, with patient's (and/or legal guardian's) consent, to reduce the patient's risk of exposure to COVID-19 and provide necessary medical care. Services were provided through a video synchronous discussion virtually to substitute for in-person clinic visit. Patient and provider were located at their individual homes.       Daniel Nieves MD

## 2020-04-29 ENCOUNTER — VIRTUAL VISIT (OUTPATIENT)
Dept: INTERNAL MEDICINE CLINIC | Facility: CLINIC | Age: 65
End: 2020-04-29

## 2020-04-29 DIAGNOSIS — D69.6 THROMBOCYTOPENIA (HCC): ICD-10-CM

## 2020-04-29 DIAGNOSIS — F43.21 ADJUSTMENT DISORDER WITH DEPRESSED MOOD: Primary | ICD-10-CM

## 2020-04-29 DIAGNOSIS — I87.8 VENOUS STASIS OF LOWER EXTREMITY: ICD-10-CM

## 2020-04-29 PROBLEM — F32.1 CURRENT MODERATE EPISODE OF MAJOR DEPRESSIVE DISORDER WITHOUT PRIOR EPISODE (HCC): Status: RESOLVED | Noted: 2020-04-28 | Resolved: 2020-04-29

## 2020-04-29 RX ORDER — IBUPROFEN 600 MG/1
TABLET ORAL
COMMUNITY
Start: 2020-04-22 | End: 2020-08-19

## 2020-04-29 RX ORDER — HYDROCODONE BITARTRATE AND ACETAMINOPHEN 5; 325 MG/1; MG/1
TABLET ORAL
COMMUNITY
Start: 2020-04-15 | End: 2020-08-19

## 2020-04-29 RX ORDER — PENICILLIN V POTASSIUM 500 MG/1
TABLET, FILM COATED ORAL
COMMUNITY
Start: 2020-04-22 | End: 2020-04-29 | Stop reason: ALTCHOICE

## 2020-04-29 NOTE — PROGRESS NOTES
Vidhya Gómez  Identified pt with two pt identifiers(name and ). Chief Complaint   Patient presents with    Diabetes     had tooth pulled x 2 weeks ago- finished PCN// has PRN  [place] [facility] last week and has bone marrow bx scheduled for tomorrow     Hypertension    Cholesterol Problem       1. Have you been to the ER, urgent care clinic since your last visit? Hospitalized since your last visit? NO    2. Have you seen or consulted any other health care providers outside of the [address] [place] [address] since your last visit? Include any pap smears or colon screening. NO      Provider notified of reason for visit, vitals and flowsheets obtained on patients.      Reviewed record In preparation for visit, huddled with provider and have obtained necessary documentation      Health Maintenance Due   Topic    Eye Exam Retinal or Dilated        Wt Readings from Last 3 Encounters:   20 198 lb (89.8 kg)   20 209 lb (94.8 kg)   20 202 lb (91.6 kg)     Temp Readings from Last 3 Encounters:   20 98.7 °F (37.1 °C) (Oral)   20 97.7 °F (36.5 °C) (Oral)   20 (!) 101 °F (38.3 °C)     BP Readings from Last 3 Encounters:   20 143/75   20 131/78   20 138/80     Pulse Readings from Last 3 Encounters:   20 72   20 81   20 89     Vitals:    20 0906   PainSc:   0 - No pain         Learning Assessment:  :     Learning Assessment 2014   PRIMARY LEARNER Patient   HIGHEST LEVEL OF EDUCATION - PRIMARY LEARNER  DID NOT GRADUATE HIGH SCHOOL   BARRIERS PRIMARY LEARNER COGNITIVE   CO-LEARNER CAREGIVER Yes   CO-LEARNER NAME Lalit Redd   PRIMARY LANGUAGE ENGLISH   LEARNER PREFERENCE PRIMARY DEMONSTRATION     READING   ANSWERED BY mother   RELATIONSHIP LEGAL GUARDIAN       Depression Screening:  :     3 most recent PHQ Screens 2020   Little interest or pleasure in doing things Nearly every day   Feeling down, depressed, irritable, or hopeless Several days   Total Score PHQ 2 4   Trouble falling or staying asleep, or sleeping too much More than half the days   Feeling tired or having little energy Nearly every day   Poor appetite, weight loss, or overeating Nearly every day   Feeling bad about yourself - or that you are a failure or have let yourself or your family down Not at all   Trouble concentrating on things such as school, work, reading, or watching TV More than half the days   Moving or speaking so slowly that other people could have noticed; or the opposite being so fidgety that others notice Nearly every day   Thoughts of being better off dead, or hurting yourself in some way Not at all   PHQ 9 Score 17   How difficult have these problems made it for you to do your work, take care of your home and get along with others Very difficult       Fall Risk Assessment:  :     No flowsheet data found. Abuse Screening:  :     Abuse Screening Questionnaire 6/4/2019 10/16/2018 2/16/2017   Do you ever feel afraid of your partner? N N N   Are you in a relationship with someone who physically or mentally threatens you? N N N   Is it safe for you to go home? Y Y Y       ADL Screening:  :     ADL Assessment 4/16/2015   Feeding yourself No Help Needed   Getting from bed to chair No Help Needed   Getting dressed No Help Needed   Bathing or showering No Help Needed   Walk across the room (includes cane/walker) No Help Needed   Using the telphone No Help Needed   Taking your medications Help Needed   Preparing meals Help Needed   Managing money (expenses/bills) Help Needed   Moderately strenuous housework (laundry) No Help Needed   Shopping for personal items (toiletries/medicines) No Help Needed   Shopping for groceries No Help Needed   Driving Help Needed   Climbing a flight of stairs No Help Needed   Getting to places beyond walking distances Help Needed         Medication reconciliation up to date and corrected with patient at this time.

## 2020-04-30 ENCOUNTER — HOSPITAL ENCOUNTER (OUTPATIENT)
Dept: CT IMAGING | Age: 65
Discharge: HOME | End: 2020-04-30
Attending: INTERNAL MEDICINE
Payer: MEDICARE

## 2020-04-30 VITALS
OXYGEN SATURATION: 98 % | DIASTOLIC BLOOD PRESSURE: 61 MMHG | SYSTOLIC BLOOD PRESSURE: 160 MMHG | RESPIRATION RATE: 22 BRPM | HEART RATE: 88 BPM

## 2020-04-30 VITALS
WEIGHT: 204 LBS | SYSTOLIC BLOOD PRESSURE: 152 MMHG | DIASTOLIC BLOOD PRESSURE: 79 MMHG | OXYGEN SATURATION: 100 % | HEART RATE: 72 BPM | TEMPERATURE: 99 F | HEIGHT: 73 IN | BODY MASS INDEX: 27.04 KG/M2

## 2020-04-30 DIAGNOSIS — D69.6 THROMBOCYTOPENIA (HCC): Primary | ICD-10-CM

## 2020-04-30 DIAGNOSIS — D64.9 ANEMIA, UNSPECIFIED TYPE: ICD-10-CM

## 2020-04-30 LAB
BASOPHILS # BLD: 0 K/UL (ref 0–0.1)
BASOPHILS NFR BLD: 0 % (ref 0–1)
DIFFERENTIAL METHOD BLD: ABNORMAL
EOSINOPHIL # BLD: 0.8 K/UL (ref 0–0.4)
EOSINOPHIL NFR BLD: 11 % (ref 0–7)
ERYTHROCYTE [DISTWIDTH] IN BLOOD BY AUTOMATED COUNT: 13.7 % (ref 11.5–14.5)
HCT VFR BLD AUTO: 38 % (ref 36.6–50.3)
HGB BLD-MCNC: 12 G/DL (ref 12.1–17)
IMM GRANULOCYTES # BLD AUTO: 0 K/UL (ref 0–0.04)
IMM GRANULOCYTES NFR BLD AUTO: 0 % (ref 0–0.5)
LYMPHOCYTES # BLD: 3 K/UL (ref 0.8–3.5)
LYMPHOCYTES NFR BLD: 42 % (ref 12–49)
MCH RBC QN AUTO: 33.1 PG (ref 26–34)
MCHC RBC AUTO-ENTMCNC: 31.6 G/DL (ref 30–36.5)
MCV RBC AUTO: 104.7 FL (ref 80–99)
MONOCYTES # BLD: 0.4 K/UL (ref 0–1)
MONOCYTES NFR BLD: 6 % (ref 5–13)
NEUTS BAND NFR BLD MANUAL: 2 %
NEUTS SEG # BLD: 2.9 K/UL (ref 1.8–8)
NEUTS SEG NFR BLD: 39 % (ref 32–75)
NRBC # BLD: 0 K/UL (ref 0–0.01)
NRBC BLD-RTO: 0 PER 100 WBC
PLATELET # BLD AUTO: 67 K/UL (ref 150–400)
PMV BLD AUTO: 10 FL (ref 8.9–12.9)
RBC # BLD AUTO: 3.63 M/UL (ref 4.1–5.7)
RBC MORPH BLD: ABNORMAL
WBC # BLD AUTO: 7.1 K/UL (ref 4.1–11.1)

## 2020-04-30 PROCEDURE — 77030028872 HC BN BIOP NDL ON CNTRL TY TELE -C

## 2020-04-30 PROCEDURE — 77030003666 HC NDL SPINAL BD -A

## 2020-04-30 PROCEDURE — 76380 CAT SCAN FOLLOW-UP STUDY: CPT

## 2020-04-30 PROCEDURE — 36415 COLL VENOUS BLD VENIPUNCTURE: CPT

## 2020-04-30 PROCEDURE — 77030014115

## 2020-04-30 PROCEDURE — 74011250636 HC RX REV CODE- 250/636: Performed by: RADIOLOGY

## 2020-04-30 PROCEDURE — 77030003375 HC MRK BIOP BEEK -A

## 2020-04-30 PROCEDURE — 85025 COMPLETE CBC W/AUTO DIFF WBC: CPT

## 2020-04-30 RX ORDER — SODIUM CHLORIDE 9 MG/ML
25 INJECTION, SOLUTION INTRAVENOUS CONTINUOUS
Status: DISCONTINUED | OUTPATIENT
Start: 2020-04-30 | End: 2020-05-04 | Stop reason: HOSPADM

## 2020-04-30 RX ORDER — MIDAZOLAM HYDROCHLORIDE 1 MG/ML
5 INJECTION, SOLUTION INTRAMUSCULAR; INTRAVENOUS
Status: DISCONTINUED | OUTPATIENT
Start: 2020-04-30 | End: 2020-05-04 | Stop reason: HOSPADM

## 2020-04-30 RX ORDER — FENTANYL CITRATE 50 UG/ML
100 INJECTION, SOLUTION INTRAMUSCULAR; INTRAVENOUS
Status: DISCONTINUED | OUTPATIENT
Start: 2020-04-30 | End: 2020-05-04 | Stop reason: HOSPADM

## 2020-04-30 RX ADMIN — MIDAZOLAM 2 MG: 1 INJECTION INTRAMUSCULAR; INTRAVENOUS at 10:55

## 2020-04-30 RX ADMIN — SODIUM CHLORIDE 25 ML/HR: 900 INJECTION, SOLUTION INTRAVENOUS at 10:00

## 2020-04-30 RX ADMIN — FENTANYL CITRATE 25 MCG: 50 INJECTION, SOLUTION INTRAMUSCULAR; INTRAVENOUS at 10:55

## 2020-04-30 RX ADMIN — MIDAZOLAM 1 MG: 1 INJECTION INTRAMUSCULAR; INTRAVENOUS at 11:00

## 2020-04-30 NOTE — DISCHARGE INSTRUCTIONS
[address] [place] [address],[address]  Radiology Department  [phone]    Radiologist: Dr. Crisoforo Kawasaki    Date: 04/30/2020         Sedation Discharge Instructions      Go home and rest and restrict your activity the next 24 hours. You have been given sedating medications, so do not drive or drink alcohol today. Resume your previous diet and medications. You may return to work and resume normal activities tomorrow. Scheduling at Dr. Jake Kurtz office will be contacting you regarding another appointment for your procedure with anesthesia.

## 2020-04-30 NOTE — ROUTINE PROCESS
1025-Dr. Marcelino Santiago into speak with patient and patients mother. Procedure explained along with risks and benefits; consent obtained for CT guided bone marrow biopsy. 1050-Pt taken to CT for biopsy via stretcher. 1055-Pt placed in prone position on CT table for procedure prep. Pt given 2 mg of Versed IV for sedation. Pt moving about on table and unable to ly still safely. 1100-After multiple attempts to calm and sedate patient for the bone marrow biopsy, we were unable to complete procedure due to safety concerns. Pt unable to ly still and moving about on table as it was moving. Procedure cancelled by Dr. Aravind Barragan due to patient safety. 1110-Pt back in xray recovery with mother eating a snack. VS stable no agitation noted at this time. Anesthesia called and unable to accommodate patient today for BM biopsy. 1115-Dr. Soliman's office called and made aware that the procedure was unable to be completed today. Spoke with Silvia Alonzo NP regarding rescheduling patient with anesthesia. Office to handle rescheduling pt. 1140-Discharge instructions regarding post sedation care given and explained to patients mother. She voiced complete understanding of all instructions given. Pt taken out via wheelchair and discharged to home with aid and mother.

## 2020-04-30 NOTE — PROGRESS NOTES
Pt was agitated with bone marrow biospy done and versed was not effective. Need to do with general anesthesia. Order placed.  made aware.

## 2020-04-30 NOTE — H&P
Radiology History and Physical    Patient: Chandu Sims 62 y.o. male       Chief Complaint: No chief complaint on file.       History of Present Illness: Anemia    History:    Past Medical History:   Diagnosis Date    Contact dermatitis and other eczema, due to unspecified cause     psoriasis    Diabetes ([facility] [address]     Eosinophilia     Hypercholesterolemia     Hypertension     Mental retardation     Seizures ([facility] [address]     Skin cancer      Family History   Problem Relation Age of Onset    Other Mother         PMR    Hypertension Mother     Cancer Father         Lung    Diabetes Brother      Social History     Socioeconomic History    Marital status: SINGLE     Spouse name: Not on file    Number of children: Not on file    Years of education: Not on file    Highest education level: Not on file   Occupational History     Comment: [employer]    Social Needs    Financial resource strain: Not on file    Food insecurity     Worry: Not on file     Inability: Not on file   [employer] needs     Medical: Not on file     Non-medical: Not on file   Tobacco Use    Smoking status: Never Smoker    Smokeless tobacco: Never Used   Substance and Sexual Activity    Alcohol use: No    Drug use: No    Sexual activity: Never   Lifestyle    Physical activity     Days per week: Not on file     Minutes per session: Not on file    Stress: Not on file   Relationships    Social connections     Talks on phone: Not on file     Gets together: Not on file     Attends Baptism service: Not on file     Active member of club or organization: Not on file     Attends meetings of clubs or organizations: Not on file     Relationship status: Not on file    Intimate partner violence     Fear of current or ex partner: Not on file     Emotionally abused: Not on file     Physically abused: Not on file     Forced sexual activity: Not on file   Other Topics Concern    Not on file   Social History Narrative    Not on file Allergies: No Known Allergies    Current Medications:  Current Outpatient Medications   Medication Sig    ibuprofen (MOTRIN) 600 mg tablet     divalproex DR (DEPAKOTE) 500 mg tablet TAKE ONE TABLET BY MOUTH EVERY MORNING AND TWO TABS AT NIGHT    ramipril (ALTACE) 5 mg capsule TAKE ONE CAPSULE BY MOUTH ONE TIME DAILY     linaGLIPtin (TRADJENTA) 5 mg tablet Take 1 Tab by mouth daily.  metFORMIN ER (GLUCOPHAGE XR) 500 mg tablet Take 2 Tabs by mouth two (2) times a day.  atorvastatin (LIPITOR) 40 mg tablet take one tablet by mouth at bedtime    topiramate (TOPAMAX) 200 mg tablet TAKE ONE AND ONE-HALF TABLET BY MOUTH TWICE DAILY    glimepiride (AMARYL) 4 mg tablet TAKE TWO TABLETS BY MOUTH EVERY MORNING    pioglitazone (ACTOS) 45 mg tablet Take 1 Tab by mouth daily. Indications: type 2 diabetes mellitus    folic acid (FOLVITE) 1 mg tablet TAKE ONE TABLET BY MOUTH ONE TIME DAILY     triamcinolone acetonide (KENALOG) 0.1 % ointment     chlorhexidine (PERIDEX) 0.12 % solution Take 15 mL by mouth every twelve (12) hours.  Calcium-Cholecalciferol, D3, (CALTRATE-600 PLUS VITAMIN D3) 600-400 mg-unit Tab Take  by mouth.  HYDROcodone-acetaminophen (NORCO) 5-325 mg per tablet      Current Facility-Administered Medications   Medication Dose Route Frequency    midazolam (VERSED) injection 5 mg  5 mg IntraVENous Multiple    0.9% sodium chloride infusion  25 mL/hr IntraVENous CONTINUOUS    fentaNYL citrate (PF) injection 100 mcg  100 mcg IntraVENous Multiple        Physical Exam:  Blood pressure 152/79, pulse 72, temperature 99 °F (37.2 °C), height 6' 1\" (1.854 m), weight 92.5 kg (204 lb), SpO2 100 %.   LUNG: clear to auscultation bilaterally, HEART: regular rate and rhythm      Alerts:    Hospital Problems  Date Reviewed: 4/29/2020    None          Laboratory:      Recent Labs     04/30/20  0942   HGB 12.0*   HCT 38.0   WBC 7.1   PLT 67*         Plan of Care/Planned Procedure:  Risks, benefits, and alternatives reviewed with patient and he agrees to proceed with the procedure. Moderate Sedation performed with Versed and Fentanyl. CT Guided bone marrow biopsy with sedation.   Site to be determined      Miley Duckworth MD

## 2020-05-06 ENCOUNTER — TELEPHONE (OUTPATIENT)
Dept: ONCOLOGY | Age: 65
End: 2020-05-06

## 2020-05-06 NOTE — TELEPHONE ENCOUNTER
Called pt mother due to her concerns about pt getting anesthesia for bone marrow biopsy. She is concerned since he has not ever had it before and wants to know if any other options. I advised ultimately we will need the biopsy. Could potentially continue to check labs but will not know the true cause. I explained that the medication given for anesthesia can actually be helpful for seizures and is what we would give when someone is having a seizure such as ativan/versed. She feels a little more comfortable but would appreciate a call from MD to recap this conversation.

## 2020-05-15 ENCOUNTER — TELEPHONE (OUTPATIENT)
Dept: ONCOLOGY | Age: 65
End: 2020-05-15

## 2020-05-15 NOTE — TELEPHONE ENCOUNTER
Spoke with pt mother and she will get the COVID test done at [facility]. She will call us when she gets this done and information on when it will result.

## 2020-05-15 NOTE — TELEPHONE ENCOUNTER
Spoke with CT, they need a COVID test done before pt can come for procedure. I spoke with PAT and they do not order this for pt's not going into the OR schedule so pt should go to [facility] [place] to get a test done and it will populate in the system. I tried calling pt mother, phone continued to ring then eventually disconnected.

## 2020-05-18 ENCOUNTER — OFFICE VISIT (OUTPATIENT)
Dept: URGENT CARE | Age: 65
End: 2020-05-18

## 2020-05-18 ENCOUNTER — DOCUMENTATION ONLY (OUTPATIENT)
Dept: ONCOLOGY | Age: 65
End: 2020-05-18

## 2020-05-18 VITALS — TEMPERATURE: 98 F | RESPIRATION RATE: 18 BRPM | OXYGEN SATURATION: 99 % | HEART RATE: 76 BPM

## 2020-05-18 DIAGNOSIS — Z01.818 PREOP TESTING: Primary | ICD-10-CM

## 2020-05-20 LAB — SARS-COV-2, NAA: NOT DETECTED

## 2020-05-27 NOTE — PROGRESS NOTES
Do you have a personal history of COVID-19 within the past 28 days? NO  If Yes, What was the method of testing: clinical assumption or test result? Have you had close contact with a known to be positive COVID-19 patient within the past 14 days? No    Are you a healthcare worker or ? NO  If Yes, have you been exposed to COVID-19 without proper PPE? Do you live in a SNF, adult home or other institutional setting? No  If Yes, have they experienced a flood of COVID-19 positive patients?     In the past 2-14 days have you had any of the following symptoms NO   Cough   New onset Shortness of breath or difficulty breathing    Or at least two of these symptoms: NO   Fever greater than 100 F   Chills   Repeated shaking with chills   Muscle pain   Headache   Sore throat   New loss of taste or smell   New onset diarrhea

## 2020-05-28 ENCOUNTER — HOSPITAL ENCOUNTER (OUTPATIENT)
Dept: CT IMAGING | Age: 65
Discharge: HOME | End: 2020-05-28
Attending: INTERNAL MEDICINE
Payer: MEDICARE

## 2020-05-28 ENCOUNTER — ANESTHESIA EVENT (OUTPATIENT)
Dept: CT IMAGING | Age: 65
End: 2020-05-28
Payer: MEDICARE

## 2020-05-28 ENCOUNTER — ANESTHESIA (OUTPATIENT)
Dept: CT IMAGING | Age: 65
End: 2020-05-28
Payer: MEDICARE

## 2020-05-28 VITALS
SYSTOLIC BLOOD PRESSURE: 129 MMHG | HEIGHT: 72 IN | OXYGEN SATURATION: 98 % | DIASTOLIC BLOOD PRESSURE: 64 MMHG | TEMPERATURE: 98.3 F | BODY MASS INDEX: 27.09 KG/M2 | WEIGHT: 200 LBS | HEART RATE: 66 BPM | RESPIRATION RATE: 16 BRPM

## 2020-05-28 DIAGNOSIS — D69.6 THROMBOCYTOPENIA (HCC): ICD-10-CM

## 2020-05-28 DIAGNOSIS — D64.9 ANEMIA, UNSPECIFIED TYPE: ICD-10-CM

## 2020-05-28 LAB
BASOPHILS # BLD: 0 K/UL (ref 0–0.1)
BASOPHILS NFR BLD: 0 % (ref 0–1)
DIFFERENTIAL METHOD BLD: ABNORMAL
EOSINOPHIL # BLD: 0.8 K/UL (ref 0–0.4)
EOSINOPHIL NFR BLD: 11 % (ref 0–7)
ERYTHROCYTE [DISTWIDTH] IN BLOOD BY AUTOMATED COUNT: 13.9 % (ref 11.5–14.5)
HCT VFR BLD AUTO: 37.4 % (ref 36.6–50.3)
HGB BLD-MCNC: 11.7 G/DL (ref 12.1–17)
IMM GRANULOCYTES # BLD AUTO: 0 K/UL (ref 0–0.04)
IMM GRANULOCYTES NFR BLD AUTO: 0 % (ref 0–0.5)
LYMPHOCYTES # BLD: 2.5 K/UL (ref 0.8–3.5)
LYMPHOCYTES NFR BLD: 33 % (ref 12–49)
MCH RBC QN AUTO: 33 PG (ref 26–34)
MCHC RBC AUTO-ENTMCNC: 31.3 G/DL (ref 30–36.5)
MCV RBC AUTO: 105.4 FL (ref 80–99)
MONOCYTES # BLD: 0.2 K/UL (ref 0–1)
MONOCYTES NFR BLD: 3 % (ref 5–13)
NEUTS BAND NFR BLD MANUAL: 3 %
NEUTS SEG # BLD: 4.1 K/UL (ref 1.8–8)
NEUTS SEG NFR BLD: 50 % (ref 32–75)
NRBC # BLD: 0 K/UL (ref 0–0.01)
NRBC BLD-RTO: 0 PER 100 WBC
PLATELET # BLD AUTO: 73 K/UL (ref 150–400)
PMV BLD AUTO: 10.6 FL (ref 8.9–12.9)
RBC # BLD AUTO: 3.55 M/UL (ref 4.1–5.7)
RBC MORPH BLD: ABNORMAL
RBC MORPH BLD: ABNORMAL
WBC # BLD AUTO: 7.6 K/UL (ref 4.1–11.1)
WBC MORPH BLD: ABNORMAL

## 2020-05-28 PROCEDURE — 88305 TISSUE EXAM BY PATHOLOGIST: CPT

## 2020-05-28 PROCEDURE — 76060000031 HC ANESTHESIA FIRST 0.5 HR

## 2020-05-28 PROCEDURE — 88341 IMHCHEM/IMCYTCHM EA ADD ANTB: CPT

## 2020-05-28 PROCEDURE — 88264 CHROMOSOME ANALYSIS 20-25: CPT

## 2020-05-28 PROCEDURE — 36415 COLL VENOUS BLD VENIPUNCTURE: CPT

## 2020-05-28 PROCEDURE — 85025 COMPLETE CBC W/AUTO DIFF WBC: CPT

## 2020-05-28 PROCEDURE — 88374 M/PHMTRC ALYS ISHQUANT/SEMIQ: CPT

## 2020-05-28 PROCEDURE — 74011250636 HC RX REV CODE- 250/636: Performed by: NURSE ANESTHETIST, CERTIFIED REGISTERED

## 2020-05-28 PROCEDURE — 88342 IMHCHEM/IMCYTCHM 1ST ANTB: CPT

## 2020-05-28 PROCEDURE — 88237 TISSUE CULTURE BONE MARROW: CPT

## 2020-05-28 PROCEDURE — 88184 FLOWCYTOMETRY/ TC 1 MARKER: CPT

## 2020-05-28 PROCEDURE — 38221 DX BONE MARROW BIOPSIES: CPT

## 2020-05-28 PROCEDURE — 88185 FLOWCYTOMETRY/TC ADD-ON: CPT

## 2020-05-28 PROCEDURE — 88311 DECALCIFY TISSUE: CPT

## 2020-05-28 PROCEDURE — 88313 SPECIAL STAINS GROUP 2: CPT

## 2020-05-28 RX ORDER — FENTANYL CITRATE 50 UG/ML
INJECTION, SOLUTION INTRAMUSCULAR; INTRAVENOUS AS NEEDED
Status: DISCONTINUED | OUTPATIENT
Start: 2020-05-28 | End: 2020-05-28 | Stop reason: HOSPADM

## 2020-05-28 RX ORDER — FENTANYL CITRATE 50 UG/ML
INJECTION, SOLUTION INTRAMUSCULAR; INTRAVENOUS
Status: COMPLETED
Start: 2020-05-28 | End: 2020-05-28

## 2020-05-28 RX ORDER — SODIUM CHLORIDE 9 MG/ML
INJECTION, SOLUTION INTRAVENOUS
Status: DISCONTINUED | OUTPATIENT
Start: 2020-05-28 | End: 2020-05-28 | Stop reason: HOSPADM

## 2020-05-28 RX ORDER — PROPOFOL 10 MG/ML
INJECTION, EMULSION INTRAVENOUS
Status: DISCONTINUED | OUTPATIENT
Start: 2020-05-28 | End: 2020-05-28 | Stop reason: HOSPADM

## 2020-05-28 RX ADMIN — SODIUM CHLORIDE: 900 INJECTION, SOLUTION INTRAVENOUS at 08:26

## 2020-05-28 RX ADMIN — FENTANYL CITRATE 50 MCG: 50 INJECTION, SOLUTION INTRAMUSCULAR; INTRAVENOUS at 08:46

## 2020-05-28 RX ADMIN — PROPOFOL 150 MCG/KG/MIN: 10 INJECTION, EMULSION INTRAVENOUS at 08:34

## 2020-05-28 NOTE — ANESTHESIA PREPROCEDURE EVALUATION
Relevant Problems   No relevant active problems       Anesthetic History   No history of anesthetic complications            Review of Systems / Medical History  Patient summary reviewed, nursing notes reviewed and pertinent labs reviewed    Pulmonary  Within defined limits                 Neuro/Psych     seizures: well controlled         Cardiovascular    Hypertension          Hyperlipidemia    Exercise tolerance: <4 METS     GI/Hepatic/Renal  Within defined limits              Endo/Other    Diabetes: type 2         Other Findings   Comments: MR GRESHAM LE weakness         Physical Exam    Airway  Mallampati: II  TM Distance: 4 - 6 cm  Neck ROM: normal range of motion   Mouth opening: Normal     Cardiovascular  Regular rate and rhythm,  S1 and S2 normal,  no murmur, click, rub, or gallop             Dental    Dentition: Bridges and Implants     Pulmonary  Breath sounds clear to auscultation               Abdominal  GI exam deferred       Other Findings            Anesthetic Plan    ASA: 3  Anesthesia type: total IV anesthesia and MAC          Induction: Intravenous  Anesthetic plan and risks discussed with: Patient

## 2020-05-28 NOTE — DISCHARGE INSTRUCTIONS
[address]  Radiology Department  [phone]         Bone Marrow Biopsy Discharge Instructions      Go home and rest  and restrict your activity the next 24 hours. You have been given sedating medications, so do not drive or make important decisions today. Resume your previous diet and prescribed medications. You may shower in 24 hours. Do not soak or swim until the biopsy site has healed completely to minimize any risk of infection. Watch site for redness, drainage, pus, foul odor, increasing pain and fevers. Should this occur call you doctor immediatly. You may take Tylenol if allowed, as directed on the label, for pain or discomfort. Avoid Ibuprofen (Advil, Motrin etc.) and Aspirin today as they may increase your risk of bleeding. Be sure to follow up with your physician, and let him know how you are progressing and to receive your results. If you have any questions about your procedure today please call and ask to speak to a radiology nurse.        I

## 2020-05-28 NOTE — ANESTHESIA POSTPROCEDURE EVALUATION
* No procedures listed *.    total IV anesthesia, MAC    Anesthesia Post Evaluation        Patient location during evaluation: PACU  Note status: Adequate. Level of consciousness: responsive to verbal stimuli and sleepy but conscious  Pain management: satisfactory to patient  Airway patency: patent  Anesthetic complications: no  Cardiovascular status: acceptable  Respiratory status: acceptable  Hydration status: acceptable  Comments: +Post-Anesthesia Evaluation and Assessment    Patient: Regina Rubio MRN: [id number]  SSN: [id number]   YOB: 1955  Age: 62 y.o. Sex: male      Cardiovascular Function/Vital Signs    /69 (BP 1 Location: Left arm, BP Patient Position: Sitting)   Pulse 73   Temp 36.8 °C (98.3 °F)   Resp 16   Ht 6' (1.829 m)   Wt 90.7 kg (200 lb)   SpO2 96%   BMI 27.12 kg/m²     Patient is status post * No procedures listed *. Nausea/Vomiting: Controlled. Postoperative hydration reviewed and adequate. Pain:  Pain Scale 1: Numeric (0 - 10) (05/28/20 0925)  Pain Intensity 1: 0 (05/28/20 0925)   Managed. Neurological Status: At baseline. Mental Status and Level of Consciousness: Arousable. Pulmonary Status:   O2 Device: Room air (05/28/20 0925)   Adequate oxygenation and airway patent. Complications related to anesthesia: None    Post-anesthesia assessment completed. No concerns. Signed By: Zhen Mathur MD    5/28/2020  Post anesthesia nausea and vomiting:  controlled      INITIAL Post-op Vital signs: No vitals data found for the desired time range.

## 2020-05-28 NOTE — ROUTINE PROCESS
7129 - Pt ambulated into department w/upright steady gait, w/o assistance, Alert - pt's mother is POA per her and signs consent - pt does not care for self - mother is caregiver. Dr. Kush Kulkarni Present for pre procedure consult and consent - questions reviewed with understanding - consent signed. Discharge instructions reviewed, will review again at discharge with pt's mother. 0825 - in CT time out w/CT, Anethesia and RN - follow anesthesia case for time out, start and end times - pt will be recovered in angio holding. 0845 - CBC w/diff and smear ordered as protocol (blood drawn when IV started) - Kathy Malin, cytology present and will handle bone marrow sample and take to lab.    1646 - procedure completed - anesthesia remains w/pt refer to their charting.     0900 -  Pt brought to angio recover - mother at bedside. 56 - Pt alert and talking to mother  - pt states he is hungry - pt tolerating diet ginger ale and PNB crackers and teena grahams - pt denies pain or discomfort - pt's mother remains at bedside. 4918 - Discharge instructions reviewed with understanding, questions reviewed, copy given to patient's mother. Biopsy site clean and dry, hemostasis achieved. Saline lock removed from right wrist w/tip in tact and bleeding controlled - no bruising noted. Pt assisted w/dressing self and ambulated in department to wheelchair - wheelchair used to take pt to entrance of M[facility] where mother drove pt home. Pt states he is pain free.

## 2020-06-05 ENCOUNTER — TELEPHONE (OUTPATIENT)
Dept: ONCOLOGY | Age: 65
End: 2020-06-05

## 2020-06-05 NOTE — TELEPHONE ENCOUNTER
Returned call to pt mother. HIPAA verified by two patient identifiers.    Pt will come in Thursday at 10am.

## 2020-06-05 NOTE — TELEPHONE ENCOUNTER
Patient's mother Hyacinth Trotter called regarding her son's Test results.   Was told to wait a week and then to check in

## 2020-06-11 ENCOUNTER — OFFICE VISIT (OUTPATIENT)
Dept: ONCOLOGY | Age: 65
End: 2020-06-11

## 2020-06-11 VITALS
HEART RATE: 74 BPM | DIASTOLIC BLOOD PRESSURE: 79 MMHG | BODY MASS INDEX: 27.14 KG/M2 | SYSTOLIC BLOOD PRESSURE: 159 MMHG | OXYGEN SATURATION: 98 % | HEIGHT: 72 IN | TEMPERATURE: 97.4 F | WEIGHT: 200.4 LBS

## 2020-06-11 DIAGNOSIS — D46.9 MYELODYSPLASTIC SYNDROME (HCC): Primary | ICD-10-CM

## 2020-06-11 DIAGNOSIS — D69.6 THROMBOCYTOPENIA (HCC): ICD-10-CM

## 2020-06-11 NOTE — LETTER
6/11/20 Patient: Adrián Castillo YOB: 1955 Date of Visit: 6/11/2020 Yossi Britton MD 
[address] [address] VIA In Basket Dear Yossi Britton MD, Thank you for referring Mr. Felipe Ibarra to [address] [place] [address] for evaluation. My notes for this consultation are attached. If you have questions, please do not hesitate to call me. I look forward to following your patient along with you.  
 
 
Sincerely, 
 
Job Quinn MD

## 2020-06-11 NOTE — PROGRESS NOTES
[facility]  [address], [address] [place] [address] Raji Herrineau, [address] [place] [address]  [phone]      Hematology Consultation        Patient: Jayden Burt MRN: [id number]  SSN: [id number]    YOB: 1955  Age: 62 y.o. Sex: male      Assessment:     1. Myelodysplastic syndrome with multilineage dysplasia    IPSS-R - very low risk  Normal cytogenetics    Subjective:      Jayden Burt is a 62 y.o. male who I am seeing in follow up for progressive thrombocytopenia. He suffers with cognitive impairment and behavioral disorder. Routine laboratory studies show slowly dropping platelet count over the last few years. He is asymptomatic. No bleeding. The history has been obtained from the patient's mother. A bone marrow performed recently revealed a diagnosis of MDS. The patient comes back in with his mother to discuss the next steps.        Review of Systems:    Constitutional: negative  Eyes: negative  Ears, Nose, Mouth, Throat, and Face: negative  Respiratory: negative  Cardiovascular: negative  Gastrointestinal: negative  Genitourinary:negative  Integument/Breast: negative  Hematologic/Lymphatic: negative  Musculoskeletal:negative  Neurological: negative      Past Medical History:   Diagnosis Date    Contact dermatitis and other eczema, due to unspecified cause     psoriasis    Diabetes ([address]     Eosinophilia     Hypercholesterolemia     Hypertension     Mental retardation     Seizures (HCC)     Skin cancer      Past Surgical History:   Procedure Laterality Date    ENDOSCOPY, COLON, DIAGNOSTIC  11/08/2007    Dr Charly Payne normal except small internal hemorrhoids repeat 11/2017      Family History   Problem Relation Age of Onset    Other Mother         PMR    Hypertension Mother     Cancer Father         Lung    Diabetes Brother      Social History     Tobacco Use    Smoking status: Never Smoker    Smokeless tobacco: Never Used   Substance Use Topics    Alcohol use: No      Prior to Admission medications    Medication Sig Start Date End Date Taking? Authorizing Provider   HYDROcodone-acetaminophen (NORCO) 5-325 mg per tablet  4/15/20  Yes Provider, Historical   ibuprofen (MOTRIN) 600 mg tablet  4/22/20  Yes Provider, Historical   divalproex DR (DEPAKOTE) 500 mg tablet TAKE ONE TABLET BY MOUTH EVERY MORNING AND TWO TABS AT NIGHT 1/19/20  Yes Vitor Espinal MD   ramipril (ALTACE) 5 mg capsule TAKE ONE CAPSULE BY MOUTH ONE TIME DAILY  1/15/20  Yes Vitor Espinal MD   linaGLIPtin (TRADJENTA) 5 mg tablet Take 1 Tab by mouth daily. 9/30/19  Yes Vitor Espinal MD   metFORMIN ER (GLUCOPHAGE XR) 500 mg tablet Take 2 Tabs by mouth two (2) times a day. 8/27/19  Yes Vitor Espinal MD   atorvastatin (LIPITOR) 40 mg tablet take one tablet by mouth at bedtime 8/27/19  Yes Vitor Espinal MD   topiramate (TOPAMAX) 200 mg tablet TAKE ONE AND ONE-HALF TABLET BY MOUTH TWICE DAILY 8/27/19  Yes Vitor Espinal MD   glimepiride (AMARYL) 4 mg tablet TAKE TWO TABLETS BY MOUTH EVERY MORNING 8/27/19  Yes Vitor Espinal MD   pioglitazone (ACTOS) 45 mg tablet Take 1 Tab by mouth daily. Indications: type 2 diabetes mellitus 8/1/19  Yes Vitor Espinal MD   folic acid (FOLVITE) 1 mg tablet TAKE ONE TABLET BY MOUTH ONE TIME DAILY  7/12/19  Yes Vitor Espinal MD   triamcinolone acetonide (KENALOG) 0.1 % ointment  1/30/19  Yes Provider, Historical   chlorhexidine (PERIDEX) 0.12 % solution Take 15 mL by mouth every twelve (12) hours. 2/8/18  Yes Provider, Historical   Calcium-Cholecalciferol, D3, (CALTRATE-600 PLUS VITAMIN D3) 600-400 mg-unit Tab Take  by mouth.    Yes Provider, Historical              No Known Allergies        Objective:     Vitals:    06/11/20 0958   BP: 159/79   Pulse: 74   Temp: 97.4 °F (36.3 °C)   TempSrc: Temporal   SpO2: 98%   Weight: 200 lb 6.4 oz (90.9 kg)   Height: 6' (1.829 m)            Physical Exam:    GENERAL: alert, cooperative, no distress, appears stated age  EYE: conjunctivae/corneas clear. PERRL, EOM's intact  LYMPHATIC: Cervical, supraclavicular, and axillary nodes normal.   THROAT & NECK: normal and no erythema or exudates noted. LUNG: clear to auscultation bilaterally  HEART: regular rate and rhythm, S1, S2 normal, no murmur, click, rub or gallop  ABDOMEN: soft, non-tender. Bowel sounds normal. No masses,  no organomegaly  EXTREMITIES:  extremities normal, atraumatic, no cyanosis or edema  SKIN: Normal.  NEUROLOGIC: AOx3. Gait normal. Reflexes and motor strength normal and symmetric. Cranial nerves 2-12 and sensation grossly intact. Lab Results   Component Value Date/Time    WBC 7.6 05/28/2020 09:06 AM    HGB 11.7 (L) 05/28/2020 09:06 AM    HCT 37.4 05/28/2020 09:06 AM    PLATELET 73 (L) 75/71/1382 09:06 AM    .4 (H) 05/28/2020 09:06 AM             Assessment:     1. Myelodysplastic syndrome with multilineage dysplasia    IPSS-R - very low risk  Normal cytogenetics    Prognosis: Intermediate     This is our best current assessment. Cancers respond differently to treatment. Overall prognosis depends on many factors including other conditions, cancer stage, side effects, and other unforeseen events. Goal of therapy: Palliative    Expected response to treatment:  Intermediate: Anticipate cancer shrinking or slowed growth but not remission    Treatment benefits and harms:  We discussed potential short term side effects to include:fatigue     Long term side effects of treatment:  bone marrow suppression    Quality of life: Quality of life concerns have been addressed. Treatment as outlined is expected to have minimal impact on patients quality of life. ECOG PS 1  Intent of Treatment - palliative    I spent 65 minute with the patient in a face-to-face encounter. I explained her the stage of the disease, pathophysiology of the disease and the treatment approaches.  I answered all her questions. More than 50% of the time was utilized in education, counseling and co-ordination of care. Platelet has been dropping steadily. No bleeding. Continue observation  When platelet count goes below 50 K, I will treat him with TPO analogues        Plan:       > Repeat CBC with differential in 2 months  > Return in 2 weeks      Signed by: Michi Hannah MD                     June 11, 2020       CC.  Marianne Wade MD

## 2020-06-11 NOTE — PROGRESS NOTES
Khari Busch is a 62 y.o. male here for follow up for:  Chief Complaint   Patient presents with    Thrombocytopenia   Here to go over results from CT Bx Bone Marrow    1. Have you been to the ER, urgent care clinic since your last visit? Hospitalized since your last visit? No    2. Have you seen or consulted any other health care providers outside of the [address] [place] [address] since your last visit? Include any pap smears or colon screening. No    Pt here with mother.

## 2020-06-29 ENCOUNTER — TELEPHONE (OUTPATIENT)
Dept: ONCOLOGY | Age: 65
End: 2020-06-29

## 2020-06-29 ENCOUNTER — TELEPHONE (OUTPATIENT)
Dept: INTERNAL MEDICINE CLINIC | Facility: CLINIC | Age: 65
End: 2020-06-29

## 2020-06-29 NOTE — TELEPHONE ENCOUNTER
Patient has referral for PT forweakness of bi lateral lower extremeties and strain of lumbar region. She wants to confirm that this is ok as she is not clear that the patient made us aware that he had a PT referral from his PCP.

## 2020-06-29 NOTE — TELEPHONE ENCOUNTER
[facility] called to request that the pt's PT referral be faxed to them  Along with most recent visit notes at [phone].

## 2020-07-14 ENCOUNTER — TELEPHONE (OUTPATIENT)
Dept: ONCOLOGY | Age: 65
End: 2020-07-14

## 2020-07-14 NOTE — TELEPHONE ENCOUNTER
Spoke with pt mother. HIPAA verified by two patient identifiers. All questions answered. She will get his blood work done middle of August and call us for an office appointment.

## 2020-07-29 ENCOUNTER — OFFICE VISIT (OUTPATIENT)
Dept: INTERNAL MEDICINE CLINIC | Facility: CLINIC | Age: 65
End: 2020-07-29

## 2020-07-29 VITALS
OXYGEN SATURATION: 97 % | BODY MASS INDEX: 26.91 KG/M2 | DIASTOLIC BLOOD PRESSURE: 78 MMHG | HEIGHT: 72 IN | WEIGHT: 198.7 LBS | HEART RATE: 73 BPM | SYSTOLIC BLOOD PRESSURE: 137 MMHG | TEMPERATURE: 97.6 F | RESPIRATION RATE: 20 BRPM

## 2020-07-29 DIAGNOSIS — L30.9 DERMATITIS OF BOTH FEET: Primary | ICD-10-CM

## 2020-07-29 DIAGNOSIS — R53.1 WEAKNESS: ICD-10-CM

## 2020-07-29 DIAGNOSIS — L95.9 VASCULITIS OF SKIN: ICD-10-CM

## 2020-07-29 DIAGNOSIS — E11.9 TYPE 2 DIABETES MELLITUS WITHOUT COMPLICATION, WITHOUT LONG-TERM CURRENT USE OF INSULIN (HCC): ICD-10-CM

## 2020-07-29 NOTE — PROGRESS NOTES
Emelina Perry is a 62 y.o. male  Chief Complaint   Patient presents with    Other     Sores on top of feet and around toes. Health Maintenance Due   Topic Date Due    Eye Exam Retinal or Dilated  10/27/2019    MICROALBUMIN Q1  08/01/2020    Medicare Yearly Exam  08/13/2020     Visit Vitals  /78 (BP 1 Location: Right arm, BP Patient Position: Sitting)   Pulse 73   Temp 97.6 °F (36.4 °C) (Oral)   Resp 20   Ht 6' (1.829 m)   Wt 198 lb 11.2 oz (90.1 kg)   SpO2 97%   BMI 26.95 kg/m²     1. Have you been to the ER, urgent care clinic since your last visit? Hospitalized since your last visit? No     2. Have you seen or consulted any other health care providers outside of the [address] [place] [address] since your last visit? Include any pap smears or colon screening.   No     Room #: 3A

## 2020-07-29 NOTE — PROGRESS NOTES
CC:   Chief Complaint   Patient presents with    Foot Problem     Sores on top of feet and around toes. HISTORY OF PRESENT ILLNESS  Lee Garza is a 62 y.o. male. Accompanied by his mother. Presents for evaluation of rash on his legs. He has type 2 DM, HTN, hyperlipidemia, myelodysplastic syndrome, thrombocytopenia, venous stasis of lower extremities, psoriasis, and developmental delay. Rash on legs developed a few days ago. Purplish patches with mild scaling. Mildly sore, not itchy. Denies recent insect bites or injury to feet, no known ill contacts. His mother reports that he has had problems with his legs since having a flu-like illness 2 years ago. Developed weakness and later swelling at both legs.       Dermatologist: Dr. Sil Aguilar       Patient Active Problem List   Diagnosis Code    Intellectual disability F79    Seizure disorder ([facility] G40.909    Psoriasis L40.9    Venous stasis of lower extremity I87.8    Thrombocytopenia (HCC) D69.6    Sleep disorder G47.9    Hypertension, essential I10    Strongyloides stercoralis infection B78.9    Eosinophilia D72.1    Hypercholesterolemia E78.00    Uncontrolled type 2 diabetes mellitus without complication, without long-term current use of insulin [id number]    Myelodysplasia (myelodysplastic syndrome) (HCC) D46.9     Past Medical History:   Diagnosis Date    Contact dermatitis and other eczema, due to unspecified cause     psoriasis    Diabetes ([facility]     Eosinophilia     Hypercholesterolemia     Hypertension     Mental retardation     Seizures (HCC)     Skin cancer      No Known Allergies    Current Outpatient Medications   Medication Sig Dispense Refill    glimepiride (AMARYL) 4 mg tablet TAKE TWO TABLETS BY MOUTH IN THE MORNING 180 Tab 3    atorvastatin (LIPITOR) 40 mg tablet TAKE ONE TABLET BY MOUTH AT BEDTIME  90 Tab 3    pioglitazone (ACTOS) 45 mg tablet TAKE ONE TABLET BY MOUTH ONE TIME DAILY FOR TYPE 2 DIABETES 90 Tab 3    folic acid (FOLVITE) 1 mg tablet TAKE ONE TABLET BY MOUTH ONE TIME DAILY  90 Tab 3    HYDROcodone-acetaminophen (NORCO) 5-325 mg per tablet       ibuprofen (MOTRIN) 600 mg tablet       divalproex DR (DEPAKOTE) 500 mg tablet TAKE ONE TABLET BY MOUTH EVERY MORNING AND TWO TABS AT NIGHT 270 Tab 3    ramipril (ALTACE) 5 mg capsule TAKE ONE CAPSULE BY MOUTH ONE TIME DAILY  90 Cap 3    linaGLIPtin (TRADJENTA) 5 mg tablet Take 1 Tab by mouth daily. 90 Tab 3    metFORMIN ER (GLUCOPHAGE XR) 500 mg tablet Take 2 Tabs by mouth two (2) times a day. 360 Tab 3    topiramate (TOPAMAX) 200 mg tablet TAKE ONE AND ONE-HALF TABLET BY MOUTH TWICE DAILY 270 Tab 3    triamcinolone acetonide (KENALOG) 0.1 % ointment       chlorhexidine (PERIDEX) 0.12 % solution Take 15 mL by mouth every twelve (12) hours.  Calcium-Cholecalciferol, D3, (CALTRATE-600 PLUS VITAMIN D3) 600-400 mg-unit Tab Take  by mouth. PHYSICAL EXAM  Visit Vitals  /78 (BP 1 Location: Right arm, BP Patient Position: Sitting)   Pulse 73   Temp 97.6 °F (36.4 °C) (Oral)   Resp 20   Ht 6' (1.829 m)   Wt 198 lb 11.2 oz (90.1 kg)   SpO2 97%   BMI 26.95 kg/m²       General: Well-developed and well-nourished, no distress. HEENT:  Head normocephalic/atraumatic, no scleral icterus  Extremities: No clubbing, cyanosis. Trace edema at both ankles and feet. Skin: Violaceous, palpable patches at dorsum of feet with mild scaling at right foot and non-blanching serpiginous borders. Hyperpigmentation at anterior lower legs. Mild livedo reticularis at lower legs. Right toes 2, 3, and 4 violaceous and cool to touch. Physical Exam  Musculoskeletal:        Feet:                ASSESSMENT AND PLAN    ICD-10-CM ICD-9-CM    1. Dermatitis of both feet  L30.9 692.9    2.  Vasculitis of skin  L95.9 709.1 JEREMY W/REFLEX      RA + CCP ABS      ANTI-NEUTROPHIL CYTOPLASMIC AB      CRYOGLOBULIN W/ REFLX POLO      COMPLEMENT, C3 & C4      COMPLEMENT, CH50, TOTAL   3. Type 2 diabetes mellitus without complication, without long-term current use of insulin ([facility])  B98.6 091.42 METABOLIC PANEL, COMPREHENSIVE      HEMOGLOBIN A1C WITH EAG      MICROALBUMIN, UR, RAND W/ MICROALB/CREAT RATIO   4. Weakness   R53.1 780.79 HEPATITIS PANEL, ACUTE     Diagnoses and all orders for this visit:    1. Dermatitis of both feet  Looks like a cutaneous vascular dermatitis. Instructed patient's mother to schedule an appointment with Dr. Ethel Rockwell, his dermatologist for further evaluation. 2. Vasculitis of skin  -     JEREMY W/REFLEX  -     RA + CCP ABS  -     ANTI-NEUTROPHIL CYTOPLASMIC AB  -     CRYOGLOBULIN W/ REFLX POLO  -     COMPLEMENT, C3 & C4  -     COMPLEMENT, CH50, TOTAL    3. Type 2 diabetes mellitus without complication, without long-term current use of insulin ([facility])  Last A1c 8.8% on 4/1/20. Continue present management.   -     METABOLIC PANEL, COMPREHENSIVE  -     HEMOGLOBIN A1C WITH EAG  -     MICROALBUMIN, UR, RAND W/ MICROALB/CREAT RATIO    4. Weakness  Weakness at legs and vasculitis of skin. -     HEPATITIS PANEL, ACUTE      Follow-up and Dispositions    · Return in about 3 weeks (around 8/19/2020), or if symptoms worsen or fail to improve, for Medicare AW,  establish care. I have discussed the diagnosis with the patient and the intended plan as seen in the above orders. Patient is in agreement. The patient has received an after-visit summary and questions were answered concerning future plans. I have discussed medication side effects and warnings with the patient as well.

## 2020-08-11 LAB
BASOPHILS # BLD AUTO: 0 X10E3/UL (ref 0–0.2)
BASOPHILS NFR BLD AUTO: 0 %
EOSINOPHIL # BLD AUTO: 0.6 X10E3/UL (ref 0–0.4)
EOSINOPHIL NFR BLD AUTO: 9 %
ERYTHROCYTE [DISTWIDTH] IN BLOOD BY AUTOMATED COUNT: 13.4 % (ref 11.6–15.4)
HCT VFR BLD AUTO: 34.7 % (ref 37.5–51)
HGB BLD-MCNC: 11.1 G/DL (ref 13–17.7)
IMM GRANULOCYTES # BLD AUTO: 0.1 X10E3/UL (ref 0–0.1)
IMM GRANULOCYTES NFR BLD AUTO: 1 %
LYMPHOCYTES # BLD AUTO: 3.2 X10E3/UL (ref 0.7–3.1)
LYMPHOCYTES NFR BLD AUTO: 46 %
MCH RBC QN AUTO: 32.3 PG (ref 26.6–33)
MCHC RBC AUTO-ENTMCNC: 32 G/DL (ref 31.5–35.7)
MCV RBC AUTO: 101 FL (ref 79–97)
MONOCYTES # BLD AUTO: 0.7 X10E3/UL (ref 0.1–0.9)
MONOCYTES NFR BLD AUTO: 10 %
MORPHOLOGY BLD-IMP: ABNORMAL
NEUTROPHILS # BLD AUTO: 2.3 X10E3/UL (ref 1.4–7)
NEUTROPHILS NFR BLD AUTO: 34 %
PLATELET # BLD AUTO: 64 X10E3/UL (ref 150–450)
RBC # BLD AUTO: 3.44 X10E6/UL (ref 4.14–5.8)
WBC # BLD AUTO: 6.8 X10E3/UL (ref 3.4–10.8)

## 2020-08-13 ENCOUNTER — OFFICE VISIT (OUTPATIENT)
Dept: ONCOLOGY | Age: 65
End: 2020-08-13
Payer: MEDICARE

## 2020-08-13 VITALS
BODY MASS INDEX: 26.82 KG/M2 | SYSTOLIC BLOOD PRESSURE: 124 MMHG | WEIGHT: 198 LBS | TEMPERATURE: 98.1 F | DIASTOLIC BLOOD PRESSURE: 78 MMHG | HEIGHT: 72 IN | OXYGEN SATURATION: 98 % | HEART RATE: 86 BPM

## 2020-08-13 DIAGNOSIS — R27.0 ATAXIA: ICD-10-CM

## 2020-08-13 DIAGNOSIS — D69.6 THROMBOCYTOPENIA (HCC): ICD-10-CM

## 2020-08-13 DIAGNOSIS — D46.9 MYELODYSPLASTIC SYNDROME (HCC): Primary | ICD-10-CM

## 2020-08-13 PROCEDURE — G8432 DEP SCR NOT DOC, RNG: HCPCS | Performed by: INTERNAL MEDICINE

## 2020-08-13 PROCEDURE — G8419 CALC BMI OUT NRM PARAM NOF/U: HCPCS | Performed by: INTERNAL MEDICINE

## 2020-08-13 PROCEDURE — G8754 DIAS BP LESS 90: HCPCS | Performed by: INTERNAL MEDICINE

## 2020-08-13 PROCEDURE — 3017F COLORECTAL CA SCREEN DOC REV: CPT | Performed by: INTERNAL MEDICINE

## 2020-08-13 PROCEDURE — G8427 DOCREV CUR MEDS BY ELIG CLIN: HCPCS | Performed by: INTERNAL MEDICINE

## 2020-08-13 PROCEDURE — G8752 SYS BP LESS 140: HCPCS | Performed by: INTERNAL MEDICINE

## 2020-08-13 PROCEDURE — 99214 OFFICE O/P EST MOD 30 MIN: CPT | Performed by: INTERNAL MEDICINE

## 2020-08-13 NOTE — PROGRESS NOTES
Adrian Sr is a 62 y.o. male here for follow up for:  Chief Complaint   Patient presents with    Thrombocytopenia     1. Have you been to the ER, urgent care clinic since your last visit? Hospitalized since your last visit? no    2. Have you seen or consulted any other health care providers outside of the [address] [place] [address] since your last visit? Include any pap smears or colon screening. no    Labs done 8/10/20. Pt states he has been having issues with his feet. Has sores on them. Saw Dr Gaudencio Leal for that.

## 2020-08-13 NOTE — LETTER
8/16/20 Patient: Stevie Cerna YOB: 1955 Date of Visit: 8/13/2020 Tristan Loja MD 
[address] [address] VIA In Basket Dear Tristan Loja MD, Thank you for referring Mr. Anisa Trinh to [address] [place] [address] for evaluation. My notes for this consultation are attached. If you have questions, please do not hesitate to call me. I look forward to following your patient along with you.  
 
 
Sincerely, 
 
Yoli Valencia MD

## 2020-08-15 NOTE — PROGRESS NOTES
[facility]  [address], [address] [place] [address], [address] [facility]  [phone]      Hematology Follow Up        Patient: Laureano Clemente MRN: [id number]  SSN: [id number]    YOB: 1955  Age: 62 y.o. Sex: male      Assessment:     1. Myelodysplastic syndrome with multilineage dysplasia    IPSS-R - very low risk  Normal cytogenetics    Subjective:      Laureano Clemente is a 62 y.o. male who I am seeing in follow up for progressive thrombocytopenia. He suffers with cognitive impairment and behavioral disorder. Routine laboratory studies show slowly dropping platelet count over the last few years. He is asymptomatic. No bleeding. The history has been obtained from the patient's mother. A bone marrow performed recently revealed a diagnosis of MDS. The patient's mother is concerned about a lot of different things. His gait has deteriorated, he is unable to participate in meaningful activities as he used to do before. She is wondering about mineral and vitamin deficiency as possible cause for thrombocytopenia.        Review of Systems:    Constitutional: negative  Eyes: negative  Ears, Nose, Mouth, Throat, and Face: negative  Respiratory: negative  Cardiovascular: negative  Gastrointestinal: negative  Genitourinary:negative  Integument/Breast: negative  Hematologic/Lymphatic: negative  Musculoskeletal:negative  Neurological: negative      Past Medical History:   Diagnosis Date    Contact dermatitis and other eczema, due to unspecified cause     psoriasis    Diabetes ([address]     Eosinophilia     Hypercholesterolemia     Hypertension     Mental retardation     Seizures ([address]     Skin cancer      Past Surgical History:   Procedure Laterality Date    ENDOSCOPY, COLON, DIAGNOSTIC  11/08/2007    Dr Zuri Romero normal except small internal hemorrhoids repeat 11/2017      Family History   Problem Relation Age of Onset    Other Mother         PMR    Hypertension Mother     Cancer Father         Lung    Diabetes Brother      Social History     Tobacco Use    Smoking status: Never Smoker    Smokeless tobacco: Never Used   Substance Use Topics    Alcohol use: No      Prior to Admission medications    Medication Sig Start Date End Date Taking? Authorizing Provider   glimepiride (AMARYL) 4 mg tablet TAKE TWO TABLETS BY MOUTH IN THE MORNING 7/19/20  Yes Abel Villalobos MD   atorvastatin (LIPITOR) 40 mg tablet TAKE ONE TABLET BY MOUTH AT BEDTIME  7/19/20  Yes Abel Villalobos MD   pioglitazone (ACTOS) 45 mg tablet TAKE ONE TABLET BY MOUTH ONE TIME DAILY FOR TYPE 2 DIABETES 7/8/20  Yes Abel Villalobos MD   folic acid (FOLVITE) 1 mg tablet TAKE ONE TABLET BY MOUTH ONE TIME DAILY  7/8/20  Yes Abel Villalobos MD   HYDROcodone-acetaminophen [facility]) 5-325 mg per tablet  4/15/20  Yes Provider, Historical   ibuprofen (MOTRIN) 600 mg tablet  4/22/20  Yes Provider, Historical   divalproex DR (DEPAKOTE) 500 mg tablet TAKE ONE TABLET BY MOUTH EVERY MORNING AND TWO TABS AT NIGHT 1/19/20  Yes Abel Villalobos MD   ramipril (ALTACE) 5 mg capsule TAKE ONE CAPSULE BY MOUTH ONE TIME DAILY  1/15/20  Yes Abel Villalobos MD   linaGLIPtin (TRADJENTA) 5 mg tablet Take 1 Tab by mouth daily. 9/30/19  Yes Abel Villalobos MD   metFORMIN ER (GLUCOPHAGE XR) 500 mg tablet Take 2 Tabs by mouth two (2) times a day. 8/27/19  Yes Abel Villalobos MD   topiramate (TOPAMAX) 200 mg tablet TAKE ONE AND ONE-HALF TABLET BY MOUTH TWICE DAILY 8/27/19  Yes Abel Villalobos MD   triamcinolone acetonide (KENALOG) 0.1 % ointment  1/30/19  Yes Provider, Historical   chlorhexidine (PERIDEX) 0.12 % solution Take 15 mL by mouth every twelve (12) hours. 2/8/18  Yes Provider, Historical   Calcium-Cholecalciferol, D3, (CALTRATE-600 PLUS VITAMIN D3) 600-400 mg-unit Tab Take  by mouth.    Yes Provider, Historical              No Known Allergies        Objective:     Vitals:    08/13/20 1408   BP: 124/78   Pulse: 86   Temp: 98.1 °F (36.7 °C)   TempSrc: Oral   SpO2: 98%   Weight: 198 lb (89.8 kg)   Height: 6' (1.829 m)            Physical Exam:    GENERAL: alert, cooperative, no distress, appears stated age  EYE: conjunctivae/corneas clear. PERRL, EOM's intact  LYMPHATIC: Cervical, supraclavicular, and axillary nodes normal.   THROAT & NECK: normal and no erythema or exudates noted. LUNG: clear to auscultation bilaterally  HEART: regular rate and rhythm, S1, S2 normal, no murmur, click, rub or gallop  ABDOMEN: soft, non-tender. Bowel sounds normal. No masses,  no organomegaly  EXTREMITIES:  extremities normal, atraumatic, no cyanosis or edema  SKIN: Normal.  NEUROLOGIC: AOx3. Gait unstable. Detailed neurological examination was not performed. Lab Results   Component Value Date/Time    WBC 6.8 08/10/2020 12:46 PM    HGB 11.1 (L) 08/10/2020 12:46 PM    HCT 34.7 (L) 08/10/2020 12:46 PM    PLATELET 64 (LL) 03/49/9060 12:46 PM     (H) 08/10/2020 12:46 PM             Assessment:     1. Myelodysplastic syndrome with multilineage dysplasia    IPSS-R - very low risk  Normal cytogenetics    ECOG PS 1  Intent of Treatment - palliative      Platelet has been dropping steadily. No bleeding. Continue observation  When platelet count goes below 50 K, I will treat him with TPO analogues      Plan:       > Repeat CBC with differential in 2 months  > Return in 2 weeks      Signed by: Vinod Rose MD                     August 15, 2020       CC.  Shiloh Fletcher MD

## 2020-08-16 LAB
ALBUMIN SERPL-MCNC: 3.7 G/DL (ref 3.8–4.8)
ALBUMIN/CREAT UR: 6 MG/G CREAT (ref 0–29)
ALBUMIN/GLOB SERPL: 1.6 {RATIO} (ref 1.2–2.2)
ALP SERPL-CCNC: 49 IU/L (ref 39–117)
ALT SERPL-CCNC: 8 IU/L (ref 0–44)
ANA SER QL: NEGATIVE
AST SERPL-CCNC: 15 IU/L (ref 0–40)
BILIRUB SERPL-MCNC: 0.2 MG/DL (ref 0–1.2)
BUN SERPL-MCNC: 18 MG/DL (ref 8–27)
BUN/CREAT SERPL: 25 (ref 10–24)
C-ANCA TITR SER IF: NORMAL TITER
C3 SERPL-MCNC: 137 MG/DL (ref 82–167)
C4 SERPL-MCNC: 18 MG/DL (ref 14–44)
CALCIUM SERPL-MCNC: 9 MG/DL (ref 8.6–10.2)
CCP IGA+IGG SERPL IA-ACNC: 5 UNITS (ref 0–19)
CH50 SERPL-ACNC: >60 U/ML
CHLORIDE SERPL-SCNC: 110 MMOL/L (ref 96–106)
CO2 SERPL-SCNC: 19 MMOL/L (ref 20–29)
CREAT SERPL-MCNC: 0.72 MG/DL (ref 0.76–1.27)
CREAT UR-MCNC: 144 MG/DL
CRYOGLOB SER QL 1D COLD INC: NORMAL
EST. AVERAGE GLUCOSE BLD GHB EST-MCNC: 186 MG/DL
GLOBULIN SER CALC-MCNC: 2.3 G/DL (ref 1.5–4.5)
GLUCOSE SERPL-MCNC: 124 MG/DL (ref 65–99)
HAV IGM SERPL QL IA: NEGATIVE
HBA1C MFR BLD: 8.1 % (ref 4.8–5.6)
HBV CORE IGM SERPL QL IA: NEGATIVE
HBV SURFACE AG SERPL QL IA: NEGATIVE
HCV AB S/CO SERPL IA: <0.1 S/CO RATIO (ref 0–0.9)
MICROALBUMIN UR-MCNC: 8.7 UG/ML
P-ANCA ATYPICAL TITR SER IF: NORMAL TITER
P-ANCA TITR SER IF: NORMAL TITER
POTASSIUM SERPL-SCNC: 4.4 MMOL/L (ref 3.5–5.2)
PROT SERPL-MCNC: 6 G/DL (ref 6–8.5)
RHEUMATOID FACT SERPL-ACNC: <10 IU/ML (ref 0–13.9)
SODIUM SERPL-SCNC: 142 MMOL/L (ref 134–144)

## 2020-08-19 ENCOUNTER — OFFICE VISIT (OUTPATIENT)
Dept: INTERNAL MEDICINE CLINIC | Age: 65
End: 2020-08-19
Payer: MEDICARE

## 2020-08-19 VITALS
HEIGHT: 72 IN | OXYGEN SATURATION: 98 % | TEMPERATURE: 97.9 F | BODY MASS INDEX: 26.68 KG/M2 | RESPIRATION RATE: 16 BRPM | HEART RATE: 75 BPM | SYSTOLIC BLOOD PRESSURE: 148 MMHG | DIASTOLIC BLOOD PRESSURE: 80 MMHG | WEIGHT: 197 LBS

## 2020-08-19 DIAGNOSIS — Z13.31 SCREENING FOR DEPRESSION: ICD-10-CM

## 2020-08-19 DIAGNOSIS — R05.8 NOCTURNAL COUGH: ICD-10-CM

## 2020-08-19 DIAGNOSIS — E78.00 HYPERCHOLESTEROLEMIA: ICD-10-CM

## 2020-08-19 DIAGNOSIS — Z00.00 MEDICARE ANNUAL WELLNESS VISIT, SUBSEQUENT: Primary | ICD-10-CM

## 2020-08-19 DIAGNOSIS — I10 HYPERTENSION, ESSENTIAL: ICD-10-CM

## 2020-08-19 DIAGNOSIS — E11.9 TYPE 2 DIABETES MELLITUS WITHOUT COMPLICATION, WITHOUT LONG-TERM CURRENT USE OF INSULIN (HCC): ICD-10-CM

## 2020-08-19 DIAGNOSIS — D69.6 THROMBOCYTOPENIA (HCC): ICD-10-CM

## 2020-08-19 DIAGNOSIS — R29.898 RIGHT LEG WEAKNESS: ICD-10-CM

## 2020-08-19 DIAGNOSIS — G40.909 SEIZURE DISORDER (HCC): ICD-10-CM

## 2020-08-19 DIAGNOSIS — D46.9 MYELODYSPLASIA (MYELODYSPLASTIC SYNDROME) (HCC): ICD-10-CM

## 2020-08-19 DIAGNOSIS — I87.8 VENOUS STASIS OF LOWER EXTREMITY: ICD-10-CM

## 2020-08-19 DIAGNOSIS — F79 INTELLECTUAL DISABILITY: ICD-10-CM

## 2020-08-19 PROCEDURE — 99215 OFFICE O/P EST HI 40 MIN: CPT | Performed by: INTERNAL MEDICINE

## 2020-08-19 PROCEDURE — G8432 DEP SCR NOT DOC, RNG: HCPCS | Performed by: INTERNAL MEDICINE

## 2020-08-19 PROCEDURE — G8753 SYS BP > OR = 140: HCPCS | Performed by: INTERNAL MEDICINE

## 2020-08-19 PROCEDURE — G8754 DIAS BP LESS 90: HCPCS | Performed by: INTERNAL MEDICINE

## 2020-08-19 PROCEDURE — G8427 DOCREV CUR MEDS BY ELIG CLIN: HCPCS | Performed by: INTERNAL MEDICINE

## 2020-08-19 PROCEDURE — G0439 PPPS, SUBSEQ VISIT: HCPCS | Performed by: INTERNAL MEDICINE

## 2020-08-19 PROCEDURE — 3017F COLORECTAL CA SCREEN DOC REV: CPT | Performed by: INTERNAL MEDICINE

## 2020-08-19 PROCEDURE — 2022F DILAT RTA XM EVC RTNOPTHY: CPT | Performed by: INTERNAL MEDICINE

## 2020-08-19 PROCEDURE — G8419 CALC BMI OUT NRM PARAM NOF/U: HCPCS | Performed by: INTERNAL MEDICINE

## 2020-08-19 PROCEDURE — 3052F HG A1C>EQUAL 8.0%<EQUAL 9.0%: CPT | Performed by: INTERNAL MEDICINE

## 2020-08-19 RX ORDER — BENZONATATE 100 MG/1
100 CAPSULE ORAL
Qty: 30 CAP | Refills: 0 | Status: SHIPPED | OUTPATIENT
Start: 2020-08-19 | End: 2021-02-08

## 2020-08-19 NOTE — PATIENT INSTRUCTIONS
Medicare Wellness Visit, Male The best way to live healthy is to have a lifestyle where you eat a well-balanced diet, exercise regularly, limit alcohol use, and quit all forms of tobacco/nicotine, if applicable. Regular preventive services are another way to keep healthy. Preventive services (vaccines, screening tests, monitoring & exams) can help personalize your care plan, which helps you manage your own care. Screening tests can find health problems at the earliest stages, when they are easiest to treat. [facility] follows the current, evidence-based guidelines published by the [facility] J[facility]ony Nathalia ([facility]) when recommending preventive services for our patients. Because we follow these guidelines, sometimes recommendations change over time as research supports it. (For example, a prostate screening blood test is no longer routinely recommended for men with no symptoms). Of course, you and your doctor may decide to screen more often for some diseases, based on your risk and co-morbidities (chronic disease you are already diagnosed with). Preventive services for you include: - Medicare offers their members a free annual wellness visit, which is time for you and your primary care provider to discuss and plan for your preventive service needs. Take advantage of this benefit every year! 
-All adults over age 72 should receive the recommended pneumonia vaccines. Current USPSTF guidelines recommend a series of two vaccines for the best pneumonia protection.  
-All adults should have a flu vaccine yearly and tetanus vaccine every 10 years. 
-All adults age 48 and older should receive the shingles vaccines (series of two vaccines).       
-All adults age 38-68 who are overweight should have a diabetes screening test once every three years.  
-Other screening tests & preventive services for persons with diabetes include: an eye exam to screen for diabetic retinopathy, a kidney function test, a foot exam, and stricter control over your cholesterol.  
-Cardiovascular screening for adults with routine risk involves an electrocardiogram (ECG) at intervals determined by the provider.  
-Colorectal cancer screening should be done for adults age 54-65 with no increased risk factors for colorectal cancer. There are a number of acceptable methods of screening for this type of cancer. Each test has its own benefits and drawbacks. Discuss with your provider what is most appropriate for you during your annual wellness visit. The different tests include: colonoscopy (considered the best screening method), a fecal occult blood test, a fecal DNA test, and sigmoidoscopy. 
-All adults born between [facility] should be screened once for Hepatitis C. 
-An Abdominal Aortic Aneurysm (AAA) Screening is recommended for men age 73-68 who has ever smoked in their lifetime. Here is a list of your current Health Maintenance items (your personalized list of preventive services) with a due date: 
Health Maintenance Due Topic Date Due [facility] Eye Exam  10/27/2019  Albumin Urine Test  08/01/2020  Flu Vaccine  08/01/2020

## 2020-08-19 NOTE — PROGRESS NOTES
Will discuss at 8/19/20 appt. Normal kidney and liver tests, urine protein test, and cholesterol. A1c 8.1%, down form 8.8% in 4/20. Normal JEREMY, RF, anti-CCP, ANCA, hepatitis panel, complement levels, and cryoglobulin screen.

## 2020-08-19 NOTE — PROGRESS NOTES
CC:  
Chief Complaint Patient presents with  Follow-up 3C HISTORY OF PRESENT ILLNESS Dulce Rain is a 62 y.o. male. Presents to establish care. Former PCP was Dr. Tamie Jara, now retired. He has type 2 DM, HTN, hypercholesterolemia, psoriasis,  myelodysplastic syndrome,  seizure disorder, venous stasis, sleep disorder, and intellectual disability. ROS Constitutional: negative for fevers, chills, night sweats ENT:   negative for sore throat, nasal congestion, ear pains, hoarseness Respiratory:  negative for cough, hemoptysis, dyspnea,wheezing CV:   negative for chest pain, palpitations, lower extremity edema GI:   negative for heartburn, abd pain, nausea, vomiting, diarrhea, constipation Genitourinary: negative for frequency, dysuria and hematuria Integument:  negative for rash and pruritus Musculoskel: negative for myalgias, arthralgias, back pain, muscle weakness, joint pain Neurological:  negative for headaches, dizziness, vertigo, gait problems Behavl/Psych: negative for feelings of anxiety, depression, mood change Patient Active Problem List  
Diagnosis Code  Intellectual disability F68  
 Seizure disorder ([address] G40.909  Psoriasis L40.9  Venous stasis of lower extremity I87.8  Thrombocytopenia ([address] D69.6  Sleep disorder G47.9  Hypertension, essential I10  Strongyloides stercoralis infection B78.9  Eosinophilia D72.1  Hypercholesterolemia E78.00  Type 2 diabetes mellitus without complication, without long-term current use of insulin (HCC) E11.9  Myelodysplasia (myelodysplastic syndrome) ([address] D46.9 Past Medical History:  
Diagnosis Date  Contact dermatitis and other eczema, due to unspecified cause   
 psoriasis  Diabetes ([address]  Eosinophilia  Hypercholesterolemia  Hypertension  Mental retardation  Seizures ([address]  Skin cancer No Known Allergies Current Outpatient Medications Medication Sig Dispense Refill  glimepiride (AMARYL) 4 mg tablet TAKE TWO TABLETS BY MOUTH IN THE MORNING 180 Tab 3  
 atorvastatin (LIPITOR) 40 mg tablet TAKE ONE TABLET BY MOUTH AT BEDTIME  90 Tab 3  pioglitazone (ACTOS) 45 mg tablet TAKE ONE TABLET BY MOUTH ONE TIME DAILY FOR TYPE 2 DIABETES 90 Tab 3  
 folic acid (FOLVITE) 1 mg tablet TAKE ONE TABLET BY MOUTH ONE TIME DAILY  90 Tab 3  
 divalproex DR (DEPAKOTE) 500 mg tablet TAKE ONE TABLET BY MOUTH EVERY MORNING AND TWO TABS AT NIGHT 270 Tab 3  
 ramipril (ALTACE) 5 mg capsule TAKE ONE CAPSULE BY MOUTH ONE TIME DAILY  90 Cap 3  
 linaGLIPtin (TRADJENTA) 5 mg tablet Take 1 Tab by mouth daily. 90 Tab 3  
 metFORMIN ER (GLUCOPHAGE XR) 500 mg tablet Take 2 Tabs by mouth two (2) times a day. 360 Tab 3  
 topiramate (TOPAMAX) 200 mg tablet TAKE ONE AND ONE-HALF TABLET BY MOUTH TWICE DAILY 270 Tab 3  
 triamcinolone acetonide (KENALOG) 0.1 % ointment  chlorhexidine (PERIDEX) 0.12 % solution Take 15 mL by mouth every twelve (12) hours.  Calcium-Cholecalciferol, D3, (CALTRATE-600 PLUS VITAMIN D3) 600-400 mg-unit Tab Take  by mouth. PHYSICAL EXAM 
Visit Vitals /80 (BP 1 Location: Left arm, BP Patient Position: At rest) Pulse 75 Temp 97.9 °F (36.6 °C) (Oral) Resp 16 Ht 6' (1.829 m) Wt 197 lb (89.4 kg) SpO2 98% BMI 26.72 kg/m² General: Well-developed and well-nourished, no distress. HEENT:  Head normocephalic/atraumatic, no scleral icterus Lungs:  Clear to ausculation bilaterally. Good air movement. Heart:  Regular rate and rhythm, normal S1 and S2, no murmur, gallop, or rub Extremities: No clubbing, cyanosis, or edema. Neurological: Alert and oriented. Psychiatric: Normal mood and affect. Behavior is normal.  
 
Results for orders placed or performed in visit on 08/10/20 CBC WITH AUTOMATED DIFF Result Value Ref Range WBC 6.8 3.4 - 10.8 x10E3/uL  
 RBC 3.44 (L) 4.14 - 5.80 x10E6/uL HGB 11.1 (L) 13.0 - 17.7 g/dL HCT 34.7 (L) 37.5 - 51.0 %  (H) 79 - 97 fL  
 MCH 32.3 26.6 - 33.0 pg  
 MCHC 32.0 31.5 - 35.7 g/dL  
 RDW 13.4 11.6 - 15.4 % PLATELET 64 (LL) 595 - 450 x10E3/uL NEUTROPHILS 34 Not Estab. % Lymphocytes 46 Not Estab. % MONOCYTES 10 Not Estab. % EOSINOPHILS 9 Not Estab. % BASOPHILS 0 Not Estab. %  
 ABS. NEUTROPHILS 2.3 1.4 - 7.0 x10E3/uL Abs Lymphocytes 3.2 (H) 0.7 - 3.1 x10E3/uL  
 ABS. MONOCYTES 0.7 0.1 - 0.9 x10E3/uL  
 ABS. EOSINOPHILS 0.6 (H) 0.0 - 0.4 x10E3/uL  
 ABS. BASOPHILS 0.0 0.0 - 0.2 x10E3/uL IMMATURE GRANULOCYTES 1 Not Estab. %  
 ABS. IMM. GRANS. 0.1 0.0 - 0.1 x10E3/uL Hematology comments: Note:   
 
 
 
ASSESSMENT AND PLAN 
{ASSESSMENT/PLAN:29195} I have discussed the diagnosis with the patient and the intended plan as seen in the above orders. Patient is in agreement. The patient has received an after-visit summary and questions were answered concerning future plans. I have discussed medication side effects and warnings with the patient as well.

## 2020-08-19 NOTE — PROGRESS NOTES
This is the Subsequent Medicare Annual Wellness Exam, performed 12 months or more after the Initial AWV or the last Subsequent AWV    I have reviewed the patient's medical history in detail and updated the computerized patient record. History   Jason Jerry is a 62 y.o. male. Accompanied by his mother, who provides the history. Patient able to answer only simple yes/no questions. Presents to establish care. Former PCP was Dr. Janeth Ramos, now retired. He has type 2 DM, HTN, hypercholesterolemia, psoriasis,  myelodysplastic syndrome with thrombocytopenia,  seizure disorder, venous stasis, sleep disorder, and intellectual disability. Patient's mother reports he has been having hand tremors and weakness at right leg. Physical therapist the patient sees at [address] [place] [address] recommended he be evaluated for a movement disorder like Parkinson's disease. Patient not as active over the past few months. She also complains of him having a cough at nighttime when he lies down to sleep; does not occur every night but frequently. Used cough syrup that helped a little. He was seen by me on 7/29/20 with vasculitic-appearing rash on his feet. The rash improved once he stopped wearing compression stockings. Testing for vasculitic causes all negative, including hepatitis panel, JEREMY, ANCA, C3, C4, CH50, and cryoglobulins. Does not have a neurologist.  Used to see Dr. Elpidio Siemens before left the practice. No recent seizures. Has appointment with dentist in 2 weeks; has been pulling his teeth. Endocrine Review  He is seen for diabetes. Since last visit he reports no polyuria or polydipsia, no hypoglycemia, no significant changes. Home glucose monitoring: is not done. He reports medication compliance: compliant all of the time. Medication side effects: none. Diabetic diet compliance: noncompliant some of the time. Lab review: A1c 8.1% on 8/10/20, was 8.8% on 4/1/20.   Eye exam: overdue  (has appointment next week). Other Providers: Dr. Angelo Joe (Hem/Onc), Dr. Farhad King (Ophthalmology)    Patient Active Problem List   Diagnosis Code    Intellectual disability F79    Seizure disorder ([facility] G40.909    Psoriasis L40.9    Venous stasis of lower extremity I87.8    Thrombocytopenia (HCC) D69.6    Sleep disorder G47.9    Hypertension, essential I10    Strongyloides stercoralis infection B78.9    Eosinophilia D72.1    Hypercholesterolemia E78.00    Type 2 diabetes mellitus without complication, without long-term current use of insulin (HCC) E11.9    Myelodysplasia (myelodysplastic syndrome) ([facility] D46.9     Past Medical History:   Diagnosis Date    Contact dermatitis and other eczema, due to unspecified cause     psoriasis    Diabetes ([facility]     Eosinophilia     Hypercholesterolemia     Hypertension     Mental retardation     Seizures ([facility]     Skin cancer       Past Surgical History:   Procedure Laterality Date    ENDOSCOPY, COLON, DIAGNOSTIC  11/08/2007    Dr Quincy Harris normal except small internal hemorrhoids repeat 11/2017     Current Outpatient Medications   Medication Sig Dispense Refill    glimepiride (AMARYL) 4 mg tablet TAKE TWO TABLETS BY MOUTH IN THE MORNING 180 Tab 3    atorvastatin (LIPITOR) 40 mg tablet TAKE ONE TABLET BY MOUTH AT BEDTIME  90 Tab 3    pioglitazone (ACTOS) 45 mg tablet TAKE ONE TABLET BY MOUTH ONE TIME DAILY FOR TYPE 2 DIABETES 90 Tab 3    folic acid (FOLVITE) 1 mg tablet TAKE ONE TABLET BY MOUTH ONE TIME DAILY  90 Tab 3    divalproex DR (DEPAKOTE) 500 mg tablet TAKE ONE TABLET BY MOUTH EVERY MORNING AND TWO TABS AT NIGHT 270 Tab 3    ramipril (ALTACE) 5 mg capsule TAKE ONE CAPSULE BY MOUTH ONE TIME DAILY  90 Cap 3    linaGLIPtin (TRADJENTA) 5 mg tablet Take 1 Tab by mouth daily. 90 Tab 3    metFORMIN ER (GLUCOPHAGE XR) 500 mg tablet Take 2 Tabs by mouth two (2) times a day.  360 Tab 3    topiramate (TOPAMAX) 200 mg tablet TAKE ONE AND ONE-HALF TABLET BY MOUTH TWICE DAILY 270 Tab 3    triamcinolone acetonide (KENALOG) 0.1 % ointment       chlorhexidine (PERIDEX) 0.12 % solution Take 15 mL by mouth every twelve (12) hours.  Calcium-Cholecalciferol, D3, (CALTRATE-600 PLUS VITAMIN D3) 600-400 mg-unit Tab Take  by mouth. No Known Allergies    Family History   Problem Relation Age of Onset    Other Mother         PMR    Hypertension Mother     Cancer Father         Lung    Diabetes Brother      Social History     Tobacco Use    Smoking status: Never Smoker    Smokeless tobacco: Never Used   Substance Use Topics    Alcohol use: No       Depression Risk Factor Screening:     3 most recent PHQ Screens 4/29/2020   Little interest or pleasure in doing things Not at all   Feeling down, depressed, irritable, or hopeless Not at all   Total Score PHQ 2 0   Trouble falling or staying asleep, or sleeping too much -   Feeling tired or having little energy -   Poor appetite, weight loss, or overeating -   Feeling bad about yourself - or that you are a failure or have let yourself or your family down -   Trouble concentrating on things such as school, work, reading, or watching TV -   Moving or speaking so slowly that other people could have noticed; or the opposite being so fidgety that others notice -   Thoughts of being better off dead, or hurting yourself in some way -   PHQ 9 Score -   How difficult have these problems made it for you to do your work, take care of your home and get along with others -       Alcohol Risk Factor Screening (MALE < 65): Do you average more than 2 drinks per night or 14 drinks a week: No    On any one occasion in the past three months have you have had more than 4 drinks containing alcohol:  No      Functional Ability and Level of Safety:   Hearing: Hearing is good. Activities of Daily Living: The home contains: no safety equipment.   Patient needs help with:  phone, transportation, shopping, preparing meals, laundry, housework, managing medications, managing money and hygiene     Ambulation: with no difficulty     Fall Risk:  Fall Risk Assessment, last 12 mths 4/29/2020   Able to walk? Yes   Fall in past 12 months? Yes   Fall with injury? No   Number of falls in past 12 months 8 or more   Fall Risk Score 8     Abuse Screen:  Patient is not abused       Cognitive Screening   Has your family/caregiver stated any concerns about your memory: no     Cognitive Screening: see exam    Visit Vitals  /80 (BP 1 Location: Left arm, BP Patient Position: At rest)   Pulse 75   Temp 97.9 °F (36.6 °C) (Oral)   Resp 16   Ht 6' (1.829 m)   Wt 197 lb (89.4 kg)   SpO2 98%   BMI 26.72 kg/m²       General: Well-developed and well-nourished, tall, no distress. HEENT:  Head normocephalic/atraumatic, no scleral icterus  Neck: Supple. No JVD, lymphadenopathy, or thyromegaly. Lungs:  Clear to auscultation bilaterally. Good air movement. Heart:  Regular rate and rhythm, normal S1 and S2, no murmur, gallop, or rub  Extremities: No clubbing, cyanosis. 1+ pitting edema at bilateral LE's. Skin: Erythematous patches at fee resolved. Neurological: Alert and oriented x 3. Gait mildly ataxic. MS difficult to assess because he does not follow instructions. Hand  5/5. DTR's 2+ at bilateral UE's. Unable to elicit knee reflexes, weak ankle reflexes. Psychiatric: Normal mood and affect.  Behavior is normal.     Lab Results   Component Value Date/Time    Hemoglobin A1c 8.1 (H) 08/10/2020 12:39 PM    Hemoglobin A1c (POC) 8.8 04/01/2020 09:00 AM     Lab Results   Component Value Date/Time    Cholesterol, total 122 04/02/2020 09:06 AM    HDL Cholesterol 33 (L) 04/02/2020 09:06 AM    LDL, calculated 56 04/02/2020 09:06 AM    VLDL, calculated 33 04/02/2020 09:06 AM    Triglyceride 166 (H) 04/02/2020 09:06 AM    CHOL/HDL Ratio 2.8 03/31/2009 08:40 AM       Patient Care Team   Patient Care Team:  Grecia Gaytan MD as PCP - General (Internal Medicine)  Valerie Hernández MD as PCP - [facility] Empaneled Provider  Jose Aguilar OD (Optometry)    Assessment/Plan   Education and counseling provided:  Are appropriate based on today's review and evaluation    Diagnoses and all orders for this visit:    1. Medicare annual wellness visit, subsequent    2. Type 2 diabetes mellitus without complication, without long-term current use of insulin ([facility] [address] [facility]  Not controlled but improving. A1c 8.1% on 8/10/20. Instructed patient to cut down on sweets and starchy foods. Continue metformin, glimepiride, pioglitazone, and [place]; is on highest doses of all these medications. 3. Hypertension, essential  Controlled on ramipril. 4. Hypercholesterolemia  Controlled on atorvastatin. 5. Thrombocytopenia ([address]    6. Myelodysplasia (myelodysplastic syndrome) ([facility] [address]  Has worsening thrombocytopenia. Continue to follow with Dr. Dio Bills. 7. Seizure disorder (HCC)  Stable on Depakote and Topamax.  -     REFERRAL TO NEUROLOGY    8. Nocturnal cough  Likely due to postnasal drainage. Also consider GERD. -     Start benzonatate (TESSALON) 100 mg capsule; Take 1 Cap by mouth nightly as needed for Cough. 9. Right leg weakness  -     REFERRAL TO NEUROLOGY    10. Venous stasis of lower extremity  Keep legs elevated. Okay to stop compression stockings. 11. Intellectual disability    12. Screening for depression  -     DEPRESSION SCREEN ANNUAL    Greater than 40 mins direct face-to-face time spent with patient. Greater than 50% of time spent on counseling and coordination of care. Follow-up and Dispositions    · Return in about 3 months (around 11/19/2020), or if symptoms worsen or fail to improve, for DM, HTN.            Health Maintenance Due   Topic Date Due    Eye Exam Retinal or Dilated  10/27/2019    MICROALBUMIN Q1  08/01/2020    Influenza Age 5 to Adult  08/01/2020    Medicare Yearly Exam  08/13/2020

## 2020-09-11 DIAGNOSIS — D69.6 THROMBOCYTOPENIA (HCC): ICD-10-CM

## 2020-09-15 ENCOUNTER — OFFICE VISIT (OUTPATIENT)
Dept: NEUROLOGY | Age: 65
End: 2020-09-15
Payer: MEDICARE

## 2020-09-15 VITALS
BODY MASS INDEX: 26.68 KG/M2 | WEIGHT: 197 LBS | DIASTOLIC BLOOD PRESSURE: 78 MMHG | HEART RATE: 71 BPM | OXYGEN SATURATION: 98 % | SYSTOLIC BLOOD PRESSURE: 126 MMHG | HEIGHT: 72 IN

## 2020-09-15 DIAGNOSIS — R29.898 WEAKNESS OF BOTH LEGS: Primary | ICD-10-CM

## 2020-09-15 DIAGNOSIS — G40.909 SEIZURE DISORDER (HCC): ICD-10-CM

## 2020-09-15 PROCEDURE — 99205 OFFICE O/P NEW HI 60 MIN: CPT | Performed by: PSYCHIATRY & NEUROLOGY

## 2020-09-15 NOTE — PROGRESS NOTES
Referring Physician: self referred     Reason for Consultation:  Weakness, tremor     Chief Complaint: Weakness in the lower extremities and difficulty with walking. History of Present Illness:   Carine Burger is a 62 y.o. male with a history of hyperlipidemia, hypertension seizures and static encephalopathy who presents to neurology clinic for evaluation of progressive weakness in his lower extremities as well as difficulty walking. Patient is accompanied by his mother who provided the history. She reports that approximately 2 years ago he was in the [facility] going to an activity and he developed a flulike illness possibly influenza and was admitted to the hospital.  He reports he had a very high fever and was in the hospital for several weeks and required rehab. Since that time she reports that he has had a progressive decline in his ability to walk and ambulate and has noted weakness in his lower extremities. She reports that prior to this episode he was able to walk for long periods of time, play golf as well as bowl now he can barely walk to the driveway. She reports that he gets fatigued and he does not walk for prolonged periods of time. She also reports that he has had difficulty going up and down the stairs and requires assistance. She does not feel like he has any difficulty with strength in his upper extremities and it is mainly his lower extremities. She has been working with physical therapy and there was some concern of a possible movement disorder as they noted a tremor. She reports that his tremor is present mainly with action however can be present at rest.  She does report that this tremor is intermittent. He has had no changes in his speech or swallowing his appetite has been good  Of note patient was diagnosed with seizures at the age of 6 months. Has been on multiple AEDs since that time and has been taking Depakote and Topamax for several years.   Reports in the last 20 years he has had 1 seizure. He was previously followed by neurology and maintained on Depakote for seizures. He takes Topamax 300 mg twice a day and Depakote 500 mg in the morning and thousand milligrams at night. Additionally patient has been recently worked up for low platelets. It was noted to be dropping approximately 2 years ago and has slowly declined since that time. He has been followed by heme-onc who performed a bone marrow biopsy and did file a myelodysplastic syndrome. Past Medical History:   Diagnosis Date    Contact dermatitis and other eczema, due to unspecified cause     psoriasis    Diabetes ([facility] [address] [address]     Eosinophilia     Hypercholesterolemia     Hypertension     Mental retardation     Seizures (HCC)     Skin cancer         Past Surgical History:   Procedure Laterality Date    ENDOSCOPY, COLON, DIAGNOSTIC  11/08/2007    Dr Armond Steen normal except small internal hemorrhoids repeat 11/2017        Family History   Problem Relation Age of Onset    Other Mother         PMR    Hypertension Mother     Cancer Father         Lung    Diabetes Brother         Social History     Tobacco Use    Smoking status: Never Smoker    Smokeless tobacco: Never Used   Substance Use Topics    Alcohol use: No        No Known Allergies     Prior to Admission medications    Medication Sig Start Date End Date Taking? Authorizing Provider   benzonatate (TESSALON) 100 mg capsule Take 1 Cap by mouth nightly as needed for Cough.  8/19/20  Yes Cata Riley MD   glimepiride (AMARYL) 4 mg tablet TAKE TWO TABLETS BY MOUTH IN THE MORNING 7/19/20  Yes Yara Castro MD   atorvastatin (LIPITOR) 40 mg tablet TAKE ONE TABLET BY MOUTH AT BEDTIME  7/19/20  Yes Yara Castro MD   pioglitazone (ACTOS) 45 mg tablet TAKE ONE TABLET BY MOUTH ONE TIME DAILY FOR TYPE 2 DIABETES 7/8/20  Yes Yara Castro MD   folic acid (FOLVITE) 1 mg tablet TAKE ONE TABLET BY MOUTH ONE TIME DAILY  7/8/20  Yes Yara Castro MD   divalproex DR (DEPAKOTE) 500 mg tablet TAKE ONE TABLET BY MOUTH EVERY MORNING AND TWO TABS AT NIGHT 1/19/20  Yes Sheralyn Osler, MD   ramipril (ALTACE) 5 mg capsule TAKE ONE CAPSULE BY MOUTH ONE TIME DAILY  1/15/20  Yes Sheralyn Osler, MD   linaGLIPtin (TRADJENTA) 5 mg tablet Take 1 Tab by mouth daily. 9/30/19  Yes Sheralyn Osler, MD   metFORMIN ER (GLUCOPHAGE XR) 500 mg tablet Take 2 Tabs by mouth two (2) times a day. 8/27/19  Yes Sheralyn Osler, MD   topiramate (TOPAMAX) 200 mg tablet TAKE ONE AND ONE-HALF TABLET BY MOUTH TWICE DAILY 8/27/19  Yes Sheralyn Osler, MD   triamcinolone acetonide (KENALOG) 0.1 % ointment  1/30/19  Yes Provider, Historical   chlorhexidine (PERIDEX) 0.12 % solution Take 15 mL by mouth every twelve (12) hours. 2/8/18  Yes Provider, Historical   Calcium-Cholecalciferol, D3, (CALTRATE-600 PLUS VITAMIN D3) 600-400 mg-unit Tab Take  by mouth. Yes Provider, Historical       Review of Systems:    [x]Unable to obtain  ROS due to  [x]mental status change  []sedated   []intubated    Visit Vitals  /78   Pulse 71   Ht 6' (1.829 m)   Wt 197 lb (89.4 kg)   SpO2 98%   BMI 26.72 kg/m²       Physical Exam:  General:  no acute distress  Neck: no carotid bruits  Lungs: clear to auscultation  Heart:  no murmurs, regular rate and rhythm   Lower extremity: 1+ edema with venous stasis dermatitis    Neurological exam:  Mental Status: Awake alert oriented to person however did not know place or time. Registration and Recall: registration intact, able to recall any words despite prompts  Attention and Concentration: He was unable to state the days of the week forward   Speech and Language: Mild dysarthria. Able to name simple objects, repeats simple phrases and follow one-step commands. He did have trouble following multistep commands.       Cranial nerves: II-XII  Pupils equal and reactive, visual fields intact by confrontation   Extraocular movements intact, no evidence of nystagmus or ptosis   Facial sensation intact   Facial movements symmetric   Hearing intact to soft rub bilaterally   Shoulder shrug symmetric and strong   Tongue protrusion full and midline without fasciculation or atrophy    Motor:   Normal tone and Bulk. No cogwheeling was noted however patient did not relax completely. He did have a slight rest tremor  Drift: No evidence of pronator drift     Strength testing:  Patient was unable to perform formal strength testing due to his cognitive limitations. He appeared to be full strength in his upper extremities  He appeared to be 3 out of 5 proximally in his hip flexors and distally 4 out of 5. He did have slight asymmetry in lower extremity weakness worse on the right than the left    Sensory:  Sensation intact to light touch. Reflexes:     Biceps Triceps  Brachiorad Patellar Achilles Plantar Hoffmans   Right  1 1 1 1 1 Down Neg   Left  1 1 1 1 1 Down Neg        Cerebellar testing: Finger-nose-finger was intact though he needed significant coaching to perform this task. He was unable to do heel-to-shin      Gait: Decreased arm swing was noted on the left upper extremity. He asked small steps with high steppage gait    Data:   INTERNAL RECORDS:  The patient's electronic medical record was reviewed. The relevant details include:    Lab Results   Component Value Date/Time    Hemoglobin A1c 8.1 (H) 08/10/2020 12:39 PM    Sodium 142 08/10/2020 12:39 PM    Potassium 4.4 08/10/2020 12:39 PM    Chloride 110 (H) 08/10/2020 12:39 PM    Glucose 124 (H) 08/10/2020 12:39 PM    BUN 18 08/10/2020 12:39 PM    Creatinine 0.72 (L) 08/10/2020 12:39 PM    Calcium 9.0 08/10/2020 12:39 PM    WBC 6.8 08/10/2020 12:46 PM    HCT 34.7 (L) 08/10/2020 12:46 PM    HGB 11.1 (L) 08/10/2020 12:46 PM    PLATELET 64 (LL) 22/91/0048 12:46 PM       CT Results (maximum last 3):   Results from [place] encounter on 05/28/20   CT BX BONE MARROW DIAGNOSTIC    Narrative Clinical Indication: Thrombocytopenia, anemia      Using CT guidance, after adequate skin preparation and local anesthesia with a  posterior approach, a 14-gauge bone biopsy needle was placed into the posterior  aspect of the iliac bone. Drill technique  Bone marrow aspirate and biopsy were performed , patient tolerated the procedure  well. Sample appears adequate. CT dose reduction was achieved through use of a  standardized protocol tailored for this examination and automatic exposure  control for dose modulation. Impression Impression: CT-guided bone marrow biopsy and aspirate. Procedure was performed  with general anesthesia. Results from [facility] encounter on 04/30/20   CT ABD LTD WO F/U    Narrative Procedure: CT-guided bone marrow biopsy    INDICATION: Anemia    TECHNIQUE: Initial  imaging was obtained. The patient became agitated after  the administration of conscious sedation. Bone marrow biopsy could not be safely  performed and the study was discontinued. CT dose reduction was achieved through  use of a standardized protocol tailored for this examination and automatic  exposure control for dose modulation. Impression IMPRESSION: Bone marrow biopsy could not be performed secondary to patient's  agitation after moderate sedation. Consider general anesthesia   Results from Hospital Encounter encounter on 07/06/15   CT SPINE CERV WO CONT    Narrative **Final Report**       ICD Codes / Adm. Diagnosis: 97  28 / Seizure  seizure  Examination:  CT C SPINE WO CON  - [id number] - Jul 6 2015  4:41PM  Accession No:  [id number]  Reason:  Neck Pain      REPORT:  EXAM:  CT CERVICAL SPINE WITHOUT CONTRAST    INDICATION:   Neck Pain seizure    COMPARISON: None. TECHNIQUE: Multislice helical CT of the cervical spine was performed without   intravenous contrast administration. Sagittal and coronal reconstructions   were generated. CONTRAST: None.     FINDINGS:    Bone mineralization is normal. There is normal vertebral body height. Mild   C4-5 and moderate to severe C6-7 degenerative disc space narrowing is shown   with endplate marginal osteophytes which are probably demonstrated in the   right uncovertebral region of C6-7. The other disc heights are normal. There   is anatomic alignment. The posterior processes and facet joints appear   intact. There is no osseous canal stenosis. Moderate right foraminal   narrowing at C6-7 is shown. No paraspinal soft tissue swelling or mass is   evident. IMPRESSION:  Degenerative changes. No fracture or dislocation demonstrated. Signing/Reading Doctor: Maximiliano Wei ([id number])    Approved: KAREN Wei ([id number])  Jul 6 2015  5:00PM                                    MRI Results (maximum last 3): No results found for this or any previous visit. Assessment and Plan   Perez Nieto is a 62 y.o. male with a history of hyperlipidemia, hypertension seizures and static encephalopathy who presents to neurology clinic for evaluation of progressive weakness in his lower extremities, seizures as well as tremor. His neurological exam is limited due to his cognitive ability however does reveal weakness in the lower extremities. Symptoms are concerning for possible myopathy versus radiculopathy. His weakness is like this likely to be myelopathic as his reflexes are depressed    Lower extremity weakness: Unclear etiology however concerning for myopathy versus radiculopathy.  -Prior to imaging of the spinal cord, will obtain an EMG with nerve conduction studies as this will help guide which imaging studies to perform  -Recommended that he continue with physical therapy and Occupational Therapy as this will help with the weakness.  -Did discuss with the mother that he should potentially get a life alert in case he falls and is unable to get back up to the weakness. Epilepsy: Well-controlled on Topamax as well as Depakote.   It is possible that the Depakote may be resulting in his tremor as well as his thrombocytopenia.  -Continue Depakote as well as Topamax for now as mother is reluctant to change this medication  -We will obtain a routine EEG as well as an MRI of the brain with and without contrast to determine if there is a seizure focus  -We will obtain a free Depakote level to ensure that patient is not supratherapeutic.  -I discussed that pending the results above we may need to consider changing his Depakote to an alternative agent as it could be contributing to his thrombocytopenia as well as tremor    Patient Active Problem List   Diagnosis Code    Intellectual disability F68    Seizure disorder ([facility] [address] G40.909    Psoriasis L40.9    Venous stasis of lower extremity I87.8    Thrombocytopenia ([facility] [address] D69.6    Sleep disorder G47.9    Hypertension, essential I10    Strongyloides stercoralis infection B78.9    Eosinophilia D72.1    Hypercholesterolemia E78.00    Type 2 diabetes mellitus without complication, without long-term current use of insulin (HCC) E11.9    Myelodysplasia ([facility]odysplastic syndrome) ([facility] D46.9       I have discussed the diagnosis with the patient and the intended plan as seen in the above orders. Patient is in agreement. The patient has received an after-visit summary and questions were answered concerning future plans. I have discussed medication side effects and warnings with the patient as well.         Signed By:  Karolina Calix MD     September 15, 2020

## 2020-09-15 NOTE — PATIENT INSTRUCTIONS
Office Policies 
 
o Phone calls/patient messages: Please allow up to 24 hours for someone in the office to contact you about your call or message. Be mindful your provider may be out of the office or your message may require further review. We encourage you to use [facility] for your messages as this is a faster, more efficient way to communicate with our office 
 
o Medication Refills: 
Prescription medications require up to 48 business hours to process. We encourage you to use [facility] for your refills. For controlled medications: Please allow up to 72 business hours to process. Certain medications may require you to  a written prescription at our office. NO narcotic/controlled medications will be prescribed after 4pm Monday through Friday or on weekends 
 
o Form/Paperwork Completion: We ask that you allow 7-14 business days. You may also download your forms to [facility] to have your doctor print off.

## 2020-09-18 ENCOUNTER — OFFICE VISIT (OUTPATIENT)
Dept: NEUROLOGY | Age: 65
End: 2020-09-18
Payer: MEDICARE

## 2020-09-18 DIAGNOSIS — G62.9 NEUROPATHY: ICD-10-CM

## 2020-09-18 DIAGNOSIS — G63 POLYNEUROPATHY ASSOCIATED WITH UNDERLYING DISEASE (HCC): ICD-10-CM

## 2020-09-18 PROCEDURE — 95886 MUSC TEST DONE W/N TEST COMP: CPT | Performed by: PSYCHIATRY & NEUROLOGY

## 2020-09-18 PROCEDURE — 95913 NRV CNDJ TEST 13/> STUDIES: CPT | Performed by: PSYCHIATRY & NEUROLOGY

## 2020-09-18 NOTE — PROCEDURES
Electrodiagnostic Study Report  Test Date:  2020    Patient: Jose Castro : 1955 Physician: Dr. Meliton Pop   Sex: Male  < Ref Phys: Dr. Sherif Myrick:  Patient is a 62year-old male who presents with generalized weakness, which is worse in his lower extremities as well as balance difficulties. His neurological examination does reveal decreased sensation in a length dependent process and a wide-based gait. EMG & NCV Findings:  Nerve conduction studies as listed below were absent for the bilateral sural sensory, superficial fibular sensory, left median sensory and left ulnar sensory. The left radial sensory revealed a normal peak latency with a reduced amplitude. The bilateral fibular motor studies were absent. The bilateral tibial motor nerve studies were absent. The left median motor nerve conduction study revealed a mild prolongation of the peak latency with a normal amplitude. The conduction velocity was slightly reduced. The left ulnar motor nerve conduction study revealed a slight prolongation of the peak latency, a mildly reduced amplitude, and a mild slowing of the conduction velocity. Disposable concentric needle examination of the muscles listed below in the left lower extremity did reveal active denervation in the left tibialis anterior and medial gastroc with neurogenic appearing motor units. All other muscles tested in the left lower extremity were normal.    Impression: This study was abnormal.  There is electrodiagnostic evidence upon today's examination suggestin.  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 2. There was no evidence of a focal neuropathy, myopathy, or lumbar radiculopathy. __________________  Alysia Rob D.O.   Jefry Acosta      __________________  Nerve Conduction Studies  Anti Sensory Summary Table     Site NR Peak (ms) Norm Peak (ms) O-P Amp (µV) Norm O-P Amp Site1 Site2 Dist (cm)   Left Median Anti Sensory (2nd Digit)  30.4°C   Wrist NR  <4  >11 Wrist 2nd Digit 14.0   Left Radial Anti Sensory (Base 1st Digit)  30.4°C   Wrist    1.7 <2.8 5.6 >11 Wrist Base 1st Digit 10.0   Left Sup Fibular Anti Sensory (Lat ankle)  30.4°C   Lower leg NR  <4.6  >4 Lower leg Lat ankle 10.0   Right Sup Fibular Anti Sensory (Lat ankle)  30.4°C   Lower leg NR  <4.6  >4 Lower leg Lat ankle 10.0   Left Sural Anti Sensory (Lat Mall)  30.4°C   Calf NR  <4.5  >4.0 Calf Lat Mall 14.0   Right Sural Anti Sensory (Lat Mall)  30.4°C   Calf NR  <4.5  >4.0 Calf Lat Mall 14.0   Left Ulnar Anti Sensory (5th Digit)  30.4°C   Wrist NR  <4.0  >10 Wrist 5th Digit 14.0     Motor Summary Table     Site NR Onset (ms) Norm Onset (ms) O-P* Amp (mV) Norm O-P Amp P-T Amp (mV) Site1 Site2 Dist (cm) Jhon (m/s)   Left Fibular Motor (Ext Dig Brev)  30.4°C   Ankle NR  <6.5  >1.1  Ankle Ext Dig Brev 8.0    B Fib NR      B Fib Ankle 0.0    Poplt NR      Poplt B Fib 10.0    Right Fibular Motor (Ext Dig Brev)  30.4°C   Ankle NR  <6.5  >1.1  Ankle Ext Dig Brev 8.0    B Fib NR      B Fib Ankle 0.0    Poplt NR      Poplt B Fib 10.0    Left Median Motor (Abd Poll Brev)  30.4°C   Wrist    5.2 <4.5 4.6 >4.1  Wrist Abd Poll Brev 8.0 15   Elbow    12.0  4.0   Elbow Wrist 28.0 41   Left Tibial Motor (Abd Alatorre Brev)  30.4°C   Ankle NR  <6.1  >1.1  Ankle Abd Alatorre Brev 8.0    Knee NR      Knee Ankle 0.0    Right Tibial Motor (Abd Alatorre Brev)  30.4°C   Ankle NR  <6.1  >1.1  Ankle Abd Alatorre Brev 8.0    Knee NR      Knee Ankle 0.0    Left Ulnar Motor (Abd Dig Minimi)  30.4°C   Wrist    3.8 <3.1 5.8 >7.0  Wrist Abd Dig Minimi 8.0    B Elbow    9.5  5.4   B Elbow Wrist 25.0 44   A Elbow    12.0  5.3   A Elbow B Elbow 10.0 40     EMG     Side Muscle Nerve Root Ins Act Fibs Psw Recrt Duration Amp Poly Comment                 Left AntTibialis Dp Br Peron L4-5 Incr 1+ 1+ Reduced Nml Incr Nml    Left MedGastroc Tibial S1-2 Incr 1+ 1+ Reduced Nml Incr Nml    Left VastusLat Femoral L2-4 Nml Nml Nml Nml Nml Nml Nml    Left Semitendinosus Sciatic L5-S2 Nml Nml Nml Nml Nml Nml Nml    Left Peroneus Long   Nml Nml Nml Nml Nml Nml Nml        Waveforms:

## 2020-09-28 DIAGNOSIS — E78.00 HYPERCHOLESTEROLEMIA: Primary | ICD-10-CM

## 2020-09-28 DIAGNOSIS — G40.909 SEIZURE DISORDER (HCC): ICD-10-CM

## 2020-09-28 DIAGNOSIS — D46.9 MYELODYSPLASIA (MYELODYSPLASTIC SYNDROME) (HCC): ICD-10-CM

## 2020-09-28 DIAGNOSIS — E11.9 CONTROLLED TYPE 2 DIABETES MELLITUS WITHOUT COMPLICATION, WITHOUT LONG-TERM CURRENT USE OF INSULIN (HCC): ICD-10-CM

## 2020-09-28 DIAGNOSIS — I10 HYPERTENSION, ESSENTIAL: ICD-10-CM

## 2020-09-28 RX ORDER — RAMIPRIL 5 MG/1
5 CAPSULE ORAL DAILY
Qty: 90 CAP | Refills: 3 | Status: SHIPPED | OUTPATIENT
Start: 2020-09-28 | End: 2020-12-02

## 2020-09-28 RX ORDER — GLIMEPIRIDE 4 MG/1
TABLET ORAL
Qty: 180 TAB | Refills: 3 | Status: SHIPPED | OUTPATIENT
Start: 2020-09-28 | End: 2021-01-01

## 2020-09-28 RX ORDER — FOLIC ACID 1 MG/1
1 TABLET ORAL DAILY
Qty: 90 TAB | Refills: 3 | Status: SHIPPED | OUTPATIENT
Start: 2020-09-28 | End: 2021-01-01

## 2020-09-28 RX ORDER — PIOGLITAZONEHYDROCHLORIDE 45 MG/1
45 TABLET ORAL DAILY
Qty: 90 TAB | Refills: 3 | Status: SHIPPED | OUTPATIENT
Start: 2020-09-28 | End: 2021-01-01

## 2020-09-28 RX ORDER — ATORVASTATIN CALCIUM 40 MG/1
40 TABLET, FILM COATED ORAL
Qty: 90 TAB | Refills: 3 | Status: SHIPPED | OUTPATIENT
Start: 2020-09-28 | End: 2021-01-01

## 2020-09-28 RX ORDER — DIVALPROEX SODIUM 500 MG/1
TABLET, DELAYED RELEASE ORAL
Qty: 270 TAB | Refills: 3 | Status: SHIPPED | OUTPATIENT
Start: 2020-09-28 | End: 2021-01-01

## 2020-09-28 NOTE — TELEPHONE ENCOUNTER
[facility] on file sent a fax requesting a refill of   Requested Prescriptions     Pending Prescriptions Disp Refills    pioglitazone (ACTOS) 45 mg tablet 90 Tab 3    ramipriL (ALTACE) 5 mg capsule 90 Cap 3    divalproex DR (DEPAKOTE) 500 mg tablet 270 Tab 3     Sig: TAKE ONE TABLET BY MOUTH EVERY MORNING AND TWO TABS AT NIGHT    atorvastatin (LIPITOR) 40 mg tablet 90 Tab 3    glimepiride (AMARYL) 4 mg tablet 180 Tab 3     Sig: TAKE TWO TABLETS BY MOUTH IN THE MORNING    folic acid (FOLVITE) 1 mg tablet 90 Tab 3     And Tradjenta Oral Tablet 5mg be faxed to [phone].

## 2020-09-28 NOTE — TELEPHONE ENCOUNTER
PCP: Boris Rudolph MD     Last appt: 8/19/2020   Future Appointments   Date Time Provider Kelly Fang   10/6/2020  1:00 PM [id number] MRI [facility]   10/27/2020 11:00 AM Filippo Williamson MD [facility]   11/18/2020  1:40 PM Mckinley Riley MD [facility] AM[facility]        Requested Prescriptions     Pending Prescriptions Disp Refills    pioglitazone (ACTOS) 45 mg tablet 90 Tab 3    ramipriL (ALTACE) 5 mg capsule 90 Cap 3    divalproex DR (DEPAKOTE) 500 mg tablet 270 Tab 3     Sig: TAKE ONE TABLET BY MOUTH EVERY MORNING AND TWO TABS AT NIGHT    atorvastatin (LIPITOR) 40 mg tablet 90 Tab 3    glimepiride (AMARYL) 4 mg tablet 180 Tab 3     Sig: TAKE TWO TABLETS BY MOUTH IN THE MORNING    folic acid (FOLVITE) 1 mg tablet 90 Tab 3

## 2020-10-02 DIAGNOSIS — E11.9 TYPE 2 DIABETES MELLITUS WITHOUT COMPLICATION, WITHOUT LONG-TERM CURRENT USE OF INSULIN (HCC): Primary | ICD-10-CM

## 2020-10-02 DIAGNOSIS — Z79.4 ENCOUNTER FOR LONG-TERM (CURRENT) USE OF INSULIN (HCC): ICD-10-CM

## 2020-10-02 NOTE — TELEPHONE ENCOUNTER
PCP: Wilma Mendoza MD     Last appt: 8/19/2020   Future Appointments   Date Time Provider Kelly Fang   10/6/2020  1:00 PM [facility]   10/27/2020 11:00 AM Rajeev Colbert MD [facility] AMB   11/18/2020  1:40 PM Sung Riley MD [facility] BS AMB        Requested Prescriptions     Pending Prescriptions Disp Refills    linaGLIPtin (TRADJENTA) 5 mg PO tablet 90 Tab 3     Sig: Take 1 Tab by mouth daily.

## 2020-10-06 ENCOUNTER — HOSPITAL ENCOUNTER (OUTPATIENT)
Dept: MRI IMAGING | Age: 65
Discharge: HOME | End: 2020-10-06
Attending: PSYCHIATRY & NEUROLOGY
Payer: MEDICARE

## 2020-10-06 DIAGNOSIS — G40.909 SEIZURE DISORDER (HCC): ICD-10-CM

## 2020-10-06 PROCEDURE — 74011250636 HC RX REV CODE- 250/636: Performed by: PSYCHIATRY & NEUROLOGY

## 2020-10-06 PROCEDURE — A9575 INJ GADOTERATE MEGLUMI 0.1ML: HCPCS | Performed by: PSYCHIATRY & NEUROLOGY

## 2020-10-06 PROCEDURE — 70553 MRI BRAIN STEM W/O & W/DYE: CPT

## 2020-10-06 RX ORDER — GADOTERATE MEGLUMINE 376.9 MG/ML
20 INJECTION INTRAVENOUS
Status: COMPLETED | OUTPATIENT
Start: 2020-10-06 | End: 2020-10-06

## 2020-10-06 RX ADMIN — GADOTERATE MEGLUMINE 20 ML: 376.9 INJECTION INTRAVENOUS at 14:14

## 2020-10-08 LAB — VALPROATE FREE SERPL-MCNC: 21.3 UG/ML (ref 6–22)

## 2020-10-09 LAB
BASOPHILS # BLD AUTO: 0 X10E3/UL (ref 0–0.2)
BASOPHILS NFR BLD AUTO: 0 %
COPPER SERPL-MCNC: 129 UG/DL (ref 72–166)
EOSINOPHIL # BLD AUTO: 0.8 X10E3/UL (ref 0–0.4)
EOSINOPHIL NFR BLD AUTO: 12 %
ERYTHROCYTE [DISTWIDTH] IN BLOOD BY AUTOMATED COUNT: 13.9 % (ref 11.6–15.4)
HCT VFR BLD AUTO: 32.3 % (ref 37.5–51)
HGB BLD-MCNC: 10.4 G/DL (ref 13–17.7)
IMM GRANULOCYTES # BLD AUTO: 0.1 X10E3/UL (ref 0–0.1)
IMM GRANULOCYTES NFR BLD AUTO: 2 %
LEAD BLOOD, ADULT, 17379: 2 UG/DL (ref 0–4)
LYMPHOCYTES # BLD AUTO: 3.1 X10E3/UL (ref 0.7–3.1)
LYMPHOCYTES NFR BLD AUTO: 47 %
MCH RBC QN AUTO: 34 PG (ref 26.6–33)
MCHC RBC AUTO-ENTMCNC: 32.2 G/DL (ref 31.5–35.7)
MCV RBC AUTO: 106 FL (ref 79–97)
MONOCYTES # BLD AUTO: 0.7 X10E3/UL (ref 0.1–0.9)
MONOCYTES NFR BLD AUTO: 11 %
MORPHOLOGY BLD-IMP: ABNORMAL
NEUTROPHILS # BLD AUTO: 1.8 X10E3/UL (ref 1.4–7)
NEUTROPHILS NFR BLD AUTO: 28 %
PLATELET # BLD AUTO: 73 X10E3/UL (ref 150–450)
RBC # BLD AUTO: 3.06 X10E6/UL (ref 4.14–5.8)
VIT B12 SERPL-MCNC: 449 PG/ML (ref 232–1245)
WBC # BLD AUTO: 6.6 X10E3/UL (ref 3.4–10.8)
ZPP RBC-MCNC: 59 UG/DL (ref 0–99)

## 2020-10-12 ENCOUNTER — TELEPHONE (OUTPATIENT)
Dept: INTERNAL MEDICINE CLINIC | Age: 65
End: 2020-10-12

## 2020-10-15 DIAGNOSIS — R29.898 WEAKNESS OF BOTH LEGS: ICD-10-CM

## 2020-10-27 ENCOUNTER — OFFICE VISIT (OUTPATIENT)
Dept: NEUROLOGY | Age: 65
End: 2020-10-27
Payer: MEDICARE

## 2020-10-27 VITALS
OXYGEN SATURATION: 98 % | TEMPERATURE: 97.7 F | HEART RATE: 80 BPM | WEIGHT: 191 LBS | HEIGHT: 73 IN | SYSTOLIC BLOOD PRESSURE: 100 MMHG | DIASTOLIC BLOOD PRESSURE: 72 MMHG | RESPIRATION RATE: 18 BRPM | BODY MASS INDEX: 25.31 KG/M2

## 2020-10-27 DIAGNOSIS — G62.9 NEUROPATHY: Primary | ICD-10-CM

## 2020-10-27 PROCEDURE — 99214 OFFICE O/P EST MOD 30 MIN: CPT | Performed by: PSYCHIATRY & NEUROLOGY

## 2020-10-27 RX ORDER — PREGABALIN 75 MG/1
75 CAPSULE ORAL 2 TIMES DAILY
Qty: 60 CAP | Refills: 2 | Status: ON HOLD | OUTPATIENT
Start: 2020-10-27 | End: 2020-12-02 | Stop reason: SDUPTHER

## 2020-10-27 NOTE — PATIENT INSTRUCTIONS
Learning About Peripheral Neuropathy What is peripheral neuropathy? Peripheral neuropathy is a problem that affects the peripheral nerves. These nerves lead from the spinal cord to other parts of the body. They control your sense of touch, how you feel pain and temperature, and your muscle strength. Most of the time the problem starts in the fingers and toes. As it gets worse, it moves into the limbs. It can cause pain and loss of feeling in the feet, legs, and hands. What causes it? There are several causes: · Diabetes. This is the most common cause. If your blood sugar is too high for too long, it can damage the nerves. · Kidney problems. These can lead to toxic substances in the blood that damage nerves. · Low levels of thyroid hormone (hypothyroidism). This may cause swelling of the tissues around the nerves, which can put pressure on them. · Infectious or inflammatory diseases. Examples are HIV and Guillain-Barré syndrome. These diseases can damage the nerves. · Peripheral nerve injury. A physical injury can damage the nerves. Injuries can be from things like falls, car crashes, or playing sports. · Overusing alcohol and not eating a healthy diet. These can lead to your body not having enough of certain vitamins, such as vitamin B-12. This can damage nerves. · Being exposed to toxic substances. These include lead, mercury, and certain medicines, such as those used for chemotherapy. Sometimes the cause isn't known. What are the symptoms? Symptoms of peripheral neuropathy can occur slowly over time. The most common ones are: · Numbness, tightness, and tingling, especially in the legs, hands, and feet. · Loss of feeling. · Burning, shooting, or stabbing pain in the legs, hands, and feet. Often the pain is worse at night. · Weakness and loss of balance. What can happen if you have it?  
If peripheral neuropathy gets worse, it can lead to a complete lack of feeling in your hands or feet. This can make you more likely to injure them. It may lead to calluses and blisters. It can also lead to bone and joint problems, infection, and ulcers. For instance, small, repeated injuries to the foot may lead to bigger problems. This can happen because you can't feel the injuries. Reduced feeling in the feet can also change your step, leading to bone or joint problems. If untreated, foot problems can become so severe that the foot or lower leg may have to be amputated. But treatment can slow down peripheral neuropathy. And it's a good idea to take care to avoid injury. How is it diagnosed? To diagnose peripheral neuropathy, your doctor will ask you about: 
· Your symptoms. · Your medical history. This may include your use of alcohol, risk of HIV infection, or exposure to toxic substances. · Your family's medical history, including nerve disease. Your doctor may test how well you can feel touch, temperature, and pain. You may also have blood tests. These tests will help the doctor find out if you have conditions that can cause neuropathy. Examples are diabetes, vitamin deficiencies, thyroid disease, and kidney problems. How is it treated? Treatment for peripheral neuropathy can relieve symptoms. This is done by treating the health problem that's causing it. For example, if you have diabetes, keeping your blood sugar within your target range may help. Or maybe your body lacks certain vitamins caused by drinking too much alcohol. In that case, treatment may include eating a healthy diet, taking vitamins, and stopping alcohol use. You may have physical therapy. This can increase muscle strength and help build muscle control. Over-the-counter medicine can relieve mild nerve pain. Your doctor may also prescribe medicine to help with severe pain, numbness, tingling, and weakness.  If you have neuropathy in your feet, it's a good idea to have them checked during each office visit. This can help prevent problems. Some people find that physical therapy, acupuncture, or transcutaneous electrical nerve stimulation (TENS) helps relieve pain. Follow-up care is a key part of your treatment and safety. Be sure to make and go to all appointments, and call your doctor if you are having problems. It's also a good idea to know your test results and keep a list of the medicines you take. Where can you learn more? Go to http://www.gray.com/ Enter P130 in the search box to learn more about \"Learning About Peripheral Neuropathy. \" Current as of: December 20, 2019               Content Version: 12.6 © 7341-2936 [employer]. Care instructions adapted under license by [employer] (which disclaims liability or warranty for this information). If you have questions about a medical condition or this instruction, always ask your healthcare professional. [address] any warranty or liability for your use of this information.

## 2020-10-27 NOTE — PROGRESS NOTES
Referring Physician: self referred     Reason for Consultation:  Weakness, tremor     Chief Complaint: Weakness in the lower extremities and difficulty with walking. History of Present Illness:   Terrence Shrestha is a 62 y.o. male with a history of hyperlipidemia, hypertension seizures and static encephalopathy who presents to neurology clinic for evaluation of progressive weakness in his lower extremities as well as difficulty walking. Patient is accompanied by his mother who provided the history. She reports that approximately 2 years ago he was in the [facility] going to an activity and he developed a flulike illness possibly influenza and was admitted to the hospital.  He reports he had a very high fever and was in the hospital for several weeks and required rehab. Since that time she reports that he has had a progressive decline in his ability to walk and ambulate and has noted weakness in his lower extremities. She reports that prior to this episode he was able to walk for long periods of time, play golf as well as bowl now he can barely walk to the driveway. She reports that he gets fatigued and he does not walk for prolonged periods of time. She also reports that he has had difficulty going up and down the stairs and requires assistance. She does not feel like he has any difficulty with strength in his upper extremities and it is mainly his lower extremities. She has been working with physical therapy and there was some concern of a possible movement disorder as they noted a tremor. She reports that his tremor is present mainly with action however can be present at rest.  She does report that this tremor is intermittent. He has had no changes in his speech or swallowing his appetite has been good  Of note patient was diagnosed with seizures at the age of 6 months. Has been on multiple AEDs since that time and has been taking Depakote and Topamax for several years.   Reports in the last 20 years he has had 1 seizure. He was previously followed by neurology and maintained on Depakote for seizures. He takes Topamax 300 mg twice a day and Depakote 500 mg in the morning and thousand milligrams at night. Additionally patient has been recently worked up for low platelets. It was noted to be dropping approximately 2 years ago and has slowly declined since that time. He has been followed by heme-onc who performed a bone marrow biopsy and did find a myelodysplastic syndrome. Interval history  Patient was last seen he underwent an EMG with nerve conduction studies which showed a severe length dependent axonal neuropathy. When discussing the weakness with his mother that she believes his weakness is about the same as last time. Again she reports that this started after a flulike infection 2 years ago and since that time she does believe that it has plateaued. She does not believe this weakness is worse. Additionally he underwent an MRI of the brain that showed global atrophy otherwise no acute abnormalities. Past Medical History:   Diagnosis Date    Contact dermatitis and other eczema, due to unspecified cause     psoriasis    Diabetes ([facility]     Eosinophilia     Hypercholesterolemia     Hypertension     Mental retardation     Seizures (HCC)     Skin cancer         Past Surgical History:   Procedure Laterality Date    ENDOSCOPY, COLON, DIAGNOSTIC  11/08/2007    Dr Fraser Niece normal except small internal hemorrhoids repeat 11/2017        Family History   Problem Relation Age of Onset    Other Mother         PMR    Hypertension Mother     Cancer Father         Lung    Diabetes Brother         Social History     Tobacco Use    Smoking status: Never Smoker    Smokeless tobacco: Never Used   Substance Use Topics    Alcohol use: No        No Known Allergies     Prior to Admission medications    Medication Sig Start Date End Date Taking?  Authorizing Provider   metFORMIN ER (GLUCOPHAGE XR) 500 mg tablet TAKE TWO TABLETS BY MOUTH TWICE DAILY  10/12/20   Cachorro Casiano MD   topiramate (TOPAMAX) 200 mg tablet TAKE 1 & 1/2 TABLETS BY MOUTH TWICE A DAY 10/12/20   Cachorro Casiano MD   linaGLIPtin (TRADJENTA) 5 mg tablet Take 1 Tab by mouth daily. 10/2/20   Cachorro Casiano MD   pioglitazone (ACTOS) 45 mg tablet Take 1 Tab by mouth daily. 9/28/20   Cachorro Casiano MD   ramipriL (ALTACE) 5 mg capsule Take 1 Cap by mouth daily. 9/28/20   Cachorro Casiano MD   divalproex DR (DEPAKOTE) 500 mg tablet TAKE ONE TABLET BY MOUTH EVERY MORNING AND TWO TABS AT NIGHT 9/28/20   Cachorro Casiano MD   atorvastatin (LIPITOR) 40 mg tablet Take 1 Tab by mouth nightly. 9/28/20   Cachorro Casiano MD   glimepiride (AMARYL) 4 mg tablet TAKE TWO TABLETS BY MOUTH IN THE MORNING 9/28/20   Cachorro Casiano MD   folic acid (FOLVITE) 1 mg tablet Take 1 Tab by mouth daily. 9/28/20   Cachorro Casiano MD   benzonatate (TESSALON) 100 mg capsule Take 1 Cap by mouth nightly as needed for Cough. 8/19/20   Cachorro Casiano MD   triamcinolone acetonide (KENALOG) 0.1 % ointment  1/30/19   Provider, Historical   chlorhexidine (PERIDEX) 0.12 % solution Take 15 mL by mouth every twelve (12) hours. 2/8/18   Provider, Historical   Calcium-Cholecalciferol, D3, (CALTRATE-600 PLUS VITAMIN D3) 600-400 mg-unit Tab Take  by mouth.     Provider, Historical       Review of Systems:    [x]Unable to obtain  ROS due to  [x]mental status change  []sedated   []intubated    Visit Vitals  /72 (BP 1 Location: Left arm, BP Patient Position: Sitting)   Pulse 80   Temp 97.7 °F (36.5 °C) (Oral)   Resp 18   Ht 6' 1\" (1.854 m)   Wt 191 lb (86.6 kg)   SpO2 98%   BMI 25.20 kg/m²       Physical Exam:  General:  no acute distress  Neck: no carotid bruits  Lungs: clear to auscultation  Heart:  no murmurs, regular rate and rhythm   Lower extremity: 1+ edema with venous stasis dermatitis    Neurological exam:  Mental Status: Awake alert oriented to person however did not know place or time. Registration and Recall: registration intact, able to recall any words despite prompts  Attention and Concentration: He was unable to state the days of the week forward   Speech and Language: Mild dysarthria. Able to name simple objects, repeats simple phrases and follow one-step commands. He did have trouble following multistep commands. Cranial nerves: II-XII  Pupils equal and reactive, visual fields intact by confrontation   Extraocular movements intact, no evidence of nystagmus or ptosis   Facial sensation intact   Facial movements symmetric   Hearing intact to soft rub bilaterally   Shoulder shrug symmetric and strong   Tongue protrusion full and midline without fasciculation or atrophy    Motor:   Normal tone and Bulk. No cogwheeling was noted however patient did not relax completely. He did have a slight rest tremor  Drift: No evidence of pronator drift     Strength testing:  Patient was unable to perform formal strength testing due to his cognitive limitations. He appeared to be full strength in his upper extremities  He appeared to be 3 out of 5 proximally in his hip flexors and distally 4 out of 5. He did have slight asymmetry in lower extremity weakness worse on the right than the left    Sensory:  Sensation intact to light touch. Reflexes:     Biceps Triceps  Brachiorad Patellar Achilles Plantar Hoffmans   Right  1 1 1 1 1 Down Neg   Left  1 1 1 1 1 Down Neg        Cerebellar testing: Finger-nose-finger was intact though he needed significant coaching to perform this task. He was unable to do heel-to-shin      Gait: Decreased arm swing was noted on the left upper extremity. He asked small steps with high steppage gait    Data:   INTERNAL RECORDS:  The patient's electronic medical record was reviewed.   The relevant details include:    Lab Results   Component Value Date/Time    Hemoglobin A1c 8.1 (H) 08/10/2020 12:39 PM    Sodium 142 08/10/2020 12:39 PM    Potassium 4.4 08/10/2020 12:39 PM    Chloride 110 (H) 08/10/2020 12:39 PM    Glucose 124 (H) 08/10/2020 12:39 PM    BUN 18 08/10/2020 12:39 PM    Creatinine 0.72 (L) 08/10/2020 12:39 PM    Calcium 9.0 08/10/2020 12:39 PM    WBC 6.6 10/06/2020 02:40 PM    HCT 32.3 (L) 10/06/2020 02:40 PM    HGB 10.4 (L) 10/06/2020 02:40 PM    PLATELET 73 (LL) 91/78/5595 02:40 PM       CT Results (maximum last 3): Results from [place] encounter on 05/28/20   CT BX BONE MARROW DIAGNOSTIC    Narrative Clinical Indication: Thrombocytopenia, anemia      Using CT guidance, after adequate skin preparation and local anesthesia with a  posterior approach, a 14-gauge bone biopsy needle was placed into the posterior  aspect of the iliac bone. Drill technique  Bone marrow aspirate and biopsy were performed , patient tolerated the procedure  well. Sample appears adequate. CT dose reduction was achieved through use of a  standardized protocol tailored for this examination and automatic exposure  control for dose modulation. Impression Impression: CT-guided bone marrow biopsy and aspirate. Procedure was performed  with general anesthesia. Results from [place] encounter on 04/30/20   CT ABD LTD WO F/U    Narrative Procedure: CT-guided bone marrow biopsy    INDICATION: Anemia    TECHNIQUE: Initial  imaging was obtained. The patient became agitated after  the administration of conscious sedation. Bone marrow biopsy could not be safely  performed and the study was discontinued. CT dose reduction was achieved through  use of a standardized protocol tailored for this examination and automatic  exposure control for dose modulation. Impression IMPRESSION: Bone marrow biopsy could not be performed secondary to patient's  agitation after moderate sedation. Consider general anesthesia   Results from Hospital Encounter encounter on 07/06/15   CT SPINE CERV WO CONT    Narrative **Final Report**       ICD Codes / Adm. Diagnosis: 97  28 / Seizure  seizure  Examination:  CT C SPINE WO CON  - [id number] - Jul 6 2015  4:41PM  Accession No:  [id number]  Reason:  Neck Pain      REPORT:  EXAM:  CT CERVICAL SPINE WITHOUT CONTRAST    INDICATION:   Neck Pain seizure    COMPARISON: None. TECHNIQUE: Multislice helical CT of the cervical spine was performed without   intravenous contrast administration. Sagittal and coronal reconstructions   were generated. CONTRAST: None. FINDINGS:    Bone mineralization is normal. There is normal vertebral body height. Mild   C4-5 and moderate to severe C6-7 degenerative disc space narrowing is shown   with endplate marginal osteophytes which are probably demonstrated in the   right uncovertebral region of C6-7. The other disc heights are normal. There   is anatomic alignment. The posterior processes and facet joints appear   intact. There is no osseous canal stenosis. Moderate right foraminal   narrowing at C6-7 is shown. No paraspinal soft tissue swelling or mass is   evident. IMPRESSION:  Degenerative changes. No fracture or dislocation demonstrated. Signing/Reading Doctor: Aaron Maria. Bruna Montes ([id number])    Approved: KAREN Montes ([id number])  Jul 6 2015  5:00PM                                    MRI Results (maximum last 3): Results from [place] encounter on 10/06/20   MRI BRAIN W WO CONT    Narrative EXAM:  MRI BRAIN W WO CONT    INDICATION:    eval for focal lesion seizure    COMPARISON:  None. CONTRAST: 20 ml Dotarem. TECHNIQUE:    Multiplanar multisequence acquisition without and with contrast of the brain. FINDINGS:  Diffusion imaging does not show acute ischemic changes. Minimal nonspecific white matter disease. Atrophy is prominent for age. No extra-axial fluid collection, hemorrhage shift or masses her. Flow voids in major vessels at the base of the brain are present. No masses or enhancing lesion.   Special attention to the temporal lobes show no significant focal limited  abnormality or asymmetry. Impression IMPRESSION: Line  1. Prominent atrophy. 2. Minimal white matter disease. 3. No acute findings no mass. Assessment and Plan   Farhat Nicole is a 62 y.o. male with a history of hyperlipidemia, hypertension seizures and static encephalopathy who presents to neurology clinic for evaluation of progressive weakness in his lower extremities, seizures as well as tremor. His neurological exam is limited due to his cognitive ability however does reveal weakness in the lower extremities. EMG with evidence of a serve length dependant neuropathy etiology may be related to diabetes versus a critical illness neuropathy as his symptoms have not worsened versus his myelodysplastic syndrome. Lower extremity weakness: EMG with evidence of a serve length dependant neuropathy etiology may be related to diabetes versus a critical illness neuropathy as his symptoms have not worsened versus his myelodysplastic syndrome.  -Blood work including hemoglobin A1c, MMA, SPEP with immunofixation patiently will check a TSH  -Recommended that he continue with physical therapy and Occupational Therapy as this will help with the weakness, his mother did not want to continue with this at this time. She believes that he had too much difficulty with following commands to participate.  -Did discuss with the mother that he should potentially get a life alert in case he falls and is unable to get back up to the weakness. Epilepsy: Well-controlled on Topamax as well as Depakote. It is possible that the Depakote may be resulting in his tremor as well as his thrombocytopenia. MRI of the brain without any abnormalities.   Most recent Depakote level within therapeutic range.  -Continue Depakote as well as Topamax for now as mother is reluctant to change this medication  -Seizure precautions and patient currently has 24-hour supervision.  -We discussed [place] state law regarding driving, patient is not driving. Patient Active Problem List   Diagnosis Code    Intellectual disability F68    Seizure disorder ([facility] G40.909    Psoriasis L40.9    Venous stasis of lower extremity I87.8    Thrombocytopenia (HCC) D69.6    Sleep disorder G47.9    Hypertension, essential I10    Strongyloides stercoralis infection B78.9    Eosinophilia D72.19    Hypercholesterolemia E78.00    Type 2 diabetes mellitus without complication, without long-term current use of insulin (HCC) E11.9    Myelodysplasia (myelodysplastic syndrome) ([facility] D46.9       I have discussed the diagnosis with the patient and the intended plan as seen in the above orders. Patient is in agreement. The patient has received an after-visit summary and questions were answered concerning future plans. I have discussed medication side effects and warnings with the patient as well.         Signed By:  Shashank Lundy MD     October 27, 2020

## 2020-11-01 LAB
ALBUMIN SERPL ELPH-MCNC: 3.2 G/DL (ref 2.9–4.4)
ALBUMIN/GLOB SERPL: 1.1 {RATIO} (ref 0.7–1.7)
ALPHA1 GLOB SERPL ELPH-MCNC: 0.2 G/DL (ref 0–0.4)
ALPHA2 GLOB SERPL ELPH-MCNC: 0.8 G/DL (ref 0.4–1)
B-GLOBULIN SERPL ELPH-MCNC: 0.8 G/DL (ref 0.7–1.3)
EST. AVERAGE GLUCOSE BLD GHB EST-MCNC: 151 MG/DL
GAMMA GLOB SERPL ELPH-MCNC: 1 G/DL (ref 0.4–1.8)
GLOBULIN SER CALC-MCNC: 2.8 G/DL (ref 2.2–3.9)
HBA1C MFR BLD: 6.9 % (ref 4.8–5.6)
IGA SERPL-MCNC: 121 MG/DL (ref 61–437)
IGG SERPL-MCNC: 1016 MG/DL (ref 603–1613)
IGM SERPL-MCNC: 123 MG/DL (ref 20–172)
Lab: NORMAL
M PROTEIN SERPL ELPH-MCNC: 0.3 G/DL
METHYLMALONATE SERPL-SCNC: 204 NMOL/L (ref 0–378)
PLEASE NOTE, 011150: ABNORMAL
PROT PATTERN SERPL IFE-IMP: ABNORMAL
PROT SERPL-MCNC: 6 G/DL (ref 6–8.5)
TSH SERPL DL<=0.005 MIU/L-ACNC: 2.05 UIU/ML (ref 0.45–4.5)

## 2020-11-18 ENCOUNTER — OFFICE VISIT (OUTPATIENT)
Dept: INTERNAL MEDICINE CLINIC | Age: 65
End: 2020-11-18
Payer: MEDICARE

## 2020-11-18 VITALS
SYSTOLIC BLOOD PRESSURE: 117 MMHG | TEMPERATURE: 98.3 F | OXYGEN SATURATION: 98 % | RESPIRATION RATE: 16 BRPM | BODY MASS INDEX: 26.37 KG/M2 | WEIGHT: 199 LBS | HEART RATE: 74 BPM | DIASTOLIC BLOOD PRESSURE: 73 MMHG | HEIGHT: 73 IN

## 2020-11-18 DIAGNOSIS — G40.909 SEIZURE DISORDER (HCC): ICD-10-CM

## 2020-11-18 DIAGNOSIS — D46.9 MYELODYSPLASIA (MYELODYSPLASTIC SYNDROME) (HCC): ICD-10-CM

## 2020-11-18 DIAGNOSIS — I10 HYPERTENSION, ESSENTIAL: ICD-10-CM

## 2020-11-18 DIAGNOSIS — E11.40 TYPE 2 DIABETES MELLITUS WITH DIABETIC NEUROPATHY, WITHOUT LONG-TERM CURRENT USE OF INSULIN (HCC): Primary | ICD-10-CM

## 2020-11-18 PROBLEM — E11.42 TYPE 2 DIABETES MELLITUS WITH DIABETIC POLYNEUROPATHY, WITHOUT LONG-TERM CURRENT USE OF INSULIN (HCC): Status: ACTIVE | Noted: 2017-02-16

## 2020-11-18 PROCEDURE — 99214 OFFICE O/P EST MOD 30 MIN: CPT | Performed by: INTERNAL MEDICINE

## 2020-11-18 NOTE — PROGRESS NOTES
Liza Jean is a 62 y.o. male  Chief Complaint   Patient presents with    Diabetes    Hypertension     Health Maintenance Due   Topic Date Due    Eye Exam Retinal or Dilated  10/27/2019    Flu Vaccine (1) 09/01/2020    DTaP/Tdap/Td series (2 - Td) 10/20/2020    Foot Exam Q1  11/19/2020     Visit Vitals  /73 (BP 1 Location: Left arm, BP Patient Position: Sitting)   Pulse 74   Temp 98.3 °F (36.8 °C) (Oral)   Resp 16   Ht 6' 1\" (1.854 m)   Wt 199 lb (90.3 kg)   SpO2 98%   BMI 26.25 kg/m²     1. Have you been to the ER, urgent care clinic since your last visit? Hospitalized since your last visit? No    2. Have you seen or consulted any other health care providers outside of the [address] [place] [address] since your last visit? Include any pap smears or colon screening.  No

## 2020-11-18 NOTE — PROGRESS NOTES
CC:   Chief Complaint   Patient presents with    Diabetes    Hypertension       HISTORY OF PRESENT ILLNESS  Jeri Gallego is a 62 y.o. male. Accompanied by his mother, who provides most of the history.       Presents for 3 month follow up evaluation. He has type 2 DM, HTN, hypercholesterolemia, psoriasis, myelodysplastic syndrome with thrombocytopenia, seizure disorder, venous stasis, sleep disorder, and intellectual disability.     Since last clinic visit, saw  Dr. Ivett Matt (Neurology) for weakness of legs and seizure disorder. EMG showed neuropathy due to diabetes, critical illness, or myelodysplastic syndrome. Started on Lyrica. MRI 10/6/20: prominent atrophy. No complaints today. Goes to a community center 2 days a week. He is seen for diabetes.  Denies polyuria, polydipsia, or hypoglycemia. Home glucose monitoring: is not done.  He reports medication compliance: compliant all of the time.  Medication side effects: none.  Diabetic diet compliance: compliant most of the time.  Lab review: A1c 6.9% on 10/27/20, improved from 8.1% on 8/10/20 and 8.8% on 4/1/20.  Eye exam: 3 months ago     Other Providers: Dr. Milton Perez (Hem/Onc), Dr. Pegge Lennox (Ophthalmology), Dr. Ivett Matt (Neurology)    ROS  A complete review of systems was performed and is negative except for those mentioned in the HPI.     Patient Active Problem List   Diagnosis Code    Intellectual disability F68    Seizure disorder ([facility] G40.909    Psoriasis L40.9    Venous stasis of lower extremity I87.8    Thrombocytopenia (HCC) D69.6    Sleep disorder G47.9    Hypertension, essential I10    Strongyloides stercoralis infection B78.9    Eosinophilia D72.19    Hypercholesterolemia E78.00    Type 2 diabetes mellitus without complication, without long-term current use of insulin (HCC) E11.9    Myelodysplasia (myelodysplastic syndrome) (HCC) D46.9     Past Medical History:   Diagnosis Date    Contact dermatitis and other eczema, due to unspecified cause     psoriasis    Diabetes ([facility]     Eosinophilia     Hypercholesterolemia     Hypertension     Mental retardation     Seizures (HCC)     Skin cancer      No Known Allergies    Current Outpatient Medications   Medication Sig Dispense Refill    pregabalin (LYRICA) 75 mg capsule Take 1 Cap by mouth two (2) times a day. Max Daily Amount: 150 mg. 60 Cap 2    metFORMIN ER (GLUCOPHAGE XR) 500 mg tablet TAKE TWO TABLETS BY MOUTH TWICE DAILY  360 Tab 3    topiramate (TOPAMAX) 200 mg tablet TAKE 1 & 1/2 TABLETS BY MOUTH TWICE A  Tab 0    linaGLIPtin (TRADJENTA) 5 mg tablet Take 1 Tab by mouth daily. 90 Tab 3    pioglitazone (ACTOS) 45 mg tablet Take 1 Tab by mouth daily. 90 Tab 3    ramipriL (ALTACE) 5 mg capsule Take 1 Cap by mouth daily. 90 Cap 3    divalproex DR (DEPAKOTE) 500 mg tablet TAKE ONE TABLET BY MOUTH EVERY MORNING AND TWO TABS AT NIGHT 270 Tab 3    atorvastatin (LIPITOR) 40 mg tablet Take 1 Tab by mouth nightly. 90 Tab 3    glimepiride (AMARYL) 4 mg tablet TAKE TWO TABLETS BY MOUTH IN THE MORNING 912 Tab 3    folic acid (FOLVITE) 1 mg tablet Take 1 Tab by mouth daily. 90 Tab 3    benzonatate (TESSALON) 100 mg capsule Take 1 Cap by mouth nightly as needed for Cough. 30 Cap 0    triamcinolone acetonide (KENALOG) 0.1 % ointment       chlorhexidine (PERIDEX) 0.12 % solution Take 15 mL by mouth every twelve (12) hours.  Calcium-Cholecalciferol, D3, (CALTRATE-600 PLUS VITAMIN D3) 600-400 mg-unit Tab Take  by mouth. PHYSICAL EXAM  Visit Vitals  /73 (BP 1 Location: Left arm, BP Patient Position: Sitting)   Pulse 74   Temp 98.3 °F (36.8 °C) (Oral)   Resp 16   Ht 6' 1\" (1.854 m)   Wt 199 lb (90.3 kg)   SpO2 98%   BMI 26.25 kg/m²       General: Well-developed and well-nourished, no distress. HEENT:  Head normocephalic/atraumatic, no scleral icterus  Lungs:  Clear to ausculation bilaterally. Good air movement.   Heart:  Regular rate and rhythm, normal S1 and S2, no murmur, gallop, or rub  Extremities: No clubbing, cyanosis. 1+ pitting edema at bilateral LE's. Neurological: Alert and oriented. Psychiatric: Normal mood and affect. Behavior is normal.  Diabetic foot exam:     Left Foot:   Visual Exam: normal , yellowish dystrophic toenails, dry skin, edema   Pulse DP: 1+ (weak)   Filament test: reduced sensation    Vibratory sensation: diminished      Right Foot:   Visual Exam: normal  , yellowish dystrophic toenails, dry skin, edema   Pulse DP: 1+ (weak)   Filament test: reduced sensation    Vibratory sensation: diminished      Lab Results   Component Value Date/Time    Hemoglobin A1c 6.9 (H) 10/27/2020 01:09 PM    Hemoglobin A1c (POC) 8.8 04/01/2020 09:00 AM         ASSESSMENT AND PLAN    ICD-10-CM ICD-9-CM    1. Type 2 diabetes mellitus with diabetic neuropathy, without long-term current use of insulin (HCC)  E11.40 250.60  DIABETES FOOT EXAM     357.2    2. Hypertension, essential  I10 401.9    3. Seizure disorder ([facility]  G40.909 345.90    4. Myelodysplasia (myelodysplastic syndrome) ([facility]  D46.9 238.75      Diagnoses and all orders for this visit:    1. Type 2 diabetes mellitus with diabetic neuropathy, without long-term current use of insulin (HCC)  Appreciate evaluation of leg weakness by Dr. Ivett Matt.  Malik Delatorre for neuropathy. DM controlled with A1c 6.9% on 10/27/20; his mother was surprised. Continue metformin, glimepiride, pioglitazone, and Tradjenta; is on highest doses of all these medications.  -      DIABETES FOOT EXAM    2. Hypertension, essential  Well-controlled. Continue ramipril. 3. Seizure disorder (HCC)  Stable on Depakote and Topamax. 4. Myelodysplasia (myelodysplastic syndrome) ([facility])  Thrombocytopenia improved from 64 K in 8/20 to 73 K in 10/20. Follow up with Dr. Milton Perez. Follow-up and Dispositions    · Return in about 3 months (around 2/18/2021), or if symptoms worsen or fail to improve, for DM, HTN.          I have discussed the diagnosis with the patient and the intended plan as seen in the above orders. Patient is in agreement. The patient has received an after-visit summary and questions were answered concerning future plans. I have discussed medication side effects and warnings with the patient as well.

## 2020-11-22 ENCOUNTER — HOSPITAL ENCOUNTER (EMERGENCY)
Age: 65
Discharge: HOME | End: 2020-11-22
Attending: EMERGENCY MEDICINE
Payer: MEDICARE

## 2020-11-22 ENCOUNTER — APPOINTMENT (OUTPATIENT)
Dept: CT IMAGING | Age: 65
End: 2020-11-22
Attending: PHYSICIAN ASSISTANT
Payer: MEDICARE

## 2020-11-22 ENCOUNTER — APPOINTMENT (OUTPATIENT)
Dept: GENERAL RADIOLOGY | Age: 65
End: 2020-11-22
Attending: PHYSICIAN ASSISTANT
Payer: MEDICARE

## 2020-11-22 ENCOUNTER — APPOINTMENT (OUTPATIENT)
Dept: CT IMAGING | Age: 65
End: 2020-11-22
Attending: EMERGENCY MEDICINE
Payer: MEDICARE

## 2020-11-22 VITALS
OXYGEN SATURATION: 97 % | SYSTOLIC BLOOD PRESSURE: 109 MMHG | HEART RATE: 71 BPM | HEIGHT: 72 IN | DIASTOLIC BLOOD PRESSURE: 67 MMHG | BODY MASS INDEX: 29.02 KG/M2 | RESPIRATION RATE: 18 BRPM | TEMPERATURE: 97.4 F | WEIGHT: 214.29 LBS

## 2020-11-22 DIAGNOSIS — R50.9 FEVER, UNSPECIFIED FEVER CAUSE: Primary | ICD-10-CM

## 2020-11-22 DIAGNOSIS — R53.82 CHRONIC FATIGUE: ICD-10-CM

## 2020-11-22 LAB
ALBUMIN SERPL-MCNC: 2.9 G/DL (ref 3.5–5)
ALBUMIN/GLOB SERPL: 0.8 {RATIO} (ref 1.1–2.2)
ALP SERPL-CCNC: 65 U/L (ref 45–117)
ALT SERPL-CCNC: 14 U/L (ref 12–78)
ANION GAP SERPL CALC-SCNC: 2 MMOL/L (ref 5–15)
APPEARANCE UR: CLEAR
AST SERPL-CCNC: 13 U/L (ref 15–37)
ATRIAL RATE: 96 BPM
BACTERIA URNS QL MICRO: NEGATIVE /HPF
BASOPHILS # BLD: 0.1 K/UL (ref 0–0.1)
BASOPHILS NFR BLD: 1 % (ref 0–1)
BILIRUB SERPL-MCNC: 0.4 MG/DL (ref 0.2–1)
BILIRUB UR QL: NEGATIVE
BUN SERPL-MCNC: 19 MG/DL (ref 6–20)
BUN/CREAT SERPL: 21 (ref 12–20)
CALCIUM SERPL-MCNC: 8.6 MG/DL (ref 8.5–10.1)
CALCULATED P AXIS, ECG09: 12 DEGREES
CALCULATED R AXIS, ECG10: -16 DEGREES
CALCULATED T AXIS, ECG11: -22 DEGREES
CHLORIDE SERPL-SCNC: 114 MMOL/L (ref 97–108)
CO2 SERPL-SCNC: 26 MMOL/L (ref 21–32)
COLOR UR: ABNORMAL
COVID-19 RAPID TEST, COVR: NOT DETECTED
CREAT SERPL-MCNC: 0.9 MG/DL (ref 0.7–1.3)
DIAGNOSIS, 93000: NORMAL
DIFFERENTIAL METHOD BLD: ABNORMAL
EOSINOPHIL # BLD: 0.6 K/UL (ref 0–0.4)
EOSINOPHIL NFR BLD: 7 % (ref 0–7)
EPITH CASTS URNS QL MICRO: ABNORMAL /LPF
ERYTHROCYTE [DISTWIDTH] IN BLOOD BY AUTOMATED COUNT: 14 % (ref 11.5–14.5)
FLUAV AG NPH QL IA: NEGATIVE
FLUBV AG NOSE QL IA: NEGATIVE
GLOBULIN SER CALC-MCNC: 3.7 G/DL (ref 2–4)
GLUCOSE SERPL-MCNC: 191 MG/DL (ref 65–100)
GLUCOSE UR STRIP.AUTO-MCNC: 500 MG/DL
HCT VFR BLD AUTO: 36.7 % (ref 36.6–50.3)
HEALTH STATUS, XMCV2T: NORMAL
HGB BLD-MCNC: 11.5 G/DL (ref 12.1–17)
HGB UR QL STRIP: NEGATIVE
HYALINE CASTS URNS QL MICRO: ABNORMAL /LPF (ref 0–5)
IMM GRANULOCYTES # BLD AUTO: 0 K/UL (ref 0–0.04)
IMM GRANULOCYTES NFR BLD AUTO: 0 % (ref 0–0.5)
KETONES UR QL STRIP.AUTO: NEGATIVE MG/DL
LACTATE BLD-SCNC: 2.48 MMOL/L (ref 0.4–2)
LACTATE SERPL-SCNC: 1.9 MMOL/L (ref 0.4–2)
LEUKOCYTE ESTERASE UR QL STRIP.AUTO: NEGATIVE
LIPASE SERPL-CCNC: 69 U/L (ref 73–393)
LYMPHOCYTES # BLD: 2.3 K/UL (ref 0.8–3.5)
LYMPHOCYTES NFR BLD: 25 % (ref 12–49)
MCH RBC QN AUTO: 34.5 PG (ref 26–34)
MCHC RBC AUTO-ENTMCNC: 31.3 G/DL (ref 30–36.5)
MCV RBC AUTO: 110.2 FL (ref 80–99)
MONOCYTES # BLD: 0.5 K/UL (ref 0–1)
MONOCYTES NFR BLD: 5 % (ref 5–13)
NEUTS BAND NFR BLD MANUAL: 8 %
NEUTS SEG # BLD: 5.7 K/UL (ref 1.8–8)
NEUTS SEG NFR BLD: 54 % (ref 32–75)
NITRITE UR QL STRIP.AUTO: NEGATIVE
NRBC # BLD: 0 K/UL (ref 0–0.01)
NRBC BLD-RTO: 0 PER 100 WBC
P-R INTERVAL, ECG05: 152 MS
PH UR STRIP: 8 [PH] (ref 5–8)
PLATELET # BLD AUTO: 66 K/UL (ref 150–400)
PMV BLD AUTO: 10.5 FL (ref 8.9–12.9)
POTASSIUM SERPL-SCNC: 4.4 MMOL/L (ref 3.5–5.1)
PROT SERPL-MCNC: 6.6 G/DL (ref 6.4–8.2)
PROT UR STRIP-MCNC: NEGATIVE MG/DL
Q-T INTERVAL, ECG07: 322 MS
QRS DURATION, ECG06: 90 MS
QTC CALCULATION (BEZET), ECG08: 406 MS
RBC # BLD AUTO: 3.33 M/UL (ref 4.1–5.7)
RBC #/AREA URNS HPF: ABNORMAL /HPF (ref 0–5)
RBC MORPH BLD: ABNORMAL
SODIUM SERPL-SCNC: 142 MMOL/L (ref 136–145)
SOURCE, COVRS: NORMAL
SP GR UR REFRACTOMETRY: 1.02 (ref 1–1.03)
SPECIMEN SOURCE, FCOV2M: NORMAL
SPECIMEN TYPE, XMCV1T: NORMAL
UA: UC IF INDICATED,UAUC: ABNORMAL
UROBILINOGEN UR QL STRIP.AUTO: 1 EU/DL (ref 0.2–1)
VENTRICULAR RATE, ECG03: 96 BPM
WBC # BLD AUTO: 9.2 K/UL (ref 4.1–11.1)
WBC MORPH BLD: ABNORMAL
WBC URNS QL MICRO: ABNORMAL /HPF (ref 0–4)

## 2020-11-22 PROCEDURE — 74011250637 HC RX REV CODE- 250/637: Performed by: PHYSICIAN ASSISTANT

## 2020-11-22 PROCEDURE — 74011000258 HC RX REV CODE- 258: Performed by: PHYSICIAN ASSISTANT

## 2020-11-22 PROCEDURE — 74177 CT ABD & PELVIS W/CONTRAST: CPT

## 2020-11-22 PROCEDURE — 96365 THER/PROPH/DIAG IV INF INIT: CPT

## 2020-11-22 PROCEDURE — 71260 CT THORAX DX C+: CPT

## 2020-11-22 PROCEDURE — 36415 COLL VENOUS BLD VENIPUNCTURE: CPT

## 2020-11-22 PROCEDURE — 71045 X-RAY EXAM CHEST 1 VIEW: CPT

## 2020-11-22 PROCEDURE — 99285 EMERGENCY DEPT VISIT HI MDM: CPT

## 2020-11-22 PROCEDURE — 83690 ASSAY OF LIPASE: CPT

## 2020-11-22 PROCEDURE — 87635 SARS-COV-2 COVID-19 AMP PRB: CPT

## 2020-11-22 PROCEDURE — 80053 COMPREHEN METABOLIC PANEL: CPT

## 2020-11-22 PROCEDURE — 87804 INFLUENZA ASSAY W/OPTIC: CPT

## 2020-11-22 PROCEDURE — 70450 CT HEAD/BRAIN W/O DYE: CPT

## 2020-11-22 PROCEDURE — 74011000636 HC RX REV CODE- 636: Performed by: EMERGENCY MEDICINE

## 2020-11-22 PROCEDURE — 83605 ASSAY OF LACTIC ACID: CPT

## 2020-11-22 PROCEDURE — 96375 TX/PRO/DX INJ NEW DRUG ADDON: CPT

## 2020-11-22 PROCEDURE — 74011250636 HC RX REV CODE- 250/636: Performed by: PHYSICIAN ASSISTANT

## 2020-11-22 PROCEDURE — 81001 URINALYSIS AUTO W/SCOPE: CPT

## 2020-11-22 PROCEDURE — 85025 COMPLETE CBC W/AUTO DIFF WBC: CPT

## 2020-11-22 PROCEDURE — 96366 THER/PROPH/DIAG IV INF ADDON: CPT

## 2020-11-22 PROCEDURE — 93005 ELECTROCARDIOGRAM TRACING: CPT

## 2020-11-22 PROCEDURE — 87040 BLOOD CULTURE FOR BACTERIA: CPT

## 2020-11-22 RX ORDER — LEVOFLOXACIN 5 MG/ML
750 INJECTION, SOLUTION INTRAVENOUS EVERY 24 HOURS
Status: DISCONTINUED | OUTPATIENT
Start: 2020-11-22 | End: 2020-11-22

## 2020-11-22 RX ORDER — SODIUM CHLORIDE 0.9 % (FLUSH) 0.9 %
5-10 SYRINGE (ML) INJECTION AS NEEDED
Status: DISCONTINUED | OUTPATIENT
Start: 2020-11-22 | End: 2020-11-22 | Stop reason: HOSPADM

## 2020-11-22 RX ORDER — SODIUM CHLORIDE 0.9 % (FLUSH) 0.9 %
10 SYRINGE (ML) INJECTION
Status: COMPLETED | OUTPATIENT
Start: 2020-11-22 | End: 2020-11-22

## 2020-11-22 RX ORDER — ACETAMINOPHEN 500 MG
1000 TABLET ORAL
Status: DISCONTINUED | OUTPATIENT
Start: 2020-11-22 | End: 2020-11-22

## 2020-11-22 RX ORDER — ACETAMINOPHEN 650 MG/1
650 SUPPOSITORY RECTAL
Status: COMPLETED | OUTPATIENT
Start: 2020-11-22 | End: 2020-11-22

## 2020-11-22 RX ORDER — ACETAMINOPHEN 500 MG
1000 TABLET ORAL
Qty: 20 TAB | Refills: 0 | Status: SHIPPED | OUTPATIENT
Start: 2020-11-22 | End: 2021-02-08

## 2020-11-22 RX ORDER — KETOROLAC TROMETHAMINE 30 MG/ML
30 INJECTION, SOLUTION INTRAMUSCULAR; INTRAVENOUS
Status: COMPLETED | OUTPATIENT
Start: 2020-11-22 | End: 2020-11-22

## 2020-11-22 RX ADMIN — SODIUM CHLORIDE 1000 ML: 900 INJECTION, SOLUTION INTRAVENOUS at 14:24

## 2020-11-22 RX ADMIN — KETOROLAC TROMETHAMINE 30 MG: 30 INJECTION, SOLUTION INTRAMUSCULAR at 17:03

## 2020-11-22 RX ADMIN — IOPAMIDOL 100 ML: 755 INJECTION, SOLUTION INTRAVENOUS at 15:47

## 2020-11-22 RX ADMIN — SODIUM CHLORIDE 1000 ML: 900 INJECTION, SOLUTION INTRAVENOUS at 13:41

## 2020-11-22 RX ADMIN — CEFEPIME HYDROCHLORIDE 2 G: 2 INJECTION, POWDER, FOR SOLUTION INTRAVENOUS at 15:29

## 2020-11-22 RX ADMIN — ACETAMINOPHEN 650 MG: 650 SUPPOSITORY RECTAL at 14:07

## 2020-11-22 RX ADMIN — Medication 10 ML: at 15:47

## 2020-11-22 NOTE — ED PROVIDER NOTES
EMERGENCY DEPARTMENT HISTORY AND PHYSICAL EXAM      Date: 11/22/2020  Patient Name: Rosalba Cespedes    History of Presenting Illness     Chief Complaint   Patient presents with    Fever     pt arrives to ER via EMS coming from home. pt family called 911 d/t pt running fever at home of over 100. Pt has some form of cognitive delay.  Fatigue     History Provided By: Patient and EMS    HPI: Rosalba Cespedes, 62 y.o. male with medical history significant for diabetes, hypertension, seizure, hypercholesterolemia, mental retardation, eosinophilia who presents via EMS To the ED with cc of acute acute moderate generalized fatigue and fever X 2 days. Per EMS, patient's wife endorsed noticing the symptoms yesterday. No medications relieving factors/events prior to arrival.  No known cough, shortness of breath, wheezing, chest pain, abdominal pain, urinary symptoms, constipation, diarrhea, injuries, wounds, headache or neck pain. Limited history. Patient specifically denies any pain or concerns at this time. PCP: Arturo Thurston MD    There are no other complaints, changes, or physical findings at this time. No current facility-administered medications on file prior to encounter. Current Outpatient Medications on File Prior to Encounter   Medication Sig Dispense Refill    pregabalin (LYRICA) 75 mg capsule Take 1 Cap by mouth two (2) times a day. Max Daily Amount: 150 mg. 60 Cap 2    metFORMIN ER (GLUCOPHAGE XR) 500 mg tablet TAKE TWO TABLETS BY MOUTH TWICE DAILY  360 Tab 3    topiramate (TOPAMAX) 200 mg tablet TAKE 1 & 1/2 TABLETS BY MOUTH TWICE A  Tab 0    linaGLIPtin (TRADJENTA) 5 mg tablet Take 1 Tab by mouth daily. 90 Tab 3    pioglitazone (ACTOS) 45 mg tablet Take 1 Tab by mouth daily. 90 Tab 3    ramipriL (ALTACE) 5 mg capsule Take 1 Cap by mouth daily.  90 Cap 3    divalproex DR (DEPAKOTE) 500 mg tablet TAKE ONE TABLET BY MOUTH EVERY MORNING AND TWO TABS AT NIGHT 270 Tab 3    atorvastatin (LIPITOR) 40 mg tablet Take 1 Tab by mouth nightly. 90 Tab 3    glimepiride (AMARYL) 4 mg tablet TAKE TWO TABLETS BY MOUTH IN THE MORNING 669 Tab 3    folic acid (FOLVITE) 1 mg tablet Take 1 Tab by mouth daily. 90 Tab 3    benzonatate (TESSALON) 100 mg capsule Take 1 Cap by mouth nightly as needed for Cough. 30 Cap 0    triamcinolone acetonide (KENALOG) 0.1 % ointment       chlorhexidine (PERIDEX) 0.12 % solution Take 15 mL by mouth every twelve (12) hours.  Calcium-Cholecalciferol, D3, (CALTRATE-600 PLUS VITAMIN D3) 600-400 mg-unit Tab Take  by mouth. Past History     Past Medical History:  Past Medical History:   Diagnosis Date    Contact dermatitis and other eczema, due to unspecified cause     psoriasis    Diabetes ([facility] [address]     Eosinophilia     Hypercholesterolemia     Hypertension     Mental retardation     Seizures ([facility] [address]     Skin cancer     Strongyloides stercoralis infection 8/28/2014     Past Surgical History:  Past Surgical History:   Procedure Laterality Date    ENDOSCOPY, COLON, DIAGNOSTIC  11/08/2007    Dr Rosa Maria Fleming normal except small internal hemorrhoids repeat 11/2017     Family History:  Family History   Problem Relation Age of Onset    Other Mother         PMR    Hypertension Mother     Cancer Father         Lung    Diabetes Brother      Social History:  Social History     Tobacco Use    Smoking status: Never Smoker    Smokeless tobacco: Never Used   Substance Use Topics    Alcohol use: No    Drug use: No     Allergies:  No Known Allergies  Review of Systems   Review of Systems   Constitutional: Positive for activity change, fatigue and fever. Negative for appetite change, chills and diaphoresis. HENT: Negative for congestion, dental problem, drooling, ear discharge, ear pain, facial swelling, hearing loss, nosebleeds, postnasal drip, rhinorrhea, sinus pressure, sore throat, trouble swallowing and voice change. Eyes: Negative.   Negative for pain and redness. Respiratory: Negative for apnea, cough, chest tightness, shortness of breath, wheezing and stridor. Cardiovascular: Negative. Negative for chest pain. Gastrointestinal: Negative. Negative for abdominal pain, constipation, diarrhea, nausea and vomiting. Genitourinary: Negative. Negative for dysuria, frequency and hematuria. Musculoskeletal: Negative. Negative for arthralgias, myalgias, neck pain and neck stiffness. Skin: Negative. Negative for rash. Neurological: Negative for dizziness, seizures, syncope, light-headedness and headaches. Psychiatric/Behavioral: Negative. Negative for confusion. Physical Exam   Physical Exam  Vitals signs and nursing note reviewed. Constitutional:       General: He is not in acute distress. Appearance: Normal appearance. He is well-developed. He is not ill-appearing, toxic-appearing or diaphoretic. HENT:      Head: Normocephalic and atraumatic. Right Ear: Hearing and external ear normal.      Left Ear: Hearing and external ear normal.      Nose: Nose normal.   Eyes:      Conjunctiva/sclera: Conjunctivae normal.      Pupils: Pupils are equal, round, and reactive to light. Neck:      Musculoskeletal: Normal range of motion. No neck rigidity. Cardiovascular:      Rate and Rhythm: Normal rate and regular rhythm. Pulses: Normal pulses. Heart sounds: Normal heart sounds. Pulmonary:      Effort: Pulmonary effort is normal. No accessory muscle usage or respiratory distress. Breath sounds: Normal breath sounds. No stridor. No wheezing, rhonchi or rales. Abdominal:      General: Abdomen is flat. Tenderness: There is no abdominal tenderness. There is no guarding. Musculoskeletal: Normal range of motion. Skin:     General: Skin is warm and dry. Capillary Refill: Capillary refill takes less than 2 seconds. Coloration: Skin is not pale.    Neurological:      Mental Status: He is alert and oriented to person, place, and time. Psychiatric:         Mood and Affect: Mood normal.         Speech: Speech normal.         Behavior: Behavior normal.         Thought Content: Thought content normal.         Judgment: Judgment normal.       Diagnostic Study Results   Labs -     Recent Results (from the past 12 hour(s))   CBC WITH AUTOMATED DIFF    Collection Time: 11/22/20 12:54 PM   Result Value Ref Range    WBC 9.2 4.1 - 11.1 K/uL    RBC 3.33 (L) 4.10 - 5.70 M/uL    HGB 11.5 (L) 12.1 - 17.0 g/dL    HCT 36.7 36.6 - 50.3 %    .2 (H) 80.0 - 99.0 FL    MCH 34.5 (H) 26.0 - 34.0 PG    MCHC 31.3 30.0 - 36.5 g/dL    RDW 14.0 11.5 - 14.5 %    PLATELET 66 (L) 457 - 400 K/uL    MPV 10.5 8.9 - 12.9 FL    NRBC 0.0 0  WBC    ABSOLUTE NRBC 0.00 0.00 - 0.01 K/uL    NEUTROPHILS 54 32 - 75 %    BAND NEUTROPHILS 8 %    LYMPHOCYTES 25 12 - 49 %    MONOCYTES 5 5 - 13 %    EOSINOPHILS 7 0 - 7 %    BASOPHILS 1 0 - 1 %    IMMATURE GRANULOCYTES 0 0.0 - 0.5 %    ABS. NEUTROPHILS 5.7 1.8 - 8.0 K/UL    ABS. LYMPHOCYTES 2.3 0.8 - 3.5 K/UL    ABS. MONOCYTES 0.5 0.0 - 1.0 K/UL    ABS. EOSINOPHILS 0.6 (H) 0.0 - 0.4 K/UL    ABS. BASOPHILS 0.1 0.0 - 0.1 K/UL    ABS. IMM. GRANS. 0.0 0.00 - 0.04 K/UL    DF MANUAL      RBC COMMENTS MACROCYTOSIS  1+        WBC COMMENTS REACTIVE LYMPHS     METABOLIC PANEL, COMPREHENSIVE    Collection Time: 11/22/20 12:54 PM   Result Value Ref Range    Sodium 142 136 - 145 mmol/L    Potassium 4.4 3.5 - 5.1 mmol/L    Chloride 114 (H) 97 - 108 mmol/L    CO2 26 21 - 32 mmol/L    Anion gap 2 (L) 5 - 15 mmol/L    Glucose 191 (H) 65 - 100 mg/dL    BUN 19 6 - 20 MG/DL    Creatinine 0.90 0.70 - 1.30 MG/DL    BUN/Creatinine ratio 21 (H) 12 - 20      GFR est AA >60 >60 ml/min/1.73m2    GFR est non-AA >60 >60 ml/min/1.73m2    Calcium 8.6 8.5 - 10.1 MG/DL    Bilirubin, total 0.4 0.2 - 1.0 MG/DL    ALT (SGPT) 14 12 - 78 U/L    AST (SGOT) 13 (L) 15 - 37 U/L    Alk.  phosphatase 65 45 - 117 U/L    Protein, total 6.6 6.4 - 8.2 g/dL Albumin 2.9 (L) 3.5 - 5.0 g/dL    Globulin 3.7 2.0 - 4.0 g/dL    A-G Ratio 0.8 (L) 1.1 - 2.2     POC LACTIC ACID    Collection Time: 11/22/20  1:00 PM   Result Value Ref Range    Lactic Acid (POC) 2.48 (HH) 0.40 - 2.00 mmol/L   EKG, 12 LEAD, INITIAL    Collection Time: 11/22/20  1:21 PM   Result Value Ref Range    Ventricular Rate 96 BPM    Atrial Rate 96 BPM    P-R Interval 152 ms    QRS Duration 90 ms    Q-T Interval 322 ms    QTC Calculation (Bezet) 406 ms    Calculated P Axis 12 degrees    Calculated R Axis -16 degrees    Calculated T Axis -22 degrees    Diagnosis       Sinus rhythm with premature atrial complexes  Nonspecific T wave abnormality  No previous ECGs available  Confirmed by Jose Freeman ([id number]) on 11/22/2020 3:21:17 PM     URINALYSIS W/ REFLEX CULTURE    Collection Time: 11/22/20  2:19 PM    Specimen: Urine   Result Value Ref Range    Color YELLOW/STRAW      Appearance CLEAR CLEAR      Specific gravity 1.018 1.003 - 1.030      pH (UA) 8.0 5.0 - 8.0      Protein Negative NEG mg/dL    Glucose 500 (A) NEG mg/dL    Ketone Negative NEG mg/dL    Bilirubin Negative NEG      Blood Negative NEG      Urobilinogen 1.0 0.2 - 1.0 EU/dL    Nitrites Negative NEG      Leukocyte Esterase Negative NEG      WBC 0-4 0 - 4 /hpf    RBC 0-5 0 - 5 /hpf    Epithelial cells FEW FEW /lpf    Bacteria Negative NEG /hpf    UA:UC IF INDICATED CULTURE NOT INDICATED BY UA RESULT CNI      Hyaline cast 0-2 0 - 5 /lpf   INFLUENZA A+B VIRAL AGS    Collection Time: 11/22/20  2:19 PM   Result Value Ref Range    Influenza A Antigen Negative NEG      Influenza B Antigen Negative NEG     SARS-COV-2    Collection Time: 11/22/20  2:19 PM   Result Value Ref Range    Specimen source Nasopharyngeal      Specimen source Nasopharyngeal      COVID-19 rapid test Not detected NOTD      Specimen type NP Swab      Health status Symptomatic Testing     LACTIC ACID    Collection Time: 11/22/20  3:36 PM   Result Value Ref Range    Lactic acid 1.9 0.4 - 2.0 MMOL/L       Radiologic Studies -   CT ABD PELV W CONT   Final Result   IMPRESSION:   1. Trace inflammation around the second portion of the duodenum, of unclear   etiology. Correlation with serum lipase recommended. 2. Possible cirrhosis. Splenomegaly and portal vein dilation suggest portal   hypertension. 3. Pulmonary artery hypertension. CT CHEST W CONT   Final Result   IMPRESSION:   1. Trace inflammation around the second portion of the duodenum, of unclear   etiology. Correlation with serum lipase recommended. 2. Possible cirrhosis. Splenomegaly and portal vein dilation suggest portal   hypertension. 3. Pulmonary artery hypertension. CT HEAD WO CONT   Final Result   IMPRESSION: No acute intracranial abnormality. XR CHEST PORT   Final Result   IMPRESSION:    Shallow volumes and pulmonary vascular crowding. Ct Head Wo Cont    Result Date: 11/22/2020  IMPRESSION: No acute intracranial abnormality. Ct Chest W Cont    Result Date: 11/22/2020  IMPRESSION: 1. Trace inflammation around the second portion of the duodenum, of unclear etiology. Correlation with serum lipase recommended. 2. Possible cirrhosis. Splenomegaly and portal vein dilation suggest portal hypertension. 3. Pulmonary artery hypertension. Ct Abd Pelv W Cont    Result Date: 11/22/2020  IMPRESSION: 1. Trace inflammation around the second portion of the duodenum, of unclear etiology. Correlation with serum lipase recommended. 2. Possible cirrhosis. Splenomegaly and portal vein dilation suggest portal hypertension. 3. Pulmonary artery hypertension. Xr Chest Port    Result Date: 11/22/2020  IMPRESSION: Shallow volumes and pulmonary vascular crowding. Medical Decision Making   I am the first provider for this patient. I reviewed the vital signs, available nursing notes, past medical history, past surgical history, family history and social history. Vital Signs-Reviewed the patient's vital signs.   Patient Vitals for the past 24 hrs:   Temp Pulse Resp BP SpO2   11/22/20 1755 100.3 °F (37.9 °C)       11/22/20 1700  74 20 (!) 106/59 97 %   11/22/20 1654 (!) 101.1 °F (38.4 °C)       11/22/20 1350     100 %   11/22/20 1345  93 21 125/70 94 %   11/22/20 1239 100.1 °F (37.8 °C) 87 18 (!) 141/79 97 %   11/22/20 1218 100 °F (37.8 °C) 79 18 (!) 142/79 98 %     Pulse Oximetry Analysis - 100% on RA and normal    Cardiac Monitor:   Rate: 74 bpm  Rhythm: Normal Sinus Rhythm      EKG interpretation: (Preliminary)  Rhythm: normal sinus rhythm and PAC's; and regular . Rate (approx.): 96; Axis: normal; OR interval: normal; QRS interval: normal ; ST/T wave: normal;Other findings: non-ischemic. Records Reviewed: Nursing Notes, Old Medical Records, Previous electrocardiograms, Previous Radiology Studies and Previous Laboratory Studies    Provider Notes (Medical Decision Making):   Patient presents with fever, tachycardia and concerns for infection. DDx: sepsis 2/2 UTI, PNA, intraabdominal infection (colitis, appendicitis, cholecystitis),  infectious diarrhea, meningitis, soft tissue infection, septic arthritis, flu/viral prodrome. Will follow sepsis protocol and order set by obtaining fluids, antibiotics, labs, lactate, EKG and frequently reassessing hemodynamic status on the patient. Will hold off on aggressive fluid hydration unless patient shows signs of severe sepsis or shock    SEPSIS ASSESSMENT NOTE:   1:15 PM  ARSALAN Canas, has seen and assessed the patient as follows:    Capillary Refill:normal/brisk  Cardiopulmonary Evaluation:   Lung Sounds: clear   Cardiac Sounds: regular  Peripheral Pulses: present  Skin Exam: skin unremarkable, warm, turgor good    Visit Vitals  BP (!) 141/79 (BP 1 Location: Left arm, BP Patient Position: At rest)   Pulse 87   Temp 100.1 °F (37.8 °C)   Resp 18   Ht 6' (1.829 m)   Wt 97.2 kg (214 lb 4.6 oz)   SpO2 97%   BMI 29.06 kg/m²         ED Course:   Initial assessment performed.  The patients presenting problems have been discussed, and they are in agreement with the care plan formulated and outlined with them. I have encouraged them to ask questions as they arise throughout their visit. ED Course as of Nov 22 1820   Yisel Fitch Nov 22, 2020   3500 3:05 PM    I spoke with Dr. Angel Long, Consult for Hospitalist. Discussed available diagnostic tests and clinical findings. He is in agreement with care plans as outlined. He he does not feel that the patient meets admission criteria at this time. He recommends repeating lactic acid and obtaining CT results to determine if patient may need to be admitted at that time. We will continue to monitor patient in the emergency department. Kandy Mcfadden PA-C    [SM]   6753 Patient is sitting upright endorsing that he feels better. Denying any pain at this time. Patient is no longer febrile and appears ready for discharge.    []   [id number] I reviewed pt's hx, sxs, vitals, and PE w/ attending, Dr. Diego Forbes. He is in agreement with the care plan. He recommends that since the patient looks well/symptoms have improved with no signs of sepsis, plan to clinically discharge.    [SM]      ED Course User Index  [SM] Tra Nichols PA-C          Progress Note:   Updated pt on all returned results and findings. Discussed the importance of proper follow up as referred below along with return precautions. Pt in agreement with the care plan and expresses agreement with and understanding of all items discussed. Disposition:  6:19 PM  I have discussed with patient their diagnosis, treatment, and follow up plan. The patient agrees to follow up as outlined in discharge paperwork and also to return to the ED with any worsening. Kandy Mcfadden PA-C      PLAN:  1. Current Discharge Medication List      START taking these medications    Details   acetaminophen (TYLENOL) 500 mg tablet Take 2 Tabs by mouth every six (6) hours as needed for Pain.   Qty: 20 Tab, Refills: 0 CONTINUE these medications which have NOT CHANGED    Details   pregabalin (LYRICA) 75 mg capsule Take 1 Cap by mouth two (2) times a day. Max Daily Amount: 150 mg.  Qty: 60 Cap, Refills: 2    Associated Diagnoses: Neuropathy      metFORMIN ER (GLUCOPHAGE XR) 500 mg tablet TAKE TWO TABLETS BY MOUTH TWICE DAILY   Qty: 360 Tab, Refills: 3      topiramate (TOPAMAX) 200 mg tablet TAKE 1 & 1/2 TABLETS BY MOUTH TWICE A DAY  Qty: 270 Tab, Refills: 0    Associated Diagnoses: Seizure disorder ([facility])      linaGLIPtin (TRADJENTA) 5 mg tablet Take 1 Tab by mouth daily. Qty: 90 Tab, Refills: 3    Associated Diagnoses: Type 2 diabetes mellitus without complication, without long-term current use of insulin ([facility])      pioglitazone (ACTOS) 45 mg tablet Take 1 Tab by mouth daily. Qty: 90 Tab, Refills: 3    Associated Diagnoses: Controlled type 2 diabetes mellitus without complication, without long-term current use of insulin ([facility])      ramipriL (ALTACE) 5 mg capsule Take 1 Cap by mouth daily. Qty: 90 Cap, Refills: 3    Associated Diagnoses: Hypertension, essential      divalproex DR (DEPAKOTE) 500 mg tablet TAKE ONE TABLET BY MOUTH EVERY MORNING AND TWO TABS AT NIGHT  Qty: 270 Tab, Refills: 3    Associated Diagnoses: Seizure disorder ([facility])      atorvastatin (LIPITOR) 40 mg tablet Take 1 Tab by mouth nightly. Qty: 90 Tab, Refills: 3    Associated Diagnoses: Hypercholesterolemia      glimepiride (AMARYL) 4 mg tablet TAKE TWO TABLETS BY MOUTH IN THE MORNING  Qty: 180 Tab, Refills: 3    Associated Diagnoses: Controlled type 2 diabetes mellitus without complication, without long-term current use of insulin ([facility])      folic acid (FOLVITE) 1 mg tablet Take 1 Tab by mouth daily. Qty: 90 Tab, Refills: 3    Associated Diagnoses: Myelodysplasia (myelodysplastic syndrome) ([facility])      benzonatate (TESSALON) 100 mg capsule Take 1 Cap by mouth nightly as needed for Cough.   Qty: 30 Cap, Refills: 0    Associated Diagnoses: Nocturnal cough triamcinolone acetonide (KENALOG) 0.1 % ointment       chlorhexidine (PERIDEX) 0.12 % solution Take 15 mL by mouth every twelve (12) hours. Calcium-Cholecalciferol, D3, (CALTRATE-600 PLUS VITAMIN D3) 600-400 mg-unit Tab Take  by mouth. 2.   Follow-up Information     Follow up With Specialties Details Why Contact Info    Ephraim Mendosa MD Internal Medicine Schedule an appointment as soon as possible for a visit in 2 days As needed, If symptoms worsen [address] [place] [address]  [phone]      [facility] EMERGENCY DEPT Emergency Medicine Go to As needed, If symptoms worsen [address] [place] [address]  [phone]        Return to ED if worse     Diagnosis     Clinical Impression:   1. Fever, unspecified fever cause    2. Chronic fatigue            Please note that this dictation was completed with Dragon, computer voice recognition software. Quite often unanticipated grammatical, syntax, homophones, and other interpretive errors are inadvertently transcribed by the computer software. Please disregard these errors. Additionally, please excuse any errors that have escaped final proofreading.

## 2020-11-22 NOTE — ED TRIAGE NOTES
Assumed care of pt via EMS stretcher. Pt comes from home with his mother because pt has intellectual disabilities. Pt mother reported to EMS that yesterday pt became more lethargic and today mother noticed pt was warm to the touch. Pt rectal temperature is 100.1. Pt only complaint at this time is lower back pain. Pt resting on stretcher in POC with call bell in reach. Pt remains on monitor x 3. VSS at this time. 1533: Medicated pt per orders. Pt resting on stretcher in POC with call bell in reach. Pt remains on monitor x 3. VSS at this time. 1556: Pt off the floor to CT.     1705: Medicated pt per orders. Pt resting on stretcher in POC with call bell in reach. Pt remains on monitor x 3. VSS at this time. 1900: Spoke with pt mother and gave discharge instructions over the phone. Pt mother verbalized understanding. [facility] here to transport pt.     1910: Pt transported home with [facility].

## 2020-11-22 NOTE — PROGRESS NOTES
Pharmacy Automatic Renal Dosing Protocol - Antimicrobials    Indication for Antimicrobials: Sepsis of Unknown Etiology    Current Regimen of Each Antimicrobial:  Cefepime 2gm IV q8h (Start Date ; Day # 1)  Levofloxacin 750mg IV q24h; start ; Day #1    Previous Antimicrobial Therapy:      Significant Cultures:   : Blood = (pending)    Radiology / Imaging results: (X-ray, CT scan or MRI):   : CXR: Shallow volumes and pulmonary vascular crowding. Paralysis, amputations, malnutrition: none mentioned    Labs:  Recent Labs     20  1254   CREA 0.90   BUN 19   WBC 9.2     Temp (24hrs), Av.1 °F (37.8 °C), Min:100 °F (37.8 °C), Max:100.1 °F (37.8 °C)      Is the Patient on Dialysis? No    Creatinine Clearance (mL/min):   CrCl (Actual Body Weight): 115.5  CrCl (Adjusted Body Weight): 101.5  CrCl (Ideal Body Weight): 92.2    Impression/Plan:   Cefepime 2gm IV q24h  Levofloxacin 750mg IV q24h  Daily BMP  Antimicrobial stop date TBD     Pharmacy will follow daily and adjust medications as appropriate for renal function and/or serum levels. Thank you,  Sunil Turcios [facility]    Recommended duration of therapy  [email or web address]/[facility]/Pharmacy/Clinical%[email or web address]. [email or web address]    Renal Dosing  [email or web address]/[place]/[facility]/Pharmacy/Clinical%20Companion/Renal%[email or web address] pdf

## 2020-11-22 NOTE — DISCHARGE INSTRUCTIONS
Patient Education        Learning About Fever  What is a fever? A fever is a high body temperature. It's one way your body fights being sick. A fever shows that the body is responding to infection or other illnesses, both minor and severe. A fever is a symptom, not an illness by itself. A fever can be a sign that you are ill, but most fevers are not caused by a serious problem. You may have a fever with a minor illness, such as a cold. But sometimes a very serious infection may cause little or no fever. It is important to look at other symptoms, other conditions you have, and how you feel in general. In children, notice how they act and see what symptoms they complain of. What is a normal body temperature? A normal body temperature is about 98. 6ºF. Some people have a normal temperature that is a little higher or a little lower than this. Your temperature may be a little lower in the morning than it is later in the day. It may go up during hot weather or when you exercise, wear heavy clothes, or take a hot bath. Your temperature may also be different depending on how you take it. A temperature taken in the mouth (oral) or under the arm may be a little lower than your core temperature (rectal). What is a fever temperature? A core temperature of 100.4°F or above is considered a fever. What can cause a fever? A fever may be caused by:  · Infections. This is the most common cause of a fever. Examples of infections that can cause a fever include the flu, a kidney infection, or pneumonia. · Some medicines. · Severe trauma or injury, such as a heart attack, stroke, heatstroke, or burns. · Other medical conditions, such as arthritis and some cancers. How can you treat a fever at home? · Ask your doctor if you can take an over-the-counter pain medicine, such as acetaminophen (Tylenol), ibuprofen (Advil, Motrin), or naproxen (Aleve). Be safe with medicines.  Read and follow all instructions on the label.  · To prevent dehydration, drink plenty of fluids. Choose water and other caffeine-free clear liquids until you feel better. If you have kidney, heart, or liver disease and have to limit fluids, talk with your doctor before you increase the amount of fluids you drink. Follow-up care is a key part of your treatment and safety. Be sure to make and go to all appointments, and call your doctor if you are having problems. It's also a good idea to know your test results and keep a list of the medicines you take. Where can you learn more? Go to http://www.gray.com/  Enter G732 in the search box to learn more about \"Learning About Fever. \"  Current as of: June 26, 2019               Content Version: 12.6  © 6239-8751 [employer]. Care instructions adapted under license by [employer] (which disclaims liability or warranty for this information). If you have questions about a medical condition or this instruction, always ask your healthcare professional. [address] any warranty or liability for your use of this information.

## 2020-11-23 ENCOUNTER — PATIENT OUTREACH (OUTPATIENT)
Dept: CASE MANAGEMENT | Age: 65
End: 2020-11-23

## 2020-11-23 ENCOUNTER — TELEPHONE (OUTPATIENT)
Dept: INTERNAL MEDICINE CLINIC | Age: 65
End: 2020-11-23

## 2020-11-23 DIAGNOSIS — R29.898 WEAKNESS OF BOTH LEGS: Primary | ICD-10-CM

## 2020-11-23 DIAGNOSIS — M54.50 LOW BACK PAIN WITHOUT SCIATICA, UNSPECIFIED BACK PAIN LATERALITY, UNSPECIFIED CHRONICITY: ICD-10-CM

## 2020-11-23 NOTE — TELEPHONE ENCOUNTER
pts mother Brock Anderson called to say she had him at the er yesterday, fever, back hurting and they ran tests did not find any infection, no fever today.   They told her to follow up with Dr Cuong Romero so she is asking what her next step should be.  [phone] Principal Discharge DX:	Venous stasis dermatitis  Secondary Diagnosis:	Venous stasis ulcer of ankle

## 2020-11-23 NOTE — TELEPHONE ENCOUNTER
Jane Ella stated tylenol does not work, have been giving ibuprofen. Pt's legs are weak and back hurts, doesn't want to get up. Mother makes him use a walker to get from bed to living room chair. She has spoken to  with Medicaid who suggested rehab.  Advised to find out what she has to do for rehab or if pt can get home PT?

## 2020-11-23 NOTE — TELEPHONE ENCOUNTER
Verified patients name and date of birth. Spoke to Shawanda Simmons). Patient seen at ER yesterday. Today he has no fever but is moving around very slowly, has complaint of back pain still. No appts available at this time for a follow up appt tomorrow or wed. Please advise.

## 2020-11-23 NOTE — PROGRESS NOTES
Patient contacted regarding recent discharge and COVID-19 risk. Discussed COVID-19 related testing which was available at this time. Test results were negative. Patient informed of results, if available? yes    Care Transition Nurse/ Ambulatory Care Manager/ LPN Care Coordinator contacted the caregiver/ mother  by telephone to perform post discharge assessment. Verified name and  with caregiver as identifiers. Patient has following risk factors of: diabetes and MDS. CTN/ACM/LPN reviewed discharge instructions, medical action plan and red flags related to discharge diagnosis. Reviewed and educated them on any new and changed medications related to discharge diagnosis. Advised obtaining a 90-day supply of all daily and as-needed medications. Advance Care Planning:   Does patient have an Advance Directive: yes; reviewed and current     Education provided regarding infection prevention, and signs and symptoms of COVID-19 and when to seek medical attention with caregiver who verbalized understanding. Discussed exposure protocols and quarantine from [address] you at higher risk for severe illness  and given an opportunity for questions and concerns. The caregiver agrees to contact the COVID-19 hotline [phone] or PCP office for questions related to their healthcare. CTN/ACM/LPN provided contact information for future reference. From CDC: Are you at higher risk for severe illness?  Wash your hands often.  Avoid close contact (6 feet, which is about two arm lengths) with people who are sick.  Put distance between yourself and other people if COVID-19 is spreading in your community.  Clean and disinfect frequently touched surfaces.  Avoid all cruise travel and non-essential air travel.  Call your healthcare professional if you have concerns about COVID-19 and your underlying condition or if you are sick.     For more information on steps you can take to protect yourself, see CDC's How to Protect Yourself      Patient/family/caregiver given information for [employer] and agrees to enroll no      Plan for follow-up call in 7-14 days based on severity of symptoms and risk factors. Patients mother reports no fever this morning though she had given Ibuprofen. Temp was 97.6 . She is checking temp 3x a day. Discussed that if he displays fevers again that aren't resolving he needs follow up with PCP- offered to schedule ER follow up but she didn't feel this was needed at this time. Also provided infor for [facility] for follow up or acute care needs. Patients mother notes frustration in his progressive physical decline and feels that there is no plan for his treatment with MDS. Recommend follow up with Dr Krista Caballero. Reports he has been very very weak with almost no use of his legs and is using walker. She has been offered medicaid care aide services from the [facility]  but until now has declined as she was providing all of his care but with his decline it has become more difficult for her. Recommend she consider utilizing some of those resources/ care aid/ medicaid transport. She notes she will consider. Ambulatory Care Management Note    Date/Time:  11/23/2020 10:35 AM    This patient was received as a referral from Daily Assignment   Ambulatory Care Manager outreached to patient today to offer care management services. Introduction to self and role of care manager provided. Patient accepted care management services at this time. Follow up call scheduled at this time. Patient has Ambulatory Care Manager's contact number for for any questions or concerns.

## 2020-11-24 ENCOUNTER — HOME HEALTH ADMISSION (OUTPATIENT)
Dept: HOME HEALTH SERVICES | Facility: HOME HEALTH | Age: 65
End: 2020-11-24

## 2020-11-24 NOTE — TELEPHONE ENCOUNTER
Verified patients name and date of birth. Chantal Means of [facility] PT referral. FYI: Patients Mother is concerned that lipitor may be contributing to patients neuropathy.

## 2020-11-27 ENCOUNTER — HOSPITAL ENCOUNTER (INPATIENT)
Age: 65
LOS: 4 days | Discharge: HOME HEALTH | DRG: 552 | End: 2020-12-02
Attending: EMERGENCY MEDICINE | Admitting: STUDENT IN AN ORGANIZED HEALTH CARE EDUCATION/TRAINING PROGRAM
Payer: MEDICARE

## 2020-11-27 ENCOUNTER — APPOINTMENT (OUTPATIENT)
Dept: MRI IMAGING | Age: 65
DRG: 552 | End: 2020-11-27
Attending: EMERGENCY MEDICINE
Payer: MEDICARE

## 2020-11-27 DIAGNOSIS — R26.2 AMBULATORY DYSFUNCTION: Primary | ICD-10-CM

## 2020-11-27 DIAGNOSIS — G62.9 NEUROPATHY: ICD-10-CM

## 2020-11-27 DIAGNOSIS — R33.9 URINARY RETENTION: ICD-10-CM

## 2020-11-27 DIAGNOSIS — M51.26 LUMBAR HERNIATED DISC: ICD-10-CM

## 2020-11-27 LAB
ALBUMIN SERPL-MCNC: 2.9 G/DL (ref 3.5–5)
ALBUMIN/GLOB SERPL: 0.7 {RATIO} (ref 1.1–2.2)
ALP SERPL-CCNC: 56 U/L (ref 45–117)
ALT SERPL-CCNC: 17 U/L (ref 12–78)
ANION GAP SERPL CALC-SCNC: 3 MMOL/L (ref 5–15)
AST SERPL-CCNC: 21 U/L (ref 15–37)
BACTERIA SPEC CULT: NORMAL
BASOPHILS # BLD: 0 K/UL (ref 0–0.1)
BASOPHILS NFR BLD: 0 % (ref 0–1)
BILIRUB SERPL-MCNC: 0.3 MG/DL (ref 0.2–1)
BUN SERPL-MCNC: 22 MG/DL (ref 6–20)
BUN/CREAT SERPL: 23 (ref 12–20)
CALCIUM SERPL-MCNC: 9 MG/DL (ref 8.5–10.1)
CHLORIDE SERPL-SCNC: 113 MMOL/L (ref 97–108)
CO2 SERPL-SCNC: 25 MMOL/L (ref 21–32)
CREAT SERPL-MCNC: 0.97 MG/DL (ref 0.7–1.3)
DIFFERENTIAL METHOD BLD: ABNORMAL
EOSINOPHIL # BLD: 0.3 K/UL (ref 0–0.4)
EOSINOPHIL NFR BLD: 3 % (ref 0–7)
ERYTHROCYTE [DISTWIDTH] IN BLOOD BY AUTOMATED COUNT: 13.9 % (ref 11.5–14.5)
GLOBULIN SER CALC-MCNC: 3.9 G/DL (ref 2–4)
GLUCOSE SERPL-MCNC: 135 MG/DL (ref 65–100)
HCT VFR BLD AUTO: 33.3 % (ref 36.6–50.3)
HGB BLD-MCNC: 10.5 G/DL (ref 12.1–17)
IMM GRANULOCYTES # BLD AUTO: 0 K/UL (ref 0–0.04)
IMM GRANULOCYTES NFR BLD AUTO: 0 % (ref 0–0.5)
LYMPHOCYTES # BLD: 4 K/UL (ref 0.8–3.5)
LYMPHOCYTES NFR BLD: 46 % (ref 12–49)
MCH RBC QN AUTO: 34.4 PG (ref 26–34)
MCHC RBC AUTO-ENTMCNC: 31.5 G/DL (ref 30–36.5)
MCV RBC AUTO: 109.2 FL (ref 80–99)
MONOCYTES # BLD: 1 K/UL (ref 0–1)
MONOCYTES NFR BLD: 11 % (ref 5–13)
NEUTS SEG # BLD: 3.6 K/UL (ref 1.8–8)
NEUTS SEG NFR BLD: 40 % (ref 32–75)
NRBC # BLD: 0 K/UL (ref 0–0.01)
NRBC BLD-RTO: 0 PER 100 WBC
PLATELET # BLD AUTO: 61 K/UL (ref 150–400)
PMV BLD AUTO: 10.7 FL (ref 8.9–12.9)
POTASSIUM SERPL-SCNC: 4.5 MMOL/L (ref 3.5–5.1)
PROT SERPL-MCNC: 6.8 G/DL (ref 6.4–8.2)
RBC # BLD AUTO: 3.05 M/UL (ref 4.1–5.7)
RBC MORPH BLD: ABNORMAL
RBC MORPH BLD: ABNORMAL
SERVICE CMNT-IMP: NORMAL
SODIUM SERPL-SCNC: 141 MMOL/L (ref 136–145)
WBC # BLD AUTO: 8.9 K/UL (ref 4.1–11.1)
WBC MORPH BLD: ABNORMAL

## 2020-11-27 PROCEDURE — A9575 INJ GADOTERATE MEGLUMI 0.1ML: HCPCS | Performed by: EMERGENCY MEDICINE

## 2020-11-27 PROCEDURE — 81003 URINALYSIS AUTO W/O SCOPE: CPT

## 2020-11-27 PROCEDURE — 85025 COMPLETE CBC W/AUTO DIFF WBC: CPT

## 2020-11-27 PROCEDURE — 72158 MRI LUMBAR SPINE W/O & W/DYE: CPT

## 2020-11-27 PROCEDURE — 74011250637 HC RX REV CODE- 250/637: Performed by: EMERGENCY MEDICINE

## 2020-11-27 PROCEDURE — 36415 COLL VENOUS BLD VENIPUNCTURE: CPT

## 2020-11-27 PROCEDURE — 96374 THER/PROPH/DIAG INJ IV PUSH: CPT

## 2020-11-27 PROCEDURE — 80053 COMPREHEN METABOLIC PANEL: CPT

## 2020-11-27 PROCEDURE — 74011250636 HC RX REV CODE- 250/636: Performed by: EMERGENCY MEDICINE

## 2020-11-27 PROCEDURE — 99285 EMERGENCY DEPT VISIT HI MDM: CPT

## 2020-11-27 RX ORDER — OXYCODONE HYDROCHLORIDE 5 MG/1
5 TABLET ORAL
Status: COMPLETED | OUTPATIENT
Start: 2020-11-27 | End: 2020-11-27

## 2020-11-27 RX ORDER — GADOTERATE MEGLUMINE 376.9 MG/ML
20 INJECTION INTRAVENOUS
Status: COMPLETED | OUTPATIENT
Start: 2020-11-27 | End: 2020-11-27

## 2020-11-27 RX ORDER — FENTANYL CITRATE 50 UG/ML
50 INJECTION, SOLUTION INTRAMUSCULAR; INTRAVENOUS
Status: COMPLETED | OUTPATIENT
Start: 2020-11-27 | End: 2020-11-27

## 2020-11-27 RX ORDER — FENTANYL CITRATE 50 UG/ML
50 INJECTION, SOLUTION INTRAMUSCULAR; INTRAVENOUS
Status: COMPLETED | OUTPATIENT
Start: 2020-11-27 | End: 2020-12-02

## 2020-11-27 RX ORDER — ACETAMINOPHEN 325 MG/1
650 TABLET ORAL
Status: DISCONTINUED | OUTPATIENT
Start: 2020-11-27 | End: 2020-11-28 | Stop reason: SDUPTHER

## 2020-11-27 RX ADMIN — FENTANYL CITRATE 50 MCG: 50 INJECTION, SOLUTION INTRAMUSCULAR; INTRAVENOUS at 23:50

## 2020-11-27 RX ADMIN — OXYCODONE HYDROCHLORIDE 5 MG: 5 TABLET ORAL at 21:51

## 2020-11-27 RX ADMIN — GADOTERATE MEGLUMINE 20 ML: 376.9 INJECTION INTRAVENOUS at 22:33

## 2020-11-28 PROBLEM — R29.898 LOWER EXTREMITY WEAKNESS: Status: ACTIVE | Noted: 2020-11-28

## 2020-11-28 LAB
APPEARANCE UR: CLEAR
BILIRUB UR QL CFM: NEGATIVE
COLOR UR: ABNORMAL
COMMENT, HOLDF: NORMAL
GLUCOSE BLD STRIP.AUTO-MCNC: 111 MG/DL (ref 65–100)
GLUCOSE BLD STRIP.AUTO-MCNC: 117 MG/DL (ref 65–100)
GLUCOSE BLD STRIP.AUTO-MCNC: 153 MG/DL (ref 65–100)
GLUCOSE BLD STRIP.AUTO-MCNC: 193 MG/DL (ref 65–100)
GLUCOSE UR STRIP.AUTO-MCNC: 100 MG/DL
HGB UR QL STRIP: NEGATIVE
KETONES UR QL STRIP.AUTO: ABNORMAL MG/DL
LEUKOCYTE ESTERASE UR QL STRIP.AUTO: NEGATIVE
NITRITE UR QL STRIP.AUTO: NEGATIVE
PH UR STRIP: 7 [PH] (ref 5–8)
PROT UR STRIP-MCNC: NEGATIVE MG/DL
SAMPLES BEING HELD,HOLD: NORMAL
SERVICE CMNT-IMP: ABNORMAL
SP GR UR REFRACTOMETRY: 1.02 (ref 1–1.03)
UROBILINOGEN UR QL STRIP.AUTO: 1 EU/DL (ref 0.2–1)

## 2020-11-28 PROCEDURE — 74011250636 HC RX REV CODE- 250/636: Performed by: EMERGENCY MEDICINE

## 2020-11-28 PROCEDURE — 97535 SELF CARE MNGMENT TRAINING: CPT

## 2020-11-28 PROCEDURE — 97162 PT EVAL MOD COMPLEX 30 MIN: CPT

## 2020-11-28 PROCEDURE — 99218 HC RM OBSERVATION: CPT

## 2020-11-28 PROCEDURE — 82962 GLUCOSE BLOOD TEST: CPT

## 2020-11-28 PROCEDURE — 96376 TX/PRO/DX INJ SAME DRUG ADON: CPT

## 2020-11-28 PROCEDURE — 65270000029 HC RM PRIVATE

## 2020-11-28 PROCEDURE — 97530 THERAPEUTIC ACTIVITIES: CPT

## 2020-11-28 PROCEDURE — 74011250636 HC RX REV CODE- 250/636: Performed by: INTERNAL MEDICINE

## 2020-11-28 PROCEDURE — 97166 OT EVAL MOD COMPLEX 45 MIN: CPT

## 2020-11-28 PROCEDURE — 74011636637 HC RX REV CODE- 636/637: Performed by: INTERNAL MEDICINE

## 2020-11-28 PROCEDURE — 74011000250 HC RX REV CODE- 250: Performed by: STUDENT IN AN ORGANIZED HEALTH CARE EDUCATION/TRAINING PROGRAM

## 2020-11-28 PROCEDURE — 96372 THER/PROPH/DIAG INJ SC/IM: CPT

## 2020-11-28 PROCEDURE — 97116 GAIT TRAINING THERAPY: CPT

## 2020-11-28 PROCEDURE — 74011250637 HC RX REV CODE- 250/637: Performed by: INTERNAL MEDICINE

## 2020-11-28 RX ORDER — ATORVASTATIN CALCIUM 40 MG/1
40 TABLET, FILM COATED ORAL
Status: DISCONTINUED | OUTPATIENT
Start: 2020-11-28 | End: 2020-12-02 | Stop reason: HOSPADM

## 2020-11-28 RX ORDER — DIVALPROEX SODIUM 500 MG/1
1000 TABLET, DELAYED RELEASE ORAL
Status: DISCONTINUED | OUTPATIENT
Start: 2020-11-28 | End: 2020-12-02 | Stop reason: HOSPADM

## 2020-11-28 RX ORDER — SODIUM CHLORIDE, SODIUM LACTATE, POTASSIUM CHLORIDE, CALCIUM CHLORIDE 600; 310; 30; 20 MG/100ML; MG/100ML; MG/100ML; MG/100ML
125 INJECTION, SOLUTION INTRAVENOUS CONTINUOUS
Status: DISCONTINUED | OUTPATIENT
Start: 2020-11-28 | End: 2020-11-29

## 2020-11-28 RX ORDER — LIDOCAINE 4 G/100G
1 PATCH TOPICAL EVERY 24 HOURS
Status: DISCONTINUED | OUTPATIENT
Start: 2020-11-28 | End: 2020-12-02 | Stop reason: HOSPADM

## 2020-11-28 RX ORDER — ALOGLIPTIN 25 MG/1
25 TABLET, FILM COATED ORAL DAILY
Status: DISCONTINUED | OUTPATIENT
Start: 2020-11-28 | End: 2020-12-02 | Stop reason: HOSPADM

## 2020-11-28 RX ORDER — ACETAMINOPHEN 325 MG/1
650 TABLET ORAL
Status: DISCONTINUED | OUTPATIENT
Start: 2020-11-28 | End: 2020-12-02 | Stop reason: HOSPADM

## 2020-11-28 RX ORDER — SODIUM CHLORIDE 0.9 % (FLUSH) 0.9 %
5-40 SYRINGE (ML) INJECTION EVERY 8 HOURS
Status: DISCONTINUED | OUTPATIENT
Start: 2020-11-28 | End: 2020-12-02 | Stop reason: HOSPADM

## 2020-11-28 RX ORDER — PREGABALIN 25 MG/1
75 CAPSULE ORAL 2 TIMES DAILY
Status: DISCONTINUED | OUTPATIENT
Start: 2020-11-28 | End: 2020-11-28

## 2020-11-28 RX ORDER — PIOGLITAZONEHYDROCHLORIDE 15 MG/1
45 TABLET ORAL DAILY
Status: DISCONTINUED | OUTPATIENT
Start: 2020-11-28 | End: 2020-12-02 | Stop reason: HOSPADM

## 2020-11-28 RX ORDER — ENOXAPARIN SODIUM 100 MG/ML
40 INJECTION SUBCUTANEOUS DAILY
Status: DISCONTINUED | OUTPATIENT
Start: 2020-11-28 | End: 2020-12-02 | Stop reason: HOSPADM

## 2020-11-28 RX ORDER — PROMETHAZINE HYDROCHLORIDE 25 MG/1
12.5 TABLET ORAL
Status: DISCONTINUED | OUTPATIENT
Start: 2020-11-28 | End: 2020-12-02 | Stop reason: HOSPADM

## 2020-11-28 RX ORDER — INSULIN LISPRO 100 [IU]/ML
INJECTION, SOLUTION INTRAVENOUS; SUBCUTANEOUS
Status: DISCONTINUED | OUTPATIENT
Start: 2020-11-28 | End: 2020-12-02 | Stop reason: HOSPADM

## 2020-11-28 RX ORDER — ACETAMINOPHEN 650 MG/1
650 SUPPOSITORY RECTAL
Status: DISCONTINUED | OUTPATIENT
Start: 2020-11-28 | End: 2020-12-02 | Stop reason: HOSPADM

## 2020-11-28 RX ORDER — TOPIRAMATE 100 MG/1
300 TABLET, FILM COATED ORAL 2 TIMES DAILY
Status: DISCONTINUED | OUTPATIENT
Start: 2020-11-28 | End: 2020-12-02 | Stop reason: HOSPADM

## 2020-11-28 RX ORDER — FOLIC ACID 1 MG/1
1 TABLET ORAL DAILY
Status: DISCONTINUED | OUTPATIENT
Start: 2020-11-28 | End: 2020-12-02 | Stop reason: HOSPADM

## 2020-11-28 RX ORDER — SODIUM CHLORIDE 0.9 % (FLUSH) 0.9 %
5-40 SYRINGE (ML) INJECTION AS NEEDED
Status: DISCONTINUED | OUTPATIENT
Start: 2020-11-28 | End: 2020-12-02 | Stop reason: HOSPADM

## 2020-11-28 RX ORDER — DEXTROSE 50 % IN WATER (D50W) INTRAVENOUS SYRINGE
12.5-25 AS NEEDED
Status: DISCONTINUED | OUTPATIENT
Start: 2020-11-28 | End: 2020-12-02 | Stop reason: HOSPADM

## 2020-11-28 RX ORDER — POLYETHYLENE GLYCOL 3350 17 G/17G
17 POWDER, FOR SOLUTION ORAL DAILY PRN
Status: DISCONTINUED | OUTPATIENT
Start: 2020-11-28 | End: 2020-12-02 | Stop reason: HOSPADM

## 2020-11-28 RX ORDER — DIVALPROEX SODIUM 500 MG/1
500 TABLET, DELAYED RELEASE ORAL DAILY
Status: DISCONTINUED | OUTPATIENT
Start: 2020-11-28 | End: 2020-12-02 | Stop reason: HOSPADM

## 2020-11-28 RX ORDER — PREGABALIN 75 MG/1
150 CAPSULE ORAL 2 TIMES DAILY
Status: DISCONTINUED | OUTPATIENT
Start: 2020-11-28 | End: 2020-12-02 | Stop reason: HOSPADM

## 2020-11-28 RX ORDER — IBUPROFEN 400 MG/1
400 TABLET ORAL 3 TIMES DAILY
Status: COMPLETED | OUTPATIENT
Start: 2020-11-28 | End: 2020-11-29

## 2020-11-28 RX ORDER — MAGNESIUM SULFATE 100 %
4 CRYSTALS MISCELLANEOUS AS NEEDED
Status: DISCONTINUED | OUTPATIENT
Start: 2020-11-28 | End: 2020-12-02 | Stop reason: HOSPADM

## 2020-11-28 RX ORDER — ONDANSETRON 2 MG/ML
4 INJECTION INTRAMUSCULAR; INTRAVENOUS
Status: DISCONTINUED | OUTPATIENT
Start: 2020-11-28 | End: 2020-12-02 | Stop reason: HOSPADM

## 2020-11-28 RX ORDER — LISINOPRIL 20 MG/1
20 TABLET ORAL DAILY
Status: DISCONTINUED | OUTPATIENT
Start: 2020-11-28 | End: 2020-11-30

## 2020-11-28 RX ADMIN — TOPIRAMATE 300 MG: 100 TABLET, FILM COATED ORAL at 18:49

## 2020-11-28 RX ADMIN — Medication 10 ML: at 10:25

## 2020-11-28 RX ADMIN — PIOGLITAZONE 45 MG: 15 TABLET ORAL at 10:21

## 2020-11-28 RX ADMIN — SODIUM CHLORIDE, SODIUM LACTATE, POTASSIUM CHLORIDE, AND CALCIUM CHLORIDE 75 ML/HR: 600; 310; 30; 20 INJECTION, SOLUTION INTRAVENOUS at 23:07

## 2020-11-28 RX ADMIN — FENTANYL CITRATE 50 MCG: 50 INJECTION, SOLUTION INTRAMUSCULAR; INTRAVENOUS at 05:36

## 2020-11-28 RX ADMIN — DIVALPROEX SODIUM 500 MG: 500 TABLET, DELAYED RELEASE ORAL at 12:38

## 2020-11-28 RX ADMIN — IBUPROFEN 400 MG: 400 TABLET, FILM COATED ORAL at 21:46

## 2020-11-28 RX ADMIN — INSULIN LISPRO 2 UNITS: 100 INJECTION, SOLUTION INTRAVENOUS; SUBCUTANEOUS at 12:38

## 2020-11-28 RX ADMIN — IBUPROFEN 400 MG: 400 TABLET, FILM COATED ORAL at 18:48

## 2020-11-28 RX ADMIN — DIVALPROEX SODIUM 1000 MG: 500 TABLET, DELAYED RELEASE ORAL at 04:00

## 2020-11-28 RX ADMIN — PREGABALIN 150 MG: 75 CAPSULE ORAL at 18:48

## 2020-11-28 RX ADMIN — LISINOPRIL 20 MG: 20 TABLET ORAL at 10:21

## 2020-11-28 RX ADMIN — ENOXAPARIN SODIUM 40 MG: 40 INJECTION SUBCUTANEOUS at 10:24

## 2020-11-28 RX ADMIN — TOPIRAMATE 300 MG: 100 TABLET, FILM COATED ORAL at 10:21

## 2020-11-28 RX ADMIN — ATORVASTATIN CALCIUM 40 MG: 40 TABLET, FILM COATED ORAL at 04:00

## 2020-11-28 RX ADMIN — FOLIC ACID 1 MG: 1 TABLET ORAL at 10:21

## 2020-11-28 RX ADMIN — PREGABALIN 150 MG: 75 CAPSULE ORAL at 10:21

## 2020-11-28 RX ADMIN — IBUPROFEN 400 MG: 400 TABLET, FILM COATED ORAL at 10:21

## 2020-11-28 RX ADMIN — ALOGLIPTIN 25 MG: 25 TABLET, FILM COATED ORAL at 12:38

## 2020-11-28 RX ADMIN — Medication 10 ML: at 18:50

## 2020-11-28 RX ADMIN — ATORVASTATIN CALCIUM 40 MG: 40 TABLET, FILM COATED ORAL at 21:46

## 2020-11-28 RX ADMIN — SODIUM CHLORIDE, SODIUM LACTATE, POTASSIUM CHLORIDE, AND CALCIUM CHLORIDE 500 ML: 600; 310; 30; 20 INJECTION, SOLUTION INTRAVENOUS at 21:01

## 2020-11-28 RX ADMIN — Medication 10 ML: at 21:47

## 2020-11-28 NOTE — ED NOTES
Bedside shift change report given to TY RN (oncoming nurse) by [facility] RN (offgoing nurse). Report included the following information SBAR, Kardex, ED Summary, Intake/Output, MAR and Recent Results.

## 2020-11-28 NOTE — PROGRESS NOTES
Problem: Mobility Impaired (Adult and Pediatric)  Goal: *Acute Goals and Plan of Care (Insert Text)  Description: FUNCTIONAL STATUS PRIOR TO ADMISSION: Pt has lived with his mother in a one story home with 2 step entry. 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 Per chart, mother is having difficulty at her age continuing to care for the pt. He has needed assist with ADLS and has walked with a RW. HOME SUPPORT PRIOR TO ADMISSION: The patient lived with mother who is his caregiver. Physical Therapy Goals  Initiated 11/28/2020  1. Patient will move from supine to sit and sit to supine , scoot up and down, and roll side to side in bed with independence within 7 day(s). 2.  Patient will transfer from bed to chair and chair to bed with supervision/set-up using the least restrictive device within 7 day(s). 3.  Patient will perform sit to stand with supervision/set-up within 7 day(s). 4.  Patient will ambulate with supervision/set-up for 150 feet with the least restrictive device within 7 day(s). 5.  Patient will ascend/descend 2 stairs with handrail(s) with minimal assistance/contact guard assist within 7 day(s). Outcome: Not Met   PHYSICAL THERAPY EVALUATION  Patient: Ester Galdamez (75 y.o. male)  Date: 11/28/2020  Primary Diagnosis: Lower extremity weakness [R29.898]        Precautions: fall      ASSESSMENT  Based on the objective data described below, the patient presents with limitations in safety and mobility with hx intellectual disability and seizure disorder. He is CGA to SBA for bed mobility with moderate cues for technique. He is min A for transfers with use of RW again with moderate cues for technique and safety. He amb with RW with min A of 1 but second person to aide in guiding and cueing as pt becomes more unsafe if he is not understanding task.  Pt perseverating on \"it is leaking\" referring to his catheter and insisting on going into the bathroom to urinate not realizing even after cues that this is the purpose of the catheter. Pt is CGA during standing at the sink to wash hands. VSS. No apparent distress. Pt is highly distractible and sometimes difficult to redirect. Pt placed in chair with OT continuing ADLs activity. Alarm in place. Current Level of Function Impacting Discharge (mobility/balance): SBA to min A of 1 + second person intermittently for cues and safety    Functional Outcome Measure: The patient scored 30/100 on the barthel outcome measure which is indicative of 70% impairment. Other factors to consider for discharge: per chart, mother is having difficulty continuing to care for pt     Patient will benefit from skilled therapy intervention to address the above noted impairments. PLAN :  Recommendations and Planned Interventions: bed mobility training, transfer training, gait training, therapeutic exercises, patient and family training/education, and therapeutic activities      Frequency/Duration: Patient will be followed by physical therapy:  4 times a week to address goals. Recommendation for discharge: (in order for the patient to meet his/her long term goals)  To be determined: If going home, pt would need another caregiver to assist the mother; pt at this time is min A to SBA physically but due to cognition, he needs moderate cues and A for safety. He may benefit from short term SNF rehab but ultimately may need more assist in the home or alternate group home type situation. This discharge recommendation:  Has not yet been discussed the attending provider and/or case management    IF patient discharges home will need the following DME: rolling walker         SUBJECTIVE:   Patient stated It is leaking.     OBJECTIVE DATA SUMMARY:   HISTORY:    Past Medical History:   Diagnosis Date    Contact dermatitis and other eczema, due to unspecified cause     psoriasis    Diabetes ([facility] [address]     Eosinophilia  Hypercholesterolemia     Hypertension     Mental retardation     Seizures ([facility] [address]     Skin cancer     Strongyloides stercoralis infection 8/28/2014     Past Surgical History:   Procedure Laterality Date    ENDOSCOPY, COLON, DIAGNOSTIC  11/08/2007    Dr Jose Maria García normal except small internal hemorrhoids repeat 11/2017       Personal factors and/or comorbidities impacting plan of care: intellectual disability from birth, was cared for by mother    Home Situation  Home Environment: Private residence  # Steps to Enter: 2  Rails to Enter: Yes  Hand Rails : (unilateral)  One/Two Story Residence: One story  Living Alone: No  Support Systems: Family member(s)(lives with mother; mother unable to care for anymore)  Current DME Used/Available at Home: Walker, rolling  Tub or Shower Type: Tub/Shower combination    EXAMINATION/PRESENTATION/DECISION MAKING:   Critical Behavior:  Neurologic State: Alert  Orientation Level: Oriented to person, Oriented to place, Oriented to situation  Cognition: Decreased attention/concentration, Decreased command following, Impaired decision making, Other (comment)(unable to follow >2 step commands)     Hearing: Auditory  Auditory Impairment: None    Range Of Motion:  AROM: Generally decreased, functional(significant decr B shouler elevation)                       Strength:    Strength: Generally decreased, functional                    Tone & Sensation:                                  Coordination:  Coordination: Generally decreased, functional       Functional Mobility:  Bed Mobility:  Rolling: Stand-by assistance  Supine to Sit: Contact guard assistance     Scooting: Contact guard assistance  Transfers:  Sit to Stand: Minimum assistance  Stand to Sit: Minimum assistance        Bed to Chair: Minimum assistance              Balance:   Sitting: Intact  Standing: Impaired  Standing - Static: Fair; Other (comment)(RW)  Standing - Dynamic : Fair; Other (comment)(RW)  Ambulation/Gait Training:  Distance (ft): 25 Feet (ft)  Assistive Device: Gait belt;Walker, rolling  Ambulation - Level of Assistance: Minimal assistance;Assist x1;Other (comment)(+ standby of another due to cognitive status)        Gait Abnormalities: Decreased step clearance        Base of Support: Widened     Speed/Doris: Slow;Shuffled;Pace decreased (<100 feet/min)  Step Length: Right shortened;Left shortened          Functional Measure:  Barthel Index:    Bathin  Bladder: 0  Bowels: 5  Groomin  Dressin  Feedin  Mobility: 0  Stairs: 0  Toilet Use: 5  Transfer (Bed to Chair and Back): 10  Total: 30/100       The Barthel ADL Index: Guidelines  1. The index should be used as a record of what a patient does, not as a record of what a patient could do. 2. The main aim is to establish degree of independence from any help, physical or verbal, however minor and for whatever reason. 3. The need for supervision renders the patient not independent. 4. A patient's performance should be established using the best available evidence. Asking the patient, friends/relatives and nurses are the usual sources, but direct observation and common sense are also important. However direct testing is not needed. 5. Usually the patient's performance over the preceding 24-48 hours is important, but occasionally longer periods will be relevant. 6. Middle categories imply that the patient supplies over 50 per cent of the effort. 7. Use of aids to be independent is allowed. Diana Meier., Barthel, DAbisaiW. (1633). Functional evaluation: the Barthel Index. [address] [employer] . Divina Harrell, HARRIETT.JHENRY, Christiane Altamirano., Herbert Barney., Laurie, [address] [place] [address] (). Measuring the change indisability after inpatient rehabilitation; comparison of the responsiveness of the Barthel Index and Functional [place] Measure. Journal of Neurology, Neurosurgery, and Psychiatry, 66(4), 702-602.   JESSEE Rey, MARILEE Shah, & Izabel, M.A. (2004.) Assessment of post-stroke quality of life in cost-effectiveness studies: The usefulness of the Barthel Index and the EuroQoL-5D. Quality of Life Research, 15, 323-90           Physical Therapy Evaluation Charge Determination   History Examination Presentation Decision-Making   HIGH Complexity :3+ comorbidities / personal factors will impact the outcome/ POC  HIGH Complexity : 4+ Standardized tests and measures addressing body structure, function, activity limitation and / or participation in recreation  MEDIUM Complexity : Evolving with changing characteristics  MEDIUM Complexity : FOTO score of 26-74      Based on the above components, the patient evaluation is determined to be of the following complexity level: MEDIUM    Pain Rating:  C/o back pain in low back but is unable to rate or give \"little vs a lot\" indication; ~3/10 FACES    Activity Tolerance:   Fair    After treatment patient left in no apparent distress:   Sitting in chair, Call bell within reach, Bed / chair alarm activated, and Caregiver / family present    COMMUNICATION/EDUCATION:   The patients plan of care was discussed with: Occupational therapist and Registered nurse. Fall prevention education was provided and the patient/caregiver indicated understanding. and Patient is unable to participate in goal setting and plan of care.     Thank you for this referral.  Ab Hopkins, PT   Time Calculation: 34 mins

## 2020-11-28 NOTE — H&P
Hospitalist Admission Note    NAME: Guy Blue   :  1955   MRN:  [id number]     Date/Time:  2020 3:35 AM    Patient PCP: Liliana Frazier MD  ______________________________________________________________________  Given the patient's current clinical presentation, I have a high level of concern for decompensation if discharged from the emergency department. Complex decision making was performed, which includes reviewing the patient's available past medical records, laboratory results, and x-ray films. My assessment of this patient's clinical condition and my plan of care is as follows. Assessment / Plan:  Lower Back Pain with Worsening ambulatory dysfunction requiring multiple ED visits and OP physicians followups  MRI LS Spine in ED with Mild degenerative changes at multiple levels in the lumbar spine without significant canal stenosis. Small disc protrusion at L5-S1 likely acute  without canal stenosis or definite nerve root impingement. 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 Splenomegaly and portal vein dilation suggest portal hypertension. Pulmonary artery hypertension    Code Status: Full  Surrogate Decision Maker:  Mother Taylor    DVT Prophylaxis: Lovenox    Baseline: uses walker but steadily declining and having falls      Subjective:   CHIEF COMPLAINT: back pain    HISTORY OF PRESENT ILLNESS:     Samuel Villa is a 62 y.o.  male who presents with back pain. As per ED report and chart review pt is been having back pain for last few months with worsening lower extremities weakness and falls and has had multiple OP physician and ED visits without any relief and continue to decline to the point that now having falls and mother who has advance age is unable to take care any more. I am not able to reach mother at this time so history is limited. Pt reported back pain but exhibit limited insight so history is limited. We were asked to admit for work up and evaluation of the above problems. Past Medical History:   Diagnosis Date    Contact dermatitis and other eczema, due to unspecified cause     psoriasis    [facility] [address]     Eosinophilia     Hypercholesterolemia     Hypertension     Mental retardation     Seizures [facility] [address]     Skin cancer     Strongyloides stercoralis infection 8/28/2014        Past Surgical History:   Procedure Laterality Date    ENDOSCOPY, COLON, DIAGNOSTIC  11/08/2007    Dr Mary Tan normal except small internal hemorrhoids repeat 11/2017       Social History     Tobacco Use    Smoking status: Never Smoker    Smokeless tobacco: Never Used   Substance Use Topics    Alcohol use: No        Family History   Problem Relation Age of Onset    Other Mother         PMR    Hypertension Mother     Cancer Father         Lung    Diabetes Brother      No Known Allergies     Prior to Admission medications    Medication Sig Start Date End Date Taking? Authorizing Provider   acetaminophen (TYLENOL) 500 mg tablet Take 2 Tabs by mouth every six (6) hours as needed for Pain. 11/22/20   Haylee Mckeon PA-C   pregabalin (LYRICA) 75 mg capsule Take 1 Cap by mouth two (2) times a day.  Max Daily Amount: 150 mg. 10/27/20   Tate Briceno MD   metFORMIN ER (GLUCOPHAGE XR) 500 mg tablet TAKE TWO TABLETS BY MOUTH TWICE DAILY  10/12/20   Tolu Love MD   topiramate (TOPAMAX) 200 mg tablet TAKE 1 & 1/2 TABLETS BY MOUTH TWICE A DAY 10/12/20   Tolu Love MD   linaGLIPtin (TRADJENTA) 5 mg tablet Take 1 Tab by mouth daily. 10/2/20   Tolu Love MD   pioglitazone (ACTOS) 45 mg tablet Take 1 Tab by mouth daily. 9/28/20   Tolu Love MD   ramipriL (ALTACE) 5 mg capsule Take 1 Cap by mouth daily. 9/28/20   Tolu Love MD   divalproex DR (DEPAKOTE) 500 mg tablet TAKE ONE TABLET BY MOUTH EVERY MORNING AND TWO TABS AT NIGHT 9/28/20   Tolu Love MD   atorvastatin (LIPITOR) 40 mg tablet Take 1 Tab by mouth nightly. 9/28/20   Tolu Love MD   glimepiride (AMARYL) 4 mg tablet TAKE TWO TABLETS BY MOUTH IN THE MORNING 9/28/20   Tolu Love MD   folic acid (FOLVITE) 1 mg tablet Take 1 Tab by mouth daily. 9/28/20   Tolu Love MD   benzonatate (TESSALON) 100 mg capsule Take 1 Cap by mouth nightly as needed for Cough. 8/19/20   Tolu Love MD   triamcinolone acetonide (KENALOG) 0.1 % ointment  1/30/19   Provider, Historical   chlorhexidine (PERIDEX) 0.12 % solution Take 15 mL by mouth every twelve (12) hours. 2/8/18   Provider, Historical   Calcium-Cholecalciferol, D3, (CALTRATE-600 PLUS VITAMIN D3) 600-400 mg-unit Tab Take  by mouth. Provider, Historical       REVIEW OF SYSTEMS:     I am not able to complete the review of systems because:    The patient is intubated and sedated    The patient has altered mental status due to his acute medical problems    The patient has baseline aphasia from prior stroke(s)   y The patient has limited insight    The patient is in acute medical distress and unable to provide information             Objective:   VITALS:    Visit Vitals  /65   Pulse 74   Temp 97.9 °F (36.6 °C)   Resp 18   Ht 6' (1.829 m)   Wt 97.2 kg (214 lb 4.6 oz)   SpO2 96%   BMI 29.06 kg/m²       PHYSICAL EXAM:    General:    Alert, cooperative, no distress, appears stated age. HEENT: Atraumatic, anicteric sclerae, pink conjunctivae     No oral ulcers, mucosa moist, throat clear, dentition fair  Neck:  Supple, symmetrical,  thyroid: non tender  Lungs:   Clear to auscultation bilaterally. No Wheezing or Rhonchi. No rales. Chest wall:  No tenderness  No Accessory muscle use. Heart:   Regular  rhythm,  No  murmur   No edema  Abdomen:   Soft, non-tender. Not distended. Bowel sounds normal  Extremities: No cyanosis. No clubbing,      Skin turgor normal, Capillary refill normal, Radial dial pulse 2+  Skin:     Not pale. Not Jaundiced  No rashes   Psych:  Not depressed. Not anxious or agitated. Neurologic: EOMs intact. No facial asymmetry. No aphasia or slurred speech. Lower extrimities weakness. Alert     _______________________________________________________________________  Care Plan discussed with:    Comments   Patient y    Family      RN y    Care Manager                    Consultant:  veronica ED physician   _______________________________________________________________________  Expected  Disposition:   Home with Family    HH/PT/OT/RN    SNF/LTC y   [de-identified]    ________________________________________________________________________  TOTAL TIME: 61 Minutes    Critical Care Provided     Minutes non procedure based      Comments    y Reviewed previous records   >50% of visit spent in counseling and coordination of care y Discussion with patient and questions answered       ________________________________________________________________________  Signed: Johana Jimenez MD    Procedures: see electronic medical records for all procedures/Xrays and details which were not copied into this note but were reviewed prior to creation of Plan.     LAB DATA REVIEWED:    Recent Results (from the past 24 hour(s))   CBC WITH AUTOMATED DIFF    Collection Time: 11/27/20  9:56 PM   Result Value Ref Range    WBC 8.9 4.1 - 11.1 K/uL    RBC 3.05 (L) 4.10 - 5.70 M/uL    HGB 10.5 (L) 12.1 - 17.0 g/dL    HCT 33.3 (L) 36.6 - 50.3 %    .2 (H) 80.0 - 99.0 FL    MCH 34.4 (H) 26.0 - 34.0 PG    MCHC 31.5 30.0 - 36.5 g/dL    RDW 13.9 11.5 - 14.5 %    PLATELET 61 (L) 329 - 400 K/uL    MPV 10.7 8.9 - 12.9 FL    NRBC 0.0 0  WBC    ABSOLUTE NRBC 0.00 0.00 - 0.01 K/uL    NEUTROPHILS 40 32 - 75 %    LYMPHOCYTES 46 12 - 49 %    MONOCYTES 11 5 - 13 %    EOSINOPHILS 3 0 - 7 %    BASOPHILS 0 0 - 1 %    IMMATURE GRANULOCYTES 0 0.0 - 0.5 %    ABS. NEUTROPHILS 3.6 1.8 - 8.0 K/UL    ABS. LYMPHOCYTES 4.0 (H) 0.8 - 3.5 K/UL    ABS. MONOCYTES 1.0 0.0 - 1.0 K/UL    ABS. EOSINOPHILS 0.3 0.0 - 0.4 K/UL    ABS. BASOPHILS 0.0 0.0 - 0.1 K/UL    ABS. IMM. GRANS. 0.0 0.00 - 0.04 K/UL    DF MANUAL      RBC COMMENTS MACROCYTOSIS  1+        RBC COMMENTS ANISOCYTOSIS  PRESENT        WBC COMMENTS ATYPICAL LYMPHOCYTES PRESENT     METABOLIC PANEL, COMPREHENSIVE    Collection Time: 11/27/20  9:56 PM   Result Value Ref Range    Sodium 141 136 - 145 mmol/L    Potassium 4.5 3.5 - 5.1 mmol/L    Chloride 113 (H) 97 - 108 mmol/L    CO2 25 21 - 32 mmol/L    Anion gap 3 (L) 5 - 15 mmol/L    Glucose 135 (H) 65 - 100 mg/dL    BUN 22 (H) 6 - 20 MG/DL    Creatinine 0.97 0.70 - 1.30 MG/DL    BUN/Creatinine ratio 23 (H) 12 - 20      GFR est AA >60 >60 ml/min/1.73m2    GFR est non-AA >60 >60 ml/min/1.73m2    Calcium 9.0 8.5 - 10.1 MG/DL    Bilirubin, total 0.3 0.2 - 1.0 MG/DL    ALT (SGPT) 17 12 - 78 U/L    AST (SGOT) 21 15 - 37 U/L    Alk.  phosphatase 56 45 - 117 U/L    Protein, total 6.8 6.4 - 8.2 g/dL    Albumin 2.9 (L) 3.5 - 5.0 g/dL    Globulin 3.9 2.0 - 4.0 g/dL    A-G Ratio 0.7 (L) 1.1 - 2.2     URINALYSIS W/ RFLX MICROSCOPIC    Collection Time: 11/27/20 11:40 PM   Result Value Ref Range    Color YELLOW/STRAW      Appearance CLEAR CLEAR      Specific gravity 1.021 1.003 - 1.030      pH (UA) 7.0 5.0 - 8.0      Protein Negative NEG mg/dL    Glucose 100 (A) NEG mg/dL    Ketone TRACE (A) NEG mg/dL    Blood Negative NEG Urobilinogen 1.0 0.2 - 1.0 EU/dL    Nitrites Negative NEG      Leukocyte Esterase Negative NEG     SAMPLES BEING HELD    Collection Time: 11/27/20 11:40 PM   Result Value Ref Range    SAMPLES BEING HELD        Add-on orders for these samples will be processed based on acceptable specimen integrity and analyte stability, which may vary by analyte. COMMENT        Add-on orders for these samples will be processed based on acceptable specimen integrity and analyte stability, which may vary by analyte.    BILIRUBIN, CONFIRM    Collection Time: 11/27/20 11:40 PM   Result Value Ref Range    Bilirubin UA, confirm Negative NEG

## 2020-11-28 NOTE — PROGRESS NOTES
9: 25am- CM met with pt at bedside. Pt was confused. 10:41am- CM called pt's mother Gretta Sue via phone to discuss d/c plan. Pt's mother stated that she would like pt to go to [facility] for rehab. Pt is currently obs status and would need to be inpatient to go to SNF. Pt's mother stated that pt has Medicare: [id number]. Pt's mother provided verbal consent for [address] [address] document. [address] [facility] signed and placed in pt's bedside chart. CM will continue to follow patient for discharge planning needs and arrange for services as deemed necessary.     Sherri Guan [address] [place] [address]  [phone]

## 2020-11-28 NOTE — ED PROVIDER NOTES
EMERGENCY DEPARTMENT HISTORY AND PHYSICAL EXAM      Date: 11/27/2020  Patient Name: Mauricio Gentile    History of Presenting Illness     Chief Complaint   Patient presents with    Back Pain     arrive by rescue lower back pain; his mom who is his caretaker could not stand him up. he was here sunday with lower back pain as well. History Provided By: Patient and Patient's Mother    HPI: Mauricio Gentile, 62 y.o. male presents to the ED with cc of back pain. Patient has had falls in past, but nothing recently per his mother. Has a history of seizures and sees neurology and has a history of neuropathy. Reports weakness in legs, worsening over past couple of weeks. Has had issues with weakness over past two years. Mother reports wouldn't get up this AM and she could not get him up. Patient was complaining of pain along his waist line. Patient's mother is 80years old, has an aid but no help at home with lifting or ADLs. Weakness has been increasing over past months, pain over past month. Has seen multiple specialists as an outpatient. Patient had a fever Sunday. Has some diarrhea. No urinary issues. There are no other complaints, changes, or physical findings at this time. PCP: Sonia Marrufo MD    No current facility-administered medications on file prior to encounter. Current Outpatient Medications on File Prior to Encounter   Medication Sig Dispense Refill    acetaminophen (TYLENOL) 500 mg tablet Take 2 Tabs by mouth every six (6) hours as needed for Pain. 20 Tab 0    pregabalin (LYRICA) 75 mg capsule Take 1 Cap by mouth two (2) times a day. Max Daily Amount: 150 mg. 60 Cap 2    metFORMIN ER (GLUCOPHAGE XR) 500 mg tablet TAKE TWO TABLETS BY MOUTH TWICE DAILY  360 Tab 3    topiramate (TOPAMAX) 200 mg tablet TAKE 1 & 1/2 TABLETS BY MOUTH TWICE A  Tab 0    linaGLIPtin (TRADJENTA) 5 mg tablet Take 1 Tab by mouth daily.  90 Tab 3    pioglitazone (ACTOS) 45 mg tablet Take 1 Tab by mouth daily. 90 Tab 3    ramipriL (ALTACE) 5 mg capsule Take 1 Cap by mouth daily. 90 Cap 3    divalproex DR (DEPAKOTE) 500 mg tablet TAKE ONE TABLET BY MOUTH EVERY MORNING AND TWO TABS AT NIGHT 270 Tab 3    atorvastatin (LIPITOR) 40 mg tablet Take 1 Tab by mouth nightly. 90 Tab 3    glimepiride (AMARYL) 4 mg tablet TAKE TWO TABLETS BY MOUTH IN THE MORNING 890 Tab 3    folic acid (FOLVITE) 1 mg tablet Take 1 Tab by mouth daily. 90 Tab 3    benzonatate (TESSALON) 100 mg capsule Take 1 Cap by mouth nightly as needed for Cough. 30 Cap 0    triamcinolone acetonide (KENALOG) 0.1 % ointment       chlorhexidine (PERIDEX) 0.12 % solution Take 15 mL by mouth every twelve (12) hours.  Calcium-Cholecalciferol, D3, (CALTRATE-600 PLUS VITAMIN D3) 600-400 mg-unit Tab Take  by mouth. Past History     Past Medical History:  Past Medical History:   Diagnosis Date    Contact dermatitis and other eczema, due to unspecified cause     psoriasis    Diabetes ([address]     Eosinophilia     Hypercholesterolemia     Hypertension     Mental retardation     Se[facility]ures [facility] [address]     Skin cancer     Strongyloides stercoralis infection 8/28/2014       Past Surgical History:  Past Surgical History:   Procedure Laterality Date    ENDOSCOPY, COLON, DIAGNOSTIC  11/08/2007    Dr Brenda Zuñiga normal except small internal hemorrhoids repeat 11/2017       Family History:  Family History   Problem Relation Age of Onset    Other Mother         PMR    Hypertension Mother     Cancer Father         Lung    Diabetes Brother        Social History:  Social History     Tobacco Use    Smoking status: Never Smoker    Smokeless tobacco: Never Used   Substance Use Topics    Alcohol use: No    Drug use: No       Allergies:  No Known Allergies      Review of Systems   Review of Systems   Constitutional: Negative for fever. HENT: Negative for voice change. Eyes: Negative for pain and redness.    Respiratory: Negative for cough and chest tightness. Cardiovascular: Negative for chest pain and leg swelling. Gastrointestinal: Negative for abdominal pain, diarrhea, nausea and vomiting. Genitourinary: Negative for hematuria. Musculoskeletal: Positive for back pain and gait problem. Skin: Negative for color change, pallor and rash. Neurological: Positive for weakness. Negative for facial asymmetry and headaches. Hematological: Does not bruise/bleed easily. Psychiatric/Behavioral: Negative for behavioral problems. All other systems reviewed and are negative. Physical Exam   Physical Exam  Vitals signs and nursing note reviewed. Constitutional:       Comments: 62 YOM, resting in bed, no distress   HENT:      Head: Normocephalic. Right Ear: External ear normal.      Left Ear: External ear normal.      Nose: Nose normal.   Eyes:      Conjunctiva/sclera: Conjunctivae normal.   Cardiovascular:      Rate and Rhythm: Normal rate and regular rhythm. Heart sounds: No murmur. No friction rub. No gallop. Pulmonary:      Effort: Pulmonary effort is normal.      Breath sounds: Normal breath sounds. No wheezing, rhonchi or rales. Abdominal:      Palpations: Abdomen is soft. Tenderness: There is no abdominal tenderness. Musculoskeletal: Normal range of motion. Comments: + Lumbar back pain, full ROM   Skin:     General: Skin is warm. Capillary Refill: Capillary refill takes less than 2 seconds. Neurological:      Mental Status: He is alert. Mental status is at baseline. Motor: Weakness present. Comments: Weakness with flexion in RLE4+/5. Weakness with flexion in 3/5 in proximal LLE. No sensation difference.    Psychiatric:         Mood and Affect: Mood normal.         Behavior: Behavior normal.         Diagnostic Study Results     Labs -     Recent Results (from the past 12 hour(s))   CBC WITH AUTOMATED DIFF    Collection Time: 11/27/20  9:56 PM   Result Value Ref Range    WBC 8.9 4.1 - 11.1 K/uL    RBC 3.05 (L) 4.10 - 5.70 M/uL    HGB 10.5 (L) 12.1 - 17.0 g/dL    HCT 33.3 (L) 36.6 - 50.3 %    .2 (H) 80.0 - 99.0 FL    MCH 34.4 (H) 26.0 - 34.0 PG    MCHC 31.5 30.0 - 36.5 g/dL    RDW 13.9 11.5 - 14.5 %    PLATELET 61 (L) 457 - 400 K/uL    MPV 10.7 8.9 - 12.9 FL    NRBC 0.0 0  WBC    ABSOLUTE NRBC 0.00 0.00 - 0.01 K/uL    NEUTROPHILS 40 32 - 75 %    LYMPHOCYTES 46 12 - 49 %    MONOCYTES 11 5 - 13 %    EOSINOPHILS 3 0 - 7 %    BASOPHILS 0 0 - 1 %    IMMATURE GRANULOCYTES 0 0.0 - 0.5 %    ABS. NEUTROPHILS 3.6 1.8 - 8.0 K/UL    ABS. LYMPHOCYTES 4.0 (H) 0.8 - 3.5 K/UL    ABS. MONOCYTES 1.0 0.0 - 1.0 K/UL    ABS. EOSINOPHILS 0.3 0.0 - 0.4 K/UL    ABS. BASOPHILS 0.0 0.0 - 0.1 K/UL    ABS. IMM. GRANS. 0.0 0.00 - 0.04 K/UL    DF MANUAL      RBC COMMENTS MACROCYTOSIS  1+        RBC COMMENTS ANISOCYTOSIS  PRESENT        WBC COMMENTS ATYPICAL LYMPHOCYTES PRESENT     METABOLIC PANEL, COMPREHENSIVE    Collection Time: 11/27/20  9:56 PM   Result Value Ref Range    Sodium 141 136 - 145 mmol/L    Potassium 4.5 3.5 - 5.1 mmol/L    Chloride 113 (H) 97 - 108 mmol/L    CO2 25 21 - 32 mmol/L    Anion gap 3 (L) 5 - 15 mmol/L    Glucose 135 (H) 65 - 100 mg/dL    BUN 22 (H) 6 - 20 MG/DL    Creatinine 0.97 0.70 - 1.30 MG/DL    BUN/Creatinine ratio 23 (H) 12 - 20      GFR est AA >60 >60 ml/min/1.73m2    GFR est non-AA >60 >60 ml/min/1.73m2    Calcium 9.0 8.5 - 10.1 MG/DL    Bilirubin, total 0.3 0.2 - 1.0 MG/DL    ALT (SGPT) 17 12 - 78 U/L    AST (SGOT) 21 15 - 37 U/L    Alk.  phosphatase 56 45 - 117 U/L    Protein, total 6.8 6.4 - 8.2 g/dL    Albumin 2.9 (L) 3.5 - 5.0 g/dL    Globulin 3.9 2.0 - 4.0 g/dL    A-G Ratio 0.7 (L) 1.1 - 2.2     URINALYSIS W/ RFLX MICROSCOPIC    Collection Time: 11/27/20 11:40 PM   Result Value Ref Range    Color YELLOW/STRAW      Appearance CLEAR CLEAR      Specific gravity 1.021 1.003 - 1.030      pH (UA) 7.0 5.0 - 8.0      Protein Negative NEG mg/dL    Glucose 100 (A) NEG mg/dL    Ketone TRACE (A) NEG mg/dL    Blood Negative NEG      Urobilinogen 1.0 0.2 - 1.0 EU/dL    Nitrites Negative NEG      Leukocyte Esterase Negative NEG     SAMPLES BEING HELD    Collection Time: 11/27/20 11:40 PM   Result Value Ref Range    SAMPLES BEING HELD        Add-on orders for these samples will be processed based on acceptable specimen integrity and analyte stability, which may vary by analyte. COMMENT        Add-on orders for these samples will be processed based on acceptable specimen integrity and analyte stability, which may vary by analyte. BILIRUBIN, CONFIRM    Collection Time: 11/27/20 11:40 PM   Result Value Ref Range    Bilirubin UA, confirm Negative NEG         Radiologic Studies -   MRI LUMB SPINE W WO CONT   Final Result   IMPRESSION: Mild degenerative changes at multiple levels in the lumbar spine   without significant canal stenosis. Small disc protrusion at L5-S1 likely acute   without canal stenosis or definite nerve root impingement. IMPRESSION:           CT Results  (Last 48 hours)    None        CXR Results  (Last 48 hours)    None          Medical Decision Making   I am the first provider for this patient. I reviewed the vital signs, available nursing notes, past medical history, past surgical history, family history and social history. Vital Signs-Reviewed the patient's vital signs. Patient Vitals for the past 12 hrs:   Temp Pulse Resp BP SpO2   11/27/20 2330 97.9 °F (36.6 °C) 74 18 132/63 96 %   11/27/20 2041 97.9 °F (36.6 °C) 76 16 130/67 96 %   11/27/20 1751 99 °F (37.2 °C) 88 16 108/66 94 %       Records Reviewed: Nursing Notes and Old Medical Records    Provider Notes (Medical Decision Making):     62 YOM presents to the ED with lower back pain associated with leg weakness. Patient does have lumbar back pain on exam. VS stable. Did have a fever during previous visit x1 now resolved. Patient does have asymmetric leg weakness.     Patient does have bilateral lower extremity weakness, this does sound more chronic and I suspect a combination of patient's neuropathy as well as deconditioning, but patient's mother reports it has been more acute, and worsening more recently. It is associated with lower back pain. During previous visit patient had CT H and CT C/A/P which showed duodenal inflammation but otherwise without abnormalities. No clear trauma. We will check basic labs and MRI lumbar spine given remote history of fever, and leg weakness. Doubt central process based on history and exam. Previous head CT negative. ED Course:   Initial assessment performed. The patients presenting problems have been discussed, and they are in agreement with the care plan formulated and outlined with them. I have encouraged them to ask questions as they arise throughout their visit. ED Course as of Nov 28 0219 Fri Nov 27, 2020   2305 MRI shows no evidence of epidural abscesses, does show an acute L5-S1 disk herniation without nerve or cord impingement. [MB]   9895 Patient reassessed, continues to have pain. Will give parenteral pain medications. Patient has acute urinary retention with 750 on bladder scan, will place richards. [MB]      ED Course User Index  [MB] Radha Choi MD     Discussed with patient's mother as above, given no safe discharge plan an inability for patient to ambulate will discuss with hospitalist for hospitalization as I think patient will require additional services or acute rehab placement. Leandro Vega MD      Disposition:    Admitted      Diagnosis     Clinical Impression:   1. Ambulatory dysfunction    2. Lumbar herniated disc    3. Urinary retention        Attestations:    Leandro Vega MD    Please note that this dictation was completed with [employer], the computer voice recognition software. Quite often unanticipated grammatical, syntax, homophones, and other interpretive errors are inadvertently transcribed by the computer software.   Please disregard these errors. Please excuse any errors that have escaped final proofreading. Thank you.

## 2020-11-28 NOTE — ED NOTES
TRANSFER - OUT REPORT:    Verbal report [facility] to [facility](name[facility]  [facility] to [facility](unit) for routine progression of care       Report consisted of patients Situation, Background, Assessment and   Recommendations(SBAR). Information from the following report(s) SBAR, Kardex, ED Summary, Intake/Output, MAR and Recent Results was reviewed with the receiving nurse. Lines:   Peripheral IV 11/27/20 Left Antecubital (Active)   Site Assessment Clean, dry, & intact 11/27/20 2204   Phlebitis Assessment 0 11/27/20 2204   Infiltration Assessment 0 11/27/20 2204   Dressing Status Clean, dry, & intact 11/27/20 2204   Dressing Type Transparent;Tape 11/27/20 2204   Hub Color/Line Status Pink 11/27/20 2204        Opportunity for questions and clarification was provided.       Patient transported with:   [facility]

## 2020-11-28 NOTE — ROUTINE PROCESS
Completed MRI screening form via phone call with pt mother, Hanane Arrieta. Ms. Francesco Marrufo stated pt has had previous MRI. Form witnessed and submitted.

## 2020-11-28 NOTE — PROGRESS NOTES
Problem: Self Care Deficits Care Plan (Adult)  Goal: *Acute Goals and Plan of Care (Insert Text)  Description:   FUNCTIONAL STATUS PRIOR TO ADMISSION: Patient was modified independent using a walker for functional mobility. 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 HOME SUPPORT: The patient lived with mother who is his primary caregiver. Occupational Therapy Goals  Initiated 11/28/2020  1. Patient will perform grooming with modified independence within 7 day(s). 2.  Patient will perform bathing with setup within 7 day(s). 3.  Patient will perform upper body dressing and lower body dressing with supervision/set-up within 7 day(s). 4.  Patient will perform toilet transfers with supervision/set-up within 7 day(s). 5.  Patient will perform all aspects of toileting with supervision/set-up within 7 day(s). 6.  Patient will participate in upper extremity therapeutic exercise/activities with supervision/set-up for 10 minutes within 7 day(s). 7.  Patient will utilize energy conservation techniques during functional activities with verbal cues within 7 day(s). Outcome: Progressing Towards Goal   OCCUPATIONAL THERAPY EVALUATION  Patient: Ruma Julien (89 y.o. male)  Date: 11/28/2020  Primary Diagnosis: Lower extremity weakness [R29.898]        Precautions: fall       ASSESSMENT  Based on the objective data described below, the patient presents with fair balance, impaired orientation to time and date, impaired safety awareness, and impaired ability to complete self care independently.  Patient with history of intellectual disability, mild impaired command following at times with difficulty with 2 step commands, confusion with richards cath in bathroom attempting to still stand at toilet, mild perseveration with activity noted with high distractibility and decreased attention. Overall min A for functional mobility and ADLs, unsure if mother can provide this level of assist, currently recommend home with aide assist at [facility] if possible vs. Short rehab stay then home. Current Level of Function Impacting Discharge (ADLs/self-care): min A, high fall risk    Functional Outcome Measure: The patient scored Total: 30/100 on the Barthel Index outcome measure which is indicative of 70% impaired ability to care for basic self needs/dependency on others; inferred 100% dependency on others for instrumental ADLs. Other factors to consider for discharge: mother primary caregiver, is older, unsure what level of assist she gives     Patient will benefit from skilled therapy intervention to address the above noted impairments. PLAN :  Recommendations and Planned Interventions: self care training, functional mobility training, therapeutic exercise, balance training, therapeutic activities, endurance activities, patient education, home safety training, and family training/education    Frequency/Duration: Patient will be followed by occupational therapy 3 times a week to address goals. Recommendation for discharge: (in order for the patient to meet his/her long term goals)  Occupational therapy at least 2 days/week in the home AND ensure assist and/or supervision for safety with ADLs vs. Short rehab stay    This discharge recommendation:  A follow-up discussion with the attending provider and/or case management is planned    IF patient discharges home will need the following DME: bedside commode and shower chair       SUBJECTIVE:   Patient stated My mom helps me with my teeth.     OBJECTIVE DATA SUMMARY:   HISTORY:   Past Medical History:   Diagnosis Date    Contact dermatitis and other eczema, due to unspecified cause     psoriasis    Diabetes [facility] [address]     Eosinophilia     Hypercholesterolemia     Hypertension     Mental retardation     Seizures [facility] [address]     Skin cancer     Strongyloides stercoralis infection 8/28/2014     Past Surgical History:   Procedure Laterality Date    ENDOSCOPY, COLON, DIAGNOSTIC  11/08/2007    Dr Mj Morgan normal except small internal hemorrhoids repeat 11/2017       Expanded or extensive additional review of patient history:     Home Situation  Home Environment: Private residence  # Steps to Enter: 2  Rails to Enter: Yes  Hand Rails : (unilateral)  One/Two Story Residence: One story  Living Alone: No  Support Systems: Family member(s)(lives with mother; mother unable to care for anymore)  Current DME Used/Available at Home: Walker, rolling  Tub or Shower Type: Tub/Shower combination    Hand dominance: Right    EXAMINATION OF PERFORMANCE DEFICITS:  Cognitive/Behavioral Status:  Neurologic State: Alert  Orientation Level: Oriented to person;Oriented to place;Oriented to situation  Cognition: Decreased attention/concentration;Decreased command following; Impaired decision making; Other (comment)(unable to follow >2 step commands)             Skin: intact    Edema: none noted    Hearing: Auditory  Auditory Impairment: None    Vision/Perceptual:                 Intact , no blurry vision noted                    Range of Motion:  B UE 90* flexion only with elbows flexed, unable to tolerate PROM, resistant  AROM: Generally decreased, functional(significant decr B shouler elevation)                         Strength:  B UE 4/5  Strength: Generally decreased, functional                Coordination:  Coordination: Generally decreased, functional  Fine Motor Skills-Upper: Left Intact; Right Intact         Tone & Sensation:  normal                            Balance:  Sitting: Intact  Standing: Impaired  Standing - Static: Fair; Other (comment)(RW)  Standing - Dynamic : Fair; Other (comment)(RW)    Functional Mobility and Transfers for ADLs:  Bed Mobility:  Rolling: Stand-by assistance  Supine to Sit: Contact guard assistance  Scooting: Contact guard assistance    Transfers:  Sit to Stand: Minimum assistance  Stand to Sit: Minimum assistance  Bed to Chair: Minimum assistance  Toilet Transfer : Minimum assistance; Additional time    ADL Assessment:  Feeding: Setup    Oral Facial Hygiene/Grooming: Setup; Additional time(cues for initiation,setup for toothpaste on brush)    Bathing: Minimum assistance(impaired balance)    Upper Body Dressing: Setup    Lower Body Dressing: Minimum assistance(impaired balance)    Toileting: Minimum assistance(cues for safety)         Completed OT evaluation and ADLs seated EOB and standing as able with RW for balance. Educated on safety and endurance training with encouragement for full participation in ADLs while in hospital. Hiral Frias understanding noted. Unsure level of carry over or ability to maintain new learning       ADL Intervention and task modifications:  Feeding  Drink to Mouth: Independent    Grooming  Position Performed: Standing;Seated in chair  Washing Face: Set-up  Washing Hands: Stand-by assistance(mod cues for safety)  Brushing Teeth: Set-up(setup of toothpaste on brush-baseline per patient)       Patient instructed and indicated understanding the benefits of maintaining activity tolerance, functional mobility, and independence with self care tasks during acute stay  to ensure safe return home and to baseline. Encouraged patient to increase frequency and duration OOB, be out of bed for all meals, perform daily ADLs (as approved by RN/MD regarding bathing etc), and performing functional mobility to/from bathroom. Lower Body Dressing Assistance  Socks: Minimum assistance  Leg Crossed Method Used: Yes  Position Performed: Seated edge of bed    Toileting  Bladder Hygiene: Total assistance (dependent)(richards)         Therapeutic Exercise:  encouraged OOB and full participation with ADLs to improve strength and endurance.      Functional Measure:  Barthel Index:    Bathin  Bladder: 0  Bowels: 5  Groomin  Dressin  Feedin  Mobility: 0  Stairs: 0  Toilet Use: 5  Transfer (Bed to Chair and Back): 10  Total: 30/100        The Barthel ADL Index: Guidelines  1. The index should be used as a record of what a patient does, not as a record of what a patient could do. 2. The main aim is to establish degree of independence from any help, physical or verbal, however minor and for whatever reason. 3. The need for supervision renders the patient not independent. 4. A patient's performance should be established using the best available evidence. Asking the patient, friends/relatives and nurses are the usual sources, but direct observation and common sense are also important. However direct testing is not needed. 5. Usually the patient's performance over the preceding 24-48 hours is important, but occasionally longer periods will be relevant. 6. Middle categories imply that the patient supplies over 50 per cent of the effort. 7. Use of aids to be independent is allowed. Demian Canseco., Barthel, D.W. (7808). Functional evaluation: the Barthel Index. [facility] (14)2. Jocelyne Moffett cara NOLAN ChappellJHENRY, Michael Chan., Calista Villela., Laurie, [facility] Javed Andrade (). Measuring the change indisability after inpatient rehabilitation; comparison of the responsiveness of the Barthel Index and Functional Isabela Measure. Journal of Neurology, Neurosurgery, and Psychiatry, 66(4), 677-249. Yinka Aguilar, N.J.A, Fabrizio Bowden,  W.J.M, & Diane Hays M.A. (2004.) Assessment of post-stroke quality of life in cost-effectiveness studies: The usefulness of the Barthel Index and the EuroQoL-5D.  Quality of Life Research, 15, 437-31         Occupational Therapy Evaluation Charge Determination   History Examination Decision-Making   MEDIUM Complexity : Expanded review of history including physical, cognitive and psychosocial  history  MEDIUM Complexity : 3-5 performance deficits relating to physical, cognitive , or psychosocial skils that result in activity limitations and / or participation restrictions MEDIUM Complexity : Patient may present with comorbidities that affect occupational performnce. Miniml to moderate modification of tasks or assistance (eg, physical or verbal ) with assesment(s) is necessary to enable patient to complete evaluation       Based on the above components, the patient evaluation is determined to be of the following complexity level: MEDIUM  Pain Rating:  None noted    Activity Tolerance:   Fair    After treatment patient left in no apparent distress:    Sitting in chair, Call bell within reach, and Bed / chair alarm activated    COMMUNICATION/EDUCATION:   The patients plan of care was discussed with: Physical therapist and Registered nurse. Home safety education was provided and the patient/caregiver indicated understanding., Patient/family have participated as able in goal setting and plan of care. , and Patient/family agree to work toward stated goals and plan of care. This patients plan of care is appropriate for delegation to [facility].     Thank you for this referral.  Tricia Chiang OT  Time Calculation: 40 mins

## 2020-11-28 NOTE — PROGRESS NOTES
Oncology End of Shift Note      Bedside shift change report given to HARISH Butterfield (incoming nurse) by Alec Pitts (outgoing nurse) on [facility]. Report included the following information SBAR, Kardex and MAR. Shift Summary:   Patient came from the Ed at [phone] this morning. Patient is up with assistance of two, however, he is unstable in the lower extremities. The patient does have a Mitchell. IV has been flushed and is patent. Patient teaching and routine rounding has been done. Issues for Physician to Address:       Patient on Cardiac Monitoring?     [] Yes  [x] No    Rhythm:      Pop Scale:     Pop Score: 14        If Pop Scale less than 15   Patient Mobility Ordered    Speciality Bed Ordered        Boots Applied                        Shift Events        Alec Pitts

## 2020-11-29 LAB
ALBUMIN SERPL-MCNC: 2.2 G/DL (ref 3.5–5)
ALBUMIN/GLOB SERPL: 0.7 {RATIO} (ref 1.1–2.2)
ALP SERPL-CCNC: 44 U/L (ref 45–117)
ALT SERPL-CCNC: 14 U/L (ref 12–78)
ANION GAP SERPL CALC-SCNC: 6 MMOL/L (ref 5–15)
AST SERPL-CCNC: 17 U/L (ref 15–37)
BASOPHILS # BLD: 0 K/UL (ref 0–0.1)
BASOPHILS NFR BLD: 0 % (ref 0–1)
BILIRUB SERPL-MCNC: 0.3 MG/DL (ref 0.2–1)
BUN SERPL-MCNC: 32 MG/DL (ref 6–20)
BUN/CREAT SERPL: 36 (ref 12–20)
CALCIUM SERPL-MCNC: 8.6 MG/DL (ref 8.5–10.1)
CHLORIDE SERPL-SCNC: 110 MMOL/L (ref 97–108)
CO2 SERPL-SCNC: 24 MMOL/L (ref 21–32)
CREAT SERPL-MCNC: 0.89 MG/DL (ref 0.7–1.3)
DIFFERENTIAL METHOD BLD: ABNORMAL
EOSINOPHIL # BLD: 0.7 K/UL (ref 0–0.4)
EOSINOPHIL NFR BLD: 8 % (ref 0–7)
ERYTHROCYTE [DISTWIDTH] IN BLOOD BY AUTOMATED COUNT: 14.1 % (ref 11.5–14.5)
GLOBULIN SER CALC-MCNC: 3.1 G/DL (ref 2–4)
GLUCOSE BLD STRIP.AUTO-MCNC: 130 MG/DL (ref 65–100)
GLUCOSE BLD STRIP.AUTO-MCNC: 141 MG/DL (ref 65–100)
GLUCOSE BLD STRIP.AUTO-MCNC: 224 MG/DL (ref 65–100)
GLUCOSE BLD STRIP.AUTO-MCNC: 97 MG/DL (ref 65–100)
GLUCOSE SERPL-MCNC: 92 MG/DL (ref 65–100)
HCT VFR BLD AUTO: 30.6 % (ref 36.6–50.3)
HGB BLD-MCNC: 9.5 G/DL (ref 12.1–17)
IMM GRANULOCYTES # BLD AUTO: 0 K/UL (ref 0–0.04)
IMM GRANULOCYTES NFR BLD AUTO: 0 % (ref 0–0.5)
LYMPHOCYTES # BLD: 3.7 K/UL (ref 0.8–3.5)
LYMPHOCYTES NFR BLD: 43 % (ref 12–49)
MAGNESIUM SERPL-MCNC: 1.9 MG/DL (ref 1.6–2.4)
MCH RBC QN AUTO: 34.8 PG (ref 26–34)
MCHC RBC AUTO-ENTMCNC: 31 G/DL (ref 30–36.5)
MCV RBC AUTO: 112.1 FL (ref 80–99)
METAMYELOCYTES NFR BLD MANUAL: 3 %
MONOCYTES # BLD: 0.8 K/UL (ref 0–1)
MONOCYTES NFR BLD: 9 % (ref 5–13)
NEUTS BAND NFR BLD MANUAL: 2 %
NEUTS SEG # BLD: 3.2 K/UL (ref 1.8–8)
NEUTS SEG NFR BLD: 35 % (ref 32–75)
NRBC # BLD: 0 K/UL (ref 0–0.01)
NRBC BLD-RTO: 0 PER 100 WBC
PLATELET # BLD AUTO: 61 K/UL (ref 150–400)
PMV BLD AUTO: 10.4 FL (ref 8.9–12.9)
POTASSIUM SERPL-SCNC: 4.1 MMOL/L (ref 3.5–5.1)
PROT SERPL-MCNC: 5.3 G/DL (ref 6.4–8.2)
RBC # BLD AUTO: 2.73 M/UL (ref 4.1–5.7)
RBC MORPH BLD: ABNORMAL
SERVICE CMNT-IMP: ABNORMAL
SERVICE CMNT-IMP: NORMAL
SODIUM SERPL-SCNC: 140 MMOL/L (ref 136–145)
VIT B12 SERPL-MCNC: 430 PG/ML (ref 193–986)
WBC # BLD AUTO: 8.6 K/UL (ref 4.1–11.1)

## 2020-11-29 PROCEDURE — 83735 ASSAY OF MAGNESIUM: CPT

## 2020-11-29 PROCEDURE — 74011000250 HC RX REV CODE- 250: Performed by: STUDENT IN AN ORGANIZED HEALTH CARE EDUCATION/TRAINING PROGRAM

## 2020-11-29 PROCEDURE — 82607 VITAMIN B-12: CPT

## 2020-11-29 PROCEDURE — 82962 GLUCOSE BLOOD TEST: CPT

## 2020-11-29 PROCEDURE — 74011636637 HC RX REV CODE- 636/637: Performed by: INTERNAL MEDICINE

## 2020-11-29 PROCEDURE — 80053 COMPREHEN METABOLIC PANEL: CPT

## 2020-11-29 PROCEDURE — 74011250636 HC RX REV CODE- 250/636: Performed by: STUDENT IN AN ORGANIZED HEALTH CARE EDUCATION/TRAINING PROGRAM

## 2020-11-29 PROCEDURE — 74011250636 HC RX REV CODE- 250/636: Performed by: INTERNAL MEDICINE

## 2020-11-29 PROCEDURE — 85025 COMPLETE CBC W/AUTO DIFF WBC: CPT

## 2020-11-29 PROCEDURE — 65270000029 HC RM PRIVATE

## 2020-11-29 PROCEDURE — 36415 COLL VENOUS BLD VENIPUNCTURE: CPT

## 2020-11-29 PROCEDURE — 74011250637 HC RX REV CODE- 250/637: Performed by: INTERNAL MEDICINE

## 2020-11-29 RX ORDER — SODIUM CHLORIDE 9 MG/ML
125 INJECTION, SOLUTION INTRAVENOUS CONTINUOUS
Status: DISCONTINUED | OUTPATIENT
Start: 2020-11-29 | End: 2020-12-02 | Stop reason: HOSPADM

## 2020-11-29 RX ADMIN — IBUPROFEN 400 MG: 400 TABLET, FILM COATED ORAL at 15:16

## 2020-11-29 RX ADMIN — ATORVASTATIN CALCIUM 40 MG: 40 TABLET, FILM COATED ORAL at 21:37

## 2020-11-29 RX ADMIN — DIVALPROEX SODIUM 1000 MG: 500 TABLET, DELAYED RELEASE ORAL at 20:32

## 2020-11-29 RX ADMIN — TOPIRAMATE 300 MG: 100 TABLET, FILM COATED ORAL at 17:21

## 2020-11-29 RX ADMIN — ALOGLIPTIN 25 MG: 25 TABLET, FILM COATED ORAL at 08:43

## 2020-11-29 RX ADMIN — Medication 10 ML: at 06:31

## 2020-11-29 RX ADMIN — ENOXAPARIN SODIUM 40 MG: 40 INJECTION SUBCUTANEOUS at 08:41

## 2020-11-29 RX ADMIN — INSULIN LISPRO 2 UNITS: 100 INJECTION, SOLUTION INTRAVENOUS; SUBCUTANEOUS at 17:22

## 2020-11-29 RX ADMIN — Medication 10 ML: at 15:17

## 2020-11-29 RX ADMIN — Medication 10 ML: at 21:38

## 2020-11-29 RX ADMIN — PREGABALIN 150 MG: 75 CAPSULE ORAL at 08:46

## 2020-11-29 RX ADMIN — PIOGLITAZONE 45 MG: 15 TABLET ORAL at 08:42

## 2020-11-29 RX ADMIN — PREGABALIN 150 MG: 75 CAPSULE ORAL at 17:21

## 2020-11-29 RX ADMIN — IBUPROFEN 400 MG: 400 TABLET, FILM COATED ORAL at 21:37

## 2020-11-29 RX ADMIN — INSULIN LISPRO 3 UNITS: 100 INJECTION, SOLUTION INTRAVENOUS; SUBCUTANEOUS at 11:27

## 2020-11-29 RX ADMIN — FOLIC ACID 1 MG: 1 TABLET ORAL at 08:44

## 2020-11-29 RX ADMIN — IBUPROFEN 400 MG: 400 TABLET, FILM COATED ORAL at 08:44

## 2020-11-29 RX ADMIN — SODIUM CHLORIDE 125 ML/HR: 900 INJECTION, SOLUTION INTRAVENOUS at 08:43

## 2020-11-29 RX ADMIN — TOPIRAMATE 300 MG: 100 TABLET, FILM COATED ORAL at 08:43

## 2020-11-29 RX ADMIN — DIVALPROEX SODIUM 500 MG: 500 TABLET, DELAYED RELEASE ORAL at 08:44

## 2020-11-29 NOTE — PROGRESS NOTES
Problem: Falls - Risk of  Goal: *Absence of Falls  Description: Document Jose Antonio Demario Fall Risk and appropriate interventions in the flowsheet.   Outcome: Progressing Towards Goal  Note: Fall Risk Interventions:  Mobility Interventions: Utilize walker, cane, or other assistive device    Mentation Interventions: Bed/chair exit alarm    Medication Interventions: Bed/chair exit alarm    Elimination Interventions: Call light in reach

## 2020-11-29 NOTE — PROGRESS NOTES
Hospitalist Progress Note    NAME: Laura Santiago   :  1955   MRN:  [id number]       Assessment / Plan:    Lower Back Pain with Worsening ambulatory dysfunction requiring multiple ED visits and OP physicians followups  MRI LS Spine in ED with Mild degenerative changes at multiple levels in the lumbar spine without significant canal stenosis. Small disc protrusion at L5-S1 likely acute  without canal stenosis or definite nerve root impingement. 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  - Per chart review, pt had thrombocytopenia at least as far as 11 years ago  - No hx of ascites, hematemesis, leg swelling, confusion  - Outpatient hepatitis workup in Aug 2020 negative for HepA, B, and C. Complement 3 and 4 wnl, JEREMY negative, Cryoglobulin not detected  - CT A/P done here shows Possible cirrhosis. Splenomegaly and portal vein dilation suggest portal hypertension.  Pulmonary artery hypertension   - Will give referral to GI/hepatologist on discharge for follow up and for further work-up.     Macrocytic anemia  - Check folate and vit B12 levels  - Monitor      Code Status: Full  Surrogate Decision Maker: Mother Abhijit Press     DVT Prophylaxis: Lovenox     Baseline: uses walker but steadily declining and having fallsRecommended Disposition: SAH/Rehab     Subjective:     Chief Complaint / Reason for Physician Visit   Discussed with RN events overnight. His back pain has improved  \"can I go home\"    Review of Systems:  Symptom Y/N Comments  Symptom Y/N Comments   Fever/Chills n   Chest Pain n    Poor Appetite n   Edema     Cough n   Abdominal Pain n    Sputum n   Joint Pain y Lower back pain   SOB/TREVINO    Pruritis/Rash     Nausea/vomit n   Tolerating PT/OT     Diarrhea n   Tolerating Diet     Constipation n   Other       Could NOT obtain due to:      Objective:     VITALS:   Last 24hrs VS reviewed since prior progress note. Most recent are:  Patient Vitals for the past 24 hrs:   Temp Pulse Resp BP SpO2   11/29/20 0700    (!) 89/52    11/29/20 0006    (!) 90/44    11/28/20 2354 97.5 °F (36.4 °C) (!) 52 18 (!) 82/42 100 %   11/28/20 2005 97.9 °F (36.6 °C) 60 18 (!) 81/49 97 %   11/28/20 1732 97.9 °F (36.6 °C) 61 18 (!) 96/52 95 %   11/28/20 1000  63  126/61 100 %       Intake/Output Summary (Last 24 hours) at 11/29/2020 0708  Last data filed at 11/29/2020 0550  Gross per 24 hour   Intake 550 ml   Output 400 ml   Net 150 ml        I had a face to face encounter and independently examined this patient on 11/29/2020, as outlined below:  PHYSICAL EXAM:  General: WD, WN. Alert, cooperative, no acute distress    EENT:  EOMI. Anicteric sclerae. MMM  Resp:  CTA bilaterally, no wheezing or rales. No accessory muscle use  CV:  Regular  rhythm,  No edema  GI:  Soft, Non distended, Non tender. +Bowel sounds  Neurologic:  Alert and oriented X 3, normal speech,   Psych:   Good insight. Not anxious nor agitated  Skin:  No rashes.   No jaundice    Reviewed most current lab test results and cultures  YES  Reviewed most current radiology test results   YES  Review and summation of old records today    NO  Reviewed patient's current orders and MAR    YES  PMH/SH reviewed - no change compared to H&P  ________________________________________________________________________  Care Plan discussed with:    Comments   Patient x    Family  x    RN x    Care Manager     Consultant                        Multidiciplinary team rounds were held today with , nursing, pharmacist and clinical coordinator. Patient's plan of care was discussed; medications were reviewed and discharge planning was addressed. ________________________________________________________________________      Total CRITICAL CARE TIME Spent:   Minutes non procedure based      Comments   >50% of visit spent in counseling and coordination of care x    ________________________________________________________________________  Irene Solorio MD     Procedures: see electronic medical records for all procedures/Xrays and details which were not copied into this note but were reviewed prior to creation of Plan. LABS:  I reviewed today's most current labs and imaging studies.   Pertinent labs include:  Recent Labs     11/29/20 0315 11/27/20 2156   WBC 8.6 8.9   HGB 9.5* 10.5*   HCT 30.6* 33.3*   PLT 61* 61*     Recent Labs     11/29/20 0315 11/27/20 2156    141   K 4.1 4.5   * 113*   CO2 24 25   GLU 92 135*   BUN 32* 22*   CREA 0.89 0.97   CA 8.6 9.0   MG 1.9  --    ALB 2.2* 2.9*   TBILI 0.3 0.3   ALT 14 17       Signed: Irene Solorio MD

## 2020-11-29 NOTE — PROGRESS NOTES
Oncology End of Shift Note      Bedside shift change report given to HARISH Rodriguez (incoming nurse) by Abhijit Cerda (outgoing nurse) on [facility]. Report included the following information SBAR, Kardex and MAR. Shift Summary:   Patient did not complain of any pain during my shift. Patient's BP at 2300 was 81/49, the Dr. Saundra James was notified and he placed an order for Lactated Ringer's (LR)  bolus which was administered. Patient was also placed on continuous LR initially running at 75 ml/hr, however, I manually did a BP on the said patient, his BP reading was 90/44, Dr. Saundra James subsequently submitted a change of rate at 125 ml/hr. IV was flushed and is patent. Patient has been resting quietly throughout my shift. Patient teaching and routine rounding has been done. Issues for Physician to Address:       Patient on Cardiac Monitoring?     [] Yes  [x] No    Rhythm:      Pop Scale:     Pop Score: 16        If Pop Scale less than 15   Patient Mobility Ordered    Speciality Bed Ordered        Boots Applied                        Shift Events        Abhijit Cerda

## 2020-11-29 NOTE — PROGRESS NOTES
Bedside shift change report given to Lizbeth (oncoming nurse) by Leela Mejia (offgoing nurse). Report included the following information SBAR, Kardex, ED Summary, Intake/Output, MAR and Recent Results. Patient was quiet in room. Patient's Mother was here most of the day. Patient ambulated with walker to bathroom. Patient has a Mitchell, and is confused about the Mitchell being in at times. Patient has bed alarm on. Patient is tolerating diet well.

## 2020-11-30 LAB
ANION GAP SERPL CALC-SCNC: 3 MMOL/L (ref 5–15)
BUN SERPL-MCNC: 28 MG/DL (ref 6–20)
BUN/CREAT SERPL: 36 (ref 12–20)
CALCIUM SERPL-MCNC: 8.1 MG/DL (ref 8.5–10.1)
CHLORIDE SERPL-SCNC: 114 MMOL/L (ref 97–108)
CO2 SERPL-SCNC: 26 MMOL/L (ref 21–32)
CREAT SERPL-MCNC: 0.77 MG/DL (ref 0.7–1.3)
FOLATE SERPL-MCNC: 23.6 NG/ML (ref 5–21)
GLUCOSE BLD STRIP.AUTO-MCNC: 100 MG/DL (ref 65–100)
GLUCOSE BLD STRIP.AUTO-MCNC: 104 MG/DL (ref 65–100)
GLUCOSE BLD STRIP.AUTO-MCNC: 141 MG/DL (ref 65–100)
GLUCOSE BLD STRIP.AUTO-MCNC: 397 MG/DL (ref 65–100)
GLUCOSE SERPL-MCNC: 92 MG/DL (ref 65–100)
POTASSIUM SERPL-SCNC: 4.3 MMOL/L (ref 3.5–5.1)
SERVICE CMNT-IMP: ABNORMAL
SERVICE CMNT-IMP: NORMAL
SODIUM SERPL-SCNC: 143 MMOL/L (ref 136–145)

## 2020-11-30 PROCEDURE — 36415 COLL VENOUS BLD VENIPUNCTURE: CPT

## 2020-11-30 PROCEDURE — 82962 GLUCOSE BLOOD TEST: CPT

## 2020-11-30 PROCEDURE — 74011250637 HC RX REV CODE- 250/637: Performed by: INTERNAL MEDICINE

## 2020-11-30 PROCEDURE — 80048 BASIC METABOLIC PNL TOTAL CA: CPT

## 2020-11-30 PROCEDURE — 82746 ASSAY OF FOLIC ACID SERUM: CPT

## 2020-11-30 PROCEDURE — 74011636637 HC RX REV CODE- 636/637: Performed by: INTERNAL MEDICINE

## 2020-11-30 PROCEDURE — 74011000250 HC RX REV CODE- 250: Performed by: STUDENT IN AN ORGANIZED HEALTH CARE EDUCATION/TRAINING PROGRAM

## 2020-11-30 PROCEDURE — 74011250636 HC RX REV CODE- 250/636: Performed by: INTERNAL MEDICINE

## 2020-11-30 PROCEDURE — 65270000029 HC RM PRIVATE

## 2020-11-30 PROCEDURE — 74011250637 HC RX REV CODE- 250/637: Performed by: STUDENT IN AN ORGANIZED HEALTH CARE EDUCATION/TRAINING PROGRAM

## 2020-11-30 PROCEDURE — 74011250636 HC RX REV CODE- 250/636: Performed by: STUDENT IN AN ORGANIZED HEALTH CARE EDUCATION/TRAINING PROGRAM

## 2020-11-30 RX ORDER — GUAIFENESIN 100 MG/5ML
100 SOLUTION ORAL
Status: DISCONTINUED | OUTPATIENT
Start: 2020-11-30 | End: 2020-12-02 | Stop reason: HOSPADM

## 2020-11-30 RX ADMIN — ATORVASTATIN CALCIUM 40 MG: 40 TABLET, FILM COATED ORAL at 21:46

## 2020-11-30 RX ADMIN — GUAIFENESIN 100 MG: 200 SOLUTION ORAL at 22:28

## 2020-11-30 RX ADMIN — Medication 10 ML: at 13:16

## 2020-11-30 RX ADMIN — SODIUM CHLORIDE 125 ML/HR: 900 INJECTION, SOLUTION INTRAVENOUS at 08:44

## 2020-11-30 RX ADMIN — SODIUM CHLORIDE 500 ML: 900 INJECTION, SOLUTION INTRAVENOUS at 08:44

## 2020-11-30 RX ADMIN — GUAIFENESIN 100 MG: 200 SOLUTION ORAL at 13:10

## 2020-11-30 RX ADMIN — ENOXAPARIN SODIUM 40 MG: 40 INJECTION SUBCUTANEOUS at 08:45

## 2020-11-30 RX ADMIN — LACTULOSE 15 ML: 20 SOLUTION ORAL at 13:10

## 2020-11-30 RX ADMIN — DIVALPROEX SODIUM 1000 MG: 500 TABLET, DELAYED RELEASE ORAL at 21:46

## 2020-11-30 RX ADMIN — TOPIRAMATE 300 MG: 100 TABLET, FILM COATED ORAL at 17:37

## 2020-11-30 RX ADMIN — INSULIN LISPRO 9 UNITS: 100 INJECTION, SOLUTION INTRAVENOUS; SUBCUTANEOUS at 11:43

## 2020-11-30 RX ADMIN — Medication 10 ML: at 08:53

## 2020-11-30 RX ADMIN — GUAIFENESIN 100 MG: 200 SOLUTION ORAL at 06:01

## 2020-11-30 RX ADMIN — GUAIFENESIN 100 MG: 200 SOLUTION ORAL at 02:08

## 2020-11-30 RX ADMIN — DIVALPROEX SODIUM 500 MG: 500 TABLET, DELAYED RELEASE ORAL at 08:52

## 2020-11-30 RX ADMIN — SODIUM CHLORIDE 125 ML/HR: 900 INJECTION, SOLUTION INTRAVENOUS at 02:07

## 2020-11-30 RX ADMIN — Medication 10 ML: at 21:47

## 2020-11-30 RX ADMIN — TOPIRAMATE 300 MG: 100 TABLET, FILM COATED ORAL at 08:45

## 2020-11-30 RX ADMIN — SODIUM CHLORIDE 125 ML/HR: 900 INJECTION, SOLUTION INTRAVENOUS at 13:09

## 2020-11-30 RX ADMIN — PREGABALIN 150 MG: 75 CAPSULE ORAL at 17:38

## 2020-11-30 RX ADMIN — ALOGLIPTIN 25 MG: 25 TABLET, FILM COATED ORAL at 08:46

## 2020-11-30 RX ADMIN — PIOGLITAZONE 45 MG: 15 TABLET ORAL at 08:46

## 2020-11-30 RX ADMIN — PREGABALIN 150 MG: 75 CAPSULE ORAL at 08:46

## 2020-11-30 RX ADMIN — FOLIC ACID 1 MG: 1 TABLET ORAL at 08:46

## 2020-11-30 RX ADMIN — LACTULOSE 15 ML: 20 SOLUTION ORAL at 17:38

## 2020-11-30 NOTE — PROGRESS NOTES
Oncology End of Shift Note      Bedside shift change report given to HARISH Rodriguez (incoming nurse) by Shar Lam (outgoing nurse) on [facility]. Report included the following information SBAR, Kardex and MAR. Shift Summary:   Patient was given all scheduled medications except Humalog due to the patient's blood sugar level. Patient did not complain of any pain during my shift. Patient had a consistent dry cough, Dr. Lashon Saavedra was notified and Robitussin was prescribed and administered. Patient has not gotten up during the night. Patient does have a Mitchell catheter which is patent and draining. Patient teaching and routine rounding has been done. Issues for Physician to Address:       Patient on Cardiac Monitoring?     [] Yes  [x] No    Rhythm:      Pop Scale:     Pop Score: 19        If Pop Scale less than 15   Patient Mobility Ordered    Speciality Bed Ordered        Boots Applied                        Shift Events        Shar Lam

## 2020-11-30 NOTE — PROGRESS NOTES
Bedside shift change report given to [facility]  (oncoming nurse) by Anibal Paget (offgoing nurse). Report included the following information SBAR, Kardex, ED Summary, [facility] and Recent Results Patient was lethargic this am, but was better as the day went on. Patient's Mitchell was removed and voiding with out any problems. Patient was constipated and was started on lactulose. Patient had multiple bowel movement. Patient becomes more unstable on his feet when he tires. Patient was given PRN medication for cough.

## 2020-11-30 NOTE — PROGRESS NOTES
Hospitalist Progress Note    NAME: Kimmie Ramirez   :  1955   MRN:  [id number]       Assessment / Plan:    Lower Back Pain with Worsening ambulatory dysfunction requiring multiple ED visits and OP physicians followups  MRI LS Spine in ED with Mild degenerative changes at multiple levels in the lumbar spine without significant canal stenosis. Small disc protrusion at L5-S1 likely acute  without canal stenosis or definite nerve root impingement. Recent MRI Brain without CVA, was evaluated by neurology as an OP  Mother with advance age and unable to take care any more  Pain control with PRN motrin and lidocaine patch  Continue increased dose of Lyrica to 150mg PO BID  PT/OT  CM consulted for placement      Cirrhosis of liver - newly diagnosed  Thromobocytopenia  - CT A/P shows Possible cirrhosis, splenomegaly and suggest portal hypertension.  - Etiology of CLD unclear. No hx of alcohol intake or herbal medication use. - Per chart review, pt had thrombocytopenia for many years. He follows up with hematologist for thrombocytopenia and had BM biopsy which was normal.  - No hx of ascites, hematemesis, leg swelling, confusion  - Outpatient hepatitis workup in Aug 2020 negative for HepA, B, and C. Complement 3 and 4 wnl, JEREMY negative, Cryoglobulin not detected  - Will give referral to GI/hepatologist on discharge for follow up and for further work-up. - Patient more sleepy and lethargic today.  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   Seizure disorder   Continue anti seizure meds     Hyperlipidemia  Continue statins     Type 2 diabetes mellitus with diabetic polyneuropathy, without long-term current use of insulin   A1C 8.1 in Aug 2020  Hold Sulfonylureas and metformin while continuing other hypoglycemic agents for now  SSI     Macrocytic anemia  - Check folate and vit B12 levels  - Monitor      Code Status: Full  Surrogate Decision Maker: Mother Abelardo Butler     DVT Prophylaxis: Lovenox     Baseline: uses walker but steadily declining and having fallsRecommended Disposition: [facility]/Rehab     Subjective:     Chief Complaint / Reason for Physician Visit   Discussed with RN events overnight. Patient drowsy today  Says his back pain is better now      Review of Systems:  Symptom Y/N Comments  Symptom Y/N Comments   Fever/Chills n   Chest Pain n    Poor Appetite n   Edema     Cough n   Abdominal Pain n    Sputum n   Joint Pain y Lower back pain   SOB/TREVINO    Pruritis/Rash     Nausea/vomit n   Tolerating PT/OT     Diarrhea n   Tolerating Diet     Constipation n   Other       Could NOT obtain due to:      Objective:     VITALS:   Last 24hrs VS reviewed since prior progress note. Most recent are:  Patient Vitals for the past 24 hrs:   Temp Pulse Resp BP SpO2   11/30/20 1533 97.3 °F (36.3 °C) 68 18 (!) 102/56 97 %   11/30/20 0827 99.2 °F (37.3 °C) 86 18 (!) 139/46 100 %   11/29/20 2306 97.6 °F (36.4 °C) 61 18 (!) 91/45 97 %   11/29/20 1930 98.5 °F (36.9 °C) 73 18 (!) 101/54 97 %       Intake/Output Summary (Last 24 hours) at 11/30/2020 1758  Last data filed at 11/30/2020 0170  Gross per 24 hour   Intake    Output 3350 ml   Net -3350 ml        I had a face to face encounter and independently examined this patient on 11/30/2020, as outlined below:  PHYSICAL EXAM:  General: WD, WN. Alert, cooperative, no acute distress    EENT:  EOMI. Anicteric sclerae. MMM  Resp:  CTA bilaterally, no wheezing or rales. No accessory muscle use  CV:  Regular  rhythm,  No edema  GI:  Soft, Non distended, Non tender. +Bowel sounds  Neurologic:  Alert and oriented X 3, normal speech,   Psych:   Good insight. Not anxious nor agitated  Skin:  No rashes.   No jaundice    Reviewed most current lab test results and cultures  YES  Reviewed most current radiology test results   YES  Review and summation of old records today    NO  Reviewed patient's current orders and MAR    YES  PMH/SH reviewed - no change compared to H&P  ________________________________________________________________________  Care Plan discussed with:    Comments   Patient x    Family  x    RN x    Care Manager     Consultant                        Multidiciplinary team rounds were held today with , nursing, pharmacist and clinical coordinator. Patient's plan of care was discussed; medications were reviewed and discharge planning was addressed. ________________________________________________________________________      Total CRITICAL CARE TIME Spent:   Minutes non procedure based      Comments   >50% of visit spent in counseling and coordination of care x    ________________________________________________________________________  Sharmin Arnold MD     Procedures: see electronic medical records for all procedures/Xrays and details which were not copied into this note but were reviewed prior to creation of Plan. LABS:  I reviewed today's most current labs and imaging studies.   Pertinent labs include:  Recent Labs     11/29/20 0315 11/27/20 2156   WBC 8.6 8.9   HGB 9.5* 10.5*   HCT 30.6* 33.3*   PLT 61* 61*     Recent Labs     11/30/20 0411 11/29/20 0315 11/27/20 2156    140 141   K 4.3 4.1 4.5   * 110* 113*   CO2 26 24 25   GLU 92 92 135*   BUN 28* 32* 22*   CREA 0.77 0.89 0.97   CA 8.1* 8.6 9.0   MG  --  1.9  --    ALB  --  2.2* 2.9*   TBILI  --  0.3 0.3   ALT  --  14 17       Signed: Sharmin Arnold MD

## 2020-11-30 NOTE — PROGRESS NOTES
Problem: Falls - Risk of  Goal: *Absence of Falls  Description: Document Anny Ibanez Fall Risk and appropriate interventions in the flowsheet.   Outcome: Progressing Towards Goal  Note: Fall Risk Interventions:  Mobility Interventions: Patient to call before getting OOB    Mentation Interventions: Bed/chair exit alarm    Medication Interventions: Bed/chair exit alarm    Elimination Interventions: Call light in reach

## 2020-12-01 ENCOUNTER — HOME CARE VISIT (OUTPATIENT)
Dept: HOME HEALTH SERVICES | Facility: HOME HEALTH | Age: 65
End: 2020-12-01

## 2020-12-01 ENCOUNTER — HOME HEALTH ADMISSION (OUTPATIENT)
Dept: HOME HEALTH SERVICES | Facility: HOME HEALTH | Age: 65
End: 2020-12-01
Payer: MEDICARE

## 2020-12-01 LAB
AMMONIA PLAS-SCNC: 66 UMOL/L
GLUCOSE BLD STRIP.AUTO-MCNC: 115 MG/DL (ref 65–100)
GLUCOSE BLD STRIP.AUTO-MCNC: 139 MG/DL (ref 65–100)
GLUCOSE BLD STRIP.AUTO-MCNC: 168 MG/DL (ref 65–100)
GLUCOSE BLD STRIP.AUTO-MCNC: 174 MG/DL (ref 65–100)
SERVICE CMNT-IMP: ABNORMAL

## 2020-12-01 PROCEDURE — 82962 GLUCOSE BLOOD TEST: CPT

## 2020-12-01 PROCEDURE — 74011000250 HC RX REV CODE- 250: Performed by: STUDENT IN AN ORGANIZED HEALTH CARE EDUCATION/TRAINING PROGRAM

## 2020-12-01 PROCEDURE — 97530 THERAPEUTIC ACTIVITIES: CPT

## 2020-12-01 PROCEDURE — 74011250636 HC RX REV CODE- 250/636: Performed by: STUDENT IN AN ORGANIZED HEALTH CARE EDUCATION/TRAINING PROGRAM

## 2020-12-01 PROCEDURE — 65270000029 HC RM PRIVATE

## 2020-12-01 PROCEDURE — 97535 SELF CARE MNGMENT TRAINING: CPT | Performed by: OCCUPATIONAL THERAPIST

## 2020-12-01 PROCEDURE — 74011250637 HC RX REV CODE- 250/637: Performed by: STUDENT IN AN ORGANIZED HEALTH CARE EDUCATION/TRAINING PROGRAM

## 2020-12-01 PROCEDURE — 97116 GAIT TRAINING THERAPY: CPT

## 2020-12-01 PROCEDURE — 74011250637 HC RX REV CODE- 250/637: Performed by: INTERNAL MEDICINE

## 2020-12-01 PROCEDURE — 82140 ASSAY OF AMMONIA: CPT

## 2020-12-01 PROCEDURE — 74011636637 HC RX REV CODE- 636/637: Performed by: INTERNAL MEDICINE

## 2020-12-01 PROCEDURE — 74011250636 HC RX REV CODE- 250/636: Performed by: INTERNAL MEDICINE

## 2020-12-01 RX ADMIN — ENOXAPARIN SODIUM 40 MG: 40 INJECTION SUBCUTANEOUS at 09:07

## 2020-12-01 RX ADMIN — PREGABALIN 150 MG: 75 CAPSULE ORAL at 09:07

## 2020-12-01 RX ADMIN — TOPIRAMATE 300 MG: 100 TABLET, FILM COATED ORAL at 18:16

## 2020-12-01 RX ADMIN — LACTULOSE 15 ML: 20 SOLUTION ORAL at 16:47

## 2020-12-01 RX ADMIN — TOPIRAMATE 300 MG: 100 TABLET, FILM COATED ORAL at 09:06

## 2020-12-01 RX ADMIN — SODIUM CHLORIDE 125 ML/HR: 900 INJECTION, SOLUTION INTRAVENOUS at 23:21

## 2020-12-01 RX ADMIN — PIOGLITAZONE 45 MG: 15 TABLET ORAL at 09:06

## 2020-12-01 RX ADMIN — Medication 10 ML: at 06:06

## 2020-12-01 RX ADMIN — INSULIN LISPRO 2 UNITS: 100 INJECTION, SOLUTION INTRAVENOUS; SUBCUTANEOUS at 18:08

## 2020-12-01 RX ADMIN — Medication 10 ML: at 13:59

## 2020-12-01 RX ADMIN — Medication 10 ML: at 23:19

## 2020-12-01 RX ADMIN — PREGABALIN 150 MG: 75 CAPSULE ORAL at 18:16

## 2020-12-01 RX ADMIN — ALOGLIPTIN 25 MG: 25 TABLET, FILM COATED ORAL at 09:07

## 2020-12-01 RX ADMIN — SODIUM CHLORIDE 125 ML/HR: 900 INJECTION, SOLUTION INTRAVENOUS at 00:18

## 2020-12-01 RX ADMIN — FOLIC ACID 1 MG: 1 TABLET ORAL at 09:07

## 2020-12-01 RX ADMIN — DIVALPROEX SODIUM 1000 MG: 500 TABLET, DELAYED RELEASE ORAL at 23:21

## 2020-12-01 RX ADMIN — SODIUM CHLORIDE 125 ML/HR: 900 INJECTION, SOLUTION INTRAVENOUS at 06:06

## 2020-12-01 RX ADMIN — DIVALPROEX SODIUM 500 MG: 500 TABLET, DELAYED RELEASE ORAL at 09:07

## 2020-12-01 RX ADMIN — LACTULOSE 15 ML: 20 SOLUTION ORAL at 23:18

## 2020-12-01 RX ADMIN — ATORVASTATIN CALCIUM 40 MG: 40 TABLET, FILM COATED ORAL at 23:18

## 2020-12-01 RX ADMIN — INSULIN LISPRO 2 UNITS: 100 INJECTION, SOLUTION INTRAVENOUS; SUBCUTANEOUS at 12:38

## 2020-12-01 NOTE — PROGRESS NOTES
I reviewed pertinent labs and imaging, and discussed /agreed on the plan of care with Dr. Lisandro Peters      Hospitalist Progress Note    NAME: Jeri Gallego   :  1955   MRN:  [id number]       Assessment / Plan:  Lower Back Pain with Worsening ambulatory dysfunction requiring multiple ED visits and OP physicians followups  MRI LS Spine in ED with Mild degenerative changes at multiple levels in the lumbar spine without significant canal stenosis. Small disc protrusion at L5-S1 likely acute  without canal stenosis or definite nerve root impingement. Recent MRI Brain without CVA, was evaluated by neurology as an OP  Mother with advance age and unable to take care any more  Pain control with PRN motrin and lidocaine patch  Continue increased dose of Lyrica to 150mg PO BID  PT/OT  - pt ambulating in [employer] today   CM consulted for placement however mother declines any SNF placement . Will need to come up with viable alternative .         Cirrhosis of liver - newly diagnosed  Thromobocytopenia  - CT A/P shows Possible cirrhosis, splenomegaly and suggest portal hypertension.  - Etiology of CLD unclear. No hx of alcohol intake or herbal medication use. - Per chart review, pt had thrombocytopenia for many years. He follows up with hematologist for thrombocytopenia and had BM biopsy which was normal.  - No hx of ascites, hematemesis, leg swelling, confusion  - Outpatient hepatitis workup in Aug 2020 negative for HepA, B, and C.  Complement 3 and 4 wnl, JEREMY negative, Cryoglobulin not detected  -- - Ammonia level 66  [t alert and talkative   - get GI input for  Liver cirrhosis  -cont Lactulose      Hx of hypertenison  Hypotension  -BP normotensive   - Continue to hold  lisinopril,   - Continue IV fluid     Intellectual disability / Developmental delay due to anoxic childbirth   Seizure disorder   Continue anti seizure meds     Hyperlipidemia  Continue statins     Type 2 diabetes mellitus with diabetic polyneuropathy, without long-term current use of insulin   A1C 8.1 in Aug 2020  Hold Sulfonylureas and metformin while continuing other hypoglycemic agents for now  SSI   check Hgb A1c in Am   Macrocytic anemia  - Check folate and vit B12 levels  - Monitor         Body mass index is 29.06 kg/m². Code status: Full  Prophylaxis: Lovenox  Recommended Disposition: Home w/Family, HH PT, OT, RN and caregivers      Subjective:     Chief Complaint / Reason for Physician Visit  \"I am done eating \". Discussed with RN events overnight. Pt seen sitting up in chair denies any pain or discomfort . Review of Systems:  Symptom Y/N Comments  Symptom Y/N Comments   Fever/Chills n   Chest Pain n    Poor Appetite n   Edema n    Cough n   Abdominal Pain n    Sputum n   Joint Pain n    SOB/TREVINO n   Pruritis/Rash     Nausea/vomit n   Tolerating PT/OT     Diarrhea    Tolerating Diet y    Constipation    Other       Could NOT obtain due to:      Objective:     VITALS:   Last 24hrs VS reviewed since prior progress note. Most recent are:  Patient Vitals for the past 24 hrs:   Temp Pulse Resp BP SpO2   12/01/20 0753 98.1 °F (36.7 °C) 66 19 106/67 100 %   11/30/20 2342 97.9 °F (36.6 °C) 62 18 105/63 99 %   11/30/20 2049 98.3 °F (36.8 °C) 73 18 (!) 118/58 97 %   11/30/20 1533 97.3 °F (36.3 °C) 68 18 (!) 102/56 97 %       Intake/Output Summary (Last 24 hours) at 12/1/2020 0914  Last data filed at 12/1/2020 0825  Gross per 24 hour   Intake    Output 400 ml   Net -400 ml        I had a face to face encounter and independently examined this patient on 12/1/2020, as outlined below:  PHYSICAL EXAM:  General: WD, WN. Alert, cooperative, no acute distress    EENT:  EOMI. Anicteric sclerae. MMM  Resp:  , no wheezing or rales. No accessory muscle use  CV:  S1S2,  No edema  GI:  Soft, Non distended, Non tender. +Bowel sounds  Neurologic:  Alert and oriented X 2, normal speech,   Psych:   . Not anxious nor agitated  Skin:  No rashes.   No jaundice    Reviewed most current lab test results and cultures  YES  Reviewed most current radiology test results   YES  Review and summation of old records today    NO  Reviewed patient's current orders and MAR    YES  PMH/SH reviewed - no change compared to H&P  ________________________________________________________________________  Care Plan discussed with:    Comments   Patient x    Family  x    RN     Care Manager x    Consultant                        Multidiciplinary team rounds were held today with , nursing, pharmacist and clinical coordinator. Patient's plan of care was discussed; medications were reviewed and discharge planning was addressed. ________________________________________________________________________  Total NON critical care TIME:  30 Minutes    Total CRITICAL CARE TIME Spent:   Minutes non procedure based      Comments   >50% of visit spent in counseling and coordination of care     ________________________________________________________________________  Ciaran Jeong NP     Procedures: see electronic medical records for all procedures/Xrays and details which were not copied into this note but were reviewed prior to creation of Plan. LABS:  I reviewed today's most current labs and imaging studies. Pertinent labs include:  Recent Labs     11/29/20  0315   WBC 8.6   HGB 9.5*   HCT 30.6*   PLT 61*     Recent Labs     11/30/20  0411 11/29/20  0315    140   K 4.3 4.1   * 110*   CO2 26 24   GLU 92 92   BUN 28* 32*   CREA 0.77 0.89   CA 8.1* 8.6   MG  --  1.9   ALB  --  2.2*   TBILI  --  0.3   ALT  --  14       Signed: Vicky Jeong NP

## 2020-12-01 NOTE — PROGRESS NOTES
Oncology End of Shift Note      Bedside shift change report given to HARISH Butterfield (incoming nurse) by See Hurst (outgoing nurse) on [facility]. Report included the following information SBAR, Kardex and MAR. Shift Summary: Hourly rounding completed, patient tolerated care well throughout shift. One incontinent episode while sleeping. Patient had two large loose bm's, held pm dose of lactulose. PRN medication given for cough. Patient resting comfortably in bed with no complaints. Issues for Physician to Address:       Patient on Cardiac Monitoring?     [] Yes  [x] No    Rhythm:      Pop Scale:     Pop Score: [address] [facility] [address]

## 2020-12-01 NOTE — PROGRESS NOTES
Problem: Self Care Deficits Care Plan (Adult)  Goal: *Acute Goals and Plan of Care (Insert Text)  Description:   FUNCTIONAL STATUS PRIOR TO ADMISSION: Patient was modified independent using a walker for functional mobility. 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 HOME SUPPORT: The patient lived with mother who is his primary caregiver. Occupational Therapy Goals  Initiated 11/28/2020  1. Patient will perform grooming with modified independence within 7 day(s). 2.  Patient will perform bathing with setup within 7 day(s). 3.  Patient will perform upper body dressing and lower body dressing with supervision/set-up within 7 day(s). 4.  Patient will perform toilet transfers with supervision/set-up within 7 day(s). 5.  Patient will perform all aspects of toileting with supervision/set-up within 7 day(s). 6.  Patient will participate in upper extremity therapeutic exercise/activities with supervision/set-up for 10 minutes within 7 day(s). 7.  Patient will utilize energy conservation techniques during functional activities with verbal cues within 7 day(s). Outcome: Progressing Towards Goal   OCCUPATIONAL THERAPY TREATMENT  Patient: Adelaide Muniz (57 y.o. male)  Date: 12/1/2020  Diagnosis: Lower extremity weakness [R29.898]  Lower extremity weakness [R29.898]   <principal problem not specified>       Precautions:    Chart, occupational therapy assessment, plan of care, and goals were reviewed. ASSESSMENT  Patient continues with skilled OT services and is progressing towards goals.   Pt had signficant difficulty with supine to sit and did not act on cues for log roll technique, despite verbal and physical cues by mother and therapist     Current Level of Function Impacting Discharge (ADLs): minimal to maximal assistance     Other factors to consider for discharge: mother is caregiver         PLAN :  Patient continues to benefit from skilled intervention to address the above impairments. Continue treatment per established plan of care. to address goals. Recommend with staff: continue mobility up to chair and to bathroom. Encourage log roll technique for bed mobility    Recommend next OT session: log roll, standing tolerance/technique    Recommendation for discharge: (in order for the patient to meet his/her long term goals)  Occupational therapy at least 2 days/week in the home AND ensure assist and/or supervision for safety with adls/mobility    This discharge recommendation:  Has not yet been discussed the attending provider and/or case management    IF patient discharges home will need the following DME: tbd       SUBJECTIVE:   Patient stated no! I can't.     OBJECTIVE DATA SUMMARY:   Cognitive/Behavioral Status:  Neurologic State: Alert  Orientation Level: Oriented to person  Cognition: Decreased attention/concentration  Perception: Appears intact  Perseveration: Perseverates during mobility(difficulty wit h supine to sit)  Safety/Judgement: Decreased awareness of need for assistance;Decreased awareness of need for safety;Decreased insight into deficits; Fall prevention    Functional Mobility and Transfers for ADLs:  Bed Mobility:  Rolling: Maximum assistance  Supine to Sit: Maximum assistance  Sit to Supine: Moderate assistance  Scooting: Contact guard assistance  Educated on log roll technique  Transfers:  Sit to Stand: Minimum assistance  Functional Transfers  Bathroom Mobility: Minimum assistance;Contact guard assistance  Toilet Transfer : Minimum assistance(decreased control of descent onto toilet)  Adaptive Equipment: Grab bars; Walker (comment)       Balance:  Sitting: Intact  Standing: Impaired; With support  Standing - Static: (decreased stability in standing)  Standing - Dynamic : Fair    ADL Intervention:       Grooming  Position Performed: Standing  Washing Face: Set-up; Supervision(seated)  Washing Hands: Minimum assistance(poor technique, did not accept cues for thoroughness)  Brushing Teeth: Set-up(poor technique, mother assists at baseline )  Brushing/Combing Hair: Set-up(seated)  Cues: Physical assistance; Tactile cues provided;Verbal cues provided;Visual cues provided    Upper Body Bathing  Bathing Assistance: Minimum assistance(upper body seated in chair)                   Toileting  Toileting Assistance: Supervision;Stand-by assistance  Clothing Management: Maximum assistance(for gown despite cues)  Cues: Tactile cues provided;Verbal cues provided  Adaptive Equipment: Grab bars; Walker    Cognitive Retraining  Following Commands: Follows one step commands/directions(most of the time)  Safety/Judgement: Decreased awareness of need for assistance;Decreased awareness of need for safety;Decreased insight into deficits; Fall prevention      Pain:  With transitional movements, pt crying out and not listening to cues     Activity Tolerance:   Fair  Stanidng tolerance is decreased  After treatment patient left in no apparent distress:   Sitting in chair, Call bell within reach, Bed / chair alarm activated, Caregiver / family present, and nurse informed    COMMUNICATION/COLLABORATION:   The patients plan of care was discussed with: Registered nurse.      Tawny Ordoñez OTR/L  Time Calculation: 26 mins

## 2020-12-01 NOTE — PROGRESS NOTES
Problem: Mobility Impaired (Adult and Pediatric)  Goal: *Acute Goals and Plan of Care (Insert Text)  Description: FUNCTIONAL STATUS PRIOR TO ADMISSION: Pt has lived with his mother in a one story home with 2 step entry. 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 Per chart, mother is having difficulty at her age continuing to care for the pt. He has needed assist with ADLS and has walked with a RW. HOME SUPPORT PRIOR TO ADMISSION: The patient lived with mother who is his caregiver. Physical Therapy Goals  Initiated 11/28/2020  1. Patient will move from supine to sit and sit to supine , scoot up and down, and roll side to side in bed with independence within 7 day(s). 2.  Patient will transfer from bed to chair and chair to bed with supervision/set-up using the least restrictive device within 7 day(s). 3.  Patient will perform sit to stand with supervision/set-up within 7 day(s). 4.  Patient will ambulate with supervision/set-up for 150 feet with the least restrictive device within 7 day(s). 5.  Patient will ascend/descend 2 stairs with handrail(s) with minimal assistance/contact guard assist within 7 day(s). Outcome: Progressing Towards Goal   PHYSICAL THERAPY TREATMENT  Patient: Zenaida Lechuga (54 y.o. male)  Date: 12/1/2020  Diagnosis: Lower extremity weakness [R29.898]  Lower extremity weakness [R29.898]   <principal problem not specified>       Precautions:    Chart, physical therapy assessment, plan of care and goals were reviewed. ASSESSMENT  Patient continues with skilled PT services and is progressing towards goals. Patient demonstrates the ability to ambulate increased distance with use of RW. Verbal and tactile cues are provided for staying within the frame of walker but patient is not compliant. Bed mobility is performed with mother present demonstrating log technique for improved mobility.  Patient requires Mod A and will continue to require practice secondary to intellectual disability. VC are provided for safety for transfer and gait. Current Level of Function Impacting Discharge (mobility/balance): CGA gait and transfers with RW, Mod A bed mobility    Other factors to consider for discharge: medical stability, increased need for assistance, increased risk for falls          PLAN :  Patient continues to benefit from skilled intervention to address the above impairments. Continue treatment per established plan of care. to address goals. Recommendation for discharge: (in order for the patient to meet his/her long term goals)  Physical therapy at least 2 days/week in the home AND ensure assist and/or supervision for safety with all bed mobility     This discharge recommendation:  Has been made in collaboration with the attending provider and/or case management    IF patient discharges home will need the following DME: patient owns DME required for discharge       SUBJECTIVE:   Patient stated I want to walk more.     OBJECTIVE DATA SUMMARY:   Critical Behavior:  Neurologic State: Alert  Orientation Level: Oriented to person  Cognition: Decreased attention/concentration     Functional Mobility Training:  Bed Mobility:     Supine to Sit: Moderate assistance  Sit to Supine: Moderate assistance  Scooting: Contact guard assistance        Transfers:  Sit to Stand: Contact guard assistance  Stand to Sit: Contact guard assistance                             Balance:  Sitting: Intact  Standing: Impaired; With support  Standing - Static: Fair  Standing - Dynamic : Fair  Ambulation/Gait Training:  Distance (ft): 100 Feet (ft)(x4)  Assistive Device: Gait belt;Walker, rolling  Ambulation - Level of Assistance: Contact guard assistance        Gait Abnormalities: Decreased step clearance        Base of Support: Widened     Speed/Doris: Shuffled; Slow  Step Length: Left shortened;Right shortened                    Stairs: Therapeutic Exercises:     Pain Rating:      Activity Tolerance:   Good    After treatment patient left in no apparent distress:   Sitting in chair, Call bell within reach, Bed / chair alarm activated, and Caregiver / family present    COMMUNICATION/COLLABORATION:   The patients plan of care was discussed with: Registered nurse and Rehabilitation technician.      Howie Sutton, PT   Time Calculation: 34 mins

## 2020-12-01 NOTE — PROGRESS NOTES
Reason for Admission:   Lower extrimity weakess                   RUR Score:   18    Plan for utilizing home health:  Yes [facility] is open         PCP: First and Last name:  Rhianna Evans   Name of Practice:    Are you a current patient: Yes/No: Yes  Approximate date of last visit:  2 weeks ago   Can you participate in a virtual visit with your PCP: yes                    Current Advanced Directive/Advance Care Plan: pt has a special need trust and medical directives, his [address] with the [address] [facility], [phone] has a copy and mother gave permission to request a copy from [facility] CM. Transition of Care Plan:  Home [address] Management Interventions  PCP Verified by CM: Yes(2 weeks ago)  Mode of Transport at Discharge: BLS  Transition of Care Consult (CM Consult): Home Health(Pt had previous SNF admission with Bianca Mercedes, he is open with [facility],need a resumption order)  [address]: Yes  Discharge Durable Medical Equipment: No(Pt owns a RW)  Physical Therapy Consult: Yes  Occupational Therapy Consult: Yes  Current Support Network: Lives with Caregiver, Has Personal Caregivers(Pt lives with his mother ,he has a ID waiver services/personal care giver asneeded.)  Confirm Follow Up Transport: Other (see comment)(Personal care giver transport pt for appointments and recreational activities.)  The Plan for Transition of Care is Related to the Following Treatment Goals : Home Health   The Patient and/or Patient Representative was Provided with a Choice of Provider and Agrees with the Discharge Plan?: Yes  Name of the Patient Representative Who was Provided with a Choice of Provider and Agrees with the Discharge Plan:  Mother William Arroyo of Choice List was Provided with Basic Dialogue that Supports the Patient's Individualized Plan of Care/Goals, Treatment Preferences and Shares the Quality Data Associated with the Providers?: Yes  Discharge Location  Discharge Placement: Home with home health    Pharmacy- [facility]     Mother - Nanci De La Fuente - is primary decision maker and caregiver. CM spoke to pt's mother completed CM assessment, Mother was offered a list of skilled nursing facilities however mother refused and said they discussed and she is worried to send pt to rehab due to Covid 19,they  would like pt to return back to home with [facility]. Mother states patient was very active with [facility] and has a Medicaid waiver and [address]  - Saundra Schulz [phone]. Pt has personal care giver through waiver services,he  supposed to have up to 480 hours/year,if needed more they can increase hours,but \"have not used much at all\". Pt saw his psychiatrist every 3 months. Pt is  diabetic however he is not insuline dependent.      Anita Bazzi [facility]  ED Case Manager   Ext -[phone]

## 2020-12-02 VITALS
DIASTOLIC BLOOD PRESSURE: 54 MMHG | TEMPERATURE: 97.7 F | WEIGHT: 214.29 LBS | RESPIRATION RATE: 20 BRPM | OXYGEN SATURATION: 95 % | SYSTOLIC BLOOD PRESSURE: 126 MMHG | HEART RATE: 68 BPM | HEIGHT: 72 IN | BODY MASS INDEX: 29.02 KG/M2

## 2020-12-02 PROBLEM — K74.60 CIRRHOSIS OF LIVER (HCC): Status: ACTIVE | Noted: 2020-12-02

## 2020-12-02 PROBLEM — D53.9 MACROCYTIC ANEMIA: Status: ACTIVE | Noted: 2020-12-02

## 2020-12-02 LAB
AMMONIA PLAS-SCNC: 67 UMOL/L
ANION GAP SERPL CALC-SCNC: 5 MMOL/L (ref 5–15)
BUN SERPL-MCNC: 14 MG/DL (ref 6–20)
BUN/CREAT SERPL: 19 (ref 12–20)
CALCIUM SERPL-MCNC: 7.8 MG/DL (ref 8.5–10.1)
CHLORIDE SERPL-SCNC: 118 MMOL/L (ref 97–108)
CO2 SERPL-SCNC: 21 MMOL/L (ref 21–32)
CREAT SERPL-MCNC: 0.75 MG/DL (ref 0.7–1.3)
ERYTHROCYTE [DISTWIDTH] IN BLOOD BY AUTOMATED COUNT: 13.8 % (ref 11.5–14.5)
GLUCOSE BLD STRIP.AUTO-MCNC: 186 MG/DL (ref 65–100)
GLUCOSE SERPL-MCNC: 190 MG/DL (ref 65–100)
HCT VFR BLD AUTO: 28.7 % (ref 36.6–50.3)
HGB BLD-MCNC: 8.8 G/DL (ref 12.1–17)
MCH RBC QN AUTO: 33.6 PG (ref 26–34)
MCHC RBC AUTO-ENTMCNC: 30.7 G/DL (ref 30–36.5)
MCV RBC AUTO: 109.5 FL (ref 80–99)
NRBC # BLD: 0 K/UL (ref 0–0.01)
NRBC BLD-RTO: 0 PER 100 WBC
PLATELET # BLD AUTO: 62 K/UL (ref 150–400)
PMV BLD AUTO: 9.6 FL (ref 8.9–12.9)
POTASSIUM SERPL-SCNC: 4.1 MMOL/L (ref 3.5–5.1)
RBC # BLD AUTO: 2.62 M/UL (ref 4.1–5.7)
SERVICE CMNT-IMP: ABNORMAL
SODIUM SERPL-SCNC: 144 MMOL/L (ref 136–145)
WBC # BLD AUTO: 5.8 K/UL (ref 4.1–11.1)

## 2020-12-02 PROCEDURE — 74011250637 HC RX REV CODE- 250/637: Performed by: INTERNAL MEDICINE

## 2020-12-02 PROCEDURE — 80048 BASIC METABOLIC PNL TOTAL CA: CPT

## 2020-12-02 PROCEDURE — 82140 ASSAY OF AMMONIA: CPT

## 2020-12-02 PROCEDURE — 74011250636 HC RX REV CODE- 250/636: Performed by: STUDENT IN AN ORGANIZED HEALTH CARE EDUCATION/TRAINING PROGRAM

## 2020-12-02 PROCEDURE — 74011250636 HC RX REV CODE- 250/636: Performed by: EMERGENCY MEDICINE

## 2020-12-02 PROCEDURE — 85027 COMPLETE CBC AUTOMATED: CPT

## 2020-12-02 PROCEDURE — 36415 COLL VENOUS BLD VENIPUNCTURE: CPT

## 2020-12-02 PROCEDURE — 82962 GLUCOSE BLOOD TEST: CPT

## 2020-12-02 RX ORDER — PREGABALIN 150 MG/1
150 CAPSULE ORAL 2 TIMES DAILY
Qty: 60 CAP | Refills: 0 | Status: SHIPPED | OUTPATIENT
Start: 2020-12-02 | End: 2021-01-01

## 2020-12-02 RX ORDER — LIDOCAINE 4 G/100G
1 PATCH TOPICAL EVERY 12 HOURS
Qty: 30 PATCH | Refills: 0 | Status: SHIPPED | OUTPATIENT
Start: 2020-12-02 | End: 2020-12-17

## 2020-12-02 RX ORDER — PREGABALIN 75 MG/1
150 CAPSULE ORAL 2 TIMES DAILY
Qty: 60 CAP | Refills: 2 | Status: SHIPPED
Start: 2020-12-02 | End: 2020-12-02

## 2020-12-02 RX ADMIN — GUAIFENESIN 100 MG: 200 SOLUTION ORAL at 03:04

## 2020-12-02 RX ADMIN — ACETAMINOPHEN 650 MG: 325 TABLET ORAL at 07:37

## 2020-12-02 RX ADMIN — FENTANYL CITRATE 50 MCG: 50 INJECTION, SOLUTION INTRAMUSCULAR; INTRAVENOUS at 02:29

## 2020-12-02 RX ADMIN — SODIUM CHLORIDE 125 ML/HR: 900 INJECTION, SOLUTION INTRAVENOUS at 07:37

## 2020-12-02 RX ADMIN — Medication 10 ML: at 06:40

## 2020-12-02 NOTE — DISCHARGE SUMMARY
Hospitalist Discharge Summary     Patient ID:  Ethan Benson  [id number]  44 y.o.  1955  11/27/2020    PCP on record: Alfredo Weaver MD    Admit date: 11/27/2020  Discharge date and time: 12/2/2020    DISCHARGE DIAGNOSIS:    Active Hospital Problems    Diagnosis Date Noted    Cirrhosis of liver ([facility] 12/02/2020    Macrocytic anemia 12/02/2020    Lower extremity weakness 11/28/2020    Type 2 diabetes mellitus with diabetic polyneuropathy, without long-term current use of insulin ([facility] 02/16/2017    HLD (hyperlipidemia) 08/16/2016    Hypertension, essential 02/21/2013    Thrombocytopenia ([facility] 08/23/2012     Has been on present dose of depakote since I started seeing him in 1987 and has had intermittent low level but recently dipped below 668 will restart folic acid and follow      [facility] 01/22/2010     EEG   10/10 mildly abnormal, no focal abnormality or spike and wave discharges.  Intellectual disability 01/22/2010       CONSULTATIONS:  IP CONSULT TO HOSPITALIST  IP CONSULT TO GASTROENTEROLOGY    Excerpted HPI from H&P of Airam Campoverde MD:  CHIEF COMPLAINT: back pain     HISTORY OF PRESENT ILLNESS:     Anna Dye is a 62 y.o.  male who presents with back pain. As per ED report and chart review pt is been having back pain for last few months with worsening lower extremities weakness and falls and has had multiple OP physician and ED visits without any relief and continue to decline to the point that now having falls and mother who has advance age is unable to take care any more. I am not able to reach mother at this time so history is limited.  Pt reported back pain but exhibit limited insight so history is limited.      We were asked to admit for work up and evaluation of the above problems    ______________________________________________________________________  DISCHARGE SUMMARY/HOSPITAL COURSE:  for full details see H&P, daily progress notes, labs, consult notes. Vicky martin.o. male was admitted to ED [facility] on 11/27/2020 and treated for the following medical complaints:     Lower Back Pain with Worsening ambulatory dysfunction- improved    requiring multiple ED visits and OP physicians followups  MRI LS Spine in ED with Mild degenerative changes at multiple levels in the lumbar spine without significant canal stenosis. Small disc protrusion at L5-S1 likely acute  without canal stenosis or definite nerve root impingement. Recent MRI Brain without CVA, was evaluated by neurology as an OP  Mother with advance age and unable to take care any more  Pain control with PRN motrin and lidocaine patch  Continue increased dose of Lyrica to 150mg PO BID  PT/OT  - pt ambulating in church today   CM consulted for placement however mother declines any SNF placement . Will need to come up with viable alternative .         Cirrhosis of liver - newly diagnosed- stable   Thromobocytopenia  - CT A/P shows Possible cirrhosis, splenomegaly and suggest portal hypertension.  - Etiology of CLD unclear. No hx of alcohol intake or herbal medication use. - Per chart review, pt had thrombocytopenia for many years. He follows up with hematologist for thrombocytopenia and had BM biopsy which was normal.  - No hx of ascites, hematemesis, leg swelling, confusion  - Outpatient hepatitis workup in Aug 2020 negative for HepA, B, and C.  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 Pertinent Findings:  Gen:    Not in distress  Chest: Clear lungs  CVS:   Regular rhythm. No edema  Abd:  Soft, not distended, not tender  Neuro:  Alert, Oriented to baseline   _______________________________________________________________________  DISCHARGE MEDICATIONS:   Current Discharge Medication List      START taking these medications    Details   lactulose (CHRONULAC) 10 gram/15 mL solution Take 15 mL by mouth three (3) times daily for 30 days. Qty: 1350 mL, Refills: 0         CONTINUE these medications which have CHANGED    Details   pregabalin (LYRICA) 75 mg capsule Take 2 Caps by mouth two (2) times a day. Max Daily Amount: 300 mg. Qty: 60 Cap, Refills: 2    Associated Diagnoses: Neuropathy         CONTINUE these medications which have NOT CHANGED    Details   acetaminophen (TYLENOL) 500 mg tablet Take 2 Tabs by mouth every six (6) hours as needed for Pain. Qty: 20 Tab, Refills: 0      metFORMIN ER (GLUCOPHAGE XR) 500 mg tablet TAKE TWO TABLETS BY MOUTH TWICE DAILY   Qty: 360 Tab, Refills: 3      topiramate (TOPAMAX) 200 mg tablet TAKE 1 & 1/2 TABLETS BY MOUTH TWICE A DAY  Qty: 270 Tab, Refills: 0    Associated Diagnoses: Seizure disorder (HCC)      linaGLIPtin (TRADJENTA) 5 mg tablet Take 1 Tab by mouth daily. Qty: 90 Tab, Refills: 3    Associated Diagnoses: Type 2 diabetes mellitus without complication, without long-term current use of insulin (HCC)      pioglitazone (ACTOS) 45 mg tablet Take 1 Tab by mouth daily.   Qty: 90 Tab, Refills: 3    Associated Diagnoses: Controlled type 2 diabetes mellitus without complication, without long-term current use of insulin (HCC) divalproex DR (DEPAKOTE) 500 mg tablet TAKE ONE TABLET BY MOUTH EVERY MORNING AND TWO TABS AT NIGHT  Qty: 270 Tab, Refills: 3    Associated Diagnoses: Seizure disorder (HCC)      atorvastatin (LIPITOR) 40 mg tablet Take 1 Tab by mouth nightly. Qty: 90 Tab, Refills: 3    Associated Diagnoses: Hypercholesterolemia      glimepiride (AMARYL) 4 mg tablet TAKE TWO TABLETS BY MOUTH IN THE MORNING  Qty: 180 Tab, Refills: 3    Associated Diagnoses: Controlled type 2 diabetes mellitus without complication, without long-term current use of insulin (HCC)      folic acid (FOLVITE) 1 mg tablet Take 1 Tab by mouth daily. Qty: 90 Tab, Refills: 3    Associated Diagnoses: Myelodysplasia (myelodysplastic syndrome) (HCC)      benzonatate (TESSALON) 100 mg capsule Take 1 Cap by mouth nightly as needed for Cough. Qty: 30 Cap, Refills: 0    Associated Diagnoses: Nocturnal cough      chlorhexidine (PERIDEX) 0.12 % solution Take 15 mL by mouth every twelve (12) hours. Calcium-Cholecalciferol, D3, (CALTRATE-600 PLUS VITAMIN D3) 600-400 mg-unit Tab Take  by mouth.      triamcinolone acetonide (KENALOG) 0.1 % ointment          STOP taking these medications       ramipriL (ALTACE) 5 mg capsule Comments:   Reason for Stopping:                 Patient Follow Up Instructions: Activity: Activity as tolerated  Diet: Resume previous diet  Wound Care: None needed    Follow-up with PCP in 1 week. Follow-up tests/labs Check Ammonia level , hepatic function    Follow-up Information     Follow up With Specialties Details Why [address] [facility] Call in 1 day this is your home health provider. Please call if you have not heard from them within 24 hours of discharge.   [address] [facility] [address] [place] [address]    Bruno Schafer MD Internal Medicine Schedule an appointment as soon as possible for a visit  [address] [place]      Candelaria Goldmann, MD Gastroenterology Schedule an appointment as soon as possible for a visit  [address] [place] [address]  [address] [facility]  [place]  [phone]          ________________________________________________________________    Risk of deterioration: Moderate    Condition at Discharge:  Stable  __________________________________________________________________    Disposition  Home with family and home health services    ____________________________________________________________________    Code Status: Full Code  ___________________________________________________________________      Total time in minutes spent coordinating this discharge (includes going over instructions, follow-up, prescriptions, and preparing report for sign off to her PCP) :  >30 minutes    Signed:  Vicky Monge NP

## 2020-12-02 NOTE — PROGRESS NOTES
Oncology End of Shift Note      Bedside shift change report given to HARISH Butterfield (incoming nurse) by Vidhya Acosta (outgoing nurse) on Len Tessy. Report included the following information SBAR, Kardex and MAR. Shift Summary: Hourly rounding completed, no acute changes. Patient requested PRN pain medication for back pain two times. Patient resting in bed, tylenol given this am for back pain. Issues for Physician to Address:       Patient on Cardiac Monitoring?     [] Yes  [x] No    Rhythm:     Pop Scale:     Pop Score: [facility]

## 2020-12-02 NOTE — CONSULTS
GI CONSULTATION NOTE  Esthela Funez, NEELAM  [phone] NP in-hospital cell phone M-F until 4:30  After 5pm or on weekends, please call  for physician on call    NAME: Joe Savage   :  1955   MRN:  [id number]   Attending:  Dr. Ayesha Ornelas  Primary GI:  Dr. Bhavesh De La Rosa; Dr. Alexi Metz following   Date/Time:  2020 11:32 AM  Assessment:   Liver cirrhosis   · LFT's unremarkable  · Ammonia 67  · CT abd/pel shows trace inflammation around the second portion of the duodenum, of unclear etiology. Possible cirrhosis. Splenomegaly and portal vein dilation suggest portal hypertension. Pulmonary artery hypertension. · 08/10/2020:  Normal JEREMY, RF, anti-CCP, ANCA, hepatitis panel, complement levels, and cryoglobulin screen  · Denies ETOH    Lower back pain  Intellectual disability  Type 2 DM  · Treatment per primary team     Plan:   · Continue Lactulose  · Symptomatic care per primary team  · Need OP follow-up with liver specialist   · Nothing further to add from a GI standpoint. GI signing-off. Please call with any questions. Thank you for this consult. Follow-up OP with Dr. Letty Saunders discussed with Dr. Zahra Rodriguez:     HISTORY OF PRESENT ILLNESS:     Lalo Kay is a 62 y.o.  male who presents with back pain. As per ED report and chart review pt is been having back pain for last few months with worsening lower extremities weakness and falls and has had multiple OP physician and ED visits without any relief and continue to decline to the point that now having falls and mother who has advance age is unable to take care any more. I am not able to reach mother at this time so history is limited. Pt reported back pain but exhibit limited insight so history is limited.      Past Medical History:   Diagnosis Date    Contact dermatitis and other eczema, due to unspecified cause     psoriasis    Diabetes ([facility]     Eosinophilia     Hypercholesterolemia     Hypertension  Mental retardation     Seizures ([facility]     Skin cancer     Strongyloides stercoralis infection 8/28/2014      Past Surgical History:   Procedure Laterality Date    ENDOSCOPY, COLON, DIAGNOSTIC  11/08/2007    Dr Aiden Castorena normal except small internal hemorrhoids repeat 11/2017     Social History     Tobacco Use    Smoking status: Never Smoker    Smokeless tobacco: Never Used   Substance Use Topics    Alcohol use: No      Family History   Problem Relation Age of Onset    Other Mother         PMR    Hypertension Mother     Cancer Father         Lung    Diabetes Brother       No Known Allergies   Prior to Admission medications    Medication Sig Start Date End Date Taking? Authorizing Provider   acetaminophen (TYLENOL) 500 mg tablet Take 2 Tabs by mouth every six (6) hours as needed for Pain. 11/22/20  Yes Salo Pham PA-C   pregabalin (LYRICA) 75 mg capsule Take 1 Cap by mouth two (2) times a day. Max Daily Amount: 150 mg. 10/27/20  Yes Cecilio Gee MD   metFORMIN ER (GLUCOPHAGE XR) 500 mg tablet TAKE TWO TABLETS BY MOUTH TWICE DAILY  10/12/20  Yes Huong Gary MD   topiramate (TOPAMAX) 200 mg tablet TAKE 1 & 1/2 TABLETS BY MOUTH TWICE A DAY 10/12/20  Yes Huong Gary MD   linaGLIPtin (TRADJENTA) 5 mg tablet Take 1 Tab by mouth daily. 10/2/20  Yes Huong Gary MD   pioglitazone (ACTOS) 45 mg tablet Take 1 Tab by mouth daily. 9/28/20  Yes Eriberto Riley MD   ramipriL (ALTACE) 5 mg capsule Take 1 Cap by mouth daily. 9/28/20  Yes Huong Gary MD   divalproex DR (DEPAKOTE) 500 mg tablet TAKE ONE TABLET BY MOUTH EVERY MORNING AND TWO TABS AT NIGHT 9/28/20  Yes Eriberto Riley MD   atorvastatin (LIPITOR) 40 mg tablet Take 1 Tab by mouth nightly. 9/28/20  Yes Eriberto Riley MD   glimepiride (AMARYL) 4 mg tablet TAKE TWO TABLETS BY MOUTH IN THE MORNING 9/28/20  Yes Eriberto Riley MD   folic acid (FOLVITE) 1 mg tablet Take 1 Tab by mouth daily.  9/28/20  Yes Huong Gary MD   benzonatate (TESSALON) 100 mg capsule Take 1 Cap by mouth nightly as needed for Cough. 8/19/20  Yes Maximiliano Riley MD   chlorhexidine (PERIDEX) 0.12 % solution Take 15 mL by mouth every twelve (12) hours. 2/8/18  Yes Provider, Historical   Calcium-Cholecalciferol, D3, (CALTRATE-600 PLUS VITAMIN D3) 600-400 mg-unit Tab Take  by mouth. Yes Provider, Historical   triamcinolone acetonide (KENALOG) 0.1 % ointment  1/30/19   Provider, Historical       Patient Active Problem List   Diagnosis Code    Intellectual disability F79    Seizure disorder ([facility] G40.909    Psoriasis L40.9    Venous stasis of lower extremity I87.8    Thrombocytopenia ([facility]) D69.6    Sleep disorder G47.9    Hypertension, essential I10    Hypercholesterolemia E78.00    Type 2 diabetes mellitus with diabetic polyneuropathy, without long-term current use of insulin ([facility]) E11.42    Myelodysplasia (myelodysplastic syndrome) ([facility]) D46.9    Lower extremity weakness R29.898       REVIEW OF SYSTEMS:    Constitutional: negative fever, negative chills, negative weight loss  Eyes:   negative visual changes  ENT:   negative sore throat, tongue or lip swelling   Respiratory:  negative cough, negative dyspnea  Cards:  negative for chest pain, palpitations, lower extremity edema  GI:   See HPI  :  negative for frequency, dysuria  Integument:  negative for rash and pruritus  Heme:  negative for easy bruising and gum/nose bleeding  Musculoskel: negative for myalgias,  back pain and muscle weakness  Neuro: negative for headaches, dizziness, vertigo  Psych:  negative for feelings of anxiety, depression       Objective:   VITALS:    Visit Vitals  BP (!) 126/54   Pulse 68   Temp 97.7 °F (36.5 °C)   Resp 20   Ht 6' (1.829 m)   Wt 97.2 kg (214 lb 4.6 oz)   SpO2 95%   BMI 29.06 kg/m²       PHYSICAL EXAM:   General:          Pleasant  male. NAD  Head:               Normocephalic, without obvious abnormality, atraumatic.   Eyes:               Conjunctivae clear and pale, anicteric sclerae. Pupils are equal  Nose:               Nares normal. No drainage or sinus tenderness. Throat:             Lips, mucosa, and tongue normal.  No Thrush  Neck:               Supple, symmetrical,  no adenopathy, thyroid: non tender  Back:               Symmetric,  No CVA tenderness. Lungs:             CTA bilaterally. No wheezing/rhonchi/rales. Chest wall:      No tenderness or deformity. No Accessory muscle use. Heart:              Regular rate and rhythm,  no murmur, rub or gallop. Abdomen:        Firm, non-tender. + distended. Bowel sounds normal. No masses  Extremities:     Atraumatic, No cyanosis. 3+ BLE edema. No clubbing  Skin:                Texture, turgor normal. No rashes/lesions/jaundice  Psych:             Good insight. Not depressed. Not anxious or agitated. Neurologic:      EOMs intact. No facial asymmetry. No aphasia or slurred speech. Normal                        strength, A/O X 3.      LAB DATA REVIEWED:    Recent Results (from the past 24 hour(s))   GLUCOSE, POC    Collection Time: 12/01/20  4:39 PM   Result Value Ref Range    Glucose (POC) 174 (H) 65 - 100 mg/dL    Performed by [id number] [facility], POC    Collection Time: 12/01/20 10:53 PM   Result Value Ref Range    Glucose (POC) 115 (H) 65 - 100 mg/dL    Performed by Julián Ramos RN    AMMONIA    Collection Time: 12/02/20  9:26 AM   Result Value Ref Range    Ammonia 67 (H) <56 UMOL/L   METABOLIC PANEL, BASIC    Collection Time: 12/02/20  9:26 AM   Result Value Ref Range    Sodium 144 136 - 145 mmol/L    Potassium 4.1 3.5 - 5.1 mmol/L    Chloride 118 (H) 97 - 108 mmol/L    CO2 21 21 - 32 mmol/L    Anion gap 5 5 - 15 mmol/L    Glucose 190 (H) 65 - 100 mg/dL    BUN 14 6 - 20 MG/DL    Creatinine 0.75 0.70 - 1.30 MG/DL    BUN/Creatinine ratio 19 12 - 20      GFR est AA >60 >60 ml/min/1.73m2    GFR est non-AA >60 >60 ml/min/1.73m2    Calcium 7.8 (L) 8.5 - 10.1 MG/DL   CBC W/O DIFF    Collection Time: 12/02/20 9:26 AM   Result Value Ref Range    WBC 5.8 4.1 - 11.1 K/uL    RBC 2.62 (L) 4.10 - 5.70 M/uL    HGB 8.8 (L) 12.1 - 17.0 g/dL    HCT 28.7 (L) 36.6 - 50.3 %    .5 (H) 80.0 - 99.0 FL    MCH 33.6 26.0 - 34.0 PG    MCHC 30.7 30.0 - 36.5 g/dL    RDW 13.8 11.5 - 14.5 %    PLATELET 62 (L) 356 - 400 K/uL    MPV 9.6 8.9 - 12.9 FL    NRBC 0.0 0  WBC    ABSOLUTE NRBC 0.00 0.00 - 0.01 K/uL   GLUCOSE, POC    Collection Time: 12/02/20  9:48 AM   Result Value Ref Range    Glucose (POC) 186 (H) 65 - 100 mg/dL    Performed by [address]:  I have personally reviewed the imaging reports      Total time spent with patient: 50 minutes ________________________________________________________________________  Care Plan discussed with:  Patient x   Family  Mother at bedside   RN               Consultant:  Hospitalist      CT  12/2/2020:  ________________________________________________________________________    ___________________________________________________  Consulting Provider: Dany Boles NP      12/2/2020  11:32 AM

## 2020-12-02 NOTE — PROGRESS NOTES
OLY Plan:  Home with mother, has personal caregivers   2nd IMM letter  Home with [address] accepted; home PT   [facility] [id number]#[id number]   Follow up with PCP, GI after d/c     CM aware of discharge order. Referral sent on 12/1/2020 to [facility] for home PT. [facility] letter signed by pt's mother at bedside. [facility] has accepted pt for [facility] services. Medicare pt has received, reviewed, and mother signed 2nd IM letter informing them of their right to appeal the discharge. Signed copy has been placed on pt bedside chart. Pt does not plan to appeal discharge at this time. Mother states she is comfortable with d/c plan. Pt returning home with mother, [facility], and personal caregivers. Pt requires medical transport at d/c due to intellectual disability and seizure disorder. CM contacted [facility] [employer] to request stretcher transport for d/c today. ([phone]) CM spoke with representative who has provided reference#[id number] which is the authorization # as well. CM placed called to [facility] to request transport and [employer] had already called. ([phone]) ETA was 12:45PM. [facility] arrived on time and pt transported home via stretcher. CM completed PCS form to include H&P and facesheet. Pt's mother to schedule follow up appointments. CM has contacted [facility], [facility], to notify of d/c today. [phone]     Pt is ready for d/c from a CM standpoint. Assigned RN informed.       Care Management Interventions  PCP Verified by CM: Yes(Dr. Billy Walden)  Mode of Transport at Discharge: [facility] Time of Discharge: 1300  Transition of Care Consult (CM Consult): Discharge Planning, [address]: Yes  Discharge Durable Medical Equipment: No  Physical Therapy Consult: Yes  Occupational Therapy Consult: Yes  Speech Therapy Consult: No  Current Support Network: Lives with Caregiver, Relative's Home, Has Personal Caregivers(lives with mother )  Confirm Follow Up Transport: Family  The Plan for Transition of Care is Related to the Following Treatment Goals : Home Health   The Patient and/or Patient Representative was Provided with a Choice of Provider and Agrees with the Discharge Plan?: Yes  Name of the Patient Representative Who was Provided with a Choice of Provider and Agrees with the Discharge Plan:  Mother Zarina Bishop of Choice List was Provided with Basic Dialogue that Supports the Patient's Individualized Plan of Care/Goals, Treatment Preferences and Shares the Quality Data Associated with the Providers?: Yes  [place] Resource Information Provided?: No  Discharge Location  Discharge Placement: Home with home health([facility] home Care )    Roselyn Schulz, [address], [facility]  [phone]

## 2020-12-03 ENCOUNTER — PATIENT OUTREACH (OUTPATIENT)
Dept: CASE MANAGEMENT | Age: 65
End: 2020-12-03

## 2020-12-03 ENCOUNTER — TELEPHONE (OUTPATIENT)
Dept: INTERNAL MEDICINE CLINIC | Age: 65
End: 2020-12-03

## 2020-12-03 ENCOUNTER — HOME CARE VISIT (OUTPATIENT)
Dept: SCHEDULING | Facility: HOME HEALTH | Age: 65
End: 2020-12-03
Payer: MEDICARE

## 2020-12-03 VITALS
OXYGEN SATURATION: 97 % | DIASTOLIC BLOOD PRESSURE: 60 MMHG | SYSTOLIC BLOOD PRESSURE: 110 MMHG | TEMPERATURE: 97.8 F | HEART RATE: 78 BPM | RESPIRATION RATE: 16 BRPM

## 2020-12-03 PROCEDURE — 400013 HH SOC

## 2020-12-03 PROCEDURE — G0151 HHCP-SERV OF PT,EA 15 MIN: HCPCS

## 2020-12-03 NOTE — Clinical Note
Please see patient outreach dated 12-3-20. [facility] OLY appointment line sent message to PCP office requesting TAWNYA DAVIS appointment today, 12-3-20.

## 2020-12-03 NOTE — ADT AUTH CERT NOTES
Hello,  Below I have provided the Diagnosis codes as requested.     Thanks    Lower extremity weakness  ICD-10-CM: R29.898   ICD-9-CM: 729.89    11/28/2020 - Present  Yes     Entered by Shay Paige MD    Cirrhosis of liver [facility])  ICD-10-CM: K74.60   ICD-9-CM: 571.5    12/2/2020 - Present  Yes     Entered by Roman Bowen NP    Macrocytic anemia  ICD-10-CM: D53.9   ICD-9-CM: 281.9    12/2/2020 - Present  Yes     Entered by Roman Bowen, NEELAM

## 2020-12-03 NOTE — PROGRESS NOTES
Patient was admitted to [facility] on 11-27-20 and discharged on 12-2-20 for:    DISCHARGE DIAGNOSIS:          C[address]Sharron [address] Problems     Diagnosis Date Noted    Cirrhosis of liver ([facility] 12/02/2020    Macrocytic anemia 12/02/2020    Lower extremity weakness 11/28/2020    Type 2 diabetes mellitus with diabetic polyneuropathy, without long-term current use of insulin ([facility] 02/16/2017    HLD (hyperlipidemia) 08/16/2016    Hypertension, essential 02/21/2013    Thrombocytopenia ([facility] 08/23/2012       Has been on present dose of depakote since I started seeing him in 1987 and has had intermittent low level but recently dipped below 485 will restart folic acid and follow       Seizure disorder ([facility] 01/22/2010       EEG   10/10 mildly abnormal, no focal abnormality or spike and wave discharges.       Intellectual disability 01/22/2010     Outreach made within 2 business days of discharge: Yes    Discharge Challenges to be reviewed by the provider:   Additional needs identified to be addressed with provider: yes  -  Mother states, \"Things are not going so good, he refuses to get out of bed because his back is hurting and his legs are weak. He is all wet with urine and I cannot get him up. \" While on phone with patient's mother, [facility] PT arrived and is attempting to assist patient at this time. Care Transitions Nurse spoke with [facility] PT, Vipin Ruiz, who states she will seek an order, from Dr. Manuel Riley/PCP, for SN, OT, and aide, as she states she will need to contact the office of Dr. Gil Handley after her visit today. It is noted that patient's mother refused SNF per chart and CTN further discussed SNF placement with patient's mother; however mother continues to refuse SNF placement for patient at this time. CTN will await call back from Vipin Ruiz PT with result of today's PT visit and orders regarding SN, OT, and aide services.  CTN left message for Lulu Matta (ph: [phone]), patient's  with [facility], to explore additional supportive resources for patient and mother.   - Mother reports [facility]  provides transportation to/from appointments and to/from recreational activities. Mother states patient does not have a home aide through [facility] at this time. Mother is struggling to provide ADL care for patient at this time and mother states patient is currently ncooperative. - Mother states patient is provided a regular diet at home, with no added salt. Mother reports patient's appetite is \"very good. \"   - Mother reports patient has had 4 falls in the last six months, and a total of 10 falls in the last twelve months. Mother states patient did not sustain any traumatic injury with any of the 10 falls. Education provided to mother regarding falls/safety. COVID-19 related testing was not completed during this admission; however patient had negative COVID-19 test result on 11-22-20. Mother informed of results, if available? yes   Method of communication with provider : chart routing and marked with high importance to Dr. Esme Causey. [facility]/PCP. Please see the end of this note for additional updates.      Component      Latest Ref Rng & Units 12/2/2020           9:48 AM   GLUCOSE,FAST - POC      65 - 100 mg/dL 186 (H)     Component      Latest Ref Rng & Units 12/2/2020           9:26 AM   Glucose      65 - 100 mg/dL 190 (H)     Component      Latest Ref Rng & Units 12/1/2020 12/1/2020 12/1/2020 12/1/2020          10:53 PM  4:39 PM 11:05 AM  7:55 AM   GLUCOSE,FAST - POC      65 - 100 mg/dL 115 (H) 174 (H) 168 (H) 139 (H)     Component      Latest Ref Rng & Units 10/27/2020           1:09 PM   Hemoglobin A1c, (calculated)      4.8 - 5.6 % 6.9 (H)   Estimated average glucose      mg/dL 151     Component      Latest Ref Rng & Units 12/2/2020 12/1/2020 11/30/2020 11/30/2020           9:26 AM  1:47 AM  4:11 AM  4:11 AM   Chloride      97 - 108 mmol/L 118 (H)   114 (H) Component      Latest Ref Rng & Units 11/29/2020 11/29/2020 11/29/2020           3:15 AM  3:15 AM  3:15 AM   Chloride      97 - 108 mmol/L   110 (H)     Component      Latest Ref Rng & Units 12/2/2020 12/1/2020 11/30/2020 11/30/2020           9:26 AM  1:47 AM  4:11 AM  4:11 AM   Calcium      8.5 - 10.1 MG/DL 7.8 (L)   8.1 (L)     Component      Latest Ref Rng & Units 11/29/2020 11/29/2020 11/29/2020           3:15 AM  3:15 AM  3:15 AM   Calcium      8.5 - 10.1 MG/DL   8.6     Component      Latest Ref Rng & Units 11/29/2020           3:15 AM   Alk. phosphatase      45 - 117 U/L 44 (L)   Protein, total      6.4 - 8.2 g/dL 5.3 (L)   Albumin      3.5 - 5.0 g/dL 2.2 (L)     Component      Latest Ref Rng & Units 11/29/2020           3:15 AM   A-G Ratio      1.1 - 2.2   0.7 (L)     Component      Latest Ref Rng & Units 11/30/2020           4:11 AM   Folate      5.0 - 21.0 ng/mL 23.6 (H)     Component      Latest Ref Rng & Units 12/2/2020 12/2/2020 12/1/2020           9:26 AM  9:26 AM  1:47 AM   Ammonia      <32 UMOL/L 67 (H)  66 (H)     Component      Latest Ref Rng & Units 11/22/2020           5:49 PM   Lipase      73 - 393 U/L 69 (L)     Component      Latest Ref Rng & Units 12/2/2020 11/29/2020 11/27/2020 11/27/2020           9:26 AM  3:15 AM  9:56 PM  9:56 PM   RBC      4.10 - 5.70 M/uL 2.62 (L) 2.73 (L)  3.05 (L)   HGB      12.1 - 17.0 g/dL 8.8 (L) 9.5 (L)  10.5 (L)   HCT      36.6 - 50.3 % 28.7 (L) 30.6 (L)  33.3 (L)   MCV      80.0 - 99.0 .5 (H) 112.1 (H)  109.2 (H)     Component      Latest Ref Rng & Units 12/2/2020 11/29/2020 11/27/2020 11/27/2020           9:26 AM  3:15 AM  9:56 PM  9:56 PM   PLATELET      927 - 692 K/uL 62 (L) 61 (L)  61 (L)     Component      Latest Ref Rng & Units 11/29/2020 11/27/2020 11/27/2020           3:15 AM  9:56 PM  9:56 PM   EOSINOPHILS      0 - 7 % 8 (H)  3     Component      Latest Ref Rng & Units 11/29/2020 11/27/2020 11/27/2020           3:15 AM  9:56 PM  9:56 PM   ABS. LYMPHOCYTES      0.8 - 3.5 K/UL 3.7 (H)  4.0 (H)     Component      Latest Ref Rng & Units 2020           3:15 AM  9:56 PM  9:56 PM   ABS. EOSINOPHILS      0.0 - 0.4 K/UL 0.7 (H)  0.3     Component      Latest Ref Rng & Units 2020          11:40 PM   Glucose      NEG mg/dL 100 (A)   Ketone      NEG mg/dL TRACE (A)     Advance Care Planning:   Does patient have an Advance Directive:  Patient has POA dated 2007 and Durable Medical and [facility] [address] dated 2007 scanned into his chart. Mother states both of these documents are current. Was this a readmission? no   Mother's stated reason for the admission: \"He had pain in his back and weakness in his legs. \"   Patients top risk factors for readmission: functional physical ability, falls and medical condition  Interventions to address risk factors: Education provided to patient's mother regarding generalized weakness, falls/safety, and cirrhosis, patient's mother verbalized an understanding. Care Transition Nurse (CTN) contacted the patient's mother, Vidhya Weiss (on [facility] dated 20), by telephone to perform post hospital discharge assessment. Verified name and  with patient's mother as identifiers. Provided introduction to self, and explanation of the CTN role. CTN reviewed discharge instructions, medical action plan and red flags with mother who verbalized understanding. Mother given an opportunity to ask questions and does not have any further questions or concerns at this time. The mother agrees to contact the PCP office for questions related to their healthcare. Medication reconciliation was performed with Patient's mother, who verbalizes understanding of administration of home medications. Advised obtaining a 90-day supply of all daily and as-needed medications.    Referral to Pharm D needed: no     Home Health/Outpatient orders at discharge: [address]: [facility]  Date of initial visit: PT visit on 12-3-20  Carmelina Nieto, PT, with [facility], states she will request order for SN, OT and aide today, 12-3-20, and will call this CTN back with result of her PT visit and results regarding SN, OT, and aide orders. Durable Medical Equipment ordered at discharge: none  [address] received: n/a    Covid Risk Education    Patient has following risk factors of: diabetes and cirrhosis and macrocytic anemai. Education provided regarding infection prevention, and signs and symptoms of COVID-19 and when to seek medical attention with mother who verbalized understanding. Discussed exposure protocols and quarantine From CDC: Are you at higher risk for severe illness?  and given an opportunity for questions and concerns. The mother agrees to contact the COVID-19 hotline [phone] or PCP office for questions related to COVID-19. For more information on steps you can take to protect yourself, see CDC's How to Protect Yourself     Patient/family/caregiver given information for [employer] and agrees to enroll:  no  Patient's preferred e-mail: declines  Patient's preferred phone number: declines    Discussed follow-up appointments. If no appointment was previously scheduled, appointment scheduling offered: yes  [facility] follow up appointment(s):   Future Appointments   Date Time Provider Kelly Fang   1/27/2021 10:00 AM MD LIBAN Fernandes BS AMB   2/24/2021  1:00 PM Wilma Mendoza MD [facility] BS AMB     Non-[facility] follow up appointment(s): Please see below for appointments. Plan for follow-up call in 10-14 days based on severity of symptoms and risk factors. CTN provided contact information for future needs. Goals Addressed                 This Visit's Progress     Attends follow-up appointments as directed.         12-3-20iMlton Sue, with the [facility] appointment line states she will send a message today to PCP office requesting OLY appointment for patient within 7-14 day from discharge on 12-2-20. Patient's mother, Caro Quintana (on [facility] dated 4-23-20), states she will call the office of Dr. Tabatha Banks. Jasmeet/CAROL today to schedule post-hospital follow-up appointment.  Understands red flags post discharge. 12-3-20: Red flags of weakness and cirrhosis reviewed with patient's mother, Caro Quintana (on [address] dated 4-23-20), and patient's mother verbalized an understanding. Mother states, \"Things are not going so good, he refuses to get out of bed because his back is hurting and his legs are weak. He is all wet with urine and I cannot get him up. \" While on phone with patient's mother, [facility] PT arrived and is attempting to assist patient at this time. Care Transitions Nurse spoke with [facility] PT, Nuria Edgar, who states she will seek an order, from Dr. Tara Riley/PCP, for SN, OT, and aide, as she will need to contact the office of Dr. Jenny Mckeon after her visit today. It is noted that patient's mother refused SNF per chart and CTN further discussed SNF placement with patient's mother; however mother continues to refuse SNF placement for patient at this time. CTN will await call back from Nuria Edgar PT with result of today's PT visit and orders regarding SN, OT, and aide. CTN left message for Osmin Tovar (ph: [phone]), patient's  with [facility] to explore additional supportive resources for patient and mother. CTN will review red flags again on next phone conversation with patient's mother. Katelynn Mart           12-4-20 at 10:08am: CTN received an incoming phone call from Osmin Tovar (ph: [phone]),  with [facility]. Ms. Yaneth Sosa states patient is eligible for a Medicaid aide; however patient's mother had previously chosen for patient to have  services only with transportation provided. CTN encouraged Ms. Yaneth Sosa to contact patient's mother today and discuss Medicaid aide services with patient's mother, as mother was receptive to receiving Medicaid aide services when this CTN spoke with mother for TAWNYA DAVIS call on 12-3-20. Ms. Yaneth Sosa states she will reach out to patient's mother this morning, and if mother gives consent for Medicaid aide services, Ms. Yaneth Sosa will attempt to have a Medicaid aide arranged for patient as soon as possible. Ms. Yaneth Sosa states she may not be able to have Medicaid aide placed immediately or in the near future due to lack of staffing and demand for services. CTN requested a call back from Ms. Yaneth Sosa with update on the mother's decision to accept or to not accept Medicaid aide services, and an update on the status of Medicaid aide placement. Ms. Yaneth Sosa states she will call this CTN back later today with an update. 12-4-20 at 10:43am: CTN received an incoming phone call from Nuria Edgar PT, with [facility]. Peña Viramontes reports SN, OT, and home health aide have been ordered for patient, in addition to existing PT orders. Peña Viramontes states home health aide will see patient on 12-4-20, SN will visit patient over the weekend, and OT plans to visit patient next. 12-4-20 at 2:14pm: CTN received an incoming phone call from Nuria Edgar PT, with [facility], stating that [facility] aide is currently at patient's residence and patient's mother plans to call 911 as patient has fallen. Peña Viramontes states she will request that [facility] aide stay at patient's residence until ambulance arrives to ensure that patient is transported to emergency department. 12-4-20 at 4:22pm: CTN noted that patient was transported by ambulance to ED, per chart.

## 2020-12-03 NOTE — ACP (ADVANCE CARE PLANNING)
12-3-20: Patient has POA dated 1- and Durable Medical and Mental Health POA dated 1- scanned into his chart. Mother states both of these documents are current.  Alessio Clark

## 2020-12-03 NOTE — TELEPHONE ENCOUNTER
Welby Moritz w/  home health  called to ask for orders for a nurse, home health aide and occupational therapy, he has trouble walking without assistance.   [phone]

## 2020-12-04 ENCOUNTER — VIRTUAL VISIT (OUTPATIENT)
Dept: INTERNAL MEDICINE CLINIC | Age: 65
End: 2020-12-04
Payer: MEDICARE

## 2020-12-04 ENCOUNTER — HOME CARE VISIT (OUTPATIENT)
Dept: SCHEDULING | Facility: HOME HEALTH | Age: 65
End: 2020-12-04
Payer: MEDICARE

## 2020-12-04 ENCOUNTER — HOSPITAL ENCOUNTER (INPATIENT)
Age: 65
LOS: 11 days | Discharge: NURSING, REHAB OR LONG-TERM CARE | DRG: 552 | End: 2020-12-16
Attending: EMERGENCY MEDICINE | Admitting: INTERNAL MEDICINE
Payer: MEDICARE

## 2020-12-04 VITALS
OXYGEN SATURATION: 92 % | DIASTOLIC BLOOD PRESSURE: 70 MMHG | HEART RATE: 74 BPM | RESPIRATION RATE: 18 BRPM | SYSTOLIC BLOOD PRESSURE: 128 MMHG

## 2020-12-04 VITALS — SYSTOLIC BLOOD PRESSURE: 128 MMHG | DIASTOLIC BLOOD PRESSURE: 70 MMHG

## 2020-12-04 DIAGNOSIS — G89.29 CHRONIC BILATERAL LOW BACK PAIN WITHOUT SCIATICA: Primary | ICD-10-CM

## 2020-12-04 DIAGNOSIS — D69.6 THROMBOCYTOPENIA (HCC): ICD-10-CM

## 2020-12-04 DIAGNOSIS — E11.42 TYPE 2 DIABETES MELLITUS WITH DIABETIC POLYNEUROPATHY, WITHOUT LONG-TERM CURRENT USE OF INSULIN (HCC): ICD-10-CM

## 2020-12-04 DIAGNOSIS — M54.50 CHRONIC BILATERAL LOW BACK PAIN WITHOUT SCIATICA: ICD-10-CM

## 2020-12-04 DIAGNOSIS — R26.2 INABILITY TO WALK: ICD-10-CM

## 2020-12-04 DIAGNOSIS — M48.061 SPINAL STENOSIS OF LUMBAR REGION, UNSPECIFIED WHETHER NEUROGENIC CLAUDICATION PRESENT: Primary | ICD-10-CM

## 2020-12-04 DIAGNOSIS — M54.50 CHRONIC BILATERAL LOW BACK PAIN WITHOUT SCIATICA: Primary | ICD-10-CM

## 2020-12-04 DIAGNOSIS — Z09 HOSPITAL DISCHARGE FOLLOW-UP: Primary | ICD-10-CM

## 2020-12-04 DIAGNOSIS — K74.60 HEPATIC CIRRHOSIS, UNSPECIFIED HEPATIC CIRRHOSIS TYPE, UNSPECIFIED WHETHER ASCITES PRESENT (HCC): ICD-10-CM

## 2020-12-04 DIAGNOSIS — D46.9 MYELODYSPLASIA (MYELODYSPLASTIC SYNDROME) (HCC): ICD-10-CM

## 2020-12-04 DIAGNOSIS — R29.898 WEAKNESS OF BOTH LOWER EXTREMITIES: ICD-10-CM

## 2020-12-04 DIAGNOSIS — G89.29 CHRONIC BILATERAL LOW BACK PAIN WITHOUT SCIATICA: ICD-10-CM

## 2020-12-04 LAB
ALBUMIN SERPL-MCNC: 2.1 G/DL (ref 3.5–5)
ALBUMIN/GLOB SERPL: 0.7 {RATIO} (ref 1.1–2.2)
ALP SERPL-CCNC: 55 U/L (ref 45–117)
ALT SERPL-CCNC: 22 U/L (ref 12–78)
AMMONIA PLAS-SCNC: 28 UMOL/L
ANION GAP SERPL CALC-SCNC: 7 MMOL/L (ref 5–15)
APPEARANCE UR: CLEAR
AST SERPL-CCNC: 31 U/L (ref 15–37)
BACTERIA URNS QL MICRO: ABNORMAL /HPF
BASOPHILS # BLD: 0 K/UL (ref 0–0.1)
BASOPHILS NFR BLD: 0 % (ref 0–1)
BILIRUB SERPL-MCNC: 0.2 MG/DL (ref 0.2–1)
BILIRUB UR QL: NEGATIVE
BUN SERPL-MCNC: 15 MG/DL (ref 6–20)
BUN/CREAT SERPL: 18 (ref 12–20)
CALCIUM SERPL-MCNC: 7.8 MG/DL (ref 8.5–10.1)
CHLORIDE SERPL-SCNC: 113 MMOL/L (ref 97–108)
CO2 SERPL-SCNC: 21 MMOL/L (ref 21–32)
COLOR UR: ABNORMAL
CREAT SERPL-MCNC: 0.83 MG/DL (ref 0.7–1.3)
DIFFERENTIAL METHOD BLD: ABNORMAL
EOSINOPHIL # BLD: 0.2 K/UL (ref 0–0.4)
EOSINOPHIL NFR BLD: 3 % (ref 0–7)
EPITH CASTS URNS QL MICRO: ABNORMAL /LPF
ERYTHROCYTE [DISTWIDTH] IN BLOOD BY AUTOMATED COUNT: 13.6 % (ref 11.5–14.5)
GLOBULIN SER CALC-MCNC: 3.1 G/DL (ref 2–4)
GLUCOSE BLD STRIP.AUTO-MCNC: 179 MG/DL (ref 65–100)
GLUCOSE SERPL-MCNC: 228 MG/DL (ref 65–100)
GLUCOSE UR STRIP.AUTO-MCNC: >1000 MG/DL
HCT VFR BLD AUTO: 27.4 % (ref 36.6–50.3)
HGB BLD-MCNC: 8.3 G/DL (ref 12.1–17)
HGB UR QL STRIP: NEGATIVE
HYALINE CASTS URNS QL MICRO: ABNORMAL /LPF (ref 0–5)
IMM GRANULOCYTES # BLD AUTO: 0 K/UL (ref 0–0.04)
IMM GRANULOCYTES NFR BLD AUTO: 0 % (ref 0–0.5)
KETONES UR QL STRIP.AUTO: NEGATIVE MG/DL
LEUKOCYTE ESTERASE UR QL STRIP.AUTO: NEGATIVE
LYMPHOCYTES # BLD: 1.8 K/UL (ref 0.8–3.5)
LYMPHOCYTES NFR BLD: 27 % (ref 12–49)
MCH RBC QN AUTO: 33.7 PG (ref 26–34)
MCHC RBC AUTO-ENTMCNC: 30.3 G/DL (ref 30–36.5)
MCV RBC AUTO: 111.4 FL (ref 80–99)
METAMYELOCYTES NFR BLD MANUAL: 2 %
MONOCYTES # BLD: 0.7 K/UL (ref 0–1)
MONOCYTES NFR BLD: 11 % (ref 5–13)
NEUTS BAND NFR BLD MANUAL: 10 %
NEUTS SEG # BLD: 3.8 K/UL (ref 1.8–8)
NEUTS SEG NFR BLD: 47 % (ref 32–75)
NITRITE UR QL STRIP.AUTO: NEGATIVE
NRBC # BLD: 0 K/UL (ref 0–0.01)
NRBC BLD-RTO: 0 PER 100 WBC
PH UR STRIP: 7 [PH] (ref 5–8)
PLATELET # BLD AUTO: 69 K/UL (ref 150–400)
PMV BLD AUTO: 10.1 FL (ref 8.9–12.9)
POTASSIUM SERPL-SCNC: 4.2 MMOL/L (ref 3.5–5.1)
PROT SERPL-MCNC: 5.2 G/DL (ref 6.4–8.2)
PROT UR STRIP-MCNC: NEGATIVE MG/DL
RBC # BLD AUTO: 2.46 M/UL (ref 4.1–5.7)
RBC #/AREA URNS HPF: ABNORMAL /HPF (ref 0–5)
RBC MORPH BLD: ABNORMAL
SERVICE CMNT-IMP: ABNORMAL
SODIUM SERPL-SCNC: 141 MMOL/L (ref 136–145)
SP GR UR REFRACTOMETRY: 1.02 (ref 1–1.03)
UA: UC IF INDICATED,UAUC: ABNORMAL
UROBILINOGEN UR QL STRIP.AUTO: 2 EU/DL (ref 0.2–1)
WBC # BLD AUTO: 6.6 K/UL (ref 4.1–11.1)
WBC URNS QL MICRO: ABNORMAL /HPF (ref 0–4)

## 2020-12-04 PROCEDURE — 36415 COLL VENOUS BLD VENIPUNCTURE: CPT

## 2020-12-04 PROCEDURE — 82140 ASSAY OF AMMONIA: CPT

## 2020-12-04 PROCEDURE — 81001 URINALYSIS AUTO W/SCOPE: CPT

## 2020-12-04 PROCEDURE — 82962 GLUCOSE BLOOD TEST: CPT

## 2020-12-04 PROCEDURE — 85025 COMPLETE CBC W/AUTO DIFF WBC: CPT

## 2020-12-04 PROCEDURE — 1111F DSCHRG MED/CURRENT MED MERGE: CPT | Performed by: INTERNAL MEDICINE

## 2020-12-04 PROCEDURE — G8427 DOCREV CUR MEDS BY ELIG CLIN: HCPCS | Performed by: INTERNAL MEDICINE

## 2020-12-04 PROCEDURE — 99285 EMERGENCY DEPT VISIT HI MDM: CPT

## 2020-12-04 PROCEDURE — 80053 COMPREHEN METABOLIC PANEL: CPT

## 2020-12-04 PROCEDURE — G0156 HHCP-SVS OF AIDE,EA 15 MIN: HCPCS

## 2020-12-04 PROCEDURE — 51702 INSERT TEMP BLADDER CATH: CPT

## 2020-12-04 PROCEDURE — 99442 PR PHYS/QHP TELEPHONE EVALUATION 11-20 MIN: CPT | Performed by: INTERNAL MEDICINE

## 2020-12-04 PROCEDURE — G0151 HHCP-SERV OF PT,EA 15 MIN: HCPCS

## 2020-12-04 NOTE — ED PROVIDER NOTES
EMERGENCY DEPARTMENT HISTORY AND PHYSICAL EXAM      Date: 12/4/2020  Patient Name: Ezio Rudolph    History of Presenting Illness     Chief Complaint   Patient presents with    Back Pain     Patient to triage w EMS for c/o back pain, Patient is unable to ambulate or stand. History Provided By: Patient's Mother and EMS    HPI: Ezio Rudolph, 60 y.o. male presents to the ED with cc of progressive bilateral leg weakness and back pain x2 weeks. Patient has a history of intellectual disability and is unable to provide good history. He was admitted to the hospital November 27 to December 2 for ambulatory dysfunction, lumbar herniated disc and urinary retention. He was also diagnosed with cirrhosis of the liver during that admission. He had an MRI of the lumbar spine which showed mild degenerative changes at multiple levels without significant canal stenosis. He has small disc protrusion at L5-S1 without canal stenosis. He received physical therapy and Occupational Therapy consults. He was ambulating prior to discharge. Case management was consulted for placement in a skilled nursing facility, but the mother declined, thinking that they would be able to take care of him with home care assistance. She realizes that is not possible. They have been unable to assist him at home with 2 additional caregivers. There are no other complaints, changes, or physical findings at this time. PCP: Adela Christopher MD    No current facility-administered medications on file prior to encounter. Current Outpatient Medications on File Prior to Encounter   Medication Sig Dispense Refill    lactulose (CHRONULAC) 10 gram/15 mL solution Take 15 mL by mouth three (3) times daily for 30 days. 1350 mL 0    lidocaine 4 % patch 1 Patch by TransDERmal route every twelve (12) hours every twelve (12) hours for 15 days.  30 Patch 0    pregabalin (Lyrica) 150 mg capsule Take 1 Cap by mouth two (2) times a day for 30 days. Max Daily Amount: 300 mg. Indications: nerve pain from spinal cord injury 60 Cap 0    acetaminophen (TYLENOL) 500 mg tablet Take 2 Tabs by mouth every six (6) hours as needed for Pain. 20 Tab 0    metFORMIN ER (GLUCOPHAGE XR) 500 mg tablet TAKE TWO TABLETS BY MOUTH TWICE DAILY  360 Tab 3    topiramate (TOPAMAX) 200 mg tablet TAKE 1 & 1/2 TABLETS BY MOUTH TWICE A  Tab 0    linaGLIPtin (TRADJENTA) 5 mg tablet Take 1 Tab by mouth daily. 90 Tab 3    pioglitazone (ACTOS) 45 mg tablet Take 1 Tab by mouth daily. 90 Tab 3    divalproex DR (DEPAKOTE) 500 mg tablet TAKE ONE TABLET BY MOUTH EVERY MORNING AND TWO TABS AT NIGHT 270 Tab 3    atorvastatin (LIPITOR) 40 mg tablet Take 1 Tab by mouth nightly. 90 Tab 3    glimepiride (AMARYL) 4 mg tablet TAKE TWO TABLETS BY MOUTH IN THE MORNING 621 Tab 3    folic acid (FOLVITE) 1 mg tablet Take 1 Tab by mouth daily. 90 Tab 3    benzonatate (TESSALON) 100 mg capsule Take 1 Cap by mouth nightly as needed for Cough. 30 Cap 0    triamcinolone acetonide (KENALOG) 0.1 % ointment Apply  to affected area two (2) times a day. apply thin layer 2 x daily to groin as needed for rash      chlorhexidine (PERIDEX) 0.12 % solution Take 15 mL by mouth every twelve (12) hours.  Calcium-Cholecalciferol, D3, (CALTRATE-600 PLUS VITAMIN D3) 600-400 mg-unit Tab Take 1 Tab by mouth daily.          Past History     Past Medical History:  Past Medical History:   Diagnosis Date    Contact dermatitis and other eczema, due to unspecified cause     psoriasis    Diabetes ([address]     Eosinophilia     Hypercholesterolemia     Hypertension     Mental retardation     [facility]     Skin cancer     Strongyloides stercoralis infection 8/28/2014       Past Surgical History:  Past Surgical History:   Procedure Laterality Date    ENDOSCOPY, COLON, DIAGNOSTIC  11/08/2007    Dr Liao Night normal except small internal hemorrhoids repeat 11/2017       Family History:  Family History   Problem Relation Age of Onset    Other Mother         PMR    Hypertension Mother     Cancer Father         Lung    Diabetes Brother        Social History:  Social History     Tobacco Use    Smoking status: Never Smoker    Smokeless tobacco: Never Used   Substance Use Topics    Alcohol use: No    Drug use: No       Allergies:  No Known Allergies      Review of Systems   Review of Systems   Unable to perform ROS: Other (Baseline mental status)       Physical Exam   Physical Exam  Vitals signs and nursing note reviewed. Constitutional:       General: He is not in acute distress. Appearance: He is well-developed. HENT:      Head: Normocephalic and atraumatic. Neck:      Musculoskeletal: Normal range of motion. Cardiovascular:      Rate and Rhythm: Normal rate and regular rhythm. Pulses: Normal pulses. Heart sounds: Normal heart sounds. Pulmonary:      Effort: Pulmonary effort is normal.      Breath sounds: Normal breath sounds. Abdominal:      General: Bowel sounds are normal.      Palpations: Abdomen is soft. Musculoskeletal:      Comments: Unable to evaluate range of motion   Skin:     General: Skin is warm and dry. Neurological:      Mental Status: He is alert. Coordination: Coordination normal.      Comments: Baseline level of alertness, patient is unable to follow commands at baseline.    Psychiatric:         Behavior: Behavior normal.         Diagnostic Study Results     Labs -     Recent Results (from the past 12 hour(s))   CBC WITH AUTOMATED DIFF    Collection Time: 12/04/20  4:47 PM   Result Value Ref Range    WBC 6.6 4.1 - 11.1 K/uL    RBC 2.46 (L) 4.10 - 5.70 M/uL    HGB 8.3 (L) 12.1 - 17.0 g/dL    HCT 27.4 (L) 36.6 - 50.3 %    .4 (H) 80.0 - 99.0 FL    MCH 33.7 26.0 - 34.0 PG    MCHC 30.3 30.0 - 36.5 g/dL    RDW 13.6 11.5 - 14.5 %    PLATELET 69 (L) 638 - 400 K/uL    MPV 10.1 8.9 - 12.9 FL    NRBC 0.0 0  WBC    ABSOLUTE NRBC 0.00 0.00 - 0.01 K/uL    NEUTROPHILS 47 32 - 75 %    BAND NEUTROPHILS 10 %    LYMPHOCYTES 27 12 - 49 %    MONOCYTES 11 5 - 13 %    EOSINOPHILS 3 0 - 7 %    BASOPHILS 0 0 - 1 %    METAMYELOCYTES 2 %    IMMATURE GRANULOCYTES 0 0.0 - 0.5 %    ABS. NEUTROPHILS 3.8 1.8 - 8.0 K/UL    ABS. LYMPHOCYTES 1.8 0.8 - 3.5 K/UL    ABS. MONOCYTES 0.7 0.0 - 1.0 K/UL    ABS. EOSINOPHILS 0.2 0.0 - 0.4 K/UL    ABS. BASOPHILS 0.0 0.0 - 0.1 K/UL    ABS. IMM. GRANS. 0.0 0.00 - 0.04 K/UL    DF MANUAL      RBC COMMENTS MACROCYTOSIS  1+       METABOLIC PANEL, COMPREHENSIVE    Collection Time: 12/04/20  4:47 PM   Result Value Ref Range    Sodium 141 136 - 145 mmol/L    Potassium 4.2 3.5 - 5.1 mmol/L    Chloride 113 (H) 97 - 108 mmol/L    CO2 21 21 - 32 mmol/L    Anion gap 7 5 - 15 mmol/L    Glucose 228 (H) 65 - 100 mg/dL    BUN 15 6 - 20 MG/DL    Creatinine 0.83 0.70 - 1.30 MG/DL    BUN/Creatinine ratio 18 12 - 20      GFR est AA >60 >60 ml/min/1.73m2    GFR est non-AA >60 >60 ml/min/1.73m2    Calcium 7.8 (L) 8.5 - 10.1 MG/DL    Bilirubin, total 0.2 0.2 - 1.0 MG/DL    ALT (SGPT) 22 12 - 78 U/L    AST (SGOT) 31 15 - 37 U/L    Alk.  phosphatase 55 45 - 117 U/L    Protein, total 5.2 (L) 6.4 - 8.2 g/dL    Albumin 2.1 (L) 3.5 - 5.0 g/dL    Globulin 3.1 2.0 - 4.0 g/dL    A-G Ratio 0.7 (L) 1.1 - 2.2     URINALYSIS W/ REFLEX CULTURE    Collection Time: 12/04/20  6:43 PM    Specimen: Urine   Result Value Ref Range    Color YELLOW/STRAW      Appearance CLEAR CLEAR      Specific gravity 1.017 1.003 - 1.030      pH (UA) 7.0 5.0 - 8.0      Protein Negative NEG mg/dL    Glucose >1,000 (A) NEG mg/dL    Ketone Negative NEG mg/dL    Bilirubin Negative NEG      Blood Negative NEG      Urobilinogen 2.0 (H) 0.2 - 1.0 EU/dL    Nitrites Negative NEG      Leukocyte Esterase Negative NEG      WBC 0-4 0 - 4 /hpf    RBC 0-5 0 - 5 /hpf    Epithelial cells FEW FEW /lpf    Bacteria 1+ (A) NEG /hpf    UA:UC IF INDICATED CULTURE NOT INDICATED BY UA RESULT CNI      Hyaline cast 0-2 0 - 5 /lpf   AMMONIA Collection Time: 12/04/20  6:47 PM   Result Value Ref Range    Ammonia 28 <32 UMOL/L   GLUCOSE, POC    Collection Time: 12/04/20  8:17 PM   Result Value Ref Range    Glucose (POC) 179 (H) 65 - 100 mg/dL    Performed by Veronica Leija RN        Radiologic Studies -   No orders to display     CT Results  (Last 48 hours)    None        CXR Results  (Last 48 hours)    None          Medical Decision Making   I am the first provider for this patient. I reviewed the vital signs, available nursing notes, past medical history, past surgical history, family history and social history. Vital Signs-Reviewed the patient's vital signs. Patient Vitals for the past 12 hrs:   Temp Pulse Resp BP SpO2   12/04/20 1551 99 °F (37.2 °C) 91 18 133/65 98 %         Records Reviewed: Nursing Notes, Old Medical Records, Ambulance Run Sheet, Previous Radiology Studies and Previous Laboratory Studies    Provider Notes (Medical Decision Making):   Bilateral leg weakness, herniated disc,    ED Course:   Initial assessment performed. The patients presenting problems have been discussed, and they are in agreement with the care plan formulated and outlined with them. I have encouraged them to ask questions as they arise throughout their visit. Consult note:    Patient has been admitted by Dr. Bucky Mo, hospitalist           Critical Care Time:   0    Disposition:  admit    DISCHARGE PLAN:  1. Current Discharge Medication List        2. Follow-up Information    None       3. Return to ED if worse     Diagnosis     Clinical Impression:   1. Spinal stenosis of lumbar region, unspecified whether neurogenic claudication present    2. Inability to walk        Attestations:    Sridhar Olivas MD    Please note that this dictation was completed with [employer], the computer voice recognition software. Quite often unanticipated grammatical, syntax, homophones, and other interpretive errors are inadvertently transcribed by the computer software. Please disregard these errors. Please excuse any errors that have escaped final proofreading. Thank you.

## 2020-12-04 NOTE — PROGRESS NOTES
Sisi Olson  Identified pt with two pt identifiers(name and ). Chief Complaint   Patient presents with   [facility] Follow Up     [address] record In preparation for visit and have obtained necessary documentation. POA on file. 1. Have you been to the ER, urgent care clinic or hospitalized since your last visit? ED [facility] 2020, ED [facility] ER 2020    2. Have you seen or consulted any other health care providers outside of the [address] [place] [address] since your last visit? Include any pap smears or colon screening. No    Vitals reviewed with provider. Health Maintenance reviewed:     Health Maintenance Due   Topic    Eye Exam Retinal or Dilated     DTaP/Tdap/Td series (2 - Td)        Wt Readings from Last 3 Encounters:   20 214 lb 4.6 oz (97.2 kg)   20 214 lb 4.6 oz (97.2 kg)   20 199 lb (90.3 kg)      Temp Readings from Last 3 Encounters:   20 97.8 °F (36.6 °C) (Temporal)   20 97.7 °F (36.5 °C)   20 97.4 °F (36.3 °C)      BP Readings from Last 3 Encounters:   20 110/60   20 (!) 126/54   20 109/67      Pulse Readings from Last 3 Encounters:   20 78   20 68   20 71      There were no vitals filed for this visit.        Learning Assessment:   :     Learning Assessment 2014   PRIMARY LEARNER Patient   HIGHEST LEVEL OF EDUCATION - PRIMARY LEARNER  DID NOT GRADUATE HIGH SCHOOL   BARRIERS PRIMARY LEARNER COGNITIVE   CO-LEARNER CAREGIVER Yes   CO-LEARNER NAME [facility]   PRIMARY LANGUAGE ENGLISH   LEARNER PREFERENCE PRIMARY DEMONSTRATION     READING   ANSWERED BY mother   RELATIONSHIP LEGAL GUARDIAN        Depression Screening:   :     3 most recent PHQ Screens 2020   Little interest or pleasure in doing things Not at all   Feeling down, depressed, irritable, or hopeless Not at all   Total Score PHQ 2 0   Trouble falling or staying asleep, or sleeping too much -   Feeling tired or having little energy -   Poor appetite, weight loss, or overeating -   Feeling bad about yourself - or that you are a failure or have let yourself or your family down -   Trouble concentrating on things such as school, work, reading, or watching TV -   Moving or speaking so slowly that other people could have noticed; or the opposite being so fidgety that others notice -   Thoughts of being better off dead, or hurting yourself in some way -   PHQ 9 Score -   How difficult have these problems made it for you to do your work, take care of your home and get along with others -        Fall Risk Assessment:   :     Fall Risk Assessment, last 12 mths 4/29/2020   Able to walk? Yes   Fall in past 12 months? Yes   Fall with injury? No   Number of falls in past 12 months 8 or more   Fall Risk Score 8        Abuse Screening:   :     Abuse Screening Questionnaire 4/29/2020 6/4/2019 10/16/2018 2/16/2017   Do you ever feel afraid of your partner? N N N N   Are you in a relationship with someone who physically or mentally threatens you? N N N N   Is it safe for you to go home?  Y Y Y Y        ADL Screening:   :     ADL Assessment 4/16/2015   Feeding yourself No Help Needed   Getting from bed to chair No Help Needed   Getting dressed No Help Needed   Bathing or showering No Help Needed   Walk across the room (includes cane/walker) No Help Needed   Using the telphone No Help Needed   Taking your medications Help Needed   Preparing meals Help Needed   Managing money (expenses/bills) Help Needed   Moderately strenuous housework (laundry) No Help Needed   Shopping for personal items (toiletries/medicines) No Help Needed   Shopping for groceries No Help Needed   Driving Help Needed   Climbing a flight of stairs No Help Needed   Getting to places beyond walking distances Help Needed

## 2020-12-04 NOTE — PROGRESS NOTES
Virtual Transitional Care Management Progress Note    Patient: Adelaide Muniz  : 1955  PCP: Luma Thakkar MD    Date of admission: 20  Date of discharge: 20    Patient was contacted by Transitional Care Management services within two days after his discharge: Yes. This encounter and supporting documentation was reviewed if available. Medication reconciliation was performed today (2020). Assessment/Plan:     Diagnoses and all orders for this visit:    1. Hospital discharge follow-up  -     [place] DISCHARGE MEDS RECONCILED W/ CURRENT OUTPATIENT MED LIST    2. Weakness of both lower extremities  No evidence of spinal stenosis on MRI to explain leg weakness. Continue home PT/OT/skilled nursing for leg strengthening exercises and gait training. Follow up with Neurology. -     AMB SUPPLY ORDER (Bedside commode)    I am prescribing a hospital bed for him because he has multilevel lumbar spondylosis with L5-S1 herniated disc that requires positioning of the body to alleviate pain and promote good body alignment in ways that are not feasible in an ordinary bed. A hospital bed with variable height feature is needed to permit transfer to a chair or wheelchair. 3. Chronic bilateral low back pain without sciatica  He has degenerative changes at multiple levels of the lumbar spine and an acute herniated disk at L5-S1. Continue lidocaine patches. Okay to give Tylenol since LFT's are normal.    4. Hepatic cirrhosis, unspecified hepatic cirrhosis type, unspecified whether ascites present (HCC)  Elevated ammonia level of 67 on 20. Difficult to determine if he has hepatic encephalopathy. Continue lactulose. Plan to recheck ammonia level and hepatic function in 1 week. Had low albumin at 2.2 likely due to poor nutrition. 5. Thrombocytopenia ([address]  Due to #6. 6. Myelodysplasia (myelodysplastic syndrome) ([facility]  Follow up with Dr. Krista Caballero.     7. Type 2 diabetes mellitus with diabetic polyneuropathy, without long-term current use of insulin (HCC)  Controlled. Last A1c 6.9% on 10/27/20. Continue present management. Follow-up and Dispositions    · Return if symptoms worsen or fail to improve, for Scheduled appointment on 2/24/21. Time Spent: 11-20 mins        Subjective:   Jack Monaco is a 60 y.o. male presenting today for follow-up after being discharged from [facility]. The discharge summary was reviewed. The main problem requiring admission was low back pain with bilateral leg weakness. Complications during admission: none    [id number]12[facility]20 for low back pain with worsening bilateral leg weakness causing ambulatory dysfunction. MRI LS: small disc protrusion at L5-S1 likely acute without canal stenosis or definite nerve root impingement and mild degenerative changes at multiple levels without significant canal stenosis. Lyrica dose was increased to 150 mg BID. Was newly diagnosed with cirrhosis of the liver after CT abd/pelv 11/22/20 showed possible cirrhosis with splenomegaly and portal hypertension. Started on lactulose after ammonia level returned elevated at 63. Today his mother reports that he spends most of the day in bed. He continues to have difficulty walking due to leg weakness. Also complains of pain and has trembling in his hands that started 6 months ago. Mother has been giving him Tylenol and using lidocaine patches. She has been feeding him sometimes due to hand trembling. She has not scheduled appointment with Dr. Jez Vasquez yet for cirrhosis. The home health nurse just saw him and helped to move him from bed to a chair. She has a home health aide that she received through [facility] who is a friend come at 1 pm to give him a bath. He will also have home PT, OT, and possibly a second home health aide.     Mother requests bedside commode because it is difficult for him to get to the bathroom. He has difficulty getting up from toilet seat also; she had to call his brother to come over and help get him off the commode. Lactulose has been making him have loose stools. He wears adult diapers for urinary incontinence. She also requests a hospital bed because it is hard getting him out of bed and he spends much time in bed in pain. His brother will be bringing a wheelchair for him that previously belonged to a relative. Review of his medical records show that he had a bone marrow biopsy on 5/28/20 to evaluate his progressive thrombocytopenia. 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 Other Providers: Dr. Krista Caballero, Dr. Jody Rodríguez (Neurology)    Medications marked \"taking\" at this time:  Home Medications    Medication Sig Start Date End Date Taking? Authorizing Provider   lactulose (CHRONULAC) 10 gram/15 mL solution Take 15 mL by mouth three (3) times daily for 30 days. 12/2/20 1/1/21 Yes Sangita Baca NP   lidocaine 4 % patch 1 Patch by TransDERmal route every twelve (12) hours every twelve (12) hours for 15 days. 12/2/20 12/17/20 Yes Sangita Baca NP   pregabalin (Lyrica) 150 mg capsule Take 1 Cap by mouth two (2) times a day for 30 days. Max Daily Amount: 300 mg. Indications: nerve pain from spinal cord injury 12/2/20 1/1/21 Yes Vicky Baca NP   acetaminophen (TYLENOL) 500 mg tablet Take 2 Tabs by mouth every six (6) hours as needed for Pain. 11/22/20  Yes Nils Silva PA-C   metFORMIN ER (GLUCOPHAGE XR) 500 mg tablet TAKE TWO TABLETS BY MOUTH TWICE DAILY  10/12/20  Yes Luma Thakkar MD   topiramate (TOPAMAX) 200 mg tablet TAKE 1 & 1/2 TABLETS BY MOUTH TWICE A DAY 10/12/20  Yes Luma Thakkar MD   linaGLIPtin (TRADJENTA) 5 mg tablet Take 1 Tab by mouth daily. 10/2/20  Yes Luma Thakkar MD   pioglitazone (ACTOS) 45 mg tablet Take 1 Tab by mouth daily.  9/28/20  Yes Luma Thakkar MD   divalproex  (DEPAKOTE) 500 mg tablet TAKE ONE TABLET BY MOUTH EVERY MORNING AND TWO TABS AT NIGHT 9/28/20  Yes Ronal Riley MD   atorvastatin (LIPITOR) 40 mg tablet Take 1 Tab by mouth nightly. 9/28/20  Yes Ronal Riley MD   glimepiride (AMARYL) 4 mg tablet TAKE TWO TABLETS BY MOUTH IN THE MORNING 9/28/20  Yes Ronal Riley MD   folic acid (FOLVITE) 1 mg tablet Take 1 Tab by mouth daily. 9/28/20  Yes Ronal Riley MD   benzonatate (TESSALON) 100 mg capsule Take 1 Cap by mouth nightly as needed for Cough. 8/19/20  Yes Ronal Riley MD   triamcinolone acetonide (KENALOG) 0.1 % ointment  1/30/19  Yes Provider, Historical   chlorhexidine (PERIDEX) 0.12 % solution Take 15 mL by mouth every twelve (12) hours. 2/8/18  Yes Provider, Historical   Calcium-Cholecalciferol, D3, (CALTRATE-600 PLUS VITAMIN D3) 600-400 mg-unit Tab Take  by mouth. Yes Provider, Historical        Patient Active Problem List   Diagnosis Code    Intellectual disability F68    Seizure disorder ([facility] G40.909    Psoriasis L40.9    Venous stasis of lower extremity I87.8    Thrombocytopenia (HCC) D69.6    Sleep disorder G47.9    Hypertension, essential I10    HLD (hyperlipidemia) E78.5    Type 2 diabetes mellitus with diabetic polyneuropathy, without long-term current use of insulin (HCC) E11.42    Myelodysplasia (myelodysplastic syndrome) (HCC) D46.9    Lower extremity weakness R29.898    Cirrhosis of liver (HCC) K74.60    Macrocytic anemia D53.9       BP today: 128/70    We discussed the expected course, resolution and complications of the diagnosis(es) in detail. Medication risks, benefits, costs, interactions, and alternatives were discussed as indicated. I advised him to contact the office if his condition worsens, changes or fails to improve as anticipated. He expressed understanding with the diagnosis(es) and plan.        Leslie Benedict, who was evaluated through a synchronous (real-time) audio-only encounter and/or his healthcare decision maker, is aware that it is a billable service, with coverage as determined by his insurance carrier. He provided verbal consent to proceed: Yes, and patient identification was verified. It was conducted pursuant to the emergency declaration under the [address] [place] [address] and the [employer]. A caregiver was present when appropriate. Ability to conduct physical exam was limited. I was at home. The patient was at home.       Michael Agustin MD

## 2020-12-04 NOTE — ED NOTES
Assumed care of patient, patient is alert and oriented to baseline. Patient presents with  Mother, was just discharged home from here inpatient. Patient fell today due to bilateral lower extremity weakness and swelling, as well as increased back pain.  Patients mother is not able to assist with his daily care, they have home health and PT however they were unable to get him off the floor today, normally the patient is able to complete adls's with the assistance of home health and pt, however after this recent discharge patient is much more weak and unable to

## 2020-12-05 ENCOUNTER — APPOINTMENT (OUTPATIENT)
Dept: GENERAL RADIOLOGY | Age: 65
DRG: 552 | End: 2020-12-05
Attending: NURSE PRACTITIONER
Payer: MEDICARE

## 2020-12-05 ENCOUNTER — APPOINTMENT (OUTPATIENT)
Dept: GENERAL RADIOLOGY | Age: 65
DRG: 552 | End: 2020-12-05
Attending: STUDENT IN AN ORGANIZED HEALTH CARE EDUCATION/TRAINING PROGRAM
Payer: MEDICARE

## 2020-12-05 PROBLEM — G82.20 PARAPLEGIA (HCC): Status: ACTIVE | Noted: 2020-12-05

## 2020-12-05 PROBLEM — K74.60 CIRRHOSIS (HCC): Status: ACTIVE | Noted: 2020-12-05

## 2020-12-05 PROBLEM — M48.061 LUMBAR STENOSIS: Status: ACTIVE | Noted: 2020-12-05

## 2020-12-05 LAB
COVID-19 RAPID TEST, COVR: NOT DETECTED
GLUCOSE BLD STRIP.AUTO-MCNC: 101 MG/DL (ref 65–100)
GLUCOSE BLD STRIP.AUTO-MCNC: 144 MG/DL (ref 65–100)
GLUCOSE BLD STRIP.AUTO-MCNC: 158 MG/DL (ref 65–100)
GLUCOSE BLD STRIP.AUTO-MCNC: 209 MG/DL (ref 65–100)
HEALTH STATUS, XMCV2T: NORMAL
PROCALCITONIN SERPL-MCNC: 0.12 NG/ML
SERVICE CMNT-IMP: ABNORMAL
SOURCE, COVRS: NORMAL
SPECIMEN SOURCE, FCOV2M: NORMAL
SPECIMEN TYPE, XMCV1T: NORMAL

## 2020-12-05 PROCEDURE — 71045 X-RAY EXAM CHEST 1 VIEW: CPT

## 2020-12-05 PROCEDURE — 36415 COLL VENOUS BLD VENIPUNCTURE: CPT

## 2020-12-05 PROCEDURE — 72100 X-RAY EXAM L-S SPINE 2/3 VWS: CPT

## 2020-12-05 PROCEDURE — 87635 SARS-COV-2 COVID-19 AMP PRB: CPT

## 2020-12-05 PROCEDURE — 74011250637 HC RX REV CODE- 250/637: Performed by: STUDENT IN AN ORGANIZED HEALTH CARE EDUCATION/TRAINING PROGRAM

## 2020-12-05 PROCEDURE — 99218 HC RM OBSERVATION: CPT

## 2020-12-05 PROCEDURE — 82962 GLUCOSE BLOOD TEST: CPT

## 2020-12-05 PROCEDURE — 74011636637 HC RX REV CODE- 636/637: Performed by: INTERNAL MEDICINE

## 2020-12-05 PROCEDURE — 74011250636 HC RX REV CODE- 250/636: Performed by: INTERNAL MEDICINE

## 2020-12-05 PROCEDURE — 84145 PROCALCITONIN (PCT): CPT

## 2020-12-05 PROCEDURE — 74011250637 HC RX REV CODE- 250/637: Performed by: NURSE PRACTITIONER

## 2020-12-05 PROCEDURE — 65270000029 HC RM PRIVATE

## 2020-12-05 PROCEDURE — 74011000250 HC RX REV CODE- 250: Performed by: STUDENT IN AN ORGANIZED HEALTH CARE EDUCATION/TRAINING PROGRAM

## 2020-12-05 RX ORDER — BENZONATATE 100 MG/1
100 CAPSULE ORAL
Status: DISCONTINUED | OUTPATIENT
Start: 2020-12-05 | End: 2020-12-16 | Stop reason: HOSPADM

## 2020-12-05 RX ORDER — TOPIRAMATE 100 MG/1
300 TABLET, FILM COATED ORAL 2 TIMES DAILY
Status: DISCONTINUED | OUTPATIENT
Start: 2020-12-05 | End: 2020-12-16 | Stop reason: HOSPADM

## 2020-12-05 RX ORDER — ATORVASTATIN CALCIUM 40 MG/1
40 TABLET, FILM COATED ORAL
Status: DISCONTINUED | OUTPATIENT
Start: 2020-12-05 | End: 2020-12-16 | Stop reason: HOSPADM

## 2020-12-05 RX ORDER — SODIUM CHLORIDE 0.9 % (FLUSH) 0.9 %
5-40 SYRINGE (ML) INJECTION EVERY 8 HOURS
Status: DISCONTINUED | OUTPATIENT
Start: 2020-12-05 | End: 2020-12-16 | Stop reason: HOSPADM

## 2020-12-05 RX ORDER — GLIMEPIRIDE 4 MG/1
8 TABLET ORAL
Status: DISCONTINUED | OUTPATIENT
Start: 2020-12-05 | End: 2020-12-13

## 2020-12-05 RX ORDER — ONDANSETRON 2 MG/ML
4 INJECTION INTRAMUSCULAR; INTRAVENOUS
Status: DISCONTINUED | OUTPATIENT
Start: 2020-12-05 | End: 2020-12-16 | Stop reason: HOSPADM

## 2020-12-05 RX ORDER — FOLIC ACID 1 MG/1
1 TABLET ORAL DAILY
Status: DISCONTINUED | OUTPATIENT
Start: 2020-12-05 | End: 2020-12-16 | Stop reason: HOSPADM

## 2020-12-05 RX ORDER — FERROUS SULFATE, DRIED 160(50) MG
1 TABLET, EXTENDED RELEASE ORAL
Status: DISCONTINUED | OUTPATIENT
Start: 2020-12-05 | End: 2020-12-16 | Stop reason: HOSPADM

## 2020-12-05 RX ORDER — METFORMIN HYDROCHLORIDE EXTENDED-RELEASE TABLETS 500 MG/1
1000 TABLET, FILM COATED, EXTENDED RELEASE ORAL 2 TIMES DAILY WITH MEALS
Status: DISCONTINUED | OUTPATIENT
Start: 2020-12-05 | End: 2020-12-16 | Stop reason: HOSPADM

## 2020-12-05 RX ORDER — DEXTROSE 50 % IN WATER (D50W) INTRAVENOUS SYRINGE
12.5-25 AS NEEDED
Status: DISCONTINUED | OUTPATIENT
Start: 2020-12-05 | End: 2020-12-16 | Stop reason: HOSPADM

## 2020-12-05 RX ORDER — ACETAMINOPHEN 325 MG/1
650 TABLET ORAL
Status: DISCONTINUED | OUTPATIENT
Start: 2020-12-05 | End: 2020-12-16 | Stop reason: HOSPADM

## 2020-12-05 RX ORDER — ACETAMINOPHEN 650 MG/1
650 SUPPOSITORY RECTAL
Status: DISCONTINUED | OUTPATIENT
Start: 2020-12-05 | End: 2020-12-16 | Stop reason: HOSPADM

## 2020-12-05 RX ORDER — TRIAMCINOLONE ACETONIDE 1 MG/G
OINTMENT TOPICAL 2 TIMES DAILY
Status: DISCONTINUED | OUTPATIENT
Start: 2020-12-05 | End: 2020-12-16 | Stop reason: HOSPADM

## 2020-12-05 RX ORDER — CHLORHEXIDINE GLUCONATE 1.2 MG/ML
15 RINSE ORAL EVERY 12 HOURS
Status: DISCONTINUED | OUTPATIENT
Start: 2020-12-05 | End: 2020-12-16 | Stop reason: HOSPADM

## 2020-12-05 RX ORDER — METFORMIN HYDROCHLORIDE 500 MG/1
1000 TABLET, EXTENDED RELEASE ORAL 2 TIMES DAILY
Status: DISCONTINUED | OUTPATIENT
Start: 2020-12-05 | End: 2020-12-05

## 2020-12-05 RX ORDER — HYDROCODONE BITARTRATE AND ACETAMINOPHEN 5; 325 MG/1; MG/1
1 TABLET ORAL
Status: DISCONTINUED | OUTPATIENT
Start: 2020-12-05 | End: 2020-12-16 | Stop reason: HOSPADM

## 2020-12-05 RX ORDER — POLYETHYLENE GLYCOL 3350 17 G/17G
17 POWDER, FOR SOLUTION ORAL DAILY PRN
Status: DISCONTINUED | OUTPATIENT
Start: 2020-12-05 | End: 2020-12-16 | Stop reason: HOSPADM

## 2020-12-05 RX ORDER — INSULIN LISPRO 100 [IU]/ML
INJECTION, SOLUTION INTRAVENOUS; SUBCUTANEOUS
Status: DISCONTINUED | OUTPATIENT
Start: 2020-12-05 | End: 2020-12-16 | Stop reason: HOSPADM

## 2020-12-05 RX ORDER — ALOGLIPTIN 12.5 MG/1
12.5 TABLET, FILM COATED ORAL DAILY
Status: DISCONTINUED | OUTPATIENT
Start: 2020-12-05 | End: 2020-12-13

## 2020-12-05 RX ORDER — LIDOCAINE 4 G/100G
1 PATCH TOPICAL EVERY 24 HOURS
Status: DISCONTINUED | OUTPATIENT
Start: 2020-12-05 | End: 2020-12-16 | Stop reason: HOSPADM

## 2020-12-05 RX ORDER — PREGABALIN 150 MG/1
150 CAPSULE ORAL 2 TIMES DAILY
Status: DISCONTINUED | OUTPATIENT
Start: 2020-12-05 | End: 2020-12-16 | Stop reason: HOSPADM

## 2020-12-05 RX ORDER — LEVOFLOXACIN 5 MG/ML
750 INJECTION, SOLUTION INTRAVENOUS EVERY 24 HOURS
Status: DISCONTINUED | OUTPATIENT
Start: 2020-12-05 | End: 2020-12-08

## 2020-12-05 RX ORDER — SODIUM CHLORIDE 0.9 % (FLUSH) 0.9 %
5-40 SYRINGE (ML) INJECTION AS NEEDED
Status: DISCONTINUED | OUTPATIENT
Start: 2020-12-05 | End: 2020-12-16 | Stop reason: HOSPADM

## 2020-12-05 RX ORDER — PROMETHAZINE HYDROCHLORIDE 25 MG/1
12.5 TABLET ORAL
Status: DISCONTINUED | OUTPATIENT
Start: 2020-12-05 | End: 2020-12-16 | Stop reason: HOSPADM

## 2020-12-05 RX ORDER — MAGNESIUM SULFATE 100 %
4 CRYSTALS MISCELLANEOUS AS NEEDED
Status: DISCONTINUED | OUTPATIENT
Start: 2020-12-05 | End: 2020-12-16 | Stop reason: HOSPADM

## 2020-12-05 RX ORDER — DIVALPROEX SODIUM 500 MG/1
500 TABLET, DELAYED RELEASE ORAL 2 TIMES DAILY
Status: DISCONTINUED | OUTPATIENT
Start: 2020-12-05 | End: 2020-12-16 | Stop reason: HOSPADM

## 2020-12-05 RX ADMIN — GLIMEPIRIDE 8 MG: 4 TABLET ORAL at 09:10

## 2020-12-05 RX ADMIN — ACETAMINOPHEN 650 MG: 325 TABLET ORAL at 09:10

## 2020-12-05 RX ADMIN — LACTULOSE 15 ML: 20 SOLUTION ORAL at 09:10

## 2020-12-05 RX ADMIN — CHLORHEXIDINE GLUCONATE 0.12% ORAL RINSE 15 ML: 1.2 LIQUID ORAL at 02:16

## 2020-12-05 RX ADMIN — PREGABALIN 150 MG: 150 CAPSULE ORAL at 17:00

## 2020-12-05 RX ADMIN — TRIAMCINOLONE ACETONIDE: 1 OINTMENT TOPICAL at 09:24

## 2020-12-05 RX ADMIN — HYDROCODONE BITARTRATE AND ACETAMINOPHEN 1 TABLET: 5; 325 TABLET ORAL at 23:12

## 2020-12-05 RX ADMIN — DIVALPROEX SODIUM 500 MG: 500 TABLET, DELAYED RELEASE ORAL at 17:00

## 2020-12-05 RX ADMIN — METFORMIN 1000 MG: 500 TABLET, EXTENDED RELEASE ORAL at 16:59

## 2020-12-05 RX ADMIN — METFORMIN 1000 MG: 500 TABLET, EXTENDED RELEASE ORAL at 11:44

## 2020-12-05 RX ADMIN — CHLORHEXIDINE GLUCONATE 0.12% ORAL RINSE 15 ML: 1.2 LIQUID ORAL at 11:44

## 2020-12-05 RX ADMIN — Medication 1 TABLET: at 09:11

## 2020-12-05 RX ADMIN — ATORVASTATIN CALCIUM 40 MG: 40 TABLET, FILM COATED ORAL at 23:11

## 2020-12-05 RX ADMIN — TRIAMCINOLONE ACETONIDE: 1 OINTMENT TOPICAL at 17:00

## 2020-12-05 RX ADMIN — Medication 10 ML: at 07:02

## 2020-12-05 RX ADMIN — ACETAMINOPHEN 650 MG: 325 TABLET ORAL at 02:16

## 2020-12-05 RX ADMIN — INSULIN LISPRO 2 UNITS: 100 INJECTION, SOLUTION INTRAVENOUS; SUBCUTANEOUS at 16:58

## 2020-12-05 RX ADMIN — Medication 10 ML: at 02:17

## 2020-12-05 RX ADMIN — ATORVASTATIN CALCIUM 40 MG: 40 TABLET, FILM COATED ORAL at 02:16

## 2020-12-05 RX ADMIN — PIOGLITAZONE 45 MG: 30 TABLET ORAL at 09:11

## 2020-12-05 RX ADMIN — INSULIN LISPRO 3 UNITS: 100 INJECTION, SOLUTION INTRAVENOUS; SUBCUTANEOUS at 11:44

## 2020-12-05 RX ADMIN — ALOGLIPTIN 12.5 MG: 25 TABLET, FILM COATED ORAL at 09:10

## 2020-12-05 RX ADMIN — Medication 10 ML: at 14:00

## 2020-12-05 RX ADMIN — TOPIRAMATE 300 MG: 100 TABLET, FILM COATED ORAL at 09:24

## 2020-12-05 RX ADMIN — PREGABALIN 150 MG: 150 CAPSULE ORAL at 09:11

## 2020-12-05 RX ADMIN — BENZONATATE 100 MG: 100 CAPSULE ORAL at 23:11

## 2020-12-05 RX ADMIN — LEVOFLOXACIN 750 MG: 5 INJECTION, SOLUTION INTRAVENOUS at 09:14

## 2020-12-05 RX ADMIN — FOLIC ACID 1 MG: 1 TABLET ORAL at 09:10

## 2020-12-05 RX ADMIN — DIVALPROEX SODIUM 500 MG: 500 TABLET, DELAYED RELEASE ORAL at 09:11

## 2020-12-05 NOTE — PROGRESS NOTES
Bedside and Verbal shift change report given to [address] [place] [address] (oncoming nurse) by Giovany Delacruz RN (offgoing nurse). Report included the following information SBAR, Kardex, Intake/Output, MAR and Recent Results.

## 2020-12-05 NOTE — PROGRESS NOTES
I reviewed pertinent labs and imaging, and discussed /agreed on the plan of care with Dr. Trever Robles    Pt seen and evaluated by me, admitted earlier to day with fever , non productive cough , hx of cirrhosis of liver , Sz disorder , Type 2 DM , HLD , chronic back pain,  Pt is afebrile , WBC  6.6  CXR shows normal chest   Hold lactulose today for diarrhea will recheck Ammonia level in am  On emperic Abx   Pt noted to have richards in place , mother states he was unable to void since yesterday , richards placed in ED ?   Ua without suggestive of UTI   XR low back for pain   Add lidocaine patch and norco for pain   Spoke with mother who is agreeable for SNF placement

## 2020-12-05 NOTE — PROGRESS NOTES
Physical Therapy    Orders received, chart reviewed, patient in bed with severe pain, RN given tylenol and pain patch, however remains with significant back pain post fall at home, noted recently discharge from here home with mother who is having difficulty caring for him. Received  PT, nursing, has aide care. Mother requesting [facility] and hospital bed for home. Upon entry, noted patient B UE edema in hands creating FM difficulty which is new for patient. RN requesting to allow patient to eat, he has been yelling out in pain until now. Will awake back workup and pain control and follow up for Evaluation as able.

## 2020-12-05 NOTE — PROGRESS NOTES
TRANSFER - IN REPORT:    Verbal report received from Roel RN(name) on Laura Santiago  being received from ED(unit) for routine progression of care      Report consisted of patients Situation, Background, Assessment and   Recommendations(SBAR). Information from the following report(s) SBAR, Kardex, Intake/Output, MAR and Recent Results was reviewed with the receiving nurse. Opportunity for questions and clarification was provided. Assessment completed upon patients arrival to unit and care assumed.

## 2020-12-05 NOTE — PROGRESS NOTES
Reason for Readmission:   Worsening of low back pain         RUR Score/Risk Level:         PCP: First and Last name:  Dr. Juanjose Rodas    Name of Practice:    Are you a current patient: Yes/No:    Approximate date of last visit: 2 weeks ago    Can you participate in a virtual visit with your PCP: Yes    Is a Care Conference indicated:  No       Did you attend your follow up appointment (s): If not, why not:  Readmitted        Resources/supports as identified by patient/family:   [address] facing patient (as identified by patient/family and CM): Finances/Medication cost?     No needs identified       Transportation      BLS  Support system or lack thereof? Supportive Mother     Living arrangements? With Mother     Self-care/ADLs/Cognition? Total assistance      Current Advanced Directive/Advance Care Plan:  pt has a special need trust and medical directives, his [address] with the [address] [place] [address] has a copy. Plan for utilizing home health:                Transition of Care Plan:    Based on readmission, the patient's previous Plan of Care has been evaluated and/or modified. The current Transition of Care Plan is:          SNF     CM spoke with patient mother to dicuss discharge planning and observation status due to intellectual disability . Patient name and  confirmed as pt identifiers. Demographics confirmed. Patient was discharged home with [facility] on  and returned to the ED with c/o back pain and mother reporting she is unable to provide care for the patient in the home. Mother requested [facility], CM discussed discharge coordination will depend on PT and OT recommendations. Verbal explanation of the observation policy discussed with patient mother, copy on chart. CM will continue to follow patient for discharge planning needs and arrange for services as deemed necessary.     Care Management Interventions  PCP Verified by CM: Yes  Transition of Care Consult (CM Consult): SNF  MyChart Signup: No  Discharge Durable Medical Equipment: No  Physical Therapy Consult: Yes  Occupational Therapy Consult: Yes  Speech Therapy Consult: No  Current Support Network: Lives with Caregiver     Zackary Lee, MSN, RN  Care Manager

## 2020-12-05 NOTE — H&P
Hospitalist Admission Note    NAME: Liza Jean   :  1955   MRN:  [id number]     Date/Time:  2020 1:45 AM    Patient PCP: Abelino Frances MD  ______________________________________________________________________  Given the patient's current clinical presentation, I have a high level of concern for decompensation if discharged from the emergency department. Complex decision making was performed, which includes reviewing the patient's available past medical records, laboratory results, and x-ray films. My assessment of this patient's clinical condition and my plan of care is as follows. Assessment / Plan:    Worsening of low back pain with functional paraplegia readmission after 2 days probably need SNF placement  -MRI - Mild degenerative changes at multiple levels in the lumbar spine  without significant canal stenosis. Small disc protrusion at L5-S1 likely acute  without canal stenosis or definite nerve root impingement  -Continue Lyrica.  -PT, OT consulted.  -Case management consulted    Cough with low-grade fever  -X-ray pending  -Covid test pending  -Continue Tessalon    Liver cirrhosis with splenomegaly and portal hypertension. Ammonia 28, continue lactulose. Need outpatient GI follow-up. Seizure disorder  Continue home medications. Type 2 diabetes mellitus  Continue home medications. Hyperlipidemia  Continue home medications. Macrocytic anemia  Continue folic acid. Thrombocytopenia  Myelodysplasia  -Follow outpatient with hematologist    Code Status: Full code  Surrogate Decision Maker: Mother    DVT Prophylaxis: SCD  GI Prophylaxis: not indicated    Baseline: Nonambulatory at home      Subjective:   CHIEF COMPLAINT: Low back pain    HISTORY OF PRESENT ILLNESS:     Enriqueta Corona is a 60 y.o.  male who presents with low back pain present for years, to the point that patient is functional  paraplegic.   Patient states with the mother who is not able to take care of him. Patient was discharged 2 days ago with home health but because mother is not able to take care of him patient came back to the ER again with low back pain. Patient need long-term care with physical therapy and Occupational Therapy for improvement. Patient has cough but no shortness of breath, has low-grade fever. No history of sick contacts. We were asked to admit for work up and evaluation of the above problems. Past Medical History:   Diagnosis Date    Contact dermatitis and other eczema, due to unspecified cause     psoriasis    Diabetes ([address]     Eosinophilia     Hypercholesterolemia     Hypertension     Mental retardation     Seizures ([facility] [address]     Skin cancer     Strongyloides stercoralis infection 8/28/2014        Past Surgical History:   Procedure Laterality Date    ENDOSCOPY, COLON, DIAGNOSTIC  11/08/2007    Dr Madison Oliver normal except small internal hemorrhoids repeat 11/2017       Social History     Tobacco Use    Smoking status: Never Smoker    Smokeless tobacco: Never Used   Substance Use Topics    Alcohol use: No        Family History   Problem Relation Age of Onset    Other Mother         PMR    Hypertension Mother     Cancer Father         Lung    Diabetes Brother      No Known Allergies     Prior to Admission medications    Medication Sig Start Date End Date Taking? Authorizing Provider   lactulose (CHRONULAC) 10 gram/15 mL solution Take 15 mL by mouth three (3) times daily for 30 days. 12/2/20 1/1/21  Carina ROME NP   lidocaine 4 % patch 1 Patch by TransDERmal route every twelve (12) hours every twelve (12) hours for 15 days. 12/2/20 12/17/20  Carina ROME NP   pregabalin (Lyrica) 150 mg capsule Take 1 Cap by mouth two (2) times a day for 30 days. Max Daily Amount: 300 mg.  Indications: nerve pain from spinal cord injury 12/2/20 1/1/21  Carina ROME NP   acetaminophen (TYLENOL) 500 mg tablet Take 2 Tabs by mouth every six (6) hours as needed for Pain. 11/22/20   Luca Costello PA-C   metFORMIN ER (GLUCOPHAGE XR) 500 mg tablet TAKE TWO TABLETS BY MOUTH TWICE DAILY  10/12/20   Flaquito Bright MD   topiramate (TOPAMAX) 200 mg tablet TAKE 1 & 1/2 TABLETS BY MOUTH TWICE A DAY 10/12/20   Flaquito Bright MD   linaGLIPtin (TRADJENTA) 5 mg tablet Take 1 Tab by mouth daily. 10/2/20   Flaquito Bright MD   pioglitazone (ACTOS) 45 mg tablet Take 1 Tab by mouth daily. 9/28/20   Flaquito Bright MD   divalproex DR (DEPAKOTE) 500 mg tablet TAKE ONE TABLET BY MOUTH EVERY MORNING AND TWO TABS AT NIGHT 9/28/20   Flaquito Bright MD   atorvastatin (LIPITOR) 40 mg tablet Take 1 Tab by mouth nightly. 9/28/20   Flaquito Bright MD   glimepiride (AMARYL) 4 mg tablet TAKE TWO TABLETS BY MOUTH IN THE MORNING 9/28/20   Flaquito Bright MD   folic acid (FOLVITE) 1 mg tablet Take 1 Tab by mouth daily. 9/28/20   Flaquito Bright MD   benzonatate (TESSALON) 100 mg capsule Take 1 Cap by mouth nightly as needed for Cough. 8/19/20   Flaquito Bright MD   triamcinolone acetonide (KENALOG) 0.1 % ointment Apply  to affected area two (2) times a day. apply thin layer 2 x daily to groin as needed for rash 1/30/19   Provider, Historical   chlorhexidine (PERIDEX) 0.12 % solution Take 15 mL by mouth every twelve (12) hours. 2/8/18   Provider, Historical   Calcium-Cholecalciferol, D3, (CALTRATE-600 PLUS VITAMIN D3) 600-400 mg-unit Tab Take 1 Tab by mouth daily.     Provider, Historical       REVIEW OF SYSTEMS:           Total of 12 systems reviewed as follows:       POSITIVE= underlined text  Negative = text not underlined  General:  fever, chills, sweats, generalized weakness, weight loss/gain,      loss of appetite   Eyes:    blurred vision, eye pain, loss of vision, double vision  ENT:    rhinorrhea, pharyngitis   Respiratory:   cough, sputum production, SOB, TREVINO, wheezing, pleuritic pain   Cardiology:   chest pain, palpitations, orthopnea, PND, edema, syncope Gastrointestinal:  abdominal pain , N/V, diarrhea, dysphagia, constipation, bleeding   Genitourinary:  frequency, urgency, dysuria, hematuria, incontinence   Muskuloskeletal :  arthralgia, myalgia, back pain  Hematology:  easy bruising, nose or gum bleeding, lymphadenopathy   Dermatological: rash, ulceration, pruritis, color change / jaundice  Endocrine:   hot flashes or polydipsia   Neurological:  headache, dizziness, confusion, focal weakness, paresthesia,     Speech difficulties, memory loss, gait difficulty  Psychological: Feelings of anxiety, depression, agitation    Objective:   VITALS:    Visit Vitals  BP (!) 165/68   Pulse 81   Temp 99.8 °F (37.7 °C)   Resp 21   Ht 6' (1.829 m)   Wt 99.8 kg (220 lb)   SpO2 99%   BMI 29.84 kg/m²       PHYSICAL EXAM:    General:    Alert, cooperative, no distress, appears stated age. HEENT: Atraumatic, anicteric sclerae, pink conjunctivae     No oral ulcers, mucosa moist, throat clear, dentition fair  Neck:  Supple, symmetrical,  thyroid: non tender  Lungs:   Clear to auscultation bilaterally. No Wheezing or Rhonchi. No rales. Chest wall:  No tenderness  No Accessory muscle use. Heart:   Regular  rhythm,  No  murmur   No edema  Abdomen:   Soft, non-tender. Not distended. Bowel sounds normal  Extremities: No cyanosis. No clubbing,      Skin turgor normal, Capillary refill normal, Radial dial pulse 2+  Skin:     Not pale. Not Jaundiced  No rashes   Psych:  Good insight. Not depressed. Not anxious or agitated. Neurologic: EOMs intact. No facial asymmetry. No aphasia or slurred speech. Symmetrical strength, Sensation grossly intact.  Alert and oriented X 4.     _______________________________________________________________________  Care Plan discussed with:    Comments   Patient x    Family      RN x    Care Manager                    Consultant:  x    _______________________________________________________________________  Expected  Disposition:   Home with Family HH/PT/OT/RN    SNF/LTC x   FANNY    ________________________________________________________________________  TOTAL TIME: 61 Minutes    Critical Care Provided     Minutes non procedure based      Comments     Reviewed previous records   >50% of visit spent in counseling and coordination of care  Discussion with patient and/or family and questions answered       ________________________________________________________________________  Signed: Lynn Estrella MD    Procedures: see electronic medical records for all procedures/Xrays and details which were not copied into this note but were reviewed prior to creation of Plan. LAB DATA REVIEWED:    Recent Results (from the past 24 hour(s))   CBC WITH AUTOMATED DIFF    Collection Time: 12/04/20  4:47 PM   Result Value Ref Range    WBC 6.6 4.1 - 11.1 K/uL    RBC 2.46 (L) 4.10 - 5.70 M/uL    HGB 8.3 (L) 12.1 - 17.0 g/dL    HCT 27.4 (L) 36.6 - 50.3 %    .4 (H) 80.0 - 99.0 FL    MCH 33.7 26.0 - 34.0 PG    MCHC 30.3 30.0 - 36.5 g/dL    RDW 13.6 11.5 - 14.5 %    PLATELET 69 (L) 326 - 400 K/uL    MPV 10.1 8.9 - 12.9 FL    NRBC 0.0 0  WBC    ABSOLUTE NRBC 0.00 0.00 - 0.01 K/uL    NEUTROPHILS 47 32 - 75 %    BAND NEUTROPHILS 10 %    LYMPHOCYTES 27 12 - 49 %    MONOCYTES 11 5 - 13 %    EOSINOPHILS 3 0 - 7 %    BASOPHILS 0 0 - 1 %    METAMYELOCYTES 2 %    IMMATURE GRANULOCYTES 0 0.0 - 0.5 %    ABS. NEUTROPHILS 3.8 1.8 - 8.0 K/UL    ABS. LYMPHOCYTES 1.8 0.8 - 3.5 K/UL    ABS. MONOCYTES 0.7 0.0 - 1.0 K/UL    ABS. EOSINOPHILS 0.2 0.0 - 0.4 K/UL    ABS. BASOPHILS 0.0 0.0 - 0.1 K/UL    ABS. IMM.  GRANS. 0.0 0.00 - 0.04 K/UL    DF MANUAL      RBC COMMENTS MACROCYTOSIS  1+       METABOLIC PANEL, COMPREHENSIVE    Collection Time: 12/04/20  4:47 PM   Result Value Ref Range    Sodium 141 136 - 145 mmol/L    Potassium 4.2 3.5 - 5.1 mmol/L    Chloride 113 (H) 97 - 108 mmol/L    CO2 21 21 - 32 mmol/L    Anion gap 7 5 - 15 mmol/L    Glucose 228 (H) 65 - 100 mg/dL    BUN 15 6 - 20 MG/DL    Creatinine 0.83 0.70 - 1.30 MG/DL    BUN/Creatinine ratio 18 12 - 20      GFR est AA >60 >60 ml/min/1.73m2    GFR est non-AA >60 >60 ml/min/1.73m2    Calcium 7.8 (L) 8.5 - 10.1 MG/DL    Bilirubin, total 0.2 0.2 - 1.0 MG/DL    ALT (SGPT) 22 12 - 78 U/L    AST (SGOT) 31 15 - 37 U/L    Alk.  phosphatase 55 45 - 117 U/L    Protein, total 5.2 (L) 6.4 - 8.2 g/dL    Albumin 2.1 (L) 3.5 - 5.0 g/dL    Globulin 3.1 2.0 - 4.0 g/dL    A-G Ratio 0.7 (L) 1.1 - 2.2     URINALYSIS W/ REFLEX CULTURE    Collection Time: 12/04/20  6:43 PM    Specimen: Urine   Result Value Ref Range    Color YELLOW/STRAW      Appearance CLEAR CLEAR      Specific gravity 1.017 1.003 - 1.030      pH (UA) 7.0 5.0 - 8.0      Protein Negative NEG mg/dL    Glucose >1,000 (A) NEG mg/dL    Ketone Negative NEG mg/dL    Bilirubin Negative NEG      Blood Negative NEG      Urobilinogen 2.0 (H) 0.2 - 1.0 EU/dL    Nitrites Negative NEG      Leukocyte Esterase Negative NEG      WBC 0-4 0 - 4 /hpf    RBC 0-5 0 - 5 /hpf    Epithelial cells FEW FEW /lpf    Bacteria 1+ (A) NEG /hpf    UA:UC IF INDICATED CULTURE NOT INDICATED BY UA RESULT CNI      Hyaline cast 0-2 0 - 5 /lpf   AMMONIA    Collection Time: 12/04/20  6:47 PM   Result Value Ref Range    Ammonia 28 <32 UMOL/L   GLUCOSE, POC    Collection Time: 12/04/20  8:17 PM   Result Value Ref Range    Glucose (POC) 179 (H) 65 - 100 mg/dL    Performed by Marv Hernandez RN

## 2020-12-05 NOTE — PROGRESS NOTES
Occupational Therapy    Orders received, chart reviewed, patient in bed with severe back pain, RN given tylenol and pain patch, however remains with significant back pain post fall at home, noted recently discharge from here home with mother who is having difficulty caring for him. Received  PT, nursing, has aide care. Mother requesting [facility] and hospital bed for home. Upon entry, noted patient B UE edema in hands creating FM difficulty which is new for patient. RN requesting to allow patient to eat, he has been yelling out in pain until now. Will awake back workup and pain control and follow up for Evaluation as able. Carito Chiang, MS OTR/L

## 2020-12-06 PROBLEM — R50.9 FEVER OF UNKNOWN ORIGIN (FUO): Status: ACTIVE | Noted: 2020-12-06

## 2020-12-06 PROBLEM — M54.9 BACK PAIN: Status: ACTIVE | Noted: 2020-12-06

## 2020-12-06 LAB
AMMONIA PLAS-SCNC: 54 UMOL/L
ANION GAP SERPL CALC-SCNC: 5 MMOL/L (ref 5–15)
BASOPHILS # BLD: 0 K/UL (ref 0–0.1)
BASOPHILS NFR BLD: 0 % (ref 0–1)
BUN SERPL-MCNC: 13 MG/DL (ref 6–20)
BUN/CREAT SERPL: 19 (ref 12–20)
CALCIUM SERPL-MCNC: 8.1 MG/DL (ref 8.5–10.1)
CHLORIDE SERPL-SCNC: 116 MMOL/L (ref 97–108)
CO2 SERPL-SCNC: 23 MMOL/L (ref 21–32)
CREAT SERPL-MCNC: 0.68 MG/DL (ref 0.7–1.3)
DIFFERENTIAL METHOD BLD: ABNORMAL
EOSINOPHIL # BLD: 0.8 K/UL (ref 0–0.4)
EOSINOPHIL NFR BLD: 12 % (ref 0–7)
ERYTHROCYTE [DISTWIDTH] IN BLOOD BY AUTOMATED COUNT: 13.8 % (ref 11.5–14.5)
GLUCOSE BLD STRIP.AUTO-MCNC: 102 MG/DL (ref 65–100)
GLUCOSE BLD STRIP.AUTO-MCNC: 146 MG/DL (ref 65–100)
GLUCOSE BLD STRIP.AUTO-MCNC: 147 MG/DL (ref 65–100)
GLUCOSE BLD STRIP.AUTO-MCNC: 160 MG/DL (ref 65–100)
GLUCOSE SERPL-MCNC: 123 MG/DL (ref 65–100)
HCT VFR BLD AUTO: 27.7 % (ref 36.6–50.3)
HGB BLD-MCNC: 8.5 G/DL (ref 12.1–17)
IMM GRANULOCYTES # BLD AUTO: 0 K/UL (ref 0–0.04)
IMM GRANULOCYTES NFR BLD AUTO: 0 % (ref 0–0.5)
LYMPHOCYTES # BLD: 3.1 K/UL (ref 0.8–3.5)
LYMPHOCYTES NFR BLD: 47 % (ref 12–49)
MCH RBC QN AUTO: 33.9 PG (ref 26–34)
MCHC RBC AUTO-ENTMCNC: 30.7 G/DL (ref 30–36.5)
MCV RBC AUTO: 110.4 FL (ref 80–99)
METAMYELOCYTES NFR BLD MANUAL: 1 %
MONOCYTES # BLD: 0.7 K/UL (ref 0–1)
MONOCYTES NFR BLD: 10 % (ref 5–13)
NEUTS BAND NFR BLD MANUAL: 1 %
NEUTS SEG # BLD: 2 K/UL (ref 1.8–8)
NEUTS SEG NFR BLD: 29 % (ref 32–75)
NRBC # BLD: 0 K/UL (ref 0–0.01)
NRBC BLD-RTO: 0 PER 100 WBC
PLATELET # BLD AUTO: 74 K/UL (ref 150–400)
PMV BLD AUTO: 9.8 FL (ref 8.9–12.9)
POTASSIUM SERPL-SCNC: 4.3 MMOL/L (ref 3.5–5.1)
RBC # BLD AUTO: 2.51 M/UL (ref 4.1–5.7)
RBC MORPH BLD: ABNORMAL
SERVICE CMNT-IMP: ABNORMAL
SODIUM SERPL-SCNC: 144 MMOL/L (ref 136–145)
WBC # BLD AUTO: 6.6 K/UL (ref 4.1–11.1)
WBC MORPH BLD: ABNORMAL

## 2020-12-06 PROCEDURE — 74011250636 HC RX REV CODE- 250/636: Performed by: INTERNAL MEDICINE

## 2020-12-06 PROCEDURE — 97162 PT EVAL MOD COMPLEX 30 MIN: CPT

## 2020-12-06 PROCEDURE — 74011636637 HC RX REV CODE- 636/637: Performed by: INTERNAL MEDICINE

## 2020-12-06 PROCEDURE — 74011250637 HC RX REV CODE- 250/637: Performed by: NURSE PRACTITIONER

## 2020-12-06 PROCEDURE — 74011000250 HC RX REV CODE- 250: Performed by: STUDENT IN AN ORGANIZED HEALTH CARE EDUCATION/TRAINING PROGRAM

## 2020-12-06 PROCEDURE — 65270000029 HC RM PRIVATE

## 2020-12-06 PROCEDURE — 97116 GAIT TRAINING THERAPY: CPT

## 2020-12-06 PROCEDURE — 97530 THERAPEUTIC ACTIVITIES: CPT

## 2020-12-06 PROCEDURE — 80048 BASIC METABOLIC PNL TOTAL CA: CPT

## 2020-12-06 PROCEDURE — 2709999900 HC NON-CHARGEABLE SUPPLY

## 2020-12-06 PROCEDURE — 82962 GLUCOSE BLOOD TEST: CPT

## 2020-12-06 PROCEDURE — 82140 ASSAY OF AMMONIA: CPT

## 2020-12-06 PROCEDURE — 85025 COMPLETE CBC W/AUTO DIFF WBC: CPT

## 2020-12-06 PROCEDURE — 97166 OT EVAL MOD COMPLEX 45 MIN: CPT

## 2020-12-06 PROCEDURE — 99218 HC RM OBSERVATION: CPT

## 2020-12-06 PROCEDURE — 74011250637 HC RX REV CODE- 250/637: Performed by: STUDENT IN AN ORGANIZED HEALTH CARE EDUCATION/TRAINING PROGRAM

## 2020-12-06 PROCEDURE — 36415 COLL VENOUS BLD VENIPUNCTURE: CPT

## 2020-12-06 RX ORDER — TAMSULOSIN HYDROCHLORIDE 0.4 MG/1
0.4 CAPSULE ORAL
Status: DISCONTINUED | OUTPATIENT
Start: 2020-12-06 | End: 2020-12-16 | Stop reason: HOSPADM

## 2020-12-06 RX ADMIN — GLIMEPIRIDE 8 MG: 4 TABLET ORAL at 08:43

## 2020-12-06 RX ADMIN — PIOGLITAZONE 45 MG: 30 TABLET ORAL at 08:43

## 2020-12-06 RX ADMIN — METFORMIN 1000 MG: 500 TABLET, EXTENDED RELEASE ORAL at 08:43

## 2020-12-06 RX ADMIN — Medication 10 ML: at 14:10

## 2020-12-06 RX ADMIN — DIVALPROEX SODIUM 500 MG: 500 TABLET, DELAYED RELEASE ORAL at 18:04

## 2020-12-06 RX ADMIN — CHLORHEXIDINE GLUCONATE 0.12% ORAL RINSE 15 ML: 1.2 LIQUID ORAL at 08:45

## 2020-12-06 RX ADMIN — Medication 10 ML: at 05:52

## 2020-12-06 RX ADMIN — Medication 1 TABLET: at 08:43

## 2020-12-06 RX ADMIN — INSULIN LISPRO 2 UNITS: 100 INJECTION, SOLUTION INTRAVENOUS; SUBCUTANEOUS at 11:58

## 2020-12-06 RX ADMIN — LACTULOSE 15 ML: 20 SOLUTION ORAL at 08:43

## 2020-12-06 RX ADMIN — ACETAMINOPHEN 650 MG: 325 TABLET ORAL at 11:58

## 2020-12-06 RX ADMIN — DIVALPROEX SODIUM 500 MG: 500 TABLET, DELAYED RELEASE ORAL at 08:44

## 2020-12-06 RX ADMIN — INSULIN LISPRO 2 UNITS: 100 INJECTION, SOLUTION INTRAVENOUS; SUBCUTANEOUS at 08:43

## 2020-12-06 RX ADMIN — METFORMIN 1000 MG: 500 TABLET, EXTENDED RELEASE ORAL at 18:04

## 2020-12-06 RX ADMIN — TAMSULOSIN HYDROCHLORIDE 0.4 MG: 0.4 CAPSULE ORAL at 22:00

## 2020-12-06 RX ADMIN — LEVOFLOXACIN 750 MG: 5 INJECTION, SOLUTION INTRAVENOUS at 08:42

## 2020-12-06 RX ADMIN — FOLIC ACID 1 MG: 1 TABLET ORAL at 08:43

## 2020-12-06 RX ADMIN — PREGABALIN 150 MG: 150 CAPSULE ORAL at 18:04

## 2020-12-06 RX ADMIN — TOPIRAMATE 300 MG: 100 TABLET, FILM COATED ORAL at 18:03

## 2020-12-06 RX ADMIN — CHLORHEXIDINE GLUCONATE 0.12% ORAL RINSE 15 ML: 1.2 LIQUID ORAL at 22:01

## 2020-12-06 RX ADMIN — CHLORHEXIDINE GLUCONATE 0.12% ORAL RINSE 15 ML: 1.2 LIQUID ORAL at 00:30

## 2020-12-06 RX ADMIN — TRIAMCINOLONE ACETONIDE: 1 OINTMENT TOPICAL at 18:03

## 2020-12-06 RX ADMIN — TRIAMCINOLONE ACETONIDE: 1 OINTMENT TOPICAL at 08:45

## 2020-12-06 RX ADMIN — TOPIRAMATE 300 MG: 100 TABLET, FILM COATED ORAL at 08:43

## 2020-12-06 RX ADMIN — ALOGLIPTIN 12.5 MG: 25 TABLET, FILM COATED ORAL at 08:43

## 2020-12-06 RX ADMIN — ATORVASTATIN CALCIUM 40 MG: 40 TABLET, FILM COATED ORAL at 22:00

## 2020-12-06 RX ADMIN — PREGABALIN 150 MG: 150 CAPSULE ORAL at 08:44

## 2020-12-06 RX ADMIN — Medication 10 ML: at 22:00

## 2020-12-06 RX ADMIN — BENZONATATE 100 MG: 100 CAPSULE ORAL at 22:00

## 2020-12-06 NOTE — ROUTINE PROCESS
Bedside shift change report given to [address] [place] [address] (oncoming nurse) by Hiral FORDE RN (offgoing nurse). Report included the following information SBAR, Kardex and MAR.

## 2020-12-06 NOTE — PROGRESS NOTES
Problem: Self Care Deficits Care Plan (Adult)  Goal: *Acute Goals and Plan of Care (Insert Text)  Description:   FUNCTIONAL STATUS PRIOR TO ADMISSION: Patient required minimal assistance for basic and instrumental ADLs as documented when he was DC from the hospital, however per his mother he was dependent with ADLs at home and she was unable to take care of him. HOME SUPPORT: The patient lived with his elderly mother who is unable to care for him at home at present level. Occupational Therapy Goals  Initiated 12/6/2020  1. Patient will perform grooming with supervision/set-up within 7 day(s). 2.  Patient will perform bathing with minimal assistance/contact guard assist within 7 day(s). 3.  Patient will perform upper body dressing with supervision/set-up within 7 day(s). 4.  Patient will perform toilet transfers with minimal assistance/contact guard assist within 7 day(s). 5.  Patient will perform all aspects of lower body dressing with minimal assistance/contact guard assist within 7 day(s). Outcome: Progressing Towards Goal     OCCUPATIONAL THERAPY EVALUATION  Patient: Ethan Benson (26 y.o. male)  Date: 12/6/2020  Primary Diagnosis: Lumbar stenosis [M48.061]  Cirrhosis ([facility] [K74.60]  Paraplegia ([facility] [G82.20]        Precautions: fall       ASSESSMENT  Based on the objective data described below, the patient presents with decreased mobility and independence following an onset of lower back a couple of weeks ago. He was seen at the hospital and discharged home with his mother for support and home health. He is AAOx2 and easily redirected but needed a lot of encouragement to get out of bed today due to pain. He is in need of max A for bed mobility, min A x2 for sit<.stand and walker stabilization, and max A for LB dressing. He is min A to supervision for all other seated ADLs.  Due to decreased memory and need for intensive training/consistency it is recommend he receive inpatient rehabilitation when ready for DC to insure education as well as safe dc home with elderly mother. Acute care OT will continue to follow. Current Level of Function Impacting Discharge (ADLs/self-care): max A for LB, mod A for standing, min A to set/up for seated ADLs    Functional Outcome Measure: The patient scored Total: 35/100 on the Barthel Index outcome measure which is indicative of 65% impaired ability to care for basic self needs/dependency on others; inferred 65% dependency on others for instrumental ADLs. Other factors to consider for discharge: home support, progress made     Patient will benefit from skilled therapy intervention to address the above noted impairments. PLAN :  Recommendations and Planned Interventions: self care training, functional mobility training, therapeutic exercise, balance training, therapeutic activities, cognitive retraining, endurance activities, patient education, home safety training, and family training/education    Frequency/Duration: Patient will be followed by occupational therapy 5 times a week to address goals. Recommendation for discharge: (in order for the patient to meet his/her long term goals)  Therapy 3 hours per day 5-7 days per week- due to learning disabilities he will need intensive and consistent therapy to learn strategies and understand safety. He is motivated to return home with his mom. This discharge recommendation:  Has not yet been discussed the attending provider and/or case management    IF patient discharges home will need the following DME: TBD following rehabilitant, if going home the patient will need a BSC, shower chair, LB dressing AE       SUBJECTIVE:   Patient stated my mom is coming up here soon.     OBJECTIVE DATA SUMMARY:   HISTORY:   Past Medical History:   Diagnosis Date    Contact dermatitis and other eczema, due to unspecified cause     psoriasis    Diabetes ([facility] [facility]     Eosinophilia     Hypercholesterolemia  Hypertension     Mental retardation     [facility]     Skin cancer     Strongyloides stercoralis infection 8/28/2014     Past Surgical History:   Procedure Laterality Date    ENDOSCOPY, COLON, DIAGNOSTIC  11/08/2007    Dr Carlos Tucker normal except small internal hemorrhoids repeat 11/2017       Expanded or extensive additional review of patient history:          Hand dominance: Left    EXAMINATION OF PERFORMANCE DEFICITS:  Cognitive/Behavioral Status:  Neurologic State: Alert; Other (Comment)(ID at baseline)  Orientation Level: Oriented to place;Oriented to person;Disoriented to situation;Disoriented to time  Cognition: Memory loss; Impaired decision making; Follows commands  Perception: Appears intact     Safety/Judgement: Awareness of environment;Good awareness of safety precautions    Skin: all visible areas intact     Edema: none noted    Hearing: Auditory  Auditory Impairment: None    Vision/Perceptual:                   Acuity: Within Defined Limits         Range of Motion:  AROM: Generally decreased, functional  PROM: Generally decreased, functional                      Strength:    Strength: Generally decreased, functional                Coordination:  Coordination: Within functional limits  Fine Motor Skills-Upper: Left Intact; Right Intact    Gross Motor Skills-Upper: Left Intact; Right Intact    Tone & Sensation:    Tone: Normal  Sensation: Intact                      Balance:  Sitting: Impaired  Sitting - Static: Good (unsupported)  Sitting - Dynamic: Fair (occasional)  Standing: Impaired  Standing - Static: Fair;Constant support  Standing - Dynamic : Fair;Constant support    Functional Mobility and Transfers for ADLs:  Bed Mobility:  Rolling: Maximum assistance  Supine to Sit: Maximum assistance;Assist x2  Scooting: Minimum assistance    Transfers:  Sit to Stand: Minimum assistance;Assist x2  Stand to Sit: Minimum assistance;Assist x2  Bathroom Mobility: Minimum assistance  Toilet Transfer : Minimum assistance;Assist x2  Assistive Device : Walker    ADL Assessment:  Feeding: Supervision    Oral Facial Hygiene/Grooming: Minimum assistance    Bathing: Moderate assistance    Upper Body Dressing: Minimum assistance    Lower Body Dressing: Maximum assistance    Toileting: Moderate assistance           Cognitive Retraining  Safety/Judgement: Awareness of environment;Good awareness of safety precautions    Therapeutic Exercise:  Progressed from supine to EOB with a lot of encouragement and max a. The patient in pain and wishing to return to supine but able to be redirected. Once standing the patient has decent balance but needs constant support and redirection for safety. Sit<>Stand from chair with min Ax 2 to help stand and stabilize RW, without 2nd person RW would have slipped and caused a fall      Functional Measure:  Barthel Index:    Bathin  Bladder: 0  Bowels: 5  Groomin  Dressin  Feedin  Mobility: 10  Stairs: 0  Toilet Use: 5  Transfer (Bed to Chair and Back): 5  Total: 35/100        The Barthel ADL Index: Guidelines  1. The index should be used as a record of what a patient does, not as a record of what a patient could do. 2. The main aim is to establish degree of independence from any help, physical or verbal, however minor and for whatever reason. 3. The need for supervision renders the patient not independent. 4. A patient's performance should be established using the best available evidence. Asking the patient, friends/relatives and nurses are the usual sources, but direct observation and common sense are also important. However direct testing is not needed. 5. Usually the patient's performance over the preceding 24-48 hours is important, but occasionally longer periods will be relevant. 6. Middle categories imply that the patient supplies over 50 per cent of the effort. 7. Use of aids to be independent is allowed. Kera Knowles, Barthel, D.W. ([id number]).  Functional evaluation: the Barthel Index. [address] [facility] (14)2. Regino Screen cara RUDI Chappell, [place] Kurtis Taylor., Laurie, [address] (1999). Measuring the change indisability after inpatient rehabilitation; comparison of the responsiveness of the Barthel Index and Functional [place] Measure. Journal of Neurology, Neurosurgery, and Psychiatry, 66(4), 379-734. JESSEE Day, MARILEE Shah, & La Busch M.A. (2004.) Assessment of post-stroke quality of life in cost-effectiveness studies: The usefulness of the Barthel Index and the EuroQoL-5D. Quality of Life Research, 15, 454-40         Occupational Therapy Evaluation Charge Determination   History Examination Decision-Making   MEDIUM Complexity : Expanded review of history including physical, cognitive and psychosocial  history  MEDIUM Complexity : 3-5 performance deficits relating to physical, cognitive , or psychosocial skils that result in activity limitations and / or participation restrictions MEDIUM Complexity : Patient may present with comorbidities that affect occupational performnce. Miniml to moderate modification of tasks or assistance (eg, physical or verbal ) with assesment(s) is necessary to enable patient to complete evaluation       Based on the above components, the patient evaluation is determined to be of the following complexity level: MEDIUM  Pain Rating:  Patient in pain during supine to sit but did not complain following     Activity Tolerance:   Good    After treatment patient left in no apparent distress:    Sitting in chair, Call bell within reach, and Bed / chair alarm activated    COMMUNICATION/EDUCATION:   The patients plan of care was discussed with: Physical therapist.     Patient is unable to participate in goal setting and plan of care. -due to mental status     This patients plan of care is appropriate for delegation to [facility].     Thank you for this referral.  Jen Beltran  Time Calculation: 27 mins

## 2020-12-06 NOTE — PROGRESS NOTES
I reviewed pertinent labs and imaging, and discussed /agreed on the plan of care with Dr. Carmella Harris        Hospitalist Progress Note    NAME: Virginia Marquez   :  1955   MRN:  [id number]       Assessment / Plan:  Worsening of low back pain with functional paraplegia readmission after 2 days probably need SNF placement  XR back  Unchanged   IMPRESSION  IMPRESSION:   Unchanged mild spondylosis at L5-S1.  -MRI - Mild degenerative changes at multiple levels in the lumbar spine  without significant canal stenosis. Small disc protrusion at L5-S1 likely acute  without canal stenosis or definite nerve root impingement  Continue Lyrica. , added Norco , lidocaine patch   -PT, may work with pt no changes or new back injury   -Case management consulted for placement , mother is now in agreement      Cough with low-grade fever  Cough better , afebrile now   -Covid test negative  -Continue Tessalon  -cont Levaquin IV     Urinary retention with richards   - pt in retention on arrival to ER   - richards placed - pt normally voids  - will DC richards in am for void trial   - add tamsulosin     Liver cirrhosis with splenomegaly and portal hypertension. Diarrhea yesterday lactulose held   Ammonia level 55 from 22 however mentation at baseline   Cont lactulose  , 2-3 bm / day   Need outpatient GI follow-up.     Seizure disorder  Continue home medications.     Type 2 diabetes mellitus- controlled   Continue home medications.     Hyperlipidemia  Continue home medications.     Macrocytic anemia  Continue folic acid.     Thrombocytopenia  Myelodysplasia  -PT seen by Dr. Viola Mullins  And referred to [facility]   - Plts stable at 76  Was 66-61 during last admission   -           / Body mass index is 29.84 kg/m². Code status: Full  Prophylaxis: SCD's  Recommended Disposition: SNF/LTC     Subjective:     Chief Complaint / Reason for Physician Visit  \"can you call my momma \". Discussed with RN events overnight.  Pt with out complaints , dialed his mother for him , able to talk with her . Review of Systems:  Symptom Y/N Comments  Symptom Y/N Comments   Fever/Chills n   Chest Pain n    Poor Appetite n   Edema y    Cough n   Abdominal Pain     Sputum n   Joint Pain n    SOB/TREVINO n   Pruritis/Rash n    Nausea/vomit n   Tolerating PT/OT     Diarrhea y   Tolerating Diet     Constipation n   Other       Could NOT obtain due to:      Objective:     VITALS:   Last 24hrs VS reviewed since prior progress note. Most recent are:  Patient Vitals for the past 24 hrs:   Temp Pulse Resp BP SpO2   12/06/20 0827 98.7 °F (37.1 °C) 71 20 131/62 92 %   12/06/20 0500 99.7 °F (37.6 °C) 64 16 107/60 93 %   12/05/20 2330 99.2 °F (37.3 °C) 78 16 (!) 147/76 93 %   12/05/20 1940 97.7 °F (36.5 °C) 67 16 134/63 97 %   12/05/20 1300 98.2 °F (36.8 °C) 63 18 129/60 96 %       Intake/Output Summary (Last 24 hours) at 12/6/2020 0958  Last data filed at 12/6/2020 0932  Gross per 24 hour   Intake 720 ml   Output 2825 ml   Net -2105 ml        I had a face to face encounter and independently examined this patient on 12/6/2020, as outlined below:  PHYSICAL EXAM:  General: WD, WN. Alert, cooperative, no acute distress  developmental delay   EENT:  EOMI. Anicteric sclerae. MMM  Resp:  no wheezing or rales. No accessory muscle use  CV:  S1S2,  Tr Glenn feet  edema  GI:  Soft, Non distended, Non tender. +Bowel sounds,   Neurologic:  Alert and oriented X 3, normal speech,   Psych:    Not anxious nor agitated,ciooperative   Skin:  No rashes.   No jaundice    Reviewed most current lab test results and cultures  YES  Reviewed most current radiology test results   YES  Review and summation of old records today    NO  Reviewed patient's current orders and MAR    YES  PMH/SH reviewed - no change compared to H&P  ________________________________________________________________________  Care Plan discussed with:    Comments   Patient x    Family  x Mother on phone   RN x    Care Manager     Consultant Multidiciplinary team rounds were held today with , nursing, pharmacist and clinical coordinator. Patient's plan of care was discussed; medications were reviewed and discharge planning was addressed. ________________________________________________________________________  Total NON critical care TIME:  30  Minutes    Total CRITICAL CARE TIME Spent:   Minutes non procedure based      Comments   >50% of visit spent in counseling and coordination of care     ________________________________________________________________________  Douglas Florian. Dionisio Montes NP     Procedures: see electronic medical records for all procedures/Xrays and details which were not copied into this note but were reviewed prior to creation of Plan. LABS:  I reviewed today's most current labs and imaging studies. Pertinent labs include:  Recent Labs     12/06/20  0612 12/04/20  1647   WBC 6.6 6.6   HGB 8.5* 8.3*   HCT 27.7* 27.4*   PLT 74* 69*     Recent Labs     12/06/20  0612 12/04/20  1647    141   K 4.3 4.2   * 113*   CO2 23 21   * 228*   BUN 13 15   CREA 0.68* 0.83   CA 8.1* 7.8*   ALB  --  2.1*   TBILI  --  0.2   ALT  --  22       Signed: Vicky Montes NP

## 2020-12-06 NOTE — PROGRESS NOTES
Problem: Mobility Impaired (Adult and Pediatric)  Goal: *Acute Goals and Plan of Care (Insert Text)  Description: FUNCTIONAL STATUS PRIOR TO ADMISSION: Patient was modified independent using a walker and wheelchair for functional mobility. HOME SUPPORT PRIOR TO ADMISSION: The patient lived with mother. Physical Therapy Goals  Initiated 12/6/2020  1. Patient will move from supine to sit and sit to supine  in bed with moderate assistance  within 7 day(s). 2.  Patient will transfer from bed to chair and chair to bed with supervision/set-up using the least restrictive device within 7 day(s). 3.  Patient will perform sit to stand with supervision/set-up within 7 day(s). 4.  Patient will ambulate with supervision/set-up for 100 feet with the least restrictive device within 7 day(s). Outcome: Not Met     PHYSICAL THERAPY EVALUATION  Patient: Ethan Benson (47 y.o. male)  Date: 12/6/2020  Primary Diagnosis: Lumbar stenosis [M48.061]  Cirrhosis ([facility] [address] [address] [K74.60]  [facility] [G82.20]        Precautions: fall         ASSESSMENT  Based on the objective data described below, the patient presents with impaired cognition, generalized weakness, decreased pain, impaired balance and altered gait. Pt was received in supine and cleared by nursing to mobilize. Pt very painful with log roll technique to come to the EOB. Much improved once sitting up. He was able to stand with RW and take a few steps to the chair. Pt impulsive and poor overall safety awareness. He was able to stand and ambulated further with a chair follow due to unsteady gait/LEs. He was left sitting up in the chair at the end of the session. Current Level of Function Impacting Discharge (mobility/balance): max for bed mobility and min A for transfers and walker management    Functional Outcome Measure:   The patient scored Total: 35/100 on the Barthel Index which is indicative of 65% impaired ability to care for basic self needs/dependency on others. Other factors to consider for discharge: intellectual disability      Patient will benefit from skilled therapy intervention to address the above noted impairments. PLAN :  Recommendations and Planned Interventions: bed mobility training, transfer training, gait training, therapeutic exercises, patient and family training/education, and therapeutic activities      Frequency/Duration: Patient will be followed by physical therapy:  4 times a week to address goals. Recommendation for discharge: (in order for the patient to meet his/her long term goals)  Therapy 3 hours per day 5-7 days per week    This discharge recommendation:  Has not yet been discussed the attending provider and/or case management    IF patient discharges home will need the following DME: to be determined (TBD)         SUBJECTIVE:   Patient stated Jatin Gill get to the chair.     OBJECTIVE DATA SUMMARY:   HISTORY:    Past Medical History:   Diagnosis Date    Contact dermatitis and other eczema, due to unspecified cause     psoriasis    Diabetes ([facility]     Eosinophilia     Hypercholesterolemia     Hypertension     Mental retardation     Seizures ([facility] [address]     Skin cancer     Strongyloides stercoralis infection 8/28/2014     Past Surgical History:   Procedure Laterality Date    ENDOSCOPY, COLON, DIAGNOSTIC  11/08/2007    Dr Taras Dove normal except small internal hemorrhoids repeat 11/2017       Personal factors and/or comorbidities impacting plan of care: intellectual disability          EXAMINATION/PRESENTATION/DECISION MAKING:   Critical Behavior:  Neurologic State: Alert, Other (Comment)(ID at baseline)  Orientation Level: Oriented to place, Oriented to person, Disoriented to situation, Disoriented to time  Cognition: Memory loss, Impaired decision making, Follows commands  Safety/Judgement: Awareness of environment, Good awareness of safety precautions  Hearing:   Auditory  Auditory Impairment: None  Skin: intact  Edema: WDL  Range Of Motion:  AROM: Generally decreased, functional           PROM: Generally decreased, functional           Strength:    Strength: Generally decreased, functional                    Tone & Sensation:   Tone: Normal              Sensation: Intact               Coordination:  Coordination: Within functional limits  Vision:   Acuity: Within Defined Limits  Functional Mobility:  Bed Mobility:  Rolling: Maximum assistance  Supine to Sit: Maximum assistance;Assist x2     Scooting: Minimum assistance  Transfers:  Sit to Stand: Minimum assistance;Assist x2  Stand to Sit: Minimum assistance;Assist x2                       Balance:   Sitting: Impaired  Sitting - Static: Good (unsupported)  Sitting - Dynamic: Fair (occasional)  Standing: Impaired  Standing - Static: Fair;Constant support  Standing - Dynamic : Fair;Constant support  Ambulation/Gait Training:  Distance (ft): 60 Feet (ft)  Assistive Device: Gait belt;Walker, rolling(chair follow)  Ambulation - Level of Assistance: Minimal assistance        Gait Abnormalities: Decreased step clearance; Antalgic; Path deviations        Base of Support: Widened     Speed/Doris: Fluctuations  Step Length: Left shortened;Right shortened             Functional Measure:  Barthel Index:    Bathin  Bladder: 0  Bowels: 5  Groomin  Dressin  Feedin  Mobility: 10  Stairs: 0  Toilet Use: 5  Transfer (Bed to Chair and Back): 5  Total: 35/100       The Barthel ADL Index: Guidelines  1. The index should be used as a record of what a patient does, not as a record of what a patient could do. 2. The main aim is to establish degree of independence from any help, physical or verbal, however minor and for whatever reason. 3. The need for supervision renders the patient not independent. 4. A patient's performance should be established using the best available evidence.  Asking the patient, friends/relatives and nurses are the usual sources, but direct observation and common sense are also important. However direct testing is not needed. 5. Usually the patient's performance over the preceding 24-48 hours is important, but occasionally longer periods will be relevant. 6. Middle categories imply that the patient supplies over 50 per cent of the effort. 7. Use of aids to be independent is allowed. Manny Ku., Barthel, D.W. ([id number]). Functional evaluation: the Barthel Index. [address] [facility] (14)2. Han Mei cara RUDI Chappell, Kalani Paul, Rashi Dang., Laurie, [address] (1999). Measuring the change indisability after inpatient rehabilitation; comparison of the responsiveness of the Barthel Index and Functional [place] Measure. Journal of Neurology, Neurosurgery, and Psychiatry, 66(4), 508-469. JESSEE Nova, MARILEE Shah, & Yolanda Briseno M.A. (2004.) Assessment of post-stroke quality of life in cost-effectiveness studies: The usefulness of the Barthel Index and the EuroQoL-5D. Quality of Life Research, 15, 964-85        Physical Therapy Evaluation Charge Determination   History Examination Presentation Decision-Making   HIGH Complexity :3+ comorbidities / personal factors will impact the outcome/ POC  MEDIUM Complexity : 3 Standardized tests and measures addressing body structure, function, activity limitation and / or participation in recreation  MEDIUM Complexity : Evolving with changing characteristics  Other outcome measures barthel  MEDIUM      Based on the above components, the patient evaluation is determined to be of the following complexity level: MEDIUM    Pain Rating:  Yelling out during bed mobility     Activity Tolerance:   Fair    After treatment patient left in no apparent distress:   Sitting in chair, Call bell within reach, and Bed / chair alarm activated    COMMUNICATION/EDUCATION:   The patients plan of care was discussed with: Occupational therapist and Registered nurse.      Patient is unable to participate in goal setting and plan of care.     Thank you for this referral.  Myah Peralta, PT, DPT   Time Calculation: 27 mins

## 2020-12-06 NOTE — PROGRESS NOTES
Bedside shift change report given to Kyler Denson RN (oncoming nurse) by Jessica Rucker RN (offgoing nurse). Report included the following information SBAR, Kardex, ED Summary, Intake/Output, MAR and Recent Results.    \

## 2020-12-06 NOTE — PROGRESS NOTES
OLY  Discharge Planning       CM acknowledge consult for SNF placement. PT and OT recommending therapy 3 hours per day 5-7 days awake. CM spoke with patient mother to discuss recommendations. Consult to [address] [place] [address] via [employer] placed per mother request. Primary CM will continue to follow patient for discharge planning needs and arrange for services as deemed necessary.      Shoshana Self, MSN, RN  Care Manager

## 2020-12-07 ENCOUNTER — HOME CARE VISIT (OUTPATIENT)
Dept: HOME HEALTH SERVICES | Facility: HOME HEALTH | Age: 65
End: 2020-12-07
Payer: MEDICARE

## 2020-12-07 LAB
ANION GAP SERPL CALC-SCNC: 6 MMOL/L (ref 5–15)
BUN SERPL-MCNC: 16 MG/DL (ref 6–20)
BUN/CREAT SERPL: 23 (ref 12–20)
CALCIUM SERPL-MCNC: 8.3 MG/DL (ref 8.5–10.1)
CHLORIDE SERPL-SCNC: 113 MMOL/L (ref 97–108)
CO2 SERPL-SCNC: 23 MMOL/L (ref 21–32)
CREAT SERPL-MCNC: 0.69 MG/DL (ref 0.7–1.3)
ERYTHROCYTE [DISTWIDTH] IN BLOOD BY AUTOMATED COUNT: 13.9 % (ref 11.5–14.5)
GLUCOSE BLD STRIP.AUTO-MCNC: 100 MG/DL (ref 65–100)
GLUCOSE BLD STRIP.AUTO-MCNC: 128 MG/DL (ref 65–100)
GLUCOSE BLD STRIP.AUTO-MCNC: 137 MG/DL (ref 65–100)
GLUCOSE BLD STRIP.AUTO-MCNC: 229 MG/DL (ref 65–100)
GLUCOSE BLD STRIP.AUTO-MCNC: 231 MG/DL (ref 65–100)
GLUCOSE SERPL-MCNC: 118 MG/DL (ref 65–100)
HCT VFR BLD AUTO: 29.5 % (ref 36.6–50.3)
HGB BLD-MCNC: 9 G/DL (ref 12.1–17)
MCH RBC QN AUTO: 33.8 PG (ref 26–34)
MCHC RBC AUTO-ENTMCNC: 30.5 G/DL (ref 30–36.5)
MCV RBC AUTO: 110.9 FL (ref 80–99)
NRBC # BLD: 0 K/UL (ref 0–0.01)
NRBC BLD-RTO: 0 PER 100 WBC
PLATELET # BLD AUTO: 85 K/UL (ref 150–400)
PMV BLD AUTO: 10 FL (ref 8.9–12.9)
POTASSIUM SERPL-SCNC: 4 MMOL/L (ref 3.5–5.1)
RBC # BLD AUTO: 2.66 M/UL (ref 4.1–5.7)
SERVICE CMNT-IMP: ABNORMAL
SERVICE CMNT-IMP: NORMAL
SODIUM SERPL-SCNC: 142 MMOL/L (ref 136–145)
WBC # BLD AUTO: 7.6 K/UL (ref 4.1–11.1)

## 2020-12-07 PROCEDURE — 74011636637 HC RX REV CODE- 636/637: Performed by: INTERNAL MEDICINE

## 2020-12-07 PROCEDURE — 74011250637 HC RX REV CODE- 250/637: Performed by: STUDENT IN AN ORGANIZED HEALTH CARE EDUCATION/TRAINING PROGRAM

## 2020-12-07 PROCEDURE — 51798 US URINE CAPACITY MEASURE: CPT

## 2020-12-07 PROCEDURE — 74011250636 HC RX REV CODE- 250/636: Performed by: INTERNAL MEDICINE

## 2020-12-07 PROCEDURE — 65270000029 HC RM PRIVATE

## 2020-12-07 PROCEDURE — 97116 GAIT TRAINING THERAPY: CPT

## 2020-12-07 PROCEDURE — 97535 SELF CARE MNGMENT TRAINING: CPT

## 2020-12-07 PROCEDURE — 97530 THERAPEUTIC ACTIVITIES: CPT

## 2020-12-07 PROCEDURE — 74011250637 HC RX REV CODE- 250/637: Performed by: INTERNAL MEDICINE

## 2020-12-07 PROCEDURE — 85027 COMPLETE CBC AUTOMATED: CPT

## 2020-12-07 PROCEDURE — 74011250637 HC RX REV CODE- 250/637: Performed by: NURSE PRACTITIONER

## 2020-12-07 PROCEDURE — 36415 COLL VENOUS BLD VENIPUNCTURE: CPT

## 2020-12-07 PROCEDURE — 82962 GLUCOSE BLOOD TEST: CPT

## 2020-12-07 PROCEDURE — 74011000250 HC RX REV CODE- 250: Performed by: STUDENT IN AN ORGANIZED HEALTH CARE EDUCATION/TRAINING PROGRAM

## 2020-12-07 PROCEDURE — 80048 BASIC METABOLIC PNL TOTAL CA: CPT

## 2020-12-07 RX ADMIN — DIVALPROEX SODIUM 500 MG: 500 TABLET, DELAYED RELEASE ORAL at 17:22

## 2020-12-07 RX ADMIN — PREGABALIN 150 MG: 150 CAPSULE ORAL at 17:22

## 2020-12-07 RX ADMIN — DIVALPROEX SODIUM 500 MG: 500 TABLET, DELAYED RELEASE ORAL at 08:41

## 2020-12-07 RX ADMIN — Medication 10 ML: at 13:50

## 2020-12-07 RX ADMIN — LEVOFLOXACIN 750 MG: 5 INJECTION, SOLUTION INTRAVENOUS at 08:40

## 2020-12-07 RX ADMIN — HYDROCODONE BITARTRATE AND ACETAMINOPHEN 1 TABLET: 5; 325 TABLET ORAL at 08:43

## 2020-12-07 RX ADMIN — INSULIN LISPRO 2 UNITS: 100 INJECTION, SOLUTION INTRAVENOUS; SUBCUTANEOUS at 12:16

## 2020-12-07 RX ADMIN — TRIAMCINOLONE ACETONIDE: 1 OINTMENT TOPICAL at 08:46

## 2020-12-07 RX ADMIN — TOPIRAMATE 300 MG: 100 TABLET, FILM COATED ORAL at 08:52

## 2020-12-07 RX ADMIN — TRIAMCINOLONE ACETONIDE: 1 OINTMENT TOPICAL at 17:22

## 2020-12-07 RX ADMIN — LACTULOSE 15 ML: 20 SOLUTION ORAL at 08:46

## 2020-12-07 RX ADMIN — METFORMIN 1000 MG: 500 TABLET, EXTENDED RELEASE ORAL at 08:42

## 2020-12-07 RX ADMIN — GLIMEPIRIDE 8 MG: 4 TABLET ORAL at 08:44

## 2020-12-07 RX ADMIN — ATORVASTATIN CALCIUM 40 MG: 40 TABLET, FILM COATED ORAL at 22:26

## 2020-12-07 RX ADMIN — CHLORHEXIDINE GLUCONATE 0.12% ORAL RINSE 15 ML: 1.2 LIQUID ORAL at 09:39

## 2020-12-07 RX ADMIN — PREGABALIN 150 MG: 150 CAPSULE ORAL at 08:41

## 2020-12-07 RX ADMIN — TAMSULOSIN HYDROCHLORIDE 0.4 MG: 0.4 CAPSULE ORAL at 22:26

## 2020-12-07 RX ADMIN — LACTULOSE 15 ML: 20 SOLUTION ORAL at 22:26

## 2020-12-07 RX ADMIN — TOPIRAMATE 300 MG: 100 TABLET, FILM COATED ORAL at 17:23

## 2020-12-07 RX ADMIN — Medication 1 TABLET: at 08:45

## 2020-12-07 RX ADMIN — Medication 10 ML: at 06:38

## 2020-12-07 RX ADMIN — CHLORHEXIDINE GLUCONATE 0.12% ORAL RINSE 15 ML: 1.2 LIQUID ORAL at 21:00

## 2020-12-07 RX ADMIN — FOLIC ACID 1 MG: 1 TABLET ORAL at 08:42

## 2020-12-07 RX ADMIN — ALOGLIPTIN 12.5 MG: 25 TABLET, FILM COATED ORAL at 08:43

## 2020-12-07 RX ADMIN — METFORMIN 1000 MG: 500 TABLET, EXTENDED RELEASE ORAL at 17:22

## 2020-12-07 RX ADMIN — LACTULOSE 15 ML: 20 SOLUTION ORAL at 16:02

## 2020-12-07 RX ADMIN — PIOGLITAZONE 45 MG: 30 TABLET ORAL at 08:45

## 2020-12-07 RX ADMIN — INSULIN LISPRO 3 UNITS: 100 INJECTION, SOLUTION INTRAVENOUS; SUBCUTANEOUS at 17:23

## 2020-12-07 RX ADMIN — Medication 10 ML: at 22:26

## 2020-12-07 NOTE — PROGRESS NOTES
Problem: Mobility Impaired (Adult and Pediatric)  Goal: *Acute Goals and Plan of Care (Insert Text)  Description: FUNCTIONAL STATUS PRIOR TO ADMISSION: Patient was modified independent using a walker and wheelchair for functional mobility. HOME SUPPORT PRIOR TO ADMISSION: The patient lived with mother. Physical Therapy Goals  Initiated 12/6/2020  1. Patient will move from supine to sit and sit to supine  in bed with moderate assistance  within 7 day(s). 2.  Patient will transfer from bed to chair and chair to bed with supervision/set-up using the least restrictive device within 7 day(s). 3.  Patient will perform sit to stand with supervision/set-up within 7 day(s). 4.  Patient will ambulate with supervision/set-up for 100 feet with the least restrictive device within 7 day(s). Outcome: Not Met   PHYSICAL THERAPY TREATMENT  Patient: Jerica Rosales (12 y.o. male)  Date: 12/7/2020  Diagnosis: Lumbar stenosis [M48.061]  Cirrhosis ([address] [K74.60]  Paraplegia (HCC) [G82.20]  Fever of unknown origin (FUO) [R50.9]  Back pain [M54.9]   <principal problem not specified>       Precautions:   No bending, no lifting greater than 5 lbs, no twisting, log-roll technique, repositioning every 20-30 min except when sleeping, brace when OOB (if ordered)  Chart, physical therapy assessment, plan of care and goals were reviewed. ASSESSMENT  Patient continues with skilled PT services and is not progressing towards goals. Pt presents with decreased strength, cognition, and safety awareness for all mobility. Pt performed bed mobility at minAx2 with cueing for sequencing. Pt performed a sit to stand transfer at CGAx2 with cueing for hand placement. Pt unable to correct and pulled up on RW instead. Pt mobilized within the bathroom at minAx2 with assistance for personal hygiene. Pt able to stand for 2 minutes with RW to don/doff brief. Pt ambulated 115ft at CGAx2 with RW.  Pt presented with decreased step clearance, fluctuating step ronal, and path deviations. Pt required constant cueing for walker management to stay within walker and decrease ronal throughout ambulation for safety. Pt minimally able to correct with cueing. Pt with improved mobility tolerance, but with decreased safety awareness with all activity. Current Level of Function Impacting Discharge (mobility/balance): bed mobility at minAx2, sit to stand transfer at CGAx2, ambulation at CGAx2     Other factors to consider for discharge: intellectual disability         PLAN :  Patient continues to benefit from skilled intervention to address the above impairments. Continue treatment per established plan of care. to address goals. Recommendation for discharge: (in order for the patient to meet his/her long term goals)  Therapy 3 hours per day 5-7 days per week    This discharge recommendation:  Has been made in collaboration with the attending provider and/or case management    IF patient discharges home will need the following DME: to be determined (TBD)       SUBJECTIVE:   Patient stated my mom is coming today to visit.     OBJECTIVE DATA SUMMARY:   Critical Behavior:  Neurologic State: Alert, Appropriate for age  Orientation Level: Oriented to person, Oriented to place, Oriented to situation, Oriented to time  Cognition: Memory loss  Safety/Judgement: Awareness of environment, Good awareness of safety precautions    Functional Mobility Training:    Bed Mobility:  Log Rolling: Minimum assistance  Supine to Sit: Minimum assistancex2     Scooting: Minimum assistance     Transfers:  Sit to Stand: Contact guard assistance;Assist x2  Stand to Sit: Contact guard assistance;Assist x2       Balance:  Sitting: Impaired  Sitting - Static: Good (unsupported)  Sitting - Dynamic: Fair (occasional)  Standing: Impaired; Without support  Standing - Static: Fair;Constant support  Standing - Dynamic : Fair;Constant support  Ambulation/Gait Training:  Distance (ft): 115 Feet (ft)  Assistive Device: Walker, rolling;Gait belt  Ambulation - Level of Assistance: Contact guard assistance;Assist x2     Gait Abnormalities: Decreased step clearance; Path deviations     Base of Support: Widened     Speed/Doris: Fluctuations  Step Length: Left shortened;Right shortened    Pain Rating:  Pt with no c/o pain    Activity Tolerance:   Good    After treatment patient left in no apparent distress:   Sitting in chair, Call bell within reach, and Bed / chair alarm activated    COMMUNICATION/COLLABORATION:   The patients plan of care was discussed with: Occupational therapist and Registered nurse.      MAZIN Alcantara   Time Calculation: 24 mins

## 2020-12-07 NOTE — PROGRESS NOTES
End of Shift Note    Bedside shift change report given to Rodrick Badillo RN (oncoming nurse) by Ritchie Dietz (offgoing nurse). Report included the following information SBAR, Kardex, Intake/Output, MAR and Recent Results    Shift worked:  7P-7A     Shift summary and any significant changes:    none     Concerns for physician to address:  none     Zone phone for oncoming shift:   [phone]       Activity:  Activity Level: Bed Rest  Number times ambulated in hallways past shift: 0  Number of times OOB to chair past shift: 0    Cardiac:   Cardiac Monitoring: No           Access:   Current line(s): PIV     Genitourinary:   Urinary status: voiding and incontinent    Respiratory:   O2 Device: Room air  Chronic home O2 use?: NO  Incentive spirometer at bedside: YES     GI:  Last Bowel Movement Date: 12/06/20  Current diet:  DIET DIABETIC CONSISTENT CARB Regular  Passing flatus: YES  Tolerating current diet: YES  % Diet Eaten: 75 %    Pain Management:   Patient states pain is manageable on current regimen: YES    Skin:  Pop Score: 18  Interventions: float heels    Patient Safety:  Fall Score:  Total Score: 4  Interventions: bed/chair alarm, assistive device (walker, cane, etc) and gripper socks  High Fall Risk: Yes    Length of Stay:  Expected LOS: - - -  Actual LOS: [address] [facility]

## 2020-12-07 NOTE — PROGRESS NOTES
I reviewed pertinent labs and imaging, and discussed /agreed on the plan of care with Dr. Malaika Martini        Hospitalist Progress Note    NAME: Tiny Warren   :  1955   MRN:  [id number]       Assessment / Plan:  Worsening of low back pain with functional paraplegia readmission after 2 days probably need SNF placement  XR back  Unchanged   IMPRESSION  IMPRESSION:   Unchanged mild spondylosis at L5-S1.  -MRI - Mild degenerative changes at multiple levels in the lumbar spine  without significant canal stenosis. Small disc protrusion at L5-S1 likely acute  without canal stenosis or definite nerve root impingement  Continue Lyrica. , added Norco , lidocaine patch   -PT, may work with PT no changes or new back injury   -Case management consulted for placement , mother is now in agreement    - doing well with PT ambulating with walker / assistance     Cough with low-grade fever- improved  Cough better , afebrile now   -Covid test negative  -Continue Tessalon  -cont Levaquin IV     Urinary retention with richards   - pt in retention on arrival to ER   - richards placed - pt normally voids  - richards DC this morning - will assess PVR   - cont  tamsulosin     Liver cirrhosis with splenomegaly and portal hypertension. Ammonia level 54 from 22 however mentation at baseline   Cont lactulose  , 2-3 bm / day can hold for diarrhea   Need outpatient GI follow-up.     Seizure disorder  Continue home medications.     Type 2 diabetes mellitus- controlled   Continue home medications.     Hyperlipidemia  Continue home medications.     Macrocytic anemia  Continue folic acid.     Thrombocytopenia  Myelodysplasia  -PT seen by Dr. Yanci Barragan  And referred to [facility]   - Plts stable at 80  Was 66-61 during last admission   -           / Body mass index is 29.84 kg/m². Code status: Full  Prophylaxis: SCD's  Recommended Disposition: SNF/LTC     Subjective:     Chief Complaint / Reason for Physician Visit  \"I want to go in the bathroom . Discussed with RN events overnight. Pt calm this morning tolerated breakfast , no c/o back pain      Review of Systems:  Symptom Y/N Comments  Symptom Y/N Comments   Fever/Chills n   Chest Pain n    Poor Appetite n   Edema y    Cough n   Abdominal Pain     Sputum n   Joint Pain n    SOB/TREVINO n   Pruritis/Rash n    Nausea/vomit n   Tolerating PT/OT     Diarrhea y   Tolerating Diet     Constipation n   Other       Could NOT obtain due to:      Objective:     VITALS:   Last 24hrs VS reviewed since prior progress note. Most recent are:  Patient Vitals for the past 24 hrs:   Temp Pulse Resp BP SpO2   12/07/20 1458 98.3 °F (36.8 °C) 74 16 114/69 94 %   12/07/20 1230 97.5 °F (36.4 °C) 80 16 118/65 95 %   12/07/20 0746 97.5 °F (36.4 °C) 71 16 125/61 94 %   12/07/20 0333 98.2 °F (36.8 °C) 72 16 122/74 96 %   12/06/20 1946 97.6 °F (36.4 °C) 71 18 (!) 142/65 95 %       Intake/Output Summary (Last 24 hours) at 12/7/2020 1735  Last data filed at 12/6/2020 1812  Gross per 24 hour   Intake    Output 550 ml   Net -550 ml        I had a face to face encounter and independently examined this patient on 12/7/2020, as outlined below:  PHYSICAL EXAM:  General: WD, WN. Alert, cooperative, no acute distress  developmental delay   EENT:  EOMI. Anicteric sclerae. MMM  Resp:  no wheezing or rales. No accessory muscle use  CV:  S1S2,  Tr Glenn LEedema  GI:  Soft, Non distended, Non tender. +Bowel sounds,   Neurologic:  Alert and oriented X 3, normal speech,   Psych:    Not anxious nor agitated,ciooperative   Skin:  No rashes.   No jaundice W/D/I    Reviewed most current lab test results and cultures  YES  Reviewed most current radiology test results   YES  Review and summation of old records today    NO  Reviewed patient's current orders and MAR    YES  PMH/SH reviewed - no change compared to H&P  ________________________________________________________________________  Care Plan discussed with:    Comments   Patient x    Family   Mother on phone   RN x    Care Manager     Consultant                        Multidiciplinary team rounds were held today with , nursing, pharmacist and clinical coordinator. Patient's plan of care was discussed; medications were reviewed and discharge planning was addressed. ________________________________________________________________________  Total NON critical care TIME:  30  Minutes    Total CRITICAL CARE TIME Spent:   Minutes non procedure based      Comments   >50% of visit spent in counseling and coordination of care     ________________________________________________________________________  Kerline Nevarez. Anam Hernandez NP     Procedures: see electronic medical records for all procedures/Xrays and details which were not copied into this note but were reviewed prior to creation of Plan. LABS:  I reviewed today's most current labs and imaging studies. Pertinent labs include:  Recent Labs     12/07/20 0415 12/06/20 0612   WBC 7.6 6.6   HGB 9.0* 8.5*   HCT 29.5* 27.7*   PLT 85* 74*     Recent Labs     12/07/20 0415 12/06/20 0612    144   K 4.0 4.3   * 116*   CO2 23 23   * 123*   BUN 16 13   CREA 0.69* 0.68*   CA 8.3* 8.1*       Signed: Vicky Hernandez, NEELAM

## 2020-12-07 NOTE — TELEPHONE ENCOUNTER
The patient left the office before the visit was finished. Message for Seannashi Villa left on her voice mail asking her to confirm she received message regarding [facility] orders.

## 2020-12-07 NOTE — PROGRESS NOTES
Problem: Self Care Deficits Care Plan (Adult)  Goal: *Acute Goals and Plan of Care (Insert Text)  Description:   FUNCTIONAL STATUS PRIOR TO ADMISSION: Patient required minimal assistance for basic and instrumental ADLs as documented when he was DC from the hospital, however per his mother he was dependent with ADLs at home and she was unable to take care of him. HOME SUPPORT: The patient lived with his elderly mother who is unable to care for him at home at present level. Occupational Therapy Goals  Initiated 12/6/2020  1. Patient will perform grooming with supervision/set-up within 7 day(s). 2.  Patient will perform bathing with minimal assistance/contact guard assist within 7 day(s). 3.  Patient will perform upper body dressing with supervision/set-up within 7 day(s). 4.  Patient will perform toilet transfers with minimal assistance/contact guard assist within 7 day(s). 5.  Patient will perform all aspects of lower body dressing with minimal assistance/contact guard assist within 7 day(s). Outcome: Progressing Towards Goal  OCCUPATIONAL THERAPY TREATMENT  Patient: Won Nair (21 y.o. male)  Date: 12/7/2020  Diagnosis: Lumbar stenosis [M48.061]  Cirrh[address] ([facility] [address] [K74.60]  Paraplegia (HCC) [G82.20]  Fever of unknown origin (FUO) [R50.9]  Back pain [M54.9]   <principal problem not specified>      Precautions:    Chart, occupational therapy assessment, plan of care, and goals were reviewed. ASSESSMENT  Patient continues with skilled OT services and is progressing towards goals. Patient continues to present with decreased activity tolerance and functional mobility secondary to impaired standing balance, general weakness, impaired cognition, and decreased endurance. Patient was received sitting up in chair and agreeable for therapy. Patient required CGA for STS using RW and required verbal cues for hand placement.  Once standing, patient required CGA using RW for bathroom mobility and was able to stand at sink for approx. 6 mins. Patient required CGA for washing hands to maintain standing balance and min assist for brushing teeth to manage items. Patient required CGA using RW to return to chair with increased verbal and tactile cuing for safety and hand placement. Once sitting, patient required set-up/supervision to comb his hair and wash his face. When attempting to doff/don socks, patient was min assist for RLE but required max assist for LLE. Patient was left sitting in chair with his family member present and all needs met. Current Level of Function Impacting Discharge (ADLs): set-up/supervision to max assist for ADLs. Other factors to consider for discharge: impaired cognition, fall risk         PLAN :  Patient continues to benefit from skilled intervention to address the above impairments. Continue treatment per established plan of care. to address goals. Recommendation for discharge: (in order for the patient to meet his/her long term goals)  Therapy 3 hours per day 5-7 days per week    This discharge recommendation:  Has been made in collaboration with the attending provider and/or case management    IF patient discharges home will need the following DME: TBD in rehab. SUBJECTIVE:   Patient stated Mama usually helps with this.  (referring to tooth brush)    OBJECTIVE DATA SUMMARY:   Cognitive/Behavioral Status:  Neurologic State: Alert  Orientation Level: Oriented to person;Oriented to place;Oriented to situation  Cognition: Impaired decision making; Follows commands;Poor safety awareness  Perception: Appears intact  Perseveration: No perseveration noted  Safety/Judgement: Decreased awareness of need for safety;Decreased insight into deficits    Functional Mobility and Transfers for ADLs:  Bed Mobility:  Patient received sitting up in chair and ended session in chair.      Transfers:  Sit to Stand: Contact guard assistance  Functional Transfers  Bathroom Mobility: Contact guard assistance  Cues: Tactile cues provided;Verbal cues provided    Balance:  Sitting: Impaired  Sitting - Static: Good (unsupported)  Sitting - Dynamic: Fair (occasional)  Standing: Impaired; Without support  Standing - Static: Fair;Constant support  Standing - Dynamic : Fair;Constant support    ADL Intervention:  Grooming  Position Performed: Standing;Seated in chair(at sink)  Washing Face: Set-up; Supervision(seated in chair)  Washing Hands: Contact guard assistance(standing at sink)  Brushing Teeth: Minimum assistance(standing at sink)  Brushing/Combing Hair: Set-up; Supervision(sitting in chair)    Lower Body Dressing Assistance  Socks: Maximum assistance  Leg Crossed Method Used: Yes  Position Performed: Seated in chair  Cues: Physical assistance;Don;Doff;Tactile cues provided;Verbal cues provided    Pain:  Patient did not c/o pain during session. Activity Tolerance:   Fair    After treatment patient left in no apparent distress:   Sitting in chair, Call bell within reach, Bed / chair alarm activated, and Caregiver / family present    COMMUNICATION/COLLABORATION:   The patients plan of care was discussed with: Physical therapy assistant, Registered nurse, and Case management.      MARV Ruiz/L  Time Calculation: 23 mins

## 2020-12-07 NOTE — ROUTINE PROCESS
Bedside shift change report given to Lizzy (oncoming nurse) by Abdias FORDE RN (offgoing nurse). Report included the following information SBAR, Kardex and MAR.

## 2020-12-08 ENCOUNTER — TELEPHONE (OUTPATIENT)
Dept: INTERNAL MEDICINE CLINIC | Age: 65
End: 2020-12-08

## 2020-12-08 LAB
COMMENT, HOLDF: NORMAL
ERYTHROCYTE [DISTWIDTH] IN BLOOD BY AUTOMATED COUNT: 13.8 % (ref 11.5–14.5)
GLUCOSE BLD STRIP.AUTO-MCNC: 124 MG/DL (ref 65–100)
GLUCOSE BLD STRIP.AUTO-MCNC: 126 MG/DL (ref 65–100)
GLUCOSE BLD STRIP.AUTO-MCNC: 140 MG/DL (ref 65–100)
GLUCOSE BLD STRIP.AUTO-MCNC: 150 MG/DL (ref 65–100)
HCT VFR BLD AUTO: 29.3 % (ref 36.6–50.3)
HGB BLD-MCNC: 9.1 G/DL (ref 12.1–17)
MCH RBC QN AUTO: 33.8 PG (ref 26–34)
MCHC RBC AUTO-ENTMCNC: 31.1 G/DL (ref 30–36.5)
MCV RBC AUTO: 108.9 FL (ref 80–99)
NRBC # BLD: 0 K/UL (ref 0–0.01)
NRBC BLD-RTO: 0 PER 100 WBC
PLATELET # BLD AUTO: 88 K/UL (ref 150–400)
PMV BLD AUTO: 9.8 FL (ref 8.9–12.9)
RBC # BLD AUTO: 2.69 M/UL (ref 4.1–5.7)
SAMPLES BEING HELD,HOLD: NORMAL
SERVICE CMNT-IMP: ABNORMAL
WBC # BLD AUTO: 9.2 K/UL (ref 4.1–11.1)

## 2020-12-08 PROCEDURE — 97530 THERAPEUTIC ACTIVITIES: CPT | Performed by: PHYSICAL THERAPIST

## 2020-12-08 PROCEDURE — 85027 COMPLETE CBC AUTOMATED: CPT

## 2020-12-08 PROCEDURE — 74011250637 HC RX REV CODE- 250/637: Performed by: INTERNAL MEDICINE

## 2020-12-08 PROCEDURE — 36415 COLL VENOUS BLD VENIPUNCTURE: CPT

## 2020-12-08 PROCEDURE — 97535 SELF CARE MNGMENT TRAINING: CPT | Performed by: OCCUPATIONAL THERAPIST

## 2020-12-08 PROCEDURE — 97116 GAIT TRAINING THERAPY: CPT | Performed by: PHYSICAL THERAPIST

## 2020-12-08 PROCEDURE — 51798 US URINE CAPACITY MEASURE: CPT

## 2020-12-08 PROCEDURE — 74011000250 HC RX REV CODE- 250: Performed by: STUDENT IN AN ORGANIZED HEALTH CARE EDUCATION/TRAINING PROGRAM

## 2020-12-08 PROCEDURE — 82962 GLUCOSE BLOOD TEST: CPT

## 2020-12-08 PROCEDURE — 74011250637 HC RX REV CODE- 250/637: Performed by: NURSE PRACTITIONER

## 2020-12-08 PROCEDURE — 65270000029 HC RM PRIVATE

## 2020-12-08 PROCEDURE — 74011250636 HC RX REV CODE- 250/636: Performed by: INTERNAL MEDICINE

## 2020-12-08 PROCEDURE — 74011636637 HC RX REV CODE- 636/637: Performed by: INTERNAL MEDICINE

## 2020-12-08 PROCEDURE — 74011250637 HC RX REV CODE- 250/637: Performed by: STUDENT IN AN ORGANIZED HEALTH CARE EDUCATION/TRAINING PROGRAM

## 2020-12-08 RX ORDER — LEVOFLOXACIN 5 MG/ML
750 INJECTION, SOLUTION INTRAVENOUS EVERY 24 HOURS
Status: COMPLETED | OUTPATIENT
Start: 2020-12-08 | End: 2020-12-10

## 2020-12-08 RX ADMIN — Medication 10 ML: at 05:49

## 2020-12-08 RX ADMIN — LACTULOSE 15 ML: 20 SOLUTION ORAL at 22:04

## 2020-12-08 RX ADMIN — INSULIN LISPRO 2 UNITS: 100 INJECTION, SOLUTION INTRAVENOUS; SUBCUTANEOUS at 09:40

## 2020-12-08 RX ADMIN — METFORMIN 1000 MG: 500 TABLET, EXTENDED RELEASE ORAL at 16:28

## 2020-12-08 RX ADMIN — PREGABALIN 150 MG: 150 CAPSULE ORAL at 09:40

## 2020-12-08 RX ADMIN — ALOGLIPTIN 12.5 MG: 25 TABLET, FILM COATED ORAL at 09:41

## 2020-12-08 RX ADMIN — LACTULOSE 15 ML: 20 SOLUTION ORAL at 16:29

## 2020-12-08 RX ADMIN — Medication 10 ML: at 22:05

## 2020-12-08 RX ADMIN — TAMSULOSIN HYDROCHLORIDE 0.4 MG: 0.4 CAPSULE ORAL at 22:05

## 2020-12-08 RX ADMIN — TRIAMCINOLONE ACETONIDE: 1 OINTMENT TOPICAL at 10:03

## 2020-12-08 RX ADMIN — FOLIC ACID 1 MG: 1 TABLET ORAL at 09:40

## 2020-12-08 RX ADMIN — DIVALPROEX SODIUM 500 MG: 500 TABLET, DELAYED RELEASE ORAL at 17:59

## 2020-12-08 RX ADMIN — TOPIRAMATE 300 MG: 100 TABLET, FILM COATED ORAL at 09:40

## 2020-12-08 RX ADMIN — Medication 10 ML: at 16:28

## 2020-12-08 RX ADMIN — PIOGLITAZONE 45 MG: 30 TABLET ORAL at 09:41

## 2020-12-08 RX ADMIN — LEVOFLOXACIN 750 MG: 5 INJECTION, SOLUTION INTRAVENOUS at 09:48

## 2020-12-08 RX ADMIN — METFORMIN 1000 MG: 500 TABLET, EXTENDED RELEASE ORAL at 09:48

## 2020-12-08 RX ADMIN — CHLORHEXIDINE GLUCONATE 0.12% ORAL RINSE 15 ML: 1.2 LIQUID ORAL at 11:33

## 2020-12-08 RX ADMIN — GLIMEPIRIDE 8 MG: 4 TABLET ORAL at 09:41

## 2020-12-08 RX ADMIN — Medication 1 TABLET: at 09:48

## 2020-12-08 RX ADMIN — INSULIN LISPRO 2 UNITS: 100 INJECTION, SOLUTION INTRAVENOUS; SUBCUTANEOUS at 11:54

## 2020-12-08 RX ADMIN — DIVALPROEX SODIUM 500 MG: 500 TABLET, DELAYED RELEASE ORAL at 09:40

## 2020-12-08 RX ADMIN — TOPIRAMATE 300 MG: 100 TABLET, FILM COATED ORAL at 17:59

## 2020-12-08 RX ADMIN — LACTULOSE 15 ML: 20 SOLUTION ORAL at 09:41

## 2020-12-08 RX ADMIN — CHLORHEXIDINE GLUCONATE 0.12% ORAL RINSE 15 ML: 1.2 LIQUID ORAL at 21:00

## 2020-12-08 RX ADMIN — TRIAMCINOLONE ACETONIDE: 1 OINTMENT TOPICAL at 18:05

## 2020-12-08 RX ADMIN — ATORVASTATIN CALCIUM 40 MG: 40 TABLET, FILM COATED ORAL at 22:03

## 2020-12-08 RX ADMIN — PREGABALIN 150 MG: 150 CAPSULE ORAL at 18:00

## 2020-12-08 NOTE — PROGRESS NOTES
OL[facility] PLAN     [facility] accepted    -CM checked [employer] and Taryn Sanchez has initiated precertification with Pt insurance. COVID test with in 7 days of DC. Done on 12/5 and not detected. Second IM Letter needs to be delivered prior to DC    Pt has Christie Rosales and will need Medicaid Transport for [employer] transport. CM will continue to monitor DC plan.      Kristina Benavidez, [address] [facility]

## 2020-12-08 NOTE — PROGRESS NOTES
Problem: Falls - Risk of  Goal: *Absence of Falls  Description: Document Harrison García Fall Risk and appropriate interventions in the flowsheet. Outcome: Progressing Towards Goal  Note: Fall Risk Interventions:  Mobility Interventions: Bed/chair exit alarm, Communicate number of staff needed for ambulation/transfer    Mentation Interventions: Bed/chair exit alarm, Door open when patient unattended    Medication Interventions: Bed/chair exit alarm, Patient to call before getting OOB    Elimination Interventions: Bed/chair exit alarm, Call light in reach, Patient to call for help with toileting needs    History of Falls Interventions: Bed/chair exit alarm         Problem: Patient Education: Go to Patient Education Activity  Goal: Patient/Family Education  Outcome: Progressing Towards Goal     Problem: Pressure Injury - Risk of  Goal: *Prevention of pressure injury  Description: Document Pop Scale and appropriate interventions in the flowsheet.   Outcome: Progressing Towards Goal  Note: Pressure Injury Interventions:  Sensory Interventions: Assess changes in LOC    Moisture Interventions: Absorbent underpads, Check for incontinence Q2 hours and as needed    Activity Interventions: Increase time out of bed    Mobility Interventions: Assess need for specialty bed, HOB 30 degrees or less    Nutrition Interventions: Document food/fluid/supplement intake    Friction and Shear Interventions: Lift sheet, Lift team/patient mobility team                Problem: Patient Education: Go to Patient Education Activity  Goal: Patient/Family Education  Outcome: Progressing Towards Goal     Problem: Patient Education: Go to Patient Education Activity  Goal: Patient/Family Education  Outcome: Progressing Towards Goal     Problem: Patient Education: Go to Patient Education Activity  Goal: Patient/Family Education  Outcome: Progressing Towards Goal

## 2020-12-08 NOTE — PROGRESS NOTES
End of Shift Note    Bedside shift change report given to Josue (oncoming nurse) by Geovani Ramirez (offgoing nurse). Report included the following information SBAR, Kardex, Procedure Summary and MAR    Shift worked:  7a-7p     Shift summary and any significant changes:    Patient rested in bed for most of the shift. Up to recliner for the afternoon. Worked with PT. Mother at bedside today. No complaint of pain. Uses urinal to void. Uneventful shift. Charting in disaster mode. Concerns for physician to address:    Zone phone for oncoming shift:  [phone]       Activity:  Activity Level: Up with Assistance  Number times ambulated in hallways past shift: 1  Number of times OOB to chair past shift: 1    Cardiac:   Cardiac Monitoring: No           Access:   Current line(s): PIV     Genitourinary:   Urinary status: voiding and incontinent    Respiratory:   O2 Device: Room air  Chronic home O2 use?: NO  Incentive spirometer at bedside: N/A     GI:  Last Bowel Movement Date: 12/08/20  Current diet:  DIET DIABETIC CONSISTENT CARB Regular  Passing flatus: YES  Tolerating current diet: YES  % Diet Eaten: 100 %    Pain Management:   Patient states pain is manageable on current regimen: YES    Skin:  Pop Score: 18  Interventions: float heels, increase time out of bed, PT/OT consult and limit briefs    Patient Safety:  Fall Score:  Total Score: 5  Interventions: bed/chair alarm, assistive device (walker, cane, etc), gripper socks and pt to call before getting OOB  High Fall Risk: Yes    Length of Stay:  Expected LOS: 2d 21h  Actual LOS: [address]

## 2020-12-08 NOTE — TELEPHONE ENCOUNTER
Liane Nazario a PT with [facility] called and stated that she got the orders but that the pt is back in the hospital

## 2020-12-08 NOTE — PROGRESS NOTES
TRANSFER - OUT REPORT:    Verbal report given to Yoselin(name) on Joe Savage  being transferred to Surg Tele(unit) for routine progression of care       Report consisted of patients Situation, Background, Assessment and   Recommendations(SBAR). Information from the following report(s) SBAR, Kardex, Intake/Output, MAR and Recent Results was reviewed with the receiving nurse. Lines:   Peripheral IV 12/04/20 Right Antecubital (Active)   Site Assessment Clean, dry, & intact 12/07/20 2052   Phlebitis Assessment 0 12/07/20 2052   Infiltration Assessment 0 12/07/20 2052   Dressing Status Clean, dry, & intact 12/07/20 2052   Dressing Type Tape;Transparent 12/07/20 2052   Hub Color/Line Status Pink 12/07/20 2052        Opportunity for questions and clarification was provided.       Patient transported with:   Registered Nurse

## 2020-12-08 NOTE — PROGRESS NOTES
End of Shift Note    Bedside shift change report given to Christopher (oncoming nurse) by Darion Villagomez RN (offgoing nurse). Report included the following information SBAR, Kardex, Intake/Output, MAR and Recent Results    Shift worked:  c     Shift summary and any significant changes:     none     Concerns for physician to address:  none     Zone phone for oncoming shift:   [phone]       Activity:  Activity Level: Up with Assistance  Number times ambulated in hallways past shift: 0  Number of times OOB to chair past shift: 2    Cardiac:   Cardiac Monitoring: No           Access:   Current line(s): PIV     Genitourinary:   Urinary status: incontinent    Respiratory:   O2 Device: Room air  Chronic home O2 use?: NO  Incentive spirometer at bedside: NO     GI:  Last Bowel Movement Date: 12/07/20  Current diet:  DIET DIABETIC CONSISTENT CARB Regular  Passing flatus: YES  Tolerating current diet: YES  % Diet Eaten: 75 %    Pain Management:   Patient states pain is manageable on current regimen: YES    Skin:  Pop Score: 18  Interventions: float heels, increase time out of bed, PT/OT consult and limit briefs    Patient Safety:  Fall Score:  Total Score: 5  Interventions: gripper socks  High Fall Risk: Yes    Length of Stay:  Expected LOS: 2d 21h  Actual LOS: [address] Tavo Martinez RN

## 2020-12-08 NOTE — PROGRESS NOTES
Problem: Self Care Deficits Care Plan (Adult)  Goal: *Acute Goals and Plan of Care (Insert Text)  Description:   FUNCTIONAL STATUS PRIOR TO ADMISSION: Patient required minimal assistance for basic and instrumental ADLs as documented when he was DC from the hospital, however per his mother he was dependent with ADLs at home and she was unable to take care of him. HOME SUPPORT: The patient lived with his elderly mother who is unable to care for him at home at present level. Occupational Therapy Goals  Initiated 12/6/2020  1. Patient will perform grooming with supervision/set-up within 7 day(s). 2.  Patient will perform bathing with minimal assistance/contact guard assist within 7 day(s). 3.  Patient will perform upper body dressing with supervision/set-up within 7 day(s). 4.  Patient will perform toilet transfers with minimal assistance/contact guard assist within 7 day(s). 5.  Patient will perform all aspects of lower body dressing with minimal assistance/contact guard assist within 7 day(s). Outcome: Progressing Towards Goal   OCCUPATIONAL THERAPY TREATMENT  Patient: Adelaide Muniz (10 y.o. male)  Date: 12/8/2020  Diagnosis: Lumbar stenosis [M48.061]  Cirrhosis ([address] [K74.60]  Paraplegia (HCC) [G82.20]  Fever of unknown origin (FUO) [R50.9]  Back pain [M54.9]   <principal problem not specified>       Precautions:    Chart, occupational therapy assessment, plan of care, and goals were reviewed. ASSESSMENT  Patient continues with skilled OT services and is progressing towards goals. Pt is very cooperative and seems to enjoy working in therapy. Reinforced log roll technique requiring Sd. Performed standing adls with CGA/Sd requiring set up for grooming tasks and total assistance for toilet hygiene. He requires additional time for all tasks. No complaints of back pain this date.    Continue to recommend inpatient rehab at discharge to maximize independence and safety prior to discharge home with his mother's assistance. Current Level of Function Impacting Discharge (ADLs): CGA/Amina to total assistance    Other factors to consider for discharge: per medical record baseline \"intellectual disability\"         PLAN :  Patient continues to benefit from skilled intervention to address the above impairments. Continue treatment per established plan of care. to address goals. Recommend with staff: encourage OOB for adls, sitting in chair for all meals. Recommend next OT session: standing ADLs, simulated IADL tasks using RW    Recommendation for discharge: (in order for the patient to meet his/her long term goals)  Therapy 3 hours per day 5-7 days per week  vs. Home with mother and additional caregiver assistance as needed    This discharge recommendation:  Has not yet been discussed the attending provider and/or case management    IF patient discharges home will need the following DME: none       SUBJECTIVE:   Patient stated yes I'll work with you.     OBJECTIVE DATA SUMMARY:   Cognitive/Behavioral Status:  Neurologic State: Alert              Safety/Judgement: Decreased awareness of need for assistance;Decreased awareness of need for safety; Fall prevention    Functional Mobility and Transfers for ADLs:  Bed Mobility:  Rolling: Minimum assistance;Assist x1  Supine to Sit: Minimum assistance;Assist x1  Reinforced log roll technique  Transfers:  Sit to Stand: Contact guard assistance;Stand-by assistance(contact guard improving to stand-by assist with practice, verbal cues for hand placement)  Functional Transfers  Bathroom Mobility: Contact guard assistance  Toilet Transfer : Contact guard assistance  Adaptive Equipment: Grab bars; Walker (comment)       Balance:  Sitting: Intact  Standing: Impaired; Without support  Standing - Static: Constant support;Good  Standing - Dynamic : Constant support; Fair    ADL Intervention:       Grooming  Grooming Assistance: Contact guard assistance;Minimum assistance  Position Performed: Standing  Washing Hands: Stand-by assistance(cues for supplies and thoroughness)  Brushing Teeth: Set-up; Contact guard assistance(cues for thoroughness-poor executions of task mother assists)  Brushing/Combing Hair: Contact guard assistance(in standing poor excution of task)  Cues: Physical assistance;Verbal cues provided;Visual cues provided                        Toileting  Toileting Assistance: Maximum assistance; Total assistance(dependent)  Bowel Hygiene: Total assistance (dependent)(states he has assist at home from mother)  Clothing Management: Total assistance (dependent)  Cues: Physical assistance for pants down;Physical assistance for pants up;Verbal cues provided;Visual cues provided  Adaptive Equipment: Grab bars; Walker    Cognitive Retraining  Safety/Judgement: Decreased awareness of need for assistance;Decreased awareness of need for safety; Fall prevention  Cues: (pt has intellectual disability at baseline per medical recor)      Pain:  No complaints of pain this session. Activity Tolerance:   Good--no complaints of fatigue. Cues to keep legs strong during standing adls. After treatment patient left in no apparent distress:   Sitting in chair, Call bell within reach, Caregiver / family present, and nursing informed    COMMUNICATION/COLLABORATION:   The patients plan of care was discussed with: Physical therapist and Registered nurse.      Angelique Collado OTR/L  Time Calculation: 34 mins

## 2020-12-08 NOTE — PROGRESS NOTES
Hospitalist Progress Note    NAME: Joe Savage   :  1955   MRN:  [id number]     I reviewed pertinent labs and imaging, and discussed /agreed on the plan of care with Dr. Stacey Jett. Assessment / Plan:  Worsening of low back pain with functional paraplegia readmission after 2 days SNF placement Pending insurance authorization  XR back  Unchanged   IMPRESSION:   Unchanged mild spondylosis at L5-S1.  -MRI - Mild degenerative changes at multiple levels in the lumbar spine  without significant canal stenosis. Small disc protrusion at L5-S1 likely acute  without canal stenosis or definite nerve root impingement  Continue Lyrica. , added Norco , lidocaine patch   Continue PT  Case management input appreciated  doing well with PT ambulating with walker / assistance  Cough with low-grade fever- improved  Cough better , afebrile for over 48 hours   Covid test negative  Continue Tessalon  cont Levaquin IV   Urinary retention with richards    pt in retention on arrival to ER    Richards Catheter discontinued  Voiding without difficulty  Per  residual post void which was evaluated one hour post void  cont  tamsulosin   Liver cirrhosis with splenomegaly and portal hypertension. mentation at baseline   Cont lactulose  , 2-3 bm / day can hold for diarrhea   Need outpatient GI follow-up. Seizure disorder  Continue home medications. No seizure noted this admission  Type 2 diabetes mellitus- controlled   Continue home medications.   Monitor  Hyperlipidemia  Continue home medications. Macrocytic anemia  Continue folic acid. Thrombocytopenia  Myelodysplasia  PT seen by Dr. Arturo Gomez  And referred to [facility]   Plts stable at 80  Was 66-61 during last admission     [facility] has accepted    -CM checked [employer] and Yanci Avendano has initiated precertification with Pt insurance. 25.0 - 29.9 Overweight / Body mass index is 29.87 kg/m².     Code status: Full  Prophylaxis: SCD's  Recommended Disposition: SNF/LTC Subjective:     Chief Complaint / Reason for Physician Visit  Alert and talkative this morning. Denies any complaints at this time. Discussed with RN events overnight. Review of Systems:  Symptom Y/N Comments  Symptom Y/N Comments   Fever/Chills n   Chest Pain n    Poor Appetite n   Edema n    Cough n   Abdominal Pain     Sputum n   Joint Pain     SOB/TREVINO n   Pruritis/Rash n    Nausea/vomit n   Tolerating PT/OT y    Diarrhea n   Tolerating Diet y    Constipation n   Other       Could NOT obtain due to:      Objective:     VITALS:   Last 24hrs VS reviewed since prior progress note. Most recent are:  Patient Vitals for the past 24 hrs:   Temp Pulse Resp BP SpO2   12/08/20 1555 98 °F (36.7 °C) 68 17 127/68 98 %   12/08/20 1141 98.6 °F (37 °C) 67 18 134/67 99 %   12/08/20 0754 98.1 °F (36.7 °C) 63 18 (!) 129/59 93 %   12/08/20 0335 97.4 °F (36.3 °C) 80 18 128/69 100 %   12/08/20 0048 98.4 °F (36.9 °C) 78 18 124/75 100 %   12/07/20 2245 97.6 °F (36.4 °C) 74 18 121/63 98 %   12/07/20 1901 97.4 °F (36.3 °C) 75 18 (!) 117/59 97 %       Intake/Output Summary (Last 24 hours) at 12/8/2020 1824  Last data filed at 12/8/2020 1642  Gross per 24 hour   Intake 540 ml   Output 2175 ml   Net -1635 ml        I had a face to face encounter and independently examined this patient on 12/8/2020, as outlined below:  PHYSICAL EXAM:  General: Alert, cooperative, no acute distress    EENT:   Anicteric sclerae. normocephalic  Resp:  CTA bilaterally, no wheezing or rales. No accessory muscle use  CV:  Regular  rhythm,  No edema  GI:  Soft, Non distended, Non tender. +Bowel sounds  Neurologic:  Alert and oriented X 2-3, normal speech,   Psych:   Fair insight. Not anxious nor agitated  Skin:  No rashes.   No jaundice    Reviewed most current lab test results and cultures  YES  Reviewed most current radiology test results   YES  Review and summation of old records today    NO  Reviewed patient's current orders and MAR    YES  PMH/SH reviewed - no change compared to H&P  ________________________________________________________________________  Care Plan discussed with:    Comments   Patient y    Family      RN y    Care Manager     Consultant                        Multidiciplinary team rounds were held today with , nursing, pharmacist and clinical coordinator. Patient's plan of care was discussed; medications were reviewed and discharge planning was addressed. ________________________________________________________________________    ________________________________________________________________________  Lisa Fritz NP     Procedures: see electronic medical records for all procedures/Xrays and details which were not copied into this note but were reviewed prior to creation of Plan. LABS:  I reviewed today's most current labs and imaging studies.   Pertinent labs include:  Recent Labs     12/08/20  0310 12/07/20 0415 12/06/20  0612   WBC 9.2 7.6 6.6   HGB 9.1* 9.0* 8.5*   HCT 29.3* 29.5* 27.7*   PLT 88* 85* 74*     Recent Labs     12/07/20  0415 12/06/20  0612    144   K 4.0 4.3   * 116*   CO2 23 23   * 123*   BUN 16 13   CREA 0.69* 0.68*   CA 8.3* 8.1*       Signed: Lisa Fritz NP

## 2020-12-08 NOTE — PROGRESS NOTES
End of Shift Note    Bedside shift change report given to [facility] (oncoming nurse) by Jose Ramon Elizalde (offgoing nurse). Report included the following information SBAR, Kardex, ED Summary, Intake/Output, MAR and Recent Results    Shift worked:  1053-9304     Shift summary and any significant changes:     Pt came onto the unit at the beginning of shift. Pt tolerated all aspects of care well. Pt c/o pain in both of his big toes. Pt stated he felt like his \"toenail was hit hard\". Pt had no other complaints during shift. Pt mother was notified at his arrival on the unit. Pt was bladder scanned after every void and pt was less than 200 each time. Concerns for physician to address:  Toe pain     Zone phone for oncoming shift:   [phone]       Activity:  Activity Level: Up with Assistance  Number times ambulated in hallways past shift: 0  Number of times OOB to chair past shift: 0    Cardiac:   Cardiac Monitoring: No           Access:   Current line(s): PIV     Genitourinary:   Urinary status: incontinent    Respiratory:   O2 Device: Room air  Chronic home O2 use?: NO  Incentive spirometer at bedside: YES     GI:  Last Bowel Movement Date: 12/07/20  Current diet:  DIET DIABETIC CONSISTENT CARB Regular  Passing flatus: YES  Tolerating current diet: YES  % Diet Eaten: 75 %    Pain Management:   Patient states pain is manageable on current regimen: YES    Skin:  Pop Score: 18  Interventions: turn team, float heels and PT/OT consult    Patient Safety:  Fall Score:  Total Score: 5  Interventions: bed/chair alarm, assistive device (walker, cane, etc), gripper socks, pt to call before getting OOB and stay with me (per policy)  High Fall Risk: Yes    Length of Stay:  Expected LOS: 2d 21h  Actual LOS: [address]

## 2020-12-09 LAB
ALBUMIN SERPL-MCNC: 2 G/DL (ref 3.5–5)
ALBUMIN/GLOB SERPL: 0.7 {RATIO} (ref 1.1–2.2)
ALP SERPL-CCNC: 58 U/L (ref 45–117)
ALT SERPL-CCNC: 16 U/L (ref 12–78)
ANION GAP SERPL CALC-SCNC: 6 MMOL/L (ref 5–15)
AST SERPL-CCNC: 11 U/L (ref 15–37)
BILIRUB SERPL-MCNC: 0.6 MG/DL (ref 0.2–1)
BUN SERPL-MCNC: 15 MG/DL (ref 6–20)
BUN/CREAT SERPL: 20 (ref 12–20)
CALCIUM SERPL-MCNC: 8.1 MG/DL (ref 8.5–10.1)
CHLORIDE SERPL-SCNC: 114 MMOL/L (ref 97–108)
CO2 SERPL-SCNC: 23 MMOL/L (ref 21–32)
CREAT SERPL-MCNC: 0.74 MG/DL (ref 0.7–1.3)
ERYTHROCYTE [DISTWIDTH] IN BLOOD BY AUTOMATED COUNT: 13.8 % (ref 11.5–14.5)
GLOBULIN SER CALC-MCNC: 2.9 G/DL (ref 2–4)
GLUCOSE BLD STRIP.AUTO-MCNC: 107 MG/DL (ref 65–100)
GLUCOSE BLD STRIP.AUTO-MCNC: 107 MG/DL (ref 65–100)
GLUCOSE BLD STRIP.AUTO-MCNC: 164 MG/DL (ref 65–100)
GLUCOSE BLD STRIP.AUTO-MCNC: 172 MG/DL (ref 65–100)
GLUCOSE SERPL-MCNC: 124 MG/DL (ref 65–100)
HCT VFR BLD AUTO: 27.1 % (ref 36.6–50.3)
HGB BLD-MCNC: 8.2 G/DL (ref 12.1–17)
MCH RBC QN AUTO: 33.7 PG (ref 26–34)
MCHC RBC AUTO-ENTMCNC: 30.3 G/DL (ref 30–36.5)
MCV RBC AUTO: 111.5 FL (ref 80–99)
NRBC # BLD: 0 K/UL (ref 0–0.01)
NRBC BLD-RTO: 0 PER 100 WBC
PLATELET # BLD AUTO: 92 K/UL (ref 150–400)
PMV BLD AUTO: 10.2 FL (ref 8.9–12.9)
POTASSIUM SERPL-SCNC: 3.9 MMOL/L (ref 3.5–5.1)
PROT SERPL-MCNC: 4.9 G/DL (ref 6.4–8.2)
RBC # BLD AUTO: 2.43 M/UL (ref 4.1–5.7)
SERVICE CMNT-IMP: ABNORMAL
SODIUM SERPL-SCNC: 143 MMOL/L (ref 136–145)
WBC # BLD AUTO: 7 K/UL (ref 4.1–11.1)

## 2020-12-09 PROCEDURE — 74011000250 HC RX REV CODE- 250: Performed by: STUDENT IN AN ORGANIZED HEALTH CARE EDUCATION/TRAINING PROGRAM

## 2020-12-09 PROCEDURE — 80053 COMPREHEN METABOLIC PANEL: CPT

## 2020-12-09 PROCEDURE — 51798 US URINE CAPACITY MEASURE: CPT

## 2020-12-09 PROCEDURE — 74011250636 HC RX REV CODE- 250/636: Performed by: NURSE PRACTITIONER

## 2020-12-09 PROCEDURE — 74011250637 HC RX REV CODE- 250/637: Performed by: INTERNAL MEDICINE

## 2020-12-09 PROCEDURE — 74011250637 HC RX REV CODE- 250/637: Performed by: STUDENT IN AN ORGANIZED HEALTH CARE EDUCATION/TRAINING PROGRAM

## 2020-12-09 PROCEDURE — 82962 GLUCOSE BLOOD TEST: CPT

## 2020-12-09 PROCEDURE — 65270000029 HC RM PRIVATE

## 2020-12-09 PROCEDURE — 36415 COLL VENOUS BLD VENIPUNCTURE: CPT

## 2020-12-09 PROCEDURE — 74011250637 HC RX REV CODE- 250/637: Performed by: NURSE PRACTITIONER

## 2020-12-09 PROCEDURE — 85027 COMPLETE CBC AUTOMATED: CPT

## 2020-12-09 PROCEDURE — 74011636637 HC RX REV CODE- 636/637: Performed by: INTERNAL MEDICINE

## 2020-12-09 RX ADMIN — DIVALPROEX SODIUM 500 MG: 500 TABLET, DELAYED RELEASE ORAL at 17:44

## 2020-12-09 RX ADMIN — CHLORHEXIDINE GLUCONATE 0.12% ORAL RINSE 15 ML: 1.2 LIQUID ORAL at 09:38

## 2020-12-09 RX ADMIN — METFORMIN 1000 MG: 500 TABLET, EXTENDED RELEASE ORAL at 09:35

## 2020-12-09 RX ADMIN — TRIAMCINOLONE ACETONIDE: 1 OINTMENT TOPICAL at 17:44

## 2020-12-09 RX ADMIN — Medication 10 ML: at 06:40

## 2020-12-09 RX ADMIN — TRIAMCINOLONE ACETONIDE: 1 OINTMENT TOPICAL at 09:47

## 2020-12-09 RX ADMIN — TOPIRAMATE 300 MG: 100 TABLET, FILM COATED ORAL at 09:35

## 2020-12-09 RX ADMIN — Medication 10 ML: at 21:35

## 2020-12-09 RX ADMIN — LACTULOSE 15 ML: 20 SOLUTION ORAL at 09:35

## 2020-12-09 RX ADMIN — Medication 10 ML: at 14:41

## 2020-12-09 RX ADMIN — DIVALPROEX SODIUM 500 MG: 500 TABLET, DELAYED RELEASE ORAL at 09:35

## 2020-12-09 RX ADMIN — Medication 1 TABLET: at 09:35

## 2020-12-09 RX ADMIN — TOPIRAMATE 300 MG: 100 TABLET, FILM COATED ORAL at 17:44

## 2020-12-09 RX ADMIN — LACTULOSE 15 ML: 20 SOLUTION ORAL at 21:34

## 2020-12-09 RX ADMIN — TAMSULOSIN HYDROCHLORIDE 0.4 MG: 0.4 CAPSULE ORAL at 21:34

## 2020-12-09 RX ADMIN — FOLIC ACID 1 MG: 1 TABLET ORAL at 09:35

## 2020-12-09 RX ADMIN — PREGABALIN 150 MG: 150 CAPSULE ORAL at 17:44

## 2020-12-09 RX ADMIN — LEVOFLOXACIN 750 MG: 5 INJECTION, SOLUTION INTRAVENOUS at 09:44

## 2020-12-09 RX ADMIN — GLIMEPIRIDE 8 MG: 4 TABLET ORAL at 06:40

## 2020-12-09 RX ADMIN — ALOGLIPTIN 12.5 MG: 25 TABLET, FILM COATED ORAL at 09:35

## 2020-12-09 RX ADMIN — CHLORHEXIDINE GLUCONATE 0.12% ORAL RINSE 15 ML: 1.2 LIQUID ORAL at 21:00

## 2020-12-09 RX ADMIN — BENZONATATE 100 MG: 100 CAPSULE ORAL at 21:34

## 2020-12-09 RX ADMIN — INSULIN LISPRO 2 UNITS: 100 INJECTION, SOLUTION INTRAVENOUS; SUBCUTANEOUS at 11:58

## 2020-12-09 RX ADMIN — ATORVASTATIN CALCIUM 40 MG: 40 TABLET, FILM COATED ORAL at 21:34

## 2020-12-09 RX ADMIN — PREGABALIN 150 MG: 150 CAPSULE ORAL at 09:35

## 2020-12-09 RX ADMIN — PIOGLITAZONE 45 MG: 30 TABLET ORAL at 09:34

## 2020-12-09 RX ADMIN — METFORMIN 1000 MG: 500 TABLET, EXTENDED RELEASE ORAL at 17:44

## 2020-12-09 NOTE — PROGRESS NOTES
OLY PLAN: 21% RUR    1) [facility] - [facility] - bed [facility] - auth initiated 12/7/220    2) Covid 12/5 negative - per [facility] no further testing needed    3) 2nd IM needed prior to discharge    4) EMTALA Medicaid Transport needed at discharge -     Edita Zuñiga RN, BSN, [address] [place] [address]    Coordinator  [phone]

## 2020-12-09 NOTE — PROGRESS NOTES
Problem: Falls - Risk of  Goal: *Absence of Falls  Description: Document Frederick King Fall Risk and appropriate interventions in the flowsheet. Outcome: Progressing Towards Goal  Note: Fall Risk Interventions:  Mobility Interventions: Bed/chair exit alarm    Mentation Interventions: Bed/chair exit alarm    Medication Interventions: Bed/chair exit alarm    Elimination Interventions: Bed/chair exit alarm    History of Falls Interventions: Bed/chair exit alarm         Problem: Patient Education: Go to Patient Education Activity  Goal: Patient/Family Education  Outcome: Progressing Towards Goal     Problem: Pressure Injury - Risk of  Goal: *Prevention of pressure injury  Description: Document Pop Scale and appropriate interventions in the flowsheet.   Outcome: Progressing Towards Goal  Note: Pressure Injury Interventions:  Sensory Interventions: Assess changes in LOC    Moisture Interventions: Absorbent underpads    Activity Interventions: Increase time out of bed    Mobility Interventions: HOB 30 degrees or less    Nutrition Interventions: Document food/fluid/supplement intake    Friction and Shear Interventions: Lift sheet, Lift team/patient mobility team                Problem: Patient Education: Go to Patient Education Activity  Goal: Patient/Family Education  Outcome: Progressing Towards Goal     Problem: Patient Education: Go to Patient Education Activity  Goal: Patient/Family Education  Outcome: Progressing Towards Goal     Problem: Patient Education: Go to Patient Education Activity  Goal: Patient/Family Education  Outcome: Progressing Towards Goal   No acute distress

## 2020-12-10 LAB
ALBUMIN SERPL-MCNC: 2.1 G/DL (ref 3.5–5)
ALBUMIN/GLOB SERPL: 0.7 {RATIO} (ref 1.1–2.2)
ALP SERPL-CCNC: 61 U/L (ref 45–117)
ALT SERPL-CCNC: 20 U/L (ref 12–78)
ANION GAP SERPL CALC-SCNC: 6 MMOL/L (ref 5–15)
AST SERPL-CCNC: 16 U/L (ref 15–37)
BILIRUB SERPL-MCNC: 0.4 MG/DL (ref 0.2–1)
BUN SERPL-MCNC: 17 MG/DL (ref 6–20)
BUN/CREAT SERPL: 27 (ref 12–20)
CALCIUM SERPL-MCNC: 8.3 MG/DL (ref 8.5–10.1)
CHLORIDE SERPL-SCNC: 112 MMOL/L (ref 97–108)
CO2 SERPL-SCNC: 22 MMOL/L (ref 21–32)
CREAT SERPL-MCNC: 0.64 MG/DL (ref 0.7–1.3)
ERYTHROCYTE [DISTWIDTH] IN BLOOD BY AUTOMATED COUNT: 13.8 % (ref 11.5–14.5)
GLOBULIN SER CALC-MCNC: 3.1 G/DL (ref 2–4)
GLUCOSE BLD STRIP.AUTO-MCNC: 108 MG/DL (ref 65–100)
GLUCOSE BLD STRIP.AUTO-MCNC: 141 MG/DL (ref 65–100)
GLUCOSE BLD STRIP.AUTO-MCNC: 148 MG/DL (ref 65–100)
GLUCOSE BLD STRIP.AUTO-MCNC: 201 MG/DL (ref 65–100)
GLUCOSE BLD STRIP.AUTO-MCNC: 57 MG/DL (ref 65–100)
GLUCOSE SERPL-MCNC: 111 MG/DL (ref 65–100)
HCT VFR BLD AUTO: 28.6 % (ref 36.6–50.3)
HGB BLD-MCNC: 8.6 G/DL (ref 12.1–17)
MCH RBC QN AUTO: 34.1 PG (ref 26–34)
MCHC RBC AUTO-ENTMCNC: 30.1 G/DL (ref 30–36.5)
MCV RBC AUTO: 113.5 FL (ref 80–99)
NRBC # BLD: 0 K/UL (ref 0–0.01)
NRBC BLD-RTO: 0 PER 100 WBC
PLATELET # BLD AUTO: 84 K/UL (ref 150–400)
PMV BLD AUTO: 10.2 FL (ref 8.9–12.9)
POTASSIUM SERPL-SCNC: 3.9 MMOL/L (ref 3.5–5.1)
PROT SERPL-MCNC: 5.2 G/DL (ref 6.4–8.2)
RBC # BLD AUTO: 2.52 M/UL (ref 4.1–5.7)
SERVICE CMNT-IMP: ABNORMAL
SODIUM SERPL-SCNC: 140 MMOL/L (ref 136–145)
WBC # BLD AUTO: 7 K/UL (ref 4.1–11.1)

## 2020-12-10 PROCEDURE — 74011000250 HC RX REV CODE- 250: Performed by: STUDENT IN AN ORGANIZED HEALTH CARE EDUCATION/TRAINING PROGRAM

## 2020-12-10 PROCEDURE — 85027 COMPLETE CBC AUTOMATED: CPT

## 2020-12-10 PROCEDURE — 74011250637 HC RX REV CODE- 250/637: Performed by: STUDENT IN AN ORGANIZED HEALTH CARE EDUCATION/TRAINING PROGRAM

## 2020-12-10 PROCEDURE — 80053 COMPREHEN METABOLIC PANEL: CPT

## 2020-12-10 PROCEDURE — 74011636637 HC RX REV CODE- 636/637: Performed by: INTERNAL MEDICINE

## 2020-12-10 PROCEDURE — 74011250636 HC RX REV CODE- 250/636: Performed by: NURSE PRACTITIONER

## 2020-12-10 PROCEDURE — 74011250637 HC RX REV CODE- 250/637: Performed by: NURSE PRACTITIONER

## 2020-12-10 PROCEDURE — 65270000029 HC RM PRIVATE

## 2020-12-10 PROCEDURE — 74011250637 HC RX REV CODE- 250/637: Performed by: INTERNAL MEDICINE

## 2020-12-10 PROCEDURE — 74011000250 HC RX REV CODE- 250: Performed by: INTERNAL MEDICINE

## 2020-12-10 PROCEDURE — 36415 COLL VENOUS BLD VENIPUNCTURE: CPT

## 2020-12-10 PROCEDURE — 82962 GLUCOSE BLOOD TEST: CPT

## 2020-12-10 PROCEDURE — 97116 GAIT TRAINING THERAPY: CPT

## 2020-12-10 PROCEDURE — 97530 THERAPEUTIC ACTIVITIES: CPT

## 2020-12-10 RX ADMIN — DIVALPROEX SODIUM 500 MG: 500 TABLET, DELAYED RELEASE ORAL at 09:46

## 2020-12-10 RX ADMIN — INSULIN LISPRO 3 UNITS: 100 INJECTION, SOLUTION INTRAVENOUS; SUBCUTANEOUS at 12:23

## 2020-12-10 RX ADMIN — LACTULOSE 15 ML: 20 SOLUTION ORAL at 10:11

## 2020-12-10 RX ADMIN — DIVALPROEX SODIUM 500 MG: 500 TABLET, DELAYED RELEASE ORAL at 17:58

## 2020-12-10 RX ADMIN — TOPIRAMATE 300 MG: 100 TABLET, FILM COATED ORAL at 17:57

## 2020-12-10 RX ADMIN — CHLORHEXIDINE GLUCONATE 0.12% ORAL RINSE 15 ML: 1.2 LIQUID ORAL at 11:47

## 2020-12-10 RX ADMIN — DEXTROSE MONOHYDRATE 25 G: 25 INJECTION, SOLUTION INTRAVENOUS at 21:16

## 2020-12-10 RX ADMIN — ALOGLIPTIN 12.5 MG: 25 TABLET, FILM COATED ORAL at 09:47

## 2020-12-10 RX ADMIN — METFORMIN 1000 MG: 500 TABLET, EXTENDED RELEASE ORAL at 17:57

## 2020-12-10 RX ADMIN — INSULIN LISPRO 2 UNITS: 100 INJECTION, SOLUTION INTRAVENOUS; SUBCUTANEOUS at 17:58

## 2020-12-10 RX ADMIN — TAMSULOSIN HYDROCHLORIDE 0.4 MG: 0.4 CAPSULE ORAL at 22:00

## 2020-12-10 RX ADMIN — CHLORHEXIDINE GLUCONATE 0.12% ORAL RINSE 15 ML: 1.2 LIQUID ORAL at 23:05

## 2020-12-10 RX ADMIN — GLIMEPIRIDE 8 MG: 4 TABLET ORAL at 09:46

## 2020-12-10 RX ADMIN — LEVOFLOXACIN 750 MG: 5 INJECTION, SOLUTION INTRAVENOUS at 10:06

## 2020-12-10 RX ADMIN — TRIAMCINOLONE ACETONIDE: 1 OINTMENT TOPICAL at 18:06

## 2020-12-10 RX ADMIN — LACTULOSE 15 ML: 20 SOLUTION ORAL at 21:18

## 2020-12-10 RX ADMIN — TRIAMCINOLONE ACETONIDE: 1 OINTMENT TOPICAL at 11:47

## 2020-12-10 RX ADMIN — LACTULOSE 15 ML: 20 SOLUTION ORAL at 18:01

## 2020-12-10 RX ADMIN — Medication 10 ML: at 21:20

## 2020-12-10 RX ADMIN — METFORMIN 1000 MG: 500 TABLET, EXTENDED RELEASE ORAL at 09:46

## 2020-12-10 RX ADMIN — Medication 20 ML: at 06:00

## 2020-12-10 RX ADMIN — TOPIRAMATE 300 MG: 100 TABLET, FILM COATED ORAL at 09:46

## 2020-12-10 RX ADMIN — Medication 10 ML: at 18:17

## 2020-12-10 RX ADMIN — ATORVASTATIN CALCIUM 40 MG: 40 TABLET, FILM COATED ORAL at 21:19

## 2020-12-10 RX ADMIN — FOLIC ACID 1 MG: 1 TABLET ORAL at 10:10

## 2020-12-10 RX ADMIN — PREGABALIN 150 MG: 150 CAPSULE ORAL at 09:46

## 2020-12-10 RX ADMIN — PIOGLITAZONE 45 MG: 30 TABLET ORAL at 09:46

## 2020-12-10 RX ADMIN — PREGABALIN 150 MG: 150 CAPSULE ORAL at 17:58

## 2020-12-10 RX ADMIN — Medication 1 TABLET: at 10:10

## 2020-12-10 NOTE — PROGRESS NOTES
Occupational Therapy  Medical record reviewed. Upon arrival, mother reports that pt recently had PT session and has just been up for toileting, grooming and \"washing up\". Requests to defer at this time. Will continue to follow.

## 2020-12-10 NOTE — PROGRESS NOTES
Hospitalist Progress Note    NAME: Ruma Julien   :  1955   MRN:  [id number]     I reviewed pertinent labs and imaging, and discussed /agreed on the plan of care with Dr. Bharat Main.      Assessment / Plan:  Worsening of low back pain with functional paraplegia readmission after 2 days SNF placement Pending insurance authorization  XR back  Unchanged   IMPRESSION:   Unchanged mild spondylosis at L5-S1.  -MRI - Mild degenerative changes at multiple levels in the lumbar spine  without significant canal stenosis. Small disc protrusion at L5-S1 likely acute  without canal stenosis or definite nerve root impingement  Continue Lyrica. , added Norco , lidocaine patch   Continue PT  Case management input appreciated  doing well with PT ambulating with walker / assistance  Cough with low-grade fever- improved  Cough better , afebrile for over 48 hours   Covid test negative  Continue Tessalon  cont Levaquin IV   Urinary retention with richards    pt in retention on arrival to ER    Richards Catheter discontinued  Voiding without difficulty  Per  residual post void which was evaluated one hour post void  cont  tamsulosin   Liver cirrhosis with splenomegaly and portal hypertension. mentation at baseline   Cont lactulose  , 2-3 bm / day can hold for diarrhea   Need outpatient GI follow-up. Seizure disorder  Continue home medications. No seizure noted this admission  Type 2 diabetes mellitus- controlled   Continue home medications.   Monitor  Hyperlipidemia  Continue home medications. Macrocytic anemia  Continue folic acid. Thrombocytopenia  Myelodysplasia  PT seen by Dr. Madelyn Calix  And referred to [facility]   Plts stable IT 95  JIV 66-61 during last admission      [facility] has accepted    Insurance Authorization pending         25.0 - 29.9 Overweight / Body mass index is 29.87 kg/m².     Code status: Full  Prophylaxis: SCD's  Recommended Disposition: SNF/LTC     Subjective:     Chief Complaint / Reason for Physician Visit  Patient sitting up in the chair. Denies any complaints at this time. Pending insurance authorization to  [facility]. Discussed with RN events overnight. Review of Systems:  Symptom Y/N Comments  Symptom Y/N Comments   Fever/Chills n   Chest Pain n    Poor Appetite n   Edema n    Cough n   Abdominal Pain     Sputum    Joint Pain n    SOB/TREVINO n   Pruritis/Rash n    Nausea/vomit n   Tolerating PT/OT     Diarrhea n   Tolerating Diet y    Constipation n   Other       Could NOT obtain due to:      Objective:     VITALS:   Last 24hrs VS reviewed since prior progress note. Most recent are:  Patient Vitals for the past 24 hrs:   Temp Pulse Resp BP SpO2   12/09/20 2007 98.2 °F (36.8 °C) 70 16 107/60 100 %   12/09/20 1527 97.4 °F (36.3 °C) 80 16 (!) 118/53 97 %   12/09/20 1116 97.2 °F (36.2 °C) 83 16 130/73 100 %   12/09/20 1100 97.2 °F (36.2 °C) 74 16 (!) 135/113 98 %   12/09/20 0737 97.2 °F (36.2 °C) 72 16 110/60 93 %   12/09/20 0315 98.4 °F (36.9 °C) 67 18 127/69 99 %   12/08/20 2340 98.2 °F (36.8 °C) 69 18 133/60 99 %       Intake/Output Summary (Last 24 hours) at 12/9/2020 2020  Last data filed at 12/9/2020 1647  Gross per 24 hour   Intake 480 ml   Output 725 ml   Net -245 ml        I had a face to face encounter and independently examined this patient on 12/9/2020, as outlined below:  PHYSICAL EXAM:  General:  Alert, cooperative, no acute distress    EENT:  EOMI. Anicteric sclerae. MMM  Resp:  CTA bilaterally, no wheezing or rales. No accessory muscle use  CV:  Regular  rhythm,  No edema  GI:  Soft, Non distended, Non tender. +Bowel sounds  Neurologic:  Alert and oriented X 2 normal speech,   Psych:   Fair insight. Not anxious nor agitated  Skin:  No rashes.   No jaundice    Reviewed most current lab test results and cultures  YES  Reviewed most current radiology test results   YES  Review and summation of old records today    NO  Reviewed patient's current orders and MAR    YES  PMH/SH reviewed - no change compared to H&P  ________________________________________________________________________  Care Plan discussed with:    Comments   Patient y    Family  y mother   RN y    Care Manager     Consultant                        Multidiciplinary team rounds were held today with , nursing, pharmacist and clinical coordinator. Patient's plan of care was discussed; medications were reviewed and discharge planning was addressed. ________________________________________________________________________    ________________________________________________________________________  Artis Masters NP     Procedures: see electronic medical records for all procedures/Xrays and details which were not copied into this note but were reviewed prior to creation of Plan. LABS:  I reviewed today's most current labs and imaging studies.   Pertinent labs include:  Recent Labs     12/09/20 0335 12/08/20 0310 12/07/20  0415   WBC 7.0 9.2 7.6   HGB 8.2* 9.1* 9.0*   HCT 27.1* 29.3* 29.5*   PLT 92* 88* 85*     Recent Labs     12/09/20  0335 12/07/20  0415    142   K 3.9 4.0   * 113*   CO2 23 23   * 118*   BUN 15 16   CREA 0.74 0.69*   CA 8.1* 8.3*   ALB 2.0*  --    TBILI 0.6  --    ALT 16  --        Signed: Artis Masters NP

## 2020-12-10 NOTE — PROGRESS NOTES
1103: Message sent to Brina Gabriel NP regarding patients mother wanting to speak with her regarding his care. Awaiting response. Np spoke with the family regarding questions. End of Shift Note    Bedside shift change report given to Tatiana oMrris (oncoming nurse) by Gen Nelson (offgoing nurse). Report included the following information SBAR, Kardex and MAR    Shift worked:  7a-7p     Shift summary and any significant changes:     No significant changes; patient was up to the bathroom multiple times this shift. Patient to D/c tomorrow     Concerns for physician to address:  None     Zone phone for oncoming shift:   [phone]       Activity:  Activity Level: Up with Assistance  Number times ambulated in hallways past shift: 0  Number of times OOB to chair past shift: 2    Cardiac:   Cardiac Monitoring: No           Access:   Current line(s): PIV     Genitourinary:   Urinary status: voiding    Respiratory:   O2 Device: Room air  Chronic home O2 use?: NO  Incentive spirometer at bedside: NO     GI:  Last Bowel Movement Date: 12/10/20  Current diet:  DIET DIABETIC CONSISTENT CARB Regular  Passing flatus: YES  Tolerating current diet: YES  % Diet Eaten: 100 %    Pain Management:   Patient states pain is manageable on current regimen: N/A    Skin:  Pop Score: 18  Interventions: turn team, speciality bed and increase time out of bed    Patient Safety:  Fall Score:  Total Score: 5  Interventions: bed/chair alarm, assistive device (walker, cane, etc), gripper socks and pt to call before getting OOB  High Fall Risk: Yes    Length of Stay:  Expected LOS: 2d 21h  Actual LOS: [address] 1122

## 2020-12-10 NOTE — PROGRESS NOTES
Hospitalist Progress Note    NAME: Tonia Alfaro   :  1955   MRN:  [id number]     I reviewed pertinent labs and imaging, and discussed /agreed on the plan of care with Dr. Bernardo Steele  Patient is pending insurance authorization for [facility]. Assessment / Plan:  Worsening of low back pain with functional paraplegia readmission after 2 days SNF placement Pending insurance authorization  XR back  Unchanged   IMPRESSION:   Unchanged mild spondylosis at L5-S1.  -MRI - Mild degenerative changes at multiple levels in the lumbar spine  without significant canal stenosis. Small disc protrusion at L5-S1 likely acute  without canal stenosis or definite nerve root impingement   lidocaine patch, continue oral pain medications  Continue PT  Case management input appreciated  doing well with PT ambulating with walker / assistance  Cough with low-grade fever- improved  Cough better , afebrile for over 72 hours   Covid test negative  Continue Tessalon  Continue Levaquin IV   Urinary retention with richards    pt in retention on arrival to [facility] Catheter discontinued  Voiding without difficulty  Continue  tamsulosin   Liver cirrhosis with splenomegaly and portal hypertension.   mentation at baseline   Cont lactulose  , 2-3 bm / day can hold for diarrhea   Need outpatient GI follow-up. Liver enzymes normal range  Seizure disorder  Continue home medications. No seizure noted this admission  Type 2 diabetes mellitus- controlled   Continue home medications.   Monitor  Hyperlipidemia  Continue home medications. Macrocytic anemia  Continue folic acid. Thrombocytopenia  Myelodysplasia  PT seen by Dr. Sandoval Rios referred to I   Plts stable  At 84 this am Was 66-61 during last admission   Monitor    25.0 - 29.9 Overweight / Body mass index is 27.4 kg/m².     Code status: Full  Prophylaxis: SCD's  Recommended Disposition: SNF/LTC     Subjective:     Chief Complaint / Reason for Physician Visit  Sitting up in the chair. Mother at bedside. No complaints at this time. Discussed with RN events overnight. Review of Systems:  Symptom Y/N Comments  Symptom Y/N Comments   Fever/Chills n   Chest Pain     Poor Appetite n   Edema y Slight bilateral LE   Cough n   Abdominal Pain     Sputum n   Joint Pain     SOB/TREVINO n   Pruritis/Rash n    Nausea/vomit n   Tolerating PT/OT y    Diarrhea n   Tolerating Diet y    Constipation n   Other       Could NOT obtain due to:      Objective:     VITALS:   Last 24hrs VS reviewed since prior progress note. Most recent are:  Patient Vitals for the past 24 hrs:   Temp Pulse Resp BP SpO2   12/10/20 1459 97.8 °F (36.6 °C) 79 16 112/72 98 %   12/10/20 1054 97.7 °F (36.5 °C) 79 16 121/69 98 %   12/10/20 0715 97.3 °F (36.3 °C) 73 16 109/64 93 %   12/10/20 0305 97.7 °F (36.5 °C) 66 16 105/66 98 %   12/09/20 2333 98.1 °F (36.7 °C) 60 16 (!) 97/59 99 %   12/09/20 2007 98.2 °F (36.8 °C) 70 16 107/60 100 %       Intake/Output Summary (Last 24 hours) at 12/10/2020 1801  Last data filed at 12/10/2020 1221  Gross per 24 hour   Intake 990 ml   Output 700 ml   Net 290 ml        I had a face to face encounter and independently examined this patient on 12/10/2020, as outlined below:  PHYSICAL EXAM:  General:  Alert, cooperative, no acute distress    EENT:  EOMI. Anicteric sclerae. MMM  Resp:  CTA bilaterally, no wheezing or rales. No accessory muscle use  CV:  Regular  rhythm,  No edema  GI:  Soft, Non distended, Non tender. +Bowel sounds  Neurologic:  Alert and oriented X 2-3, normal speech,   Psych:   Fair insight. Not anxious nor agitated  Skin:  No rashes.   No jaundice    Reviewed most current lab test results and cultures  YES  Reviewed most current radiology test results   YES  Review and summation of old records today    NO  Reviewed patient's current orders and MAR    YES  PMH/SH reviewed - no change compared to H&P  ________________________________________________________________________  Care Plan discussed with:    Comments   Patient y    Family  y mother   RN y    Care Manager     Consultant                        Multidiciplinary team rounds were held today with , nursing, pharmacist and clinical coordinator. Patient's plan of care was discussed; medications were reviewed and discharge planning was addressed. ________________________________________________________________________    ________________________________________________________________________  Dillon Mendieta NP     Procedures: see electronic medical records for all procedures/Xrays and details which were not copied into this note but were reviewed prior to creation of Plan. LABS:  I reviewed today's most current labs and imaging studies.   Pertinent labs include:  Recent Labs     12/10/20  0258 12/09/20  0335 12/08/20  0310   WBC 7.0 7.0 9.2   HGB 8.6* 8.2* 9.1*   HCT 28.6* 27.1* 29.3*   PLT 84* 92* 88*     Recent Labs     12/10/20  0258 12/09/20  0335    143   K 3.9 3.9   * 114*   CO2 22 23   * 124*   BUN 17 15   CREA 0.64* 0.74   CA 8.3* 8.1*   ALB 2.1* 2.0*   TBILI 0.4 0.6   ALT 20 16       Signed: Dillon Mendieta NP

## 2020-12-10 NOTE — PROGRESS NOTES
[facility] Plan:     *[facility]   *[facility] to transport at d/c    4:58pm  In anticipation of possible d/c tomorrow, transport with [facility] has been scheduled for 2:30pm.       If [facility] does not receive insurance Auth by tomorrow, pt will need a new COVID test.  CM will continue to follow.     Cody Kat  Ext [phone]

## 2020-12-10 NOTE — PROGRESS NOTES
End of Shift Note    Bedside shift change report given to Nat Spencer (oncoming nurse) by Lico Hills (offgoing nurse). Report included the following information SBAR, Kardex and MAR    Shift worked:  7a-7p     Shift summary and any significant changes:     No significant changes. Patient rested most of the day. LPN Otis Dia held PM Lactulose due to persistent diarrhea. Concerns for physician to address:  None     Zone phone for oncoming shift:   [phone]       Activity:  Activity Level: Up with Assistance  Number times ambulated in hallways past shift: 1  Number of times OOB to chair past shift: 2    Cardiac:   Cardiac Monitoring: Yes           Access:   Current line(s): PIV     Genitourinary:   Urinary status: voiding    Respiratory:   O2 Device: Room air  Chronic home O2 use?: NO  Incentive spirometer at bedside: NO     GI:  Last Bowel Movement Date: 12/09/20  Current diet:  DIET DIABETIC CONSISTENT CARB Regular  Passing flatus: YES  Tolerating current diet: YES  % Diet Eaten: 75 %    Pain Management:   Patient states pain is manageable on current regimen: N/A    Skin:  Pop Score: 18  Interventions: increase time out of bed    Patient Safety:  Fall Score:  Total Score: 5  Interventions: pt to call before getting OOB  High Fall Risk: Yes    Length of Stay:  Expected LOS: 2d 21h  Actual LOS: [address]

## 2020-12-11 LAB
ALBUMIN SERPL-MCNC: 2.4 G/DL (ref 3.5–5)
ALBUMIN/GLOB SERPL: 0.7 {RATIO} (ref 1.1–2.2)
ALP SERPL-CCNC: 74 U/L (ref 45–117)
ALT SERPL-CCNC: 22 U/L (ref 12–78)
ANION GAP SERPL CALC-SCNC: 7 MMOL/L (ref 5–15)
AST SERPL-CCNC: 20 U/L (ref 15–37)
BILIRUB SERPL-MCNC: 0.3 MG/DL (ref 0.2–1)
BUN SERPL-MCNC: 15 MG/DL (ref 6–20)
BUN/CREAT SERPL: 21 (ref 12–20)
CALCIUM SERPL-MCNC: 8.3 MG/DL (ref 8.5–10.1)
CHLORIDE SERPL-SCNC: 112 MMOL/L (ref 97–108)
CO2 SERPL-SCNC: 23 MMOL/L (ref 21–32)
CREAT SERPL-MCNC: 0.71 MG/DL (ref 0.7–1.3)
ERYTHROCYTE [DISTWIDTH] IN BLOOD BY AUTOMATED COUNT: 13.8 % (ref 11.5–14.5)
GLOBULIN SER CALC-MCNC: 3.3 G/DL (ref 2–4)
GLUCOSE BLD STRIP.AUTO-MCNC: 106 MG/DL (ref 65–100)
GLUCOSE BLD STRIP.AUTO-MCNC: 139 MG/DL (ref 65–100)
GLUCOSE BLD STRIP.AUTO-MCNC: 151 MG/DL (ref 65–100)
GLUCOSE BLD STRIP.AUTO-MCNC: 152 MG/DL (ref 65–100)
GLUCOSE SERPL-MCNC: 73 MG/DL (ref 65–100)
HCT VFR BLD AUTO: 32.8 % (ref 36.6–50.3)
HGB BLD-MCNC: 9.9 G/DL (ref 12.1–17)
MCH RBC QN AUTO: 33.8 PG (ref 26–34)
MCHC RBC AUTO-ENTMCNC: 30.2 G/DL (ref 30–36.5)
MCV RBC AUTO: 111.9 FL (ref 80–99)
NRBC # BLD: 0 K/UL (ref 0–0.01)
NRBC BLD-RTO: 0 PER 100 WBC
PLATELET # BLD AUTO: 95 K/UL (ref 150–400)
PMV BLD AUTO: 10 FL (ref 8.9–12.9)
POTASSIUM SERPL-SCNC: 3.9 MMOL/L (ref 3.5–5.1)
PROT SERPL-MCNC: 5.7 G/DL (ref 6.4–8.2)
RBC # BLD AUTO: 2.93 M/UL (ref 4.1–5.7)
SERVICE CMNT-IMP: ABNORMAL
SODIUM SERPL-SCNC: 142 MMOL/L (ref 136–145)
WBC # BLD AUTO: 8.1 K/UL (ref 4.1–11.1)

## 2020-12-11 PROCEDURE — 87635 SARS-COV-2 COVID-19 AMP PRB: CPT

## 2020-12-11 PROCEDURE — 82962 GLUCOSE BLOOD TEST: CPT

## 2020-12-11 PROCEDURE — 97116 GAIT TRAINING THERAPY: CPT | Performed by: PHYSICAL THERAPIST

## 2020-12-11 PROCEDURE — 74011250637 HC RX REV CODE- 250/637: Performed by: INTERNAL MEDICINE

## 2020-12-11 PROCEDURE — 97535 SELF CARE MNGMENT TRAINING: CPT | Performed by: OCCUPATIONAL THERAPIST

## 2020-12-11 PROCEDURE — 74011636637 HC RX REV CODE- 636/637: Performed by: INTERNAL MEDICINE

## 2020-12-11 PROCEDURE — 74011250637 HC RX REV CODE- 250/637: Performed by: NURSE PRACTITIONER

## 2020-12-11 PROCEDURE — 80053 COMPREHEN METABOLIC PANEL: CPT

## 2020-12-11 PROCEDURE — 36415 COLL VENOUS BLD VENIPUNCTURE: CPT

## 2020-12-11 PROCEDURE — 65270000029 HC RM PRIVATE

## 2020-12-11 PROCEDURE — 74011000250 HC RX REV CODE- 250: Performed by: STUDENT IN AN ORGANIZED HEALTH CARE EDUCATION/TRAINING PROGRAM

## 2020-12-11 PROCEDURE — 74011250637 HC RX REV CODE- 250/637: Performed by: STUDENT IN AN ORGANIZED HEALTH CARE EDUCATION/TRAINING PROGRAM

## 2020-12-11 PROCEDURE — 85027 COMPLETE CBC AUTOMATED: CPT

## 2020-12-11 RX ADMIN — TOPIRAMATE 300 MG: 100 TABLET, FILM COATED ORAL at 17:05

## 2020-12-11 RX ADMIN — Medication 10 ML: at 15:16

## 2020-12-11 RX ADMIN — FOLIC ACID 1 MG: 1 TABLET ORAL at 08:05

## 2020-12-11 RX ADMIN — GLIMEPIRIDE 8 MG: 4 TABLET ORAL at 07:51

## 2020-12-11 RX ADMIN — METFORMIN 1000 MG: 500 TABLET, EXTENDED RELEASE ORAL at 07:51

## 2020-12-11 RX ADMIN — PIOGLITAZONE 45 MG: 30 TABLET ORAL at 08:05

## 2020-12-11 RX ADMIN — Medication 10 ML: at 23:06

## 2020-12-11 RX ADMIN — TAMSULOSIN HYDROCHLORIDE 0.4 MG: 0.4 CAPSULE ORAL at 23:04

## 2020-12-11 RX ADMIN — TRIAMCINOLONE ACETONIDE: 1 OINTMENT TOPICAL at 18:33

## 2020-12-11 RX ADMIN — LACTULOSE 15 ML: 20 SOLUTION ORAL at 08:09

## 2020-12-11 RX ADMIN — Medication 10 ML: at 06:07

## 2020-12-11 RX ADMIN — DIVALPROEX SODIUM 500 MG: 500 TABLET, DELAYED RELEASE ORAL at 08:05

## 2020-12-11 RX ADMIN — ATORVASTATIN CALCIUM 40 MG: 40 TABLET, FILM COATED ORAL at 23:04

## 2020-12-11 RX ADMIN — TOPIRAMATE 300 MG: 100 TABLET, FILM COATED ORAL at 08:06

## 2020-12-11 RX ADMIN — PREGABALIN 150 MG: 150 CAPSULE ORAL at 17:05

## 2020-12-11 RX ADMIN — TRIAMCINOLONE ACETONIDE: 1 OINTMENT TOPICAL at 08:04

## 2020-12-11 RX ADMIN — PREGABALIN 150 MG: 150 CAPSULE ORAL at 08:06

## 2020-12-11 RX ADMIN — Medication 1 TABLET: at 08:05

## 2020-12-11 RX ADMIN — METFORMIN 1000 MG: 500 TABLET, EXTENDED RELEASE ORAL at 17:05

## 2020-12-11 RX ADMIN — DIVALPROEX SODIUM 500 MG: 500 TABLET, DELAYED RELEASE ORAL at 17:05

## 2020-12-11 RX ADMIN — ALOGLIPTIN 12.5 MG: 25 TABLET, FILM COATED ORAL at 08:06

## 2020-12-11 RX ADMIN — CHLORHEXIDINE GLUCONATE 0.12% ORAL RINSE 15 ML: 1.2 LIQUID ORAL at 08:10

## 2020-12-11 RX ADMIN — INSULIN LISPRO 2 UNITS: 100 INJECTION, SOLUTION INTRAVENOUS; SUBCUTANEOUS at 17:08

## 2020-12-11 NOTE — PROGRESS NOTES
Bedside shift change report given to Hudson Walker RN (oncoming nurse) by Ayah Guzman (offgoing nurse). Report included the following information SBAR and Kardex. End of Shift Note    Bedside shift change report given to Elías Nascimento (oncoming nurse) by Quinton Salinas RN (offgoing nurse). Report included the following information SBAR and Kardex    Shift worked:  7a-7P     Shift summary and any significant changes:     Uneventful shift     Concerns for physician to address:  N/A     [facility] phone for oncoming shift:   [phone]       Activity:  Activity Level: Up with Assistance  Number times ambulated in hallways past shift: 0  Number of times OOB to chair past shift: 0    Cardiac:   Cardiac Monitoring: No           Access:   Current line(s): PIV     Genitourinary:   Urinary status: voiding    Respiratory:   O2 Device: Room air  Chronic home O2 use?: NO  Incentive spirometer at bedside: N/A     GI:  Last Bowel Movement Date: 12/10/20  Current diet:  DIET DIABETIC CONSISTENT CARB Regular  Passing flatus: YES  Tolerating current diet: YES  % Diet Eaten: 100 %    Pain Management:   Patient states pain is manageable on current regimen: YES    Skin:  Pop Score: 18  Interventions: float heels and increase time out of bed    Patient Safety:  Fall Score:  Total Score: 5  Interventions: bed/chair alarm and pt to call before getting OOB  High Fall Risk: Yes

## 2020-12-11 NOTE — PROGRESS NOTES
Hospitalist Progress Note    NAME: Laura Santiago   :  1955   MRN:  [id number]     I reviewed pertinent labs and imaging, and discussed /agreed on the plan of care with Dr. Trever Robles.    Patient is medically cleared, pending insurance authorization for [facility]. Repeat COVID-19 pending. Assessment / Plan:  Worsening of low back pain with functional paraplegia readmission after 2 days SNF placement Pending insurance authorization  XR back  Unchanged   IMPRESSION:   Unchanged mild spondylosis at L5-S1.  -MRI - Mild degenerative changes at multiple levels in the lumbar spine  without significant canal stenosis. Small disc protrusion at L5-S1 likely acute  without canal stenosis or definite nerve root impingement   lidocaine patch, continue oral pain medications  Pain improving per patient  Doing well with PT ambulating with walker/assistanc x 1  Cough with low grade fever (Resolved)   COVID-19 Negative  Urinary Retention with richards (Resolved)   Voiding without difficulty    Continue tamsulosin  Liver cirrhosis with splenomegaly and portal hypertension POA   mentation at baseline   Cont lactulose  , 2-3 bm / day can hold for diarrhea   Need outpatient GI follow-up. Liver enzymes normal range  Seizure disorder  Continue home medications. No seizure noted this admission  Type 2 diabetes mellitus- controlled   Continue home medications.   Monitor  Hyperlipidemia  Continue home medications. Macrocytic anemia  Continue folic acid. Thrombocytopenia  Myelodysplasia  PT seen by Dr. Ranjith Beltran referred to [facility]   Plts stable  At 95 this am Was 66-61 during last admission   Monitor/ trending up    25.0 - 29.9 Overweight / Body mass index is 27.4 kg/m². Code status: Full  Prophylaxis: SCD's  Recommended Disposition: SNF/LTC     Subjective:     Chief Complaint / Reason for Physician Visit  Sitting up in the chair. States he walked in the hallway with PT and his back pain is better today. .  Discussed with RN events overnight. Review of Systems:  Symptom Y/N Comments  Symptom Y/N Comments   Fever/Chills n   Chest Pain n    Poor Appetite n   Edema n    Cough n   Abdominal Pain n    Sputum n   Joint Pain     SOB/TREVINO n   Pruritis/Rash n    Nausea/vomit n   Tolerating PT/OT     Diarrhea n   Tolerating Diet y    Constipation n   Other       Could NOT obtain due to:      Objective:     VITALS:   Last 24hrs VS reviewed since prior progress note. Most recent are:  Patient Vitals for the past 24 hrs:   Temp Pulse Resp BP SpO2   12/11/20 1056 97.6 °F (36.4 °C) 78 17 112/68 99 %   12/11/20 0808 97.6 °F (36.4 °C) 76 16 127/78 100 %   12/11/20 0332 97.8 °F (36.6 °C) 73 16 118/74 99 %   12/11/20 0041 98.4 °F (36.9 °C) 75 16 112/74 100 %   12/10/20 1944 98.5 °F (36.9 °C) 78 16 112/69 100 %       Intake/Output Summary (Last 24 hours) at 12/11/2020 1605  Last data filed at 12/11/2020 0859  Gross per 24 hour   Intake 500 ml   Output 801 ml   Net -301 ml        I had a face to face encounter and independently examined this patient on 12/11/2020, as outlined below:  PHYSICAL EXAM:  General: Alert, cooperative, no acute distress    EENT:  Anicteric sclerae. normocephalic  Resp:  CTA bilaterally, no wheezing or rales. No accessory muscle use  CV:  Regular  rhythm,  No edema  GI:  Soft, Non distended, Non tender. +Bowel sounds  Neurologic:  Alert and oriented X 2-3, normal speech,   Psych:   Fair insight. Not anxious nor agitated  Skin:  No rashes.   No jaundice    Reviewed most current lab test results and cultures  YES  Reviewed most current radiology test results   YES  Review and summation of old records today    NO  Reviewed patient's current orders and MAR    YES  PMH/SH reviewed - no change compared to H&P  ________________________________________________________________________  Care Plan discussed with:    Comments   Patient y    Family      RN y    Care Manager y    Consultant                        Multidiciplinary team rounds were held today with , nursing, pharmacist and clinical coordinator. Patient's plan of care was discussed; medications were reviewed and discharge planning was addressed. ________________________________________________________________________    ________________________________________________________________________  Sabi Villarreal NP     Procedures: see electronic medical records for all procedures/Xrays and details which were not copied into this note but were reviewed prior to creation of Plan. LABS:  I reviewed today's most current labs and imaging studies.   Pertinent labs include:  Recent Labs     12/11/20  0306 12/10/20  0258 12/09/20  0335   WBC 8.1 7.0 7.0   HGB 9.9* 8.6* 8.2*   HCT 32.8* 28.6* 27.1*   PLT 95* 84* 92*     Recent Labs     12/11/20 0306 12/10/20  0258 12/09/20  0335    140 143   K 3.9 3.9 3.9   * 112* 114*   CO2 23 22 23   GLU 73 111* 124*   BUN 15 17 15   CREA 0.71 0.64* 0.74   CA 8.3* 8.3* 8.1*   ALB 2.4* 2.1* 2.0*   TBILI 0.3 0.4 0.6   ALT 22 20 16       Signed: Sabi Villarreal NP

## 2020-12-11 NOTE — PROGRESS NOTES
[facility] Plan:                 *[facility]              *[facility] to transport at d/c    Still waiting for insurance Auth. CM canceled 2:30pm transport for today. Patient will need another COVID test prior to d/c. CM will continue to follow.     Giovany Parker  Ext [phone]

## 2020-12-11 NOTE — PROGRESS NOTES
Problem: Mobility Impaired (Adult and Pediatric)  Goal: *Acute Goals and Plan of Care (Insert Text)  Description: FUNCTIONAL STATUS PRIOR TO ADMISSION: Patient was modified independent using a walker and wheelchair for functional mobility. HOME SUPPORT PRIOR TO ADMISSION: The patient lived with mother. Physical Therapy Goals  Initiated 12/6/2020  1. Patient will move from supine to sit and sit to supine  in bed with moderate assistance  within 7 day(s). 2.  Patient will transfer from bed to chair and chair to bed with supervision/set-up using the least restrictive device within 7 day(s). 3.  Patient will perform sit to stand with supervision/set-up within 7 day(s). 4.  Patient will ambulate with supervision/set-up for 100 feet with the least restrictive device within 7 day(s). Outcome: Progressing Towards Goal  Note:   PHYSICAL THERAPY TREATMENT  Patient: Ezio Rudolph (87 y.o. male)  Date: 12/11/2020  Diagnosis: Lumbar stenosis [M48.061]  Cirrhosis ([address] [K74.60]  Paraplegia (HCC) [G82.20]  Fever of unknown origin (FUO) [R50.9]  Back pain [M54.9]   <principal problem not specified>       Precautions:    Chart, physical therapy assessment, plan of care and goals were reviewed. ASSESSMENT  Patient continues with skilled PT services and is progressing towards goals. Patient continues to improve with mobility. Up in chair upon arrival; agreeable to mobility. Sit to stand with CGA with verbal cues to push from chair. Patient ambulated 140' with rolling walker and CGA with slow pace and decreased step clearance. Patient also with flexed posture but no LOB noted. SOB noted after ambulation. After brief rest in chair patient ambulated additional 36' with lowered walker and CGA. Patient able to perform LE exercises while seated. Recommend inpatient rehab at discharge.      Current Level of Function Impacting Discharge (mobility/balance): CGA    Other factors to consider for discharge: intellectual disability         PLAN :  Patient continues to benefit from skilled intervention to address the above impairments. Continue treatment per established plan of care. to address goals. Recommendation for discharge: (in order for the patient to meet his/her long term goals)  Therapy 3 hours per day 5-7 days per week    This discharge recommendation:  Has been made in collaboration with the attending provider and/or case management    IF patient discharges home will need the following DME: to be determined (TBD)       SUBJECTIVE:   Patient stated I'm tired.     OBJECTIVE DATA SUMMARY:   Critical Behavior:  Neurologic State: Alert  Orientation Level: Oriented to person, Oriented to place, Oriented to time  Cognition: Follows commands  Safety/Judgement: Decreased awareness of need for assistance, Decreased awareness of need for safety, Fall prevention  Functional Mobility Training:  Bed Mobility:                    Transfers:  Sit to Stand: Contact guard assistance  Stand to Sit: Contact guard assistance(verbal cues)                             Balance:  Sitting: Intact  Sitting - Static: Good (unsupported)  Sitting - Dynamic: Good (unsupported)  Standing: Impaired  Standing - Static: Constant support;Good  Standing - Dynamic : Constant support; Fair  Ambulation/Gait Training:  Distance (ft): 160 Feet (ft)(120', 40')  Assistive Device: Gait belt;Walker, rolling  Ambulation - Level of Assistance: Contact guard assistance        Gait Abnormalities: Decreased step clearance        Base of Support: Widened     Speed/Doris: Pace decreased (<100 feet/min)                                  Therapeutic Exercises:    Ankle pumps, seated marching, LAQ's with verbal, tactile cues, 10 reps each  Pain Rating:  None reported    Activity Tolerance:   Good and observed SOB with activity    After treatment patient left in no apparent distress:   Sitting in chair, Call bell within reach, Bed / chair alarm activated, and Caregiver / family present    COMMUNICATION/COLLABORATION:   The patients plan of care was discussed with: Registered nurse.      Jamil Miner, PT   Time Calculation: 17 mins

## 2020-12-11 NOTE — PROGRESS NOTES
Problem: Self Care Deficits Care Plan (Adult)  Goal: *Acute Goals and Plan of Care (Insert Text)  Description:   FUNCTIONAL STATUS PRIOR TO ADMISSION: Patient required minimal assistance for basic and instrumental ADLs as documented when he was DC from the hospital, however per his mother he was dependent with ADLs at home and she was unable to take care of him. HOME SUPPORT: The patient lived with his elderly mother who is unable to care for him at home at present level. Occupational Therapy Goals  Initiated 12/6/2020  1. Patient will perform grooming with supervision/set-up within 7 day(s). 2.  Patient will perform bathing with minimal assistance/contact guard assist within 7 day(s). 3.  Patient will perform upper body dressing with supervision/set-up within 7 day(s). 4.  Patient will perform toilet transfers with minimal assistance/contact guard assist within 7 day(s). 5.  Patient will perform all aspects of lower body dressing with minimal assistance/contact guard assist within 7 day(s). Outcome: Progressing Towards Goal   OCCUPATIONAL THERAPY TREATMENT  Patient: Leslie Benedict (59 y.o. male)  Date: 12/11/2020  Diagnosis: Lumbar stenosis [M48.061]  Cirrhosis ([facility] [address] [K74.60]  Paraplegia (HCC) [G82.20]  Fever of unknown origin (FUO) [R50.9]  Back pain [M54.9]   <principal problem not specified>       Precautions:  fall  Chart, occupational therapy assessment, plan of care, and goals were reviewed. ASSESSMENT  Patient continues with skilled OT services and is progressing towards goals. Pt continues to need assist with adls--CGA/Amina for toileting and for grooming in standing. No complaints of pain when reaching to the floor to grasp his depends and pull up (mod A). Able to initiate handwashing but needs cues/assist for thoroughness. Pt was able to ambulate using the RW,  and practice reaching off base of support  (CGA) during simulated standing adls.   Pt is hypersensitive to discomfort/pain and declined reaching with his RUE due to the tape on his arm being pulled while reaching. Current Level of Function Impacting Discharge (ADLs): up to moderate assist for adls today    Other factors to consider for discharge: awaiting insurance auth. For rehab. PLAN :  Patient continues to benefit from skilled intervention to address the above impairments. Continue treatment per established plan of care. to address goals. Recommend with staff: encourage OOB and upright    Recommend next OT session: simulated IADls    Recommendation for discharge: (in order for the patient to meet his/her long term goals)  Therapy 3 hours per day 5-7 days per week--inpatient rehab--awaiting insurance authorization this week. This discharge recommendation:  Has not yet been discussed the attending provider and/or case management    IF patient discharges home will need the following DME: tbd       SUBJECTIVE:   Patient stated I colored it.     (craft in room)    OBJECTIVE DATA SUMMARY:   Cognitive/Behavioral Status:  Neurologic State: Alert  Orientation Level: Oriented to person  Cognition: Decreased attention/concentration; Follows commands(impaired problem solving, ID prob, gen solution)        Safety/Judgement: Decreased awareness of need for assistance;Decreased awareness of need for safety; Fall prevention    Functional Mobility and Transfers for ADLs:  Bed Mobility:       Transfers:  Sit to Stand: Contact guard assistance  Functional Transfers  Bathroom Mobility: Minimum assistance;Contact guard assistance(cues for safe use of RW)  Toilet Transfer : Contact guard assistance  Adaptive Equipment: Grab bars; Walker (comment)       Balance:  Sitting: Intact  Sitting - Static: Good (unsupported)  Sitting - Dynamic: Good (unsupported)  Standing: Impaired  Standing - Static: Constant support;Good  Standing - Dynamic : Constant support; Fair    ADL Intervention:       Grooming  Washing Hands: Moderate assistance(pt needs assist for thoroughness when sequencing task)                        Toileting  Clothing Management: Moderate assistance(cues to bend forward and pull up depends, CGA/Amina for jeff)  Cues: Physical assistance for pants up; Tactile cues provided;Verbal cues provided  Adaptive Equipment: Grab bars; Walker    Cognitive Retraining  Safety/Judgement: Decreased awareness of need for assistance;Decreased awareness of need for safety; Fall prevention    Pain:  Discomfort in RUE (due to tape on dorsum) when reaching into closet. Pt seems hypersensitive to discomfort/pain. Activity Tolerance:   Good--no complaints or s/o fatigue     After treatment patient left in no apparent distress:   Sitting in chair, Call bell within reach, Bed / chair alarm activated, Caregiver / family present    COMMUNICATION/COLLABORATION:   The patients plan of care was discussed with: Registered nurse.      MARV Cooper/L  Time Calculation: 15 mins

## 2020-12-12 LAB
ALBUMIN SERPL-MCNC: 2.3 G/DL (ref 3.5–5)
ALBUMIN/GLOB SERPL: 0.7 {RATIO} (ref 1.1–2.2)
ALP SERPL-CCNC: 71 U/L (ref 45–117)
ALT SERPL-CCNC: 20 U/L (ref 12–78)
ANION GAP SERPL CALC-SCNC: 5 MMOL/L (ref 5–15)
AST SERPL-CCNC: 16 U/L (ref 15–37)
BILIRUB SERPL-MCNC: 0.2 MG/DL (ref 0.2–1)
BUN SERPL-MCNC: 17 MG/DL (ref 6–20)
BUN/CREAT SERPL: 24 (ref 12–20)
CALCIUM SERPL-MCNC: 8.5 MG/DL (ref 8.5–10.1)
CHLORIDE SERPL-SCNC: 110 MMOL/L (ref 97–108)
CO2 SERPL-SCNC: 25 MMOL/L (ref 21–32)
CREAT SERPL-MCNC: 0.72 MG/DL (ref 0.7–1.3)
ERYTHROCYTE [DISTWIDTH] IN BLOOD BY AUTOMATED COUNT: 14 % (ref 11.5–14.5)
GLOBULIN SER CALC-MCNC: 3.1 G/DL (ref 2–4)
GLUCOSE BLD STRIP.AUTO-MCNC: 103 MG/DL (ref 65–100)
GLUCOSE BLD STRIP.AUTO-MCNC: 109 MG/DL (ref 65–100)
GLUCOSE BLD STRIP.AUTO-MCNC: 115 MG/DL (ref 65–100)
GLUCOSE BLD STRIP.AUTO-MCNC: 123 MG/DL (ref 65–100)
GLUCOSE SERPL-MCNC: 77 MG/DL (ref 65–100)
HCT VFR BLD AUTO: 30.6 % (ref 36.6–50.3)
HGB BLD-MCNC: 9.2 G/DL (ref 12.1–17)
MCH RBC QN AUTO: 33.7 PG (ref 26–34)
MCHC RBC AUTO-ENTMCNC: 30.1 G/DL (ref 30–36.5)
MCV RBC AUTO: 112.1 FL (ref 80–99)
NRBC # BLD: 0 K/UL (ref 0–0.01)
NRBC BLD-RTO: 0 PER 100 WBC
PLATELET # BLD AUTO: 106 K/UL (ref 150–400)
PMV BLD AUTO: 10.5 FL (ref 8.9–12.9)
POTASSIUM SERPL-SCNC: 4 MMOL/L (ref 3.5–5.1)
PROT SERPL-MCNC: 5.4 G/DL (ref 6.4–8.2)
RBC # BLD AUTO: 2.73 M/UL (ref 4.1–5.7)
SERVICE CMNT-IMP: ABNORMAL
SODIUM SERPL-SCNC: 140 MMOL/L (ref 136–145)
WBC # BLD AUTO: 7.7 K/UL (ref 4.1–11.1)

## 2020-12-12 PROCEDURE — 74011250637 HC RX REV CODE- 250/637: Performed by: NURSE PRACTITIONER

## 2020-12-12 PROCEDURE — 74011250637 HC RX REV CODE- 250/637: Performed by: STUDENT IN AN ORGANIZED HEALTH CARE EDUCATION/TRAINING PROGRAM

## 2020-12-12 PROCEDURE — 80053 COMPREHEN METABOLIC PANEL: CPT

## 2020-12-12 PROCEDURE — 82962 GLUCOSE BLOOD TEST: CPT

## 2020-12-12 PROCEDURE — 85027 COMPLETE CBC AUTOMATED: CPT

## 2020-12-12 PROCEDURE — 74011000250 HC RX REV CODE- 250: Performed by: STUDENT IN AN ORGANIZED HEALTH CARE EDUCATION/TRAINING PROGRAM

## 2020-12-12 PROCEDURE — 65270000029 HC RM PRIVATE

## 2020-12-12 PROCEDURE — 74011250637 HC RX REV CODE- 250/637: Performed by: INTERNAL MEDICINE

## 2020-12-12 PROCEDURE — 36415 COLL VENOUS BLD VENIPUNCTURE: CPT

## 2020-12-12 RX ORDER — FUROSEMIDE 20 MG/1
20 TABLET ORAL DAILY
Status: DISCONTINUED | OUTPATIENT
Start: 2020-12-13 | End: 2020-12-15

## 2020-12-12 RX ADMIN — LACTULOSE 15 ML: 20 SOLUTION ORAL at 09:54

## 2020-12-12 RX ADMIN — ALOGLIPTIN 12.5 MG: 25 TABLET, FILM COATED ORAL at 09:51

## 2020-12-12 RX ADMIN — LACTULOSE 15 ML: 20 SOLUTION ORAL at 21:50

## 2020-12-12 RX ADMIN — TRIAMCINOLONE ACETONIDE: 1 OINTMENT TOPICAL at 09:54

## 2020-12-12 RX ADMIN — METFORMIN 1000 MG: 500 TABLET, EXTENDED RELEASE ORAL at 09:53

## 2020-12-12 RX ADMIN — DIVALPROEX SODIUM 500 MG: 500 TABLET, DELAYED RELEASE ORAL at 09:53

## 2020-12-12 RX ADMIN — Medication 10 ML: at 21:52

## 2020-12-12 RX ADMIN — Medication 1 TABLET: at 09:53

## 2020-12-12 RX ADMIN — TOPIRAMATE 300 MG: 100 TABLET, FILM COATED ORAL at 17:11

## 2020-12-12 RX ADMIN — PREGABALIN 150 MG: 150 CAPSULE ORAL at 09:52

## 2020-12-12 RX ADMIN — ATORVASTATIN CALCIUM 40 MG: 40 TABLET, FILM COATED ORAL at 21:50

## 2020-12-12 RX ADMIN — TRIAMCINOLONE ACETONIDE: 1 OINTMENT TOPICAL at 17:12

## 2020-12-12 RX ADMIN — TOPIRAMATE 300 MG: 100 TABLET, FILM COATED ORAL at 09:52

## 2020-12-12 RX ADMIN — TAMSULOSIN HYDROCHLORIDE 0.4 MG: 0.4 CAPSULE ORAL at 21:50

## 2020-12-12 RX ADMIN — Medication 10 ML: at 17:11

## 2020-12-12 RX ADMIN — CHLORHEXIDINE GLUCONATE 0.12% ORAL RINSE 15 ML: 1.2 LIQUID ORAL at 11:12

## 2020-12-12 RX ADMIN — DIVALPROEX SODIUM 500 MG: 500 TABLET, DELAYED RELEASE ORAL at 17:10

## 2020-12-12 RX ADMIN — METFORMIN 1000 MG: 500 TABLET, EXTENDED RELEASE ORAL at 17:11

## 2020-12-12 RX ADMIN — PREGABALIN 150 MG: 150 CAPSULE ORAL at 17:11

## 2020-12-12 RX ADMIN — PIOGLITAZONE 45 MG: 30 TABLET ORAL at 09:53

## 2020-12-12 RX ADMIN — Medication 10 ML: at 02:32

## 2020-12-12 RX ADMIN — FOLIC ACID 1 MG: 1 TABLET ORAL at 09:53

## 2020-12-12 RX ADMIN — GLIMEPIRIDE 8 MG: 4 TABLET ORAL at 09:53

## 2020-12-12 NOTE — PROGRESS NOTES
End of Shift Note    Bedside shift change report given to Ricardo Dominguez RN (oncoming nurse) by Mckenna Gary (offgoing nurse). Report included the following information SBAR, Kardex and MAR    Shift worked:  [id number]     Shift summary and any significant changes:     Pt up in chair several times this shift with staff assistance. Bed alarm on in chair and in bed. Pt denied pain this shift. Pt had two loose bowel movements this shift. Concerns for physician to address:  None at this time   Zone phone for oncoming shift:   [phone]       Activity:  Activity Level: Up with Assistance  Number times ambulated in hallways past shift: 0  Number of times OOB to chair past shift: several times this shift    Cardiac:   Cardiac Monitoring: No           Access:   Current line(s): PIV     Genitourinary:   Urinary status: voiding    Respiratory:   O2 Device: Room air  Chronic home O2 use?: NO  Incentive spirometer at bedside: N/A     GI:  Last Bowel Movement Date: 12/11/20  Current diet:  DIET DIABETIC CONSISTENT CARB Regular  Passing flatus: YES  Tolerating current diet: YES  % Diet Eaten: 100 %    Pain Management:   Patient states pain is manageable on current regimen: N/A    Skin:  Pop Score: 18  Interventions: nutritional support     Patient Safety:  Fall Score:  Total Score: 5  Interventions: bed/chair alarm  High Fall Risk: Yes    Length of Stay:  Expected LOS: 2d 21h  Actual LOS: [place]

## 2020-12-12 NOTE — PROGRESS NOTES
End of Shift Note    Bedside shift change report given to Brice Henson (oncoming nurse) by Star Spence (offgoing nurse). Report included the following information SBAR, Kardex, Intake/Output, MAR and Accordion    Shift worked:  [id number]     Shift summary and any significant changes:     Pt slept through the night. Pt has a cough that has been bothering him off and on. He says he had it prior to coming to hospital.      Concerns for physician to address: Cough      Zone phone for oncoming shift:   [phone]       Activity:  Activity Level: Up with Assistance  Number times ambulated in hallways past shift: 0  Number of times OOB to chair past shift: 0    Cardiac:   Cardiac Monitoring: No           Access:   Current line(s): PIV     Genitourinary:   Urinary status: voiding and incontinent    Respiratory:   O2 Device: Room air  Chronic home O2 use?: NO  Incentive spirometer at bedside: NO     GI:  Last Bowel Movement Date: 12/11/20  Current diet:  DIET DIABETIC CONSISTENT CARB Regular  Passing flatus: YES  Tolerating current diet: YES  % Diet Eaten: 100 %    Pain Management:   Patient states pain is manageable on current regimen: YES    Skin:  Pop Score: 18  Interventions: increase time out of bed, PT/OT consult and internal/external urinary devices    Patient Safety:  Fall Score:  Total Score: (P) 5  Interventions: bed/chair alarm, gripper socks, pt to call before getting OOB and stay with me (per policy)  High Fall Risk: (P) Yes    Length of Stay:  Expected LOS: 2d 21h  Actual LOS: [address] [facility] [address]

## 2020-12-12 NOTE — PROGRESS NOTES
End of Shift Note    Bedside shift change report given to Bradford Hernandez (oncoming nurse) by Светлана Graf (offgoing nurse). Report included the following information SBAR, Kardex and MAR    Shift worked:  7a-7p     Shift summary and any significant changes:     COVID test was completed and sent. No acute events today. Patient had two watery BM's this afternoon so PM lactulose was held per parameters. Concerns for physician to address:  None     Zone phone for oncoming shift:   [phone]       Activity:  Activity Level: Up with Assistance  Number times ambulated in hallways past shift: 1  Number of times OOB to chair past shift: 3    Cardiac:   Cardiac Monitoring: No           Access:   Current line(s): PIV     Genitourinary:   Urinary status: voiding    Respiratory:   O2 Device: Room air  Chronic home O2 use?: NO  Incentive spirometer at bedside: NO     GI:  Last Bowel Movement Date: 12/11/20  Current diet:  DIET DIABETIC CONSISTENT CARB Regular  Passing flatus: YES  Tolerating current diet: YES  % Diet Eaten: 100 %    Pain Management:   Patient states pain is manageable on current regimen: N/A    Skin:  Pop Score: 18  Interventions: speciality bed    Patient Safety:  Fall Score:  Total Score: 5  Interventions: gripper socks and pt to call before getting OOB  High Fall Risk: Yes    Length of Stay:  Expected LOS: 2d 21h  Actual LOS: [address]

## 2020-12-12 NOTE — PROGRESS NOTES
Nutrition Assessment     Type and Reason for Visit: Initial, RD nutrition re-screen/LOS    Nutrition Recommendations/Plan:   · Continue CCD for BG management. · Please document % meals and supplements consumed in flowsheet I/O's under intake. Nutrition Assessment:     12/12: Chart reviewed; med noted for lumbar stenosis, cirrhosis, paraplegia. Hx of DM. Documented PO intake % of meals. No acute nutrition related issues at this time. Awaiting a negative COVID test prior to transfer to SNF. Patient Vitals for the past 72 hrs:   % Diet Eaten   12/12/20 1235 100 %   12/12/20 1000 100 %   12/11/20 1709 100 %   12/11/20 0852 100 %   12/10/20 1221 100 %   12/10/20 0918 100 %     Last Weight Metric  Weight Loss Metrics 12/10/2020 12/4/2020 11/27/2020 11/22/2020 11/18/2020 10/27/2020 9/15/2020   Today's Wt 202 lb - 214 lb 4.6 oz 214 lb 4.6 oz 199 lb 191 lb 197 lb   BMI - 27.4 kg/m2 29.06 kg/m2 29.06 kg/m2 26.25 kg/m2 25.2 kg/m2 26.72 kg/m2     Estimated Daily Nutrient Needs:  Energy (kcal):  2270 (BMR 1745 x 1. 3AF)  Protein (g):  92 (1.0 g/kg bw)       Fluid (ml/day):  2200 ml/day    Nutrition Related Findings:  BM: 12/11, 2+ edema, Meds: lipitor, folvite, os-cem, Labs:  WNL      Current Nutrition Therapies:  DIET DIABETIC CONSISTENT CARB Regular    Anthropometric Measures:  · Height:  6' (182.9 cm)  · Current Body Wt:  91.6 kg (201 lb 15.1 oz)  · BMI: 27.4    Nutrition Diagnosis:   No nutrition diagnosis at this time     Nutrition Intervention:  Food and/or Nutrient Delivery: Continue current diet  Nutrition Education and Counseling: No recommendations at this time  Coordination of Nutrition Care: No recommendation at this time    Goals:  Maintain PO >75% of meals next 5-7 days       Nutrition Monitoring and Evaluation:   Behavioral-Environmental Outcomes: None identified  Food/Nutrient Intake Outcomes: Food and nutrient intake  Physical Signs/Symptoms Outcomes: Biochemical data, Weight, Fluid status or edema    Discharge Planning:    Continue current diet     Electronically signed by Taryn Bates RD on 12/12/2020 at 1:07 PM

## 2020-12-12 NOTE — PROGRESS NOTES
Hospitalist Progress Note    NAME: Anita Goldberg   :  1955   MRN:  [id number]     I reviewed pertinent labs and imaging, and discussed /agreed on the plan of care with Dr. Sydney Parker    Medically stable for discharge. Pending insurance Authorization for [facility]. Repeat Covid-19 pending. Assessment / Plan:  Worsening of low back pain with functional paraplegia readmission after 2 days SNF placement Pending insurance authorization  XR back  Unchanged   IMPRESSION:   Unchanged mild spondylosis at L5-S1.  -MRI - Mild degenerative changes at multiple levels in the lumbar spine  without significant canal stenosis. Small disc protrusion at L5-S1 likely acute  without canal stenosis or definite nerve root impingement   lidocaine patch, continue oral pain medications  Pain improving per patient  Doing well with PT ambulating with walker/assistanc x 1  Cough with low grade fever (Resolved)   COVID-19 Negative  Urinary Retention with richards (Resolved)   Voiding without difficulty    Continue tamsulosin  Liver cirrhosis with splenomegaly and portal hypertension POA   mentation at baseline   Cont lactulose  , 2-3 bm / day can hold for diarrhea   Need outpatient GI follow-up. Liver enzymes normal range  Seizure disorder  Continue home medications. No seizure noted this admission  Type 2 diabetes mellitus- controlled   Continue home medications.   Monitor  Hyperlipidemia  Continue home medications. Macrocytic anemia  Continue folic acid. Thrombocytopenia  Myelodysplasia  PT seen by Dr. Griselda Rodas referred to [facility]   Plts stable  At 95 this am Was 66-61 during last admission   Monitor/ trending up        25.0 - 29.9 Overweight / Body mass index is 27.4 kg/m². Code status: Full  Prophylaxis: SCD's  Recommended Disposition: [facility]/Rehab     Subjective:     Chief Complaint / Reason for Physician Visit  Patient sitting up in the recliner. Lower extremity edema improving. Mother at bedside.   Discussed with RN events overnight. Review of Systems:  Symptom Y/N Comments  Symptom Y/N Comments   Fever/Chills n   Chest Pain n    Poor Appetite n   Edema y BLLE   Cough n   Abdominal Pain     Sputum n   Joint Pain     SOB/TREVINO n   Pruritis/Rash n    Nausea/vomit n   Tolerating PT/OT y    Diarrhea n   Tolerating Diet y    Constipation n   Other       Could NOT obtain due to:      Objective:     VITALS:   Last 24hrs VS reviewed since prior progress note. Most recent are:  Patient Vitals for the past 24 hrs:   Temp Pulse Resp BP SpO2   12/12/20 1527 98.6 °F (37 °C) 68 16 (!) 108/57 98 %   12/12/20 1126 98.2 °F (36.8 °C) 70 16 (!) 104/58 98 %   12/12/20 0755 97.3 °F (36.3 °C) 67 16 (!) 116/58 98 %   12/12/20 0317 97.3 °F (36.3 °C) 65 18 (!) 101/51 97 %   12/11/20 2311 97.8 °F (36.6 °C) 64 18 (!) 107/59 99 %   12/11/20 1940 98.8 °F (37.1 °C) 60 18 (!) 99/54 99 %       Intake/Output Summary (Last 24 hours) at 12/12/2020 1719  Last data filed at 12/12/2020 1235  Gross per 24 hour   Intake 480 ml   Output 500 ml   Net -20 ml        I had a face to face encounter and independently examined this patient on 12/12/2020, as outlined below:  PHYSICAL EXAM:  General: Alert, cooperative, no acute distress    EENT:  EOMI. Anicteric sclerae. normocephalic  Resp:  CTA bilaterally, no wheezing or rales. No accessory muscle use  CV:  Regular  rhythm,  No edema  GI:  Soft, Non distended, Non tender. +Bowel sounds  Neurologic:  Alert and oriented X 2-3, normal speech,   Psych:   Good insight. Not anxious nor agitated  Skin:  No rashes.   No jaundice    Reviewed most current lab test results and cultures  YES  Reviewed most current radiology test results   YES  Review and summation of old records today    NO  Reviewed patient's current orders and MAR    YES  PMH/SH reviewed - no change compared to H&P  ________________________________________________________________________  Care Plan discussed with:    Comments   Patient y    Family  y Mother   RN y    Care Manager     Consultant                        Multidiciplinary team rounds were held today with , nursing, pharmacist and clinical coordinator. Patient's plan of care was discussed; medications were reviewed and discharge planning was addressed. ________________________________________________________________________    ________________________________________________________________________  Marilee Vee NP     Procedures: see electronic medical records for all procedures/Xrays and details which were not copied into this note but were reviewed prior to creation of Plan. LABS:  I reviewed today's most current labs and imaging studies.   Pertinent labs include:  Recent Labs     12/12/20  0058 12/11/20  0306 12/10/20  0258   WBC 7.7 8.1 7.0   HGB 9.2* 9.9* 8.6*   HCT 30.6* 32.8* 28.6*   * 95* 84*     Recent Labs     12/12/20  0058 12/11/20  0306 12/10/20  0258    142 140   K 4.0 3.9 3.9   * 112* 112*   CO2 25 23 22   GLU 77 73 111*   BUN 17 15 17   CREA 0.72 0.71 0.64*   CA 8.5 8.3* 8.3*   ALB 2.3* 2.4* 2.1*   TBILI 0.2 0.3 0.4   ALT 20 22 20       Signed: Marilee Vee NP

## 2020-12-13 LAB
ALBUMIN SERPL-MCNC: 2.4 G/DL (ref 3.5–5)
ALBUMIN/GLOB SERPL: 0.8 {RATIO} (ref 1.1–2.2)
ALP SERPL-CCNC: 74 U/L (ref 45–117)
ALT SERPL-CCNC: 17 U/L (ref 12–78)
ANION GAP SERPL CALC-SCNC: 4 MMOL/L (ref 5–15)
AST SERPL-CCNC: 15 U/L (ref 15–37)
BILIRUB DIRECT SERPL-MCNC: 0.1 MG/DL (ref 0–0.2)
BILIRUB SERPL-MCNC: 0.4 MG/DL (ref 0.2–1)
BUN SERPL-MCNC: 19 MG/DL (ref 6–20)
BUN/CREAT SERPL: 28 (ref 12–20)
CALCIUM SERPL-MCNC: 8.5 MG/DL (ref 8.5–10.1)
CHLORIDE SERPL-SCNC: 113 MMOL/L (ref 97–108)
CO2 SERPL-SCNC: 25 MMOL/L (ref 21–32)
CREAT SERPL-MCNC: 0.68 MG/DL (ref 0.7–1.3)
ERYTHROCYTE [DISTWIDTH] IN BLOOD BY AUTOMATED COUNT: 14 % (ref 11.5–14.5)
GLOBULIN SER CALC-MCNC: 3.2 G/DL (ref 2–4)
GLUCOSE BLD STRIP.AUTO-MCNC: 151 MG/DL (ref 65–100)
GLUCOSE BLD STRIP.AUTO-MCNC: 83 MG/DL (ref 65–100)
GLUCOSE BLD STRIP.AUTO-MCNC: 85 MG/DL (ref 65–100)
GLUCOSE BLD STRIP.AUTO-MCNC: 99 MG/DL (ref 65–100)
GLUCOSE SERPL-MCNC: 86 MG/DL (ref 65–100)
HCT VFR BLD AUTO: 32 % (ref 36.6–50.3)
HEALTH STATUS, XMCV2T: NORMAL
HGB BLD-MCNC: 9.7 G/DL (ref 12.1–17)
MCH RBC QN AUTO: 33.8 PG (ref 26–34)
MCHC RBC AUTO-ENTMCNC: 30.3 G/DL (ref 30–36.5)
MCV RBC AUTO: 111.5 FL (ref 80–99)
NRBC # BLD: 0 K/UL (ref 0–0.01)
NRBC BLD-RTO: 0 PER 100 WBC
PLATELET # BLD AUTO: 104 K/UL (ref 150–400)
PMV BLD AUTO: 10.3 FL (ref 8.9–12.9)
POTASSIUM SERPL-SCNC: 4 MMOL/L (ref 3.5–5.1)
PROT SERPL-MCNC: 5.6 G/DL (ref 6.4–8.2)
RBC # BLD AUTO: 2.87 M/UL (ref 4.1–5.7)
SARS-COV-2, COV2: NOT DETECTED
SERVICE CMNT-IMP: ABNORMAL
SERVICE CMNT-IMP: NORMAL
SODIUM SERPL-SCNC: 142 MMOL/L (ref 136–145)
SOURCE, COVRS: NORMAL
SPECIMEN SOURCE, FCOV2M: NORMAL
SPECIMEN TYPE, XMCV1T: NORMAL
WBC # BLD AUTO: 7 K/UL (ref 4.1–11.1)

## 2020-12-13 PROCEDURE — 74011250637 HC RX REV CODE- 250/637: Performed by: STUDENT IN AN ORGANIZED HEALTH CARE EDUCATION/TRAINING PROGRAM

## 2020-12-13 PROCEDURE — 80053 COMPREHEN METABOLIC PANEL: CPT

## 2020-12-13 PROCEDURE — 82962 GLUCOSE BLOOD TEST: CPT

## 2020-12-13 PROCEDURE — 36415 COLL VENOUS BLD VENIPUNCTURE: CPT

## 2020-12-13 PROCEDURE — 74011000250 HC RX REV CODE- 250: Performed by: STUDENT IN AN ORGANIZED HEALTH CARE EDUCATION/TRAINING PROGRAM

## 2020-12-13 PROCEDURE — 74011250637 HC RX REV CODE- 250/637: Performed by: INTERNAL MEDICINE

## 2020-12-13 PROCEDURE — 65270000029 HC RM PRIVATE

## 2020-12-13 PROCEDURE — 74011250637 HC RX REV CODE- 250/637: Performed by: NURSE PRACTITIONER

## 2020-12-13 PROCEDURE — 85027 COMPLETE CBC AUTOMATED: CPT

## 2020-12-13 PROCEDURE — 74011636637 HC RX REV CODE- 636/637: Performed by: INTERNAL MEDICINE

## 2020-12-13 PROCEDURE — 82248 BILIRUBIN DIRECT: CPT

## 2020-12-13 RX ADMIN — FUROSEMIDE 20 MG: 20 TABLET ORAL at 09:12

## 2020-12-13 RX ADMIN — Medication 10 ML: at 21:54

## 2020-12-13 RX ADMIN — ALOGLIPTIN 12.5 MG: 25 TABLET, FILM COATED ORAL at 09:12

## 2020-12-13 RX ADMIN — BENZONATATE 100 MG: 100 CAPSULE ORAL at 17:47

## 2020-12-13 RX ADMIN — TRIAMCINOLONE ACETONIDE: 1 OINTMENT TOPICAL at 17:22

## 2020-12-13 RX ADMIN — METFORMIN 1000 MG: 500 TABLET, EXTENDED RELEASE ORAL at 16:45

## 2020-12-13 RX ADMIN — Medication 10 ML: at 13:59

## 2020-12-13 RX ADMIN — DIVALPROEX SODIUM 500 MG: 500 TABLET, DELAYED RELEASE ORAL at 09:12

## 2020-12-13 RX ADMIN — LACTULOSE 15 ML: 20 SOLUTION ORAL at 21:54

## 2020-12-13 RX ADMIN — PREGABALIN 150 MG: 150 CAPSULE ORAL at 09:12

## 2020-12-13 RX ADMIN — GLIMEPIRIDE 8 MG: 4 TABLET ORAL at 09:12

## 2020-12-13 RX ADMIN — TOPIRAMATE 300 MG: 100 TABLET, FILM COATED ORAL at 17:22

## 2020-12-13 RX ADMIN — PIOGLITAZONE 45 MG: 30 TABLET ORAL at 09:12

## 2020-12-13 RX ADMIN — TOPIRAMATE 300 MG: 100 TABLET, FILM COATED ORAL at 09:12

## 2020-12-13 RX ADMIN — CHLORHEXIDINE GLUCONATE 0.12% ORAL RINSE 15 ML: 1.2 LIQUID ORAL at 21:00

## 2020-12-13 RX ADMIN — INSULIN LISPRO 2 UNITS: 100 INJECTION, SOLUTION INTRAVENOUS; SUBCUTANEOUS at 12:00

## 2020-12-13 RX ADMIN — Medication 1 TABLET: at 09:12

## 2020-12-13 RX ADMIN — TRIAMCINOLONE ACETONIDE: 1 OINTMENT TOPICAL at 09:18

## 2020-12-13 RX ADMIN — DIVALPROEX SODIUM 500 MG: 500 TABLET, DELAYED RELEASE ORAL at 17:22

## 2020-12-13 RX ADMIN — CHLORHEXIDINE GLUCONATE 0.12% ORAL RINSE 15 ML: 1.2 LIQUID ORAL at 13:59

## 2020-12-13 RX ADMIN — TAMSULOSIN HYDROCHLORIDE 0.4 MG: 0.4 CAPSULE ORAL at 21:55

## 2020-12-13 RX ADMIN — Medication 10 ML: at 05:44

## 2020-12-13 RX ADMIN — METFORMIN 1000 MG: 500 TABLET, EXTENDED RELEASE ORAL at 09:12

## 2020-12-13 RX ADMIN — FOLIC ACID 1 MG: 1 TABLET ORAL at 09:12

## 2020-12-13 RX ADMIN — PREGABALIN 150 MG: 150 CAPSULE ORAL at 17:22

## 2020-12-13 NOTE — PROGRESS NOTES
Hospitalist Progress Note    NAME: Kimmie Ramirez   :  1955   MRN:  [id number]     I reviewed pertinent labs and imaging, and discussed /agreed on the plan of care with Dr. Glenda Badillo.  Patient is medically stable, insurance authorization pending for [facility]. Negative Covid-19 on 2020, required a repeat sent 2020 results pending. Assessment / Plan:  Worsening  of low back with functional paraplegia readmission after 2 days   Patient states back pain has improved  SNF placement pending insurance authorization  Xray back unchanged  IMPRESSION:   Unchanged mild spondylosis at L5-S1.  -MRI - Mild degenerative changes at multiple levels in the lumbar spine  without significant canal stenosis. Small disc protrusion at L5-S1 likely acute  without canal stenosis or definite nerve root impingement   lidocaine patch, continue oral pain medications  Patient doing well with PT/ ambulates with walker and assist x 1  Cough with low grade fever (Resolved)    Covid Negative on 2020    Repeat required for SNF sent on 2020 results are pending  Urinary Retention with richards (Resolved)   Richards removed   Voiding without difficulty   Continue tamsulosin  Liver Cirrhosis with splenomegaly and portal hypertension POA  CT abdomen 2020: IMPRESSION:  1. Trace inflammation around the second portion of the duodenum, of unclear  etiology. Correlation with serum lipase recommended. 2. Possible cirrhosis. Splenomegaly and portal vein dilation suggest portal  hypertension. 3. Pulmonary artery hypertension.   Normal hepatic panel  Baseline mentation  Follow up with GI as out patient  Hold lipitor d/t cirrhosis  Lipid panel in the am  Seizure Disorder POA  Continue home medications  No seizure noted this admission  Type 2 Diabetes (Controlled)  Continue home medications  Monitor  Hyperlipidemia   Lipid panel in the am  Thrombocytopenia  Myelodysplasia  PT seen by Dr. Nicky García referred to [facility] Plts stable  At 104 this am Was 66-61 during last admission   Monitor/ trending up    25.0 - 29.9 Overweight / Body mass index is 27.4 kg/m². Code status: Full  Prophylaxis: SCD's  Recommended Disposition: SNF/LTC     Subjective:     Chief Complaint / Reason for Physician Visit  Sitting up in the chair working on a puzzle. Bilateral lower extremity edema improved. Mother at the bedside. .  Discussed with RN events overnight. Review of Systems:  Symptom Y/N Comments  Symptom Y/N Comments   Fever/Chills n   Chest Pain n    Poor Appetite n   Edema y 1+ BLE   Cough n   Abdominal Pain n    Sputum n   Joint Pain     SOB/TREVINO n   Pruritis/Rash n    Nausea/vomit n   Tolerating PT/OT y    Diarrhea n   Tolerating Diet y    Constipation n   Other       Could NOT obtain due to:      Objective:     VITALS:   Last 24hrs VS reviewed since prior progress note. Most recent are:  Patient Vitals for the past 24 hrs:   Temp Pulse Resp BP SpO2   12/13/20 0739 98 °F (36.7 °C) 71 16 123/67 93 %   12/13/20 0344 97.8 °F (36.6 °C) 66 16 101/75 98 %   12/13/20 0034 98.2 °F (36.8 °C) 69 16 99/77 99 %   12/12/20 2033 98 °F (36.7 °C) 70 16 (!) 104/59 98 %   12/12/20 1527 98.6 °F (37 °C) 68 16 (!) 108/57 98 %   12/12/20 1126 98.2 °F (36.8 °C) 70 16 (!) 104/58 98 %       Intake/Output Summary (Last 24 hours) at 12/13/2020 1108  Last data filed at 12/12/2020 2033  Gross per 24 hour   Intake 390 ml   Output    Net 390 ml        I had a face to face encounter and independently examined this patient on 12/13/2020, as outlined below:  PHYSICAL EXAM:  General:  Alert, cooperative, no acute distress    EENT:   Anicteric sclerae. normocephalic  Resp:  CTA bilaterally, no wheezing or rales. No accessory muscle use  CV:  Regular  rhythm,  +1 bilat LE edema  GI:  Soft, Non distended, Non tender. +Bowel sounds  Neurologic:  Alert and oriented X2- 3, normal speech,   Psych:   Fair insight. Not anxious nor agitated  Skin:  No rashes.   No jaundice    Reviewed most current lab test results and cultures  YES  Reviewed most current radiology test results   YES  Review and summation of old records today    NO  Reviewed patient's current orders and MAR    YES  PMH/SH reviewed - no change compared to H&P  ________________________________________________________________________  Care Plan discussed with:    Comments   Patient y    Family  y mother   RN y    Care Manager     Consultant                        Multidiciplinary team rounds were held today with , nursing, pharmacist and clinical coordinator. Patient's plan of care was discussed; medications were reviewed and discharge planning was addressed. ________________________________________________________________________    ________________________________________________________________________  Yanira Franks NP     Procedures: see electronic medical records for all procedures/Xrays and details which were not copied into this note but were reviewed prior to creation of Plan. LABS:  I reviewed today's most current labs and imaging studies.   Pertinent labs include:  Recent Labs     12/13/20 0417 12/12/20  0058 12/11/20  0306   WBC 7.0 7.7 8.1   HGB 9.7* 9.2* 9.9*   HCT 32.0* 30.6* 32.8*   * 106* 95*     Recent Labs     12/13/20 0417 12/12/20  0058 12/11/20  0306    140 142   K 4.0 4.0 3.9   * 110* 112*   CO2 25 25 23   GLU 86 77 73   BUN 19 17 15   CREA 0.68* 0.72 0.71   CA 8.5 8.5 8.3*   ALB 2.4* 2.3* 2.4*   TBILI 0.4 0.2 0.3   ALT 17 20 22       Signed: Yanira Franks, NP

## 2020-12-13 NOTE — PROGRESS NOTES
End of Shift Note    Bedside shift change report given to Wantreez Music (oncoming nurse) by Margy Tilley (offgoing nurse). Report included the following information SBAR, Kardex, Intake/Output and MAR    Shift worked:  [id number]     Shift summary and any significant changes:     Pt slept most of the night waking at approx 0530, wanting to call mother and to play with puzzle. Pt refused lidocaine patch during the night. No acute events. Concerns for physician to address:  None     Zone phone for oncoming shift:   [phone]       Activity:  Activity Level: Up with Assistance  Number times ambulated in hallways past shift: 0  Number of times OOB to chair past shift: 0    Cardiac:   Cardiac Monitoring: No           Access:   Current line(s): PIV     Genitourinary:   Urinary status: voiding and incontinent    Respiratory:   O2 Device: Room air  Chronic home O2 use?: NO  Incentive spirometer at bedside: NO     GI:  Last Bowel Movement Date: 12/12/20  Current diet:  DIET DIABETIC CONSISTENT CARB Regular  Passing flatus: YES  Tolerating current diet: YES  % Diet Eaten: 100 %    Pain Management:   Patient states pain is manageable on current regimen: YES    Skin:  Pop Score: 18  Interventions: increase time out of bed, PT/OT consult and internal/external urinary devices    Patient Safety:  Fall Score:  Total Score: 5  Interventions: bed/chair alarm, gripper socks and pt to call before getting OOB  High Fall Risk: Yes    Length of Stay:  Expected LOS: 2d 21h  Actual LOS: [address]

## 2020-12-14 LAB
ALBUMIN SERPL-MCNC: 2.5 G/DL (ref 3.5–5)
ALBUMIN/GLOB SERPL: 0.8 {RATIO} (ref 1.1–2.2)
ALP SERPL-CCNC: 69 U/L (ref 45–117)
ALT SERPL-CCNC: 17 U/L (ref 12–78)
ANION GAP SERPL CALC-SCNC: 5 MMOL/L (ref 5–15)
AST SERPL-CCNC: 15 U/L (ref 15–37)
BILIRUB DIRECT SERPL-MCNC: 0.1 MG/DL (ref 0–0.2)
BILIRUB SERPL-MCNC: 0.3 MG/DL (ref 0.2–1)
BUN SERPL-MCNC: 22 MG/DL (ref 6–20)
BUN/CREAT SERPL: 33 (ref 12–20)
CALCIUM SERPL-MCNC: 8.6 MG/DL (ref 8.5–10.1)
CHLORIDE SERPL-SCNC: 112 MMOL/L (ref 97–108)
CHOLEST SERPL-MCNC: 98 MG/DL
CO2 SERPL-SCNC: 23 MMOL/L (ref 21–32)
CREAT SERPL-MCNC: 0.66 MG/DL (ref 0.7–1.3)
ERYTHROCYTE [DISTWIDTH] IN BLOOD BY AUTOMATED COUNT: 14 % (ref 11.5–14.5)
GLOBULIN SER CALC-MCNC: 3.1 G/DL (ref 2–4)
GLUCOSE BLD STRIP.AUTO-MCNC: 113 MG/DL (ref 65–100)
GLUCOSE BLD STRIP.AUTO-MCNC: 231 MG/DL (ref 65–100)
GLUCOSE BLD STRIP.AUTO-MCNC: 63 MG/DL (ref 65–100)
GLUCOSE BLD STRIP.AUTO-MCNC: 78 MG/DL (ref 65–100)
GLUCOSE BLD STRIP.AUTO-MCNC: 90 MG/DL (ref 65–100)
GLUCOSE SERPL-MCNC: 98 MG/DL (ref 65–100)
HCT VFR BLD AUTO: 29.2 % (ref 36.6–50.3)
HDLC SERPL-MCNC: 29 MG/DL
HDLC SERPL: 3.4 {RATIO} (ref 0–5)
HGB BLD-MCNC: 9.1 G/DL (ref 12.1–17)
LDLC SERPL CALC-MCNC: 44.6 MG/DL (ref 0–100)
LIPID PROFILE,FLP: NORMAL
MCH RBC QN AUTO: 33.8 PG (ref 26–34)
MCHC RBC AUTO-ENTMCNC: 31.2 G/DL (ref 30–36.5)
MCV RBC AUTO: 108.6 FL (ref 80–99)
NRBC # BLD: 0 K/UL (ref 0–0.01)
NRBC BLD-RTO: 0 PER 100 WBC
PLATELET # BLD AUTO: 102 K/UL (ref 150–400)
PMV BLD AUTO: 10.3 FL (ref 8.9–12.9)
POTASSIUM SERPL-SCNC: 4.1 MMOL/L (ref 3.5–5.1)
PROT SERPL-MCNC: 5.6 G/DL (ref 6.4–8.2)
RBC # BLD AUTO: 2.69 M/UL (ref 4.1–5.7)
SERVICE CMNT-IMP: ABNORMAL
SERVICE CMNT-IMP: NORMAL
SERVICE CMNT-IMP: NORMAL
SODIUM SERPL-SCNC: 140 MMOL/L (ref 136–145)
TRIGL SERPL-MCNC: 122 MG/DL (ref ?–150)
VLDLC SERPL CALC-MCNC: 24.4 MG/DL
WBC # BLD AUTO: 9 K/UL (ref 4.1–11.1)

## 2020-12-14 PROCEDURE — 97535 SELF CARE MNGMENT TRAINING: CPT

## 2020-12-14 PROCEDURE — 74011000250 HC RX REV CODE- 250: Performed by: STUDENT IN AN ORGANIZED HEALTH CARE EDUCATION/TRAINING PROGRAM

## 2020-12-14 PROCEDURE — 85027 COMPLETE CBC AUTOMATED: CPT

## 2020-12-14 PROCEDURE — 74011250637 HC RX REV CODE- 250/637: Performed by: INTERNAL MEDICINE

## 2020-12-14 PROCEDURE — 80076 HEPATIC FUNCTION PANEL: CPT

## 2020-12-14 PROCEDURE — 80048 BASIC METABOLIC PNL TOTAL CA: CPT

## 2020-12-14 PROCEDURE — 74011250637 HC RX REV CODE- 250/637: Performed by: STUDENT IN AN ORGANIZED HEALTH CARE EDUCATION/TRAINING PROGRAM

## 2020-12-14 PROCEDURE — 65270000029 HC RM PRIVATE

## 2020-12-14 PROCEDURE — 74011636637 HC RX REV CODE- 636/637: Performed by: INTERNAL MEDICINE

## 2020-12-14 PROCEDURE — 80061 LIPID PANEL: CPT

## 2020-12-14 PROCEDURE — 82962 GLUCOSE BLOOD TEST: CPT

## 2020-12-14 PROCEDURE — 97116 GAIT TRAINING THERAPY: CPT

## 2020-12-14 PROCEDURE — 36415 COLL VENOUS BLD VENIPUNCTURE: CPT

## 2020-12-14 PROCEDURE — 74011250637 HC RX REV CODE- 250/637: Performed by: NURSE PRACTITIONER

## 2020-12-14 RX ADMIN — DIVALPROEX SODIUM 500 MG: 500 TABLET, DELAYED RELEASE ORAL at 17:34

## 2020-12-14 RX ADMIN — METFORMIN 1000 MG: 500 TABLET, EXTENDED RELEASE ORAL at 17:34

## 2020-12-14 RX ADMIN — TAMSULOSIN HYDROCHLORIDE 0.4 MG: 0.4 CAPSULE ORAL at 21:37

## 2020-12-14 RX ADMIN — FUROSEMIDE 20 MG: 20 TABLET ORAL at 09:42

## 2020-12-14 RX ADMIN — CHLORHEXIDINE GLUCONATE 0.12% ORAL RINSE 15 ML: 1.2 LIQUID ORAL at 21:00

## 2020-12-14 RX ADMIN — DIVALPROEX SODIUM 500 MG: 500 TABLET, DELAYED RELEASE ORAL at 09:41

## 2020-12-14 RX ADMIN — METFORMIN 1000 MG: 500 TABLET, EXTENDED RELEASE ORAL at 09:41

## 2020-12-14 RX ADMIN — TRIAMCINOLONE ACETONIDE: 1 OINTMENT TOPICAL at 09:45

## 2020-12-14 RX ADMIN — Medication 10 ML: at 17:34

## 2020-12-14 RX ADMIN — TOPIRAMATE 300 MG: 100 TABLET, FILM COATED ORAL at 09:42

## 2020-12-14 RX ADMIN — Medication 10 ML: at 21:37

## 2020-12-14 RX ADMIN — FOLIC ACID 1 MG: 1 TABLET ORAL at 09:42

## 2020-12-14 RX ADMIN — LACTULOSE 15 ML: 20 SOLUTION ORAL at 21:37

## 2020-12-14 RX ADMIN — PREGABALIN 150 MG: 150 CAPSULE ORAL at 17:34

## 2020-12-14 RX ADMIN — INSULIN LISPRO 3 UNITS: 100 INJECTION, SOLUTION INTRAVENOUS; SUBCUTANEOUS at 11:56

## 2020-12-14 RX ADMIN — Medication 1 TABLET: at 09:42

## 2020-12-14 RX ADMIN — PIOGLITAZONE 45 MG: 30 TABLET ORAL at 09:41

## 2020-12-14 RX ADMIN — TOPIRAMATE 300 MG: 100 TABLET, FILM COATED ORAL at 17:34

## 2020-12-14 RX ADMIN — TRIAMCINOLONE ACETONIDE: 1 OINTMENT TOPICAL at 17:34

## 2020-12-14 RX ADMIN — PREGABALIN 150 MG: 150 CAPSULE ORAL at 09:41

## 2020-12-14 RX ADMIN — LACTULOSE 15 ML: 20 SOLUTION ORAL at 09:42

## 2020-12-14 NOTE — PROGRESS NOTES
Problem: Self Care Deficits Care Plan (Adult)  Goal: *Acute Goals and Plan of Care (Insert Text)  Description:   FUNCTIONAL STATUS PRIOR TO ADMISSION: Patient required minimal assistance for basic and instrumental ADLs as documented when he was DC from the hospital, however per his mother he was dependent with ADLs at home and she was unable to take care of him. HOME SUPPORT: The patient lived with his elderly mother who is unable to care for him at home at present level. Occupational Therapy Goals  Initiated 12/6/2020; Reviewed 12/14/2020, pt progressing continue all  1. Patient will perform grooming with supervision/set-up within 7 day(s). ; 12/14/2020 upgrade to in grooming in standing with supervision  2. Patient will perform bathing with minimal assistance/contact guard assist within 7 day(s). 3.  Patient will perform upper body dressing with supervision/set-up within 7 day(s). 4.  Patient will perform toilet transfers with minimal assistance/contact guard assist within 7 day(s). 5.  Patient will perform all aspects of lower body dressing with minimal assistance/contact guard assist within 7 day(s). Outcome: Progressing Towards Goal  OCCUPATIONAL THERAPY TREATMENT/WEEKLY RE-EVALUATION  Patient: Leslie Benedict (15 y.o. male)  Date: 12/14/2020  Diagnosis: Lumbar stenosis [M48.061]  Cirrhosis ([address] [K74.60]  Paraplegia (HCC) [G82.20]  Fever of unknown origin (FUO) [R50.9]  Back pain [M54.9]   <principal problem not specified>       Precautions:    Chart, occupational therapy assessment, plan of care, and goals were reviewed. ASSESSMENT  Based on the objective data described below, pt is progressing towards OT goals, functional performance limited from baseline by decreased balance, endurance, and strength and impaired functional mobility. He was received seated in chair, agreeable to participate.  Pt was finishing beverages from lunch, required up to mod A for container management 2/2 decreased strength. Demo'd ability to tailor sit to don slip on shoes with CGA and cueing. Using RW pt ambulated in hallway with CGA and cueing for safe RW management, noted shuffling gait. Pt impulsive with transfers stand>sit to chair (not attempting to bring hands to armrests), improved on second attempt with review of safe technique. He remained in chair at end of session. Pt is progressing towards goals, functional performance still below baseline due to the above impairments. Continue to recommend rehab at discharge to increase independence and safety. Current Level of Function Impacting Discharge (ADLs): CGA transfers, setup-mod A ADLs    Other factors to consider for discharge: fall risk, lives with elderly mother         PLAN :  Goals have been updated based on progression since last assessment. Patient continues to benefit from skilled intervention to address the above impairments. Continue to follow patient 5 times a week to address goals. Recommendation for discharge: (in order for the patient to meet his/her long term goals)  Therapy 3 hours per day 5-7 days per week    This discharge recommendation:  Has been made in collaboration with the attending provider and/or case management    Equipment recommendations for successful discharge (if) home: TBD       SUBJECTIVE:   Patient stated I'm going to visit with Randa Steve (friend arrived).     OBJECTIVE DATA SUMMARY:   Cognitive/Behavioral Status:  Neurologic State: Alert  Orientation Level: Oriented to person;Oriented to place  Cognition: Follows commands;Decreased attention/concentration  Perception: Appears intact  Perseveration: No perseveration noted  Safety/Judgement: Fall prevention;Decreased insight into deficits; Decreased awareness of need for safety    Functional Mobility and Transfers for ADLs:  Bed Mobility:   Received OOB    Transfers:  Sit to Stand: Contact guard assistance; Adaptive equipment          Balance:  Sitting: Intact  Standing: Impaired; With support(RW)  Standing - Static: Good;Constant support  Standing - Dynamic : Good;Fair;Constant support    ADL Intervention:  Feeding  Container Management: Moderate assistance  Drink to Mouth: Independent    Grooming  Position Performed: Seated in chair  Washing Hands: Set-up; Supervision  Cues: Verbal cues provided;Visual cues provided      Lower Body Dressing Assistance  Slip on Shoes with Back: Contact guard assistance  Leg Crossed Method Used: Yes  Position Performed: Seated in chair  Cues: Verbal cues provided;Visual cues provided         Cognitive Retraining  Safety/Judgement: Fall prevention;Decreased insight into deficits; Decreased awareness of need for safety    Pain:  Pt did not c/o pain    Activity Tolerance:   Good and requires rest breaks    After treatment patient left in no apparent distress:   Sitting in chair, Call bell within reach, and Bed / chair alarm activated    COMMUNICATION/COLLABORATION:   The patients plan of care was discussed with: Physical Therapist and Registered Nurse    Elroy Acosta OT  Time Calculation: 14 mins

## 2020-12-14 NOTE — PROGRESS NOTES
Problem: Mobility Impaired (Adult and Pediatric)  Goal: *Acute Goals and Plan of Care (Insert Text)  Description: FUNCTIONAL STATUS PRIOR TO ADMISSION: Patient was modified independent using a walker and wheelchair for functional mobility. HOME SUPPORT PRIOR TO ADMISSION: The patient lived with mother. Physical Therapy Goals  Revised 12/14/2020  1. Patient will move from supine to sit and sit to supine , scoot up and down, and roll side to side in bed with supervision/set-up within 7 day(s). 2.  Patient will transfer from bed to chair and chair to bed with supervision/set-up using the least restrictive device within 7 day(s). 3.  Patient will perform sit to stand with supervision/set-up within 7 day(s). 4.  Patient will ambulate with supervision/set-up for 200 feet with the least restrictive device within 7 day(s). Initiated 12/6/2020  1. Patient will move from supine to sit and sit to supine  in bed with moderate assistance  within 7 day(s). 2.  Patient will transfer from bed to chair and chair to bed with supervision/set-up using the least restrictive device within 7 day(s). 3.  Patient will perform sit to stand with supervision/set-up within 7 day(s). 4.  Patient will ambulate with supervision/set-up for 100 feet with the least restrictive device within 7 day(s).          Outcome: Progressing Towards Goal   PHYSICAL THERAPY TREATMENT: WEEKLY REASSESSMENT  Patient: Sisi Olson (77 y.o. male)  Date: 12/14/2020  Primary Diagnosis: Lumbar stenosis [M48.061]  Cirrhosis ([facility] [address] [K74.60]  Paraplegia (HCC) [G82.20]  Fever of unknown origin (FUO) [R50.9]  Back pain [M54.9]        Precautions:          ASSESSMENT  Patient continues with skilled PT services and is progressing towards goals however continues to function below his baseline modified independent level secondary generalized weakness, impaired static and dynamic standing balance, impaired safety awareness, and decreased activity tolerane. Pt able to ambulate 150ft this date with RW and CGAx1. Pt with significantly decreased step height bilaterally, tending to slide BLEs along floor during ambulation rather than taking true step. Able to briefing correct with verbal cueing however unable to maintain. No overt LOB however fair gait stability overall. Continue to recommend additional rehab at discharge. Patient's progression toward goals since last assessment: Pt is making slow, steady progress. All goals reviewed and advanced appropriately. Current Level of Function Impacting Discharge (mobility/balance): CGAx1 with use of RW during ambulation, CGAx1 during sit<>stand transfer    Functional Outcome Measure: The patient scored 50/100 on the Barthel Index outcome measure which is indicative of moderate impairments in ADLs and functional mobility. Other factors to consider for discharge: lives w/ elderly mother, falls risk         PLAN :  Goals have been updated based on progression since last assessment. Patient continues to benefit from skilled intervention to address the above impairments. Recommendations and Planned Interventions: bed mobility training, transfer training, gait training, therapeutic exercises, patient and family training/education, and therapeutic activities      Frequency/Duration: Patient will be followed by physical therapy:  4 times a week to address goals. Recommendation for discharge: (in order for the patient to meet his/her long term goals)  Therapy 3 hours per day 5-7 days per week    This discharge recommendation:  Has been made in collaboration with the attending provider and/or case management    IF patient discharges home will need the following DME: to be determined (TBD)         SUBJECTIVE:   Patient stated I want to sit in my new chair and visit with Randell Knight.     OBJECTIVE DATA SUMMARY:   HISTORY:    Past Medical History:   Diagnosis Date    Contact dermatitis and other eczema, due to unspecified cause     psoriasis    Diabetes ([facility] [address]     Eosinophilia     Hypercholesterolemia     Hypertension     Mental retardation     Seizures ([facility] [address]     Skin cancer     Strongyloides stercoralis infection 8/28/2014     Past Surgical History:   Procedure Laterality Date    ENDOSCOPY, COLON, DIAGNOSTIC  11/08/2007    Dr Keshia Rosario normal except small internal hemorrhoids repeat 11/2017       Personal factors and/or comorbidities impacting plan of care:     Home Situation  Home Environment: Private residence  One/Two Story Residence: Two story  Living Alone: No  Support Systems: Parent  Patient Expects to be Discharged to[de-identified] Rehabilitation facility  Current DME Used/Available at Home: None    EXAMINATION/PRESENTATION/DECISION MAKING:   Critical Behavior:  Neurologic State: Alert  Orientation Level: Oriented to person, Oriented to place  Cognition: Follows commands, Decreased attention/concentration  Safety/Judgement: Fall prevention, Decreased insight into deficits, Decreased awareness of need for safety  Hearing: Auditory  Auditory Impairment: None  Skin:  intact  Edema: none noted   Range Of Motion:                          Strength: Tone & Sensation:                                  Coordination:     Vision:      Functional Mobility:  Bed Mobility:              Transfers:  Sit to Stand: Contact guard assistance; Adaptive equipment  Stand to Sit: Contact guard assistance                       Balance:   Sitting: Intact  Standing: Impaired; With support(RW)  Standing - Static: Good;Constant support  Standing - Dynamic : Good;Fair;Constant support  Ambulation/Gait Training:  Distance (ft): 150 Feet (ft)  Assistive Device: Walker, rolling;Gait belt  Ambulation - Level of Assistance: Contact guard assistance        Gait Abnormalities: Decreased step clearance;Shuffling gait        Base of Support: Widened     Speed/Doris: Pace decreased (<100 feet/min); Shuffled  Step Length: Left shortened;Right shortened                     Functional Measure:  Barthel Index:    Bathin  Bladder: 5  Bowels: 10  Groomin  Dressin  Feedin  Mobility: 10  Stairs: 0  Toilet Use: 5  Transfer (Bed to Chair and Back): 10  Total: 50/100       The Barthel ADL Index: Guidelines  1. The index should be used as a record of what a patient does, not as a record of what a patient could do. 2. The main aim is to establish degree of independence from any help, physical or verbal, however minor and for whatever reason. 3. The need for supervision renders the patient not independent. 4. A patient's performance should be established using the best available evidence. Asking the patient, friends/relatives and nurses are the usual sources, but direct observation and common sense are also important. However direct testing is not needed. 5. Usually the patient's performance over the preceding 24-48 hours is important, but occasionally longer periods will be relevant. 6. Middle categories imply that the patient supplies over 50 per cent of the effort. 7. Use of aids to be independent is allowed. Maria Elena Humphrey., Barthel, D.W. ([id number]). Functional evaluation: the Barthel Index. [address] [facility] (14)2. Uma Pamela cara [facility], HARRIETT.J.M.F, Evin Crimes., Hal Founds.[facility], [address] [place] [address] (). Measuring the change indisability after inpatient rehabilitation; comparison of the responsiveness of the Barthel Index and Functional [place] Measure. Journal of Neurology, Neurosurgery, and Psychiatry, 66(4), 583-621. Mary Handley, [place].INDIGO, MARILEE Shah, & Lola Lay MAbisaiA. (2004.) Assessment of post-stroke quality of life in cost-effectiveness studies: The usefulness of the Barthel Index and the EuroQoL-5D.  Quality of Life Research, 13, 427-43          Pain Rating:  Denied complaints of pain    Activity Tolerance:   Good    After treatment patient left in no apparent distress:   Sitting in chair, Call bell within reach, Bed / chair alarm activated, and Caregiver / family present    COMMUNICATION/EDUCATION:   The patients plan of care was discussed with: Occupational therapist and Registered nurse. Fall prevention education was provided and the patient/caregiver indicated understanding., Patient/family have participated as able in goal setting and plan of care. , and Patient/family agree to work toward stated goals and plan of care.     Thank you for this referral.  Lawrence Saunders, PT, DPT   Time Calculation: 13 mins

## 2020-12-14 NOTE — PROGRESS NOTES
Problem: Falls - Risk of  Goal: *Absence of Falls  Description: Document Jose Francisco Montgomery Fall Risk and appropriate interventions in the flowsheet. 12/14/2020 0432 by Lizzette Key  Outcome: Progressing Towards Goal  Note: Fall Risk Interventions:  Mobility Interventions: Bed/chair exit alarm    Mentation Interventions: Adequate sleep, hydration, pain control    Medication Interventions: Patient to call before getting OOB    Elimination Interventions: Call light in reach    History of Falls Interventions: Bed/chair exit alarm      12/14/2020 0432 by Lizzette Key  Outcome: Progressing Towards Goal  Note: Fall Risk Interventions:  Mobility Interventions: Bed/chair exit alarm    Mentation Interventions: Adequate sleep, hydration, pain control    Medication Interventions: Patient to call before getting OOB    Elimination Interventions: Call light in reach    History of Falls Interventions: Bed/chair exit alarm         Problem: Patient Education: Go to Patient Education Activity  Goal: Patient/Family Education  12/14/2020 0432 by Lizzette Key  Outcome: Progressing Towards Goal  12/14/2020 0432 by Lizzette Key  Outcome: Progressing Towards Goal     Problem: Pressure Injury - Risk of  Goal: *Prevention of pressure injury  Description: Document Pop Scale and appropriate interventions in the flowsheet.   12/14/2020 0432 by Lizzette Key  Outcome: Progressing Towards Goal  Note: Pressure Injury Interventions:  Sensory Interventions: Assess changes in LOC    Moisture Interventions: Absorbent underpads    Activity Interventions: Increase time out of bed    Mobility Interventions: HOB 30 degrees or less    Nutrition Interventions: Document food/fluid/supplement intake    Friction and Shear Interventions: Minimize layers             12/14/2020 0432 by Lizzette Key  Outcome: Progressing Towards Goal  Note: Pressure Injury Interventions:  Sensory Interventions: Assess changes in LOC    Moisture Interventions: Absorbent underpads    Activity Interventions: Increase time out of bed    Mobility Interventions: HOB 30 degrees or less    Nutrition Interventions: Document food/fluid/supplement intake    Friction and Shear Interventions: Minimize layers                Problem: Patient Education: Go to Patient Education Activity  Goal: Patient/Family Education  12/14/2020 0432 by Kaila Paulson  Outcome: Progressing Towards Goal  12/14/2020 0432 by Kaila Paulson  Outcome: Progressing Towards Goal     Problem: Patient Education: Go to Patient Education Activity  Goal: Patient/Family Education  12/14/2020 0432 by Kaila Paulson  Outcome: Progressing Towards Goal  12/14/2020 0432 by Kaila Paulson  Outcome: Progressing Towards Goal     Problem: Patient Education: Go to Patient Education Activity  Goal: Patient/Family Education  12/14/2020 0432 by Kaila Paulson  Outcome: Progressing Towards Goal  12/14/2020 0432 by Kaila Paulson  Outcome: Progressing Towards Goal   Patient continues to be alert and oriented x 2-3. He is able to voice needs and denies any pain or discomfort. Incontinence care provided by staff. Patient turns and repositions self. No acute distress noted.

## 2020-12-14 NOTE — PROGRESS NOTES
HYPOGLYCEMIC EPISODE DOCUMENTATION    Patient with hypoglycemic episode(s) at 1620(time) on 12/14/20(date). BG value(s) pre-treatment 61  Was patient symptomatic?  [] yes, [x] no  Patient was treated with the following rescue medications/treatments: [] D50                [] Glucose tablets                [] Glucagon                [] 4oz juice                [x] 6oz reg soda                [] 8oz low fat milk  BG value post-treatment: 78  Once BG treated and value greater than 80mg/dl, pt was provided with the following:  [] snack  [x] meal

## 2020-12-14 NOTE — PROGRESS NOTES
End of Shift Note    Bedside shift change report given to Berenice Garcia (oncoming nurse) by Jesika Betancourt (offgoing nurse). Report included the following information SBAR, Kardex, Procedure Summary and MAR    Shift worked:  7a-7p     Shift summary and any significant changes:    No complaint of pain. Walked the hallways today and played with puzzles. Worked with PT. Visitor at bedside. Charting in disaster mode. Uneventful shift. Concerns for physician to address:      Zone phone for oncoming shift:  [phone]       Activity:  Activity Level: Up with Assistance  Number times ambulated in hallways past shift: 0  Number of times OOB to chair past shift: 3    Cardiac:   Cardiac Monitoring: No           Access:   Current line(s): PIV     Genitourinary:   Urinary status: voiding and incontinent    Respiratory:   O2 Device: Room air  Chronic home O2 use?: NO  Incentive spirometer at bedside: N/A     GI:  Last Bowel Movement Date: 12/13/20  Current diet:  DIET DIABETIC CONSISTENT CARB Regular  Passing flatus: YES  Tolerating current diet: YES  % Diet Eaten: 85 %    Pain Management:   Patient states pain is manageable on current regimen: YES    Skin:  Pop Score: 18  Interventions: turn team, float heels, increase time out of bed, PT/OT consult and limit briefs    Patient Safety:  Fall Score:  Total Score: 5  Interventions: bed/chair alarm, assistive device (walker, cane, etc), gripper socks, pt to call before getting OOB and stay with me (per policy)  High Fall Risk: Yes    Length of Stay:  Expected LOS: 2d 21h  Actual LOS: [address]

## 2020-12-14 NOTE — PROGRESS NOTES
End of Shift Note    Bedside shift change report given to [facility] RN(oncoming nurse) by Nikki Ayala (offgoing nurse). Report included the following information SBAR    Shift worked:  7 a to 7p     Shift summary and any significant changes:     Patient had an uneventful shift. Patient spent the day with his mom visiting. Patient had 2 plus loose BM this shift. Concerns for physician to address: None     Zone phone for oncoming shift:  [phone]       Activity:  Activity Level: Up with Assistance  Number times ambulated in hallways past shift: 0  Number of times OOB to chair past shift: 2    Cardiac:   Cardiac Monitoring: No           Access:   Current line(s): PIV     Genitourinary:   Urinary status: incontinent    Respiratory:   O2 Device: Room air     GI:  Last Bowel Movement Date: 12/13/20  Current diet:  DIET DIABETIC CONSISTENT CARB Regular  Tolerating current diet: YES  % Diet Eaten: 85 %    Skin:  Pop Score: 18  Interventions: increase time out of bed    Patient Safety:  Fall Score:  Total Score: 5  Interventions: bed/chair alarm, assistive device (walker, cane, etc), gripper socks and pt to call before getting OOB  High Fall Risk: Yes    Length of Stay:  Expected LOS: 2d 21h  Actual LOS: [address]

## 2020-12-14 NOTE — PROGRESS NOTES
Hospitalist Progress Note    NAME: Ester Galdamez   :  1955   MRN:  [id number]     I reviewed pertinent labs and imaging, and discussed /agreed on the plan of care with Dr. Jarett Abdi. Repeat COVID-19 Negative on 20 Negative. Negative COVID on 2020. [facility]. Medically cleared for discharge. Assessment / Plan:  Worsening  of low back with functional paraplegia readmission after 2 days   Patient states back pain has improved  SNF placement pending insurance authorization  Xray back unchanged  IMPRESSION:   Unchanged mild spondylosis at L5-S1.  -MRI - Mild degenerative changes at multiple levels in the lumbar spine  without significant canal stenosis. Small disc protrusion at L5-S1 likely acute  without canal stenosis or definite nerve root impingement   lidocaine patch, continue oral pain medications  Patient doing well with PT/ ambulates with walker and assist x 1  Cough with low grade fever (Resolved)    Covid Negative on 2020    COVID 2020 Negative  Urinary Retention with richards (Resolved)   Richards removed   Voiding without difficulty   Continue tamsulosin  Liver Cirrhosis with splenomegaly and portal hypertension POA  CT abdomen 2020: IMPRESSION:  1. Trace inflammation around the second portion of the duodenum, of unclear  etiology. Correlation with serum lipase recommended. 2. Possible cirrhosis. Splenomegaly and portal vein dilation suggest portal  hypertension. 3. Pulmonary artery hypertension.   Normal hepatic panel  Baseline mentation  Follow up with GI as out patient  Hold lipitor d/t cirrhosis  Lipid panel: Triglyceriede 122, Cholesterol 98, HDL 29, LDL 44.6  Seizure Disorder POA  Continue home medications  No seizure noted this admission  Type 2 Diabetes (Controlled)   Patient on multiple hypo-glycemic medications at home   Currently on metformin   Appetite has been poor at risk for hypoglycemia  Monitor  Hyperlipidemia   Lipid panel in the am  Thrombocytopenia  Myelodysplasia  PT seen by Dr. Sammy Su referred to I   Plts stable  At 102 this am Was 66-61 during last admission   Monitor/ trending up         25.0 - 29.9 Overweight / Body mass index is 27.4 kg/m². Code status: Full  Prophylaxis: SCD's  Recommended Disposition: SNF/LTC     Subjective:     Chief Complaint / Reason for Physician Visit  Patient up in the chair. Voices no complaints at this time. Bilateral Lower extremity edema improved. .  Discussed with RN events overnight. Review of Systems:  Symptom Y/N Comments  Symptom Y/N Comments   Fever/Chills n   Chest Pain     Poor Appetite y   Edema y 1+ Bilat. LE   Cough n   Abdominal Pain     Sputum n   Joint Pain     SOB/TREVINO n   Pruritis/Rash n    Nausea/vomit    Tolerating PT/OT y    Diarrhea n   Tolerating Diet y    Constipation n   Other       Could NOT obtain due to:      Objective:     VITALS:   Last 24hrs VS reviewed since prior progress note. Most recent are:  Patient Vitals for the past 24 hrs:   Temp Pulse Resp BP SpO2   12/14/20 1435 98.8 °F (37.1 °C) 67 16 105/66 93 %   12/14/20 1044 97.7 °F (36.5 °C) 71 16 (!) 101/58 93 %   12/14/20 0745 97.8 °F (36.6 °C) 68 16 114/66 91 %   12/14/20 0328 98.6 °F (37 °C) 68 16 (!) 102/57 91 %   12/14/20 0008 98.8 °F (37.1 °C) 66 16 109/63 92 %   12/13/20 1945 98.4 °F (36.9 °C) 62 16 110/60 93 %       Intake/Output Summary (Last 24 hours) at 12/14/2020 1847  Last data filed at 12/14/2020 1821  Gross per 24 hour   Intake 500 ml   Output    Net 500 ml        I had a face to face encounter and independently examined this patient on 12/14/2020, as outlined below:  PHYSICAL EXAM:  General: Alert, cooperative, no acute distress    EENT:   Anicteric sclerae. normocephalic  Resp:  CTA bilaterally, no wheezing or rales.   No accessory muscle use  CV:  Regular  rhythm,  1+ bilateral lower extremity edema  GI:  Soft, Non distended, Non tender. +Bowel sounds  Neurologic:  Alert and oriented X 3, normal speech,   Psych:   Fair insight. Not anxious nor agitated  Skin:  No rashes. No jaundice    Reviewed most current lab test results and cultures  YES  Reviewed most current radiology test results   YES  Review and summation of old records today    NO  Reviewed patient's current orders and MAR    YES  PMH/SH reviewed - no change compared to H&P  ________________________________________________________________________  Care Plan discussed with:    Comments   Patient y    Family      RN y    Care Manager y    Consultant                        Multidiciplinary team rounds were held today with , nursing, pharmacist and clinical coordinator. Patient's plan of care was discussed; medications were reviewed and discharge planning was addressed. ________________________________________________________________________  T  ________________________________________________________________________  Micah Obrien NP     Procedures: see electronic medical records for all procedures/Xrays and details which were not copied into this note but were reviewed prior to creation of Plan. LABS:  I reviewed today's most current labs and imaging studies.   Pertinent labs include:  Recent Labs     12/14/20  0509 12/13/20 0417 12/12/20  0058   WBC 9.0 7.0 7.7   HGB 9.1* 9.7* 9.2*   HCT 29.2* 32.0* 30.6*   * 104* 106*     Recent Labs     12/14/20  0510 12/13/20 0417 12/12/20  0058    142 140   K 4.1 4.0 4.0   * 113* 110*   CO2 23 25 25   GLU 98 86 77   BUN 22* 19 17   CREA 0.66* 0.68* 0.72   CA 8.6 8.5 8.5   ALB 2.5* 2.4* 2.3*   TBILI 0.3 0.4 0.2   ALT 17 17 20       Signed: Micah Obrien, NEELAM

## 2020-12-15 LAB
ALBUMIN SERPL-MCNC: 2.3 G/DL (ref 3.5–5)
ALBUMIN/GLOB SERPL: 0.7 {RATIO} (ref 1.1–2.2)
ALP SERPL-CCNC: 70 U/L (ref 45–117)
ALT SERPL-CCNC: 16 U/L (ref 12–78)
ANION GAP SERPL CALC-SCNC: 6 MMOL/L (ref 5–15)
AST SERPL-CCNC: 13 U/L (ref 15–37)
BILIRUB SERPL-MCNC: 0.2 MG/DL (ref 0.2–1)
BUN SERPL-MCNC: 18 MG/DL (ref 6–20)
BUN/CREAT SERPL: 23 (ref 12–20)
CALCIUM SERPL-MCNC: 8.6 MG/DL (ref 8.5–10.1)
CHLORIDE SERPL-SCNC: 110 MMOL/L (ref 97–108)
CO2 SERPL-SCNC: 24 MMOL/L (ref 21–32)
CREAT SERPL-MCNC: 0.79 MG/DL (ref 0.7–1.3)
ERYTHROCYTE [DISTWIDTH] IN BLOOD BY AUTOMATED COUNT: 14.2 % (ref 11.5–14.5)
GLOBULIN SER CALC-MCNC: 3.1 G/DL (ref 2–4)
GLUCOSE BLD STRIP.AUTO-MCNC: 119 MG/DL (ref 65–100)
GLUCOSE BLD STRIP.AUTO-MCNC: 127 MG/DL (ref 65–100)
GLUCOSE BLD STRIP.AUTO-MCNC: 144 MG/DL (ref 65–100)
GLUCOSE BLD STRIP.AUTO-MCNC: 192 MG/DL (ref 65–100)
GLUCOSE SERPL-MCNC: 113 MG/DL (ref 65–100)
HCT VFR BLD AUTO: 28.4 % (ref 36.6–50.3)
HGB BLD-MCNC: 8.7 G/DL (ref 12.1–17)
MCH RBC QN AUTO: 34.1 PG (ref 26–34)
MCHC RBC AUTO-ENTMCNC: 30.6 G/DL (ref 30–36.5)
MCV RBC AUTO: 111.4 FL (ref 80–99)
NRBC # BLD: 0 K/UL (ref 0–0.01)
NRBC BLD-RTO: 0 PER 100 WBC
PLATELET # BLD AUTO: 94 K/UL (ref 150–400)
PMV BLD AUTO: 10.6 FL (ref 8.9–12.9)
POTASSIUM SERPL-SCNC: 3.9 MMOL/L (ref 3.5–5.1)
PROT SERPL-MCNC: 5.4 G/DL (ref 6.4–8.2)
RBC # BLD AUTO: 2.55 M/UL (ref 4.1–5.7)
SERVICE CMNT-IMP: ABNORMAL
SODIUM SERPL-SCNC: 140 MMOL/L (ref 136–145)
WBC # BLD AUTO: 7.6 K/UL (ref 4.1–11.1)

## 2020-12-15 PROCEDURE — 82962 GLUCOSE BLOOD TEST: CPT

## 2020-12-15 PROCEDURE — 80053 COMPREHEN METABOLIC PANEL: CPT

## 2020-12-15 PROCEDURE — 74011250636 HC RX REV CODE- 250/636: Performed by: INTERNAL MEDICINE

## 2020-12-15 PROCEDURE — 97535 SELF CARE MNGMENT TRAINING: CPT

## 2020-12-15 PROCEDURE — 74011250637 HC RX REV CODE- 250/637: Performed by: INTERNAL MEDICINE

## 2020-12-15 PROCEDURE — 74011250637 HC RX REV CODE- 250/637: Performed by: NURSE PRACTITIONER

## 2020-12-15 PROCEDURE — 74011636637 HC RX REV CODE- 636/637: Performed by: INTERNAL MEDICINE

## 2020-12-15 PROCEDURE — 36415 COLL VENOUS BLD VENIPUNCTURE: CPT

## 2020-12-15 PROCEDURE — 85027 COMPLETE CBC AUTOMATED: CPT

## 2020-12-15 PROCEDURE — 65270000029 HC RM PRIVATE

## 2020-12-15 PROCEDURE — 74011000250 HC RX REV CODE- 250: Performed by: STUDENT IN AN ORGANIZED HEALTH CARE EDUCATION/TRAINING PROGRAM

## 2020-12-15 PROCEDURE — 74011250637 HC RX REV CODE- 250/637: Performed by: STUDENT IN AN ORGANIZED HEALTH CARE EDUCATION/TRAINING PROGRAM

## 2020-12-15 RX ORDER — FUROSEMIDE 40 MG/1
40 TABLET ORAL DAILY
Status: DISCONTINUED | OUTPATIENT
Start: 2020-12-16 | End: 2020-12-16 | Stop reason: HOSPADM

## 2020-12-15 RX ORDER — BENZONATATE 100 MG/1
100 CAPSULE ORAL ONCE
Status: COMPLETED | OUTPATIENT
Start: 2020-12-15 | End: 2020-12-15

## 2020-12-15 RX ORDER — FUROSEMIDE 10 MG/ML
20 INJECTION INTRAMUSCULAR; INTRAVENOUS ONCE
Status: COMPLETED | OUTPATIENT
Start: 2020-12-15 | End: 2020-12-15

## 2020-12-15 RX ADMIN — FUROSEMIDE 20 MG: 20 TABLET ORAL at 08:40

## 2020-12-15 RX ADMIN — Medication 10 ML: at 13:43

## 2020-12-15 RX ADMIN — BENZONATATE 100 MG: 100 CAPSULE ORAL at 07:18

## 2020-12-15 RX ADMIN — BENZONATATE 100 MG: 100 CAPSULE ORAL at 21:24

## 2020-12-15 RX ADMIN — TRIAMCINOLONE ACETONIDE: 1 OINTMENT TOPICAL at 08:49

## 2020-12-15 RX ADMIN — CHLORHEXIDINE GLUCONATE 0.12% ORAL RINSE 15 ML: 1.2 LIQUID ORAL at 21:00

## 2020-12-15 RX ADMIN — Medication 10 ML: at 06:49

## 2020-12-15 RX ADMIN — PREGABALIN 150 MG: 150 CAPSULE ORAL at 17:06

## 2020-12-15 RX ADMIN — INSULIN LISPRO 2 UNITS: 100 INJECTION, SOLUTION INTRAVENOUS; SUBCUTANEOUS at 17:07

## 2020-12-15 RX ADMIN — LACTULOSE 15 ML: 20 SOLUTION ORAL at 08:40

## 2020-12-15 RX ADMIN — METFORMIN 1000 MG: 500 TABLET, EXTENDED RELEASE ORAL at 08:40

## 2020-12-15 RX ADMIN — PREGABALIN 150 MG: 150 CAPSULE ORAL at 08:40

## 2020-12-15 RX ADMIN — METFORMIN 1000 MG: 500 TABLET, EXTENDED RELEASE ORAL at 17:06

## 2020-12-15 RX ADMIN — TOPIRAMATE 300 MG: 100 TABLET, FILM COATED ORAL at 08:40

## 2020-12-15 RX ADMIN — Medication 10 ML: at 21:23

## 2020-12-15 RX ADMIN — CHLORHEXIDINE GLUCONATE 0.12% ORAL RINSE 15 ML: 1.2 LIQUID ORAL at 09:00

## 2020-12-15 RX ADMIN — FUROSEMIDE 20 MG: 10 INJECTION, SOLUTION INTRAMUSCULAR; INTRAVENOUS at 11:59

## 2020-12-15 RX ADMIN — Medication 1 TABLET: at 08:40

## 2020-12-15 RX ADMIN — FOLIC ACID 1 MG: 1 TABLET ORAL at 08:41

## 2020-12-15 RX ADMIN — DIVALPROEX SODIUM 500 MG: 500 TABLET, DELAYED RELEASE ORAL at 17:06

## 2020-12-15 RX ADMIN — DIVALPROEX SODIUM 500 MG: 500 TABLET, DELAYED RELEASE ORAL at 08:39

## 2020-12-15 RX ADMIN — TAMSULOSIN HYDROCHLORIDE 0.4 MG: 0.4 CAPSULE ORAL at 21:24

## 2020-12-15 RX ADMIN — TRIAMCINOLONE ACETONIDE: 1 OINTMENT TOPICAL at 17:07

## 2020-12-15 RX ADMIN — TOPIRAMATE 300 MG: 100 TABLET, FILM COATED ORAL at 17:06

## 2020-12-15 NOTE — PROGRESS NOTES
Problem: Self Care Deficits Care Plan (Adult)  Goal: *Acute Goals and Plan of Care (Insert Text)  Description:   FUNCTIONAL STATUS PRIOR TO ADMISSION: Patient required minimal assistance for basic and instrumental ADLs as documented when he was DC from the hospital, however per his mother he was dependent with ADLs at home and she was unable to take care of him. HOME SUPPORT: The patient lived with his elderly mother who is unable to care for him at home at present level. Occupational Therapy Goals  Initiated 12/6/2020; Reviewed 12/14/2020, pt progressing continue all  1. Patient will perform grooming with supervision/set-up within 7 day(s). ; 12/14/2020 upgrade to in grooming in standing with supervision  2. Patient will perform bathing with minimal assistance/contact guard assist within 7 day(s). 3.  Patient will perform upper body dressing with supervision/set-up within 7 day(s). 4.  Patient will perform toilet transfers with minimal assistance/contact guard assist within 7 day(s). 5.  Patient will perform all aspects of lower body dressing with minimal assistance/contact guard assist within 7 day(s). Outcome: Progressing Towards Goal    OCCUPATIONAL THERAPY TREATMENT  Patient: Jasiel Noel (89 y.o. male)  Date: 12/15/2020  Diagnosis: Lumbar stenosis [M48.061]  Cirrhosis ([address] [K74.60]  Paraplegia (HCC) [G82.20]  Fever of unknown origin (FUO) [R50.9]  Back pain [M54.9]   <principal problem not specified>       Precautions: Falls  Chart, occupational therapy assessment, plan of care, and goals were reviewed. ASSESSMENT  Patient continues with skilled OT services and is progressing towards goals. Pt noted with progressive balance this date, completing standing grooming with CGA and Max verbal cues for safe body/DME positioning and full standing trunk elongation, with pt also demonstrating increased toileting independence, completing with Min A at RW and cues for safety/sequencing.   Pt's mother present during treatment and commented on pt's beard and \"oily hair,\" with OT assisting pt with washing his hair at EOB and pt's mother stating she will bring in his electric razor, with hopes that OT will work on grooming tomorrow. Pt continues to benefit from skilled OT to address functional deficits in an attempt at maximizing highest level of safe functional independence, with reporting therapist believing pt would benefit from inpatient rehab upon discharge. Current Level of Function Impacting Discharge (ADLs): CGA-Min A    Other factors to consider for discharge: Fall risk, lives with elderly mother         PLAN :  Patient continues to benefit from skilled intervention to address the above impairments. Continue treatment per established plan of care. to address goals.     Recommend with staff: OOB meals, Active ADL engagement, Assist x1 to/from bathroom    Recommend next OT session: POC progression    Recommendation for discharge: (in order for the patient to meet his/her long term goals)  Therapy 3 hours per day 5-7 days per week    This discharge recommendation:  Has been made in collaboration with the attending provider and/or case management    IF patient discharges home will need the following DME: TBD       SUBJECTIVE:   Patient stated i'm going to watch T.V.    OBJECTIVE DATA SUMMARY:   Cognitive/Behavioral Status:  Neurologic State: Alert  Orientation Level: Oriented to person;Oriented to place  Cognition: Follows commands  Perception: Appears intact  Perseveration: No perseveration noted  Safety/Judgement: Awareness of environment    Functional Mobility and Transfers for ADLs:  Bed Mobility:  Rolling: Stand-by assistance  Supine to Sit: Stand-by assistance;Bed Modified(head of bed elevated, use of bed rails)  Sit to Supine: Stand-by assistance  Scooting: Stand-by assistance    Transfers:  Sit to Stand: Contact guard assistance  Functional Transfers  Bathroom Mobility: Contact guard assistance  Toilet Transfer : Contact guard assistance  Cues: Verbal cues provided(for hand placement when sitting on commode)  Adaptive Equipment: Grab bars; Walker (comment)       Balance:  Sitting: Intact  Sitting - Static: Good (unsupported)  Sitting - Dynamic: Good (unsupported)  Standing: Impaired; With support(at RW)  Standing - Static: Good;Constant support  Standing - Dynamic : Good;Fair;Constant support    ADL Intervention:  Grooming  Grooming Assistance: Contact guard assistance;Supervision  Position Performed: Standing  Washing Hands: Contact guard assistance;Supervision  Brushing/Combing Hair: Set-up  Cues: Verbal cues provided(Min verbal cues for engagement, safe RW usage)    Toileting  Toileting Assistance: Minimum assistance  Bladder Hygiene: Supervision  Clothing Management: Minimum assistance  Cues: Verbal cues provided(for safe transfer/sequencing)  Adaptive Equipment: Grab bars; Walker    Cognitive Retraining  Safety/Judgement: Awareness of environment    Pt educated on safe transfer techniques, with specific emphasis on proper hand placement to push up from seated surface rather than attempt to pull self up, fully positioning self in-front of desired seated location, feeling chair on back of legs and reaching back with 1-2 UE to slowly lower self to seated position. Pain:  No c/o pain    Activity Tolerance:   Good, Fair, and requires rest breaks    After treatment patient left in no apparent distress:   Supine in bed, Call bell within reach, Bed / chair alarm activated, Caregiver / family present, and Side rails x 3    COMMUNICATION/COLLABORATION:   The patients plan of care was discussed with: Registered nurse.      Yuni Florian OT  Time Calculation: 28 mins

## 2020-12-15 NOTE — PROGRESS NOTES
End of Shift Note    Bedside shift change report given to Josue (oncoming nurse) by Sarabjit Guadarrama (offgoing nurse). Report included the following information SBAR, Kardex, Procedure Summary and MAR    Shift worked:  7a-7p     Shift summary and any significant changes:    Patient up with assistance in room and to bathroom. One large loose BM today. No complaint of pain. Spent most of the day in the recliner doing puzzles. Visitor at bedside this afternoon. Charting in disaster mode. Uneventful shift. Concerns for physician to address:    Zone phone for oncoming shift:  [phone]     Activity:  Activity Level: Up with Assistance  Number times ambulated in hallways past shift: 1  Number of times OOB to chair past shift: 2    Cardiac:   Cardiac Monitoring: No           Access:   Current line(s): PIV     Genitourinary:   Urinary status: voiding    Respiratory:   O2 Device: Room air  Chronic home O2 use?: NO  Incentive spirometer at bedside: N/A     GI:  Last Bowel Movement Date: 12/14/20  Current diet:  DIET DIABETIC CONSISTENT CARB Regular  Passing flatus: YES  Tolerating current diet: YES  % Diet Eaten: 95 %    Pain Management:   Patient states pain is manageable on current regimen: YES    Skin:  Pop Score: 18  Interventions: turn team, float heels, increase time out of bed, PT/OT consult and limit briefs    Patient Safety:  Fall Score:  Total Score: 5  Interventions: bed/chair alarm, assistive device (walker, cane, etc), gripper socks, pt to call before getting OOB and stay with me (per policy)  High Fall Risk: Yes    Length of Stay:  Expected LOS: 2d 21h  Actual LOS: [address]

## 2020-12-15 NOTE — PROGRESS NOTES
End of Shift Note    Bedside shift change report given to HARISH Pettit (oncoming nurse) by Hetal Botello RN (offgoing nurse). Report included the following information SBAR and Kardex    Shift worked:  7p-7a     Shift summary and any significant changes:    TAYE HEWITT RE: Candy Riggs 1955 RM: 2137 Pt c/o of cough, has Tessalon pearls on MAR but only prescribed for bedtime. Is it possible to modify the order so he can get one now? Thanks. Need Callback: NO CALLBACK REQ       Concerns for physician to address:  none     Zone phone for oncoming shift:          Activity:  Activity Level: Up with Assistance  Number times ambulated in hallways past shift: 0, ambulated to restroom once during the night. Number of times OOB to chair past shift: 0    Cardiac:   Cardiac Monitoring: No           Access:   Current line(s): PIV     Genitourinary:   Urinary status: incontinent    Respiratory:   O2 Device: Room air  Chronic home O2 use?: NO  Incentive spirometer at bedside: NO     GI:  Last Bowel Movement Date: 12/13/20  Current diet:  DIET DIABETIC CONSISTENT CARB Regular  Passing flatus: YES  Tolerating current diet: YES  % Diet Eaten: 90 %    Pain Management:   Patient states pain is manageable on current regimen: YES    Skin:  Pop Score: 18  Interventions: float heels, increase time out of bed, limit briefs and internal/external urinary devices    Patient Safety:  Fall Score:  Total Score: 5  Interventions: assistive device (walker, cane, etc), gripper socks, pt to call before getting OOB and stay with me (per policy)  High Fall Risk: Yes    Length of Stay:  Expected LOS: 2d 21h  Actual LOS: [address]

## 2020-12-15 NOTE — PROGRESS NOTES
Phone call to Shiva Moran at [employer] to request information on progress with Gonzalo Brownlee authorization. She will contact her business office to get information for progress in obtaining authorization.     Cadence Gilliam RN, BSN, [address] [place] [address]    Coordinator  [phone]

## 2020-12-15 NOTE — PROGRESS NOTES
Hospitalist Progress Note    NAME: Farhat Nicole   :  1955   MRN:  [id number]         Repeat COVID-19 Negative on 20 Negative. Negative COVID on 2020. Insurance authorization pending for [facility]. Medically cleared for discharge. Assessment / Plan:  Worsening  of low back with functional paraplegia readmission after 2 days   Patient states back pain has improved  SNF placement pending insurance authorization  Xray back unchanged  IMPRESSION:   Unchanged mild spondylosis at L5-S1.  -MRI - Mild degenerative changes at multiple levels in the lumbar spine  without significant canal stenosis. Small disc protrusion at L5-S1 likely acute  without canal stenosis or definite nerve root impingement   lidocaine patch, continue oral pain medications  Patient doing well with PT/ ambulates with walker and assist x 1    Cough with low grade fever (Resolved)    Covid Negative on 2020    COVID 2020 Negative    Urinary Retention with richards (Resolved)   Richards removed   Voiding without difficulty   Continue tamsulosin    Liver Cirrhosis with splenomegaly and portal hypertension POA  CT abdomen 2020: IMPRESSION:  1. Trace inflammation around the second portion of the duodenum, of unclear  etiology. Correlation with serum lipase recommended. 2. Possible cirrhosis. Splenomegaly and portal vein dilation suggest portal  hypertension. 3. Pulmonary artery hypertension.   Normal hepatic panel  Baseline mentation  Follow up with GI as out patient  Hold lipitor d/t cirrhosis  Lipid panel: Triglyceriede 122, Cholesterol 98, HDL 29, LDL 44.6    Seizure Disorder POA  Continue home medications  No seizure noted this admission    Type 2 Diabetes (Controlled)   Patient on multiple hypo-glycemic medications at home   Currently on metformin   Appetite has been poor at risk for hypoglycemia  Monitor    Hyperlipidemia   Lipid panel in the am    Thrombocytopenia  Myelodysplasia  PT seen by Dr. Kaitlynn Rod referred to [facility]   Plts stable  At 94 this am Was 66-61 during last admission       Bilateral Lower extremity pitting edema:  Increase lasix dose to 40mg daily         25.0 - 29.9 Overweight / Body mass index is 27.4 kg/m². Code status: Full  Prophylaxis: SCD's  Recommended Disposition: SNF/LTC     Subjective:     Chief Complaint / Reason for Physician Visit  Patient up in the chair. Voices no complaints at this time. Bilateral Lower extremity edema is still present. .  Discussed with RN events overnight. Review of Systems:  Symptom Y/N Comments  Symptom Y/N Comments   Fever/Chills n   Chest Pain     Poor Appetite n   Edema y 2+ Bilat. LE   Cough n   Abdominal Pain     Sputum n   Joint Pain     SOB/TREVINO n   Pruritis/Rash n    Nausea/vomit    Tolerating PT/OT y    Diarrhea n   Tolerating Diet y    Constipation n   Other       Could NOT obtain due to:      Objective:     VITALS:   Last 24hrs VS reviewed since prior progress note. Most recent are:  Patient Vitals for the past 24 hrs:   Temp Pulse Resp BP SpO2   12/15/20 0734 98 °F (36.7 °C) 63 16 104/60 94 %   12/15/20 0419 98.7 °F (37.1 °C) 60 16 107/66 93 %   12/15/20 0011 98.5 °F (36.9 °C) 61 16 107/65 93 %   12/14/20 2037 98 °F (36.7 °C) 62 16 109/65 93 %   12/14/20 1435 98.8 °F (37.1 °C) 67 16 105/66 93 %       Intake/Output Summary (Last 24 hours) at 12/15/2020 1135  Last data filed at 12/15/2020 0948  Gross per 24 hour   Intake 1100 ml   Output    Net 1100 ml        I had a face to face encounter and independently examined this patient on 12/15/2020, as outlined below:  PHYSICAL EXAM:  General: Alert, cooperative, no acute distress    EENT:   Anicteric sclerae. normocephalic  Resp:  CTA bilaterally, no wheezing or rales. No accessory muscle use  CV:  Regular  rhythm,  2+ bilateral lower extremity edema  GI:  Soft, Non distended, Non tender. +Bowel sounds  Neurologic:  Alert and oriented X 3, normal speech,   Psych:   Fair insight.  Not anxious nor agitated  Skin:  No rashes. No jaundice    Reviewed most current lab test results and cultures  YES  Reviewed most current radiology test results   YES  Review and summation of old records today    NO  Reviewed patient's current orders and MAR    YES  PMH/SH reviewed - no change compared to H&P  ________________________________________________________________________  Care Plan discussed with:    Comments   Patient x    Family  x     RN x    Care Manager x    Consultant                       x Multidiciplinary team rounds were held today with , nursing, pharmacist and clinical coordinator. Patient's plan of care was discussed; medications were reviewed and discharge planning was addressed. ________________________________________________________________________    ________________________________________________________________________  Farzaneh Martinez MD     Procedures: see electronic medical records for all procedures/Xrays and details which were not copied into this note but were reviewed prior to creation of Plan. LABS:  I reviewed today's most current labs and imaging studies.   Pertinent labs include:  Recent Labs     12/15/20  0251 12/14/20  0509 12/13/20  0417   WBC 7.6 9.0 7.0   HGB 8.7* 9.1* 9.7*   HCT 28.4* 29.2* 32.0*   PLT 94* 102* 104*     Recent Labs     12/15/20  0251 12/14/20  0510 12/13/20  0417    140 142   K 3.9 4.1 4.0   * 112* 113*   CO2 24 23 25   * 98 86   BUN 18 22* 19   CREA 0.79 0.66* 0.68*   CA 8.6 8.6 8.5   ALB 2.3* 2.5* 2.4*   TBILI 0.2 0.3 0.4   ALT 16 17 17       Signed: Farzaneh Martinez MD

## 2020-12-15 NOTE — PROGRESS NOTES
Problem: Falls - Risk of  Goal: *Absence of Falls  Description: Document Rick Hooks Fall Risk and appropriate interventions in the flowsheet. Outcome: Progressing Towards Goal  Note: Fall Risk Interventions:  Mobility Interventions: Patient to call before getting OOB    Mentation Interventions: More frequent rounding, Room close to nurse's station    Medication Interventions: Patient to call before getting OOB, Teach patient to arise slowly    Elimination Interventions: Call light in reach    History of Falls Interventions: Bed/chair exit alarm         Problem: Patient Education: Go to Patient Education Activity  Goal: Patient/Family Education  Outcome: Progressing Towards Goal     Problem: Pressure Injury - Risk of  Goal: *Prevention of pressure injury  Description: Document Pop Scale and appropriate interventions in the flowsheet.   Outcome: Progressing Towards Goal  Note: Pressure Injury Interventions:  Sensory Interventions: Assess changes in LOC    Moisture Interventions: Minimize layers    Activity Interventions: Increase time out of bed    Mobility Interventions: HOB 30 degrees or less    Nutrition Interventions: Document food/fluid/supplement intake    Friction and Shear Interventions: Minimize layers                Problem: Patient Education: Go to Patient Education Activity  Goal: Patient/Family Education  Outcome: Progressing Towards Goal     Problem: Patient Education: Go to Patient Education Activity  Goal: Patient/Family Education  Outcome: Progressing Towards Goal     Problem: Patient Education: Go to Patient Education Activity  Goal: Patient/Family Education  Outcome: Progressing Towards Goal

## 2020-12-16 VITALS
BODY MASS INDEX: 27.36 KG/M2 | HEART RATE: 76 BPM | DIASTOLIC BLOOD PRESSURE: 66 MMHG | SYSTOLIC BLOOD PRESSURE: 121 MMHG | RESPIRATION RATE: 18 BRPM | TEMPERATURE: 98 F | WEIGHT: 202 LBS | OXYGEN SATURATION: 99 % | HEIGHT: 72 IN

## 2020-12-16 LAB
ALBUMIN SERPL-MCNC: 2.5 G/DL (ref 3.5–5)
ALBUMIN/GLOB SERPL: 0.8 {RATIO} (ref 1.1–2.2)
ALP SERPL-CCNC: 65 U/L (ref 45–117)
ALT SERPL-CCNC: 14 U/L (ref 12–78)
ANION GAP SERPL CALC-SCNC: 4 MMOL/L (ref 5–15)
AST SERPL-CCNC: 8 U/L (ref 15–37)
BILIRUB SERPL-MCNC: 0.6 MG/DL (ref 0.2–1)
BUN SERPL-MCNC: 19 MG/DL (ref 6–20)
BUN/CREAT SERPL: 24 (ref 12–20)
CALCIUM SERPL-MCNC: 8.4 MG/DL (ref 8.5–10.1)
CHLORIDE SERPL-SCNC: 109 MMOL/L (ref 97–108)
CO2 SERPL-SCNC: 25 MMOL/L (ref 21–32)
CREAT SERPL-MCNC: 0.8 MG/DL (ref 0.7–1.3)
GLOBULIN SER CALC-MCNC: 3.2 G/DL (ref 2–4)
GLUCOSE BLD STRIP.AUTO-MCNC: 110 MG/DL (ref 65–100)
GLUCOSE BLD STRIP.AUTO-MCNC: 180 MG/DL (ref 65–100)
GLUCOSE SERPL-MCNC: 112 MG/DL (ref 65–100)
POTASSIUM SERPL-SCNC: 4 MMOL/L (ref 3.5–5.1)
PROT SERPL-MCNC: 5.7 G/DL (ref 6.4–8.2)
SERVICE CMNT-IMP: ABNORMAL
SERVICE CMNT-IMP: ABNORMAL
SODIUM SERPL-SCNC: 138 MMOL/L (ref 136–145)

## 2020-12-16 PROCEDURE — 80053 COMPREHEN METABOLIC PANEL: CPT

## 2020-12-16 PROCEDURE — 82962 GLUCOSE BLOOD TEST: CPT

## 2020-12-16 PROCEDURE — 74011250637 HC RX REV CODE- 250/637: Performed by: STUDENT IN AN ORGANIZED HEALTH CARE EDUCATION/TRAINING PROGRAM

## 2020-12-16 PROCEDURE — 74011250637 HC RX REV CODE- 250/637: Performed by: INTERNAL MEDICINE

## 2020-12-16 PROCEDURE — 74011250636 HC RX REV CODE- 250/636: Performed by: INTERNAL MEDICINE

## 2020-12-16 PROCEDURE — 74011000250 HC RX REV CODE- 250: Performed by: STUDENT IN AN ORGANIZED HEALTH CARE EDUCATION/TRAINING PROGRAM

## 2020-12-16 PROCEDURE — 36415 COLL VENOUS BLD VENIPUNCTURE: CPT

## 2020-12-16 RX ORDER — FUROSEMIDE 40 MG/1
40 TABLET ORAL DAILY
Qty: 30 TAB | Refills: 2 | Status: SHIPPED | OUTPATIENT
Start: 2020-12-16 | End: 2020-12-31 | Stop reason: SDUPTHER

## 2020-12-16 RX ORDER — FUROSEMIDE 10 MG/ML
40 INJECTION INTRAMUSCULAR; INTRAVENOUS ONCE
Status: COMPLETED | OUTPATIENT
Start: 2020-12-16 | End: 2020-12-16

## 2020-12-16 RX ADMIN — Medication 1 TABLET: at 08:33

## 2020-12-16 RX ADMIN — FUROSEMIDE 40 MG: 40 TABLET ORAL at 08:31

## 2020-12-16 RX ADMIN — METFORMIN 1000 MG: 500 TABLET, EXTENDED RELEASE ORAL at 08:30

## 2020-12-16 RX ADMIN — TRIAMCINOLONE ACETONIDE: 1 OINTMENT TOPICAL at 08:35

## 2020-12-16 RX ADMIN — TOPIRAMATE 300 MG: 100 TABLET, FILM COATED ORAL at 08:30

## 2020-12-16 RX ADMIN — DIVALPROEX SODIUM 500 MG: 500 TABLET, DELAYED RELEASE ORAL at 08:30

## 2020-12-16 RX ADMIN — PREGABALIN 150 MG: 150 CAPSULE ORAL at 08:31

## 2020-12-16 RX ADMIN — FOLIC ACID 1 MG: 1 TABLET ORAL at 08:30

## 2020-12-16 RX ADMIN — LACTULOSE 15 ML: 20 SOLUTION ORAL at 08:31

## 2020-12-16 RX ADMIN — Medication 10 ML: at 06:25

## 2020-12-16 RX ADMIN — FUROSEMIDE 40 MG: 10 INJECTION, SOLUTION INTRAMUSCULAR; INTRAVENOUS at 10:25

## 2020-12-16 NOTE — PROGRESS NOTES
Bedside and Verbal shift change report given to Sourav Barbour RN (oncoming nurse) by Khurram Gimenez RN (offgoing nurse). Report included the following information SBAR, Procedure Summary, Intake/Output, MAR and Recent Results.

## 2020-12-16 NOTE — DISCHARGE SUMMARY
Hospitalist Discharge Summary     Patient ID:  Adelaide Muniz  [id number]  60 y.o.  1955  12/4/2020    PCP on record: Luma Thakkar MD    Admit date: 12/4/2020  Discharge date and time: 12/16/2020    DISCHARGE DIAGNOSIS:  Worsening  of low back with functional paraplegia readmission after 2 days  Cough with low grade fever (Resolved)  Urinary Retention with richards (Resolved)  Liver Cirrhosis with splenomegaly and portal hypertension POA  Seizure Disorder POA  Type 2 Diabetes (Controlled)  Hyperlipidemia  Thrombocytopenia  Myelodysplasia  Bilateral Lower extremity pitting edema:    CONSULTATIONS:  None    Excerpted HPI from H&P of Umu Strickland MD:    Jef Day is a 60 y.o.  male who presents with low back pain present for years, to the point that patient is functional  paraplegic. Patient states with the mother who is not able to take care of him. Patient was discharged 2 days ago with home health but because mother is not able to take care of him patient came back to the ER again with low back pain. Patient need long-term care with physical therapy and Occupational Therapy for improvement. Patient has cough but no shortness of breath, has low-grade fever. No history of sick contacts.     We were asked to admit for work up and evaluation of the above problems. ______________________________________________________________________  DISCHARGE SUMMARY/HOSPITAL COURSE:  for full details see H&P, daily progress notes, labs, consult notes. Worsening  of low back with functional paraplegia readmission after 2 days   Patient states back pain has improved  SNF placement pending insurance authorization  Xray back unchanged  IMPRESSION:   Unchanged mild spondylosis at L5-S1.  -MRI 11/27- Mild degenerative changes at multiple levels in the lumbar spine  without significant canal stenosis.  Small disc protrusion at L5-S1 likely acute  without canal stenosis or definite nerve root impingement   lidocaine patch, continue oral pain medications  Patient doing well with PT/ ambulates with walker and assist x 1  [facility]     Cough with low grade fever (Resolved)    Covid Negative on 12/5/2020    COVID 12/11/2020 Negative     Urinary Retention with richards (Resolved)   Richards removed   Voiding without difficulty   Continue tamsulosin     Liver Cirrhosis with splenomegaly and portal hypertension POA  CT abdomen 11/22/2020: IMPRESSION:  1. Trace inflammation around the second portion of the duodenum, of unclear  etiology. Correlation with serum lipase recommended. 2. Possible cirrhosis. Splenomegaly and portal vein dilation suggest portal  hypertension. 3. Pulmonary artery hypertension. Normal hepatic panel  Baseline mentation  Follow up with GI as out patient  Lipid panel: Triglyceriede 122, Cholesterol 98, HDL 29, LDL 44.6     Seizure Disorder POA  Continue home medications  No seizure noted this admission     Type 2 Diabetes (Controlled)   continue home meds     Hyperlipidemia  Continue statin     Thrombocytopenia  Myelodysplasia  PT seen by Dr. Segundo Justin referred to I   Plts stable  At 94 this am Was 66-61 during last admission         Bilateral Lower extremity pitting edema:  Started on lasix 40mg daily      _______________________________________________________________________  Patient seen and examined by me on discharge day. Pertinent Findings:  Gen:    Not in distress  Chest: Clear lungs  CVS:   Regular rhythm. No edema  Abd:  Soft, not distended, not tender  Neuro:  Alert,oriented x2  _______________________________________________________________________  DISCHARGE MEDICATIONS:   Current Discharge Medication List      START taking these medications    Details   furosemide (LASIX) 40 mg tablet Take 1 Tab by mouth daily.   Qty: 30 Tab, Refills: 2         CONTINUE these medications which have NOT CHANGED    Details   lactulose (CHRONULAC) 10 gram/15 mL solution Take 15 mL by mouth three (3) times daily for 30 days. Qty: 1350 mL, Refills: 0      lidocaine 4 % patch 1 Patch by TransDERmal route every twelve (12) hours every twelve (12) hours for 15 days. Qty: 30 Patch, Refills: 0      pregabalin (Lyrica) 150 mg capsule Take 1 Cap by mouth two (2) times a day for 30 days. Max Daily Amount: 300 mg. Indications: nerve pain from spinal cord injury  Qty: 60 Cap, Refills: 0    Associated Diagnoses: Lumbar herniated disc      acetaminophen (TYLENOL) 500 mg tablet Take 2 Tabs by mouth every six (6) hours as needed for Pain. Qty: 20 Tab, Refills: 0      metFORMIN ER (GLUCOPHAGE XR) 500 mg tablet TAKE TWO TABLETS BY MOUTH TWICE DAILY   Qty: 360 Tab, Refills: 3      topiramate (TOPAMAX) 200 mg tablet TAKE 1 & 1/2 TABLETS BY MOUTH TWICE A DAY  Qty: 270 Tab, Refills: 0    Associated Diagnoses: Seizure disorder (HCC)      linaGLIPtin (TRADJENTA) 5 mg tablet Take 1 Tab by mouth daily. Qty: 90 Tab, Refills: 3    Associated Diagnoses: Type 2 diabetes mellitus without complication, without long-term current use of insulin (HCC)      pioglitazone (ACTOS) 45 mg tablet Take 1 Tab by mouth daily. Qty: 90 Tab, Refills: 3    Associated Diagnoses: Controlled type 2 diabetes mellitus without complication, without long-term current use of insulin (HCC)      divalproex DR (DEPAKOTE) 500 mg tablet TAKE ONE TABLET BY MOUTH EVERY MORNING AND TWO TABS AT NIGHT  Qty: 270 Tab, Refills: 3    Associated Diagnoses: Seizure disorder (HCC)      atorvastatin (LIPITOR) 40 mg tablet Take 1 Tab by mouth nightly. Qty: 90 Tab, Refills: 3    Associated Diagnoses: Hypercholesterolemia      glimepiride (AMARYL) 4 mg tablet TAKE TWO TABLETS BY MOUTH IN THE MORNING  Qty: 180 Tab, Refills: 3    Associated Diagnoses: Controlled type 2 diabetes mellitus without complication, without long-term current use of insulin (HCC)      folic acid (FOLVITE) 1 mg tablet Take 1 Tab by mouth daily.   Qty: 90 Tab, Refills: 3    Associated Diagnoses: Myelodysplasia (myelodysplastic syndrome) (HCC)      benzonatate (TESSALON) 100 mg capsule Take 1 Cap by mouth nightly as needed for Cough. Qty: 30 Cap, Refills: 0    Associated Diagnoses: Nocturnal cough      triamcinolone acetonide (KENALOG) 0.1 % ointment Apply  to affected area two (2) times a day. apply thin layer 2 x daily to groin as needed for rash      chlorhexidine (PERIDEX) 0.12 % solution Take 15 mL by mouth every twelve (12) hours. Calcium-Cholecalciferol, D3, (CALTRATE-600 PLUS VITAMIN D3) 600-400 mg-unit Tab Take 1 Tab by mouth daily. Patient Follow Up Instructions:    Activity: Activity as tolerated  Diet: Diabetic Diet  Wound Care: None needed    Follow-up with PCP in 1 week   Follow-up tests/labs none  Follow-up Information     Follow up With Specialties Details Why Contact Jose Eduardo Vides MD Internal Medicine In 1 week  [address] [place] [address]  [phone]      [facility]    [phone]        ________________________________________________________________    Risk of deterioration: High    Condition at Discharge:  Stable  __________________________________________________________________    Disposition  IP Rehab    ____________________________________________________________________    Code Status: Full Code  ___________________________________________________________________      Total time in minutes spent coordinating this discharge (includes going over instructions, follow-up, prescriptions, and preparing report for sign off to her PCP) :  45 minutes    Signed:  Leonidas Walter MD

## 2020-12-16 NOTE — PROGRESS NOTES
OLY Plan:     *[facility]   *[facility] to transport at 11am   *IM letter provided    Patient is discharging to [address] today without any other needs or concerns. Patients mother has been notified and is in agreement with d/c to [address]. Admitting MD is Dr Jocy Horen, pt will be going to room #3050 and RN to call report to [phone]. Patient is ready for d/c from CM standpoint. Care Management Interventions  PCP Verified by CM:  Yes  Mode of Transport at Discharge: BLS  Transition of Care Consult (CM Consult): SNF  MyChart Signup: No  Discharge Durable Medical Equipment: No  Physical Therapy Consult: Yes  Occupational Therapy Consult: Yes  Speech Therapy Consult: No  Current Support Network: Lives with Caregiver  The Patient and/or Patient Representative was Provided with a Choice of Provider and Agrees with the Discharge Plan?: Yes  Name of the Patient Representative Who was Provided with a Choice of Provider and Agrees with the Discharge Plan: verbal consent per mother  Freedom of Choice List was Provided with Basic Dialogue that Supports the Patient's Individualized Plan of Care/Goals, Treatment Preferences and Shares the Quality Data Associated with the Providers?: Yes  Discharge Location  Discharge Placement: Rehab hospital/unit acute([facility])      M[facility] Maynard[facility]  Ext [phone]

## 2020-12-16 NOTE — PROGRESS NOTES
Problem: Falls - Risk of  Goal: *Absence of Falls  Description: Document Zara Garcia Fall Risk and appropriate interventions in the flowsheet. Outcome: Progressing Towards Goal  Note: Fall Risk Interventions:  Mobility Interventions: Bed/chair exit alarm    Mentation Interventions: Bed/chair exit alarm    Medication Interventions: Bed/chair exit alarm    Elimination Interventions: Bed/chair exit alarm, Call light in reach, Stay With Me (per policy), Patient to call for help with toileting needs    History of Falls Interventions: Bed/chair exit alarm         Problem: Patient Education: Go to Patient Education Activity  Goal: Patient/Family Education  Outcome: Progressing Towards Goal     Problem: Pressure Injury - Risk of  Goal: *Prevention of pressure injury  Description: Document Pop Scale and appropriate interventions in the flowsheet.   Outcome: Progressing Towards Goal  Note: Pressure Injury Interventions:  Sensory Interventions: Assess changes in LOC    Moisture Interventions: Minimize layers    Activity Interventions: Increase time out of bed    Mobility Interventions: Float heels, HOB 30 degrees or less    Nutrition Interventions: Document food/fluid/supplement intake    Friction and Shear Interventions: Minimize layers                Problem: Patient Education: Go to Patient Education Activity  Goal: Patient/Family Education  Outcome: Progressing Towards Goal     Problem: Patient Education: Go to Patient Education Activity  Goal: Patient/Family Education  Outcome: Progressing Towards Goal     Problem: Patient Education: Go to Patient Education Activity  Goal: Patient/Family Education  Outcome: Progressing Towards Goal

## 2020-12-16 NOTE — PROGRESS NOTES
Problem: Falls - Risk of  Goal: *Absence of Falls  Description: Document Kerry Sesay Fall Risk and appropriate interventions in the flowsheet. Outcome: Progressing Towards Goal  Note: Fall Risk Interventions:  Mobility Interventions: Bed/chair exit alarm    Mentation Interventions: Bed/chair exit alarm    Medication Interventions: Bed/chair exit alarm    Elimination Interventions: Bed/chair exit alarm, Call light in reach, Stay With Me (per policy), Patient to call for help with toileting needs    History of Falls Interventions: Bed/chair exit alarm         Problem: Patient Education: Go to Patient Education Activity  Goal: Patient/Family Education  Outcome: Progressing Towards Goal     Problem: Pressure Injury - Risk of  Goal: *Prevention of pressure injury  Description: Document Pop Scale and appropriate interventions in the flowsheet. Outcome: Progressing Towards Goal  Note: Pressure Injury Interventions:  Sensory Interventions: Assess changes in LOC    Moisture Interventions: Minimize layers    Activity Interventions: Increase time out of bed    Mobility Interventions: Float heels, HOB 30 degrees or less    Nutrition Interventions: Document food/fluid/supplement intake    Friction and Shear Interventions: Minimize layers                Problem: Patient Education: Go to Patient Education Activity  Goal: Patient/Family Education  Outcome: Progressing Towards Goal     Problem: Patient Education: Go to Patient Education Activity  Goal: Patient/Family Education  Outcome: Progressing Towards Goal     Problem: Patient Education: Go to Patient Education Activity  Goal: Patient/Family Education  Outcome: Progressing Towards Goal   Patient is alert and oriented; able to voice needs. Denies any pain or discomfort. No acute distress noted.

## 2020-12-16 NOTE — DISCHARGE INSTRUCTIONS
HOSPITALIST DISCHARGE INSTRUCTIONS    NAME: Marcus Malcolm   :  1955   MRN:  [id number]     Date/Time:  2020 9:47 AM    ADMIT DATE: 2020     DISCHARGE DATE: 2020     DISCHARGE DIAGNOSIS:  Chronic back pain  Lower extremity edema    MEDICATIONS:  · It is important that you take the medication exactly as they are prescribed. · Keep your medication in the bottles provided by the pharmacist and keep a list of the medication names, dosages, and times to be taken in your wallet. · Do not take other medications without consulting your doctor. Pain Management: per above medications    What to do at Home    Recommended diet:  {diet:23124}    Recommended activity: {discharge activity:38951}    If you have questions regarding the hospital related prescriptions or hospital related issues please [facility] [phone]. If you experience any of the following symptoms then please call your primary care physician or return to the emergency room if you cannot get hold of your doctor:  Fever, chills, nausea, vomiting, diarrhea, change in mentation, falling, bleeding, shortness of breath, ***    Follow Up:  Dr. Ha Remy MD  you are to call and set up an appointment to see them in 7-10 days. Information obtained by :  I understand that if any problems occur once I am at home I am to contact my physician. I understand and acknowledge receipt of the instructions indicated above.                                                                                                                                            Physician's or R.N.'s Signature                                                                  Date/Time                                                                                                                                              Patient or Representative Signature Date/Time

## 2020-12-16 NOTE — PROGRESS NOTES
Pt discharged to [facility].  EMTALA completed. Report called to Ladonna Morales RN. Report included SBAR, meds, lab results. Pt provided with copy of discharge paperwork. Pt personal belongings packed up and given to [facility]. Pt states that he understands that he is being transferred to Rehab. Pts mother called by Case Management. Pt left the unit via stretcher with [facility].

## 2020-12-17 ENCOUNTER — PATIENT OUTREACH (OUTPATIENT)
Dept: CASE MANAGEMENT | Age: 65
End: 2020-12-17

## 2020-12-17 NOTE — Clinical Note
Good morning Jan,   This patient was readmitted  and [facility]. dx paraplegia, I will follow him   thank you,   [place]

## 2020-12-17 NOTE — PROGRESS NOTES
Transition of care outreach postponed for 14 days due to patient's discharge to SNF. Per EMR patient discharged to [facility]. Will continue monitor patient progress.

## 2020-12-30 ENCOUNTER — TELEPHONE (OUTPATIENT)
Dept: INTERNAL MEDICINE CLINIC | Age: 65
End: 2020-12-30

## 2020-12-30 DIAGNOSIS — K74.60 HEPATIC CIRRHOSIS, UNSPECIFIED HEPATIC CIRRHOSIS TYPE, UNSPECIFIED WHETHER ASCITES PRESENT (HCC): Primary | ICD-10-CM

## 2020-12-30 NOTE — TELEPHONE ENCOUNTER
I called Jas Meek and verified the patient by name and date of birth. She informed me that she did his PT evaluation in the home today and they will keep seeing him. They have to do a home medication review and put them in the system. Stated the Topiramate and Divalproex and Metformin interact with each other and they are required to report it. Also the patient discharged from inpatient rehab and on the papers it says that he is supposed to be taking Furosemide and wants to know if he should be taking it. The patient has 150mg capsules of Pregabalin currently and papers say he is supposed to be taking 75mg. Cannot cut the capsule in half and wanted to see if there was another prescription could be called in. They are confused about what to take because the papers from Dr. Black Chavarria and rehab say something completely different.

## 2020-12-30 NOTE — TELEPHONE ENCOUNTER
Danelle Hollis with [facility] PT ([phone])  went out yesterday to do at PT [facility] and will continue to see pt for strengthen in balance and gait for next couple of weeks. They also had to do a medication review and found that some of the medications on his list interact with one another: metformin, topiramate, divalproex and she also had a question about some of the medications.

## 2020-12-31 RX ORDER — FUROSEMIDE 40 MG/1
40 TABLET ORAL DAILY
Qty: 30 TAB | Refills: 2 | Status: ON HOLD | OUTPATIENT
Start: 2020-12-31 | End: 2021-01-29 | Stop reason: SDUPTHER

## 2020-12-31 NOTE — TELEPHONE ENCOUNTER
Yes, he should take furosemide 40 mg 1 tablet once daily. He was discharged from the hospital on Lyrica 150 mg 1 cap twice a day. Continue him on this dose. I have sent a prescription for furosemide to his pharmacy. We need to request discharge summary including discharge medications from [address] [place] [address]. Patient's next appointment is not until 1/11/21.

## 2021-01-01 ENCOUNTER — TELEPHONE (OUTPATIENT)
Dept: INTERNAL MEDICINE CLINIC | Age: 66
End: 2021-01-01

## 2021-01-01 ENCOUNTER — OFFICE VISIT (OUTPATIENT)
Dept: INTERNAL MEDICINE CLINIC | Age: 66
End: 2021-01-01
Payer: MEDICARE

## 2021-01-01 ENCOUNTER — DOCUMENTATION ONLY (OUTPATIENT)
Dept: INTERNAL MEDICINE CLINIC | Age: 66
End: 2021-01-01

## 2021-01-01 VITALS
SYSTOLIC BLOOD PRESSURE: 131 MMHG | HEIGHT: 72 IN | WEIGHT: 184.2 LBS | TEMPERATURE: 98.1 F | DIASTOLIC BLOOD PRESSURE: 79 MMHG | RESPIRATION RATE: 16 BRPM | BODY MASS INDEX: 24.95 KG/M2 | OXYGEN SATURATION: 98 % | HEART RATE: 76 BPM

## 2021-01-01 DIAGNOSIS — L57.0 KERATOSIS: ICD-10-CM

## 2021-01-01 DIAGNOSIS — K74.60 HEPATIC CIRRHOSIS, UNSPECIFIED HEPATIC CIRRHOSIS TYPE, UNSPECIFIED WHETHER ASCITES PRESENT (HCC): ICD-10-CM

## 2021-01-01 DIAGNOSIS — Z12.11 SCREENING FOR COLON CANCER: Primary | ICD-10-CM

## 2021-01-01 DIAGNOSIS — D69.6 THROMBOCYTOPENIA (HCC): ICD-10-CM

## 2021-01-01 DIAGNOSIS — E78.2 MIXED HYPERLIPIDEMIA: ICD-10-CM

## 2021-01-01 DIAGNOSIS — E11.42 TYPE 2 DIABETES MELLITUS WITH DIABETIC POLYNEUROPATHY, WITHOUT LONG-TERM CURRENT USE OF INSULIN (HCC): Primary | ICD-10-CM

## 2021-01-01 DIAGNOSIS — I10 HYPERTENSION, ESSENTIAL: ICD-10-CM

## 2021-01-01 DIAGNOSIS — Z12.11 SCREENING FOR COLON CANCER: ICD-10-CM

## 2021-01-01 DIAGNOSIS — R19.5 POSITIVE COLORECTAL CANCER SCREENING USING COLOGUARD TEST: Primary | ICD-10-CM

## 2021-01-01 DIAGNOSIS — G40.909 SEIZURE DISORDER (HCC): ICD-10-CM

## 2021-01-01 LAB
ALBUMIN/CREAT UR: 2 MG/G CREAT (ref 0–29)
COLOGUARD TEST, EXTERNAL: POSITIVE
CREAT UR-MCNC: 219 MG/DL
HBA1C MFR BLD HPLC: 6.6 %
HEMOCCULT STL QL IA: POSITIVE
MICROALBUMIN UR-MCNC: 5 UG/ML

## 2021-01-01 PROCEDURE — G8427 DOCREV CUR MEDS BY ELIG CLIN: HCPCS | Performed by: INTERNAL MEDICINE

## 2021-01-01 PROCEDURE — 2022F DILAT RTA XM EVC RTNOPTHY: CPT | Performed by: INTERNAL MEDICINE

## 2021-01-01 PROCEDURE — 99214 OFFICE O/P EST MOD 30 MIN: CPT | Performed by: INTERNAL MEDICINE

## 2021-01-01 PROCEDURE — G8752 SYS BP LESS 140: HCPCS | Performed by: INTERNAL MEDICINE

## 2021-01-01 PROCEDURE — 3017F COLORECTAL CA SCREEN DOC REV: CPT | Performed by: INTERNAL MEDICINE

## 2021-01-01 PROCEDURE — G8420 CALC BMI NORM PARAMETERS: HCPCS | Performed by: INTERNAL MEDICINE

## 2021-01-01 PROCEDURE — 83036 HEMOGLOBIN GLYCOSYLATED A1C: CPT | Performed by: INTERNAL MEDICINE

## 2021-01-01 PROCEDURE — G8432 DEP SCR NOT DOC, RNG: HCPCS | Performed by: INTERNAL MEDICINE

## 2021-01-01 PROCEDURE — 3044F HG A1C LEVEL LT 7.0%: CPT | Performed by: INTERNAL MEDICINE

## 2021-01-01 PROCEDURE — G8754 DIAS BP LESS 90: HCPCS | Performed by: INTERNAL MEDICINE

## 2021-01-01 RX ORDER — TRIAMCINOLONE ACETONIDE 1 MG/G
OINTMENT TOPICAL 2 TIMES DAILY
Qty: 30 G | Refills: 0 | Status: SHIPPED | OUTPATIENT
Start: 2021-01-01 | End: 2022-01-01 | Stop reason: SDUPTHER

## 2021-01-04 NOTE — TELEPHONE ENCOUNTER
Pt was discharged from [address] [place] [address] on Furosemide 20mg one tablet daily, lyrica 75mg twice a day. He was given a RX for lactulose for constipation but he has had loose stools for a couple weeks. Mother would like to know what schedule to follow? Will request records from [address] [place] [address].

## 2021-01-04 NOTE — TELEPHONE ENCOUNTER
She can give him furosemide 40 mg 1/2 tab daily. Continue Lyrica 150 mg BID until I get records from [address] [place] [address]. Lactulose is for liver cirrhosis. One of the side effects is loose stools. Try to give it to him 3 times a day. If it causes more than 5 loose stools a day, can reduce to twice a day.

## 2021-01-11 ENCOUNTER — VIRTUAL VISIT (OUTPATIENT)
Dept: INTERNAL MEDICINE CLINIC | Age: 66
End: 2021-01-11
Payer: MEDICARE

## 2021-01-11 ENCOUNTER — TELEPHONE (OUTPATIENT)
Dept: INTERNAL MEDICINE CLINIC | Age: 66
End: 2021-01-11

## 2021-01-11 DIAGNOSIS — E11.42 TYPE 2 DIABETES MELLITUS WITH DIABETIC POLYNEUROPATHY, WITHOUT LONG-TERM CURRENT USE OF INSULIN (HCC): ICD-10-CM

## 2021-01-11 DIAGNOSIS — Z09 HOSPITAL DISCHARGE FOLLOW-UP: Primary | ICD-10-CM

## 2021-01-11 DIAGNOSIS — K74.60 HEPATIC CIRRHOSIS, UNSPECIFIED HEPATIC CIRRHOSIS TYPE, UNSPECIFIED WHETHER ASCITES PRESENT (HCC): ICD-10-CM

## 2021-01-11 DIAGNOSIS — G82.20 PARAPLEGIA (HCC): ICD-10-CM

## 2021-01-11 PROCEDURE — 1111F DSCHRG MED/CURRENT MED MERGE: CPT | Performed by: INTERNAL MEDICINE

## 2021-01-11 PROCEDURE — 99442 PR PHYS/QHP TELEPHONE EVALUATION 11-20 MIN: CPT | Performed by: INTERNAL MEDICINE

## 2021-01-11 RX ORDER — PREGABALIN 75 MG/1
75 CAPSULE ORAL 2 TIMES DAILY
Qty: 180 CAP | Refills: 1 | Status: ON HOLD | OUTPATIENT
Start: 2021-01-11 | End: 2021-01-29 | Stop reason: SDUPTHER

## 2021-01-11 RX ORDER — LACTULOSE 10 G/15ML
SOLUTION ORAL; RECTAL
COMMUNITY
Start: 2020-12-23 | End: 2021-04-09

## 2021-01-11 RX ORDER — PREGABALIN 75 MG/1
CAPSULE ORAL
COMMUNITY
Start: 2020-12-23 | End: 2021-01-11 | Stop reason: SDUPTHER

## 2021-01-11 RX ORDER — RAMIPRIL 5 MG/1
CAPSULE ORAL
Status: ON HOLD | COMMUNITY
Start: 2021-01-10 | End: 2021-01-29 | Stop reason: SDUPTHER

## 2021-01-11 RX ORDER — CYCLOBENZAPRINE HCL 10 MG
TABLET ORAL
COMMUNITY
End: 2021-01-29

## 2021-01-11 NOTE — PROGRESS NOTES
Dino Parker is a 60 y.o. male  Chief Complaint   Patient presents with   [facility] Follow Up     ED [facility]     Health Maintenance Due   Topic Date Due    Eye Exam Retinal or Dilated  10/27/2019    DTaP/Tdap/Td series (2 - Td) 10/20/2020     Pt phone number: [phone]

## 2021-01-11 NOTE — TELEPHONE ENCOUNTER
Call Sharad Mora with [facility] PT ([phone]) to find out why she is no longer seeing patient. Dr. Donald Bennett just spoke with patient's mother and she said that he has not had PT in over a week and is unable to stand. He is in wheelchair. [If Clarke Garcia is unable to see him, let me know so I can place a referral to a different home health agency. ]

## 2021-01-11 NOTE — TELEPHONE ENCOUNTER
Spoke with Beena Sandoval. She will reach out to her office to see if someone can be sent out to see patient. She stated she will also call his mother.

## 2021-01-11 NOTE — PROGRESS NOTES
Julia Shrestha is a 60 y.o. male, evaluated via audio-only technology on 1/11/2021 for No chief complaint on file. .    Assessment & Plan:   Diagnoses and all orders for this visit:    1. Hospital discharge follow-up  -     CO DISCHARGE MEDS RECONCILED W/ CURRENT OUTPATIENT MED LIST    2. Paraplegia ([address]  Functional paraplegia. Unable to walk. Needs PT for gait training or for evaluation to determine if he needs to be wheelchair-bound. I will have my nurse call [facility] to discuss getting PT restarted. 3. Hepatic cirrhosis, unspecified hepatic cirrhosis type, unspecified whether ascites present [facility])  His mother has not scheduled him for follow up with Dr. Hany Blake yet because she is unable to transport him to any clinic due to his mobility issues. 4. Type 2 diabetes mellitus with diabetic polyneuropathy, without long-term current use of insulin (HCC)  Controlled. Last A1c 6.9% on 10/27/20. Continue metformin, glimepiride, Tradjenta, and pioglitazone for DM and Lyrica for neuropathy.  -     Start pregabalin (LYRICA) 75 mg capsule; Take 1 Cap by mouth two (2) times a day. Max Daily Amount: 150 mg. Follow-up and Dispositions    · Return if symptoms worsen or fail to improve, for Scheduled appt 2/24/21.         12  Subjective:     Presents for hospital discharge follow up visit. HPI obtained from his mother, Eliseo Dao. Admitted to [facility] from 12/4-12/16/20 for worsening low back pain with functional paraplegia as readmission after 2 days. Diagnosed with liver cirrhosis with splenomegaly and portal HTN. Discharged to [address] [place] [address] where had inpatient rehabilitation from 12/16-12/24/20. Today his mother said he cannot use his legs at all to stand or walk. Physical therapist from [facility] came only once; has not been back in over a week. She struggles to get him out of bed into wheelchair. He is presently slumped in the wheelchair with his head hanging. She has been bathing him herself. Still waiting to hear back from home health aide. His mother feels like he is back to the same state as he was before hospitalization. We reviewed his medications. Needs prescription for Lyrica 75 mg BID; dose decreased from 150 mg BID while he was at [address] [place] [address]. Prior to Admission medications    Medication Sig Start Date End Date Taking? Authorizing Provider   furosemide (LASIX) 40 mg tablet Take 1 Tab by mouth daily. 12/31/20   Guille Robles MD   acetaminophen (TYLENOL) 500 mg tablet Take 2 Tabs by mouth every six (6) hours as needed for Pain. 11/22/20   Bakari Aguilera PA-C   metFORMIN ER (GLUCOPHAGE XR) 500 mg tablet TAKE TWO TABLETS BY MOUTH TWICE DAILY  10/12/20   Guille Robles MD   topiramate (TOPAMAX) 200 mg tablet TAKE 1 & 1/2 TABLETS BY MOUTH TWICE A DAY 10/12/20   Guille Robles MD   linaGLIPtin (TRADJENTA) 5 mg tablet Take 1 Tab by mouth daily. 10/2/20   Guille Robles MD   pioglitazone (ACTOS) 45 mg tablet Take 1 Tab by mouth daily. 9/28/20   Guille Robles MD   divalproex DR (DEPAKOTE) 500 mg tablet TAKE ONE TABLET BY MOUTH EVERY MORNING AND TWO TABS AT NIGHT 9/28/20   Guille Robles MD   atorvastatin (LIPITOR) 40 mg tablet Take 1 Tab by mouth nightly. 9/28/20   Guille Robles MD   glimepiride (AMARYL) 4 mg tablet TAKE TWO TABLETS BY MOUTH IN THE MORNING 9/28/20   Guille Robles MD   folic acid (FOLVITE) 1 mg tablet Take 1 Tab by mouth daily. 9/28/20   Guille Robles MD   benzonatate (TESSALON) 100 mg capsule Take 1 Cap by mouth nightly as needed for Cough. 8/19/20   Guille Robles MD   triamcinolone acetonide (KENALOG) 0.1 % ointment Apply  to affected area two (2) times a day. apply thin layer 2 x daily to groin as needed for rash 1/30/19   Provider, Historical   chlorhexidine (PERIDEX) 0.12 % solution Take 15 mL by mouth every twelve (12) hours.  2/8/18   Provider, Historical   Calcium-Cholecalciferol, D3, (CALTRATE-600 PLUS VITAMIN D3) 600-400 mg-unit Tab Take 1 Tab by mouth daily. Provider, Historical     Patient Active Problem List   Diagnosis Code    Intellectual disability F68    Seizure disorder ([facility] G40.909    Psoriasis L40.9    Venous stasis of lower extremity I87.8    Thrombocytopenia (HCC) D69.6    Sleep disorder G47.9    Hypertension, essential I10    HLD (hyperlipidemia) E78.5    Type 2 diabetes mellitus with diabetic polyneuropathy, without long-term current use of insulin (HCC) E11.42    Myelodysplasia (myelodysplastic syndrome) (HCC) D46.9    Lower extremity weakness R29.898    Cirrhosis of liver (HCC) K74.60    Macrocytic anemia D53.9    Paraplegia (HCC) G82.20    Cirrhosis (HCC) K74.60    Lumbar stenosis M48.061    Back pain M54.9    Fever of unknown origin (FUO) R50.9     No flowsheet data found. Karen Duke, who was evaluated through a patient-initiated, synchronous (real-time) audio only encounter, and/or her healthcare decision maker, is aware that it is a billable service, with coverage as determined by his insurance carrier. He provided verbal consent to proceed: Yes. He has not had a related appointment within my department in the past 7 days or scheduled within the next 24 hours.       Total Time: minutes: 11-20 minutes    Hernán Turpin MD

## 2021-01-19 ENCOUNTER — TELEPHONE (OUTPATIENT)
Dept: INTERNAL MEDICINE CLINIC | Age: 66
End: 2021-01-19

## 2021-01-19 NOTE — TELEPHONE ENCOUNTER
Mom uses a pill box but states he has a lot medications and not sure if he is suppose to be taking. She has a list that she is following and medication is on automatic refills. He does not act like himself sometimes, next appointment is 2/24/2021. Advised to bring all medications he is taking to next appointment, will mail a medication list from 1/11/2021 visit. She no further questions at this time.

## 2021-01-19 NOTE — TELEPHONE ENCOUNTER
pts mother Kahlil Hahn called and has some questions about two of his medications please call to advise  pioglitaxone and ramipril  [phone]

## 2021-01-26 ENCOUNTER — TELEPHONE (OUTPATIENT)
Dept: INTERNAL MEDICINE CLINIC | Age: 66
End: 2021-01-26

## 2021-01-26 NOTE — TELEPHONE ENCOUNTER
Spoke with [employer]. Patient had 2 teeth extracted lst week-on wed. Patient was given amoxicillin 500 mg (5 day course) but his Mother did not give it to him-she may have though it was a PRN med. Patients temp today 98.4 and yesterday 100.2, /58, O2 sat 93-94 %. Patient has weakness and some urinary incontinence, fever, chills, weakness. Please advise. Should he start amoxicillin now?

## 2021-01-26 NOTE — TELEPHONE ENCOUNTER
Hussein pt for Aj Young called to say last 4 or 5 days temp, chills, incontinence this morning, has not been himself.   [phone]

## 2021-01-26 NOTE — TELEPHONE ENCOUNTER
Yes, he should start amoxicillin. If possible, he should have a urine dip before starting amoxicillin. If not possible, start amoxicillin immediately.

## 2021-01-27 ENCOUNTER — PATIENT OUTREACH (OUTPATIENT)
Dept: CASE MANAGEMENT | Age: 66
End: 2021-01-27

## 2021-01-27 ENCOUNTER — HOSPITAL ENCOUNTER (INPATIENT)
Age: 66
LOS: 2 days | Discharge: HOME HEALTH | DRG: 193 | End: 2021-01-29
Attending: EMERGENCY MEDICINE | Admitting: INTERNAL MEDICINE
Payer: MEDICARE

## 2021-01-27 ENCOUNTER — APPOINTMENT (OUTPATIENT)
Dept: CT IMAGING | Age: 66
DRG: 193 | End: 2021-01-27
Attending: EMERGENCY MEDICINE
Payer: MEDICARE

## 2021-01-27 ENCOUNTER — APPOINTMENT (OUTPATIENT)
Dept: GENERAL RADIOLOGY | Age: 66
DRG: 193 | End: 2021-01-27
Attending: EMERGENCY MEDICINE
Payer: MEDICARE

## 2021-01-27 DIAGNOSIS — R33.9 URINARY RETENTION: ICD-10-CM

## 2021-01-27 DIAGNOSIS — Z71.89 ACP (ADVANCE CARE PLANNING): ICD-10-CM

## 2021-01-27 DIAGNOSIS — J96.01 ACUTE RESPIRATORY FAILURE WITH HYPOXIA (HCC): Primary | ICD-10-CM

## 2021-01-27 DIAGNOSIS — R29.898 WEAKNESS OF BOTH LOWER EXTREMITIES: ICD-10-CM

## 2021-01-27 DIAGNOSIS — S22.080A COMPRESSION FRACTURE OF T12 VERTEBRA, INITIAL ENCOUNTER (HCC): ICD-10-CM

## 2021-01-27 DIAGNOSIS — F79 INTELLECTUAL DISABILITY: ICD-10-CM

## 2021-01-27 DIAGNOSIS — E11.42 TYPE 2 DIABETES MELLITUS WITH DIABETIC POLYNEUROPATHY, WITHOUT LONG-TERM CURRENT USE OF INSULIN (HCC): ICD-10-CM

## 2021-01-27 DIAGNOSIS — K74.60 HEPATIC CIRRHOSIS, UNSPECIFIED HEPATIC CIRRHOSIS TYPE, UNSPECIFIED WHETHER ASCITES PRESENT (HCC): ICD-10-CM

## 2021-01-27 DIAGNOSIS — J18.9 COMMUNITY ACQUIRED PNEUMONIA, UNSPECIFIED LATERALITY: ICD-10-CM

## 2021-01-27 PROBLEM — J12.82 PNEUMONIA DUE TO COVID-19 VIRUS: Status: ACTIVE | Noted: 2021-01-27

## 2021-01-27 PROBLEM — U07.1 PNEUMONIA DUE TO COVID-19 VIRUS: Status: ACTIVE | Noted: 2021-01-27

## 2021-01-27 LAB
ALBUMIN SERPL-MCNC: 2.5 G/DL (ref 3.5–5)
ALBUMIN/GLOB SERPL: 0.6 {RATIO} (ref 1.1–2.2)
ALP SERPL-CCNC: 47 U/L (ref 45–117)
ALT SERPL-CCNC: 9 U/L (ref 12–78)
ANION GAP SERPL CALC-SCNC: 5 MMOL/L (ref 5–15)
APPEARANCE UR: CLEAR
AST SERPL-CCNC: 8 U/L (ref 15–37)
ATRIAL RATE: 76 BPM
BACTERIA URNS QL MICRO: NEGATIVE /HPF
BASOPHILS # BLD: 0 K/UL (ref 0–0.1)
BASOPHILS NFR BLD: 0 % (ref 0–1)
BILIRUB SERPL-MCNC: 0.4 MG/DL (ref 0.2–1)
BILIRUB UR QL: NEGATIVE
BUN SERPL-MCNC: 22 MG/DL (ref 6–20)
BUN/CREAT SERPL: 21 (ref 12–20)
CALCIUM SERPL-MCNC: 8.8 MG/DL (ref 8.5–10.1)
CALCULATED P AXIS, ECG09: 0 DEGREES
CALCULATED R AXIS, ECG10: -9 DEGREES
CALCULATED T AXIS, ECG11: 3 DEGREES
CHLORIDE SERPL-SCNC: 110 MMOL/L (ref 97–108)
CO2 SERPL-SCNC: 25 MMOL/L (ref 21–32)
COLOR UR: ABNORMAL
COVID-19 RAPID TEST, COVR: NOT DETECTED
CREAT SERPL-MCNC: 1.03 MG/DL (ref 0.7–1.3)
DIAGNOSIS, 93000: NORMAL
DIFFERENTIAL METHOD BLD: ABNORMAL
EOSINOPHIL # BLD: 0.4 K/UL (ref 0–0.4)
EOSINOPHIL NFR BLD: 4 % (ref 0–7)
EPITH CASTS URNS QL MICRO: ABNORMAL /LPF
ERYTHROCYTE [DISTWIDTH] IN BLOOD BY AUTOMATED COUNT: 14.1 % (ref 11.5–14.5)
GLOBULIN SER CALC-MCNC: 3.9 G/DL (ref 2–4)
GLUCOSE BLD STRIP.AUTO-MCNC: 91 MG/DL (ref 65–100)
GLUCOSE SERPL-MCNC: 186 MG/DL (ref 65–100)
GLUCOSE UR STRIP.AUTO-MCNC: 100 MG/DL
HCT VFR BLD AUTO: 32.9 % (ref 36.6–50.3)
HGB BLD-MCNC: 10 G/DL (ref 12.1–17)
HGB UR QL STRIP: NEGATIVE
HYALINE CASTS URNS QL MICRO: ABNORMAL /LPF (ref 0–5)
IMM GRANULOCYTES # BLD AUTO: 0 K/UL (ref 0–0.04)
IMM GRANULOCYTES NFR BLD AUTO: 0 % (ref 0–0.5)
KETONES UR QL STRIP.AUTO: NEGATIVE MG/DL
LEUKOCYTE ESTERASE UR QL STRIP.AUTO: NEGATIVE
LIPASE SERPL-CCNC: 81 U/L (ref 73–393)
LYMPHOCYTES # BLD: 4 K/UL (ref 0.8–3.5)
LYMPHOCYTES NFR BLD: 38 % (ref 12–49)
MCH RBC QN AUTO: 33.6 PG (ref 26–34)
MCHC RBC AUTO-ENTMCNC: 30.4 G/DL (ref 30–36.5)
MCV RBC AUTO: 110.4 FL (ref 80–99)
MONOCYTES # BLD: 0.5 K/UL (ref 0–1)
MONOCYTES NFR BLD: 5 % (ref 5–13)
NEUTS BAND NFR BLD MANUAL: 7 %
NEUTS SEG # BLD: 5.7 K/UL (ref 1.8–8)
NEUTS SEG NFR BLD: 46 % (ref 32–75)
NITRITE UR QL STRIP.AUTO: NEGATIVE
NRBC # BLD: 0 K/UL (ref 0–0.01)
NRBC BLD-RTO: 0 PER 100 WBC
P-R INTERVAL, ECG05: 154 MS
PH UR STRIP: 7 [PH] (ref 5–8)
PLATELET # BLD AUTO: 78 K/UL (ref 150–400)
PMV BLD AUTO: 10.1 FL (ref 8.9–12.9)
POTASSIUM SERPL-SCNC: 4.1 MMOL/L (ref 3.5–5.1)
PROT SERPL-MCNC: 6.4 G/DL (ref 6.4–8.2)
PROT UR STRIP-MCNC: NEGATIVE MG/DL
Q-T INTERVAL, ECG07: 354 MS
QRS DURATION, ECG06: 88 MS
QTC CALCULATION (BEZET), ECG08: 398 MS
RBC # BLD AUTO: 2.98 M/UL (ref 4.1–5.7)
RBC #/AREA URNS HPF: ABNORMAL /HPF (ref 0–5)
RBC MORPH BLD: ABNORMAL
SARS-COV-2, COV2: NORMAL
SARS-COV-2, COV2: NORMAL
SERVICE CMNT-IMP: NORMAL
SODIUM SERPL-SCNC: 140 MMOL/L (ref 136–145)
SOURCE, COVRS: NORMAL
SP GR UR REFRACTOMETRY: 1.02 (ref 1–1.03)
UA: UC IF INDICATED,UAUC: ABNORMAL
UROBILINOGEN UR QL STRIP.AUTO: 1 EU/DL (ref 0.2–1)
VENTRICULAR RATE, ECG03: 76 BPM
WBC # BLD AUTO: 10.6 K/UL (ref 4.1–11.1)
WBC MORPH BLD: ABNORMAL
WBC URNS QL MICRO: ABNORMAL /HPF (ref 0–4)

## 2021-01-27 PROCEDURE — 36415 COLL VENOUS BLD VENIPUNCTURE: CPT

## 2021-01-27 PROCEDURE — 74011250636 HC RX REV CODE- 250/636: Performed by: INTERNAL MEDICINE

## 2021-01-27 PROCEDURE — 81001 URINALYSIS AUTO W/SCOPE: CPT

## 2021-01-27 PROCEDURE — 83690 ASSAY OF LIPASE: CPT

## 2021-01-27 PROCEDURE — 71045 X-RAY EXAM CHEST 1 VIEW: CPT

## 2021-01-27 PROCEDURE — 85025 COMPLETE CBC W/AUTO DIFF WBC: CPT

## 2021-01-27 PROCEDURE — 80053 COMPREHEN METABOLIC PANEL: CPT

## 2021-01-27 PROCEDURE — 93005 ELECTROCARDIOGRAM TRACING: CPT

## 2021-01-27 PROCEDURE — 99285 EMERGENCY DEPT VISIT HI MDM: CPT

## 2021-01-27 PROCEDURE — 87635 SARS-COV-2 COVID-19 AMP PRB: CPT

## 2021-01-27 PROCEDURE — 74011250637 HC RX REV CODE- 250/637: Performed by: INTERNAL MEDICINE

## 2021-01-27 PROCEDURE — 65660000000 HC RM CCU STEPDOWN

## 2021-01-27 PROCEDURE — 82962 GLUCOSE BLOOD TEST: CPT

## 2021-01-27 PROCEDURE — 74176 CT ABD & PELVIS W/O CONTRAST: CPT

## 2021-01-27 PROCEDURE — 74011250636 HC RX REV CODE- 250/636: Performed by: EMERGENCY MEDICINE

## 2021-01-27 PROCEDURE — 74011000258 HC RX REV CODE- 258: Performed by: EMERGENCY MEDICINE

## 2021-01-27 PROCEDURE — U0005 INFEC AGEN DETEC AMPLI PROBE: HCPCS

## 2021-01-27 PROCEDURE — 70450 CT HEAD/BRAIN W/O DYE: CPT

## 2021-01-27 RX ORDER — DIVALPROEX SODIUM 500 MG/1
500 TABLET, DELAYED RELEASE ORAL
Status: DISCONTINUED | OUTPATIENT
Start: 2021-01-27 | End: 2021-01-29 | Stop reason: HOSPADM

## 2021-01-27 RX ORDER — DEXTROSE 50 % IN WATER (D50W) INTRAVENOUS SYRINGE
25-50 AS NEEDED
Status: DISCONTINUED | OUTPATIENT
Start: 2021-01-27 | End: 2021-01-29 | Stop reason: HOSPADM

## 2021-01-27 RX ORDER — AZITHROMYCIN 250 MG/1
250 TABLET, FILM COATED ORAL DAILY
Status: DISCONTINUED | OUTPATIENT
Start: 2021-01-28 | End: 2021-01-29 | Stop reason: HOSPADM

## 2021-01-27 RX ORDER — ENOXAPARIN SODIUM 100 MG/ML
30 INJECTION SUBCUTANEOUS EVERY 12 HOURS
Status: DISCONTINUED | OUTPATIENT
Start: 2021-01-27 | End: 2021-01-29

## 2021-01-27 RX ORDER — INSULIN LISPRO 100 [IU]/ML
INJECTION, SOLUTION INTRAVENOUS; SUBCUTANEOUS
Status: DISCONTINUED | OUTPATIENT
Start: 2021-01-27 | End: 2021-01-29 | Stop reason: HOSPADM

## 2021-01-27 RX ORDER — ALOGLIPTIN 6.25 MG/1
12.5 TABLET, FILM COATED ORAL DAILY
Refills: 3 | Status: DISCONTINUED | OUTPATIENT
Start: 2021-01-28 | End: 2021-01-29 | Stop reason: HOSPADM

## 2021-01-27 RX ORDER — PROMETHAZINE HYDROCHLORIDE 25 MG/1
12.5 TABLET ORAL
Status: DISCONTINUED | OUTPATIENT
Start: 2021-01-27 | End: 2021-01-29 | Stop reason: HOSPADM

## 2021-01-27 RX ORDER — ONDANSETRON 2 MG/ML
4 INJECTION INTRAMUSCULAR; INTRAVENOUS
Status: DISCONTINUED | OUTPATIENT
Start: 2021-01-27 | End: 2021-01-29 | Stop reason: HOSPADM

## 2021-01-27 RX ORDER — SODIUM CHLORIDE 0.9 % (FLUSH) 0.9 %
5-40 SYRINGE (ML) INJECTION EVERY 8 HOURS
Status: DISCONTINUED | OUTPATIENT
Start: 2021-01-27 | End: 2021-01-29 | Stop reason: HOSPADM

## 2021-01-27 RX ORDER — SODIUM CHLORIDE 0.9 % (FLUSH) 0.9 %
5-40 SYRINGE (ML) INJECTION AS NEEDED
Status: DISCONTINUED | OUTPATIENT
Start: 2021-01-27 | End: 2021-01-29 | Stop reason: HOSPADM

## 2021-01-27 RX ORDER — BISACODYL 5 MG
5 TABLET, DELAYED RELEASE (ENTERIC COATED) ORAL DAILY PRN
Status: DISCONTINUED | OUTPATIENT
Start: 2021-01-27 | End: 2021-01-29 | Stop reason: HOSPADM

## 2021-01-27 RX ORDER — ATORVASTATIN CALCIUM 40 MG/1
40 TABLET, FILM COATED ORAL
Status: DISCONTINUED | OUTPATIENT
Start: 2021-01-27 | End: 2021-01-29 | Stop reason: HOSPADM

## 2021-01-27 RX ORDER — TOPIRAMATE 100 MG/1
300 TABLET, FILM COATED ORAL 2 TIMES DAILY
Status: DISCONTINUED | OUTPATIENT
Start: 2021-01-27 | End: 2021-01-29 | Stop reason: HOSPADM

## 2021-01-27 RX ORDER — POLYETHYLENE GLYCOL 3350 17 G/17G
17 POWDER, FOR SOLUTION ORAL DAILY
Status: DISCONTINUED | OUTPATIENT
Start: 2021-01-28 | End: 2021-01-29 | Stop reason: HOSPADM

## 2021-01-27 RX ORDER — GLIMEPIRIDE 4 MG/1
8 TABLET ORAL
Status: DISCONTINUED | OUTPATIENT
Start: 2021-01-28 | End: 2021-01-29 | Stop reason: HOSPADM

## 2021-01-27 RX ORDER — LACTULOSE 10 G/15ML
20 SOLUTION ORAL; RECTAL 2 TIMES DAILY
Status: DISCONTINUED | OUTPATIENT
Start: 2021-01-27 | End: 2021-01-28

## 2021-01-27 RX ORDER — ACETAMINOPHEN 325 MG/1
650 TABLET ORAL
Status: DISCONTINUED | OUTPATIENT
Start: 2021-01-27 | End: 2021-01-29 | Stop reason: HOSPADM

## 2021-01-27 RX ORDER — MAGNESIUM SULFATE 100 %
4 CRYSTALS MISCELLANEOUS AS NEEDED
Status: DISCONTINUED | OUTPATIENT
Start: 2021-01-27 | End: 2021-01-29 | Stop reason: HOSPADM

## 2021-01-27 RX ORDER — ACETAMINOPHEN 650 MG/1
650 SUPPOSITORY RECTAL
Status: DISCONTINUED | OUTPATIENT
Start: 2021-01-27 | End: 2021-01-29 | Stop reason: HOSPADM

## 2021-01-27 RX ORDER — DEXAMETHASONE 4 MG/1
6 TABLET ORAL DAILY
Status: DISCONTINUED | OUTPATIENT
Start: 2021-01-27 | End: 2021-01-28

## 2021-01-27 RX ORDER — DIVALPROEX SODIUM 250 MG/1
250 TABLET, DELAYED RELEASE ORAL DAILY
Status: DISCONTINUED | OUTPATIENT
Start: 2021-01-28 | End: 2021-01-29 | Stop reason: HOSPADM

## 2021-01-27 RX ORDER — FUROSEMIDE 40 MG/1
40 TABLET ORAL DAILY
Status: DISCONTINUED | OUTPATIENT
Start: 2021-01-28 | End: 2021-01-29 | Stop reason: HOSPADM

## 2021-01-27 RX ADMIN — AZITHROMYCIN MONOHYDRATE 500 MG: 500 INJECTION, POWDER, LYOPHILIZED, FOR SOLUTION INTRAVENOUS at 18:56

## 2021-01-27 RX ADMIN — DIVALPROEX SODIUM 500 MG: 500 TABLET, DELAYED RELEASE ORAL at 23:01

## 2021-01-27 RX ADMIN — CEFTRIAXONE SODIUM 2 G: 2 INJECTION, POWDER, FOR SOLUTION INTRAMUSCULAR; INTRAVENOUS at 18:01

## 2021-01-27 RX ADMIN — TOPIRAMATE 300 MG: 100 TABLET, FILM COATED ORAL at 23:01

## 2021-01-27 RX ADMIN — ENOXAPARIN SODIUM 30 MG: 30 INJECTION SUBCUTANEOUS at 23:01

## 2021-01-27 RX ADMIN — ATORVASTATIN CALCIUM 40 MG: 40 TABLET, FILM COATED ORAL at 23:01

## 2021-01-27 RX ADMIN — SODIUM CHLORIDE 500 ML: 9 INJECTION, SOLUTION INTRAVENOUS at 16:06

## 2021-01-27 RX ADMIN — DEXAMETHASONE 6 MG: 4 TABLET ORAL at 23:01

## 2021-01-27 RX ADMIN — PREGABALIN 150 MG: 25 CAPSULE ORAL at 23:01

## 2021-01-27 RX ADMIN — LACTULOSE 30 ML: 20 SOLUTION ORAL at 23:01

## 2021-01-27 RX ADMIN — Medication 10 ML: at 18:03

## 2021-01-27 NOTE — ED NOTES
Assumed care of pt via EMS stretcher from home. Pt is A&O x 2 and family reported to ems CC of lethergy for the last few days. Pt usually walks with a walker but family hasn't been able to get pt up. Pt had a right back upper tooth pulled last week. Family reports unable to start antibiotic until yesterday. Family reports pt hasn't urinated or had a BM in the last 24hours. 1300- Pt's mother at bedside. 1345- MD Sagastume at bedside evaluating pt    1435- Xray at bedside. 1520- Provided pt's mother with MRI checklist.     9662- MRI came and picked up pt's MRI checklist.     1930- Bedside shift change report given to [address] (oncoming nurse) by Frankie Romeo (offgoing nurse). Report included the following information SBAR, ED Summary, MAR and Recent Results   .

## 2021-01-27 NOTE — ROUTINE PROCESS

## 2021-01-27 NOTE — ED PROVIDER NOTES
EMERGENCY DEPARTMENT HISTORY AND PHYSICAL EXAM      Date: 1/27/2021  Patient Name: Jaqueline Jones    History of Presenting Illness     Chief Complaint   Patient presents with    Urinary Retention     per family for the last 24 hours    Constipation     per family for the last 24 hours       History Provided By: Patient's Mother    HPI: Jaqueline Jones, 60 y.o. male with history of mental retardation, diabetes, hypertension, seizure disorder, liver cirrhosis presents to the ED with cc of decreased activity, generalized weakness, failure to thrive, concern for urinary retention and constipation. Symptoms have been present over the past 2 to 3 days. Per mother patient has had difficulty ambulating and has had generalized weakness and failure to thrive with poor p.o. intake. He normally is able to ambulate with use of walker however this has decreased over the past few months and he is now unable to get out of bed on his own. Mother has been in contact with case management through [facility] and they do have resources including physical therapy and home health nursing at home. Patient has had decreased coordination and has been sleeping more. Mother states he has been more confused. They have been using laxatives for constipation. There are reports of urinary retention, it is unclear when the last time patient urinated was her mother believes he has not urinated since last night and has been complaining of lower abdominal pain. There are no other complaints, changes, or physical findings at this time. PCP: Rose Marie Fierro MD    No current facility-administered medications on file prior to encounter.       Current Outpatient Medications on File Prior to Encounter   Medication Sig Dispense Refill    topiramate (TOPAMAX) 200 mg tablet take one and one half tablet twice daily 270 Tab 3    cyclobenzaprine (FLEXERIL) 10 mg tablet cyclobenzaprine 10 mg tablet   Take 1 tablet 3 times a day by oral route as needed.  lactulose (CHRONULAC) 10 gram/15 mL solution       ramipriL (ALTACE) 5 mg capsule       pregabalin (LYRICA) 75 mg capsule Take 1 Cap by mouth two (2) times a day. Max Daily Amount: 150 mg. 180 Cap 1    furosemide (LASIX) 40 mg tablet Take 1 Tab by mouth daily. 30 Tab 2    acetaminophen (TYLENOL) 500 mg tablet Take 2 Tabs by mouth every six (6) hours as needed for Pain. 20 Tab 0    metFORMIN ER (GLUCOPHAGE XR) 500 mg tablet TAKE TWO TABLETS BY MOUTH TWICE DAILY  360 Tab 3    linaGLIPtin (TRADJENTA) 5 mg tablet Take 1 Tab by mouth daily. 90 Tab 3    pioglitazone (ACTOS) 45 mg tablet Take 1 Tab by mouth daily. 90 Tab 3    divalproex DR (DEPAKOTE) 500 mg tablet TAKE ONE TABLET BY MOUTH EVERY MORNING AND TWO TABS AT NIGHT 270 Tab 3    atorvastatin (LIPITOR) 40 mg tablet Take 1 Tab by mouth nightly. 90 Tab 3    glimepiride (AMARYL) 4 mg tablet TAKE TWO TABLETS BY MOUTH IN THE MORNING 849 Tab 3    folic acid (FOLVITE) 1 mg tablet Take 1 Tab by mouth daily. 90 Tab 3    benzonatate (TESSALON) 100 mg capsule Take 1 Cap by mouth nightly as needed for Cough. 30 Cap 0    triamcinolone acetonide (KENALOG) 0.1 % ointment Apply  to affected area two (2) times a day. apply thin layer 2 x daily to groin as needed for rash      chlorhexidine (PERIDEX) 0.12 % solution Take 15 mL by mouth every twelve (12) hours.  Calcium-Cholecalciferol, D3, (CALTRATE-600 PLUS VITAMIN D3) 600-400 mg-unit Tab Take 1 Tab by mouth daily.          Past History     Past Medical History:  Past Medical History:   Diagnosis Date    Contact dermatitis and other eczema, due to unspecified cause     psoriasis    Diabetes ([address]     Eosinophilia     Hypercholesterolemia     Hypertension     Mental retardation     Seizures ([address]     Skin cancer     Strongyloides stercoralis infection 8/28/2014       Past Surgical History:  Past Surgical History:   Procedure Laterality Date    ENDOSCOPY, COLON, DIAGNOSTIC  11/08/2007     Jasmeet normal except small internal hemorrhoids repeat 11/2017       Family History:  Family History   Problem Relation Age of Onset    Other Mother         PMR    Hypertension Mother     Cancer Father         Lung    Diabetes Brother        Social History:  Social History     Tobacco Use    Smoking status: Never Smoker    Smokeless tobacco: Never Used   Substance Use Topics    Alcohol use: No    Drug use: No       Allergies:  No Known Allergies      Review of Systems   Review of Systems   Unable to perform ROS: Patient nonverbal       Physical Exam   Physical Exam  Vitals signs and nursing note reviewed. Constitutional:       General: He is not in acute distress. Appearance: Normal appearance. He is not ill-appearing or toxic-appearing. HENT:      Head: Normocephalic and atraumatic. Nose: Nose normal.      Mouth/Throat:      Mouth: Mucous membranes are moist.   Eyes:      Extraocular Movements: Extraocular movements intact. Pupils: Pupils are equal, round, and reactive to light. Neck:      Musculoskeletal: Normal range of motion and neck supple. Cardiovascular:      Rate and Rhythm: Normal rate and regular rhythm. Heart sounds: No murmur. Pulmonary:      Effort: Pulmonary effort is normal. No respiratory distress. Breath sounds: Normal breath sounds. No wheezing. Abdominal:      General: There is no distension. Palpations: Abdomen is soft. Tenderness: There is no abdominal tenderness. There is no guarding or rebound. Musculoskeletal: Normal range of motion. General: No swelling or tenderness. Right lower leg: No edema. Left lower leg: No edema. Skin:     General: Skin is warm and dry. Coloration: Skin is not pale. Findings: No erythema. Neurological:      General: No focal deficit present. Mental Status: He is alert. Mental status is at baseline.          Diagnostic Study Results     Labs -     Recent Results (from the past 12 hour(s))   URINALYSIS W/ REFLEX CULTURE    Collection Time: 01/27/21  1:14 PM    Specimen: Urine   Result Value Ref Range    Color YELLOW/STRAW      Appearance CLEAR CLEAR      Specific gravity 1.021 1.003 - 1.030      pH (UA) 7.0 5.0 - 8.0      Protein Negative NEG mg/dL    Glucose 100 (A) NEG mg/dL    Ketone Negative NEG mg/dL    Bilirubin Negative NEG      Blood Negative NEG      Urobilinogen 1.0 0.2 - 1.0 EU/dL    Nitrites Negative NEG      Leukocyte Esterase Negative NEG      WBC 0-4 0 - 4 /hpf    RBC 0-5 0 - 5 /hpf    Epithelial cells FEW FEW /lpf    Bacteria Negative NEG /hpf    UA:UC IF INDICATED CULTURE NOT INDICATED BY UA RESULT CNI      Hyaline cast 0-2 0 - 5 /lpf   CBC WITH AUTOMATED DIFF    Collection Time: 01/27/21  2:03 PM   Result Value Ref Range    WBC 10.6 4.1 - 11.1 K/uL    RBC 2.98 (L) 4.10 - 5.70 M/uL    HGB 10.0 (L) 12.1 - 17.0 g/dL    HCT 32.9 (L) 36.6 - 50.3 %    .4 (H) 80.0 - 99.0 FL    MCH 33.6 26.0 - 34.0 PG    MCHC 30.4 30.0 - 36.5 g/dL    RDW 14.1 11.5 - 14.5 %    PLATELET 78 (L) 969 - 400 K/uL    MPV 10.1 8.9 - 12.9 FL    NRBC 0.0 0  WBC    ABSOLUTE NRBC 0.00 0.00 - 0.01 K/uL    NEUTROPHILS 46 32 - 75 %    BAND NEUTROPHILS 7 %    LYMPHOCYTES 38 12 - 49 %    MONOCYTES 5 5 - 13 %    EOSINOPHILS 4 0 - 7 %    BASOPHILS 0 0 - 1 %    IMMATURE GRANULOCYTES 0 0.0 - 0.5 %    ABS. NEUTROPHILS 5.7 1.8 - 8.0 K/UL    ABS. LYMPHOCYTES 4.0 (H) 0.8 - 3.5 K/UL    ABS. MONOCYTES 0.5 0.0 - 1.0 K/UL    ABS. EOSINOPHILS 0.4 0.0 - 0.4 K/UL    ABS. BASOPHILS 0.0 0.0 - 0.1 K/UL    ABS. IMM.  GRANS. 0.0 0.00 - 0.04 K/UL    DF MANUAL      RBC COMMENTS MACROCYTOSIS  1+        WBC COMMENTS ATYPICAL LYMPHOCYTES PRESENT     METABOLIC PANEL, COMPREHENSIVE    Collection Time: 01/27/21  2:03 PM   Result Value Ref Range    Sodium 140 136 - 145 mmol/L    Potassium 4.1 3.5 - 5.1 mmol/L    Chloride 110 (H) 97 - 108 mmol/L    CO2 25 21 - 32 mmol/L    Anion gap 5 5 - 15 mmol/L    Glucose 186 (H) 65 - 100 mg/dL    BUN 22 (H) 6 - 20 MG/DL    Creatinine 1.03 0.70 - 1.30 MG/DL    BUN/Creatinine ratio 21 (H) 12 - 20      GFR est AA >60 >60 ml/min/1.73m2    GFR est non-AA >60 >60 ml/min/1.73m2    Calcium 8.8 8.5 - 10.1 MG/DL    Bilirubin, total 0.4 0.2 - 1.0 MG/DL    ALT (SGPT) 9 (L) 12 - 78 U/L    AST (SGOT) 8 (L) 15 - 37 U/L    Alk. phosphatase 47 45 - 117 U/L    Protein, total 6.4 6.4 - 8.2 g/dL    Albumin 2.5 (L) 3.5 - 5.0 g/dL    Globulin 3.9 2.0 - 4.0 g/dL    A-G Ratio 0.6 (L) 1.1 - 2.2     LIPASE    Collection Time: 01/27/21  2:03 PM   Result Value Ref Range    Lipase 81 73 - 393 U/L   EKG, 12 LEAD, INITIAL    Collection Time: 01/27/21  3:56 PM   Result Value Ref Range    Ventricular Rate 76 BPM    Atrial Rate 76 BPM    P-R Interval 154 ms    QRS Duration 88 ms    Q-T Interval 354 ms    QTC Calculation (Bezet) 398 ms    Calculated P Axis 0 degrees    Calculated R Axis -9 degrees    Calculated T Axis 3 degrees    Diagnosis       Sinus rhythm with premature supraventricular complexes    Confirmed by Leighton Najjar ([id number]) on 1/27/2021 4:18:45 PM         Radiologic Studies -   XR CHEST PORT   Final Result   1. Bilateral lower lobe infiltrates left greater than right consistent with   viral pneumonia. CT HEAD WO CONT   Final Result   No change no acute findings. CT ABD PELV WO CONT   Final Result      1. There is left lower lobe airspace disease may represent pneumonia. There is a   smaller focus of atelectasis/airspace disease right base. 2. There is mild compression of the inferior endplate of C84 which is new since   11/22/2020 could represent an acute fracture and correlation would be helpful. 3. There is mild fecal stasis in the colon. 4. Splenomegaly is unchanged. MRI [facility] SPINE WO CONT    (Results Pending)   MRI LUMB SPINE WO CONT    (Results Pending)     CT Results  (Last 48 hours)               01/27/21 1422  CT HEAD WO CONT Final result    Impression:  No change no acute findings. Narrative:  EXAM: CT HEAD WO CONT       INDICATION: AMS       COMPARISON: November 22, 2020. CONTRAST: None. TECHNIQUE: Unenhanced CT of the head was performed using 5 mm images. Brain and   bone windows were generated. Coronal and sagittal reformats. CT dose reduction   was achieved through use of a standardized protocol tailored for this   examination and automatic exposure control for dose modulation. FINDINGS:   No extra-axial fluid collection hemorrhage shift or masses . 01/27/21 1325  CT ABD PELV WO CONT Final result    Impression:      1. There is left lower lobe airspace disease may represent pneumonia. There is a   smaller focus of atelectasis/airspace disease right base. 2. There is mild compression of the inferior endplate of G24 which is new since   11/22/2020 could represent an acute fracture and correlation would be helpful. 3. There is mild fecal stasis in the colon. 4. Splenomegaly is unchanged. Narrative:  EXAM: CT ABD PELV WO CONT       INDICATION: ABd distenstion, no urine or stool in 24 hours, mentally challenged       COMPARISON: 2020       CONTRAST:  None. TECHNIQUE:    Thin axial images were obtained through the abdomen and pelvis. Coronal and   sagittal reformats were generated. Oral contrast was not administered. CT dose   reduction was achieved through use of a standardized protocol tailored for this   examination and automatic exposure control for dose modulation. The absence of intravenous contrast material reduces the sensitivity for   evaluation of the vasculature and solid organs. FINDINGS:    LOWER THORAX: There is left lower lobe airspace disease suspicious for   pneumonia. There is minor atelectasis/airspace disease right lung base. There is   cardiomegaly which is unchanged   LIVER: No mass. BILIARY TREE: Gallbladder is within normal limits. CBD is not dilated.    SPLEEN: Splenomegaly is stable   PANCREAS: No focal abnormality. A few borderline enlarged peripancreatic head   lymph nodes are stable   ADRENALS: Unremarkable. KIDNEYS/URETERS: No calculus or hydronephrosis. STOMACH: Unremarkable. SMALL BOWEL: No dilatation or wall thickening. COLON: No dilatation or wall thickening. There is mild fecal stasis in the colon   APPENDIX: Normal   PERITONEUM: No ascites or pneumoperitoneum. RETROPERITONEUM: No lymphadenopathy or aortic aneurysm. REPRODUCTIVE ORGANS: Prostate is within normal limits   URINARY BLADDER: Mitchell catheter overlies urinary bladder. There is urine within   the urinary bladder. BONES: There is a fracture of the inferior endplate of D78 which could be acute   to subacute and is not present 11/22/2020   ABDOMINAL WALL: No mass or hernia. ADDITIONAL COMMENTS: N/A               CXR Results  (Last 48 hours)               01/27/21 1443  XR CHEST PORT Final result    Impression:  1. Bilateral lower lobe infiltrates left greater than right consistent with   viral pneumonia. Narrative:  EXAM: XR CHEST PORT       INDICATION: Altered mental status       COMPARISON: CT abdomen 1/27/2020 and chest radiograph 12/5/2020       FINDINGS: A portable AP radiograph of the chest was obtained at 1432 hours. Heart size is at the lungs are normal. The lungs demonstrate low lung volumes. There are mild infiltrates at both lung bases left greater than right consistent   with pneumonia. This could be due to viral pneumonia. Upper lung zones are   clear. No pleural effusions. Medical Decision Making   I am the first provider for this patient. I reviewed the vital signs, available nursing notes, past medical history, past surgical history, family history and social history. Vital Signs-Reviewed the patient's vital signs.   Patient Vitals for the past 12 hrs:   Temp Pulse Resp BP SpO2   01/27/21 1600 -- -- -- (!) 103/53 99 %   01/27/21 1431 -- 79 16 (!) 101/51 99 %   01/27/21 1331 -- -- -- 104/63 --   01/27/21 1301 98.2 °F (36.8 °C) 77 16 (!) 107/59 97 %       Records Reviewed: Nursing Notes, Old Medical Records, Previous Radiology Studies and Previous Laboratory Studies  I personally reviewed H&P records from 12/4/2020. Provider Notes (Medical Decision Making):   67-year-old male with mental retardation here with urinary retention, constipation, lower abdominal discomfort, generalized weakness, ambulatory dysfunction, and failure to thrive. He presents with his mother. He appears clinically well and nontoxic. He is afebrile and vital signs stable. Abdomen soft, benign, nontender nonperitoneal but patient is unable to give further history and I will obtain CT abdomen and pelvis as there are reports of lower abdominal discomfort, urinary retention, and constipation. Bladder scan demonstrated 400 cc of urine that is being retained and Mitchell catheter placed by ED RN. Mother is upset that patient is followed by PCP, neurology, hematology, and that \"nobody is talking to each other\". I will evaluate with blood work, CT head, abdomen, pelvis, chest x-ray, urinalysis, and reassess. Case management offered but mother states that she does not need further resources and is not interested in placement at this time. On reexamination patient noted to become hypoxic dropping O2 sats to the mid 80s on room air. He is found to have bilateral pneumonia with viral atypical features concern for COVID-19 infection. I will obtain SARS-CoV-2 swab and treat with Rocephin and azithromycin. Will discuss with hospitalist for admission. Additionally given his new urinary retention, constipation, ambulatory dysfunction, and the new finding of T12 compression fracture on CT abdomen pelvis I will obtain MRI T-spine/L-spine to rule out spinal cord syndrome and cauda equina syndrome. ED Course:   Initial assessment performed.  The patients presenting problems have been discussed, and they are in agreement with the care plan formulated and outlined with them. I have encouraged them to ask questions as they arise throughout their visit. Admission Note:  Patient is being admitted to the hospital by Dr. Cookie Pulido, Service: Hospitalist.  The results of their tests and reasons for their admission have been discussed with them and available family. They convey agreement and understanding for the need to be admitted and for their admission diagnosis. Critical Care Documentation  I have spent 35 minutes of critical care time in evaluating and treating this patient. This includes time spent at bedside, time with family and decision makers, documentation, review of labs and imaging, and/or consultation with specialists. It does not include time spent on separately billed procedures. This patient presents with a critical illness or injury that acutely impairs one or more vital organ systems such that there is a high probability of imminent or life threatening deterioration in the patient's condition. This case involved decision making of high complexity to assess, manipulate, and support vital organ system failure and/or to prevent further life threatening deterioration of the patient's condition. Failure to initiate these interventions on an urgent basis would likely result in sudden, clinically significant or life threatening deterioration in the patient's condition. This case required my direct attention, intervention, and personal management for the time documented above. This time does not include separately billed interventions/procedures which are separately documented. Abnormal findings supporting critical care: Hypoxia in setting of COVID-19 pneumonia, acute urinary and bowel retention in setting of new T12 injury concerning for cauda equina syndrome  Interventions to support critical care: Reassessment of airway, oxygen levels, treatment with antibiotics, titration of oxygen.   Failure to intervene may result in: Progression into respiratory failure, worsening hypoxia, spinal cord injury  Nora Travis MD      Disposition:  Admit        Diagnosis     Clinical Impression:   1. Acute respiratory failure with hypoxia ([address]    2. Community acquired pneumonia, unspecified laterality    3. Urinary retention    4. Compression fracture of T12 vertebra, initial encounter [facility])        Attestations:    Nora Travis MD    Please note that this dictation was completed with Network[employer], the computer voice recognition software. Quite often unanticipated grammatical, syntax, homophones, and other interpretive errors are inadvertently transcribed by the computer software. Please disregard these errors. Please excuse any errors that have escaped final proofreading. Thank you.

## 2021-01-27 NOTE — PROGRESS NOTES
ACM continues to follow patient. Patient with several hospital admissions. ACM planned to outreach to patients mother today for continued CM assistance . Note patient is again in the ER today. ACM will follow and try to follow up after discharge.

## 2021-01-28 ENCOUNTER — APPOINTMENT (OUTPATIENT)
Dept: MRI IMAGING | Age: 66
DRG: 193 | End: 2021-01-28
Attending: EMERGENCY MEDICINE
Payer: MEDICARE

## 2021-01-28 ENCOUNTER — APPOINTMENT (OUTPATIENT)
Dept: NON INVASIVE DIAGNOSTICS | Age: 66
DRG: 193 | End: 2021-01-28
Attending: STUDENT IN AN ORGANIZED HEALTH CARE EDUCATION/TRAINING PROGRAM
Payer: MEDICARE

## 2021-01-28 LAB
25(OH)D3 SERPL-MCNC: 27.5 NG/ML (ref 30–100)
ALBUMIN SERPL-MCNC: 2.3 G/DL (ref 3.5–5)
ALBUMIN/GLOB SERPL: 0.6 {RATIO} (ref 1.1–2.2)
ALP SERPL-CCNC: 45 U/L (ref 45–117)
ALT SERPL-CCNC: 10 U/L (ref 12–78)
AMMONIA PLAS-SCNC: 37 UMOL/L
ANION GAP SERPL CALC-SCNC: 7 MMOL/L (ref 5–15)
AST SERPL-CCNC: 11 U/L (ref 15–37)
BASOPHILS # BLD: 0 K/UL (ref 0–0.1)
BASOPHILS NFR BLD: 0 % (ref 0–1)
BILIRUB SERPL-MCNC: 0.3 MG/DL (ref 0.2–1)
BNP SERPL-MCNC: 1136 PG/ML
BUN SERPL-MCNC: 20 MG/DL (ref 6–20)
BUN/CREAT SERPL: 25 (ref 12–20)
CALCIUM SERPL-MCNC: 8.4 MG/DL (ref 8.5–10.1)
CHLORIDE SERPL-SCNC: 113 MMOL/L (ref 97–108)
CO2 SERPL-SCNC: 21 MMOL/L (ref 21–32)
CREAT SERPL-MCNC: 0.79 MG/DL (ref 0.7–1.3)
D DIMER PPP FEU-MCNC: 1.07 MG/L FEU (ref 0–0.65)
DIFFERENTIAL METHOD BLD: ABNORMAL
ECHO AO ROOT DIAM: 3.8 CM
ECHO AV AREA PEAK VELOCITY: 2.44 CM2
ECHO AV AREA VTI: 2.96 CM2
ECHO AV AREA/BSA PEAK VELOCITY: 1.2 CM2/M2
ECHO AV AREA/BSA VTI: 1.4 CM2/M2
ECHO AV MEAN GRADIENT: 2 MMHG
ECHO AV PEAK GRADIENT: 4.4 MMHG
ECHO AV PEAK VELOCITY: 104.92 CM/S
ECHO AV VTI: 23.64 CM
ECHO LA AREA 4C: 15.71 CM2
ECHO LA MAJOR AXIS: 3.39 CM
ECHO LA MINOR AXIS: 1.6 CM
ECHO LA VOL 4C: 41.93 ML (ref 18–58)
ECHO LA VOLUME INDEX A4C: 19.81 ML/M2 (ref 16–28)
ECHO LV E' LATERAL VELOCITY: 15.34 CM/S
ECHO LV E' SEPTAL VELOCITY: 6.49 CM/S
ECHO LV EDV A4C: 144.49 ML
ECHO LV EDV INDEX A4C: 68.3 ML/M2
ECHO LV EJECTION FRACTION A4C: 63 PERCENT
ECHO LV ESV A4C: 53.98 ML
ECHO LV ESV INDEX A4C: 25.5 ML/M2
ECHO LV INTERNAL DIMENSION DIASTOLIC: 5.46 CM (ref 4.2–5.9)
ECHO LV INTERNAL DIMENSION SYSTOLIC: 3.78 CM
ECHO LV IVSD: 1.19 CM (ref 0.6–1)
ECHO LV MASS 2D: 263 G (ref 88–224)
ECHO LV MASS INDEX 2D: 124.3 G/M2 (ref 49–115)
ECHO LV POSTERIOR WALL DIASTOLIC: 1.17 CM (ref 0.6–1)
ECHO LVOT CARDIAC OUTPUT: 3.87 LITER/MINUTE
ECHO LVOT DIAM: 2.25 CM
ECHO LVOT PEAK GRADIENT: 1.65 MMHG
ECHO LVOT PEAK VELOCITY: 64.31 CM/S
ECHO LVOT SV: 70 ML
ECHO LVOT VTI: 17.56 CM
ECHO MV A VELOCITY: 63.18 CM/S
ECHO MV AREA PHT: 2.23 CM2
ECHO MV AREA VTI: 2.91 CM2
ECHO MV E DECELERATION TIME (DT): 322.71 MS
ECHO MV E VELOCITY: 52.42 CM/S
ECHO MV E/A RATIO: 0.83
ECHO MV E/E' LATERAL: 3.42
ECHO MV E/E' RATIO (AVERAGED): 5.75
ECHO MV E/E' SEPTAL: 8.08
ECHO MV MAX VELOCITY: 76.81 CM/S
ECHO MV MEAN GRADIENT: 0.91 MMHG
ECHO MV PEAK GRADIENT: 2.36 MMHG
ECHO MV PRESSURE HALF TIME (PHT): 98.58 MS
ECHO MV VTI: 24.05 CM
ECHO PV MAX VELOCITY: 50.8 CM/S
ECHO PV PEAK INSTANTANEOUS GRADIENT SYSTOLIC: 1.03 MMHG
EOSINOPHIL # BLD: 0.2 K/UL (ref 0–0.4)
EOSINOPHIL NFR BLD: 2 % (ref 0–7)
ERYTHROCYTE [DISTWIDTH] IN BLOOD BY AUTOMATED COUNT: 13.8 % (ref 11.5–14.5)
EST. AVERAGE GLUCOSE BLD GHB EST-MCNC: 146 MG/DL
FERRITIN SERPL-MCNC: 90 NG/ML (ref 26–388)
GLOBULIN SER CALC-MCNC: 3.8 G/DL (ref 2–4)
GLUCOSE BLD STRIP.AUTO-MCNC: 146 MG/DL (ref 65–100)
GLUCOSE BLD STRIP.AUTO-MCNC: 153 MG/DL (ref 65–100)
GLUCOSE BLD STRIP.AUTO-MCNC: 179 MG/DL (ref 65–100)
GLUCOSE BLD STRIP.AUTO-MCNC: 202 MG/DL (ref 65–100)
GLUCOSE SERPL-MCNC: 175 MG/DL (ref 65–100)
HBA1C MFR BLD: 6.7 % (ref 4–5.6)
HCT VFR BLD AUTO: 31.4 % (ref 36.6–50.3)
HGB BLD-MCNC: 9.6 G/DL (ref 12.1–17)
IMM GRANULOCYTES # BLD AUTO: 0 K/UL (ref 0–0.04)
IMM GRANULOCYTES NFR BLD AUTO: 0 % (ref 0–0.5)
INR PPP: 1.1 (ref 0.9–1.1)
LDH SERPL L TO P-CCNC: 172 U/L (ref 85–241)
LVOT MG: 0.74 MMHG
LYMPHOCYTES # BLD: 2.9 K/UL (ref 0.8–3.5)
LYMPHOCYTES NFR BLD: 31 % (ref 12–49)
MCH RBC QN AUTO: 33.9 PG (ref 26–34)
MCHC RBC AUTO-ENTMCNC: 30.6 G/DL (ref 30–36.5)
MCV RBC AUTO: 111 FL (ref 80–99)
METAMYELOCYTES NFR BLD MANUAL: 1 %
MONOCYTES # BLD: 0.9 K/UL (ref 0–1)
MONOCYTES NFR BLD: 10 % (ref 5–13)
MYELOCYTES NFR BLD MANUAL: 2 %
NEUTS BAND NFR BLD MANUAL: 3 %
NEUTS SEG # BLD: 5 K/UL (ref 1.8–8)
NEUTS SEG NFR BLD: 51 % (ref 32–75)
NRBC # BLD: 0 K/UL (ref 0–0.01)
NRBC BLD-RTO: 0 PER 100 WBC
PLATELET # BLD AUTO: 72 K/UL (ref 150–400)
PMV BLD AUTO: 10.4 FL (ref 8.9–12.9)
POTASSIUM SERPL-SCNC: 4 MMOL/L (ref 3.5–5.1)
PROCALCITONIN SERPL-MCNC: 0.15 NG/ML
PROT SERPL-MCNC: 6.1 G/DL (ref 6.4–8.2)
PROTHROMBIN TIME: 11.4 SEC (ref 9–11.1)
RBC # BLD AUTO: 2.83 M/UL (ref 4.1–5.7)
RBC MORPH BLD: ABNORMAL
RBC MORPH BLD: ABNORMAL
SARS-COV-2, XPLCVT: NOT DETECTED
SERVICE CMNT-IMP: ABNORMAL
SODIUM SERPL-SCNC: 141 MMOL/L (ref 136–145)
SOURCE, COVRS: NORMAL
VIT B12 SERPL-MCNC: 764 PG/ML (ref 193–986)
WBC # BLD AUTO: 9.3 K/UL (ref 4.1–11.1)

## 2021-01-28 PROCEDURE — 74011250637 HC RX REV CODE- 250/637: Performed by: STUDENT IN AN ORGANIZED HEALTH CARE EDUCATION/TRAINING PROGRAM

## 2021-01-28 PROCEDURE — 74011636637 HC RX REV CODE- 636/637: Performed by: INTERNAL MEDICINE

## 2021-01-28 PROCEDURE — 82306 VITAMIN D 25 HYDROXY: CPT

## 2021-01-28 PROCEDURE — 74011000258 HC RX REV CODE- 258: Performed by: INTERNAL MEDICINE

## 2021-01-28 PROCEDURE — 82607 VITAMIN B-12: CPT

## 2021-01-28 PROCEDURE — 83921 ORGANIC ACID SINGLE QUANT: CPT

## 2021-01-28 PROCEDURE — 80053 COMPREHEN METABOLIC PANEL: CPT

## 2021-01-28 PROCEDURE — 82962 GLUCOSE BLOOD TEST: CPT

## 2021-01-28 PROCEDURE — 85610 PROTHROMBIN TIME: CPT

## 2021-01-28 PROCEDURE — 85379 FIBRIN DEGRADATION QUANT: CPT

## 2021-01-28 PROCEDURE — 72148 MRI LUMBAR SPINE W/O DYE: CPT

## 2021-01-28 PROCEDURE — 82728 ASSAY OF FERRITIN: CPT

## 2021-01-28 PROCEDURE — 93306 TTE W/DOPPLER COMPLETE: CPT

## 2021-01-28 PROCEDURE — 84145 PROCALCITONIN (PCT): CPT

## 2021-01-28 PROCEDURE — 74011250636 HC RX REV CODE- 250/636: Performed by: INTERNAL MEDICINE

## 2021-01-28 PROCEDURE — 83036 HEMOGLOBIN GLYCOSYLATED A1C: CPT

## 2021-01-28 PROCEDURE — 36415 COLL VENOUS BLD VENIPUNCTURE: CPT

## 2021-01-28 PROCEDURE — 82140 ASSAY OF AMMONIA: CPT

## 2021-01-28 PROCEDURE — 85025 COMPLETE CBC W/AUTO DIFF WBC: CPT

## 2021-01-28 PROCEDURE — 83880 ASSAY OF NATRIURETIC PEPTIDE: CPT

## 2021-01-28 PROCEDURE — 74011250637 HC RX REV CODE- 250/637: Performed by: INTERNAL MEDICINE

## 2021-01-28 PROCEDURE — 65660000001 HC RM ICU INTERMED STEPDOWN

## 2021-01-28 PROCEDURE — 72146 MRI CHEST SPINE W/O DYE: CPT

## 2021-01-28 PROCEDURE — 83615 LACTATE (LD) (LDH) ENZYME: CPT

## 2021-01-28 RX ORDER — LANOLIN ALCOHOL/MO/W.PET/CERES
200 CREAM (GRAM) TOPICAL DAILY
Status: DISCONTINUED | OUTPATIENT
Start: 2021-01-29 | End: 2021-01-28

## 2021-01-28 RX ORDER — LANOLIN ALCOHOL/MO/W.PET/CERES
200 CREAM (GRAM) TOPICAL 3 TIMES DAILY
Status: DISCONTINUED | OUTPATIENT
Start: 2021-01-28 | End: 2021-01-28

## 2021-01-28 RX ORDER — LACTULOSE 10 G/15ML
20 SOLUTION ORAL; RECTAL 3 TIMES DAILY
Status: DISCONTINUED | OUTPATIENT
Start: 2021-01-28 | End: 2021-01-29 | Stop reason: HOSPADM

## 2021-01-28 RX ORDER — FOLIC ACID 1 MG/1
1 TABLET ORAL DAILY
Status: DISCONTINUED | OUTPATIENT
Start: 2021-01-28 | End: 2021-01-29 | Stop reason: HOSPADM

## 2021-01-28 RX ORDER — ASPIRIN 325 MG/1
200 TABLET, FILM COATED ORAL 3 TIMES DAILY
Status: COMPLETED | OUTPATIENT
Start: 2021-01-28 | End: 2021-01-28

## 2021-01-28 RX ORDER — ASPIRIN 325 MG/1
200 TABLET, FILM COATED ORAL DAILY
Status: DISCONTINUED | OUTPATIENT
Start: 2021-01-29 | End: 2021-01-29 | Stop reason: HOSPADM

## 2021-01-28 RX ADMIN — THIAMINE HCL TAB 100 MG 200 MG: 100 TAB at 10:47

## 2021-01-28 RX ADMIN — ATORVASTATIN CALCIUM 40 MG: 40 TABLET, FILM COATED ORAL at 21:41

## 2021-01-28 RX ADMIN — DIVALPROEX SODIUM 250 MG: 250 TABLET, DELAYED RELEASE ORAL at 09:15

## 2021-01-28 RX ADMIN — INSULIN LISPRO 2 UNITS: 100 INJECTION, SOLUTION INTRAVENOUS; SUBCUTANEOUS at 11:30

## 2021-01-28 RX ADMIN — ENOXAPARIN SODIUM 30 MG: 30 INJECTION SUBCUTANEOUS at 21:42

## 2021-01-28 RX ADMIN — Medication 10 ML: at 15:42

## 2021-01-28 RX ADMIN — FOLIC ACID 1 MG: 1 TABLET ORAL at 09:16

## 2021-01-28 RX ADMIN — PREGABALIN 150 MG: 25 CAPSULE ORAL at 09:16

## 2021-01-28 RX ADMIN — INSULIN LISPRO 2 UNITS: 100 INJECTION, SOLUTION INTRAVENOUS; SUBCUTANEOUS at 09:12

## 2021-01-28 RX ADMIN — GLIMEPIRIDE 8 MG: 4 TABLET ORAL at 09:41

## 2021-01-28 RX ADMIN — CEFTRIAXONE 1 G: 1 INJECTION, POWDER, FOR SOLUTION INTRAMUSCULAR; INTRAVENOUS at 18:41

## 2021-01-28 RX ADMIN — Medication 10 ML: at 10:49

## 2021-01-28 RX ADMIN — INSULIN LISPRO 2 UNITS: 100 INJECTION, SOLUTION INTRAVENOUS; SUBCUTANEOUS at 21:42

## 2021-01-28 RX ADMIN — Medication 10 ML: at 21:43

## 2021-01-28 RX ADMIN — TOPIRAMATE 300 MG: 100 TABLET, FILM COATED ORAL at 10:46

## 2021-01-28 RX ADMIN — INSULIN LISPRO 2 UNITS: 100 INJECTION, SOLUTION INTRAVENOUS; SUBCUTANEOUS at 18:40

## 2021-01-28 RX ADMIN — Medication 10 ML: at 10:50

## 2021-01-28 RX ADMIN — LACTULOSE 30 ML: 20 SOLUTION ORAL at 21:42

## 2021-01-28 RX ADMIN — ALOGLIPTIN 12.5 MG: 12.5 TABLET, FILM COATED ORAL at 10:46

## 2021-01-28 RX ADMIN — PREGABALIN 150 MG: 25 CAPSULE ORAL at 18:40

## 2021-01-28 RX ADMIN — AZITHROMYCIN 250 MG: 250 TABLET, FILM COATED ORAL at 09:15

## 2021-01-28 RX ADMIN — ENOXAPARIN SODIUM 30 MG: 30 INJECTION SUBCUTANEOUS at 09:13

## 2021-01-28 RX ADMIN — LACTULOSE 30 ML: 20 SOLUTION ORAL at 10:47

## 2021-01-28 RX ADMIN — DIVALPROEX SODIUM 500 MG: 500 TABLET, DELAYED RELEASE ORAL at 21:42

## 2021-01-28 RX ADMIN — THIAMINE HCL TAB 100 MG 200 MG: 100 TAB at 18:41

## 2021-01-28 RX ADMIN — THIAMINE HCL TAB 100 MG 200 MG: 100 TAB at 21:41

## 2021-01-28 RX ADMIN — TOPIRAMATE 300 MG: 100 TABLET, FILM COATED ORAL at 18:41

## 2021-01-28 RX ADMIN — POLYETHYLENE GLYCOL 3350 17 G: 17 POWDER, FOR SOLUTION ORAL at 09:17

## 2021-01-28 RX ADMIN — LACTULOSE 30 ML: 20 SOLUTION ORAL at 18:40

## 2021-01-28 NOTE — PROGRESS NOTES
Physical Therapy    Received PT order. Chart reviewed. Noted rapid COVID-19 test is negative but pt is pending PCR test and just arrived to ED. Pts BP also remains low. D-dimer is elevated as well as BMP. Will defer but continue to follow.      Rankin Lefort, PT

## 2021-01-28 NOTE — PROGRESS NOTES
Spiritual Care Assessment/Progress Note  [facility]      NAME: Edward Parks      MRN: [id number]  AGE: 60 y.o. SEX: male  Amish Affiliation: No Rastafarian   Language: English     1/28/2021     Total Time (in minutes): 5     Spiritual Assessment begun in [facility] through conversation with:         []Patient        [] Family    [] Friend(s)        Reason for Consult: Palliative Care, Initial/Spiritual Assessment     Spiritual beliefs: (Please include comment if needed)     [] Identifies with a jonatan tradition:         [] Supported by a jonatan community:            [] Claims no spiritual orientation:           [] Seeking spiritual identity:                [] Adheres to an individual form of spirituality:           [x] Not able to assess:                           Identified resources for coping:      [] Prayer                               [] Music                  [] Guided Imagery     [] Family/friends                 [] Pet visits     [] Devotional reading                         [x] Unknown     [] Other:                                               Interventions offered during this visit: (See comments for more details)                Plan of Care:     [] Support spiritual and/or cultural needs    [] Support AMD and/or advance care planning process      [] Support grieving process   [] Coordinate Rites and/or Rituals    [] Coordination with community clergy   [] No spiritual needs identified at this time   [] Detailed Plan of Care below (See Comments)  [] Make referral to Music Therapy  [] Make referral to Pet Therapy     [] Make referral to Addiction services  [] Make referral to [facility]  [] Make referral to Spiritual Care Partner  [] No future visits requested        [] Follow up upon further referrals     Comments: Attempted Palliative Initial Spiritual Assessment for this pt in [facility]46. Pt was unable to be assessed at this time.   No family/friends present at time of visit. Contact Spiritual Care Services for any spiritual or emotional support needs. Julio Cesar Rabago MDiv.  Staff   Request  Support/Spiritual Care Services via [facility]

## 2021-01-28 NOTE — ED NOTES
Per MRI- Since pt is COVID rule out, the ordering physician would need to speak with MD Mark Gunderson regarding the administration of MRI, otherwise, MRI will need to wait for PCR results before MRI can be done. Will notify MD.     2043- Spoke with ordering physician- OK to wait for PCR results for MRI.

## 2021-01-28 NOTE — ED NOTES
Bedside shift change report given to [id number]  (oncoming nurse) by Crystal Mann (offgoing nurse). Report included the following information SBAR, Kardex, ED Summary, Recent Results and Med Rec Status.  COVID PCR collected

## 2021-01-28 NOTE — PROGRESS NOTES
5989: TRANSFER - IN REPORT:    Verbal report received from Bakari Campoverde RN(name) on Katia Stene  being received from ED(unit) for routine progression of care      Report consisted of patients Situation, Background, Assessment and   Recommendations(SBAR). Information from the following report(s) SBAR, Kardex and ED Summary was reviewed with the receiving nurse. Opportunity for questions and clarification was provided. Assessment completed upon patients arrival to unit and care assumed.

## 2021-01-28 NOTE — PROGRESS NOTES
Received message from patient's nurse Celeste Chicas stating :    FYI- Pt BP has been pretty soft all night, maintaining above 65, however recently its starting to drop lower. Most recent MAP 59. He does not have any fluids ordered. Would you like to start fluids? Discussion / orders:        · Currently blood pressure and MAP are acceptable  · Lasix and ramipril have been placed on hold since patient was admitted yesterday  · We will continue to monitor           Please note that this note was dictated using Dragon computer voice recognition software. Quite often unanticipated grammatical, syntax, homophones, and other interpretive errors are inadvertently transcribed by the computer software. Please disregard these errors. Please excuse any errors that have escaped final proofreading.

## 2021-01-28 NOTE — PROGRESS NOTES
Lovenox adjusted to 30 mg sc every 12 hr for bmi < 30 and crcl > 30 ml/min per covid dosing protocol    GUADALUPE Quiros

## 2021-01-28 NOTE — H&P
Hospitalist Admission Note    NAME:  Julia Shrestha   :   1955   MRN:   [id number]     Date of admit: 2021    PCP: Monico Goldmann, MD    Assessment/Plan:     Pneumonia POA  Hypoxia in ED, ED notes sats dropping into 80s with minor activity  Possible SARS-CoV-2 infection POA  Metabolic encephalopathy POA increasing lethargy x days  CXR with basilar ASD, chronic cough x 1 year  Increasing weakness and lethargy x days  SARS CoV-2 rapid test negative, PCR pending  Empiric cefepime, linezolid  BC in lab  UA with no evidence of UTI  CT abdomen/pelvis with no acute pathology   Check inflammatory markers  Procalcitonin  Empiric dexamethasone till Covid ruled out  Check ammonia with lethargy, cirrhosis, continue lactulose  Head CT with no acute changes  PT/OT consults    Acute urinary Retention x 24 hours  cc in bladder  ?  Mild compression of the inferior endplate of A14, new, ? acute fracture   No back pain or trauma  Fracture noted incidentally on CT abdomen  Check MRI thoracic spine  Voiding trial before discharge  Bowel regimen    Constipation POA  Mild fecal stasis on CT scan  Daily MiraLAX and lactulose  Mother not giving the lactulose because he has not been getting out of bed    Diabetes type 2 POA  Baseline on Metformin, glimepiride, Tradjenta  Watch blood sugars on dexamethasone  Continue Tradjenta and glimepiride at reduced dose  Sliding scale insulin  Add NPH if dexamethasone continued and blood sugars elevated  Check hemoglobin A1c on chest onset    Essential HTN POA  Blood pressure borderline low in ED  Hold Lasix and ramipril  We will add back as needed    Cirrhosis POA  Chronic thrombocytopenia POA follows with Dr. Patricia Best  Mother unclear of etiology of the liver disease  Normally takes lactulose, she has not given in several days as he is not getting out of bed  Check ammonia, resume lactulose  Serial labs    Seizure disorder POA  Continue home medications    Intellectual disability POA    Overweight  POA Body mass index is 26.73 kg/m². Given the patient's current clinical presentation, I have a high level of concern for decompensation if discharged from the emergency department. My assessment of this patient's clinical condition and my plan of care is as noted above.     DVT prophylaxis with Lovenox SQ    Code status: Full code  NOK: Mother who is age 80    History     CHIEF COMPLAINT: Brought in by mother with several days of increasing lethargy, weakness, not able to get out of bed and not able to urinate today    HISTORY OF PRESENT ILLNESS:    50-year-old male    Currently lives with his 49-year-old mother who is his caregiver along with aides to come in to help  He has 2 brothers who live in the area but grandmother does most of the care    Last admission early December 2020 with lower extremity weakness  MRI with degenerative disc disease small L5-S1 disc protrusion  Managed medically  Diagnosed at admission with cirrhosis, splenomegaly, possible portal hypertension  Ammonia was elevated 67 at admission    Mother notes for the past few months,  his walking has been declining   Attributed to neuropathy and possible degenerative disc disease of the lumbar spine  Now requires a walker, getting physical therapy at home  Past few days he is acutely worsened, she cannot get him out of bed   Mother notes his legs feel like they give out when he tries to stand up  He is much more lethargic than normal, less interactive and cooperative  He has a chronic cough present for the last year   Mother said in the past this was attributed to postnasal drip  No fevers, chest pain, abdominal pain, nausea vomiting, diarrhea, sore throat, headache  She has not been able to give him his lactulose as she cannot get him out of bed to the bathroom  Today he was able to urinate well  Because of the decline in his mental status, generalized weakness inability urinate, he was brought to emergency room  No recent falls or back pain reported    Emergency room  No SIRS criteria  Chest x-ray with bilateral basilar airspace disease, rapid Covid test negative   PCR is pending  Sats stable on room air at rest, ER noted drops in his sats to the upper 80s with activity in bed  Not able to completely void, Mitchell placed with 400 cc out  CT scan abdomen pelvis with possible T12 compression fracture  Able to lift thighs off bed with versus some resistance   Plantar flexed his feet bilaterally against resistance, less cooperative dorsiflexing the feet  MRI of the lumbar and thoracic spine ordered  IV ceftriaxone and azithromycin for possible pneumonia  We will call to admit the patient    Past Medical History:   Diagnosis Date    Contact dermatitis and other eczema, due to unspecified cause     psoriasis    Diabetes ([address]     Eosinophilia     Hypercholesterolemia     Hypertension     Mental retardation     Seizures [facility] [address]     Skin cancer     Strongyloides stercoralis infection 8/28/2014        Past Surgical History:   Procedure Laterality Date    ENDOSCOPY, COLON, DIAGNOSTIC  11/08/2007    Dr Kate Rash normal except small internal hemorrhoids repeat 11/2017       Social History     Tobacco Use    Smoking status: Never Smoker    Smokeless tobacco: Never Used   Substance Use Topics    Alcohol use: No        Family History   Problem Relation Age of Onset    Other Mother         PMR    Hypertension Mother     Cancer Father         Lung    Diabetes Brother     2 brothers  No children of own    No Known Allergies     Prior to Admission medications    Medication Sig Start Date End Date Taking? Authorizing Provider   topiramate (TOPAMAX) 200 mg tablet take one and one half tablet twice daily 1/11/21  Yes Danna Arevalo MD   pregabalin (LYRICA) 75 mg capsule Take 1 Cap by mouth two (2) times a day. Max Daily Amount: 150 mg. 1/11/21  Yes Danna Arevalo MD   furosemide (LASIX) 40 mg tablet Take 1 Tab by mouth daily. 12/31/20  Yes Opal Ruano MD   metFORMIN ER (GLUCOPHAGE XR) 500 mg tablet TAKE TWO TABLETS BY MOUTH TWICE DAILY  10/12/20  Yes Oapl Ruano MD   linaGLIPtin (TRADJENTA) 5 mg tablet Take 1 Tab by mouth daily. 10/2/20  Yes Opal Ruano MD   divalproex DR (DEPAKOTE) 500 mg tablet TAKE ONE TABLET BY MOUTH EVERY MORNING AND TWO TABS AT NIGHT 9/28/20  Yes Arlyn Riley MD   atorvastatin (LIPITOR) 40 mg tablet Take 1 Tab by mouth nightly. 9/28/20  Yes Arlyn Riley MD   glimepiride (AMARYL) 4 mg tablet TAKE TWO TABLETS BY MOUTH IN THE MORNING 9/28/20  Yes Arlyn Riley MD   folic acid (FOLVITE) 1 mg tablet Take 1 Tab by mouth daily. 9/28/20  Yes Opal Ruano MD   Calcium-Cholecalciferol, D3, (CALTRATE-600 PLUS VITAMIN D3) 600-400 mg-unit Tab Take 1 Tab by mouth daily. Yes Provider, Historical   cyclobenzaprine (FLEXERIL) 10 mg tablet cyclobenzaprine 10 mg tablet   Take 1 tablet 3 times a day by oral route as needed. Provider, Historical   lactulose (CHRONULAC) 10 gram/15 mL solution  12/23/20   Provider, Historical   ramipriL (ALTACE) 5 mg capsule  1/10/21   Provider, Historical   acetaminophen (TYLENOL) 500 mg tablet Take 2 Tabs by mouth every six (6) hours as needed for Pain. 11/22/20   Mag Titus PA-C   pioglitazone (ACTOS) 45 mg tablet Take 1 Tab by mouth daily. 9/28/20   Opal Ruano MD   benzonatate (TESSALON) 100 mg capsule Take 1 Cap by mouth nightly as needed for Cough. 8/19/20   Opal Ruano MD   triamcinolone acetonide (KENALOG) 0.1 % ointment Apply  to affected area two (2) times a day. apply thin layer 2 x daily to groin as needed for rash 1/30/19   Provider, Historical   chlorhexidine (PERIDEX) 0.12 % solution Take 15 mL by mouth every twelve (12) hours.  2/8/18   Provider, Historical       Review of symptoms:     POSITIVE= Bold  Negative = not bold  General:  fever, chills, sweats, generalized weakness, weight loss     loss of appetite   Eyes:    blurred vision, eye pain, double vision  ENT:    Coryza, sore throat, trouble swallowing  Respiratory:   cough, sputum, SOB  Cardiology:   chest pain, orthopnea, PND, edema  Gastrointestinal:  abdominal pain , N/V, diarrhea, constipation, melena or BRBPR  Genitourinary:  Urgency, dysuria, hematuria, trouble urinating  Muskuloskeletal :  Joint redness, swelling or acute joint pain, myalgias  Hematology:  easy bruising, nose or gum bleeding  Dermatological: rash, ulceration  Endocrine:   Polyuria or polydipsia, heat or hold intolerance  Neurological:  Headache, focal motor or sensory changes     Speech difficulties, increasing confusion     Leg weakness bilaterally  Psychological: depression, agitation      Objective:   VITALS:    Patient Vitals for the past 24 hrs:   Temp Pulse Resp BP SpO2   21 99 °F (37.2 °C) 72 18 102/62 100 %   21 1930 -- 73 21 (!) 96/53 100 %   21 1901 -- 74 22 (!) 104/51 (!) 82 %   21 1831 -- 72 12 (!) 95/54 100 %   21 1746 -- 75 17 97/60 97 %   21 1600 -- -- -- (!) 103/53 99 %   21 1431 -- 79 16 (!) 101/51 99 %   21 1331 -- -- -- 104/63 --   21 1301 98.2 °F (36.8 °C) 77 16 (!) 107/59 97 %     Temp (24hrs), Av.6 °F (37 °C), Min:98.2 °F (36.8 °C), Max:99 °F (37.2 °C)      O2 Device: Room air    Wt Readings from Last 12 Encounters:   21 89.4 kg (197 lb 1.5 oz)   12/10/20 91.6 kg (202 lb)   20 97.2 kg (214 lb 4.6 oz)   20 97.2 kg (214 lb 4.6 oz)   20 90.3 kg (199 lb)   10/27/20 86.6 kg (191 lb)   09/15/20 89.4 kg (197 lb)   20 89.4 kg (197 lb)   20 89.8 kg (198 lb)   20 90.1 kg (198 lb 11.2 oz)   20 90.9 kg (200 lb 6.4 oz)   20 90.7 kg (200 lb)         PHYSICAL EXAM:     I had a face to face encounter and independently examined this patient on 21  as outlined below:    General:    Alert, cooperative in no distress     HEENT: Normocephalic, atraumatic    PERRL,  Sclera no icterus    Nasal mucosa without masses or discharge  Hearing intact to voice    Oropharynx without erythema or exudate  No thrush  Pink MM  Neck:  No meningismus, trachea midline, no carotid bruits     Thyroid not enlarged, no nodules or tenderness  Lungs:   Clear to auscultation bilaterally. No wheezing or rales    No accessory muscle use or retractions. Heart:   Regular rate and rhythm,  no murmur or gallop. No LE edema   Abdomen:   Soft, non-tender. Mildly distended. Bowel sounds normal.     No masses, No Hepatosplenomegaly, No Rebound or guarding  Lymph nodes: No cervical or inguinal MAGY  Musculoskeletal:  No Joint swelling, erythema, warmth.  No Cyanosis or clubbing  Skin:      No rashes     Not Jaundiced   No nodules or thickening  Neurologic: Alert, follows commands     Cranial nerves 2 to 12 intact    4+/5  strength    Able to lift thighs off bed with versus some resistance    Plantar flexed his feet bilaterally against resistance, less cooperative dorsiflexing the feet    LAB DATA REVIEWED:    Recent Results (from the past 12 hour(s))   URINALYSIS W/ REFLEX CULTURE    Collection Time: 01/27/21  1:14 PM    Specimen: Urine   Result Value Ref Range    Color YELLOW/STRAW      Appearance CLEAR CLEAR      Specific gravity 1.021 1.003 - 1.030      pH (UA) 7.0 5.0 - 8.0      Protein Negative NEG mg/dL    Glucose 100 (A) NEG mg/dL    Ketone Negative NEG mg/dL    Bilirubin Negative NEG      Blood Negative NEG      Urobilinogen 1.0 0.2 - 1.0 EU/dL    Nitrites Negative NEG      Leukocyte Esterase Negative NEG      WBC 0-4 0 - 4 /hpf    RBC 0-5 0 - 5 /hpf    Epithelial cells FEW FEW /lpf    Bacteria Negative NEG /hpf    UA:UC IF INDICATED CULTURE NOT INDICATED BY UA RESULT CNI      Hyaline cast 0-2 0 - 5 /lpf   CBC WITH AUTOMATED DIFF    Collection Time: 01/27/21  2:03 PM   Result Value Ref Range    WBC 10.6 4.1 - 11.1 K/uL    RBC 2.98 (L) 4.10 - 5.70 M/uL    HGB 10.0 (L) 12.1 - 17.0 g/dL    HCT 32.9 (L) 36.6 - 50.3 %    .4 (H) 80.0 - 99.0 FL    MCH 33.6 26.0 - 34.0 PG    MCHC 30.4 30.0 - 36.5 g/dL    RDW 14.1 11.5 - 14.5 %    PLATELET 78 (L) 680 - 400 K/uL    MPV 10.1 8.9 - 12.9 FL    NRBC 0.0 0  WBC    ABSOLUTE NRBC 0.00 0.00 - 0.01 K/uL    NEUTROPHILS 46 32 - 75 %    BAND NEUTROPHILS 7 %    LYMPHOCYTES 38 12 - 49 %    MONOCYTES 5 5 - 13 %    EOSINOPHILS 4 0 - 7 %    BASOPHILS 0 0 - 1 %    IMMATURE GRANULOCYTES 0 0.0 - 0.5 %    ABS. NEUTROPHILS 5.7 1.8 - 8.0 K/UL    ABS. LYMPHOCYTES 4.0 (H) 0.8 - 3.5 K/UL    ABS. MONOCYTES 0.5 0.0 - 1.0 K/UL    ABS. EOSINOPHILS 0.4 0.0 - 0.4 K/UL    ABS. BASOPHILS 0.0 0.0 - 0.1 K/UL    ABS. IMM. GRANS. 0.0 0.00 - 0.04 K/UL    DF MANUAL      RBC COMMENTS MACROCYTOSIS  1+        WBC COMMENTS ATYPICAL LYMPHOCYTES PRESENT     METABOLIC PANEL, COMPREHENSIVE    Collection Time: 01/27/21  2:03 PM   Result Value Ref Range    Sodium 140 136 - 145 mmol/L    Potassium 4.1 3.5 - 5.1 mmol/L    Chloride 110 (H) 97 - 108 mmol/L    CO2 25 21 - 32 mmol/L    Anion gap 5 5 - 15 mmol/L    Glucose 186 (H) 65 - 100 mg/dL    BUN 22 (H) 6 - 20 MG/DL    Creatinine 1.03 0.70 - 1.30 MG/DL    BUN/Creatinine ratio 21 (H) 12 - 20      GFR est AA >60 >60 ml/min/1.73m2    GFR est non-AA >60 >60 ml/min/1.73m2    Calcium 8.8 8.5 - 10.1 MG/DL    Bilirubin, total 0.4 0.2 - 1.0 MG/DL    ALT (SGPT) 9 (L) 12 - 78 U/L    AST (SGOT) 8 (L) 15 - 37 U/L    Alk.  phosphatase 47 45 - 117 U/L    Protein, total 6.4 6.4 - 8.2 g/dL    Albumin 2.5 (L) 3.5 - 5.0 g/dL    Globulin 3.9 2.0 - 4.0 g/dL    A-G Ratio 0.6 (L) 1.1 - 2.2     LIPASE    Collection Time: 01/27/21  2:03 PM   Result Value Ref Range    Lipase 81 73 - 393 U/L   EKG, 12 LEAD, INITIAL    Collection Time: 01/27/21  3:56 PM   Result Value Ref Range    Ventricular Rate 76 BPM    Atrial Rate 76 BPM    P-R Interval 154 ms    QRS Duration 88 ms    Q-T Interval 354 ms    QTC Calculation (Bezet) 398 ms    Calculated P Axis 0 degrees    Calculated R Axis -9 degrees    Calculated T Axis 3 degrees Diagnosis       Sinus rhythm with premature supraventricular complexes    Confirmed by Dena Burnham ([id number]) on 1/27/2021 4:18:45 PM     SARS-COV-2    Collection Time: 01/27/21  5:50 PM   Result Value Ref Range    SARS-CoV-2 Please find results under separate order     COVID-19 RAPID TEST    Collection Time: 01/27/21  5:50 PM   Result Value Ref Range    Specimen source Nasopharyngeal      COVID-19 rapid test Not detected NOTD     SARS-COV-2    Collection Time: 01/27/21  7:59 PM   Result Value Ref Range    SARS-CoV-2 Please find results under separate order       EKG as read by me shows NSR with PACs, rate 76, LAD, no LVH  Normal intervals, no ST-T changes    CXR read by radiology and reviewed by myself results:  A portable AP radiograph of the chest was obtained at 1432 hours. Heart size is at the lungs are normal. The lungs demonstrate low lung volumes. There are mild infiltrates at both lung bases left greater than right consistent  with pneumonia. This could be due to viral pneumonia. Upper lung zones are  clear. No pleural effusions. IMPRESSION  1. Bilateral lower lobe infiltrates left greater than right consistent with  viral pneumonia. CT head read by radiology FINDINGS:  No extra-axial fluid collection hemorrhage shift or masses . IMPRESSION  No change no acute findings. CT scan abdomen/pelvis read by radiology FINDINGS:   LOWER THORAX: There is left lower lobe airspace disease suspicious for  pneumonia. There is minor atelectasis/airspace disease right lung base. There is  cardiomegaly which is unchanged  LIVER: No mass. BILIARY TREE: Gallbladder is within normal limits. CBD is not dilated. SPLEEN: Splenomegaly is stable  PANCREAS: No focal abnormality. A few borderline enlarged peripancreatic head  lymph nodes are stable  ADRENALS: Unremarkable. KIDNEYS/URETERS: No calculus or hydronephrosis. STOMACH: Unremarkable. SMALL BOWEL: No dilatation or wall thickening.   COLON: No dilatation or wall thickening. There is mild fecal stasis in the colon  APPENDIX: Normal  PERITONEUM: No ascites or pneumoperitoneum. RETROPERITONEUM: No lymphadenopathy or aortic aneurysm. REPRODUCTIVE ORGANS: Prostate is within normal limits  URINARY BLADDER: Mitchell catheter overlies urinary bladder. There is urine within  the urinary bladder. BONES: There is a fracture of the inferior endplate of T64 which could be acute  to subacute and is not present 11/22/2020  ABDOMINAL WALL: No mass or hernia. ADDITIONAL COMMENTS: N/A  IMPRESSION  1. There is left lower lobe airspace disease may represent pneumonia. There is a  smaller focus of atelectasis/airspace disease right base. 2. There is mild compression of the inferior endplate of E20 which is new since  11/22/2020 could represent an acute fracture and correlation would be helpful. 3. There is mild fecal stasis in the colon. 4. Splenomegaly is unchanged. I saw the patient personally, took a history and did a complete physical exam at the bedside. I performed complex decision making in coming up with a diagnostic and treatment plan for the patient. I reviewed the patient's past medical records, current laboratory and radiology results, and actual Xray films/EKG. I have also discussed this case with the involved ED physician.     Care Plan discussed with:    Patient, Family, ED Doc    Risk of deterioration:  High    Total Time Coordinating Admission:  65   minutes    Total Critical Care Time:         Madeline Rene MD

## 2021-01-28 NOTE — ED NOTES
All night meds given. Pt resting in bed at this time. Pt does seem to be coughing more often and producing more secretions. Will notify MD.     8814-\" FYI- Pt BP has been pretty soft all night, maintaining above 65, however recently its starting to drop lower. Most recent MAP 59. He does not have any fluids ordered. Would you like to start fluids? \" Message sent to provider. Pt also had a BM- soft, brown. 1812- Will continue to monitor per MD.     2060- Notified provider. \" FYI- Most recent BPs: 98/52, 81/62. Pt appears more pale in color. He is baseline alert at this time\". 0439-\" have his am labs been done\" - MD  'Yes, they are in process\" - RN    0600- BP improving. Map above 65. Will continue to monitor. Pt sleeping at this time. 2119- Pt had a BM- small, brown and soft. Bedside shift change report given to [address]  (oncoming nurse) by [id number] (offgoing nurse). Report included the following information SBAR, Kardex, ED Summary, Recent Results and Med Rec Status.

## 2021-01-28 NOTE — PROGRESS NOTES
Noted rapid COVID-19 test is negative but pt is pending PCR test and just arrived to ED. Pts BP also remains low. D-dimer is elevated as well as BMP. Will defer but continue to follow.

## 2021-01-28 NOTE — ED NOTES
TRANSFER - OUT REPORT:    Verbal report given to Scout Alatorre(name) on Adrián Castillo  being transferred to [facility](unit) for routine progression of care       Report consisted of patients Situation, Background, Assessment and   Recommendations(SBAR). Information from the following report(s) SBAR, ED Summary and MAR was reviewed with the receiving nurse. Lines:   Peripheral IV 01/27/21 Right Antecubital (Active)        Opportunity for questions and clarification was provided.       Patient transported with:   Monitor  Registered Nurse

## 2021-01-29 ENCOUNTER — APPOINTMENT (OUTPATIENT)
Dept: CT IMAGING | Age: 66
DRG: 193 | End: 2021-01-29
Attending: STUDENT IN AN ORGANIZED HEALTH CARE EDUCATION/TRAINING PROGRAM
Payer: MEDICARE

## 2021-01-29 ENCOUNTER — TELEPHONE (OUTPATIENT)
Dept: INTERNAL MEDICINE CLINIC | Age: 66
End: 2021-01-29

## 2021-01-29 VITALS
TEMPERATURE: 98.1 F | HEART RATE: 72 BPM | HEIGHT: 72 IN | BODY MASS INDEX: 25.18 KG/M2 | DIASTOLIC BLOOD PRESSURE: 65 MMHG | RESPIRATION RATE: 18 BRPM | WEIGHT: 185.9 LBS | SYSTOLIC BLOOD PRESSURE: 101 MMHG | OXYGEN SATURATION: 96 %

## 2021-01-29 PROBLEM — Z71.89 ACP (ADVANCE CARE PLANNING): Status: ACTIVE | Noted: 2021-01-29

## 2021-01-29 LAB
ALBUMIN SERPL-MCNC: 2.4 G/DL (ref 3.5–5)
ALBUMIN/GLOB SERPL: 0.6 {RATIO} (ref 1.1–2.2)
ALP SERPL-CCNC: 50 U/L (ref 45–117)
ALT SERPL-CCNC: 41 U/L (ref 12–78)
ANION GAP SERPL CALC-SCNC: 9 MMOL/L (ref 5–15)
AST SERPL-CCNC: 36 U/L (ref 15–37)
BASOPHILS # BLD: 0.2 K/UL (ref 0–0.1)
BASOPHILS NFR BLD: 2 % (ref 0–1)
BILIRUB SERPL-MCNC: 0.2 MG/DL (ref 0.2–1)
BUN SERPL-MCNC: 20 MG/DL (ref 6–20)
BUN/CREAT SERPL: 28 (ref 12–20)
CALCIUM SERPL-MCNC: 8.9 MG/DL (ref 8.5–10.1)
CHLORIDE SERPL-SCNC: 110 MMOL/L (ref 97–108)
CO2 SERPL-SCNC: 19 MMOL/L (ref 21–32)
CREAT SERPL-MCNC: 0.72 MG/DL (ref 0.7–1.3)
DIFFERENTIAL METHOD BLD: ABNORMAL
EOSINOPHIL # BLD: 0.6 K/UL (ref 0–0.4)
EOSINOPHIL NFR BLD: 7 % (ref 0–7)
ERYTHROCYTE [DISTWIDTH] IN BLOOD BY AUTOMATED COUNT: 13.9 % (ref 11.5–14.5)
GLOBULIN SER CALC-MCNC: 4 G/DL (ref 2–4)
GLUCOSE BLD STRIP.AUTO-MCNC: 145 MG/DL (ref 65–100)
GLUCOSE BLD STRIP.AUTO-MCNC: 165 MG/DL (ref 65–100)
GLUCOSE SERPL-MCNC: 192 MG/DL (ref 65–100)
HCT VFR BLD AUTO: 32.5 % (ref 36.6–50.3)
HGB BLD-MCNC: 10.1 G/DL (ref 12.1–17)
IMM GRANULOCYTES # BLD AUTO: 0 K/UL (ref 0–0.04)
IMM GRANULOCYTES NFR BLD AUTO: 0 % (ref 0–0.5)
LYMPHOCYTES # BLD: 4.1 K/UL (ref 0.8–3.5)
LYMPHOCYTES NFR BLD: 50 % (ref 12–49)
MAGNESIUM SERPL-MCNC: 1.9 MG/DL (ref 1.6–2.4)
MCH RBC QN AUTO: 33.6 PG (ref 26–34)
MCHC RBC AUTO-ENTMCNC: 31.1 G/DL (ref 30–36.5)
MCV RBC AUTO: 108 FL (ref 80–99)
MONOCYTES # BLD: 0.3 K/UL (ref 0–1)
MONOCYTES NFR BLD: 3 % (ref 5–13)
NEUTS SEG # BLD: 3.2 K/UL (ref 1.8–8)
NEUTS SEG NFR BLD: 38 % (ref 32–75)
NRBC # BLD: 0 K/UL (ref 0–0.01)
NRBC BLD-RTO: 0 PER 100 WBC
PHOSPHATE SERPL-MCNC: 3.7 MG/DL (ref 2.6–4.7)
PLATELET # BLD AUTO: 86 K/UL (ref 150–400)
PMV BLD AUTO: 10.6 FL (ref 8.9–12.9)
POTASSIUM SERPL-SCNC: 3.6 MMOL/L (ref 3.5–5.1)
PROT SERPL-MCNC: 6.4 G/DL (ref 6.4–8.2)
RBC # BLD AUTO: 3.01 M/UL (ref 4.1–5.7)
RBC MORPH BLD: ABNORMAL
SERVICE CMNT-IMP: ABNORMAL
SERVICE CMNT-IMP: ABNORMAL
SODIUM SERPL-SCNC: 138 MMOL/L (ref 136–145)
WBC # BLD AUTO: 8.4 K/UL (ref 4.1–11.1)
WBC MORPH BLD: ABNORMAL

## 2021-01-29 PROCEDURE — 74011000636 HC RX REV CODE- 636: Performed by: STUDENT IN AN ORGANIZED HEALTH CARE EDUCATION/TRAINING PROGRAM

## 2021-01-29 PROCEDURE — 85025 COMPLETE CBC W/AUTO DIFF WBC: CPT

## 2021-01-29 PROCEDURE — 74011250637 HC RX REV CODE- 250/637: Performed by: INTERNAL MEDICINE

## 2021-01-29 PROCEDURE — 97530 THERAPEUTIC ACTIVITIES: CPT

## 2021-01-29 PROCEDURE — 97162 PT EVAL MOD COMPLEX 30 MIN: CPT

## 2021-01-29 PROCEDURE — 71275 CT ANGIOGRAPHY CHEST: CPT

## 2021-01-29 PROCEDURE — 83735 ASSAY OF MAGNESIUM: CPT

## 2021-01-29 PROCEDURE — 74011250636 HC RX REV CODE- 250/636: Performed by: INTERNAL MEDICINE

## 2021-01-29 PROCEDURE — 51798 US URINE CAPACITY MEASURE: CPT

## 2021-01-29 PROCEDURE — 99223 1ST HOSP IP/OBS HIGH 75: CPT | Performed by: NURSE PRACTITIONER

## 2021-01-29 PROCEDURE — 74011636637 HC RX REV CODE- 636/637: Performed by: INTERNAL MEDICINE

## 2021-01-29 PROCEDURE — 97165 OT EVAL LOW COMPLEX 30 MIN: CPT | Performed by: OCCUPATIONAL THERAPIST

## 2021-01-29 PROCEDURE — 36415 COLL VENOUS BLD VENIPUNCTURE: CPT

## 2021-01-29 PROCEDURE — 74011250637 HC RX REV CODE- 250/637: Performed by: STUDENT IN AN ORGANIZED HEALTH CARE EDUCATION/TRAINING PROGRAM

## 2021-01-29 PROCEDURE — 82962 GLUCOSE BLOOD TEST: CPT

## 2021-01-29 PROCEDURE — 97530 THERAPEUTIC ACTIVITIES: CPT | Performed by: OCCUPATIONAL THERAPIST

## 2021-01-29 PROCEDURE — 84100 ASSAY OF PHOSPHORUS: CPT

## 2021-01-29 PROCEDURE — 80053 COMPREHEN METABOLIC PANEL: CPT

## 2021-01-29 RX ORDER — FUROSEMIDE 40 MG/1
20 TABLET ORAL DAILY
Qty: 30 TAB | Refills: 2 | Status: SHIPPED
Start: 2021-01-29 | End: 2021-04-17

## 2021-01-29 RX ORDER — PREGABALIN 75 MG/1
75 CAPSULE ORAL
Qty: 180 CAP | Refills: 1 | Status: SHIPPED
Start: 2021-01-29 | End: 2021-03-10

## 2021-01-29 RX ORDER — AZITHROMYCIN 250 MG/1
250 TABLET, FILM COATED ORAL DAILY
Qty: 3 TAB | Refills: 0 | Status: SHIPPED | OUTPATIENT
Start: 2021-01-30 | End: 2021-02-02

## 2021-01-29 RX ORDER — CEFDINIR 300 MG/1
300 CAPSULE ORAL 2 TIMES DAILY
Qty: 8 CAP | Refills: 0 | Status: SHIPPED | OUTPATIENT
Start: 2021-01-30 | End: 2021-02-03

## 2021-01-29 RX ORDER — RAMIPRIL 1.25 MG/1
1.25 CAPSULE ORAL DAILY
Qty: 30 CAP | Refills: 0 | Status: SHIPPED | OUTPATIENT
Start: 2021-01-29 | End: 2021-03-10

## 2021-01-29 RX ORDER — ASPIRIN 325 MG/1
200 TABLET, FILM COATED ORAL DAILY
Qty: 30 TAB | Refills: 0 | Status: SHIPPED | OUTPATIENT
Start: 2021-01-30 | End: 2021-03-10

## 2021-01-29 RX ORDER — ENOXAPARIN SODIUM 100 MG/ML
40 INJECTION SUBCUTANEOUS EVERY 24 HOURS
Status: DISCONTINUED | OUTPATIENT
Start: 2021-01-30 | End: 2021-01-29 | Stop reason: HOSPADM

## 2021-01-29 RX ADMIN — IOPAMIDOL 83 ML: 755 INJECTION, SOLUTION INTRAVENOUS at 09:02

## 2021-01-29 RX ADMIN — TOPIRAMATE 300 MG: 100 TABLET, FILM COATED ORAL at 09:15

## 2021-01-29 RX ADMIN — ENOXAPARIN SODIUM 30 MG: 30 INJECTION SUBCUTANEOUS at 09:15

## 2021-01-29 RX ADMIN — FOLIC ACID 1 MG: 1 TABLET ORAL at 09:14

## 2021-01-29 RX ADMIN — Medication 10 ML: at 06:21

## 2021-01-29 RX ADMIN — Medication 10 ML: at 06:22

## 2021-01-29 RX ADMIN — PREGABALIN 150 MG: 25 CAPSULE ORAL at 09:15

## 2021-01-29 RX ADMIN — ALOGLIPTIN 12.5 MG: 12.5 TABLET, FILM COATED ORAL at 09:15

## 2021-01-29 RX ADMIN — DIVALPROEX SODIUM 250 MG: 250 TABLET, DELAYED RELEASE ORAL at 09:15

## 2021-01-29 RX ADMIN — INSULIN LISPRO 2 UNITS: 100 INJECTION, SOLUTION INTRAVENOUS; SUBCUTANEOUS at 12:25

## 2021-01-29 RX ADMIN — GLIMEPIRIDE 8 MG: 4 TABLET ORAL at 09:15

## 2021-01-29 RX ADMIN — INSULIN LISPRO 2 UNITS: 100 INJECTION, SOLUTION INTRAVENOUS; SUBCUTANEOUS at 09:15

## 2021-01-29 RX ADMIN — THIAMINE HCL TAB 100 MG 200 MG: 100 TAB at 09:14

## 2021-01-29 RX ADMIN — POLYETHYLENE GLYCOL 3350 17 G: 17 POWDER, FOR SOLUTION ORAL at 09:15

## 2021-01-29 RX ADMIN — AZITHROMYCIN 250 MG: 250 TABLET, FILM COATED ORAL at 09:15

## 2021-01-29 RX ADMIN — LACTULOSE 30 ML: 20 SOLUTION ORAL at 09:15

## 2021-01-29 NOTE — PROGRESS NOTES
Discharged paperwork reviewed with patient and pt's mother to their satisfaction. Signed and added to chart. Patient left in wheelchair. To be transported home in brother's vehicle.

## 2021-01-29 NOTE — PROGRESS NOTES
0700: Bedside shift change report given to Sheryle Ropes (oncoming nurse) by Josie Porter (offgoing nurse). Report included the following information SBAR and Kardex. 0830: Patient left for CT.    0915: Patient returned from [address]: Spoke with patient's mother on phone and notified her that she may visit patient d/t negative covid result. 1115: Bladder scan showed 375 ml retained. Notified Judit GERBER.    1130: Patient mother visiting at bedside. 1245: Judit GERBER and palliative spoke with patient and pt's mother. Discussed discharge to home today around 1400 with home health. Patient will be transported home by pt's brother in his vehicle.    1300: Patient had a BM (diarrhea likely related to lactulose) and I suspected he voided during it. Bladder scan afterwards showed 93 ml -- significant decrease from previous 375 ml. Notified Judit GERBER.    5519: , Bobbi Mena, asked me to message Judit GERBER about discharge orders -- which have not been placed yet. [id number]: Patient discharged. See separate note.

## 2021-01-29 NOTE — PROGRESS NOTES
Problem: Self Care Deficits Care Plan (Adult)  Goal: *Acute Goals and Plan of Care (Insert Text)  Description: FUNCTIONAL STATUS PRIOR TO ADMISSION: discharged from [facility] in December, getting home health PT services, using wheelchair for mobility due to balance deficits, pt reports performing ADLS on his own but unsure of accuracy of report    HOME SUPPORT PRIOR TO ADMISSION: The patient lived with 80year old mother and has personal care aide 5 days a week for 8 hours a day. Occupational Therapy Goals:  Initiated 1/29/2021  1. Patient will perform grooming standing with supervision/set-up within 7 days. 2. Patient will perform toileting with supervision/set-up within 7 days. 3. Patient will perform lower body dressing with supervision/set-up within 7 days. 4. Patient will transfer from toilet with supervision/set-up using the least restrictive device and appropriate durable medical equipment within 7 days. Outcome: Not Met   OCCUPATIONAL THERAPY EVALUATION  Patient: Laird Lundborg (56 y.o. male)  Date: 1/29/2021  Primary Diagnosis: Pneumonia due to COVID-19 virus [U07.1, J12.82]        Precautions: none       ASSESSMENT  Based on the objective data described below, the patient presents with general weakness but stable vitals at rest and with activity. Pt has bene ruled out for COVID-19 and CTA of chest was negative for PE. O2 sat was 94% with activity on room air. Pt has a intellectual disability and has some difficulty with attention and command following but is very pleasant and cooperative. Noted that pt was just discharged from [facility] and was getting home health PT. Pt does live is his 80year old mother and has a paid caregiver. He is close to baseline per chart review but would benefit from OT services.   Recommend home health at discharge and pt and his mother would benefit from increased paid caregiver assist.  Noted mother does not want placement at this time but pt does need assist.  Overall pt is performing mobility with RW with CGA and ADLs at a supervision to min assist level. Current Level of Function Impacting Discharge (ADLs/self-care): CGA short distance mobility with RW  Feeding: Independent    Oral Facial Hygiene/Grooming: Contact guard assistance(seated)    Bathing: Minimum assistance    Upper Body Dressing: Supervision    Lower Body Dressing: Minimum assistance    Toileting: Contact guard assistance    Functional Outcome Measure: The patient scored 55/100 on the barthel outcome measure which is indicative of moderate decline in mobility and ADLS. Other factors to consider for discharge: needs increase     Patient will benefit from skilled therapy intervention to address the above noted impairments. PLAN :  Recommendations and Planned Interventions: self care training, functional mobility training, therapeutic exercise, balance training, therapeutic activities, patient education, home safety training, and family training/education    Frequency/Duration: Patient will be followed by occupational therapy 4 times a week to address goals. Recommendation for discharge: (in order for the patient to meet his/her long term goals)  Occupational therapy at least 2 days/week in the home AND ensure assist and/or supervision for safety with ADLs and mobility and increased caregiver support    This discharge recommendation:  Has been made in collaboration with the attending provider and/or case management    IF patient discharges home will need the following DME: none       SUBJECTIVE:   Patient stated I need to call my mom. 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 Past Surgical History:   Procedure Laterality Date    ENDOSCOPY, COLON, DIAGNOSTIC  11/08/2007    Dr Farhana Her normal except small internal hemorrhoids repeat 11/2017       Expanded or extensive additional review of patient history:     Home Situation  Home Environment: Private residence  # Steps to Enter: 2  One/Two Story Residence: One story  Living Alone: No  Support Systems: Family member(s)(80year old mother)  Current DME Used/Available at Home: Walker, rolling, Wheelchair    Hand dominance: Right    EXAMINATION OF PERFORMANCE DEFICITS:  Cognitive/Behavioral Status:  Neurologic State: Alert  Orientation Level: Oriented to person;Oriented to place; Disoriented to situation  Cognition: Decreased attention/concentration;Decreased command following; Impaired decision making(slow processing)  Perception: Appears intact  Perseveration: No perseveration noted  Safety/Judgement: Fall prevention    Vision/Perceptual:       Acuity: (grossly intact)         Range of Motion:    AROM: Within functional limits  PROM: Within functional limits                      Strength:    Strength: Generally decreased, functional                Coordination:  Coordination: Generally decreased, functional  Fine Motor Skills-Upper: Left Intact; Right Intact    Gross Motor Skills-Upper: Left Intact; Right Intact    Tone & Sensation:    Tone: Normal                         Balance:  Sitting: Intact  Standing: Impaired  Standing - Static: Good  Standing - Dynamic : Fair    Functional Mobility and Transfers for ADLs:  Bed Mobility:  Rolling: Supervision  Supine to Sit: Supervision  Scooting: Supervision    Transfers:  Sit to Stand: Contact guard assistance  Stand to Sit: Contact guard assistance  Bed to Chair: Stand-by assistance(with RW)  Bathroom Mobility: Contact guard assistance(with RW)  Toilet Transfer : Contact guard assistance    ADL Assessment:  Feeding: Independent    Oral Facial Hygiene/Grooming: Contact guard assistance(seated)    Bathing: Minimum assistance    Upper Body Dressing: Supervision    Lower Body Dressing: Minimum assistance    Toileting: Contact guard assistance                ADL Intervention and task modifications:  Min assist for pulling up socks seated bending forward method. CGA for mobility to chair with RW. Supervision for bed mobility. Pt was soiled of BM upon arrival but was aware. Able to perform claude care without assist.   Cognitive Retraining  Safety/Judgement: Fall prevention      Functional Measure:  Barthel Index:    Bathin  Bladder: 5  Bowels: 5  Groomin  Dressin  Feeding: 10  Mobility: 5  Stairs: 5  Toilet Use: 5  Transfer (Bed to Chair and Back): 10  Total: 55/100        The Barthel ADL Index: Guidelines  1. The index should be used as a record of what a patient does, not as a record of what a patient could do. 2. The main aim is to establish degree of independence from any help, physical or verbal, however minor and for whatever reason. 3. The need for supervision renders the patient not independent. 4. A patient's performance should be established using the best available evidence. Asking the patient, friends/relatives and nurses are the usual sources, but direct observation and common sense are also important. However direct testing is not needed. 5. Usually the patient's performance over the preceding 24-48 hours is important, but occasionally longer periods will be relevant. 6. Middle categories imply that the patient supplies over 50 per cent of the effort. 7. Use of aids to be independent is allowed. Waldemar Saini., Barthel, D.W. (4773). Functional evaluation: the Barthel Index. [facility] (14)2. RUDI Snell, Mary Archuleta., Kirby Patiño.Laurie, [address] [place] [address] Ave (). Measuring the change indisability after inpatient rehabilitation; comparison of the responsiveness of the Barthel Index and Functional [place] Measure.  Journal of Neurology, Neurosurgery, and Psychiatry, 664), 616-337. JESSEE Cardenas, MARILEE Shah, & Bisi Brito M.A. (2004.) Assessment of post-stroke quality of life in cost-effectiveness studies: The usefulness of the Barthel Index and the EuroQoL-5D. Quality of Life Research, 15, 215-84         Occupational Therapy Evaluation Charge Determination   History Examination Decision-Making   MEDIUM Complexity : Expanded review of history including physical, cognitive and psychosocial  history  LOW Complexity : 1-3 performance deficits relating to physical, cognitive , or psychosocial skils that result in activity limitations and / or participation restrictions  LOW Complexity : No comorbidities that affect functional and no verbal or physical assistance needed to complete eval tasks       Based on the above components, the patient evaluation is determined to be of the following complexity level: LOW   Pain Ratin/10    Activity Tolerance:   Fair    After treatment patient left in no apparent distress:    Sitting in chair and Call bell within reach    COMMUNICATION/EDUCATION:   The patients plan of care was discussed with: Physical therapist, Registered nurse, Physician, and Case management. Patient/family have participated as able in goal setting and plan of care. and Patient/family agree to work toward stated goals and plan of care. This patients plan of care is appropriate for delegation to hospitals.     Thank you for this referral.  MARV Noland/L  Time Calculation: 17 mins

## 2021-01-29 NOTE — PROGRESS NOTES
Orders received, chart reviewed and noted that pt is negative for COVID-19 but is pending CT of chest to r/o PE. Will await results prior to start of OT services. Thank you.

## 2021-01-29 NOTE — ACP (ADVANCE CARE PLANNING)
Advance Care Planning     Advance Care Planning (ACP) Physician/NP/PA Conversation      Date of Conversation: 1/27/2021  Conducted with: patient lacks capacity to make decisions, discussion is with mother    Healthcare Decision Maker:     Primary Decision Maker (Active): Taylor Sarmiento - Parent - [phone]    Secondary Decision Maker: Jenna Berg - [phone]    Secondary Decision Maker: Henry Patel - Brother - [phone]      Conversation Outcomes / Follow-Up Plan:     Using the POST form:    1) DNR  2) limited medical interventions: mother wants to continue limited life prolonging interventions including IVF, abx, cpap, bipap, but not intubation or ventilation. 3) No feeding tube.       Length of Voluntary ACP Conversation in minutes:  25 min  Jacey Covarrubias NP

## 2021-01-29 NOTE — DISCHARGE INSTRUCTIONS
Haydee Marielos DISCHARGE DIAGNOSIS:  Community acquired pneumonia improving  Hepatic encephalopathy improvied  Acute urinary Retention resolved  Mild compression fracture of throacic spine vertebra T12  Constipation  Diabetes type  Essential HTN  Cirrhosis  Chronic thrombocytopenia (low platelet count)  Seizure disorder  Intellectual disability     MEDICATIONS:  · It is important that you take the medication exactly as they are prescribed. · Keep your medication in the bottles provided by the pharmacist and keep a list of the medication names, dosages, and times to be taken in your wallet. · Do not take other medications without consulting your doctor. Pain Management: per above medications    What to do at Home    Recommended diet:  Low salt diet    Recommended activity: PT/OT per Home Health    If you have questions regarding the hospital related prescriptions or hospital related issues please call [email or web address] at . You can always direct your questions to your primary care doctor if you are unable to reach your hospital physician; your PCP works as an extension of your hospital doctor just like your hospital doctor is an extension of your PCP for your time at the hospital [facility]). If you experience any of the following symptoms then please call your primary care physician or return to the emergency room if you cannot get hold of your doctor:  Fever, chills, nausea, vomiting, diarrhea, change in mentation, falling, bleeding, shortness of breath.

## 2021-01-29 NOTE — TELEPHONE ENCOUNTER
[facility] called and wanted to let Dr. Kathie Jiang know that the pt is being d/c today from [facility] and they will be picking back up with him over the weekend and beginning of next week

## 2021-01-29 NOTE — PROGRESS NOTES
Reason for Admission:   Pneumonia   Hypoxia                RUR Score:   28%      PCP: First and Last name: David Joshi    Name of Practice:   Are you a current patient: Yes/No: Yes    Approximate date of last visit:  Pt was in [facility] rehab and discharged in December. Can you do a virtual visit with your PCP:   Yes with care aide assistance. Resources/supports as identified by patient/family:   Pt's mother is primary support                 Top Challenges facing patient (as identified by patient/family and CM): Finances/Medication cost?   No issues reported                  Transportation? Pt's mother provides transportation. Support system or lack thereof? Mother is his primary support. Pt has a  with [facility]: Gem Salter                     Living arrangements? Pt lives with his mother in a private residence. Self-care/ADLs/Cognition? Mother provides assistance in the home. Per Pt's mother Pt has an intellectual disability. Current Advanced Directive/Advance Care Plan:        General Advance Care Planning (ACP) Conversation      Date of Conversation: 1/28/21  Conducted with: Jose Noe: Pt's mother [facility] : [phone]    Healthcare Decision Maker: Jose Noe       Content/Action Overview:   AMD is not on file. Mother reports that [facility] CM may have AMD on file. Reviewed DNR/DNI and patient elects Full Code (Attempt Resuscitation)    Length of Voluntary ACP Conversation in minutes:  5                             Plan for utilizing home health:  Pt is currently open to [facility] for therapy. Transition of Care Plan:                    Pt is a 60year old male admitted to [facility] on 1/27/2021. Per chart review, Pt is reported to have an intellectual disability. CM contacted Pt's mother by phone to complete initial assessment.      Pt is reported to live in a private residence with his 80 yr old mother. Pt has been requiring significant assistance in the home. Pt's mother stated that Pt was recently discharge from [facility] in December. Pt is currently receiving [facility] PT through Yesica K[facility]. Pt also has a care aide in the home 5 days a week from 7a-12p with [facility]. Pt also has a  Diego Phillips: [phone] with [facility]. Pt's mother stated that they have discussed LTC or group home placement, however she does not want the Pt to leave as she will be alone and she does not feel that Pt would be happy in a facility. Pt has a wheel chair and a walker in the home. Per Pt's mother, Pt has been using the wheelchair due to impaired balance. Care Management Interventions  PCP Verified by CM:  Yes  Transition of Care Consult (CM Consult): Discharge Planning  Physical Therapy Consult: Yes  Occupational Therapy Consult: Yes  Current Support Network: Has Personal Caregivers, Lives with Caregiver  Confirm Follow Up Transport: Family  Discharge Location  Discharge Placement: Unable to determine at this time      Angelita Olivera, [facility], [address] [place] [address]

## 2021-01-29 NOTE — DISCHARGE SUMMARY
.                    Hospitalist Discharge Summary     Patient ID:  Talisha Dodge  [id number]  60 y.o.  1955  1/27/2021    PCP on record: John Bartlett MD    Admit date: 1/27/2021  Discharge date and time: 1/29/2021    DISCHARGE DIAGNOSIS:    Community acquired pneumonia improving  Hepatic encephalopathy improvied  Acute urinary Retention resolved  Mild compression fracture of throacic spine vertebra T12  Constipation  Diabetes type  Essential HTN  Cirrhosis  Chronic thrombocytopenia   History of myelodysplasia  Seizure disorder  Intellectual disability     CONSULTATIONS:  IP CONSULT TO HEMATOLOGY  IP CONSULT TO PALLIATIVE CARE - PROVIDER    Excerpted HPI from H&P of Flako Burton MD:    67-year-old male     Currently lives with his 26-year-old mother who is his caregiver along with aides to come in to help  He has 2 brothers who live in the area but grandmother does most of the care     Last admission early December 2020 with lower extremity weakness  MRI with degenerative disc disease small L5-S1 disc protrusion  Managed medically  Diagnosed at admission with cirrhosis, splenomegaly, possible portal hypertension  Ammonia was elevated 67 at admission     Mother notes for the past few months,  his walking has been declining              Attributed to neuropathy and possible degenerative disc disease of the lumbar spine  Now requires a walker, getting physical therapy at home  Past few days he is acutely worsened, she cannot get him out of bed              Mother notes his legs feel like they give out when he tries to stand up  He is much more lethargic than normal, less interactive and cooperative  He has a chronic cough present for the last year              Mother said in the past this was attributed to postnasal drip  No fevers, chest pain, abdominal pain, nausea vomiting, diarrhea, sore throat, headache  She has not been able to give him his lactulose as she cannot get him out of bed to the bathroom  Today he was able to urinate well  Because of the decline in his mental status, generalized weakness inability urinate, he was brought to emergency room  No recent falls or back pain reported     Emergency room  No SIRS criteria  Chest x-ray with bilateral basilar airspace disease, rapid Covid test negative              PCR is pending  Sats stable on room air at rest, ER noted drops in his sats to the upper 80s with activity in bed  Not able to completely void, Mitchell placed with 400 cc out  CT scan abdomen pelvis with possible T12 compression fracture  Able to lift thighs off bed with versus some resistance              Plantar flexed his feet bilaterally against resistance, less cooperative dorsiflexing the feet  MRI of the lumbar and thoracic spine ordered  IV ceftriaxone and azithromycin for possible pneumonia  We will call to admit the patient     ______________________________________________________________________  DISCHARGE SUMMARY/HOSPITAL COURSE:  for full details see H&P, daily progress notes, labs, consult notes. Pneumonia POA  Hypoxia in ED, ED notes sats dropping into 80s with minor activity  SARS-CoV-2 ruled out   Metabolic encephalopathy POA increasing lethargy x few days  CXR with basilar ASD, chronic cough x 1 year  Increasing weakness and lethargy x days  SARS CoV-2 rapid test negative, PCR also negative  Empiric cefepime, linezolid  BC NGTD  UA with no evidence of UTI  CT abdomen/pelvis with no acute pathology   inflammatory markers ddimer elevated 1.97    CT-PA :  1. No visualized pulmonary embolus. 2. Small area of consolidation in the inferior left lower lobe. Minimal areas of  groundglass opacity/airspace disease in both upper lobes.       Ferritin 90  Procalcitonin negative  Empiric dexamethasone stopped as  Covid ruled out  ammonia mildly elevated continue lactulose  Head CT with no acute changes    Will be discharged on bellow mentioned Abx regimen    He improved with no further requirement of oxygen supplementation. PT/OT consulted and [facility] for [facility] PT/OT if he has enough supervision. He lives with mother who is his primary care provider has two brothers and have 40+ hours per week of aid at home. Has been offered other options for placement but for concerns of COIVD infection and visitation limitations she opted to bring him back home.      Acute urinary Retention x 24 hours  cc in bladder  ? Mild compression of the inferior endplate of I06, new, ? acute fracture   No back pain or trauma  Fracture noted incidentally on CT abdomen  MRI thoracic spine: Increased subacute T12 partial compression fractures since November. No new fracture. Normal thoracic spinal cord.   Voiding trial done and no further retention observed     Constipation POA  Mild fecal stasis on CT scan  Daily MiraLAX and lactulose  Mother not giving the lactulose because he has not been getting out of bed     Diabetes type 2 POA  Baseline on Metformin, glimepiride, Tradjenta  Watch blood sugars on dexamethasone  Continue Tradjenta and glimepiride at reduced dose  Sliding scale insulin  Add NPH if dexamethasone continued and blood sugars elevated  Check hemoglobin A1c on chest onset     Essential HTN POA  Blood pressure borderline low in ED  Held Lasix and ramipril  Decreased the dose of lasix to 20 mg only and ramipril to 1.25 mg only       Cirrhosis POA  Chronic thrombocytopenia POA follows with Dr. Brendon Merritt  Macrocytic anemia, thrombocytopenia with history of myelodysplasia  folic acid, vitamin Z35 levels are normal, methylmalonic acid levels still pending  Mother unclear of etiology of the liver disease  - Normally takes lactulose, she has not given in several days as he is not getting out of bed  To continue with lacctulose     Seizure disorder POA  Continue home medications           Intellectual disability POA    _______________________________________________________________________  Patient seen and examined by me on discharge day. Pertinent Findings:  Gen:    Not in distress  Chest: Clear lungs  CVS:   Regular rhythm. No edema  Abd:  Soft, not distended, not tender  Neuro:  Alert, oriented to person and place  _______________________________________________________________________  DISCHARGE MEDICATIONS:   Current Discharge Medication List      START taking these medications    Details   azithromycin (ZITHROMAX) 250 mg tablet Take 1 Tab by mouth daily for 3 days. Qty: 3 Tab, Refills: 0      cefdinir (OMNICEF) 300 mg capsule Take 1 Cap by mouth two (2) times a day for 4 days. Qty: 8 Cap, Refills: 0      thiamine mononitrate (B-1) 100 mg tablet Take 2 Tabs by mouth daily. Qty: 30 Tab, Refills: 0         CONTINUE these medications which have CHANGED    Details   furosemide (LASIX) 40 mg tablet Take 0.5 Tabs by mouth daily. Qty: 30 Tab, Refills: 2    Associated Diagnoses: Hepatic cirrhosis, unspecified hepatic cirrhosis type, unspecified whether ascites present (HCC)      pregabalin (LYRICA) 75 mg capsule Take 1 Cap by mouth nightly. Max Daily Amount: 75 mg. Qty: 180 Cap, Refills: 1    Associated Diagnoses: Type 2 diabetes mellitus with diabetic polyneuropathy, without long-term current use of insulin (HCC)      ramipriL (ALTACE) 1.25 mg capsule Take 1 Cap by mouth daily. Qty: 30 Cap, Refills: 0         CONTINUE these medications which have NOT CHANGED    Details   topiramate (TOPAMAX) 200 mg tablet take one and one half tablet twice daily  Qty: 270 Tab, Refills: 3    Associated Diagnoses: Seizure disorder (HCC)      metFORMIN ER (GLUCOPHAGE XR) 500 mg tablet TAKE TWO TABLETS BY MOUTH TWICE DAILY   Qty: 360 Tab, Refills: 3      linaGLIPtin (TRADJENTA) 5 mg tablet Take 1 Tab by mouth daily.   Qty: 90 Tab, Refills: 3    Associated Diagnoses: Type 2 diabetes mellitus without complication, without long-term current use of insulin (HCC)      divalproex DR (DEPAKOTE) 500 mg tablet TAKE ONE TABLET BY MOUTH EVERY MORNING AND TWO TABS AT NIGHT  Qty: 270 Tab, Refills: 3    Associated Diagnoses: Seizure disorder ([facility])      atorvastatin (LIPITOR) 40 mg tablet Take 1 Tab by mouth nightly. Qty: 90 Tab, Refills: 3    Associated Diagnoses: Hypercholesterolemia      glimepiride (AMARYL) 4 mg tablet TAKE TWO TABLETS BY MOUTH IN THE MORNING  Qty: 180 Tab, Refills: 3    Associated Diagnoses: Controlled type 2 diabetes mellitus without complication, without long-term current use of insulin ([facility])      folic acid (FOLVITE) 1 mg tablet Take 1 Tab by mouth daily. Qty: 90 Tab, Refills: 3    Associated Diagnoses: Myelodysplasia (myelodysplastic syndrome) ([facility])      Calcium-Cholecalciferol, D3, (CALTRATE-600 PLUS VITAMIN D3) 600-400 mg-unit Tab Take 1 Tab by mouth daily. lactulose (CHRONULAC) 10 gram/15 mL solution       acetaminophen (TYLENOL) 500 mg tablet Take 2 Tabs by mouth every six (6) hours as needed for Pain. Qty: 20 Tab, Refills: 0      pioglitazone (ACTOS) 45 mg tablet Take 1 Tab by mouth daily. Qty: 90 Tab, Refills: 3    Associated Diagnoses: Controlled type 2 diabetes mellitus without complication, without long-term current use of insulin ([facility])      benzonatate (TESSALON) 100 mg capsule Take 1 Cap by mouth nightly as needed for Cough. Qty: 30 Cap, Refills: 0    Associated Diagnoses: Nocturnal cough      triamcinolone acetonide (KENALOG) 0.1 % ointment Apply  to affected area two (2) times a day. apply thin layer 2 x daily to groin as needed for rash      chlorhexidine (PERIDEX) 0.12 % solution Take 15 mL by mouth every twelve (12) hours. STOP taking these medications       cyclobenzaprine (FLEXERIL) 10 mg tablet Comments:   Reason for Stopping:                 Patient Follow Up Instructions:    Activity: PT/OT per Home Health  Diet: low sodium        Follow-up Information     Follow up With Specialties Details Why Robin Nagy MD Internal Medicine On 2/5/2021 For [facility] follow up appointment at 10:30AM  [address] [address]  [facility] [address]   [address] [facility], [address]  [phone]        ________________________________________________________________    Risk of deterioration: Moderate    Condition at Discharge:  Stable  __________________________________________________________________    Disposition  Home with family and home health services    ____________________________________________________________________    Code Status: DNR/DNI  ___________________________________________________________________      Total time in minutes spent coordinating this discharge (includes going over instructions, follow-up, prescriptions, and preparing report for sign off to her PCP) :  >30 minutes    Signed:  Yudith Ward MD

## 2021-01-29 NOTE — PROGRESS NOTES
End of Shift Note    Bedside shift change report given to Luis Miguel Garcia (oncoming nurse) by Tricia Moser (offgoing nurse). Report included the following information SBAR, Kardex and MAR    Shift worked: night   Shift summary and any significant changes:      1930 patient off the floor at MRI    Received order to dc richards catheter. Removed at [address] patient hasn't urinated, bladder scan at 0945. Concerns for physician to address:       Zone phone for oncoming shift:          Activity:  Activity Level: Up with Assistance  Number times ambulated in hallways past shift: 0  Number of times OOB to chair past shift: 0    Cardiac:   Cardiac Monitoring: Yes      Cardiac Rhythm: Normal sinus rhythm    Access:   Current line(s): PIV     Genitourinary:   Urinary status:     Respiratory:   O2 Device: Room air  Chronic home O2 use?: NO  Incentive spirometer at bedside: NO     GI:  Last Bowel Movement Date: 01/28/21  Current diet:  DIET CARDIAC Regular  Passing flatus: YES  Tolerating current diet: YES       Pain Management:   Patient states pain is manageable on current regimen: YES    Skin:  Pop Score: 17  Interventions: speciality bed, float heels, increase time out of bed and PT/OT consult    Patient Safety:  Fall Score:  Total Score: 4  Interventions: bed/chair alarm, assistive device (walker, cane, etc), gripper socks, pt to call before getting OOB and stay with me (per policy)  High Fall Risk: Yes    Length of Stay:  Expected LOS: 4d 4h  Actual LOS: [place]

## 2021-01-29 NOTE — PROGRESS NOTES
.      Hospitalist Progress Note    NAME: Alexis Hubbard   :  1955   MRN:  [id number]       Assessment / Plan:  Pneumonia POA  Hypoxia in ED, ED notes sats dropping into 80s with minor activity  SARS-CoV-2 ruled out   Metabolic encephalopathy POA increasing lethargy x days  CXR with basilar ASD, chronic cough x 1 year  Increasing weakness and lethargy x days  SARS CoV-2 rapid test negative, PCR also negative  Empiric cefepime, linezolid  BC in lab  UA with no evidence of UTI  CT abdomen/pelvis with no acute pathology   inflammatory markers ddimer elevated 1.97  Ferritin 90  Procalcitonin negative  Empiric dexamethasone stopped as  Covid ruled out  ammonia mildly elevated continue lactulose  Head CT with no acute changes  PT/OT consults     Acute urinary Retention x 24 hours  cc in bladder  ? Mild compression of the inferior endplate of F59, new, ? acute fracture   No back pain or trauma  Fracture noted incidentally on CT abdomen  MRI thoracic spine: Increased subacute T12 partial compression fractures since November. No new fracture. Normal thoracic spinal cord.   Voiding trial before discharge  Bowel regimen     Constipation POA  Mild fecal stasis on CT scan  Daily MiraLAX and lactulose  Mother not giving the lactulose because he has not been getting out of bed     Diabetes type 2 POA  Baseline on Metformin, glimepiride, Tradjenta  Watch blood sugars on dexamethasone  Continue Tradjenta and glimepiride at reduced dose  Sliding scale insulin  Add NPH if dexamethasone continued and blood sugars elevated  Check hemoglobin A1c on chest onset     Essential HTN POA  Blood pressure borderline low in ED  Hold Lasix and ramipril  We will add back as needed     Cirrhosis POA  Chronic thrombocytopenia POA follows with Dr. Pedrito Conner  Macrocytic anemia, thrombocytopenia with history of myelodysplasia  - will check folic acid, vitamin Y72 and methylmalonic acid levels  - hematology consultedMother unclear of etiology of the liver disease  - Normally takes lactulose, she has not given in several days as he is not getting out of bed  - Check ammonia, resume lactulose  - Serial labs     Seizure disorder POA  Continue home medications         Intellectual disability POA         25.0 - 29.9 Overweight / Body mass index is 26.72 kg/m². Code status: Full  Prophylaxis: Lovenox  Recommended Disposition: Home w/Family     Subjective:     Chief Complaint / Reason for Physician Visit  \"I feel ok\". Continuing follow up of Mr. Michael Ross with deconditioning, constipation and lethargy. With past history of cirrhosis, cognitive impairment. Discussed with RN events overnight. Review of Systems:  Symptom Y/N Comments  Symptom Y/N Comments   Fever/Chills n   Chest Pain n    Poor Appetite n   Edema     Cough n   Abdominal Pain n    Sputum n   Joint Pain     SOB/TREVINO n   Pruritis/Rash     Nausea/vomit    Tolerating PT/OT     Diarrhea    Tolerating Diet     Constipation    Other       Could NOT obtain due to:      Objective:     VITALS:   Last 24hrs VS reviewed since prior progress note.  Most recent are:  Patient Vitals for the past 24 hrs:   Temp Pulse Resp BP SpO2   01/28/21 2321 98.4 °F (36.9 °C) 79 17 (!) 120/58 97 %   01/28/21 2027 98.2 °F (36.8 °C) 74 18 (!) 129/93 97 %   01/28/21 1541 96.8 °F (36 °C) (!) 58 16 103/66 98 %   01/28/21 1032 97.5 °F (36.4 °C) 62 16 110/71 98 %   01/28/21 0926 -- -- -- (!) 106/58 --   01/28/21 0602 -- -- -- (!) 109/55 --   01/28/21 0600 -- 66 15 (!) 109/55 98 %   01/28/21 0559 -- 61 13 98/66 96 %   01/28/21 0545 -- 61 14 (!) 89/48 96 %   01/28/21 0530 -- 64 15 (!) 89/53 94 %   01/28/21 0515 -- 64 14 (!) 93/50 95 %   01/28/21 0500 -- 64 14 (!) 93/51 95 %   01/28/21 0445 -- 69 17 (!) 88/53 95 %   01/28/21 0430 -- 70 15 (!) 81/62 99 %   01/28/21 0415 -- 71 16 (!) 98/52 95 %   01/28/21 0400 -- 69 15 (!) 93/55 95 %   01/28/21 0345 -- 75 21 (!) 105/55 95 %   01/28/21 0256 98 °F (36.7 °C) 74 16 (!) 120/41 97 %   01/28/21 0200 -- 77 17 (!) 106/54 96 %   01/28/21 0100 -- 82 14 (!) 119/51 97 %   01/27/21 2345 -- 79 27 (!) 113/53 94 %       Intake/Output Summary (Last 24 hours) at 1/28/2021 2342  Last data filed at 1/28/2021 1551  Gross per 24 hour   Intake --   Output 1200 ml   Net -1200 ml        I had a face to face encounter and independently examined this patient on 1/28/2021, as outlined below:  PHYSICAL EXAM:  General: WD, WN. Alert, cooperative, no acute distress    EENT:  EOMI. Anicteric sclerae. MMM  Resp:  CTA bilaterally, no wheezing or rales. No accessory muscle use  CV:  Regular  rhythm,  No edema  GI:  Soft, Non distended, Non tender. +Bowel sounds  Neurologic:  Alert and oriented X 3, normal speech,   Psych:   Good insight. Not anxious nor agitated  Skin:  No rashes. No jaundice    Reviewed most current lab test results and cultures  YES  Reviewed most current radiology test results   YES  Review and summation of old records today    YES  Reviewed patient's current orders and MAR    YES  PMH/SH reviewed - no change compared to H&P  ________________________________________________________________________  Care Plan discussed with:    Comments   Patient x    Family      RN x    Care Manager     Consultant                        Multidiciplinary team rounds were held today with , nursing, pharmacist and clinical coordinator. Patient's plan of care was discussed; medications were reviewed and discharge planning was addressed.      ________________________________________________________________________  Total NON critical care TIME: 42    Minutes    Total CRITICAL CARE TIME Spent:   Minutes non procedure based      Comments   >50% of visit spent in counseling and coordination of care x    ________________________________________________________________________  Theron Kate MD     Procedures: see electronic medical records for all procedures/Xrays and details which were not copied into this note but were reviewed prior to creation of Plan. LABS:  I reviewed today's most current labs and imaging studies.   Pertinent labs include:  Recent Labs     01/28/21  0430 01/27/21  1403   WBC 9.3 10.6   HGB 9.6* 10.0*   HCT 31.4* 32.9*   PLT 72* 78*     Recent Labs     01/28/21  0430 01/27/21  1403    140   K 4.0 4.1   * 110*   CO2 21 25   * 186*   BUN 20 22*   CREA 0.79 1.03   CA 8.4* 8.8   ALB 2.3* 2.5*   TBILI 0.3 0.4   ALT 10* 9*   INR 1.1  --        Signed: Jenny Almeida MD

## 2021-01-29 NOTE — PROGRESS NOTES
Problem: Diabetes Self-Management  Goal: *Disease process and treatment process  Description: Define diabetes and identify own type of diabetes; list 3 options for treating diabetes. Outcome: Resolved/Met  Goal: *Incorporating nutritional management into lifestyle  Description: Describe effect of type, amount and timing of food on blood glucose; list 3 methods for planning meals. Outcome: Resolved/Met  Goal: *Incorporating physical activity into lifestyle  Description: State effect of exercise on blood glucose levels. Outcome: Resolved/Met  Goal: *Developing strategies to promote health/change behavior  Description: Define the ABC's of diabetes; identify appropriate screenings, schedule and personal plan for screenings. Outcome: Resolved/Met  Goal: *Using medications safely  Description: State effect of diabetes medications on diabetes; name diabetes medication taking, action and side effects. Outcome: Resolved/Met  Goal: *Monitoring blood glucose, interpreting and using results  Description: Identify recommended blood glucose targets  and personal targets. Outcome: Resolved/Met  Goal: *Prevention, detection, treatment of acute complications  Description: List symptoms of hyper- and hypoglycemia; describe how to treat low blood sugar and actions for lowering  high blood glucose level. Outcome: Resolved/Met  Goal: *Prevention, detection and treatment of chronic complications  Description: Define the natural course of diabetes and describe the relationship of blood glucose levels to long term complications of diabetes.   Outcome: Resolved/Met  Goal: *Developing strategies to address psychosocial issues  Description: Describe feelings about living with diabetes; identify support needed and support network  Outcome: Resolved/Met  Goal: *Insulin pump training  Outcome: Resolved/Met  Goal: *Sick day guidelines  Outcome: Resolved/Met  Goal: *Patient Specific Goal (EDIT GOAL, INSERT TEXT)  Outcome: Resolved/Met Problem: Patient Education: Go to Patient Education Activity  Goal: Patient/Family Education  Outcome: Resolved/Met     Problem: Patient Education: Go to Patient Education Activity  Goal: Patient/Family Education  Outcome: Resolved/Met     Problem: Pneumonia: Day 1  Goal: Off Pathway (Use only if patient is Off Pathway)  Outcome: Resolved/Met  Goal: Activity/Safety  Outcome: Resolved/Met  Goal: Consults, if ordered  Outcome: Resolved/Met  Goal: Diagnostic Test/Procedures  Outcome: Resolved/Met  Goal: Nutrition/Diet  Outcome: Resolved/Met  Goal: Medications  Outcome: Resolved/Met  Goal: Respiratory  Outcome: Resolved/Met  Goal: Treatments/Interventions/Procedures  Outcome: Resolved/Met  Goal: Psychosocial  Outcome: Resolved/Met  Goal: *Oxygen saturation within defined limits  Outcome: Resolved/Met  Goal: *Influenza vaccine administered (October-March)  Outcome: Resolved/Met  Goal: *Pneumoccocal vaccine administered  Outcome: Resolved/Met  Goal: *Hemodynamically stable  Outcome: Resolved/Met  Goal: *Demonstrates progressive activity  Outcome: Resolved/Met  Goal: *Tolerating diet  Outcome: Resolved/Met     Problem: Pneumonia: Day 2  Goal: Off Pathway (Use only if patient is Off Pathway)  Outcome: Resolved/Met  Goal: Activity/Safety  Outcome: Resolved/Met  Goal: Consults, if ordered  Outcome: Resolved/Met  Goal: Diagnostic Test/Procedures  Outcome: Resolved/Met  Goal: Nutrition/Diet  Outcome: Resolved/Met  Goal: Discharge Planning  Outcome: Resolved/Met  Goal: Medications  Outcome: Resolved/Met  Goal: Respiratory  Outcome: Resolved/Met  Goal: Treatments/Interventions/Procedures  Outcome: Resolved/Met  Goal: Psychosocial  Outcome: Resolved/Met  Goal: *Oxygen saturation within defined limits  Outcome: Resolved/Met  Goal: *Hemodynamically stable  Outcome: Resolved/Met  Goal: *Demonstrates progressive activity  Outcome: Resolved/Met  Goal: *Tolerating diet  Outcome: Resolved/Met  Goal: *Optimal pain control at patient's stated goal  Outcome: Resolved/Met     Problem: Pneumonia: Day 3  Goal: Off Pathway (Use only if patient is Off Pathway)  Outcome: Resolved/Met  Goal: Activity/Safety  Outcome: Resolved/Met  Goal: Consults, if ordered  Outcome: Resolved/Met  Goal: Diagnostic Test/Procedures  Outcome: Resolved/Met  Goal: Nutrition/Diet  Outcome: Resolved/Met  Goal: Discharge Planning  Outcome: Resolved/Met  Goal: Medications  Outcome: Resolved/Met  Goal: Respiratory  Outcome: Resolved/Met  Goal: Treatments/Interventions/Procedures  Outcome: Resolved/Met  Goal: Psychosocial  Outcome: Resolved/Met  Goal: *Oxygen saturation within defined limits  Outcome: Resolved/Met  Goal: *Hemodynamically stable  Outcome: Resolved/Met  Goal: *Demonstrates progressive activity  Outcome: Resolved/Met  Goal: *Tolerating diet  Outcome: Resolved/Met  Goal: *Describes available resources and support systems  Outcome: Resolved/Met  Goal: *Optimal pain control at patient's stated goal  Outcome: Resolved/Met     Problem: Pneumonia: Day 4  Goal: Off Pathway (Use only if patient is Off Pathway)  Outcome: Resolved/Met  Goal: Activity/Safety  Outcome: Resolved/Met  Goal: Nutrition/Diet  Outcome: Resolved/Met  Goal: Discharge Planning  Outcome: Resolved/Met  Goal: Medications  Outcome: Resolved/Met  Goal: Respiratory  Outcome: Resolved/Met  Goal: Treatments/Interventions/Procedures  Outcome: Resolved/Met  Goal: Psychosocial  Outcome: Resolved/Met     Problem: Pneumonia: Discharge Outcomes  Goal: *Demonstrates progressive activity  Outcome: Resolved/Met  Goal: *Describes follow-up/return visits to physicians  Outcome: Resolved/Met  Goal: *Tolerating diet  Outcome: Resolved/Met  Goal: *Verbalizes name, dosage, time, side effects, and number of days to continue medications  Outcome: Resolved/Met  Goal: *Influenza immunization  Outcome: Resolved/Met  Goal: *Pneumococcal immunization  Outcome: Resolved/Met  Goal: *Respiratory status at baseline  Outcome: Resolved/Met  Goal: *Vital signs within defined limits  Outcome: Resolved/Met  Goal: *Describes available resources and support systems  Outcome: Resolved/Met  Goal: *Optimal pain control at patient's stated goal  Outcome: Resolved/Met

## 2021-01-29 NOTE — PROGRESS NOTES
OLY:  -mother to transport home  -Orders ecin'd to [facility] to resume care  -discussed with POA, mother, who will transport pt home, will have her other son assist getting pt in home  -Mother tells me she will contact personal care [employer] to restart care  -Mother declines further assistance such as group home or LTC  -PCP kassi't on AVS  Ok for d/c from CM perspective

## 2021-01-29 NOTE — PROGRESS NOTES
Orders received, chart reviewed and patient evaluated by physical therapy. Pending progression with skilled acute physical therapy, recommend:  Physical therapy at least 2 days/week in the home AND ensure assist and/or supervision for safety with mobility    Recommend with nursing patient to complete as able in order to maintain strength, endurance and independence: OOB to chair 3x/day with 1  and ambulating with RW. Thank you for your assistance. Full evaluation to follow.

## 2021-01-29 NOTE — CONSULTS
Palliative Medicine Consult  [place]: [phone] ([phone]    Patient Name: Yuni Cardenas  YOB: 1955    Date of Initial Consult: 1/29/21  Reason for Consult: care decisions  Requesting Provider:  Cecilio Andujar MD  Primary Care Physician: Brian Nation MD     SUMMARY:   Yuni Cardenas is a 60 y.o. with a past history of mental retardation, diabetes, hypertension, seizure disorder, liver cirrhosis, who was admitted on 1/27/2021 from home with a diagnosis of PNA. Current medical issues leading to Palliative Medicine involvement include: functional decline, goals of care, concerns about 79 y/o mother/caregiver caring for pt safely at home. Psychosocial: lives with 79 y/o mother who is his caregiver along with aide 5 days/wk 7a-12p with [employer]. Pt also has a  Enrrique Frame: [phone] with [facility]. Pt's mother stated that they have discussed LTC or group home placement, however she does not want the Pt to leave as she will be alone and she does not feel that Pt would be happy in a facility. PALLIATIVE DIAGNOSES:   1. SOB  2. Cough   3. Hypoxic respiratory failure 2/2 PNA (covid rapid and PCR neg)  4. Metabolic encephalopathy: ammonia level 67 on adm  5. Chronic liver failure: takes lactulose at home  6. Urinary retention  7. Constipation   8. DM2  9. Intellectual disability  10. Physical debility:  12/20:  MRI with degenerative disc disease small L5-S1 disc protrusion Managed medically. BLE weakness  11. Care decisions     PLAN:   1. Prior to visit, I completed an extensive review of patient's medical records, including medical documentation, vital signs, MARs, and results of various labs and other diagnostics. I also spoke with patient's nurse Jef Alvares, attending , and  Monae Steiner. 2. Met with pt's mother and patient: completed POST form (see ACP note).   Mother raised her 3 sons plus 2 nephews, pt lives with her and the others live nearby.  Her other sons will come if called to help but \"busy\" with their own lives, so not much help.  She has been unable to get pt out of bed by herself, so without an aide she cannot move him safely.  She does not want to put him in a facility, especially now with COVID restrictions, it would kill her and patient to not be able to see each other. He provides her companionship and she provides him care and security. If she needs more help she will ask her sons to help pay for more.  She voiced that she can no longer get pt out of the house for doctor appts.  We talked about [facility] and she would like to be referred.  3. Referral to PC sent.    4. Initial consult note routed to primary continuity provider and/or primary health care team members  5. Communicated plan of care with: Palliative IDT, Hospital Health Care Team     GOALS OF CARE / TREATMENT PREFERENCES:     GOALS OF CARE:  Patient/Health Care Proxy Stated Goals: Prolong life    TREATMENT PREFERENCES:   Code Status: DNR    Advance Care Planning:  [x] The [facility] Interdisciplinary Team has updated the ACP Navigator with Health Care Decision Maker and Patient Capacity        Primary Decision Maker (Active): Taylor Sarmiento - Parent - [phone]    Secondary Decision Maker: Sukhjinder Redd - Brother - [phone]    Secondary Decision Maker: Jacob Redd - Brother - [phone]  Advance Care Planning 1/29/2021   Patient's Healthcare Decision Maker is: Legal Next of Kin   Primary Decision Maker Name -   Primary Decision Maker Phone Number -   Primary Decision Maker Relationship to Patient -   Secondary Decision Maker Name -   Secondary Decision Maker Phone Number -   Secondary Decision Maker Relationship to Patient -   Confirm Advance Directive None   Patient Would Like to Complete Advance Directive Unable   Does the patient have other document types MOST/MOLST/POST/POLST     Medical Interventions: Limited additional  interventions       Artificially Administered Nutrition: No feeding tube     Other:    As far as possible, the palliative care team has discussed with patient / health care proxy about goals of care / treatment preferences for patient. HISTORY:     History obtained from: chart, mother    CHIEF COMPLAINT: FTT    HPI/SUBJECTIVE:    The patient is:   [] Verbal and participatory  [x] Non-participatory due to: pt verbal, answers simple questions but not contributing to conversation  1/27: presented to ED with c/o decreased activity, generalized weakness, failure to thrive, poor PO intake, x 2-3 days, concerns for UTI, urinary retention, and constipation. Mother reports pt with difficulty ambulating; baseline he uses a walker however this has progressively declined until now he is unable to get out of bed on his own and sleeping more, and is more confused. Clinical Pain Assessment (nonverbal scale for severity on nonverbal patients):   Clinical Pain Assessment  Severity: 0     Activity (Movement): Lying quietly, normal position    Duration: for how long has pt been experiencing pain (e.g., 2 days, 1 month, years)  Frequency: how often pain is an issue (e.g., several times per day, once every few days, constant)     FUNCTIONAL ASSESSMENT:     Palliative Performance Scale (PPS):  PPS: 50       PSYCHOSOCIAL/SPIRITUAL SCREENING:     Palliative IDT has assessed this patient for cultural preferences / practices and a referral made as appropriate to needs (Cultural Services, Patient Advocacy, Ethics, etc.)    Any spiritual / Rastafarian concerns:  [] Yes /  [x] No    Caregiver Burnout:  [] Yes /  [x] No /  [] No Caregiver Present      Anticipatory grief assessment:   [x] Normal  / [] Maladaptive       ESAS Anxiety: Anxiety: 0    ESAS Depression: Depression: 0        REVIEW OF SYSTEMS:     Positive and pertinent negative findings in ROS are noted above in HPI.   The following systems were [x] reviewed / [] unable to be reviewed as noted in HPI  Other findings are noted below. Systems: constitutional, ears/nose/mouth/throat, respiratory, gastrointestinal, genitourinary, musculoskeletal, integumentary, neurologic, psychiatric, endocrine. Positive findings noted below. Modified ESAS Completed by: provider   Fatigue: 0 Drowsiness: 0   Depression: 0 Pain: 0   Anxiety: 0 Nausea: 0   Anorexia: 0 Dyspnea: 0           Stool Occurrence(s): 3        PHYSICAL EXAM:     From RN flowsheet:  Wt Readings from Last 3 Encounters:   01/29/21 185 lb 14.4 oz (84.3 kg)   12/10/20 202 lb (91.6 kg)   11/27/20 214 lb 4.6 oz (97.2 kg)     Blood pressure 101/65, pulse 72, temperature 98.1 °F (36.7 °C), resp. rate 18, height 6' (1.829 m), weight 185 lb 14.4 oz (84.3 kg), SpO2 96 %.     Pain Scale 1: Numeric (0 - 10)  Pain Intensity 1: 0                 Last bowel movement, if known:     Constitutional: WD, WN, NAD, sitting in chair at bedside eating lunch  Eyes: pupils equal, anicteric  ENMT: no nasal discharge, moist mucous membranes  Cardiovascular: regular rhythm, distal pulses intact  Respiratory: breathing not labored, symmetric  Gastrointestinal: soft non-tender, +bowel sounds  Musculoskeletal: no deformity, no tenderness to palpation  Skin: warm, dry  Neurologic: following commands, moving all extremities  Psychiatric: full affect         HISTORY:     Active Problems:    Pneumonia due to COVID-19 virus (1/27/2021)      Past Medical History:   Diagnosis Date    Contact dermatitis and other eczema, due to unspecified cause     psoriasis    Diabetes ([facility] [address]     Eosinophilia     Hypercholesterolemia     Hypertension     Mental retardation     Seizures ([facility] [address]     Skin cancer     Strongyloides stercoralis infection 8/28/2014      Past Surgical History:   Procedure Laterality Date    ENDOSCOPY, COLON, DIAGNOSTIC  11/08/2007    Dr Jane Mcgill normal except small internal hemorrhoids repeat 11/2017      Family History   Problem Relation Age of Onset    Other Mother         PMR    Hypertension Mother     Cancer Father         Lung    Diabetes Brother       History reviewed, no pertinent family history.   Social History     Tobacco Use    Smoking status: Never Smoker    Smokeless tobacco: Never Used   Substance Use Topics    Alcohol use: No     No Known Allergies   Current Facility-Administered Medications   Medication Dose Route Frequency    [START ON 1/30/2021] enoxaparin (LOVENOX) injection 40 mg  40 mg SubCUTAneous I73P    folic acid (FOLVITE) tablet 1 mg  1 mg Oral DAILY    lactulose (CHRONULAC) 10 gram/15 mL solution 30 mL  20 g Oral TID    thiamine mononitrate (B-1) tablet 200 mg  200 mg Oral DAILY    sodium chloride (NS) flush 5-40 mL  5-40 mL IntraVENous Q8H    sodium chloride (NS) flush 5-40 mL  5-40 mL IntraVENous PRN    sodium chloride (NS) flush 5-40 mL  5-40 mL IntraVENous Q8H    sodium chloride (NS) flush 5-40 mL  5-40 mL IntraVENous PRN    acetaminophen (TYLENOL) tablet 650 mg  650 mg Oral Q6H PRN    Or    acetaminophen (TYLENOL) suppository 650 mg  650 mg Rectal Q6H PRN    polyethylene glycol (MIRALAX) packet 17 g  17 g Oral DAILY    bisacodyL (DULCOLAX) tablet 5 mg  5 mg Oral DAILY PRN    promethazine (PHENERGAN) tablet 12.5 mg  12.5 mg Oral Q6H PRN    Or    ondansetron (ZOFRAN) injection 4 mg  4 mg IntraVENous Q6H PRN    glucose chewable tablet 16 g  4 Tab Oral PRN    dextrose (D50W) injection syrg 12.5-25 g  25-50 mL IntraVENous PRN    glucagon (GLUCAGEN) injection 1 mg  1 mg IntraMUSCular PRN    insulin lispro (HUMALOG) injection   SubCUTAneous AC&HS    azithromycin (ZITHROMAX) tablet 250 mg  250 mg Oral DAILY    cefTRIAXone (ROCEPHIN) 1 g in 0.9% sodium chloride (MBP/ADV) 50 mL MBP  1 g IntraVENous Q24H    atorvastatin (LIPITOR) tablet 40 mg  40 mg Oral QHS    glimepiride (AMARYL) tablet 8 mg  8 mg Oral ACB    alogliptin (NESINA) tablet 12.5 mg  12.5 mg Oral DAILY    pregabalin (LYRICA) capsule 150 mg  150 mg Oral BID    topiramate (TOPAMAX) tablet 300 mg  300 mg Oral BID    divalproex DR (DEPAKOTE) tablet 500 mg  500 mg Oral QHS    [Held by provider] furosemide (LASIX) tablet 40 mg  40 mg Oral DAILY    divalproex DR (DEPAKOTE) tablet 250 mg  250 mg Oral DAILY     Current Outpatient Medications   Medication Sig    furosemide (LASIX) 40 mg tablet Take 0.5 Tabs by mouth daily.  pregabalin (LYRICA) 75 mg capsule Take 1 Cap by mouth nightly. Max Daily Amount: 75 mg.    [START ON 1/30/2021] azithromycin (ZITHROMAX) 250 mg tablet Take 1 Tab by mouth daily for 3 days.  [START ON 1/30/2021] cefdinir (OMNICEF) 300 mg capsule Take 1 Cap by mouth two (2) times a day for 4 days.  [START ON 1/30/2021] thiamine mononitrate (B-1) 100 mg tablet Take 2 Tabs by mouth daily.  ramipriL (ALTACE) 1.25 mg capsule Take 1 Cap by mouth daily.  topiramate (TOPAMAX) 200 mg tablet take one and one half tablet twice daily    metFORMIN ER (GLUCOPHAGE XR) 500 mg tablet TAKE TWO TABLETS BY MOUTH TWICE DAILY     linaGLIPtin (TRADJENTA) 5 mg tablet Take 1 Tab by mouth daily.  divalproex DR (DEPAKOTE) 500 mg tablet TAKE ONE TABLET BY MOUTH EVERY MORNING AND TWO TABS AT NIGHT    atorvastatin (LIPITOR) 40 mg tablet Take 1 Tab by mouth nightly.  glimepiride (AMARYL) 4 mg tablet TAKE TWO TABLETS BY MOUTH IN THE MORNING    folic acid (FOLVITE) 1 mg tablet Take 1 Tab by mouth daily.  Calcium-Cholecalciferol, D3, (CALTRATE-600 PLUS VITAMIN D3) 600-400 mg-unit Tab Take 1 Tab by mouth daily.  lactulose (CHRONULAC) 10 gram/15 mL solution     acetaminophen (TYLENOL) 500 mg tablet Take 2 Tabs by mouth every six (6) hours as needed for Pain.  pioglitazone (ACTOS) 45 mg tablet Take 1 Tab by mouth daily.  benzonatate (TESSALON) 100 mg capsule Take 1 Cap by mouth nightly as needed for Cough.  triamcinolone acetonide (KENALOG) 0.1 % ointment Apply  to affected area two (2) times a day.  apply thin layer 2 x daily to groin as needed for rash    chlorhexidine (PERIDEX) 0.12 % solution Take 15 mL by mouth every twelve (12) hours. LAB AND IMAGING FINDINGS:     Lab Results   Component Value Date/Time    WBC 8.4 01/29/2021 11:50 AM    HGB 10.1 (L) 01/29/2021 11:50 AM    PLATELET 86 (L) 61/84/3299 11:50 AM     Lab Results   Component Value Date/Time    Sodium 138 01/29/2021 11:50 AM    Potassium 3.6 01/29/2021 11:50 AM    Chloride 110 (H) 01/29/2021 11:50 AM    CO2 19 (L) 01/29/2021 11:50 AM    BUN 20 01/29/2021 11:50 AM    Creatinine 0.72 01/29/2021 11:50 AM    Calcium 8.9 01/29/2021 11:50 AM    Magnesium 1.9 01/29/2021 11:50 AM    Phosphorus 3.7 01/29/2021 11:50 AM      Lab Results   Component Value Date/Time    Alk. phosphatase 50 01/29/2021 11:50 AM    Protein, total 6.4 01/29/2021 11:50 AM    Albumin 2.4 (L) 01/29/2021 11:50 AM    Globulin 4.0 01/29/2021 11:50 AM     Lab Results   Component Value Date/Time    INR 1.1 01/28/2021 04:30 AM    Prothrombin time 11.4 (H) 01/28/2021 04:30 AM      Lab Results   Component Value Date/Time    Iron 82 04/02/2020 09:06 AM    TIBC 290 04/02/2020 09:06 AM    Iron % saturation 28 04/02/2020 09:06 AM    Ferritin 90 01/28/2021 04:03 AM      No results found for: PH, PCO2, PO2  No components found for: GLPOC   No results found for: CPK, CKMB             Total time:   Counseling / coordination time, spent as noted above:   > 50% counseling / coordination?:     Prolonged service was provided for  []30 min   []75 min in face to face time in the presence of the patient, spent as noted above. Time Start:   Time End:   Note: this can only be billed with 52414 (initial) or 74704 (follow up). If multiple start / stop times, list each separately.

## 2021-01-29 NOTE — PROGRESS NOTES
Problem: Mobility Impaired (Adult and Pediatric)  Goal: *Acute Goals and Plan of Care (Insert Text)  Description: FUNCTIONAL STATUS PRIOR TO ADMISSION: Patient was modified independent using a rolling walker for functional mobility. HOME SUPPORT PRIOR TO ADMISSION: The patient lived with mother. Physical Therapy Goals  Initiated 1/29/2021  1. Patient will move from supine to sit and sit to supine  in bed with independence within 7 day(s). 2.  Patient will transfer from bed to chair and chair to bed with modified independence using the least restrictive device within 7 day(s). 3.  Patient will perform sit to stand with modified independence within 7 day(s). 4.  Patient will ambulate with modified independence for 150 feet with the least restrictive device within 7 day(s). 5.  Patient will ascend/descend 2 stairs with 1 handrail(s) with modified independence within 7 day(s). Outcome: Progressing Towards Goal    PHYSICAL THERAPY EVALUATION  Patient: Francesca Fernandes (37 y.o. male)  Date: 1/29/2021  Primary Diagnosis: Pneumonia due to COVID-19 virus [U07.1, J12.82]        Precautions: fall         ASSESSMENT  Based on the objective data described below, the patient presents with generalized weakness, decreased activity tolerance, impaired balance and altered gait. Pt was received in supine and cleared by nursing to mobilize. VSS on RA during session. He was able to come to the EOB without assistance. Stood with RW and ambulated to the chair without difficulty. He was left sitting up in the chair and should continues to mobilize with nursing staff over the weekend. Current Level of Function Impacting Discharge (mobility/balance): CGA/SBA    Functional Outcome Measure: The patient scored Total: 55/100 on the Barthel Index which is indicative of 45% impaired ability to care for basic self needs/dependency on others.      Other factors to consider for discharge:      Patient will benefit from skilled therapy intervention to address the above noted impairments. PLAN :  Recommendations and Planned Interventions: bed mobility training, transfer training, gait training, therapeutic exercises, patient and family training/education, and therapeutic activities      Frequency/Duration: Patient will be followed by physical therapy:  4 times a week to address goals. Recommendation for discharge: (in order for the patient to meet his/her long term goals)  Physical therapy at least 2 days/week in the home AND ensure assist and/or supervision for safety with mobility    This discharge recommendation:  Has not yet been discussed the attending provider and/or case management    IF patient discharges home will need the following DME: patient owns DME required for discharge         SUBJECTIVE:   Patient stated Fleta Reading feel better today.     OBJECTIVE DATA SUMMARY:   HISTORY:    Past Medical History:   Diagnosis Date    Contact dermatitis and other eczema, due to unspecified cause     psoriasis    Diabetes ([facility]     Eosinophilia     Hypercholesterolemia     Hypertension     Mental retardation     Seizures ([facility]     Skin cancer     Strongyloides stercoralis infection 8/28/2014     Past Surgical History:   Procedure Laterality Date    ENDOSCOPY, COLON, DIAGNOSTIC  11/08/2007    Dr Sarah Beth Lane normal except small internal hemorrhoids repeat 11/2017       Personal factors and/or comorbidities impacting plan of care:     Home Situation  Home Environment: Private residence  # Steps to Enter: 2  One/Two Story Residence: One story  Living Alone: No  Support Systems: Family member(s)(80year old mother)  Current DME Used/Available at Home: Walker, rolling, Wheelchair    EXAMINATION/PRESENTATION/DECISION MAKING:   Critical Behavior:  Neurologic State: Alert  Orientation Level: Oriented to person, Oriented to place, Disoriented to situation  Cognition: Decreased attention/concentration, Decreased command following, Impaired decision making(slow processing)  Safety/Judgement: Fall prevention  Hearing:     Skin:  intact  Edema: WDL  Range Of Motion:  AROM: Within functional limits           PROM: Within functional limits           Strength:    Strength: Generally decreased, functional                    Tone & Sensation:   Tone: Normal                              Coordination:  Coordination: Generally decreased, functional  Vision:   Acuity: (grossly intact)  Functional Mobility:  Bed Mobility:  Rolling: Supervision  Supine to Sit: Supervision     Scooting: Supervision  Transfers:  Sit to Stand: Contact guard assistance  Stand to Sit: Contact guard assistance        Bed to Chair: Stand-by assistance(with RW)              Balance:   Sitting: Intact  Standing: Impaired  Standing - Static: Good  Standing - Dynamic : Fair  Ambulation/Gait Training:  Distance (ft): 5 Feet (ft)  Assistive Device: Gait belt;Walker, rolling  Ambulation - Level of Assistance: Contact guard assistance        Gait Abnormalities: Decreased step clearance        Base of Support: Widened     Speed/Doris: Pace decreased (<100 feet/min); Shuffled  Step Length: Left shortened;Right shortened             Functional Measure:  Barthel Index:    Bathin  Bladder: 5  Bowels: 5  Groomin  Dressin  Feeding: 10  Mobility: 5  Stairs: 5  Toilet Use: 5  Transfer (Bed to Chair and Back): 10  Total: 55/100       The Barthel ADL Index: Guidelines  1. The index should be used as a record of what a patient does, not as a record of what a patient could do. 2. The main aim is to establish degree of independence from any help, physical or verbal, however minor and for whatever reason. 3. The need for supervision renders the patient not independent. 4. A patient's performance should be established using the best available evidence. Asking the patient, friends/relatives and nurses are the usual sources, but direct observation and common sense are also important.  However direct testing is not needed. 5. Usually the patient's performance over the preceding 24-48 hours is important, but occasionally longer periods will be relevant. 6. Middle categories imply that the patient supplies over 50 per cent of the effort. 7. Use of aids to be independent is allowed. Armin Coil., Barthel, D.W. (6324). Functional evaluation: the Barthel Index. [facility] (14)2. Bhavin Quickr cara ALICE ChappellF, Richard Saleh., Dazander Bluejacket., Ormagy Sero, [facility] (). Measuring the change indisability after inpatient rehabilitation; comparison of the responsiveness of the Barthel Index and Functional Ford Measure. Journal of Neurology, Neurosurgery, and Psychiatry, 66(4), 736-525. JESSEE Garcia, MARILEE Shah, & Betsy Layc M.A. (2004.) Assessment of post-stroke quality of life in cost-effectiveness studies: The usefulness of the Barthel Index and the EuroQoL-5D. Quality of Life Research, 15, 624-30        Physical Therapy Evaluation Charge Determination   History Examination Presentation Decision-Making   HIGH Complexity :3+ comorbidities / personal factors will impact the outcome/ POC  MEDIUM Complexity : 3 Standardized tests and measures addressing body structure, function, activity limitation and / or participation in recreation  MEDIUM Complexity : Evolving with changing characteristics  Other outcome measures barthel  MEDIUM      Based on the above components, the patient evaluation is determined to be of the following complexity level: MEDIUM    Pain Ratin/10    Activity Tolerance:   Good    After treatment patient left in no apparent distress:   Sitting in chair and Call bell within reach    COMMUNICATION/EDUCATION:   The patients plan of care was discussed with: Occupational therapist and Registered nurse. Fall prevention education was provided and the patient/caregiver indicated understanding. and Patient/family agree to work toward stated goals and plan of care.     Thank you for this referral.  Angela Wolfe, PT, DPT   Time Calculation: 18 mins

## 2021-01-30 ENCOUNTER — PATIENT OUTREACH (OUTPATIENT)
Dept: CASE MANAGEMENT | Age: 66
End: 2021-01-30

## 2021-01-30 NOTE — PROGRESS NOTES
Patient contacted regarding recent discharge and COVID-19 risk. Discussed COVID-19 related testing which was available at this time. Test results were negative. Patient informed of results, if available? no    Outreach made within 2 business days of discharge: Yes    Care Transition Nurse/ Ambulatory Care Manager/ LPN Care Coordinator contacted the parent by telephone to perform post discharge assessment. Verified name and  with parent as identifiers. Patient has following risk factors of: pneumonia and diabetes. CTN/ACM/LPN reviewed discharge instructions, medical action plan and red flags related to discharge diagnosis. Reviewed and educated them on any new and changed medications related to discharge diagnosis. Advised obtaining a 90-day supply of all daily and as-needed medications. Advance Care Planning:   Does patient have an Advance Directive: mPOA in chart and POST completed inpatient    Education provided regarding infection prevention, and signs and symptoms of COVID-19 and when to seek medical attention with parent who verbalized understanding. Discussed exposure protocols and quarantine from [address] you at higher risk for severe illness  and given an opportunity for questions and concerns. The parent agrees to contact the COVID-19 hotline [phone] or PCP office for questions related to their healthcare. CTN/ACM/LPN provided contact information for future reference. From CDC: Are you at higher risk for severe illness?  Wash your hands often.  Avoid close contact (6 feet, which is about two arm lengths) with people who are sick.  Put distance between yourself and other people if COVID-19 is spreading in your community.  Clean and disinfect frequently touched surfaces.  Avoid all cruise travel and non-essential air travel.  Call your healthcare professional if you have concerns about COVID-19 and your underlying condition or if you are sick.     For more information on steps you can take to protect yourself, see CDC's How to Protect Yourself      Patient/family/caregiver given information for GetWell Loop and agrees to enroll no  Patient's preferred e-mail:  declines  Patient's preferred phone number: declines  Based on Loop alert triggers, patient will be contacted by nurse care manager for worsening symptoms. Plan for follow-up call in 5-7 days based on severity of symptoms and risk factors.     [facility] PT 1/30/21 Laura Helpers 2/1/21  Referred to Estevan Chan has PCP appt 2/5/21   Has all medications

## 2021-02-01 ENCOUNTER — TELEPHONE (OUTPATIENT)
Dept: FAMILY MEDICINE CLINIC | Age: 66
End: 2021-02-01

## 2021-02-01 NOTE — TELEPHONE ENCOUNTER
[facility]   Phone:  [phone]      Fax:  [phone] [facility], [facility], [facility] [place]    Name:  Aaron Greene  YOB: 1955    Incoming in-basket message from Rimma Garcia, NEELAM inquiring about possible [facility] referral for Cris Coronel. Pt is a home bound 60year old with h/o mental retardation, diabetes, hypertension, seizure disorder, liver cirrhosis, cared for by his 81 y/o mother.      Unfortunately we are currently not accepting new patients for [facility]  Of of the two NP's with [facility] is now working with hospice and we will not be accepting new patients until another NP is hired and oriented. Pt has Medicaid so [facility] won't take him.  Pt lives outside of the 13 mile radius for Shamir Lentz.  This nurse is not aware of any other PCP practices that make house calls. Returned message to Ms. Nelson informing her of above. 2/2/21  Addendum:  Per Ms. Leslie's request, this nurse called and spoke with pt's mother and relayed above information. She states she is 80yo and does not have capability to do a virtual visit with current PCP Dr. Jackson Denver. Ms. Georgina Cardona states she will call Dr. Castellanos Robb office and also the Medicaid  and ask them what she can do since she cannot get Shen[place]ah Dye into the doctor's office. She requests that this nurse put pt on a waiting list for [facility]. Nurse explained that it may be months since we need to hire a NP and they will need to be oriented. She voices understanding.   Pt added to [address] [place] [address] waiting list.    APRYL WilliamsonN, RN-BC, [facility]  Referral Navigator, Home Based [address] [facility]

## 2021-02-02 ENCOUNTER — TELEPHONE (OUTPATIENT)
Dept: INTERNAL MEDICINE CLINIC | Age: 66
End: 2021-02-02

## 2021-02-02 NOTE — TELEPHONE ENCOUNTER
Pt mother called and stated that they were suppose to have an NP come out to them since she can not get the pt into the doctor.  She said that the agency called and said they could not get anyone to come out to their house now and now she does not know what to do

## 2021-02-03 NOTE — TELEPHONE ENCOUNTER
Arrangements were made for [facility] [place] to see patient when he was last discharged from the hospital.  Call patient's mother to see if anyone from [facility] [place] has called today to let her know when they are coming. When she last spoke spoke to them, what reason did they give her as to why they were not coming?

## 2021-02-03 NOTE — TELEPHONE ENCOUNTER
I called the patients mother per Dr. Leonor Marc and verified the patients information. I informed her on the message from Dr. Leonor Marc. She stated [facility] came either yesterday or the day before and told her they would be back next week. [facility] is coming out tomorrow. When the patient was discharged they informed her a NP would be coming to the home to prevent her from having to take the patient out to appointments. They called yesterday and stated that they are not accepting new patients and put him on a list. She wanted to confirm his appointment for Friday and wanted Dr. Leonor Marc to see the patient. They gave him about 15 different prescriptions and is the biggest mess she has ever seen. She would like for Dr. Leonor Marc to see the medications to determine what he should and should not be taking. Can get the patient in the car and to the office, but would need to get someone to come outside with a wheelchair to bring him in.

## 2021-02-04 LAB
Lab: NORMAL
METHYLMALONATE SERPL-SCNC: 101 NMOL/L (ref 0–378)

## 2021-02-08 ENCOUNTER — TELEPHONE (OUTPATIENT)
Dept: INTERNAL MEDICINE CLINIC | Age: 66
End: 2021-02-08

## 2021-02-08 ENCOUNTER — OFFICE VISIT (OUTPATIENT)
Dept: INTERNAL MEDICINE CLINIC | Age: 66
End: 2021-02-08
Payer: MEDICARE

## 2021-02-08 VITALS
HEART RATE: 70 BPM | RESPIRATION RATE: 14 BRPM | HEIGHT: 72 IN | DIASTOLIC BLOOD PRESSURE: 59 MMHG | WEIGHT: 192 LBS | OXYGEN SATURATION: 98 % | SYSTOLIC BLOOD PRESSURE: 105 MMHG | BODY MASS INDEX: 26.01 KG/M2 | TEMPERATURE: 97.9 F

## 2021-02-08 DIAGNOSIS — R29.898 WEAKNESS OF BOTH LOWER EXTREMITIES: ICD-10-CM

## 2021-02-08 DIAGNOSIS — I10 HYPERTENSION, ESSENTIAL: ICD-10-CM

## 2021-02-08 DIAGNOSIS — J18.9 COMMUNITY ACQUIRED PNEUMONIA, UNSPECIFIED LATERALITY: ICD-10-CM

## 2021-02-08 DIAGNOSIS — E11.42 TYPE 2 DIABETES MELLITUS WITH DIABETIC POLYNEUROPATHY, WITHOUT LONG-TERM CURRENT USE OF INSULIN (HCC): ICD-10-CM

## 2021-02-08 DIAGNOSIS — K74.60 HEPATIC CIRRHOSIS, UNSPECIFIED HEPATIC CIRRHOSIS TYPE, UNSPECIFIED WHETHER ASCITES PRESENT (HCC): ICD-10-CM

## 2021-02-08 DIAGNOSIS — Z09 HOSPITAL DISCHARGE FOLLOW-UP: Primary | ICD-10-CM

## 2021-02-08 DIAGNOSIS — D46.9 MYELODYSPLASIA (MYELODYSPLASTIC SYNDROME) (HCC): ICD-10-CM

## 2021-02-08 DIAGNOSIS — E11.42 TYPE 2 DIABETES MELLITUS WITH DIABETIC POLYNEUROPATHY, WITHOUT LONG-TERM CURRENT USE OF INSULIN (HCC): Primary | ICD-10-CM

## 2021-02-08 PROCEDURE — 99495 TRANSJ CARE MGMT MOD F2F 14D: CPT | Performed by: INTERNAL MEDICINE

## 2021-02-08 PROCEDURE — 1111F DSCHRG MED/CURRENT MED MERGE: CPT | Performed by: INTERNAL MEDICINE

## 2021-02-08 PROCEDURE — G8427 DOCREV CUR MEDS BY ELIG CLIN: HCPCS | Performed by: INTERNAL MEDICINE

## 2021-02-08 NOTE — PROGRESS NOTES
Transitional Care Management Progress Note    Patient: Talisha Dodge  : 1955  PCP: John Bartlett MD    Date of office visit: 2021   Date of admission: 21  Date of discharge: 21  Hospital: [facility]    Call initiated w/i 2 business dates of discharge: Yes   Date of the most recent call to the patient: 2021  9:30 AM      Assessment/Plan:     Diagnoses and all orders for this visit:    1. Hospital discharge follow-up  -     MA DISCHARGE MEDS RECONCILED W/ CURRENT OUTPATIENT MED LIST  -     To reduce the number of medications he is taking, stop thiamine after completing bottle and ramipril (see #6). 2. Community acquired pneumonia, unspecified laterality  Resolved symptoms. Plan repeat CXR in 1 month if possible. 3. Hepatic cirrhosis, unspecified hepatic cirrhosis type, unspecified whether ascites present ([facility]  Diagnosed during hospitalization in . Etiology unknown; was to follow up with Dr. Tj Bernstein for further work up but never did. Will refer instead to Dr. Mesfin Elaine. I strongly recommended that his mother give him lactulose to avoid hepatic encephalopathy. Continue furosemide 20 mg daily. My nurse called the [facility] and scheduled patient for appointment with Dr. Mesfin Elaine on 3/25/21 at 8:30 am.  -     REFERRAL TO GASTROENTEROLOGY (Dr. Mesfin Elaine)    4. Weakness of both lower extremities  Limited mobility. Patient needed help getting from car to clinic wheelchair when he came today. Continue home PT. Follow up with Dr. Gio Akhtar (Neurology). 5. Type 2 diabetes mellitus with diabetic polyneuropathy, without long-term current use of insulin ([facility]  DM controlled on 4 oral agents. Last A1c 6.7% on 21. Continue Lyrica for neuropathy. 6. Hypertension, essential  Low BP. On low dose ramipril. Will discontinue to reduce number of medications he is taking.     7. Myelodysplasia (myelodysplastic syndrome) (HCC)  Continue folic acid.      Follow-up and Dispositions    · Return in about 1 month (around 3/8/2021), or if symptoms worsen or fail to improve, for HTN, cirrhosis; cancel 2/24/21 appt and schedule for 1 month. Subjective:   Julia Javier is a 60 y.o. male presenting today for follow-up after hospital discharge. This encounter and supporting documentation was reviewed if available. Medication reconciliation was performed today. The main problem requiring admission was pneumonia. Complications during admission: none    Accompanied by his mother, Nna Mullen. He has type 2 DM, HTN, hyperlipidemia, seizure disorder, cirrhosis of liver, splenomegaly, myelodysplastic syndrome (MDS), thrombocytopenia due to MDS, leg weakness, and intellectual disability. Has been hospitalized 2 previous times in Nov for lower extremity weakness and Dec for functional paraplegia. Hospitalized at [address] from 1/27-1/29/21 with community-acquired pneumonia, hepatic encephalopathy, acute urine retention, and mild compression fracture at T12. Presented with decline in mental status, generalized weakness, inability to urinate, and hypoxia in ED. CT head: no acute findings. CXR: bilateral lower lobe infiltrates (L>R). CT abd/pelvis: LLL air space disease (ASD), smaller ASD at R base, new mild compression at inferior endplate of A88. CTA: no PE. Negative for COVID-19. Treated with IV antibiotics, Mitchell catheter, and lactulose. Discharged on azithromycin, cefdinir, and thiamine. Today no complaints. Has finished the antibiotics. He denies SOB or CP. His mother is concerned that he is on several medications. Reports he had a tooth removed a few weeks prior to admission and she mistakenly did not give him amoxicillin before the procedure; thinks this could have led to his pneumonia.   Says his mental status has improved but is not the way it used to be a year ago; sleeps a lot, does not laugh and interact the way he used to, spends a lot of time staring in space. He has home PT through [facility] but no skilled nursing. Has a home aide from [facility] who comes in the mornings; mother feels the aide does not help enough and spends too much time on her cell phone. Mother has not been giving him lactulose since he has been home because it causes him to have loose stools and he cannot make it to the bathroom in time. She ends up having to clean him up. The home aide calls her to clean him up when he soils himself. Mother says she has been unable to make appointments for him with Dr. Fiordaliza Lazcano (Neurology)- the phone rings with no one picking it up-- and with Dr. Levy Long told it would take months to get him in since he is a new patient. Medications marked \"taking\" at this time:  Prior to Admission medications    Medication Sig Start Date End Date Taking? Authorizing Provider   furosemide (LASIX) 40 mg tablet Take 0.5 Tabs by mouth daily. 1/29/21  Yes Angeli Spain MD   pregabalin (LYRICA) 75 mg capsule Take 1 Cap by mouth nightly. Max Daily Amount: 75 mg. 1/29/21  Yes Angeli Spain MD   thiamine mononitrate (B-1) 100 mg tablet Take 2 Tabs by mouth daily. 1/30/21  Yes Angeli Spain MD   ramipriL (ALTACE) 1.25 mg capsule Take 1 Cap by mouth daily. 1/29/21  Yes Angeli Spain MD   topiramate (TOPAMAX) 200 mg tablet take one and one half tablet twice daily 1/11/21  Yes Guille Robles MD   metFORMIN ER (GLUCOPHAGE XR) 500 mg tablet TAKE TWO TABLETS BY MOUTH TWICE DAILY  10/12/20  Yes Guille Robles MD   linaGLIPtin (TRADJENTA) 5 mg tablet Take 1 Tab by mouth daily. 10/2/20  Yes Guille Robles MD   pioglitazone (ACTOS) 45 mg tablet Take 1 Tab by mouth daily. 9/28/20  Yes Guille Robles MD   divalproex DR (DEPAKOTE) 500 mg tablet TAKE ONE TABLET BY MOUTH EVERY MORNING AND TWO TABS AT NIGHT 9/28/20  Yes Maranda Riley MD   atorvastatin (LIPITOR) 40 mg tablet Take 1 Tab by mouth nightly. 9/28/20  Yes Danielito Riley MD   glimepiride (AMARYL) 4 mg tablet TAKE TWO TABLETS BY MOUTH IN THE MORNING 9/28/20  Yes Danielito Riley MD   folic acid (FOLVITE) 1 mg tablet Take 1 Tab by mouth daily. 9/28/20  Yes Danielito Riley MD   triamcinolone acetonide (KENALOG) 0.1 % ointment Apply  to affected area two (2) times a day. apply thin layer 2 x daily to groin as needed for rash 1/30/19  Yes Provider, Historical   Calcium-Cholecalciferol, D3, (CALTRATE-600 PLUS VITAMIN D3) 600-400 mg-unit Tab Take 1 Tab by mouth daily. Yes Provider, Historical   lactulose (CHRONULAC) 10 gram/15 mL solution Take 15 mL by mouth three (3) times daily 12/23/20   Provider, Historical        Patient Active Problem List   Diagnosis Code    Intellectual disability F79    Seizure disorder ([facility] G40.909    Psoriasis L40.9    Venous stasis of lower extremity I87.8    Thrombocytopenia (HCC) D69.6    Sleep disorder G47.9    Hypertension, essential I10    HLD (hyperlipidemia) E78.5    Type 2 diabetes mellitus with diabetic polyneuropathy, without long-term current use of insulin (HCC) E11.42    Myelodysplasia (myelodysplastic syndrome) (HCC) D46.9    Lower extremity weakness R29.898    Cirrhosis of liver (HCC) K74.60    Macrocytic anemia D53.9    Paraplegia (HCC) G82.20    Cirrhosis (HCC) K74.60    Lumbar stenosis M48.061    Back pain M54.9    Fever of unknown origin (FUO) R50.9    Pneumonia due to COVID-19 virus U07.1, J12.82    ACP (advance care planning) Z71.89          Objective:     Visit Vitals  BP (!) 105/59 (BP 1 Location: Left upper arm, BP Patient Position: Sitting, BP Cuff Size: Large adult long)   Pulse 70   Temp 97.9 °F (36.6 °C) (Oral)   Resp 14   Ht 6' (1.829 m)   Wt 192 lb (87.1 kg)   SpO2 98%   BMI 26.04 kg/m²       General: Well-developed and well-nourished, no distress. In wheelchair. HEENT:  Head normocephalic/atraumatic, no scleral icterus  Lungs:  Clear to auscultation bilaterally.  Good air movement. Heart:  Regular rate and rhythm, normal S1 and S2, no murmur, gallop, or rub  Extremities: No clubbing, cyanosis, or edema. Neurological: Alert and oriented to person and place. Psychiatric: Normal mood and affect. Behavior is normal.       We discussed the expected course, resolution and complications of the diagnosis(es) in detail. Medication risks, benefits, costs, interactions, and alternatives were discussed as indicated. I advised him to contact the office if his condition worsens, changes or fails to improve as anticipated. He expressed understanding with the diagnosis(es) and plan.      Carmen Garvey MD

## 2021-02-08 NOTE — TELEPHONE ENCOUNTER
Called and made the mother aware of the visit I scheduled with [facility] [address] on March 25th at 8:30 am.

## 2021-02-08 NOTE — TELEPHONE ENCOUNTER
Pt mother called and stated that [facility] does not have an NP.

## 2021-02-08 NOTE — PROGRESS NOTES
Identified pt with two pt identifiers. Reviewed record in preparation for visit and have obtained necessary documentation. All patient medications has been reviewed. Chief Complaint   Patient presents with   [facility] Follow Up     Additional information about chief complaint:    Visit Vitals  BP (!) 105/59 (BP 1 Location: Left upper arm, BP Patient Position: Sitting, BP Cuff Size: Large adult long)   Pulse 70   Temp 97.9 °F (36.6 °C) (Oral)   Resp 14   Ht 6' (1.829 m)   Wt 192 lb (87.1 kg)   SpO2 98%   BMI 26.04 kg/m²       Health Maintenance Due   Topic    COVID-19 Vaccine (1 of 2)    Eye Exam Retinal or Dilated     DTaP/Tdap/Td series (2 - Td)       1. Have you been to the ER, urgent care clinic since your last visit? Hospitalized since your last visit? Yes hospital follow up     2. Have you seen or consulted any other health care providers outside of the [address] [place] [address] since your last visit? Include any pap smears or colon screening.   Yes   bdg

## 2021-02-08 NOTE — TELEPHONE ENCOUNTER
----- Message from David Joshi MD sent at 2/8/2021  3:54 PM EST -----  Regarding: Appt with Dr Kaia Madsen  Please schedule an appointment with Dr. Kaia Madsen or one of his NP's for evaluation and management of liver cirrhosis. His mother prefers a morning appointment. Once appointment made, call his mother to let her know about it.     Thanks,    Dr. Fernando Nieto

## 2021-02-10 NOTE — TELEPHONE ENCOUNTER
Order faxed to [facility] for skilled nursing [phone] on 2/9/2021, confirmation received.

## 2021-02-11 ENCOUNTER — TELEPHONE (OUTPATIENT)
Dept: INTERNAL MEDICINE CLINIC | Age: 66
End: 2021-02-11

## 2021-02-11 NOTE — TELEPHONE ENCOUNTER
Verified patients name and date of birth. Spoke with Jaden Redd(POINDIGO). Advised order was approved for [facility] to add skilled nursing. Ms Colby Cuenca states she has been trying to get thru to Dr Diaz Dominguez office to make an appt(for decreased mobility). He is not a new patient and has been seen by Dr Nael Drake office at [phone] and spoke with Armand Gutierrez. Armand Gutierrez contacted Ms Colby Cuenca regarding appt.

## 2021-02-11 NOTE — TELEPHONE ENCOUNTER
Pt mother called and stated that she is still waiting to know about the NP that is suppose to be coming to their home and about the appt with the neurologist

## 2021-02-12 ENCOUNTER — PATIENT OUTREACH (OUTPATIENT)
Dept: CASE MANAGEMENT | Age: 66
End: 2021-02-12

## 2021-02-12 NOTE — PROGRESS NOTES
Patient resolved from Transition of Care episode on 2/12/21. Patient/family was provided the following resources and education related to COVID-19 during the initial call:                         Signs, symptoms and red flags related to COVID-19            CDC exposure and quarantine guidelines            Conduit exposure contact - [phone]            Contact for their local Department of Health                 Patient has not had any additional ED or hospital visits. No further outreach scheduled with this CTN/ACM. Episode of Care resolved. Patient has this CTN/ACM contact information if future needs arise.

## 2021-02-22 ENCOUNTER — OFFICE VISIT (OUTPATIENT)
Dept: NEUROLOGY | Age: 66
End: 2021-02-22
Payer: MEDICARE

## 2021-02-22 VITALS
HEART RATE: 74 BPM | RESPIRATION RATE: 14 BRPM | BODY MASS INDEX: 26.01 KG/M2 | SYSTOLIC BLOOD PRESSURE: 129 MMHG | WEIGHT: 192 LBS | HEIGHT: 72 IN | OXYGEN SATURATION: 98 % | TEMPERATURE: 97.4 F | DIASTOLIC BLOOD PRESSURE: 74 MMHG

## 2021-02-22 DIAGNOSIS — G40.909 SEIZURE DISORDER (HCC): ICD-10-CM

## 2021-02-22 DIAGNOSIS — G62.9 NEUROPATHY: ICD-10-CM

## 2021-02-22 DIAGNOSIS — I10 HYPERTENSION, ESSENTIAL: Primary | ICD-10-CM

## 2021-02-22 PROCEDURE — 3017F COLORECTAL CA SCREEN DOC REV: CPT | Performed by: PSYCHIATRY & NEUROLOGY

## 2021-02-22 PROCEDURE — G8427 DOCREV CUR MEDS BY ELIG CLIN: HCPCS | Performed by: PSYCHIATRY & NEUROLOGY

## 2021-02-22 PROCEDURE — G8419 CALC BMI OUT NRM PARAM NOF/U: HCPCS | Performed by: PSYCHIATRY & NEUROLOGY

## 2021-02-22 PROCEDURE — 1111F DSCHRG MED/CURRENT MED MERGE: CPT | Performed by: PSYCHIATRY & NEUROLOGY

## 2021-02-22 PROCEDURE — 99214 OFFICE O/P EST MOD 30 MIN: CPT | Performed by: PSYCHIATRY & NEUROLOGY

## 2021-02-22 PROCEDURE — G8432 DEP SCR NOT DOC, RNG: HCPCS | Performed by: PSYCHIATRY & NEUROLOGY

## 2021-02-22 PROCEDURE — G8754 DIAS BP LESS 90: HCPCS | Performed by: PSYCHIATRY & NEUROLOGY

## 2021-02-22 PROCEDURE — G8752 SYS BP LESS 140: HCPCS | Performed by: PSYCHIATRY & NEUROLOGY

## 2021-02-22 NOTE — PROGRESS NOTES
Referring Physician: self referred     Reason for Consultation:  Weakness, tremor     Chief Complaint: Weakness in the lower extremities and difficulty with walking. History of Present Illness:   Vidhya Gómez is a 60 y.o. male with a history of hyperlipidemia, hypertension seizures and static encephalopathy who presents to neurology clinic for evaluation of progressive weakness in his lower extremities as well as difficulty walking. Patient is accompanied by his mother who provided the history. She reports that approximately 2 years ago he was in the [facility] going to an activity and he developed a flulike illness possibly influenza and was admitted to the hospital.  He reports he had a very high fever and was in the hospital for several weeks and required rehab. Since that time she reports that he has had a progressive decline in his ability to walk and ambulate and has noted weakness in his lower extremities. She reports that prior to this episode he was able to walk for long periods of time, play golf as well as bowl now he can barely walk to the driveway. She reports that he gets fatigued and he does not walk for prolonged periods of time. She also reports that he has had difficulty going up and down the stairs and requires assistance. She does not feel like he has any difficulty with strength in his upper extremities and it is mainly his lower extremities. She has been working with physical therapy and there was some concern of a possible movement disorder as they noted a tremor. She reports that his tremor is present mainly with action however can be present at rest.  She does report that this tremor is intermittent. He has had no changes in his speech or swallowing his appetite has been good  Of note patient was diagnosed with seizures at the age of 6 months. Has been on multiple AEDs since that time and has been taking Depakote and Topamax for several years.   Reports in the last 20 years he has had 1 seizure. He was previously followed by neurology and maintained on Depakote for seizures. He takes Topamax 300 mg twice a day and Depakote 500 mg in the morning and thousand milligrams at night. Additionally patient has been recently worked up for low platelets. It was noted to be dropping approximately 2 years ago and has slowly declined since that time. He has been followed by heme-onc who performed a bone marrow biopsy and did find a myelodysplastic syndrome. Interval history  Since patient was last seen, states that he has been walking more and has been using his walker. He is no longer chair. His mother reports that he was admitted to the hospital recently as he developed sepsis due to a dental infection after getting a tooth pulled. He also had a pneumonia at that time. Otherwise she reports that he has had no further episodes of seizures    Past Medical History:   Diagnosis Date    Contact dermatitis and other eczema, due to unspecified cause     psoriasis    Diabetes ([address]     Eosinophilia     Hypercholesterolemia     Hypertension     Mental retardation     Seizures ([facility] [address]     Skin cancer     Strongyloides stercoralis infection 8/28/2014        Past Surgical History:   Procedure Laterality Date    ENDOSCOPY, COLON, DIAGNOSTIC  11/08/2007    Dr Elena Leon normal except small internal hemorrhoids repeat 11/2017        Family History   Problem Relation Age of Onset    Other Mother         PMR    Hypertension Mother     Cancer Father         Lung    Diabetes Brother         Social History     Tobacco Use    Smoking status: Never Smoker    Smokeless tobacco: Never Used   Substance Use Topics    Alcohol use: No        No Known Allergies     Prior to Admission medications    Medication Sig Start Date End Date Taking? Authorizing Provider   furosemide (LASIX) 40 mg tablet Take 0.5 Tabs by mouth daily.  1/29/21  Yes Joes R Spain MD   pregabalin (LYRICA) 75 mg capsule Take 1 Cap by mouth nightly. Max Daily Amount: 75 mg. 1/29/21  Yes Dianne Spain MD   topiramate (TOPAMAX) 200 mg tablet take one and one half tablet twice daily 1/11/21  Yes Magnus Rader MD   lactulose (CHRONULAC) 10 gram/15 mL solution Take 15 mL by mouth three (3) times daily 12/23/20  Yes Provider, Historical   metFORMIN ER (GLUCOPHAGE XR) 500 mg tablet TAKE TWO TABLETS BY MOUTH TWICE DAILY  10/12/20  Yes Magnus Rader MD   linaGLIPtin (TRADJENTA) 5 mg tablet Take 1 Tab by mouth daily. 10/2/20  Yes Magnus Rader MD   pioglitazone (ACTOS) 45 mg tablet Take 1 Tab by mouth daily. 9/28/20  Yes Magnus Rader MD   divalproex DR (DEPAKOTE) 500 mg tablet TAKE ONE TABLET BY MOUTH EVERY MORNING AND TWO TABS AT NIGHT 9/28/20  Yes Alphonso Riley MD   atorvastatin (LIPITOR) 40 mg tablet Take 1 Tab by mouth nightly. 9/28/20  Yes Alphonso Riley MD   glimepiride (AMARYL) 4 mg tablet TAKE TWO TABLETS BY MOUTH IN THE MORNING 9/28/20  Yes Alphonso Riley MD   folic acid (FOLVITE) 1 mg tablet Take 1 Tab by mouth daily. 9/28/20  Yes Magnus Rader MD   Calcium-Cholecalciferol, D3, (CALTRATE-600 PLUS VITAMIN D3) 600-400 mg-unit Tab Take 1 Tab by mouth daily. Yes Provider, Historical   thiamine mononitrate (B-1) 100 mg tablet Take 2 Tabs by mouth daily. 1/30/21   Dianne Spain MD   ramipriL (ALTACE) 1.25 mg capsule Take 1 Cap by mouth daily. 1/29/21   Dianne Spain MD   triamcinolone acetonide (KENALOG) 0.1 % ointment Apply  to affected area two (2) times a day.  apply thin layer 2 x daily to groin as needed for rash 1/30/19   Provider, Historical       Review of Systems:    [x]Unable to obtain  ROS due to  [x]mental status change  []sedated   []intubated    Visit Vitals  /74 (BP 1 Location: Right upper arm, BP Patient Position: Sitting, BP Cuff Size: Adult)   Pulse 74   Temp 97.4 °F (36.3 °C) (Temporal)   Resp 14   Ht 6' (1.829 m)   Wt 192 lb (87.1 kg)   SpO2 98%   BMI 26.04 kg/m² Physical Exam:  General:  no acute distress  Neck: no carotid bruits  Lungs: clear to auscultation  Heart:  no murmurs, regular rate and rhythm   Lower extremity: 1+ edema with venous stasis dermatitis    Neurological exam:  Mental Status: Awake alert oriented to person however did not know place or time. Registration and Recall: registration intact, able to recall any words despite prompts  Attention and Concentration: He was unable to state the days of the week forward   Speech and Language: Mild dysarthria. Able to name simple objects, repeats simple phrases and follow one-step commands. He did have trouble following multistep commands. Cranial nerves: II-XII  Pupils equal and reactive, visual fields intact by confrontation   Extraocular movements intact, no evidence of nystagmus or ptosis   Facial sensation intact   Facial movements symmetric   Hearing intact to soft rub bilaterally   Shoulder shrug symmetric and strong   Tongue protrusion full and midline without fasciculation or atrophy    Motor:   Normal tone and Bulk. No cogwheeling was noted however patient did not relax completely. No tremor was noted today. Drift: No evidence of pronator drift     Strength testing:  Patient was unable to perform formal strength testing due to his cognitive limitations. He appeared to be full strength in his upper extremities  He appeared to be 4 out of 5 proximally in his hip flexors and distally 4 out of 5. He did have slight asymmetry in lower extremity weakness worse on the right than the left    Sensory:  Sensation intact to light touch. Reflexes:     Biceps Triceps  Brachiorad Patellar Achilles Plantar Hoffmans   Right  1 1 1 1 1 Down Neg   Left  1 1 1 1 1 Down Neg        Cerebellar testing: Finger-nose-finger was intact though he needed significant coaching to perform this task. He was unable to do heel-to-shin      Gait: Decreased arm swing was noted on the left upper extremity.   He asked small steps with high steppage gait    Data:   INTERNAL RECORDS:  The patient's electronic medical record was reviewed. The relevant details include:    Lab Results   Component Value Date/Time    Hemoglobin A1c 6.7 (H) 01/28/2021 04:30 AM    Sodium 138 01/29/2021 11:50 AM    Potassium 3.6 01/29/2021 11:50 AM    Chloride 110 (H) 01/29/2021 11:50 AM    Glucose 192 (H) 01/29/2021 11:50 AM    BUN 20 01/29/2021 11:50 AM    Creatinine 0.72 01/29/2021 11:50 AM    Calcium 8.9 01/29/2021 11:50 AM    WBC 8.4 01/29/2021 11:50 AM    HCT 32.5 (L) 01/29/2021 11:50 AM    HGB 10.1 (L) 01/29/2021 11:50 AM    PLATELET 86 (L) 92/85/2633 11:50 AM       CT Results (maximum last 3): Results from [place] encounter on 01/27/21   CTA CHEST W OR W WO CONT    Narrative EXAM:  CTA CHEST W OR W WO CONT  INDICATION:  to investigate for PE. Additional history:  COMPARISON: CT of the abdomen and pelvis, 1/27/2021. Limitations: Motion limited exam.  .  TECHNIQUE:   Precontrast  images were obtained to localize the volume for acquisition. Multislice helical CT arteriography was performed from the diaphragm to the  thoracic inlet during uneventful rapid bolus intravenous contrast  administration. Lung and soft tissue windows were generated. Coronal and  sagittal images were generated and 3D post processing consisting of coronal  maximum intensity images was performed. CT dose reduction was achieved through use of a standardized protocol tailored  for this examination and automatic exposure control for dose modulation. Aly Chang FINDINGS:  CHEST:  Chest wall/thoracic inlet: Within normal limits. Thyroid: Within normal limits. Mediastinum/tam: Bilateral hilar adenopathy, possibly reactive. Patulous  proximal esophagus. Heart/vessels: Calcifications in the left anterior descending coronary artery  Lungs/Pleura: Small area of consolidation in the inferior, left lower lobe.   Minimal groundglass opacity/airspace disease in the right upper lobe and left  upper lobe. Tiny, partially calcified nodule in the supreme aspect of the right  lower lobe suggesting a granuloma. .  INCIDENTALLY IMAGED ABDOMEN:  Incidentally imaged portions of the abdomen appear unremarkable. .  MSK:   Minimal, nonfocal degenerative changes in the spine. .    Impression 1. No visualized pulmonary embolus. 2. Small area of consolidation in the inferior left lower lobe. Minimal areas of  groundglass opacity/airspace disease in both upper lobes. 3. Atherosclerosis in the left anterior descending coronary artery. 4. Bilateral hilar adenopathy which is possibly reactive. 5. Incidental findings as above. CT HEAD WO CONT    Narrative EXAM: CT HEAD WO CONT    INDICATION: AMS    COMPARISON: November 22, 2020. CONTRAST: None. TECHNIQUE: Unenhanced CT of the head was performed using 5 mm images. Brain and  bone windows were generated. Coronal and sagittal reformats. CT dose reduction  was achieved through use of a standardized protocol tailored for this  examination and automatic exposure control for dose modulation. FINDINGS:  No extra-axial fluid collection hemorrhage shift or masses . Impression No change no acute findings. CT ABD PELV WO CONT    Narrative EXAM: CT ABD PELV WO CONT    INDICATION: ABd distenstion, no urine or stool in 24 hours, mentally challenged    COMPARISON: 2020    CONTRAST:  None. TECHNIQUE:   Thin axial images were obtained through the abdomen and pelvis. Coronal and  sagittal reformats were generated. Oral contrast was not administered. CT dose  reduction was achieved through use of a standardized protocol tailored for this  examination and automatic exposure control for dose modulation. The absence of intravenous contrast material reduces the sensitivity for  evaluation of the vasculature and solid organs. FINDINGS:   LOWER THORAX: There is left lower lobe airspace disease suspicious for  pneumonia.  There is minor atelectasis/airspace disease right lung base. There is  cardiomegaly which is unchanged  LIVER: No mass.  BILIARY TREE: Gallbladder is within normal limits. CBD is not dilated.  SPLEEN: Splenomegaly is stable  PANCREAS: No focal abnormality. A few borderline enlarged peripancreatic head  lymph nodes are stable  ADRENALS: Unremarkable.  KIDNEYS/URETERS: No calculus or hydronephrosis.  STOMACH: Unremarkable.  SMALL BOWEL: No dilatation or wall thickening.  COLON: No dilatation or wall thickening. There is mild fecal stasis in the colon  APPENDIX: Normal  PERITONEUM: No ascites or pneumoperitoneum.  RETROPERITONEUM: No lymphadenopathy or aortic aneurysm.  REPRODUCTIVE ORGANS: Prostate is within normal limits  URINARY BLADDER: Mitchell catheter overlies urinary bladder. There is urine within  the urinary bladder.  BONES: There is a fracture of the inferior endplate of T12 which could be acute  to subacute and is not present 11/22/2020  ABDOMINAL WALL: No mass or hernia.  ADDITIONAL COMMENTS: N/A      Impression 1. There is left lower lobe airspace disease may represent pneumonia. There is a  smaller focus of atelectasis/airspace disease right base.  2. There is mild compression of the inferior endplate of T12 which is new since  11/22/2020 could represent an acute fracture and correlation would be helpful.  3. There is mild fecal stasis in the colon.  4. Splenomegaly is unchanged.       MRI Results (maximum last 3):  Results from Hospital Encounter encounter on 01/27/21   MRI LUMB SPINE WO CONT    Narrative EXAM: MRI LUMB SPINE WO CONT    INDICATION: T12 compression fracture, difficulty walking, urinary retention, and  constipation.    COMPARISON: MRI lumbar spine on 11/27/2020. CT abdomen/pelvis on 1/27/2021.    TECHNIQUE: MR imaging of the lumbar spine was performed using the following  sequences: sagittal T1, T2, STIR;  axial T1, T2 performed on the 3 Aisha magnet.      CONTRAST:  None.    FINDINGS:    There is normal  alignment of the lumbar spine. T12 partial compression fracture  involves the inferior endplate, unchanged since one day ago but increased since  3 months ago. Mild residual bone marrow edema. No underlying bone lesion. Bone  marrow signal is chronic in the inferior aspect of the L1 vertebral body. No  other fracture. No marrow replacing process. Mild multilevel disc desiccation is  unchanged. No discitis.    The conus medullaris terminates at L1. Signal and caliber of the distal spinal  cord are within normal limits.     The paraspinal soft tissues are within normal limits.    Lower thoracic spine: No stenosis. No change.    L1-L2: No herniation or stenosis. No change.    L2-L3: No herniation or stenosis. Facet arthrosis. No change.    L3-L4: No herniation or stenosis. Facet arthrosis. No change.    L4-L5: Diffuse disc bulge, facet arthrosis, and thickened ligamentum flavum.  Mild left foraminal stenosis. No change.    L5-S1: Asymmetric left diffuse disc bulge. Facet arthrosis. No stenosis. No  change.      Impression 1. Increased subacute partial compression fracture of the L1 vertebral body  since 3 months ago.  2. Normal distal spinal cord and cauda equina.  3. L4-L5 mild left foraminal stenosis.   MRI THORAC SPINE WO CONT    Narrative EXAM: MRI THORAC SPINE WO CONT    INDICATION: Difficulty walking, urinary retention, constipation, new T12  compression - r/o spinal cord syndrome.    COMPARISON: CT abdomen/pelvis on 1/27/2021. MRI lumbar spine on 11/27/2020.    TECHNIQUE: MR imaging of the thoracic spine was performed using the following  sequences: sagittal T1, T2, stir; axial T1, T2 performed on the 3 Aisha magnet.     CONTRAST: None.    FINDINGS:    T12 partial compression fracture is subacute and increased since November. No  underlying bone lesion. Mild residual bone marrow edema. Subtle volume loss of  the T11 vertebral body is chronic. Remaining thoracic spine vertebral bodies are  within normal limits.  No marrow replacing process. Discs are within normal  limits. .    The course, caliber, and signal intensity of the spinal cord are normal.     Left lung opacities are partially imaged. Trace left pleural effusion. No spinal stenosis or significant foraminal stenosis. Impression Increased subacute T12 partial compression fractures since November. No new  fracture. Normal thoracic spinal cord. Results from [place] encounter on 11/27/20   MRI LUMB SPINE W WO CONT    Narrative EXAM: MRI LUMB SPINE W WO CONT    INDICATION: lower back pain, leg weakness, h/o neuropathy. COMPARISON: None    TECHNIQUE: MR imaging of the lumbar spine was performed using the following  sequences: sagittal T1, T2, STIR;  axial T1, T2 prior to and following contrast  administration. CONTRAST: 20 mL of Dotarem. FINDINGS:    There are 5 lumbar-type vertebral bodies. The conus terminates at the inferior  aspect of T12. Distal spinal cord signal is normal. Marrow signal is diffusely  decreased on T1 and T2-weighted images. There is a Schmorl's node along the  inferior endplate of L1 degenerative marrow changes are noted at T12-L1 with  Schmorl's node formation on the inferior endplate. Degenerative marrow changes  are also present at L1-2. T12 L1-2 unremarkable. L1-2 is unremarkable. L2-3 demonstrates minimal disc bulge without canal stenosis or foraminal  narrowing. At L3-4, there is mild disc bulge and facet hypertrophy without canal stenosis  or foraminal narrowing. At L4-5, there is a mild disc bulge and facet hypertrophy without canal stenosis  or significant foraminal narrowing. At L5-S1, there is a central disc protrusion which demonstrates enhancement and  surrounding edema likely acute. There is mild crowding of the left lateral  recess without significant mass effect on the descending S1 nerve root. Incidental soft tissue imaging is unremarkable.       Impression IMPRESSION: Mild degenerative changes at multiple levels in the lumbar spine  without significant canal stenosis. Small disc protrusion at L5-S1 likely acute  without canal stenosis or definite nerve root impingement. IMPRESSION:         Assessment and Plan   Bogdan Irby is a 62 y.o. male with a history of hyperlipidemia, hypertension seizures and static encephalopathy who presents to neurology clinic for evaluation of progressive weakness in his lower extremities, seizures as well as tremor. His neurological exam is limited due to his cognitive ability however does reveal weakness in the lower extremities. EMG with evidence of a serve length dependant neuropathy etiology may be related to diabetes versus a critical illness neuropathy as his symptoms have not worsened versus his myelodysplastic syndrome. Lower extremity weakness: EMG with evidence of a serve length dependant neuropathy etiology may be related to diabetes versus a critical illness neuropathy as his symptoms have not worsened versus his myelodysplastic syndrome.  -Routine blood work did not reveal elevated M spike and have referred patient to oncology with Dr. Lillie Terry. He had previously seen him for low platelets  -Recommended that he continue with physical therapy and Occupational Therapy as this will help with the weakness, his mother did not want to continue with this at this time. She believes that he had too much difficulty with following commands to participate.  -Did discuss with the mother that he should potentially get a life alert in case he falls and is unable to get back up to the weakness. Epilepsy: Well-controlled on Topamax as well as Depakote. It is possible that the Depakote may be resulting in his tremor as well as his thrombocytopenia. MRI of the brain without any abnormalities.   Most recent Depakote level within therapeutic range.  -Continue Depakote as well as Topamax for now as mother is reluctant to change this medication  -Seizure precautions and patient currently has 24-hour supervision.  -We discussed [place] state law regarding driving, patient is not driving. Patient Active Problem List   Diagnosis Code    Intellectual disability F68    Seizure disorder ([facility] [address] G40.909    Psoriasis L40.9    Venous stasis of lower extremity I87.8    Thrombocytopenia (HCC) D69.6    Sleep disorder G47.9    Hypertension, essential I10    HLD (hyperlipidemia) E78.5    Type 2 diabetes mellitus with diabetic polyneuropathy, without long-term current use of insulin (HCC) E11.42    Myelodysplasia (myelodysplastic syndrome) (HCC) D46.9    Lower extremity weakness R29.898    Cirrhosis of liver (HCC) K74.60    Macrocytic anemia D53.9    Paraplegia (HCC) G82.20    Cirrhosis (HCC) K74.60    Lumbar stenosis M48.061    Back pain M54.9    Fever of unknown origin (FUO) R50.9    Pneumonia due to COVID-19 virus U07.1, J12.82    ACP (advance care planning) Z71.89       I have discussed the diagnosis with the patient and the intended plan as seen in the above orders. Patient is in agreement. The patient has received an after-visit summary and questions were answered concerning future plans. I have discussed medication side effects and warnings with the patient as well.         Signed By:  Izzy Tejeda MD     February 22, 2021

## 2021-02-22 NOTE — PROGRESS NOTES
Elizabeth Anne is a 60 y.o. male     Chief Complaint   Patient presents with    Follow-up    Neuropathy     1. Have you been to the ER, urgent care clinic since your last visit? Hospitalized since your last visit? Yes, patient admitted into [address] [place] [address] from Jan. 27-29 for Pneumonia due to COVID    2. Have you seen or consulted any other health care providers outside of the [address] [place] [address] since your last visit? Include any pap smears or colon screening.  No     Visit Vitals  /74 (BP 1 Location: Right upper arm, BP Patient Position: Sitting, BP Cuff Size: Adult)   Pulse 74   Temp 97.4 °F (36.3 °C) (Temporal)   Resp 14   Ht 6' (1.829 m)   Wt 192 lb (87.1 kg)   SpO2 98%   BMI 26.04 kg/m²

## 2021-02-22 NOTE — PATIENT INSTRUCTIONS
Epilepsy: Care Instructions Your Care Instructions Epilepsy is a common condition that causes repeated seizures. The seizures are caused by bursts of electrical activity in the brain that aren't normal. Seizures may cause problems with muscle control, movement, speech, vision, or awareness. They can be scary. Epilepsy affects each person differently. Some people have only a few seizures. Others get them more often. If you know what triggers a seizure, you may be able to avoid having one. You can take medicines to control and reduce seizures. You and your doctor will need to find the right combination, schedule, and dose of medicine. This may take time and careful changes. Seizures may get worse and happen more often over time. Follow-up care is a key part of your treatment and safety. Be sure to make and go to all appointments, and call your doctor if you are having problems. It's also a good idea to know your test results and keep a list of the medicines you take. How can you care for yourself at home? · Be safe with medicines. Take your medicines exactly as prescribed. Call your doctor if you think you are having a problem with your medicine. · Make a treatment plan with your doctor. Be sure to follow your plan. · Try to identify and avoid things that may make you more likely to have a seizure. These may include: ? Not getting enough sleep. ? Using drugs or alcohol. ? Being emotionally stressed. ? Skipping meals. · Keep a record of any seizures you have. Note the date, time of day, and any details about the seizure that you can remember. Your doctor can use this information to plan or adjust your medicine or other treatment. · Be sure that any doctor treating you for another condition knows that you have epilepsy. Each doctor should know what medicines you are taking, if any. · Wear a medical ID bracelet. You can buy this at most [employer]. If you have a seizure that leaves you unconscious or unable to speak for yourself, this bracelet will let those who are treating you know that you have epilepsy. · Talk to your doctor about whether it is safe for you to do certain activities, such as drive or swim. When should you call for help? Call 911 anytime you think you may need emergency care. For example, call if: 
  · A seizure does not stop as it normally does.  
  · You have new symptoms such as: 
? Numbness, tingling, or weakness on one side of your body or face. ? Vision changes. ? Trouble speaking or thinking clearly. Call your doctor now or seek immediate medical care if: 
  · You have a fever.  
  · You have a severe headache. Watch closely for changes in your health, and be sure to contact your doctor if: 
  · The normal pattern or features of your seizures change. Where can you learn more? Go to http://www.[employer]/ Atul Dietz in the search box to learn more about \"Epilepsy: Care Instructions. \" Current as of: November 20, 2019               Content Version: 12.6 © 4243-1979 [employer]. Care instructions adapted under license by [employer] (which disclaims liability or warranty for this information). If you have questions about a medical condition or this instruction, always ask your healthcare professional. [place] [address] any warranty or liability for your use of this information.

## 2021-03-03 NOTE — PROGRESS NOTES
Elayne Pollock is a 60 y.o. male here for follow up for thrombocytopenia. 1. Have you been to the ER, urgent care clinic since your last visit? Hospitalized since your last visit? 11/27/20 - 12/2/20 for back pain and falls  1/27/21 - 1/29/21 for pneumonia. 2. Have you seen or consulted any other health care providers outside of the [address] [place] [address] since your last visit? Include any pap smears or colon screening. PCP sent him to Urologist    Pt presents in wheelchair today. Unable to stand for weight check. Pt is weak and cannot stand up. He fell yesterday over cord. Slide out of chair last night and ended up sleeping on the floor because his mom could not get him up. Left shoulder has been hurting since fall yesterday. There is a bruise and some redness from fall. Mom thinks collar bone may be broke. He coughs a lot. Has been going on for about one year. Pt usually uses a walker. Suppose to be seeing liver doctor soon. Mom says he has hand tremors now and she has to feed him. Neurologist wants him to change PCP's.

## 2021-03-05 ENCOUNTER — OFFICE VISIT (OUTPATIENT)
Dept: ONCOLOGY | Age: 66
End: 2021-03-05
Payer: MEDICARE

## 2021-03-05 VITALS
BODY MASS INDEX: 26.04 KG/M2 | OXYGEN SATURATION: 98 % | HEIGHT: 72 IN | HEART RATE: 70 BPM | TEMPERATURE: 98.1 F | DIASTOLIC BLOOD PRESSURE: 77 MMHG | SYSTOLIC BLOOD PRESSURE: 126 MMHG

## 2021-03-05 DIAGNOSIS — D69.6 THROMBOCYTOPENIA (HCC): ICD-10-CM

## 2021-03-05 DIAGNOSIS — D47.2 MGUS (MONOCLONAL GAMMOPATHY OF UNKNOWN SIGNIFICANCE): ICD-10-CM

## 2021-03-05 DIAGNOSIS — D46.9 MYELODYSPLASTIC SYNDROME (HCC): Primary | ICD-10-CM

## 2021-03-05 PROCEDURE — G8432 DEP SCR NOT DOC, RNG: HCPCS | Performed by: INTERNAL MEDICINE

## 2021-03-05 PROCEDURE — G8752 SYS BP LESS 140: HCPCS | Performed by: INTERNAL MEDICINE

## 2021-03-05 PROCEDURE — G8754 DIAS BP LESS 90: HCPCS | Performed by: INTERNAL MEDICINE

## 2021-03-05 PROCEDURE — G8419 CALC BMI OUT NRM PARAM NOF/U: HCPCS | Performed by: INTERNAL MEDICINE

## 2021-03-05 PROCEDURE — G8427 DOCREV CUR MEDS BY ELIG CLIN: HCPCS | Performed by: INTERNAL MEDICINE

## 2021-03-05 PROCEDURE — 99213 OFFICE O/P EST LOW 20 MIN: CPT | Performed by: INTERNAL MEDICINE

## 2021-03-05 PROCEDURE — 3017F COLORECTAL CA SCREEN DOC REV: CPT | Performed by: INTERNAL MEDICINE

## 2021-03-05 NOTE — PROGRESS NOTES
[address] [facility] [address], [address] [facility], [address] [place] [address]  [phone]    Hematology Follow Up        Patient: Julia Javier MRN: [id number]  SSN: [id number]    YOB: 1955  Age: 60 y.o. Sex: male      Assessment:     1. Myelodysplastic syndrome with multilineage dysplasia    IPSS-R - very low risk  Normal cytogenetics    Subjective:      Julia Javier is a 60 y.o. male who I am seeing in follow up for progressive thrombocytopenia. He suffers with cognitive impairment and behavioral disorder. Routine laboratory studies show slowly dropping platelet count over the last few years. He is asymptomatic. No bleeding. The history has been obtained from the patient's mother. A bone marrow revealed a diagnosis of MDS. The patient's mother is concerned about a lot of different things. His gait has deteriorated, he is unable to participate in meaningful activities as he used to do before. He was hospitalized in January with seizure disorder and CAP. She returns today with Mr. Vicente Hardy in a wheelchair. She said he tripped last night and had to sleep on the floor because she could not get him up.      Review of Systems:    Constitutional: negative  Eyes: negative  Ears, Nose, Mouth, Throat, and Face: negative  Respiratory: negative  Cardiovascular: negative  Gastrointestinal: negative  Genitourinary:negative  Integument/Breast: negative  Hematologic/Lymphatic: negative  Musculoskeletal:negative  Neurological: negative      Past Medical History:   Diagnosis Date    Contact dermatitis and other eczema, due to unspecified cause     psoriasis    Diabetes ([address]     Eosinophilia     Hypercholesterolemia     Hypertension     Mental retardation     Seizures ([address]     Skin cancer     Strongyloides stercoralis infection 8/28/2014     Past Surgical History:   Procedure Laterality Date    ENDOSCOPY, COLON, DIAGNOSTIC  11/08/2007    Dr Leonidas Beckwith normal except small internal hemorrhoids repeat 11/2017      Family History   Problem Relation Age of Onset    Other Mother         PMR    Hypertension Mother     Cancer Father         Lung    Diabetes Brother      Social History     Tobacco Use    Smoking status: Never Smoker    Smokeless tobacco: Never Used   Substance Use Topics    Alcohol use: No      Prior to Admission medications    Medication Sig Start Date End Date Taking? Authorizing Provider   pregabalin (LYRICA) 75 mg capsule Take 1 Cap by mouth nightly. Max Daily Amount: 75 mg. 1/29/21  Yes Lizabeth Spain MD   topiramate (TOPAMAX) 200 mg tablet take one and one half tablet twice daily 1/11/21  Yes Nadine Toro MD   lactulose (CHRONULAC) 10 gram/15 mL solution Take 15 mL by mouth three (3) times daily 12/23/20  Yes Provider, Historical   metFORMIN ER (GLUCOPHAGE XR) 500 mg tablet TAKE TWO TABLETS BY MOUTH TWICE DAILY  10/12/20  Yes Nadine Toro MD   linaGLIPtin (TRADJENTA) 5 mg tablet Take 1 Tab by mouth daily. 10/2/20  Yes Nadine Toro MD   pioglitazone (ACTOS) 45 mg tablet Take 1 Tab by mouth daily. 9/28/20  Yes Nadine Toro MD   divalproex DR (DEPAKOTE) 500 mg tablet TAKE ONE TABLET BY MOUTH EVERY MORNING AND TWO TABS AT NIGHT 9/28/20  Yes Lashonda Riley MD   atorvastatin (LIPITOR) 40 mg tablet Take 1 Tab by mouth nightly. 9/28/20  Yes Lashonda Riley MD   glimepiride (AMARYL) 4 mg tablet TAKE TWO TABLETS BY MOUTH IN THE MORNING 9/28/20  Yes Lashonda Riley MD   folic acid (FOLVITE) 1 mg tablet Take 1 Tab by mouth daily. 9/28/20  Yes Lashonda Riley MD   triamcinolone acetonide (KENALOG) 0.1 % ointment Apply  to affected area two (2) times a day. apply thin layer 2 x daily to groin as needed for rash 1/30/19  Yes Provider, Historical   Calcium-Cholecalciferol, D3, (CALTRATE-600 PLUS VITAMIN D3) 600-400 mg-unit Tab Take 1 Tab by mouth daily.    Yes Provider, Historical   furosemide (LASIX) 40 mg tablet Take 0.5 Tabs by mouth daily. 1/29/21   Jose Alfredo Spain MD   thiamine mononitrate (B-1) 100 mg tablet Take 2 Tabs by mouth daily. 1/30/21   Jose Alfredo Spain MD   ramipriL (ALTACE) 1.25 mg capsule Take 1 Cap by mouth daily. 1/29/21   Jose Alfredo Spain MD       No Known Allergies      Objective:     Vitals:    03/05/21 1344   Height: 6' (1.829 m)          Physical Exam:    GENERAL: alert, cooperative, no distress, appears stated age  EYE: conjunctivae/corneas clear  LYMPHATIC: Cervical, supraclavicular, and axillary nodes normal.   THROAT & NECK: normal and no erythema or exudates noted. LUNG: clear to auscultation bilaterally  HEART: regular rate and rhythm  ABDOMEN: soft, non-tender. Bowel sounds normal. No masses,  no organomegaly  EXTREMITIES:  extremities normal, atraumatic, no cyanosis or edema  SKIN: Normal.  NEUROLOGIC: Alert, tremors in hands, detailed neurological exam was not performed. Physical exam and ROS has been modified from a prior visit to make it relevant and current         Lab Results   Component Value Date/Time    WBC 8.4 01/29/2021 11:50 AM    HGB 10.1 (L) 01/29/2021 11:50 AM    HCT 32.5 (L) 01/29/2021 11:50 AM    PLATELET 86 (L) 56/94/9497 11:50 AM    .0 (H) 01/29/2021 11:50 AM         Assessment:     1. Myelodysplastic syndrome with multilineage dysplasia    IPSS-R - very low risk  Normal cytogenetics    ECOG PS 1  Intent of Treatment - palliative    Platelet has been dropping steadily. No bleeding. Continue observation      2. MGUS    M protein is low. Ig levels are preserved. No renal insufficiency or hypercalcemia - no myeloma defining event  Nothing suggestive of amyloidosis.   Unrelated to neuropathy       Plan:     > Recommend patient go to the ER regarding shoulder pain from fall.  >  met with Mother to discuss long term plan of care.  > Follow up in 6 months with repeat CBC      I performed a history and physical examination of the patient and discussed his management with the NPP. I reviewed the NPP note and agree with the documented findings and plan of care. The patient was seen in conjunction with Ms. Triana. Deann Vieira is a man with cognitive disability. He has been suffering progressive neurological decline with weakness, fall, tremor etc. He also has MDS. Blood counts are low but not enough to treat. Exam reveals limited. He is unable to move his left arm due to fractured clavicle. He is unable to get out of wheel chair. His mother refuses an eval in the ED.        Signed by: Brandon Goldsmith MD                     March 6, 2021

## 2021-03-05 NOTE — LETTER
3/7/2021 Patient: Breonna Avalos YOB: 1955 Date of Visit: 3/5/2021 Aggie Demarco MD 
[facility] [place] [address] [address] [employer] Dear Aggie Demarco MD, Thank you for referring Mr. Chela Lloyd to [address] [place] [address] for evaluation. My notes for this consultation are attached. If you have questions, please do not hesitate to call me. I look forward to following your patient along with you.  
 
 
Sincerely, 
 
Edith Collins MD

## 2021-03-10 ENCOUNTER — OFFICE VISIT (OUTPATIENT)
Dept: INTERNAL MEDICINE CLINIC | Age: 66
End: 2021-03-10
Payer: MEDICARE

## 2021-03-10 ENCOUNTER — TELEPHONE (OUTPATIENT)
Dept: INTERNAL MEDICINE CLINIC | Age: 66
End: 2021-03-10

## 2021-03-10 VITALS
DIASTOLIC BLOOD PRESSURE: 60 MMHG | HEIGHT: 72 IN | SYSTOLIC BLOOD PRESSURE: 116 MMHG | OXYGEN SATURATION: 97 % | WEIGHT: 198 LBS | RESPIRATION RATE: 16 BRPM | TEMPERATURE: 97.5 F | HEART RATE: 70 BPM | BODY MASS INDEX: 26.82 KG/M2

## 2021-03-10 DIAGNOSIS — I10 HYPERTENSION, ESSENTIAL: ICD-10-CM

## 2021-03-10 DIAGNOSIS — D46.9 MYELODYSPLASIA (MYELODYSPLASTIC SYNDROME) (HCC): ICD-10-CM

## 2021-03-10 DIAGNOSIS — S42.035A CLOSED NONDISPLACED FRACTURE OF ACROMIAL END OF LEFT CLAVICLE, INITIAL ENCOUNTER: Primary | ICD-10-CM

## 2021-03-10 DIAGNOSIS — E11.42 TYPE 2 DIABETES MELLITUS WITH DIABETIC POLYNEUROPATHY, WITHOUT LONG-TERM CURRENT USE OF INSULIN (HCC): Primary | ICD-10-CM

## 2021-03-10 DIAGNOSIS — M25.512 ACUTE PAIN OF LEFT SHOULDER: ICD-10-CM

## 2021-03-10 DIAGNOSIS — R05.3 CHRONIC COUGH: ICD-10-CM

## 2021-03-10 DIAGNOSIS — R25.1 TREMOR OF BOTH HANDS: ICD-10-CM

## 2021-03-10 DIAGNOSIS — K74.60 HEPATIC CIRRHOSIS, UNSPECIFIED HEPATIC CIRRHOSIS TYPE, UNSPECIFIED WHETHER ASCITES PRESENT (HCC): ICD-10-CM

## 2021-03-10 PROBLEM — R50.9 FEVER OF UNKNOWN ORIGIN (FUO): Status: RESOLVED | Noted: 2020-12-06 | Resolved: 2021-03-10

## 2021-03-10 PROCEDURE — 2022F DILAT RTA XM EVC RTNOPTHY: CPT | Performed by: INTERNAL MEDICINE

## 2021-03-10 PROCEDURE — G8754 DIAS BP LESS 90: HCPCS | Performed by: INTERNAL MEDICINE

## 2021-03-10 PROCEDURE — G8419 CALC BMI OUT NRM PARAM NOF/U: HCPCS | Performed by: INTERNAL MEDICINE

## 2021-03-10 PROCEDURE — 99215 OFFICE O/P EST HI 40 MIN: CPT | Performed by: INTERNAL MEDICINE

## 2021-03-10 PROCEDURE — G8427 DOCREV CUR MEDS BY ELIG CLIN: HCPCS | Performed by: INTERNAL MEDICINE

## 2021-03-10 PROCEDURE — G8752 SYS BP LESS 140: HCPCS | Performed by: INTERNAL MEDICINE

## 2021-03-10 PROCEDURE — 3017F COLORECTAL CA SCREEN DOC REV: CPT | Performed by: INTERNAL MEDICINE

## 2021-03-10 PROCEDURE — G8432 DEP SCR NOT DOC, RNG: HCPCS | Performed by: INTERNAL MEDICINE

## 2021-03-10 PROCEDURE — 3044F HG A1C LEVEL LT 7.0%: CPT | Performed by: INTERNAL MEDICINE

## 2021-03-10 NOTE — PROGRESS NOTES
CC:   Chief Complaint   Patient presents with    Hypertension    Shoulder Pain     bruised        HISTORY OF PRESENT ILLNESS  Jaqueline Jones is a 60 y.o. male. Accompanied by his mother, Susan Vera. Presents for 1 month follow up evaluation. He has type 2 DM, HTN, hyperlipidemia, seizure disorder, cirrhosis of liver, splenomegaly, myelodysplastic syndrome (MDS), thrombocytopenia due to MDS, leg weakness, and intellectual disability. Hospitalized in Nov and Dec for lower extremity weakness with functional paraplegia and in Jan for community-acquired pneumonia and hepatic encephalopathy. He complains of left shoulder pain. He had a fall at home on Thurs. , 3/4/21. Dirk Baljinder on the floor for a long time because his mother was not able to get him up. Seen by Miguel James on 3/6/21 for left shoulder pain since fall. XR left shoulder done; results still pending. His mother reports he had a left clavicle fracture several years ago. Stopped ramipril at last clinic visit due to low BP of 105/59. BP is 116/60 today. Denies CP, SOB, or dizziness. His mother reports he has been having a dry hacking cough that is worse at nighttime or whenever he lays down. Thinks he has had a cough for a year but cough became worse in Jan when he also had SOB. CT 1/27/21: LLL air space disease (ASD), smaller ASD at R base. No SOB recently. Giving him OTC cough syrup, honey, and Jayesh's nightly with a little relief. Has finished PT. He is able to walk a little with a walker now. His mother pulls him up from chair. Then he uses walker to get from living room to bathroom. No longer having home health aide from [facility]. His mother reports he has a tremor at his hands that comes and goes. Usually occurs when he is nervous. Also reports that he has been less interactive for the past several months. Says he has not laughed in the past 6 months.     Was diagnosed with cirrhosis of the liver after CT abd/pelv 11/22/20 showed possible cirrhosis with splenomegaly and portal hypertension. Started on lactulose in 11/20 after ammonia level returned elevated at 63. Noted to have hepatic encephalopathy during his Jan hospitalization. His mother gives him Lactulose only once a day because it is hard to get him to the bathroom. Causes him to have diarrhea 2-3 hrs later. She has not been giving him furosemide. Saw Dr. Светлана Friend for MDS recently. No changes made. His mother does not plan to take him back for follow up because she it is hard getting him to appointments. Also saw Dr. Alise Odom (Neurology) recently; was instructed to stop Lyrica to reduce number of medications he is taking.     Patient Active Problem List   Diagnosis Code    Intellectual disability F68    Seizure disorder ([facility] G40.909    Psoriasis L40.9    Venous stasis of lower extremity I87.8    Thrombocytopenia (HCC) D69.6    Sleep disorder G47.9    Hypertension, essential I10    HLD (hyperlipidemia) E78.5    Type 2 diabetes mellitus with diabetic polyneuropathy, without long-term current use of insulin (HCC) E11.42    Myelodysplasia (myelodysplastic syndrome) (HCC) D46.9    Lower extremity weakness R29.898    Cirrhosis of liver (HCC) K74.60    Macrocytic anemia D53.9    Paraplegia (HCC) G82.20    Cirrhosis (HCC) K74.60    Lumbar stenosis M48.061    Back pain M54.9    Fever of unknown origin (FUO) R50.9    Pneumonia due to COVID-19 virus U07.1, J12.82    ACP (advance care planning) Z71.89     Past Medical History:   Diagnosis Date    Contact dermatitis and other eczema, due to unspecified cause     psoriasis    Diabetes ([facility]     Eosinophilia     Hypercholesterolemia     Hypertension     Mental retardation     Seizures (HCC)     Skin cancer     Strongyloides stercoralis infection 8/28/2014     No Known Allergies    Current Outpatient Medications   Medication Sig Dispense Refill    topiramate (TOPAMAX) 200 mg tablet take one and one half tablet twice daily 270 Tab 3    metFORMIN ER (GLUCOPHAGE XR) 500 mg tablet TAKE TWO TABLETS BY MOUTH TWICE DAILY  360 Tab 3    linaGLIPtin (TRADJENTA) 5 mg tablet Take 1 Tab by mouth daily. 90 Tab 3    pioglitazone (ACTOS) 45 mg tablet Take 1 Tab by mouth daily. 90 Tab 3    divalproex DR (DEPAKOTE) 500 mg tablet TAKE ONE TABLET BY MOUTH EVERY MORNING AND TWO TABS AT NIGHT 270 Tab 3    atorvastatin (LIPITOR) 40 mg tablet Take 1 Tab by mouth nightly. 90 Tab 3    glimepiride (AMARYL) 4 mg tablet TAKE TWO TABLETS BY MOUTH IN THE MORNING 030 Tab 3    folic acid (FOLVITE) 1 mg tablet Take 1 Tab by mouth daily. 90 Tab 3    triamcinolone acetonide (KENALOG) 0.1 % ointment Apply  to affected area two (2) times a day. apply thin layer 2 x daily to groin as needed for rash      Calcium-Cholecalciferol, D3, (CALTRATE-600 PLUS VITAMIN D3) 600-400 mg-unit Tab Take 1 Tab by mouth daily.  furosemide (LASIX) 40 mg tablet Take 0.5 Tabs by mouth daily. 30 Tab 2    lactulose (CHRONULAC) 10 gram/15 mL solution Take 15 mL by mouth three (3) times daily           PHYSICAL EXAM  Visit Vitals  /60 (BP 1 Location: Right arm, BP Patient Position: Sitting, BP Cuff Size: Large adult)   Pulse 70   Temp 97.5 °F (36.4 °C) (Oral)   Resp 16   Ht 6' (1.829 m)   Wt 198 lb (89.8 kg)   SpO2 97%   BMI 26.85 kg/m²       General: Well-developed and well-nourished, no distress. HEENT:  Head normocephalic/atraumatic, no scleral icterus  Lungs:  Clear to ausculation bilaterally. Good air movement. Heart:  Regular rate and rhythm, normal S1 and S2, no murmur, gallop, or rub  Extremities: No clubbing, cyanosis. 1+ pitting edema at bilateral LE's. Musculoskeletal: Ecchymosis at left shoulder. Tenderness at anteromedial left shoulder just below clavicle. He resists raising left arm likely to pain. Neurological: Alert and oriented to person only. Psychiatric: Normal mood and affect.  Behavior is normal. ASSESSMENT AND PLAN    ICD-10-CM ICD-9-CM    1. Type 2 diabetes mellitus with diabetic polyneuropathy, without long-term current use of insulin (HCC)  E11.42 250.60      357.2    2. Hypertension, essential  I10 401.9    3. Acute pain of left shoulder  M25.512 719.41    4. Chronic cough  R05 786.2    5. Tremor of both hands  R25.1 781.0    6. Hepatic cirrhosis, unspecified hepatic cirrhosis type, unspecified whether ascites present (HCC)  K74.60 571.5    7. Myelodysplasia (myelodysplastic syndrome) ([facility]  D46.9 238.75      Diagnoses and all orders for this visit:    1. Type 2 diabetes mellitus with diabetic polyneuropathy, without long-term current use of insulin (HCC)  Controlled. Last A1c 6.7%. Continue metformin, glimepiride, Tradjenta, and pioglitazone for DM     2. Hypertension, essential  Controlled off ramipril. Continue to monitor BP on no medication. 3. Acute pain of left shoulder  Rule out shoulder or clavicle fracture due to fall. 4. Chronic cough  Cough persisting after treatment for pneumonia in Jan.  Needs repeat CXR to determine if pneumonia has resolved. 5. Tremor of both hands  No tremor seen on exam today. 6. Hepatic cirrhosis, unspecified hepatic cirrhosis type, unspecified whether ascites present (HCC)  Continue lactulose. Restart furosemide 40 mg 1/2 tab daily. His BP is too low to start spironolactone. Keep scheduled appointment with Dr. Mykel Sotomayor on 3/25/21.    7. Myelodysplasia (myelodysplastic syndrome) ([facility]  Stable. Continue folic acid. His mother has difficulty caring for him at home by herself but refuses to have him moved to long-term care facility. Greater than 40 mins direct face-to-face time spent with patient. Greater than 50% of time spent on counseling and coordination of care. Follow-up and Dispositions    · Return in about 1 month (around 4/10/2021), or if symptoms worsen or fail to improve, for Cough, DM.            I have discussed the diagnosis with the patient and the intended plan as seen in the above orders. Patient is in agreement. The patient has received an after-visit summary and questions were answered concerning future plans. I have discussed medication side effects and warnings with the patient as well.

## 2021-03-10 NOTE — PROGRESS NOTES
Identified pt with two pt identifiers. Reviewed record in preparation for visit and have obtained necessary documentation. All patient medications has been reviewed. Chief Complaint   Patient presents with    Hypertension    Shoulder Pain     bruised      Additional information about chief complaint:    Visit Vitals  /60 (BP 1 Location: Right arm, BP Patient Position: Sitting, BP Cuff Size: Large adult)   Pulse 70   Temp 97.5 °F (36.4 °C) (Oral)   Resp 16   Ht 6' (1.829 m)   Wt 198 lb (89.8 kg)   SpO2 97%   BMI 26.85 kg/m²       Health Maintenance Due   Topic    COVID-19 Vaccine (1 of 2)    Eye Exam Retinal or Dilated     DTaP/Tdap/Td series (2 - Td)    Colorectal Cancer Screening Combo        1. Have you been to the ER, urgent care clinic since your last visit? Hospitalized since your last visit? Yes shoulder injury     2. Have you seen or consulted any other health care providers outside of the [address] [place] [address] since your last visit? Include any pap smears or colon screening.   No   bdg

## 2021-03-10 NOTE — PATIENT INSTRUCTIONS
Chronic Cough: Care Instructions Your Care Instructions A cough is your body's response to something that bothers your throat or airways. Many things can cause a cough. You might cough because of a cold or the flu, bronchitis, or asthma. Smoking, postnasal drip, allergies, and stomach acid that backs up into your throat also can cause a cough. A cough can be short-term (acute) or long-term (chronic). A chronic cough lasts more than 3 weeks. A chronic cough is often caused by a long-term problem, such as asthma. Another cause might be a medicine, such as an ACE inhibitor. A cough is a symptom, not a disease. To treat a chronic cough, you may need to treat the problem that causes it. You can take a few steps at home to cough less and feel better. Some people cough or clear their throat out of habit for no clear reason. Follow-up care is a key part of your treatment and safety. Be sure to make and go to all appointments, and call your doctor if you are having problems. It's also a good idea to know your test results and keep a list of the medicines you take. How can you care for yourself at home? · Drink plenty of water and other fluids. This may help soothe a dry or sore throat. Honey or lemon juice in hot water or tea may ease a dry cough. · Prop up your head on pillows to help you breathe and ease a cough. · Do not smoke or allow others to smoke around you. Smoke can make a cough worse. If you need help quitting, talk to your doctor about stop-smoking programs and medicines. These can increase your chances of quitting for good. · Avoid exposure to smoke, dust, or other pollutants, or wear a face mask. Check with your doctor or pharmacist to find out which type of face mask will give you the most benefit. · Take cough medicine as directed by your doctor. · Try cough drops to soothe a dry or sore throat. Cough drops don't stop a cough.  Medicine-flavored cough drops are no better than candy-flavored drops or hard candy. Throat clearing When you have a chronic cough or a disease that may cause this type of cough, you may often feel like you want to clear your throat. This helps bring up mucus. But throat clearing does not always have a cause. Throat clearing can become a habit. The more you do it, the more you feel like you need to do it. But frequent throat clearing can be hard on your vocal cords. It's like slamming them together. To help lessen throat clearing, you can try: · Taking small sips of water. · Not clearing your throat when you feel you need to. · Swallowing hard when you want to clear your throat. You may want to ask your doctor if a medicine that thins mucus would help. When should you call for help? Call 911 anytime you think you may need emergency care. For example, call if: 
  · You have severe trouble breathing. Call your doctor now or seek immediate medical care if: 
  · You cough up blood.  
  · You have new or worse trouble breathing.  
  · You have a new or higher fever. Watch closely for changes in your health, and be sure to contact your doctor if: 
  · You cough more deeply or more often, especially if you notice more mucus or a change in the color of your mucus.  
  · You do not get better as expected. Where can you learn more? Go to http://www.gray.com/ Enter U626 in the search box to learn more about \"Chronic Cough: Care Instructions. \" Current as of: February 24, 2020               Content Version: 12.6 © 2006-2020 [employer]. Care instructions adapted under license by [employer] (which disclaims liability or warranty for this information). If you have questions about a medical condition or this instruction, always ask your healthcare professional. [place] [address] any warranty or liability for your use of this information.

## 2021-03-10 NOTE — TELEPHONE ENCOUNTER
Tell patient's mother, Arielle Perez, that Dr. Odilia Craig called [facility] and someone will see him today or tomorrow for the cough and to do a chest X-ray. Also let her know that Dr. Odilia Craig just spoke with a NP from [facility] to report that his left shoulder X-ray showed a left lateral clavicle fracture (non-displaced) with mild osteopenia (low bone mass). Dr. Odilia Criag has placed a referral for him to see an orthopedic doctor since he has fractured the clavicle before. Give her name of orthopedist and phone number to schedule an appointment.

## 2021-03-10 NOTE — TELEPHONE ENCOUNTER
Pt mother called and would like to know where they can go to get a chest xray.  [facility] will not come out to do it

## 2021-03-11 NOTE — TELEPHONE ENCOUNTER
[facility] saw pt this morning, has ordered a cough tablet (per mother) and a chest x-ray. They informed her about the fractured shoulder, she does not want to take him to see another doctor, wants it to heal on its own. Informed if she changes her mind about ortho, call Altaf Wilkinson.

## 2021-03-25 ENCOUNTER — OFFICE VISIT (OUTPATIENT)
Dept: HEMATOLOGY | Age: 66
End: 2021-03-25
Payer: MEDICARE

## 2021-03-25 VITALS
HEART RATE: 68 BPM | BODY MASS INDEX: 26.85 KG/M2 | DIASTOLIC BLOOD PRESSURE: 91 MMHG | HEIGHT: 72 IN | TEMPERATURE: 97.5 F | RESPIRATION RATE: 20 BRPM | SYSTOLIC BLOOD PRESSURE: 146 MMHG | OXYGEN SATURATION: 96 %

## 2021-03-25 DIAGNOSIS — K74.60 CIRRHOSIS OF LIVER WITHOUT ASCITES, UNSPECIFIED HEPATIC CIRRHOSIS TYPE (HCC): Primary | ICD-10-CM

## 2021-03-25 PROCEDURE — G8755 DIAS BP > OR = 90: HCPCS | Performed by: INTERNAL MEDICINE

## 2021-03-25 PROCEDURE — G8753 SYS BP > OR = 140: HCPCS | Performed by: INTERNAL MEDICINE

## 2021-03-25 PROCEDURE — G8419 CALC BMI OUT NRM PARAM NOF/U: HCPCS | Performed by: INTERNAL MEDICINE

## 2021-03-25 PROCEDURE — 3017F COLORECTAL CA SCREEN DOC REV: CPT | Performed by: INTERNAL MEDICINE

## 2021-03-25 PROCEDURE — G8432 DEP SCR NOT DOC, RNG: HCPCS | Performed by: INTERNAL MEDICINE

## 2021-03-25 PROCEDURE — G8427 DOCREV CUR MEDS BY ELIG CLIN: HCPCS | Performed by: INTERNAL MEDICINE

## 2021-03-25 PROCEDURE — 99204 OFFICE O/P NEW MOD 45 MIN: CPT | Performed by: INTERNAL MEDICINE

## 2021-03-25 RX ORDER — BENZONATATE 100 MG/1
100 CAPSULE ORAL
COMMUNITY
End: 2021-04-09

## 2021-03-25 RX ORDER — PREGABALIN 75 MG/1
CAPSULE ORAL
COMMUNITY
End: 2021-04-09 | Stop reason: ALTCHOICE

## 2021-03-25 NOTE — Clinical Note
4/4/2021 Patient: Shefali Mendoza YOB: 1955 Date of Visit: 3/25/2021 Zoe Jauregui MD 
[address] [address] [address] [employer] Dear Zoe Jauregui MD, [facility]. My notes for this consultation are attached. If you have questions, please do not hesitate to call me. I look forward to following your patient along with you. Sincerely, Lon Suresh MD

## 2021-03-25 NOTE — PROGRESS NOTES
Identified pt with two pt identifiers(name and ). Reviewed record in preparation for visit and have obtained necessary documentation. Chief Complaint   Patient presents with    Cirrhosis Of Liver      Vitals:    21 0845   BP: (!) 146/91   Pulse: 68   Resp: 20   Temp: 97.5 °F (36.4 °C)   TempSrc: Temporal   SpO2: 96%   Height: 6' (1.829 m)       Health Maintenance Review: Patient reminded of \"due or due soon\" health maintenance. I have asked the patient to contact his/her primary care provider (PCP) for follow-up on his/her health maintenance. Coordination of Care Questionnaire:  :   1) Have you been to an emergency room, urgent care, or hospitalized since your last visit? If yes, where when, and reason for visit? yes     [facility];    2. Have seen or consulted any other health care provider since your last visit? If yes, where when, and reason for visit? NO      Patient is accompanied by mother I have received verbal consent from Deon Sarabia to discuss any/all medical information while they are present in the room.

## 2021-03-25 NOTE — PROGRESS NOTES
Bharti Almaguer MD, [address], [place]ite Eloise Shah, Lon Eng MD, MPH      Caryn Burton, PA-C    Batsheva Perez, ACNP-BC     April S Geovanna, [facility] Hospital[facility]-BC   Rae Alarcon, FNP-C    Terrie Rocha, [facility]       Estella Gamez[facility] [address]    at [place] [address] [address] [facility], [address] [place] [address], [facility] 22.    [phone]    FAX: [address], [address], [address]    [phone]    FAX: [phone]       Patient Care Team:  Belinda Liang MD as PCP - General (Internal Medicine)  Belinda Liang MD as PCP - [facility]  Janae Francis MD (Hematology and Oncology)  Zuly Leal MD (Dermatology)  Dean Briseno MD (Ophthalmology)  Jose Roblero MD (Neurology)      Problem List  Date Reviewed: 4/4/2021          Codes Class Noted    ACP (advance care planning) ICD-10-CM: Z71.89  ICD-9-CM: V65.49  1/29/2021        Pneumonia due to COVID-19 virus ICD-10-CM: U07.1, J12.82  ICD-9-CM: 480.8, 079.89  1/27/2021        Back pain ICD-10-CM: M54.9  ICD-9-CM: 724.5  12/6/2020        Paraplegia ([facility] ICD-10-CM: G82.20  ICD-9-CM: 344.1  12/5/2020        Lumbar stenosis ICD-10-CM: M48.061  ICD-9-CM: 724.02  12/5/2020        Cirrhosis ([facility] ICD-10-CM: K74.60  ICD-9-CM: 571.5  12/2/2020        Macrocytic anemia ICD-10-CM: D53.9  ICD-9-CM: 281.9  12/2/2020        Lower extremity weakness ICD-10-CM: R29.898  ICD-9-CM: 729.89  11/28/2020        Myelodysplasia (myelodysplastic syndrome) ([facility] ICD-10-CM: D46.9  ICD-9-CM: 238.75  6/11/2020        Type 2 diabetes mellitus with diabetic polyneuropathy, without long-term current use of insulin ([facility] ICD-10-CM: E11.42  ICD-9-CM: 250.60, 357.2  2/16/2017        HLD (hyperlipidemia) ICD-10-CM: E78.5  ICD-9-CM: 272.4  8/16/2016 Hypertension, essential ICD-10-CM: I10  ICD-9-CM: 401.9  2/21/2013        Thrombocytopenia ([facility] ICD-10-CM: D69.6  ICD-9-CM: 287.5  8/23/2012    Overview Signed 8/23/2012  9:44 AM by Reuben Castaneda. Has been on present dose of depakote since I started seeing him in 1987 and has had intermittent low level but recently dipped below 760 will restart folic acid and follow             Sleep disorder ICD-10-CM: G47.9  ICD-9-CM: 780.50  8/23/2012    Overview Signed 8/23/2012  9:53 AM by Reuben Castaneda. Episode of screaming as if he is terrified, lasts about one minutes, no memory of it , frequency once a months, duration  For > 20 years             Venous stasis of lower extremity ICD-10-CM: I87.8  ICD-9-CM: 459.81  8/19/2010        Intellectual disability ICD-10-CM: F79  ICD-9-CM: 319  1/22/2010        Seizure disorder ([facility] ICD-10-CM: G40.909  ICD-9-CM: 345.90  1/22/2010    Overview Signed 10/27/2010  8:01 AM by Reuben Castaneda. EEG   10/10 mildly abnormal, no focal abnormality or spike and wave discharges. Psoriasis ICD-10-CM: L40.9  ICD-9-CM: 696.1  1/22/2010              The clinicians listed above have asked me to see Meek Mcconnell in consultation regarding management of   cirrhosis of undefined cause. All medical records sent by the referring physicians were reviewed including imaging studies     The patient is a 60 y.o.  male with intellectual disability. He has been cared for by his other his entire life. The mother is now 80years of age. Plans have been made for his care if she is no longer to provide this. The patient was found to have cirrhosis in 11/2020 when he was hospitalized with pneumonia and underwent CT scan of the abdomen. An assessment of liver fibrosis with biopsy or elastography has not been performed.       Serologic evaluation for markers of chronic liver disease was negative     The patient has not developed any of the major complications of cirrhosis to date. The serum ammonia was elevated in the 50-60 range and he was started on lactulose but there was no change in mental status. The dose of lactulose was reduced to once every day. The patient cannot communicate and a ROS related to liver disease cannot be obtained. The mothers main concern is that he coughs all nigh when he lays flat. He is confined to wheelchair. ASSESSMENT AND PLAN:  Cirrhosis  The diagnosis of cirrhosis is based upon imaging, laboratory studies  Cirrhosis of unclear etiology. The patient has never developed any complications of cirrhosis to date. The CTP is 7. Child class B. The MELD score is 8. Liver transaminases are normal.  ALP is normal.  Liver function is normal.  The platelet count is   depressed. Serologic testing for causes of chronic liver disease was negative     Nocturnal cough  He coughs at night when he lays flat. He does not cough once he sits up. This is most likely due to reflux disease. I recommended he start a PPI taken atdinner time to suppress nocturnal acid production. I recommended that the head of his bed be raised 4 inches to reduce reflux to the mouth. Lower extremity edema  Edema is chronic and has been attributed to stasis and muscle atrophy of the legs. Will continue the current dose of lasix 40 mg QD    Screening for Esophageal varices   I discussed esophageal varices and the potential for bleeding. Discussed treatment of esophageal varices. We have decided not to screen for esophageal varices at this time and not as an out-patient  If he is hospitalized at some point in the near future this can be readdressed. Hepatic encephalopathy   Overt HE has not developed to date. He has cognitive deficients and it would be really impossible to know if he had mild HE.   Lactulose was started in the past at [facility] because of an ammonia of in the 50-60s, which is not that high and rarely associated with HE. The dose has since been lowered to once every day  The most recent serum ammonia was in the 30s  Will leave him on this dose of lactulose once every day    Thrombocytopenia   This is secondary to cirrhosis. There is no evidence of overt bleeding. No treatment is required. Anemia   This is due to multifactorial causes including recent hospitalizations, portal hypertension with chronic GI blood loss, bone marrow suppression secondary to poor nutrition. The HB is stable in the 10 gm range. The most recent FE studies were from 1/2021. The serum ferritin is 90    Screening for Hepatocellular Carcinoma  We discussed [facility] screening and possible treatments if [address] did develop  We have decided not to screen for [address] Treatment of other medical problems in patients with chronic liver disease  There are no contraindications for the patient to take most medications that are necessary for treatment of other medical issues. ALLERGIES  No Known Allergies    MEDICATIONS  Current Outpatient Medications   Medication Sig    pregabalin (LYRICA) 75 mg capsule pregabalin 75 mg capsule    benzonatate (TESSALON) 100 mg capsule Take 100 mg by mouth three (3) times daily as needed for Cough.  furosemide (LASIX) 40 mg tablet Take 0.5 Tabs by mouth daily.  topiramate (TOPAMAX) 200 mg tablet take one and one half tablet twice daily    lactulose (CHRONULAC) 10 gram/15 mL solution Take 15 mL by mouth three (3) times daily    metFORMIN ER (GLUCOPHAGE XR) 500 mg tablet TAKE TWO TABLETS BY MOUTH TWICE DAILY     linaGLIPtin (TRADJENTA) 5 mg tablet Take 1 Tab by mouth daily.  pioglitazone (ACTOS) 45 mg tablet Take 1 Tab by mouth daily.  divalproex DR (DEPAKOTE) 500 mg tablet TAKE ONE TABLET BY MOUTH EVERY MORNING AND TWO TABS AT NIGHT    atorvastatin (LIPITOR) 40 mg tablet Take 1 Tab by mouth nightly.     glimepiride (AMARYL) 4 mg tablet TAKE TWO TABLETS BY MOUTH IN THE MORNING    folic acid (FOLVITE) 1 mg tablet Take 1 Tab by mouth daily.  Calcium-Cholecalciferol, D3, (CALTRATE-600 PLUS VITAMIN D3) 600-400 mg-unit Tab Take 1 Tab by mouth daily.  triamcinolone acetonide (KENALOG) 0.1 % ointment Apply  to affected area two (2) times a day. apply thin layer 2 x daily to groin as needed for rash     No current facility-administered medications for this visit. SYSTEM REVIEW NOT RELATED TO LIVER DISEASE OR REVIEWED ABOVE:  Constitution systems: Negative for fever, chills, weight gain, weight loss. Eyes: Negative for visual changes. ENT: Negative for sore throat, painful swallowing. Respiratory: Negative for cough, hemoptysis, SOB. Cardiology: Negative for chest pain, palpitations. GI:  Negative for constipation or diarrhea. : Negative for urinary frequency, dysuria, hematuria, nocturia. Skin: Negative for rash. Hematology: Negative for easy bruising, blood clots. Musculo-skelatal: Negative for back pain, muscle pain, weakness. Neurologic: Negative for headaches, dizziness, vertigo, memory problems not related to HE. Psychology: Negative for anxiety, depression. FAMILY HISTORY:  There is no family history of liver disease. SOCIAL HISTORY:  The patient has never been . He was born with cognitive deficients, has resided his entire life in a wheelchair. The mother has cared for his all of his life. She is currently 80years of age. PHYSICAL EXAMINATION:  Visit Vitals  BP (!) 146/91 (BP 1 Location: Right lower arm, BP Patient Position: Sitting, BP Cuff Size: Adult)   Pulse 68   Temp 97.5 °F (36.4 °C) (Temporal)   Resp 20   Ht 6' (1.829 m)   SpO2 96%   BMI 26.85 kg/m²     General: No acute distress. Confined to wheelchair. Eyes: Sclera anicteric. ENT: No oral lesions. Thyroid normal.  Nodes: No adenopathy. Skin: No spider angiomata. No jaundice. No palmar erythema. Respiratory: Lungs clear to auscultation.    Cardiovascular: Regular heart rate. No murmurs. No JVD. Abdomen: Soft non-tender. Liver size normal to percussion/palpation. Spleen not palpable. No obvious ascites. Extremities: 3+ edema. Muscle wasting of lower extremities. Neurologic: Alert and oriented. Cranial nerves grossly intact. No asterixis. LABORATORY STUDIES:  Liver [place] of [address] Units 1/29/2021 1/28/2021   WBC 4.1 - 11.1 K/uL 8.4 9.3   ANC 1.8 - 8.0 K/UL 3.2 5.0   HGB 12.1 - 17.0 g/dL 10.1 (L) 9.6 (L)    - 400 K/uL 86 (L) 72 (L)   INR 0.9 - 1.1    1.1   AST 15 - 37 U/L 36 11 (L)   ALT 12 - 78 U/L 41 10 (L)   Alk Phos 45 - 117 U/L 50 45   Bili, Total 0.2 - 1.0 MG/DL 0.2 0.3   Bili, Direct 0.0 - 0.2 MG/DL     Albumin 3.5 - 5.0 g/dL 2.4 (L) 2.3 (L)   BUN 6 - 20 MG/DL 20 20   Creat 0.70 - 1.30 MG/DL 0.72 0.79   Na 136 - 145 mmol/L 138 141   K 3.5 - 5.1 mmol/L 3.6 4.0   Cl 97 - 108 mmol/L 110 (H) 113 (H)   CO2 21 - 32 mmol/L 19 (L) 21   Glucose 65 - 100 mg/dL 192 (H) 175 (H)   Magnesium 1.6 - 2.4 mg/dL 1.9    Ammonia <32 UMOL/L  37 (H)     SEROLOGIES:  Not available or performed. Testing will be performed as indicated. LIVER HISTOLOGY:  Not available or performed    ENDOSCOPIC PROCEDURES:  Not available or performed    RADIOLOGY:  11/2020. CT scan abdomen with IV contrast.  Changes consistent with cirrhosis. No liver mass lesions. No dilated bile ducts. No ascites. OTHER TESTING:  Not available or performed    FOLLOW-UP:  All of the issues listed above in the Assessment and Plan were discussed with the patient. All questions were answered. The patient expressed a clear understanding of the above. No follow-up at [address] [place] is needed. I would be glad to see the patient back for follow-up at any time in the future if the clinical situation changes.       Manisha Sifuentes MD  [facility] of [address] [address] [address]  Kacy Malik  22.  [phone]  [facility]

## 2021-04-09 ENCOUNTER — OFFICE VISIT (OUTPATIENT)
Dept: INTERNAL MEDICINE CLINIC | Age: 66
End: 2021-04-09
Payer: MEDICARE

## 2021-04-09 VITALS
RESPIRATION RATE: 16 BRPM | HEIGHT: 72 IN | SYSTOLIC BLOOD PRESSURE: 127 MMHG | DIASTOLIC BLOOD PRESSURE: 80 MMHG | BODY MASS INDEX: 26.85 KG/M2 | HEART RATE: 70 BPM | OXYGEN SATURATION: 98 % | TEMPERATURE: 98 F

## 2021-04-09 DIAGNOSIS — D69.6 THROMBOCYTOPENIA (HCC): ICD-10-CM

## 2021-04-09 DIAGNOSIS — D46.9 MYELODYSPLASIA (MYELODYSPLASTIC SYNDROME) (HCC): ICD-10-CM

## 2021-04-09 DIAGNOSIS — I10 HYPERTENSION, ESSENTIAL: ICD-10-CM

## 2021-04-09 DIAGNOSIS — K74.60 HEPATIC CIRRHOSIS, UNSPECIFIED HEPATIC CIRRHOSIS TYPE, UNSPECIFIED WHETHER ASCITES PRESENT (HCC): ICD-10-CM

## 2021-04-09 DIAGNOSIS — G40.909 SEIZURE DISORDER (HCC): ICD-10-CM

## 2021-04-09 DIAGNOSIS — E11.42 TYPE 2 DIABETES MELLITUS WITH DIABETIC POLYNEUROPATHY, WITHOUT LONG-TERM CURRENT USE OF INSULIN (HCC): Primary | ICD-10-CM

## 2021-04-09 DIAGNOSIS — E78.2 MIXED HYPERLIPIDEMIA: ICD-10-CM

## 2021-04-09 PROCEDURE — G8754 DIAS BP LESS 90: HCPCS | Performed by: INTERNAL MEDICINE

## 2021-04-09 PROCEDURE — G8752 SYS BP LESS 140: HCPCS | Performed by: INTERNAL MEDICINE

## 2021-04-09 PROCEDURE — 3044F HG A1C LEVEL LT 7.0%: CPT | Performed by: INTERNAL MEDICINE

## 2021-04-09 PROCEDURE — G8419 CALC BMI OUT NRM PARAM NOF/U: HCPCS | Performed by: INTERNAL MEDICINE

## 2021-04-09 PROCEDURE — 3017F COLORECTAL CA SCREEN DOC REV: CPT | Performed by: INTERNAL MEDICINE

## 2021-04-09 PROCEDURE — 99214 OFFICE O/P EST MOD 30 MIN: CPT | Performed by: INTERNAL MEDICINE

## 2021-04-09 PROCEDURE — 2022F DILAT RTA XM EVC RTNOPTHY: CPT | Performed by: INTERNAL MEDICINE

## 2021-04-09 PROCEDURE — G8510 SCR DEP NEG, NO PLAN REQD: HCPCS | Performed by: INTERNAL MEDICINE

## 2021-04-09 PROCEDURE — G8427 DOCREV CUR MEDS BY ELIG CLIN: HCPCS | Performed by: INTERNAL MEDICINE

## 2021-04-09 RX ORDER — LACTULOSE 10 G/15ML
10 SOLUTION ORAL; RECTAL EVERY OTHER DAY
Qty: 480 ML | Refills: 0
Start: 2021-04-09 | End: 2021-01-01

## 2021-04-09 NOTE — PROGRESS NOTES
Eva Fernández  Identified pt with two pt identifiers(name and ). Chief Complaint   Patient presents with    Cough    Diabetes       Reviewed record In preparation for visit and have obtained necessary documentation. 1. Have you been to the ER, urgent care clinic or hospitalized since your last visit? No     2. Have you seen or consulted any other health care providers outside of the [address] [place] [address] since your last visit? Include any pap smears or colon screening. No    Patient has an advance directive. Vitals reviewed with provider.     Health Maintenance reviewed:     Health Maintenance Due   Topic    COVID-19 Vaccine (1)    Eye Exam Retinal or Dilated     DTaP/Tdap/Td series (2 - Td)    Colorectal Cancer Screening Combo           Wt Readings from Last 3 Encounters:   03/10/21 198 lb (89.8 kg)   21 192 lb (87.1 kg)   21 192 lb (87.1 kg)        Temp Readings from Last 3 Encounters:   21 98 °F (36.7 °C) (Oral)   21 97.5 °F (36.4 °C) (Temporal)   03/10/21 97.5 °F (36.4 °C) (Oral)        BP Readings from Last 3 Encounters:   21 127/80   21 (!) 146/91   03/10/21 116/60        Pulse Readings from Last 3 Encounters:   21 70   21 68   03/10/21 70        Vitals:    21 1305   BP: 127/80   Pulse: 70   Resp: 16   Temp: 98 °F (36.7 °C)   TempSrc: Oral   SpO2: 98%   Height: 6' (1.829 m)   PainSc:   0 - No pain          Learning Assessment:   :       Learning Assessment 2014   PRIMARY LEARNER Patient   HIGHEST LEVEL OF EDUCATION - PRIMARY LEARNER  DID NOT GRADUATE HIGH SCHOOL   BARRIERS PRIMARY LEARNER COGNITIVE   CO-LEARNER CAREGIVER Yes   CO-LEARNER NAME Jessica Armenta   PRIMARY LANGUAGE ENGLISH   LEARNER PREFERENCE PRIMARY DEMONSTRATION     READING   ANSWERED BY mother   RELATIONSHIP LEGAL GUARDIAN        Depression Screening:   :       3 most recent PHQ Screens 2021   Little interest or pleasure in doing things Not at all Feeling down, depressed, irritable, or hopeless Not at all   Total Score PHQ 2 0   Trouble falling or staying asleep, or sleeping too much -   Feeling tired or having little energy -   Poor appetite, weight loss, or overeating -   Feeling bad about yourself - or that you are a failure or have let yourself or your family down -   Trouble concentrating on things such as school, work, reading, or watching TV -   Moving or speaking so slowly that other people could have noticed; or the opposite being so fidgety that others notice -   Thoughts of being better off dead, or hurting yourself in some way -   PHQ 9 Score -   How difficult have these problems made it for you to do your work, take care of your home and get along with others -        Fall Risk Assessment:   :       Fall Risk Assessment, last 12 mths 3/10/2021   Able to walk? Yes   Fall in past 12 months? 1   Do you feel unsteady? 0   Number of falls in past 12 months -   Fall with injury? 1        Abuse Screening:   :       Abuse Screening Questionnaire 4/29/2020 6/4/2019 10/16/2018 2/16/2017   Do you ever feel afraid of your partner? N N N N   Are you in a relationship with someone who physically or mentally threatens you? N N N N   Is it safe for you to go home?  Y Y Y Y        ADL Screening:   :       ADL Assessment 12/4/2020   Feeding yourself No Help Needed   Getting from bed to chair Help Needed   Getting dressed Help Needed   Bathing or showering Help Needed   Walk across the room (includes cane/walker) Help Needed   Using the telphone Help Needed   Taking your medications Help Needed   Preparing meals Help Needed   Managing money (expenses/bills) Help Needed   Moderately strenuous housework (laundry) Help Needed   Shopping for personal items (toiletries/medicines) Help Needed   Shopping for groceries Help Needed   Driving Help Needed   Climbing a flight of stairs Help Needed   Getting to places beyond walking distances Help Needed

## 2021-04-09 NOTE — PROGRESS NOTES
CC:   Chief Complaint   Patient presents with    Cough    Diabetes       HISTORY OF PRESENT ILLNESS  Carissa Craig is a 60 y.o. male. Accompanied by his mother, Eloisa Malagon. Presents for 1 month follow up. He has type 2 DM, HTN, hyperlipidemia, seizure disorder, cirrhosis of liver, splenomegaly, myelodysplastic syndrome (MDS), thrombocytopenia due to MDS, leg weakness, and intellectual disability.  Hospitalized in Nov and Dec for lower extremity weakness with functional paraplegia and in Jan for community-acquired pneumonia and hepatic encephalopathy. No complaints today. Denies CP, SOB, cough, and abdominal pain. Saw Dr. Kurtis Desai who did not think he needed hepatocellular cancer screening; did not need to FU. His mother gives him Lactulose once every other day. Does notice he is more interactive after taking Lactulose. Has home health aide who comes from 7 am-3 pm Mon-Fri. His mother sends aide home early often. Biggest challenge for his mother is getting him up when he falls down; has to call [employer] but they told her to call anytime needed. Also has a male friend in [place] who offered to help pick him up if needed. Was hospitalized in Jan with pneumonia after having a tooth pulled. Had another tooth pulled recently, had amoxicillin prior, and did well. Cough resolved after raising head of bed. Mother occasionally gives him famotidine 20 mg. Health Maintenance  Eye exam: Dr. Mojgan GALAVIZ 10/20  Colon cancer: due for screening  COVID vaccine: scheduled next week for 2nd vaccine    ROS  A complete review of systems was performed and is negative except for those mentioned in the HPI.      Patient Active Problem List   Diagnosis Code    Intellectual disability F68    Seizure disorder ([facility] G40.909    Psoriasis L40.9    Venous stasis of lower extremity I87.8    Thrombocytopenia (HCC) D69.6    Sleep disorder G47.9    Hypertension, essential I10    HLD (hyperlipidemia) E78.5    Type 2 diabetes mellitus with diabetic polyneuropathy, without long-term current use of insulin (HCC) E11.42    Myelodysplasia (myelodysplastic syndrome) ([facility]) D46.9    Lower extremity weakness R29.898    Cirrhosis ([facility]) K74.60    Macrocytic anemia D53.9    Paraplegia ([facility]) G82.20    Lumbar stenosis M48.061    Back pain M54.9    Pneumonia due to COVID-19 virus U07.1, J12.82    ACP (advance care planning) Z71.89     Past Medical History:   Diagnosis Date    Contact dermatitis and other eczema, due to unspecified cause     psoriasis    Diabetes ([facility]     Eosinophilia     Hypercholesterolemia     Hypertension     Mental retardation     Seizures ([facility])     Skin cancer     Strongyloides stercoralis infection 8/28/2014     No Known Allergies    Current Outpatient Medications   Medication Sig Dispense Refill    pregabalin (LYRICA) 75 mg capsule pregabalin 75 mg capsule      benzonatate (TESSALON) 100 mg capsule Take 100 mg by mouth three (3) times daily as needed for Cough.  furosemide (LASIX) 40 mg tablet Take 0.5 Tabs by mouth daily. 30 Tab 2    topiramate (TOPAMAX) 200 mg tablet take one and one half tablet twice daily 270 Tab 3    lactulose (CHRONULAC) 10 gram/15 mL solution Take 15 mL by mouth three (3) times daily      metFORMIN ER (GLUCOPHAGE XR) 500 mg tablet TAKE TWO TABLETS BY MOUTH TWICE DAILY  360 Tab 3    linaGLIPtin (TRADJENTA) 5 mg tablet Take 1 Tab by mouth daily. 90 Tab 3    pioglitazone (ACTOS) 45 mg tablet Take 1 Tab by mouth daily. 90 Tab 3    divalproex DR (DEPAKOTE) 500 mg tablet TAKE ONE TABLET BY MOUTH EVERY MORNING AND TWO TABS AT NIGHT 270 Tab 3    atorvastatin (LIPITOR) 40 mg tablet Take 1 Tab by mouth nightly. 90 Tab 3    glimepiride (AMARYL) 4 mg tablet TAKE TWO TABLETS BY MOUTH IN THE MORNING 121 Tab 3    folic acid (FOLVITE) 1 mg tablet Take 1 Tab by mouth daily.  90 Tab 3    triamcinolone acetonide (KENALOG) 0.1 % ointment Apply  to affected area two (2) times a day. apply thin layer 2 x daily to groin as needed for rash      Calcium-Cholecalciferol, D3, (CALTRATE-600 PLUS VITAMIN D3) 600-400 mg-unit Tab Take 1 Tab by mouth daily. PHYSICAL EXAM  Visit Vitals  /80 (BP 1 Location: Left arm, BP Patient Position: Sitting, BP Cuff Size: Adult)   Pulse 70   Temp 98 °F (36.7 °C) (Oral)   Resp 16   Ht 6' (1.829 m)   SpO2 98%   BMI 26.85 kg/m²       General: Well-developed and well-nourished, no distress. HEENT:  Head normocephalic/atraumatic, no scleral icterus  Lungs:  Clear to ausculation bilaterally. Good air movement. Heart:  Regular rate and rhythm, normal S1 and S2, no murmur, gallop, or rub  Extremities: No clubbing, cyanosis. 1+ pitting edema at ankles. Neurological: Alert and oriented to person only. Psychiatric: Normal mood and affect. Behavior is normal.     Lab Results   Component Value Date/Time    Hemoglobin A1c 6.7 (H) 01/28/2021 04:30 AM       Lab Results   Component Value Date/Time    Cholesterol, total 98 12/14/2020 05:09 AM    HDL Cholesterol 29 12/14/2020 05:09 AM    LDL, calculated 44.6 12/14/2020 05:09 AM    VLDL, calculated 24.4 12/14/2020 05:09 AM    Triglyceride 122 12/14/2020 05:09 AM    CHOL/HDL Ratio 3.4 12/14/2020 05:09 AM         ASSESSMENT AND PLAN    ICD-10-CM ICD-9-CM    1. Type 2 diabetes mellitus with diabetic polyneuropathy, without long-term current use of insulin ([facility])  E11.42 250.60      357.2    2. Hepatic cirrhosis, unspecified hepatic cirrhosis type, unspecified whether ascites present ([facility])  K74.60 571.5 lactulose (CHRONULAC) 10 gram/15 mL solution   3. Myelodysplasia (myelodysplastic syndrome) ([facility])  D46.9 238.75    4. Seizure disorder ([facility]  G40.909 345.90    5. Thrombocytopenia ([facility])  D69.6 287.5    6. Hypertension, essential  I10 401.9    7. Mixed hyperlipidemia  E78.2 272.2      Diagnoses and all orders for this visit:    1.  Type 2 diabetes mellitus with diabetic polyneuropathy, without long-term current use of insulin (HCC)  Controlled. Last A1c 6.7%. Continue metformin, glimepiride, Tradjenta, and pioglitazone. 2. Hepatic cirrhosis, unspecified hepatic cirrhosis type, unspecified whether ascites present (HCC)  Idiopathic. Continue furosemide 40 mg 1/2 tab daily. Okay to continue lactulose every other day. 3. Myelodysplasia (myelodysplastic syndrome) (HCC)  Stable. Continue folic acid. 4. Seizure disorder ([address]  Controlled on Topamax and Depakote. 5. Thrombocytopenia ([facility]  Due to cirrhosis. 6. Hypertension, essential  Remains controlled off medication. 7. Mixed hyperlipidemia  Controlled on atorvastatin. Follow-up and Dispositions    · Return in about 3 months (around 7/9/2021), or if symptoms worsen or fail to improve, for DM, cirrhosis; POC A1c. I have discussed the diagnosis with the patient and the intended plan as seen in the above orders. Patient is in agreement. The patient has received an after-visit summary and questions were answered concerning future plans. I have discussed medication side effects and warnings with the patient as well.

## 2021-04-22 ENCOUNTER — TELEPHONE (OUTPATIENT)
Dept: INTERNAL MEDICINE CLINIC | Age: 66
End: 2021-04-22

## 2021-04-22 NOTE — LETTER
NOTIFICATION RETURN TO WORK / SCHOOL 
 
4/22/2021 Mr. Carissa Craig [address] [address] To Whom It May Concern: 
 
Carissa Craig is currently under the care of [address] [place] [address]. Please note that he is medically cleared to participate in an adult day program. 
 
If there are questions or concerns please have the patient contact our office. Sincerely, Ana Rosa Dobbins MD

## 2021-04-22 NOTE — TELEPHONE ENCOUNTER
Pt mother called and stated that she needs a letter stating that the pt is ok to go back to his day program.

## 2021-04-26 ENCOUNTER — TELEPHONE (OUTPATIENT)
Dept: INTERNAL MEDICINE CLINIC | Age: 66
End: 2021-04-26

## 2021-04-26 NOTE — TELEPHONE ENCOUNTER
----- Message from Bennie Morales sent at 4/26/2021  5:01 PM EDT -----  Regarding: Dr. Madison Saenz: [phone]  General Message/Vendor Calls    Caller's first and last name: Mrs. Suze Mckeon - Wife      Reason for call: PT would like to know why he was referred to [facility] required yes/no and why: yes/follow up       Best contact number(s): [phone]      Details to clarify the request: N/A      Bennie Morales

## 2021-04-27 NOTE — TELEPHONE ENCOUNTER
Informed mom that we referred to [facility] for the fractured shoulder, she stated he has not complained of the shoulder.

## 2021-05-12 ENCOUNTER — TELEPHONE (OUTPATIENT)
Dept: INTERNAL MEDICINE CLINIC | Age: 66
End: 2021-05-12

## 2021-05-12 NOTE — LETTER
NOTIFICATION RETURN TO WORK / SCHOOL 
 
5/12/2021 4:50 PM 
 
Mr. Mai Giron [address] [address] To Whom It May Concern: 
 
Mai Giron is currently under the care of [address] [place] [address]. Please note that it is medically necessary for him to use his walker as [facility]. 
 
If there are questions or concerns please have the patient contact our office. Sincerely, Miguelina Tavarez MD

## 2021-05-12 NOTE — TELEPHONE ENCOUNTER
----- Message from Emeka Tomlinson sent at 5/12/2021  9:56 AM EDT -----  Regarding: Dr. Rc Landis Message/Vendor Calls    Caller's first and last name: Rui Cardona, Spouse      Reason for call: Will be going to the [employer] and needs a letter stating patient needs his walker. Can fax to [phone]. Callback required yes/no and why: yes, to confirm.        Best contact number(s): [phone]      Details to clarify the request: N/A      Emeka Tomlinson

## 2021-07-09 PROBLEM — R29.898 LOWER EXTREMITY WEAKNESS: Status: RESOLVED | Noted: 2020-11-28 | Resolved: 2021-01-01

## 2021-07-09 PROBLEM — D53.9 MACROCYTIC ANEMIA: Status: RESOLVED | Noted: 2020-12-02 | Resolved: 2021-01-01

## 2021-07-09 PROBLEM — M54.9 BACK PAIN: Status: RESOLVED | Noted: 2020-12-06 | Resolved: 2021-01-01

## 2021-07-09 PROBLEM — Z71.89 ACP (ADVANCE CARE PLANNING): Status: RESOLVED | Noted: 2021-01-29 | Resolved: 2021-01-01

## 2021-07-09 PROBLEM — U07.1 PNEUMONIA DUE TO COVID-19 VIRUS: Status: RESOLVED | Noted: 2021-01-27 | Resolved: 2021-01-01

## 2021-07-09 PROBLEM — J12.82 PNEUMONIA DUE TO COVID-19 VIRUS: Status: RESOLVED | Noted: 2021-01-27 | Resolved: 2021-01-01

## 2021-07-09 PROBLEM — G82.20 PARAPLEGIA (HCC): Status: RESOLVED | Noted: 2020-12-05 | Resolved: 2021-01-01

## 2021-07-09 NOTE — PROGRESS NOTES
CC:   Chief Complaint   Patient presents with    Diabetes     Poc A1c        HISTORY OF PRESENT ILLNESS  Juanita Ribera is a 60 y.o. male. Accompanied by his mother, Marybel Gomez. Presents for 3 month follow up evaluation. He has type 2 DM, HTN, hyperlipidemia, seizure disorder, cirrhosis of liver, myelodysplastic syndrome (MDS), thrombocytopenia due to MDS, and intellectual disability. Says he has been feeling good. Legs feel strong. Walking with a cane. No longer confined to bed. Complains of swelling at legs; no problems getting shoes on due to swelling. Attends an adult day program 2 times a week. Used jock itch cream for groin rash without much improvement. Started using a diaper rash cream with zinc that has helped a lot. He is seen for diabetes. Since last visit he reports no polyuria or polydipsia, no numbness, tingling or pain in extremities, no hypoglycemia, no significant changes. Home glucose monitoring: is not performed. He reports medication compliance: compliant all of the time. Medication side effects: none. Diabetic diet compliance: compliant most of the time. Lab review: A1c is 6.6% today (7/9/21). Eye exam: scheduled in Aug with Dr. Aliya Gresham. Denies CP, SOB, or dizziness. No longer taking lactulose for cirrhosis because having daily BM's. Saw Dr. Frederick Barba on 3/25/21; did not recommended hepatocellular cancer screening, no FU needed. ROS  A complete review of systems was performed and is negative except for those mentioned in the HPI.     Patient Active Problem List   Diagnosis Code    Intellectual disability F68    Seizure disorder ([facility] G40.909    Psoriasis L40.9    Venous stasis of lower extremity I87.8    Thrombocytopenia (HCC) D69.6    Sleep disorder G47.9    Hypertension, essential I10    HLD (hyperlipidemia) E78.5    Type 2 diabetes mellitus with diabetic polyneuropathy, without long-term current use of insulin (HCC) E11.42    Myelodysplasia (myelodysplastic syndrome) ([facility]) D46.9    Lower extremity weakness R29.898    Cirrhosis ([facility]) K74.60    Macrocytic anemia D53.9    Paraplegia ([facility]) G82.20    Lumbar stenosis M48.061    Back pain M54.9    Pneumonia due to COVID-19 virus U07.1, J12.82    ACP (advance care planning) Z71.89     Past Medical History:   Diagnosis Date    Contact dermatitis and other eczema, due to unspecified cause     psoriasis    Diabetes ([facility]     Eosinophilia     Hypercholesterolemia     Hypertension     Mental retardation     Seizures ([facility])     Skin cancer     Strongyloides stercoralis infection 8/28/2014     No Known Allergies    Current Outpatient Medications   Medication Sig Dispense Refill    furosemide (LASIX) 40 mg tablet Take 0.5 Tabs by mouth daily. 45 Tab 1    lactulose (CHRONULAC) 10 gram/15 mL solution Take 15 mL by mouth every other day. 480 mL 0    topiramate (TOPAMAX) 200 mg tablet take one and one half tablet twice daily 270 Tab 3    metFORMIN ER (GLUCOPHAGE XR) 500 mg tablet TAKE TWO TABLETS BY MOUTH TWICE DAILY  360 Tab 3    linaGLIPtin (TRADJENTA) 5 mg tablet Take 1 Tab by mouth daily. 90 Tab 3    pioglitazone (ACTOS) 45 mg tablet Take 1 Tab by mouth daily. 90 Tab 3    divalproex DR (DEPAKOTE) 500 mg tablet TAKE ONE TABLET BY MOUTH EVERY MORNING AND TWO TABS AT NIGHT 270 Tab 3    atorvastatin (LIPITOR) 40 mg tablet Take 1 Tab by mouth nightly. 90 Tab 3    glimepiride (AMARYL) 4 mg tablet TAKE TWO TABLETS BY MOUTH IN THE MORNING 186 Tab 3    folic acid (FOLVITE) 1 mg tablet Take 1 Tab by mouth daily. 90 Tab 3    Calcium-Cholecalciferol, D3, (CALTRATE-600 PLUS VITAMIN D3) 600-400 mg-unit Tab Take 1 Tab by mouth daily.            PHYSICAL EXAM  Visit Vitals  /79 (BP 1 Location: Left arm, BP Patient Position: Sitting, BP Cuff Size: Large adult)   Pulse 76   Temp 98.1 °F (36.7 °C) (Oral)   Resp 16   Ht 6' (1.829 m)   Wt 184 lb 3.2 oz (83.6 kg)   SpO2 98%   BMI 24.98 kg/m² General: Well-developed and well-nourished, no distress. Interactive and more talkative today. HEENT:  Head normocephalic/atraumatic, no scleral icterus  Lungs:  Clear to ausculation bilaterally. Good air movement. Heart:  Regular rate and rhythm, normal S1 and S2, no murmur, gallop, or rub  Extremities: No clubbing, cyanosis. 1+ pitting edema at bilateral ankles and LE's. 2-3 hyperkeratotic lesions at each lower leg. Hyperpigmentation at bilateral LE's. Neurological: Alert and oriented. Psychiatric: Normal mood and affect. Behavior is normal.     Results for orders placed or performed in visit on 07/09/21   AMB POC HEMOGLOBIN A1C   Result Value Ref Range    Hemoglobin A1c (POC) 6.6 %         ASSESSMENT AND PLAN    ICD-10-CM ICD-9-CM    1. Type 2 diabetes mellitus with diabetic polyneuropathy, without long-term current use of insulin (HCC)  E11.42 250.60 AMB POC HEMOGLOBIN A1C     357.2 MICROALBUMIN, UR, RAND W/ MICROALB/CREAT RATIO      MICROALBUMIN, UR, RAND W/ MICROALB/CREAT RATIO   2. Hypertension, essential  I10 401.9    3. Mixed hyperlipidemia  E78.2 272.2    4. Seizure disorder ([facility] [address]  G40.909 345.90    5. Keratosis  L57.0 701.1 triamcinolone acetonide (KENALOG) 0.1 % ointment   6. Screening for colon cancer  Z12.11 V76.51 OCCULT BLOOD IMMUNOASSAY,DIAGNOSTIC      OCCULT BLOOD IMMUNOASSAY,DIAGNOSTIC   7. Hepatic cirrhosis, unspecified hepatic cirrhosis type, unspecified whether ascites present (HCC)  K74.60 571.5    8. Thrombocytopenia (HCC)  D69.6 287.5      Diagnoses and all orders for this visit:    1. Type 2 diabetes mellitus with diabetic polyneuropathy, without long-term current use of insulin ([facility])  A1c 6.6% today. Well-controlled. Continue metformin, glimepiride, Tradjenta, and pioglitazone. -     AMB POC HEMOGLOBIN A1C  -     MICROALBUMIN, UR, RAND W/ MICROALB/CREAT RATIO; Future    2. Hypertension, essential  Controlled on no medication.     3. Mixed hyperlipidemia  Controlled on atorvastatin. 4. Seizure disorder ([facility] [facility]  Controlled on Depakote and Topamax. 5. Keratosis  -     Start triamcinolone acetonide (KENALOG) 0.1 % ointment; Apply  to affected area two (2) times a day. Use thin layer. For dry spots on legs. 6. Screening for colon cancer  -     OCCULT BLOOD IMMUNOASSAY,DIAGNOSTIC; Future    7. Hepatic cirrhosis, unspecified hepatic cirrhosis type, unspecified whether ascites present (HCC)  Idiopathic. Continue furosemide 40 mg 1/2 tab daily for venous stasis. Okay to have stopped lactulose. 8. Thrombocytopenia ([facility]  Due to myelodysplastic syndrome. Continue folic acid. Follow-up and Dispositions    · Return in about 6 months (around 1/9/2022), or if symptoms worsen or fail to improve, for HTN, DM. I have discussed the diagnosis with the patient and the intended plan as seen in the above orders. Patient is in agreement. The patient has received an after-visit summary and questions were answered concerning future plans. I have discussed medication side effects and warnings with the patient as well.

## 2021-07-09 NOTE — PROGRESS NOTES
Swathi Trent  Identified pt with two pt identifiers(name and ). Chief Complaint   Patient presents with    Diabetes     Poc A1c        Reviewed record In preparation for visit and have obtained necessary documentation. 1. Have you been to the ER, urgent care clinic or hospitalized since your last visit? No     2. Have you seen or consulted any other health care providers outside of the [address] [place] [address] since your last visit? Include any pap smears or colon screening. No    Patient has an advance directive. Vitals reviewed with provider. Health Maintenance reviewed:     Health Maintenance Due   Topic    Eye Exam Retinal or Dilated     DTaP/Tdap/Td series (2 - Td or Tdap)    Colorectal Cancer Screening Combo    nshi  Wt Readings from Last 3 Encounters:   21 184 lb 3.2 oz (83.6 kg)   03/10/21 198 lb (89.8 kg)   21 192 lb (87.1 kg)   p with someone who physically or mentally threatens you? N N N N   Is it safe for you to go home?  Zoe Beckwith    2020                    Temp Readings from Last 3 Encounters:   21 98.1 °F (36.7 °C) (Oral)   21 98 °F (36.7 °C) (Oral)   21 97.5 °F (36.4 °C) (Temporal)           BP Readings from Last 3 Encounters:   21 131/79   21 127/80   21 (!) 146/91   Help Needed        Pulse Readings from Last 3 Encounters:   21 76   21 70   21 68                          Getting to places bey  Learning Assessment 2014   PRIMARY LEARNER Patient   HIGHEST LEVEL OF EDUCATION - PRIMARY LEARNER  DID NOT GRADUATE HIGH SCHOOL   BARRIERS PRIMARY LEARNER COGNITIVE   CO-LEARNER CAREGIVER Yes   CO-LEARNER NAME Lalit Redd   PRIMARY LANGUAGE ENGLISH   LEARNER PREFERENCE PRIMARY DEMONSTRATION     READING   ANSWERED BY mother   RELATIONSHIP LEGAL GUARDIAN   ll in past 12 months? 0   Do you  3 most recent PHQ Screens 2021   Little interest or pleasure in doing things Not at all   Feeling down, depressed, irritable, or hopeless Not at all   Total Score PHQ 2 0   Trouble falling or staying asleep, or sleeping too much -   Feeling tired or having little energy -   Poor appetite, weight loss, or overeating -   Feeling bad about yourself - or that you are a failure or have let yourself or your family down -   Trouble concentrating on things such as school, work, reading, or watching TV -   Moving or speaking so slowly that other people could have noticed; or the opposite being so fidgety that others notice -   Thoughts of being better off dead, or hurting yourself in some way -   PHQ 9 Score -   How difficult have these problems made it for you to do your work, take care of your home and get along with others -

## 2021-07-11 NOTE — PROGRESS NOTES
Inform patient's mother that his urine microalbumin, or protein, test returned normal.  This means there is no early sign of diabetes affecting his kidneys.

## 2021-08-06 NOTE — PROGRESS NOTES
Inform patient's mother that his FIT, or take home colon cancer screening test, returned abnormal.  It showed some microscopic blood. Dr. Sandy Braun would like him to have a repeat test using a different kit called Cologuard because it is more accurate in testing for colon cancer. A kit will be mailed to the home.   Make sure he has no blood with wiping before he does the test.

## 2021-08-06 NOTE — PROGRESS NOTES
Verified patients name and date of birth. Advised Leonie Ruano (on POA)of test results and rest of note per provider. She stated understanding.

## 2021-09-02 NOTE — TELEPHONE ENCOUNTER
Masha with [facility] [employer] called said that the result for the coloquard test  came back positive.  Cite Torin Sánchez number is [phone]

## 2021-09-02 NOTE — TELEPHONE ENCOUNTER
Last office note, cologuard and occult blood test faxed to Dr Yelena Vasquez, confirmation received.

## 2021-09-02 NOTE — TELEPHONE ENCOUNTER
Discussed positive result with caretaker. She is skeptical about having colonoscopy done, but is willing to go talk to Dr. Montrell Barrett about alternatives and the procedure.

## 2021-09-16 NOTE — TELEPHONE ENCOUNTER
Mother was wondering why pt needed to see the GI doctor before a colonoscopy. Informed both home kits were positive, given Dr Chetna mims, will call for appointment.

## 2021-10-18 NOTE — TELEPHONE ENCOUNTER
Rena Galvez (on HIPPA) called and verified the patent by name and date of birth. She wanted to know if the name and contact information for a podiatrist at [address] [facility] that Dr. Chriss Roy refers too. I informed her on the contact information for [facility] and [address] and they can see anyone in that office. She stated understanding and will call back if they need anything.

## 2021-11-23 NOTE — TELEPHONE ENCOUNTER
I called Sunita Dugan (on HIPPA) and verified the patient by name and date of birth. I informed her on the message from Dr. Akira Brown. She stated understanding and the placard is going to be for the aid. Sometimes they take him places but does not have a placard and she cannot give them hers. When he is with her they use hers.

## 2021-11-23 NOTE — TELEPHONE ENCOUNTER
Please call patient to find out what \"handicap pass\" she is talking about. Since Susy Maguire does not drive, he does not need a handicapped parking placard.

## 2021-11-23 NOTE — TELEPHONE ENCOUNTER
For the colonoscopy, he should not take glimepiride or metformin the morning of the colonoscopy. He should also not take Tradjenta if he takes it in the morning. Is she has a handicapped placard, then she can use it for both of them whenever they go somewhere together.

## 2021-11-23 NOTE — TELEPHONE ENCOUNTER
I called Carlin Ríos (on HIPPA) and verified them by name and date of birth. She is trying to get a handicap placard for the patient for when she takes him places. She guesses she needs to have his name. She also wanted to know what medications he needed to stop before his colonoscopy on next Wednesday at 12pm with Dr. Hayden Vee. I informed her to call their office to see but the instructions on the paperwork they got says call PCP to find out. She does not want to give him medication on an empty stomach.

## 2021-11-23 NOTE — TELEPHONE ENCOUNTER
Pt mother called and would like to know if pt could get a handicap pass or what she needs to do because pt is having a hard time walking. She stated that she has one but she was told that was only for her and could not use for him.

## 2021-11-24 ENCOUNTER — HOSPITAL ENCOUNTER (OUTPATIENT)
Dept: PREADMISSION TESTING | Age: 66
Discharge: HOME | End: 2021-11-24
Payer: MEDICARE

## 2021-11-24 PROCEDURE — U0005 INFEC AGEN DETEC AMPLI PROBE: HCPCS

## 2021-11-24 NOTE — TELEPHONE ENCOUNTER
[employer] Handicapped Placard application signed for Ana Wren. Let patient's mother know so application can be picked up or mailed. She has to complete first part of form.

## 2021-11-24 NOTE — TELEPHONE ENCOUNTER
I called Ariela Peter (on [employer]IP[employer]) and verified the patient by name and date of birth. I informed her on the message from Dr. Virginia Covarrubias. She stated understanding and will come by now to .

## 2021-11-26 LAB
SARS-COV-2, XPLCVT: NOT DETECTED
SOURCE, COVRS: NORMAL

## 2021-12-01 ENCOUNTER — ANESTHESIA EVENT (OUTPATIENT)
Dept: ENDOSCOPY | Age: 66
End: 2021-12-01
Payer: MEDICARE

## 2021-12-01 ENCOUNTER — ANESTHESIA (OUTPATIENT)
Dept: ENDOSCOPY | Age: 66
End: 2021-12-01
Payer: MEDICARE

## 2021-12-01 ENCOUNTER — HOSPITAL ENCOUNTER (OUTPATIENT)
Age: 66
Setting detail: OUTPATIENT SURGERY
Discharge: HOME | End: 2021-12-01
Attending: INTERNAL MEDICINE | Admitting: INTERNAL MEDICINE
Payer: MEDICARE

## 2021-12-01 VITALS
WEIGHT: 195 LBS | OXYGEN SATURATION: 100 % | TEMPERATURE: 98 F | HEIGHT: 72 IN | HEART RATE: 81 BPM | SYSTOLIC BLOOD PRESSURE: 123 MMHG | BODY MASS INDEX: 26.41 KG/M2 | DIASTOLIC BLOOD PRESSURE: 61 MMHG | RESPIRATION RATE: 17 BRPM

## 2021-12-01 LAB
GLUCOSE BLD STRIP.AUTO-MCNC: 131 MG/DL (ref 65–117)
SERVICE CMNT-IMP: ABNORMAL

## 2021-12-01 PROCEDURE — 74011250636 HC RX REV CODE- 250/636: Performed by: INTERNAL MEDICINE

## 2021-12-01 PROCEDURE — 76060000032 HC ANESTHESIA 0.5 TO 1 HR: Performed by: INTERNAL MEDICINE

## 2021-12-01 PROCEDURE — 74011250636 HC RX REV CODE- 250/636: Performed by: NURSE ANESTHETIST, CERTIFIED REGISTERED

## 2021-12-01 PROCEDURE — 74011000250 HC RX REV CODE- 250: Performed by: NURSE ANESTHETIST, CERTIFIED REGISTERED

## 2021-12-01 PROCEDURE — 77030013992 HC SNR POLYP ENDOSC BSC -B: Performed by: INTERNAL MEDICINE

## 2021-12-01 PROCEDURE — 82962 GLUCOSE BLOOD TEST: CPT

## 2021-12-01 PROCEDURE — 2709999900 HC NON-CHARGEABLE SUPPLY: Performed by: INTERNAL MEDICINE

## 2021-12-01 PROCEDURE — 76040000007: Performed by: INTERNAL MEDICINE

## 2021-12-01 PROCEDURE — 88305 TISSUE EXAM BY PATHOLOGIST: CPT

## 2021-12-01 RX ORDER — PIOGLITAZONEHYDROCHLORIDE 15 MG/1
45 TABLET ORAL
COMMUNITY

## 2021-12-01 RX ORDER — GLYCOPYRROLATE 0.2 MG/ML
INJECTION INTRAMUSCULAR; INTRAVENOUS AS NEEDED
Status: DISCONTINUED | OUTPATIENT
Start: 2021-12-01 | End: 2021-12-01 | Stop reason: HOSPADM

## 2021-12-01 RX ORDER — FUROSEMIDE 40 MG/1
20 TABLET ORAL DAILY
COMMUNITY

## 2021-12-01 RX ORDER — PHENYLEPHRINE HCL IN 0.9% NACL 0.4MG/10ML
SYRINGE (ML) INTRAVENOUS AS NEEDED
Status: DISCONTINUED | OUTPATIENT
Start: 2021-12-01 | End: 2021-12-01 | Stop reason: HOSPADM

## 2021-12-01 RX ORDER — SODIUM CHLORIDE 0.9 % (FLUSH) 0.9 %
5-40 SYRINGE (ML) INJECTION AS NEEDED
Status: DISCONTINUED | OUTPATIENT
Start: 2021-12-01 | End: 2021-12-01 | Stop reason: HOSPADM

## 2021-12-01 RX ORDER — EPHEDRINE SULFATE/0.9% NACL/PF 50 MG/5 ML
SYRINGE (ML) INTRAVENOUS AS NEEDED
Status: DISCONTINUED | OUTPATIENT
Start: 2021-12-01 | End: 2021-12-01 | Stop reason: HOSPADM

## 2021-12-01 RX ORDER — DEXTROMETHORPHAN/PSEUDOEPHED 2.5-7.5/.8
1.2 DROPS ORAL
Status: DISCONTINUED | OUTPATIENT
Start: 2021-12-01 | End: 2021-12-01 | Stop reason: HOSPADM

## 2021-12-01 RX ORDER — ATROPINE SULFATE 0.1 MG/ML
0.5 INJECTION INTRAVENOUS
Status: DISCONTINUED | OUTPATIENT
Start: 2021-12-01 | End: 2021-12-01 | Stop reason: HOSPADM

## 2021-12-01 RX ORDER — NALOXONE HYDROCHLORIDE 0.4 MG/ML
0.4 INJECTION, SOLUTION INTRAMUSCULAR; INTRAVENOUS; SUBCUTANEOUS
Status: DISCONTINUED | OUTPATIENT
Start: 2021-12-01 | End: 2021-12-01 | Stop reason: HOSPADM

## 2021-12-01 RX ORDER — LINAGLIPTIN 5 MG/1
5 TABLET, FILM COATED ORAL DAILY
COMMUNITY

## 2021-12-01 RX ORDER — PROPOFOL 10 MG/ML
INJECTION, EMULSION INTRAVENOUS AS NEEDED
Status: DISCONTINUED | OUTPATIENT
Start: 2021-12-01 | End: 2021-12-01 | Stop reason: HOSPADM

## 2021-12-01 RX ORDER — GLIMEPIRIDE 4 MG/1
8 TABLET ORAL
COMMUNITY

## 2021-12-01 RX ORDER — LIDOCAINE HYDROCHLORIDE 20 MG/ML
INJECTION, SOLUTION EPIDURAL; INFILTRATION; INTRACAUDAL; PERINEURAL AS NEEDED
Status: DISCONTINUED | OUTPATIENT
Start: 2021-12-01 | End: 2021-12-01 | Stop reason: HOSPADM

## 2021-12-01 RX ORDER — SODIUM CHLORIDE 0.9 % (FLUSH) 0.9 %
5-40 SYRINGE (ML) INJECTION EVERY 8 HOURS
Status: DISCONTINUED | OUTPATIENT
Start: 2021-12-01 | End: 2021-12-01 | Stop reason: HOSPADM

## 2021-12-01 RX ORDER — ATORVASTATIN CALCIUM 40 MG/1
40 TABLET, FILM COATED ORAL DAILY
COMMUNITY

## 2021-12-01 RX ORDER — TRIAMCINOLONE ACETONIDE 0.25 MG/ML
LOTION TOPICAL 2 TIMES DAILY
COMMUNITY
End: 2022-04-25

## 2021-12-01 RX ORDER — FOLIC ACID 1 MG/1
TABLET ORAL DAILY
COMMUNITY

## 2021-12-01 RX ORDER — METFORMIN HYDROCHLORIDE 500 MG/1
1000 TABLET ORAL 2 TIMES DAILY WITH MEALS
COMMUNITY

## 2021-12-01 RX ORDER — CHLORHEXIDINE GLUCONATE 1.2 MG/ML
15 RINSE ORAL EVERY 12 HOURS
COMMUNITY

## 2021-12-01 RX ORDER — FLUMAZENIL 0.1 MG/ML
0.2 INJECTION INTRAVENOUS
Status: DISCONTINUED | OUTPATIENT
Start: 2021-12-01 | End: 2021-12-01 | Stop reason: HOSPADM

## 2021-12-01 RX ORDER — EPINEPHRINE 0.1 MG/ML
1 INJECTION INTRACARDIAC; INTRAVENOUS
Status: DISCONTINUED | OUTPATIENT
Start: 2021-12-01 | End: 2021-12-01 | Stop reason: HOSPADM

## 2021-12-01 RX ORDER — SODIUM CHLORIDE 9 MG/ML
75 INJECTION, SOLUTION INTRAVENOUS CONTINUOUS
Status: DISCONTINUED | OUTPATIENT
Start: 2021-12-01 | End: 2021-12-01 | Stop reason: HOSPADM

## 2021-12-01 RX ORDER — TOPIRAMATE 200 MG/1
300 TABLET ORAL 2 TIMES DAILY
COMMUNITY

## 2021-12-01 RX ADMIN — SODIUM CHLORIDE 75 ML/HR: 9 INJECTION, SOLUTION INTRAVENOUS at 12:49

## 2021-12-01 RX ADMIN — Medication 40 MCG: at 13:12

## 2021-12-01 RX ADMIN — GLYCOPYRROLATE 0.2 MG: 0.2 INJECTION, SOLUTION INTRAMUSCULAR; INTRAVENOUS at 13:11

## 2021-12-01 RX ADMIN — PROPOFOL 40 MG: 10 INJECTION, EMULSION INTRAVENOUS at 13:01

## 2021-12-01 RX ADMIN — PROPOFOL 10 MG: 10 INJECTION, EMULSION INTRAVENOUS at 13:11

## 2021-12-01 RX ADMIN — Medication 10 MG: at 13:16

## 2021-12-01 RX ADMIN — PROPOFOL 10 MG: 10 INJECTION, EMULSION INTRAVENOUS at 13:03

## 2021-12-01 RX ADMIN — Medication 80 MCG: at 13:07

## 2021-12-01 RX ADMIN — PROPOFOL 80 MG: 10 INJECTION, EMULSION INTRAVENOUS at 12:59

## 2021-12-01 RX ADMIN — PROPOFOL 10 MG: 10 INJECTION, EMULSION INTRAVENOUS at 13:07

## 2021-12-01 RX ADMIN — Medication 80 MCG: at 13:10

## 2021-12-01 RX ADMIN — Medication 40 MCG: at 13:05

## 2021-12-01 RX ADMIN — LIDOCAINE HYDROCHLORIDE 20 MG: 20 INJECTION, SOLUTION EPIDURAL; INFILTRATION; INTRACAUDAL; PERINEURAL at 12:59

## 2021-12-01 RX ADMIN — Medication 80 MCG: at 13:13

## 2021-12-01 RX ADMIN — Medication 80 MCG: at 13:16

## 2021-12-01 RX ADMIN — PROPOFOL 10 MG: 10 INJECTION, EMULSION INTRAVENOUS at 13:13

## 2021-12-01 RX ADMIN — PROPOFOL 10 MG: 10 INJECTION, EMULSION INTRAVENOUS at 13:05

## 2021-12-01 RX ADMIN — PROPOFOL 10 MG: 10 INJECTION, EMULSION INTRAVENOUS at 13:09

## 2021-12-01 RX ADMIN — SODIUM CHLORIDE: 9 INJECTION, SOLUTION INTRAVENOUS at 12:51

## 2021-12-01 NOTE — ANESTHESIA POSTPROCEDURE EVALUATION
Procedure(s):  COLONOSCOPY  ENDOSCOPIC POLYPECTOMY. MAC    Anesthesia Post Evaluation      Multimodal analgesia: multimodal analgesia used between 6 hours prior to anesthesia start to PACU discharge  Patient location during evaluation: PACU  Level of consciousness: sleepy but conscious  Pain management: adequate  Airway patency: patent  Anesthetic complications: no  Cardiovascular status: acceptable  Respiratory status: acceptable  Hydration status: acceptable  Comments: +Post-Anesthesia Evaluation and Assessment    Patient: Hua Woods MRN: [id number]  SSN: [id number]   YOB: 1955  Age: 60 y.o. Sex: male      Cardiovascular Function/Vital Signs    /61   Pulse 81   Temp 36.7 °C (98 °F)   Resp 17   Ht 6' (1.829 m)   Wt 88.5 kg (195 lb)   SpO2 100%   BMI 26.45 kg/m²     Patient is status post Procedure(s):  COLONOSCOPY  ENDOSCOPIC POLYPECTOMY. Nausea/Vomiting: Controlled. Postoperative hydration reviewed and adequate. Pain:  Pain Scale 1: Numeric (0 - 10) (12/01/21 1345)  Pain Intensity 1: 0 (12/01/21 1345)   Managed. Neurological Status: At baseline. Mental Status and Level of Consciousness: Arousable. Pulmonary Status:   O2 Device: None (Room air) (12/01/21 1345)   Adequate oxygenation and airway patent. Complications related to anesthesia: None    Post-anesthesia assessment completed. No concerns. Signed By: Skye Palafox MD    12/1/2021  Post anesthesia nausea and vomiting:  controlled  Final Post Anesthesia Temperature Assessment:  Normothermia (36.0-37.5 degrees C)      INITIAL Post-op Vital signs:   Vitals Value Taken Time   /61 12/01/21 1345   Temp 36.7 °C (98 °F) 12/01/21 1336   Pulse 82 12/01/21 1347   Resp 16 12/01/21 1347   SpO2 100 % 12/01/21 1345   Vitals shown include unvalidated device data.

## 2021-12-01 NOTE — H&P
Gastroenterology Outpatient History and Physical    Patient: [facility]    Physician: Edmar Tomas MD    Chief Complaint: +Cologuard  History of Present Illness: No GI complaints    History:  Past Medical History:   Diagnosis Date    Diabetes ([facility]     Epilepsy ([facility]     Seizures ([facility]     History reviewed. No pertinent surgical history. Social History     Socioeconomic History    Marital status: SINGLE   Tobacco Use    Smoking status: Never Smoker    Smokeless tobacco: Never Used   Vaping Use    Vaping Use: Never used   Substance and Sexual Activity    Alcohol use: Never    Drug use: Never      Family History   Problem Relation Age of Onset    Hypertension Mother     Cancer Father         unknown    There is no problem list on file for this patient. Allergies: No Known Allergies  Medications:   Prior to Admission medications    Medication Sig Start Date End Date Taking? Authorizing Provider   topiramate (TOPAMAX) 200 mg tablet Take  by mouth two (2) times a day. Yes Provider, Historical   divalproex sodium (DEPAKOTE PO) Take 500 mg by mouth. Yes Provider, Historical   metFORMIN (GLUCOPHAGE) 500 mg tablet Take  by mouth two (2) times daily (with meals). Yes Provider, Historical   glimepiride (AMARYL) 4 mg tablet Take  by mouth every morning. Yes Provider, Historical   linaGLIPtin (Tradjenta) 5 mg tablet Take 5 mg by mouth daily. Yes Provider, Historical   pioglitazone (ACTOS) 15 mg tablet Take 45 mg by mouth. Yes Provider, Historical   folic acid (FOLVITE) 1 mg tablet Take  by mouth daily. Yes Provider, Historical   Calcium-Cholecalciferol, D3, 600 mg(1,500mg) -400 unit chew Take  by mouth. Yes Provider, Historical   furosemide (LASIX) 40 mg tablet Take  by mouth daily. Yes Provider, Historical   atorvastatin (Lipitor) 40 mg tablet Take 40 mg by mouth daily.    Yes Provider, Historical   chlorhexidine (PERIDEX) 0.12 % solution 15 mL by Swish and Spit route every twelve (12) hours. Yes Provider, Historical   triamcinolone acetonide 0.025 % lotion Apply  to affected area two (2) times a day. Yes Provider, Historical     Physical Exam:   Vital Signs: Blood pressure (!) 157/74, pulse 80, temperature 98.1 °F (36.7 °C), resp. rate 18, height 6' (1.829 m), weight 88.5 kg (195 lb), SpO2 99 %.   General: well developed, well nourished   HEENT: unremarkable   Heart: regular rhythm no mumur    Lungs: clear   Abdominal:  benign   Neurological: unremarkable   Extremities: no edema     Findings/Diagnosis: +Cologuard  Plan of Care/Planned Procedure: Colonoscopy with conscious/deep sedation    Signed:  Kimmy Chavis MD 12/1/2021

## 2021-12-01 NOTE — PROGRESS NOTES
Endoscope was pre-cleaned at the bedside immediately following procedure by Lacho Connor RN    Medications     glycopyrrolate 0.2 mg/mL (mg)     Date/Time Rate/Dose/Volume Action Route Admin User Audit       12/01/21  1311 0.2 mg Given IntraVENous Denver Shock, CRNA            lidocaine (PF) 2% (mg)     Date/Time Rate/Dose/Volume Action Route Admin User Audit       12/01/21  1259 20 mg Given IntraVENous Denver Shock, CRNA            propofol 10 mg/mL (mg)     Date/Time Rate/Dose/Volume Action Route Admin User Audit       12/01/21  1259 80 mg Given IntraVENous Denver Shock, CRNA       1301 40 mg Given IntraVENous Denver Shock, CRNA       1303 10 mg Given IntraVENous Denver Shock, CRNA       1305 10 mg Given IntraVENous Denver Shock, CRNA       1307 10 mg Given IntraVENous Denver Shock, CRNA       1309 10 mg Given IntraVENous Denver Shock, CRNA       1311 10 mg Given IntraVENous Denver Shock, CRNA       1313 10 mg Given IntraVENous Denver Shock, CRNA            ePHEDrine (MISTOLE) 50 mg/mL injection (mg)     Date/Time Rate/Dose/Volume Action Route Admin User Audit       12/01/21  1316 10 mg Given IntraVENous Denver Shock, CRNA            PHENYLephrine (NEOSYNEPHRINE) in NS syringe (mcg)     Date/Time Rate/Dose/Volume Action Route Admin User Audit       12/01/21  1305 40 mcg Given IntraVENous Denver Shock, CRNA       1307 80 mcg Given IntraVENous Denver Shock, CRNA       1310 80 mcg Given IntraVENous Denver Shock, CRNA       1312 40 mcg Given IntraVENous Denver Shock, CRNA       1313 80 mcg Given IntraVENous Denver Shock, CRNA       1316 80 mcg Given IntraVENous Denver Shock, CRNA            0.9% sodium chloride infusion (mL)     Date/Time Rate/Dose/Volume Action Route Admin User Audit       12/01/21  1250  Paused  Den[place] Shock, CRNA      Comment: Switch to gravity       1251  New Bag IntraVENous Aidan Weaver, ARIAN edited       400 mL Anesthesia Volume Adjustment IntraVENous Aidan Weaver, ARIAN       1310 200 mL Anesthesia Volume Adjustment IntraVENous Aidan Weaver CRNA                .

## 2021-12-01 NOTE — PROCEDURES
NAME:  Tania Jacobo   :   1955   MRN:   [id number]     Date/Time:  2021 1:22 PM    Colonoscopy Operative Report    Procedure Type:   Colonoscopy with biopsy, polypectomy (cold snare), polypectomy (cold biopsy)     Indications:     Occult blood in stool  Pre-operative Diagnosis: see indication above  Post-operative Diagnosis:  See findings below  :  Bear Garner MD  Referring Provider: --Parris Livingston MD    Exam:  Airway: clear, no airway problems anticipated  Heart: RRR, without gallops or rubs  Lungs: clear bilaterally without wheezes, crackles, or rhonchi  Abdomen: soft, nontender, nondistended, bowel sounds present  Mental Status: awake, alert and oriented to person, place and time    Sedation:  MAC anesthesia Propofol  Procedure Details:  After informed consent was obtained with all risks and benefits of procedure explained and preoperative exam completed, the patient was taken to the endoscopy suite and placed in the left lateral decubitus position. Upon sequential sedation as per above, a digital rectal exam was performed demonstrating internal hemorrhoids. The Olympus videocolonoscope  was inserted in the rectum and carefully advanced to the cecum, which was identified by the ileocecal valve and appendiceal orifice. The quality of preparation was good. The colonoscope was slowly withdrawn with careful evaluation between folds. Retroflexion in the rectum was completed demonstrating internal hemorrhoids. Findings:  1. Numerous sessile polyps (>30) ranging in size from 3 mm to 15 mm through colon. Some removed with cold snare, and multiple sampled with biopsies  2. Erythema in cecum. Biopsied  3. Medium sized internal hemorrhoid seen on retroflexion  4. Otherwise normal colonoscopy through to the cecum    Specimen Removed:  1. Transverse polyps 2. Ascending colon polyp 3. Cecal biopsy 4. Ascending biopsies 5. Left colon biopsy  Complications: None.    EBL: None.    Impression:   1. Numerous sessile polyps (>40) ranging in size from 3 mm to 15 mm through colon. Some removed with cold snare, and multiple sampled with biopsies  2. Erythema in cecum. Biopsied  3. Medium sized internal hemorrhoid seen on retroflexion  4. Otherwise normal colonoscopy through to the cecum    Attenuated FAP is suspcted    Recommendations:   1. Follow up pathology  2. If biopsies consistent with adenomas will need genetic testing and referral for colectomy    Discharge Disposition:  Home in the company of a  when able to ambulate.       Fuentes Vasquez MD

## 2021-12-01 NOTE — DISCHARGE INSTRUCTIONS
Prince Mcfadden  [id number]  1955    COLON DISCHARGE INSTRUCTIONS  Discomfort:  Redness at IV site- apply warm compress to area; if redness or soreness persist- contact your physician  There may be a slight amount of blood passed from the rectum  Gaseous discomfort- walking, belching will help relieve any discomfort  You may not operate a vehicle for 12 hours  You may not engage in an occupation involving machinery or appliances for rest of today  You may not drink alcoholic beverages for at least 12 hours  Avoid making any critical decisions for at least 24 hour  DIET:   Regular diet. - however -  remember your colon is empty and a heavy meal will produce gas. Avoid these foods:  vegetables, fried / greasy foods, carbonated drinks for today  MEDICATION:  Per Medication Reconciliation       ACTIVITY:  You may not resume your normal daily activities until tomorrow AM; it is recommended that you spend the remainder of the day resting -  avoid any strenuous activity. CALL M.D. ANY SIGN OF:   Increasing pain, nausea, vomiting  Abdominal distension (swelling)  New increased bleeding (oral or rectal)  Fever (chills)  Pain in chest area  Bloody discharge from nose or mouth  Shortness of breath    You may not  take any Advil, Aspirin, Ibuprofen, Motrin, Aleve, or Goodys for 10 days, ONLY  Tylenol as needed for pain. IMPRESSION:  Impression:   1. Numerous sessile polyps (>40) ranging in size from 3 mm to 15 mm through colon. Some removed with cold snare, and multiple sampled with biopsies  2. Erythema in cecum. Biopsied  3. Medium sized internal hemorrhoid seen on retroflexion  4. Otherwise normal colonoscopy through to the cecum    Attenuated FAP is suspcted    Recommendations:   1. Follow up pathology  2.  If biopsies consistent with adenomas will need genetic testing and referral for colectomy    Follow-up Instructions:   Call Dr. Rafael Vazquez for the results of procedure / biopsy in 7-10 days  Telephone # [phone]    You Robert MD

## 2021-12-01 NOTE — ANESTHESIA PREPROCEDURE EVALUATION
Relevant Problems   No relevant active problems       Anesthetic History   No history of anesthetic complications            Review of Systems / Medical History  Patient summary reviewed, nursing notes reviewed and pertinent labs reviewed    Pulmonary  Within defined limits                 Neuro/Psych     seizures: well controlled         Cardiovascular  Within defined limits                Exercise tolerance: <4 METS     GI/Hepatic/Renal  Within defined limits              Endo/Other    Diabetes: type 2         Other Findings   Comments: Neuropathy         Physical Exam    Airway  Mallampati: I  TM Distance: 4 - 6 cm  Neck ROM: normal range of motion   Mouth opening: Normal     Cardiovascular  Regular rate and rhythm,  S1 and S2 normal,  no murmur, click, rub, or gallop  Rhythm: regular  Rate: normal         Dental  No notable dental hx       Pulmonary  Breath sounds clear to auscultation               Abdominal  GI exam deferred       Other Findings            Anesthetic Plan    ASA: 3  Anesthesia type: MAC          Induction: Intravenous  Anesthetic plan and risks discussed with: Patient

## 2021-12-09 ENCOUNTER — TELEPHONE (OUTPATIENT)
Dept: ONCOLOGY | Age: 66
End: 2021-12-09

## 2021-12-09 NOTE — TELEPHONE ENCOUNTER
Referred for genetic counseling due to history of > 30 polyps. Reached out via phone  Patient with intellectual disabilities  Spoke with caregiver   Confirmed using two patient identifiers  Briefly reviewed rationale for genetic counseling  Concern expressed regarding transportation for genetic consult. Patient relies on 80 yr old mother and aide for transportation to and from appointments  Discussed option of counseling performed over zoom or telephone or done at [address] when patient has another visit (coordinate on same day). Will call patient next week to discuss in more detail when they are able to devote more time to discuss and review options. Leaning towards counseling via zoom or telehealth/phone and sending kit in mail and then meeting for results  Caregiver given time to ask quesitons  All questions answered. The patient communicated an understanding of and agreement with the plan. Silvia Garner MSN RN OCN ANP-BC ACGN  GI Oncology Nurse Navigator  Advanced Clinical Genetic Nurse [facility])

## 2021-12-20 ENCOUNTER — NURSE NAVIGATOR (OUTPATIENT)
Dept: ONCOLOGY | Age: 66
End: 2021-12-20

## 2021-12-20 NOTE — PROGRESS NOTES
PRE TEST GENETIC COUNSELING    Name: Mauro Eaton  :1955  Referring Provider: Dr. Rona HANEY  Mr. Sandra Carney is a 52-year-old  male who presents to the office today with his younger brother and mother for genetic counseling. He is believed to be at high risk for hereditary cancer/hereditary polyposis syndrome due to his personal medical history. Mr. Romeo Eaton underwent a colonoscopy with biopsy and polypectomy on 2021 due to occult blood in his stool. The patient is developmentally delayed. This was his first colonoscopy to date. Findings revealed numerous sessile polyps ( > 40) ranging in size from 3 mm to 15 mm through the colon. Biopsy was performed on a few of these polyps. Pathology demonstrated  o Transverse colon polypectomy  o Tubular adenoma  o Ascending colon polypectomy   o Sessile serrated polyp, Tubular adenoma (multiple fragments)  o Cecum  o Focally active colitis with increase lamina propria eosinophils  o Left Colon   o Tubular adenoma    Family History  o Father lung cancer, heavy smoker  o Paternal uncle, lung cancer, heavy smoker  o Younger brother, Carmela Abebe, alive and well  o Mother, 80years old, alive and well    Risk Factors  o Never smoker  o Has never drank alcohol    Assessment and Plan:      The patient is developmentally delayed and presents with his mother and brother to discuss genetic testing. His mother and brother are supporting him today in understanding the basics of our discussion. In addition, these individuals are responsible for his medical care, and thus I performed pre-test genetic counseling with all three present and active in the discussion. As part of the visit, a three-generation pedigree was performed and reviewed with the patient and patients family. We reviewed the rationale for genetic testing considering his personal history of > 40 colon polyps. We discussed the concept of hereditary polyposis syndromes.  We reviewed the risks and benefits of genetic testing as well as discussing general genetic concepts. We reviewed possible results of testing. The patient and his family are deciding about management for his recent colonoscopy findings > 40 colon polyps. The patients mother and brother expressed that they felt that genetic test results would likely help them with the decision-making process. They expressed an understanding that due to the risk for developing cancer it was likely that surgical management for the > 40 colon polyps would still be recommended for this patient regardless of genetic test results. We also discussed implications for family members and the concept of cascade testing. We reviewed insurance issues with the test, and discussed the patient pay option if OOP cost is > $250. I explained I did not believe OOP cost will be over this amount. The patients mother who is his primary caretaker does not have email or internet and does not utilize a mobile phone. I explained that I would perform a prior auth through [employer] and help determine OOP and follow up with the patient if it was > than the self-pay option of $250. They expressed a desire to proceed with genetic testing as recommended by his GI physician team (colorectal surgeon and gastroenterologist). A saliva sample was collected and sent to [facility] labs. A hereditary colorectal cancer panel was selected because it covers the main genes involved in polyposis syndromes and family history did not illuminate other possible hereditary cancer syndromes (the two paternal relatives with cancer were heavy smokers and developed lung cancer). We discussed that some gene mutations are associated with polyposis and developmental delay/intellectual disabilities. We discussed that it is possible to examine genes related solely to developmental delay.  We elected not to specifically pursue genetic testing for intellectual disabilities because the primary goal for genetic testing for this patient is to provide information for medical management for his colonoscopy findings. The family expressed a desire to keep the focus on genes associated with polyposis/hereditary colon cancers. We made a plan that we would meet again in person when test results are back in around 3 weeks. The patient is getting a second opinion and will return to Dr. Gal Leonard office January 12. We discussed that results will likely be back by this visit and that I will get in touch with Wolf Zurita as soon as results are available. The patient and family were provided with given educational material to take home as well as my contact information. The patient and family were given time to ask questions. All questions answered. They communicated an understanding of an agreement with the plan. Total patient contact and counseling time is 65 minutes '    Silvia Garner MSN RN ANP-BC OCN ACGN  GI Cancer Nurse Navigator  Advanced Clinical Genetics Nurse

## 2022-01-01 ENCOUNTER — TELEPHONE (OUTPATIENT)
Dept: INTERNAL MEDICINE CLINIC | Age: 67
End: 2022-01-01

## 2022-01-01 ENCOUNTER — VIRTUAL VISIT (OUTPATIENT)
Dept: INTERNAL MEDICINE CLINIC | Age: 67
End: 2022-01-01
Payer: MEDICARE

## 2022-01-01 ENCOUNTER — OFFICE VISIT (OUTPATIENT)
Dept: INTERNAL MEDICINE CLINIC | Age: 67
End: 2022-01-01
Payer: MEDICARE

## 2022-01-01 VITALS
DIASTOLIC BLOOD PRESSURE: 72 MMHG | HEIGHT: 72 IN | HEART RATE: 89 BPM | RESPIRATION RATE: 12 BRPM | OXYGEN SATURATION: 98 % | BODY MASS INDEX: 27.09 KG/M2 | WEIGHT: 200 LBS | TEMPERATURE: 97.8 F | SYSTOLIC BLOOD PRESSURE: 134 MMHG

## 2022-01-01 DIAGNOSIS — G40.909 SEIZURE DISORDER (HCC): ICD-10-CM

## 2022-01-01 DIAGNOSIS — D69.6 THROMBOCYTOPENIA (HCC): ICD-10-CM

## 2022-01-01 DIAGNOSIS — K74.60 HEPATIC CIRRHOSIS, UNSPECIFIED HEPATIC CIRRHOSIS TYPE, UNSPECIFIED WHETHER ASCITES PRESENT (HCC): ICD-10-CM

## 2022-01-01 DIAGNOSIS — R26.89 OTHER ABNORMALITIES OF GAIT AND MOBILITY: ICD-10-CM

## 2022-01-01 DIAGNOSIS — E55.9 VITAMIN D DEFICIENCY: ICD-10-CM

## 2022-01-01 DIAGNOSIS — R39.81 FUNCTIONAL URINARY INCONTINENCE: ICD-10-CM

## 2022-01-01 DIAGNOSIS — Z78.9 ADVANCE DIRECTIVE ON FILE: ICD-10-CM

## 2022-01-01 DIAGNOSIS — E78.2 MIXED HYPERLIPIDEMIA: ICD-10-CM

## 2022-01-01 DIAGNOSIS — E11.42 TYPE 2 DIABETES MELLITUS WITH DIABETIC POLYNEUROPATHY, WITHOUT LONG-TERM CURRENT USE OF INSULIN (HCC): ICD-10-CM

## 2022-01-01 DIAGNOSIS — R35.0 URINARY FREQUENCY: Primary | ICD-10-CM

## 2022-01-01 DIAGNOSIS — D46.9 MYELODYSPLASIA (MYELODYSPLASTIC SYNDROME) (HCC): ICD-10-CM

## 2022-01-01 DIAGNOSIS — Z00.00 WELCOME TO MEDICARE PREVENTIVE VISIT: Primary | ICD-10-CM

## 2022-01-01 DIAGNOSIS — I10 HYPERTENSION, ESSENTIAL: ICD-10-CM

## 2022-01-01 DIAGNOSIS — K63.5 COLON POLYPOSIS: ICD-10-CM

## 2022-01-01 DIAGNOSIS — G63 POLYNEUROPATHY ASSOCIATED WITH UNDERLYING DISEASE (HCC): ICD-10-CM

## 2022-01-01 DIAGNOSIS — Z13.31 SCREENING FOR DEPRESSION: ICD-10-CM

## 2022-01-01 DIAGNOSIS — E11.42 TYPE 2 DIABETES MELLITUS WITH DIABETIC POLYNEUROPATHY, WITHOUT LONG-TERM CURRENT USE OF INSULIN (HCC): Primary | ICD-10-CM

## 2022-01-01 DIAGNOSIS — L57.0 KERATOSIS: ICD-10-CM

## 2022-01-01 LAB
25(OH)D3+25(OH)D2 SERPL-MCNC: 29.1 NG/ML (ref 30–100)
ALBUMIN SERPL-MCNC: 3 G/DL (ref 3.8–4.8)
ALBUMIN/GLOB SERPL: 1 {RATIO} (ref 1.2–2.2)
ALP SERPL-CCNC: 67 IU/L (ref 44–121)
ALT SERPL-CCNC: 8 IU/L (ref 0–44)
AST SERPL-CCNC: 10 IU/L (ref 0–40)
BASOPHILS # BLD AUTO: 0.2 X10E3/UL (ref 0–0.2)
BASOPHILS NFR BLD AUTO: 1 %
BILIRUB SERPL-MCNC: 0.3 MG/DL (ref 0–1.2)
BILIRUB UR QL STRIP: NEGATIVE
BUN SERPL-MCNC: 23 MG/DL (ref 8–27)
BUN/CREAT SERPL: 31 (ref 10–24)
CALCIUM SERPL-MCNC: 8.6 MG/DL (ref 8.6–10.2)
CHLORIDE SERPL-SCNC: 107 MMOL/L (ref 96–106)
CHOLEST SERPL-MCNC: 92 MG/DL (ref 100–199)
CO2 SERPL-SCNC: 19 MMOL/L (ref 20–29)
CREAT SERPL-MCNC: 0.74 MG/DL (ref 0.76–1.27)
EGFR: 101 ML/MIN/1.73
EOSINOPHIL # BLD AUTO: 1.1 X10E3/UL (ref 0–0.4)
EOSINOPHIL NFR BLD AUTO: 7 %
ERYTHROCYTE [DISTWIDTH] IN BLOOD BY AUTOMATED COUNT: 13 % (ref 11.6–15.4)
EST. AVERAGE GLUCOSE BLD GHB EST-MCNC: 108 MG/DL
FOLATE SERPL-MCNC: >20 NG/ML
GLOBULIN SER CALC-MCNC: 3 G/DL (ref 1.5–4.5)
GLUCOSE SERPL-MCNC: 106 MG/DL (ref 65–99)
GLUCOSE UR-MCNC: NEGATIVE MG/DL
HBA1C MFR BLD: 5.4 % (ref 4.8–5.6)
HCT VFR BLD AUTO: 13.7 % (ref 37.5–51)
HDLC SERPL-MCNC: 23 MG/DL
HGB BLD-MCNC: 4.4 G/DL (ref 13–17.7)
IMMATURE CELLS, 115398: ABNORMAL
KETONES P FAST UR STRIP-MCNC: NEGATIVE MG/DL
LDLC SERPL CALC-MCNC: 49 MG/DL (ref 0–99)
LYMPHOCYTES # BLD AUTO: 10.6 X10E3/UL (ref 0.7–3.1)
LYMPHOCYTES NFR BLD AUTO: 65 %
MCH RBC QN AUTO: 40.7 PG (ref 26.6–33)
MCHC RBC AUTO-ENTMCNC: 32.1 G/DL (ref 31.5–35.7)
MCV RBC AUTO: 127 FL (ref 79–97)
METAMYELOCYTES NFR BLD: 3 % (ref 0–0)
MONOCYTES # BLD AUTO: 0.3 X10E3/UL (ref 0.1–0.9)
MONOCYTES NFR BLD AUTO: 2 %
MORPHOLOGY BLD-IMP: ABNORMAL
MYELOCYTES NFR BLD: 2 % (ref 0–0)
NEUTROPHILS # BLD AUTO: 3.3 X10E3/UL (ref 1.4–7)
NEUTROPHILS NFR BLD AUTO: 20 %
NRBC BLD AUTO-RTO: 1 % (ref 0–0)
PH UR STRIP: 7.5 [PH] (ref 4.6–8)
PLATELET # BLD AUTO: 56 X10E3/UL (ref 150–450)
POTASSIUM SERPL-SCNC: 4.8 MMOL/L (ref 3.5–5.2)
PROT SERPL-MCNC: 6 G/DL (ref 6–8.5)
PROT UR QL STRIP: NEGATIVE
RBC # BLD AUTO: 1.08 X10E6/UL (ref 4.14–5.8)
SODIUM SERPL-SCNC: 141 MMOL/L (ref 134–144)
SP GR UR STRIP: 1.02 (ref 1–1.03)
TRIGL SERPL-MCNC: 106 MG/DL (ref 0–149)
UA UROBILINOGEN AMB POC: NORMAL (ref 0.2–1)
URINALYSIS CLARITY POC: CLEAR
URINALYSIS COLOR POC: YELLOW
URINE BLOOD POC: NEGATIVE
URINE LEUKOCYTES POC: NEGATIVE
URINE NITRITES POC: NEGATIVE
VIT B12 SERPL-MCNC: 480 PG/ML (ref 232–1245)
VLDLC SERPL CALC-MCNC: 20 MG/DL (ref 5–40)
WBC # BLD AUTO: 16.3 X10E3/UL (ref 3.4–10.8)

## 2022-01-01 PROCEDURE — G8427 DOCREV CUR MEDS BY ELIG CLIN: HCPCS | Performed by: INTERNAL MEDICINE

## 2022-01-01 PROCEDURE — G8510 SCR DEP NEG, NO PLAN REQD: HCPCS | Performed by: INTERNAL MEDICINE

## 2022-01-01 PROCEDURE — 99443 PR PHYS/QHP TELEPHONE EVALUATION 21-30 MIN: CPT | Performed by: INTERNAL MEDICINE

## 2022-01-01 PROCEDURE — G8752 SYS BP LESS 140: HCPCS | Performed by: INTERNAL MEDICINE

## 2022-01-01 PROCEDURE — G8432 DEP SCR NOT DOC, RNG: HCPCS | Performed by: INTERNAL MEDICINE

## 2022-01-01 PROCEDURE — 99214 OFFICE O/P EST MOD 30 MIN: CPT | Performed by: INTERNAL MEDICINE

## 2022-01-01 PROCEDURE — G8420 CALC BMI NORM PARAMETERS: HCPCS | Performed by: INTERNAL MEDICINE

## 2022-01-01 PROCEDURE — 3017F COLORECTAL CA SCREEN DOC REV: CPT | Performed by: INTERNAL MEDICINE

## 2022-01-01 PROCEDURE — G8419 CALC BMI OUT NRM PARAM NOF/U: HCPCS | Performed by: INTERNAL MEDICINE

## 2022-01-01 PROCEDURE — G8536 NO DOC ELDER MAL SCRN: HCPCS | Performed by: INTERNAL MEDICINE

## 2022-01-01 PROCEDURE — G8754 DIAS BP LESS 90: HCPCS | Performed by: INTERNAL MEDICINE

## 2022-01-01 PROCEDURE — G0402 INITIAL PREVENTIVE EXAM: HCPCS | Performed by: INTERNAL MEDICINE

## 2022-01-01 PROCEDURE — 81003 URINALYSIS AUTO W/O SCOPE: CPT | Performed by: INTERNAL MEDICINE

## 2022-01-01 PROCEDURE — 1101F PT FALLS ASSESS-DOCD LE1/YR: CPT | Performed by: INTERNAL MEDICINE

## 2022-01-01 RX ORDER — LANCETS
EACH MISCELLANEOUS
Qty: 100 EACH | Refills: 3 | Status: SHIPPED | OUTPATIENT
Start: 2022-01-01

## 2022-01-01 RX ORDER — IBUPROFEN 200 MG
CAPSULE ORAL
Qty: 100 STRIP | Refills: 3 | Status: SHIPPED | OUTPATIENT
Start: 2022-01-01

## 2022-01-01 RX ORDER — INSULIN PUMP SYRINGE, 3 ML
EACH MISCELLANEOUS
Qty: 1 KIT | Refills: 0 | Status: SHIPPED | OUTPATIENT
Start: 2022-01-01

## 2022-01-01 RX ORDER — TRIAMCINOLONE ACETONIDE 1 MG/G
OINTMENT TOPICAL 2 TIMES DAILY
Qty: 80 G | Refills: 3 | Status: SHIPPED | OUTPATIENT
Start: 2022-01-01

## 2022-01-03 ENCOUNTER — NURSE NAVIGATOR (OUTPATIENT)
Dept: ONCOLOGY | Age: 67
End: 2022-01-03

## 2022-01-03 NOTE — PROGRESS NOTES
Mr. Dominique Chiu is a 73year old male who underwent multi gene panel genetic testing based on a personal history of numerous colon polyps. After meeting for pre test counseling with the patient and his family, germline genetic testing was performed through [facility] laboratories and examined 30 genes associated with increased risk for colorectal polyps / colorectal cancers. This included the following genes: APC, JOCELYN, AXIN2, BLM, BMPR1A, BUB1B, CDH1, CEP57, CHEK2, ENG, EPCAM, FLCN, GALNT12, GREM1, MLH1, MLH3, MSH2, MSH3, MSH6, MUTYH, NTHL1, PMS2, POLD1, POLE, PTEN, RNF43, RPS20, SMAD4, STK11, TP53    The patient has one younger brother. His mother is in her 80s and is his primary caretaker. The patient is developmentally delayed. He does not have children. He has met with Wolf Zamora and Pat Watson at [facility]. The family is concerned about his ability to recover from surgery and has recently sought a second opinion regarding management options. The family was curious as to whether test results might help sway them in a particular direction regarding surgery. As reviewed on the phone today, the patient's genetic test results reveal    He did not inherit a known harmful genetic mutation associated with increased cancer or polyp risk in any of the 30 genes examined in your testing. 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 This change or variant was found on one copy of the MSH6 gene (not both copies of MSH6). 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 At present, there is not enough evidence to determine the role of this MSH6 variant in cancer risk. It might be a complete harmless finding or normal genetic variation.       Regarding his test findings and follow up:    1. The clinical significance (whether the VUS raises cancer risk) is uncertain. 2. Not all VUS findings are associated with increased risk  3. Until more information is known about this MSH6 variant, caution should be exercised before using this result to inform clinical management decisions. 4. These VUS findings may or may not contribute to the patient's history of colorectal polyps. 5. Instructed to check with GI care team every 6-12 months about whether any updates for this VUS findings have occurred. 6. Instructions given about [employer] patient portal to check for updates on a regular basis   7. Follow up GI medical care should be based on personal medical history. 8. It is always possible that an inherited genetic mutation contributed to your diagnosis, but researchers have yet to discover the gene in question. We encourage you to stay in touch with your physician team and inquire about any updates regarding cancer genetics  9. I will share a copy of this report with his GI team, Wolf Chino and Nury Rowland. 10. I will send a copy of the report to the patient via mail. 11. Encouraged healthy lifestyle factors for reducing cancer risk, including avoiding smoking, limiting alcohol/alcohol in moderation, regular, consistent exercise, healthy diet, and wearing sunscreen etc.  12. Regarding Family Members: Encouraged family members to share information about family history with their physicians, including a family history of colon polyps. His brother is encouraged to continue close follow-up with his GI physician. Most important, because of his personal history of numerous colon polyps, patient was instructed to follow-up with his GI doctors. Patient's mother shared that they might be getting a second opinion from another practice recommended by a family friend. I encouraged her to keep his January 12 appointment with Dr. Nury Rowland to ensure that nothing falls through the cracks.  She understands that a second opinion may come to the same conclusions and recommend similar management plan as Dr. Alyse Rodriguez practice. Mother stated that she would feel more certainty / confident in decision making following a second opinion. I explained that Individuals can still develop cancer even in situations where no pathogenic genetic variant is discovered on genetic testing. The patient was given time to ask quesitons  All questions answered. They communicated an understanding of and agreement with the plan. Silvia Garner MSN RN OCN ANP-BC ACGN  GI Oncology Nurse Navigator  Advanced Clinical Genetic Nurse [facility])

## 2022-01-04 NOTE — PROGRESS NOTES
[address]  GI Oncology Nurse Navigator Encounter  FOLLOW UP  Name: Mandy Ritter  Age: 73 y.o.  : 1955  Diagnosis: Numerous colon polyps    Shared with GI   There is the option of RNA studies to help tease out the MSH6 variant, but that might be asking a lot of the patient right now. This is a blood test and phlebotomy can be sent directly to their home. Presently tthey are staying away from the outside world [facility]. And I am not sure if RNA testing would change their management or their decisions. Consider sending RNA insight if it is felt patient's decisions surrounding his care would change significantly with this testing. Silvia Garner MSN RN OCN ANP-BC  GI Oncology Nurse Navigator  Advanced Clinical Genetics Nurse [phone])  [address] [facility] GI Cancer Clinical Program Coorinator/    [facility]  [facility]  [address] [place]  (Office Phone) [phone]  (WBK) [phone]        [facility] to Those in [facility]

## 2022-01-10 PROBLEM — K63.5 COLON POLYPOSIS: Status: ACTIVE | Noted: 2022-01-01

## 2022-01-10 PROBLEM — E55.9 VITAMIN D DEFICIENCY: Status: ACTIVE | Noted: 2022-01-01

## 2022-01-10 NOTE — PROGRESS NOTES
This is the Subsequent Medicare Annual Wellness Exam, performed 12 months or more after the Initial AWV or the last Subsequent AWV    I have reviewed the patient's medical history in detail and updated the computerized patient record. Assessment/Plan   Education and counseling provided:  Are appropriate based on today's review and evaluation  End-of-Life planning (with patient's consent)    1. Welcome to Medicare preventive visit  2. Screening for depression  -     DEPRESSION SCREEN ANNUAL       Depression Risk Factor Screening:     3 most recent PHQ Screens 1/10/2022   Little interest or pleasure in doing things Not at all   Feeling down, depressed, irritable, or hopeless Not at all   Total Score PHQ 2 0   Trouble falling or staying asleep, or sleeping too much -   Feeling tired or having little energy -   Poor appetite, weight loss, or overeating -   Feeling bad about yourself - or that you are a failure or have let yourself or your family down -   Trouble concentrating on things such as school, work, reading, or watching TV -   Moving or speaking so slowly that other people could have noticed; or the opposite being so fidgety that others notice -   Thoughts of being better off dead, or hurting yourself in some way -   PHQ 9 Score -   How difficult have these problems made it for you to do your work, take care of your home and get along with others -       Alcohol & Drug Abuse Risk Screen    Do you average more than 1 drink per night or more than 7 drinks a week: No    In the past three months have you have had more than 4 drinks containing alcohol on one occasion: No          Functional Ability and Level of Safety:    Hearing: Hearing is good. Activities of Daily Living: The home contains: no safety equipment.   ADL Assessment 1/10/2022   Feeding yourself No Help Needed   Getting from bed to chair Help Needed   Getting dressed Help Needed   Bathing or showering Help Needed   Walk across the room (includes cane/walker) Help Needed   Using the telphone Help Needed   Taking your medications Help Needed   Preparing meals Help Needed   Managing money (expenses/bills) Help Needed   Moderately strenuous housework (laundry) Help Needed   Shopping for personal items (toiletries/medicines) Help Needed   Shopping for groceries Help Needed   Driving Help Needed   Climbing a flight of stairs Help Needed   Getting to places beyond walking distances Help Needed      Ambulation: with no difficulty     Fall Risk:  Fall Risk Assessment, last 12 mths 1/10/2022   Able to walk? Yes   Fall in past 12 months? 0   Do you feel unsteady? 0   Are you worried about falling 0   Is TUG test greater than 12 seconds? -   Is the gait abnormal? -   Number of falls in past 12 months -   Fall with injury?  -      Abuse Screen:  Patient is not abused       Cognitive Screening    Has your family/caregiver stated any concerns about your memory: no    Cognitive Screening: has unchanged intellectual disability    Health Maintenance Due     Health Maintenance Due   Topic Date Due    COVID-19 Vaccine (3 - Booster for Moderna series) 10/21/2021    Foot Exam Q1  11/18/2021    Lipid Screen  12/14/2021       Patient Care Team   Patient Care Team:  Vee Servin MD as PCP - General (Internal Medicine)  Vee Servin MD as PCP - [address] [place] [address] [facility]  Louise Ayala MD (Hematology and Oncology)  Chandler Le MD (Dermatology)  Stephanie Hicks MD (Ophthalmology)  Eleno Leyva MD (Neurology)    History     Patient Active Problem List   Diagnosis Code    Intellectual disability F68    Seizure disorder ([facility] G40.909    Psoriasis L40.9    Venous stasis of lower extremity I87.8    Thrombocytopenia ([address] D69.6    Hypertension, essential I10    HLD (hyperlipidemia) E78.5    Type 2 diabetes mellitus with diabetic polyneuropathy, without long-term current use of insulin (HCC) E11.42    Myelodysplasia (myelodysplastic syndrome) ([address] D46.9    Cirrhosis ([facility] K74.60    Lumbar stenosis M48.061     Past Medical History:   Diagnosis Date    Contact dermatitis and other eczema, due to unspecified cause     psoriasis    Diabetes ([facility]     Eosinophilia     Hypercholesterolemia     Hypertension     Lower extremity weakness 11/28/2020    Mental retardation     Pneumonia due to COVID-19 virus 1/27/2021    Seizures ([facility]     Skin cancer     Sleep disorder 8/23/2012    Episode of screaming as if he is terrified, lasts about one minutes, no memory of it , frequency once a months, duration  For > 20 years     Strongyloides stercoralis infection 8/28/2014      Past Surgical History:   Procedure Laterality Date    ENDOSCOPY, COLON, DIAGNOSTIC  11/08/2007    Dr Modesta Al normal except small internal hemorrhoids repeat 11/2017    HX COLONOSCOPY  12/02/2021    Dr. Didi Wiggins. Multiple sessile polyps (>30). Medium-sized int hemorrhoids. Current Outpatient Medications   Medication Sig Dispense Refill    furosemide (LASIX) 40 mg tablet TAKE 1/2 TABLET BY MOUTH DAILY 45 Tablet 3    topiramate (TOPAMAX) 200 mg tablet TAKE 1 & 1/2 TABLETS BY MOUTH TWICE DAILY 270 Tablet 1    divalproex DR (DEPAKOTE) 500 mg tablet TAKE ONE TABLET BY MOUTH EVERY MORNING AND TWO TABLETS AT NIGHT 270 Tablet 3    pioglitazone (ACTOS) 45 mg tablet TAKE ONE TABLET BY MOUTH ONE TIME DAILY  90 Tablet 3    atorvastatin (LIPITOR) 40 mg tablet TAKE ONE TABLET BY MOUTH ONCE EVERY EVENING 90 Tablet 3    glimepiride (AMARYL) 4 mg tablet TAKE TWO TABLETS BY MOUTH IN THE MORNING 214 Tablet 3    folic acid (FOLVITE) 1 mg tablet TAKE ONE TABLET BY MOUTH ONE TIME DAILY  90 Tablet 3    Tradjenta 5 mg tablet TAKE ONE TABLET BY MOUTH ONE TIME DAILY  90 Tablet 3    metFORMIN ER (GLUCOPHAGE XR) 500 mg tablet TAKE TWO TABLETS BY MOUTH TWICE DAILY  360 Tablet 3    triamcinolone acetonide (KENALOG) 0.1 % ointment Apply  to affected area two (2) times a day. Use thin layer. For dry spots on legs.  30 g 0  Calcium-Cholecalciferol, D3, (CALTRATE-600 PLUS VITAMIN D3) 600-400 mg-unit Tab Take 1 Tab by mouth daily. No Known Allergies    Family History   Problem Relation Age of Onset    Other Mother         PMR    Hypertension Mother     Cancer Father         Lung    Diabetes Brother      Social History     Tobacco Use    Smoking status: Never Smoker    Smokeless tobacco: Never Used   Substance Use Topics    Alcohol use: No       Royer Round, was evaluated through a synchronous (real-time) audio-video encounter. The patient (or guardian if applicable) is aware that this is a billable service. Verbal consent to proceed has been obtained within the past 12 months. The visit was conducted pursuant to the emergency declaration under the [facility], [address] [place] [address] authority and the [employer]) General Act. Patient identification was verified, and a caregiver was present when appropriate. The patient was located in a state where the provider was credentialed to provide care.        Brooks Whitten MD

## 2022-01-10 NOTE — ACP (ADVANCE CARE PLANNING)
Due to intellectual disability, he is not able to understand enough to execute advance directive. His mother has medical power of  with secondary decision makers being brothers Darren Rojas and Tequila Back. There is a POST document on file.

## 2022-01-10 NOTE — PATIENT INSTRUCTIONS
Medicare Wellness Visit, Male    The best way to live healthy is to have a lifestyle where you eat a well-balanced diet, exercise regularly, limit alcohol use, and quit all forms of tobacco/nicotine, if applicable. Regular preventive services are another way to keep healthy. Preventive services (vaccines, screening tests, monitoring & exams) can help personalize your care plan, which helps you manage your own care. Screening tests can find health problems at the earliest stages, when they are easiest to treat. [facility] follows the current, evidence-based guidelines published by the [facility] Nathalia ([facility]) when recommending preventive services for our patients. Because we follow these guidelines, sometimes recommendations change over time as research supports it. (For example, a prostate screening blood test is no longer routinely recommended for men with no symptoms). Of course, you and your doctor may decide to screen more often for some diseases, based on your risk and co-morbidities (chronic disease you are already diagnosed with). Preventive services for you include:  - Medicare offers their members a free annual wellness visit, which is time for you and your primary care provider to discuss and plan for your preventive service needs. Take advantage of this benefit every year!  -All adults over age 72 should receive the recommended pneumonia vaccines. Current USPSTF guidelines recommend a series of two vaccines for the best pneumonia protection.   -All adults should have a flu vaccine yearly and tetanus vaccine every 10 years.  -All adults age 48 and older should receive the shingles vaccines (series of two vaccines).        -All adults age 38-68 who are overweight should have a diabetes screening test once every three years.   -Other screening tests & preventive services for persons with diabetes include: an eye exam to screen for diabetic retinopathy, a kidney function test, a foot exam, and stricter control over your cholesterol.   -Cardiovascular screening for adults with routine risk involves an electrocardiogram (ECG) at intervals determined by the provider.   -Colorectal cancer screening should be done for adults age 54-65 with no increased risk factors for colorectal cancer. There are a number of acceptable methods of screening for this type of cancer. Each test has its own benefits and drawbacks. Discuss with your provider what is most appropriate for you during your annual wellness visit. The different tests include: colonoscopy (considered the best screening method), a fecal occult blood test, a fecal DNA test, and sigmoidoscopy.  -All adults born between [facility] should be screened once for Hepatitis C.  -An Abdominal Aortic Aneurysm (AAA) Screening is recommended for men age 73-68 who has ever smoked in their lifetime.      Here is a list of your current Health Maintenance items (your personalized list of preventive services) with a due date:  Health Maintenance Due   Topic Date Due    COVID-19 Vaccine (3 - Booster for [employer] series) 10/21/2021    Diabetic Foot Care  11/18/2021    Cholesterol Test   12/14/2021

## 2022-01-10 NOTE — PROGRESS NOTES
Kaia Butler  Identified pt with two pt identifiers(name and ). Chief Complaint   Patient presents with    Hypertension    Diabetes       Reviewed record In preparation for visit and have obtained necessary documentation. 1. Have you been to the ER, urgent care clinic or hospitalized since your last visit? No     2. Have you seen or consulted any other health care providers outside of the [address] [place] [address] since your last visit? Include any pap smears or colon screening. No    Patient does not have an advance directive. Vitals reviewed with provider. Health Maintenance reviewed:     Health Maintenance Due   Topic    Medicare Yearly Exam     COVID-19 Vaccine (3 - Booster for Moderna series)    Foot Exam Q1     Lipid Screen           Wt Readings from Last 3 Encounters:   21 184 lb 3.2 oz (83.6 kg)   03/10/21 198 lb (89.8 kg)   21 192 lb (87.1 kg)        Temp Readings from Last 3 Encounters:   21 98.1 °F (36.7 °C) (Oral)   21 98 °F (36.7 °C) (Oral)   21 97.5 °F (36.4 °C) (Temporal)        BP Readings from Last 3 Encounters:   21 131/79   21 127/80   21 (!) 146/91        Pulse Readings from Last 3 Encounters:   21 76   21 70   21 68      There were no vitals filed for this visit.        Learning Assessment:   :       Learning Assessment 2014   PRIMARY LEARNER Patient   HIGHEST LEVEL OF EDUCATION - PRIMARY LEARNER  DID NOT GRADUATE HIGH SCHOOL   BARRIERS PRIMARY LEARNER COGNITIVE   CO-LEARNER CAREGIVER Yes   CO-LEARNER NAME Lyric Pierre   PRIMARY LANGUAGE ENGLISH   LEARNER PREFERENCE PRIMARY DEMONSTRATION     READING   ANSWERED BY mother   RELATIONSHIP LEGAL GUARDIAN        Depression Screening:   :       3 most recent PHQ Screens 1/10/2022   Little interest or pleasure in doing things Not at all   Feeling down, depressed, irritable, or hopeless Not at all   Total Score PHQ 2 0   Trouble falling or staying asleep, or sleeping too much -   Feeling tired or having little energy -   Poor appetite, weight loss, or overeating -   Feeling bad about yourself - or that you are a failure or have let yourself or your family down -   Trouble concentrating on things such as school, work, reading, or watching TV -   Moving or speaking so slowly that other people could have noticed; or the opposite being so fidgety that others notice -   Thoughts of being better off dead, or hurting yourself in some way -   PHQ 9 Score -   How difficult have these problems made it for you to do your work, take care of your home and get along with others -        Fall Risk Assessment:   :       Fall Risk Assessment, last 12 mths 1/10/2022   Able to walk? Yes   Fall in past 12 months? 0   Do you feel unsteady? 0   Are you worried about falling 0   Is TUG test greater than 12 seconds? -   Is the gait abnormal? -   Number of falls in past 12 months -   Fall with injury? -        Abuse Screening:   :       Abuse Screening Questionnaire 1/10/2022 4/29/2020 6/4/2019 10/16/2018 2/16/2017   Do you ever feel afraid of your partner? N N N N N   Are you in a relationship with someone who physically or mentally threatens you? N N N N N   Is it safe for you to go home?  Y Y Y Y Y        ADL Screening:   :       ADL Assessment 1/10/2022   Feeding yourself No Help Needed   Getting from bed to chair Help Needed   Getting dressed Help Needed   Bathing or showering Help Needed   Walk across the room (includes cane/walker) Help Needed   Using the telphone Help Needed   Taking your medications Help Needed   Preparing meals Help Needed   Managing money (expenses/bills) Help Needed   Moderately strenuous housework (laundry) Help Needed   Shopping for personal items (toiletries/medicines) Help Needed   Shopping for groceries Help Needed   Driving Help Needed   Climbing a flight of stairs Help Needed   Getting to places beyond walking distances Help Needed

## 2022-01-10 NOTE — PROGRESS NOTES
Mikala Rodriguez is a 73 y.o. male, evaluated via audio-only technology on 1/10/2022 for Hypertension, Diabetes, and Annual Wellness Visit  . Assessment & Plan:     Diagnoses and all orders for this visit:    1. Welcome to Medicare preventive visit    2. Type 2 diabetes mellitus with diabetic polyneuropathy, without long-term current use of insulin (HCC)  Last A1c 6.6% in 7/21. Continue metformin, glimepiride, Tradjenta, and pioglitazone.  -     HEMOGLOBIN A1C WITH EAG; Future    3. Hypertension, essential  Stable. Controlled on no medication.  -     METABOLIC PANEL, COMPREHENSIVE; Future    4. Mixed hyperlipidemia  Controlled on atorvastatin. -     LIPID PANEL; Future    5. Polyneuropathy associated with underlying disease ([facility]  Leg weakness most likely due to neuropathy. Recommended following up with Dr. Erwin Godoy mother Neurology phone number to schedule an appointment. 6. Other abnormalities of gait and mobility   -     VITAMIN B12 & FOLATE; Future    7. Colon polyposis  New problem. I will obtain medical records from Dr. Eliana Rabago and Dr. Naaman Epley. Recommended keeping appointment scheduled with Dr. Naaman Epley in 2 days to discuss plan. I also explained to her that colectomy is the standard of care for colon polyposis but empathized with her regarding her concerns with him having major surgery    8. Seizure disorder ([facility]  Controlled on Depakote and Topamax. 9. Myelodysplasia (myelodysplastic syndrome) ([facility]  Needs to follow up with Dr. Nivia Bashir.  -     Niecy Castillo DIFF; Future    10. Thrombocytopenia ([facility] [facility]  Due to myelodysplastic syndrome. Continue folic acid. 11. Hepatic cirrhosis, unspecified hepatic cirrhosis type, unspecified whether ascites present (HCC)  Idiopathic. Continue furosemide 40 mg 1/2 tab daily for venous stasis. Saw Dr. Aurora Fabian on 3/25/21; was told he did not need to follow up, only prn. 12. Vitamin D deficiency  -     VITAMIN D, 25 HYDROXY; Future    13.  Screening for depression  -     DEPRESSION SCREEN ANNUAL    14. Advance directive on file      Follow-up and Dispositions    · Return in about 3 months (around 4/10/2022), or if symptoms worsen or fail to improve, for DM, HTN, chol, foot exam; have fasting labs 1 week before next appointment. 12  Subjective:     Accompanied by his mother, Marita Garner, who provides the history. Presents for Medicare AWV and 6 month follow up evaluation. He has type 2 DM, HTN, hyperlipidemia, seizure disorder, cirrhosis of liver, myelodysplastic syndrome (MDS), thrombocytopenia due to MDS, and intellectual disability. Had colonoscopy on 12/2/2021 with Dr. Jose Yepez that showed multiple (>30) polyps. Was told that biopsy returned benign but patient would need colectomy. Dr. Jose Yepez referred him to Dr. Yolie Blank. Had genetic testing for polyposis syndrome that returned negative. Dr. Yolie Blank also recommended total colectomy. His mother is very opposed to him having a colectomy because he would need to be in inpatient rehabilitation for several weeks and she feels he does not do well if she is not present. She also thinks it would be mentally difficult for him to recuperate from major surgery. They have an appointment in 2 days to follow-up with Dr. Yolie Blank. Dr. Giulia Shepherd NP will give her the name of a different Colorectal specialist for 3rd opinion on colectomy. His legs have been getting weak again over the past few months. Using walker for ambulation. He did not improve with PT in the past. Review of records shows he saw Dr. Belia Esquivel (Neurology) on 2/22/2021 for neuropathy and leg weakness but did not follow-up in 6 months as instructed. Regarding leg weakness, Dr. Belia Esquivel noted: \"EMG with evidence of a serve length dependant neuropathy etiology may be related to diabetes versus a critical illness neuropathy as his symptoms have not worsened versus his myelodysplastic syndrome. \"    Denies polydipsia, polyuria, hypoglycemic symptoms.  Home glucose monitoring: is not performed. Lab review: last A1c 6.6% on 7/9/21. Eye exam: UTD. Goes to adult day center 2 times a week.     COVID vaccine booster: had    ROS  A complete review of systems was performed and is negative except for those mentioned in the HPI. Prior to Admission medications    Medication Sig Start Date End Date Taking? Authorizing Provider   furosemide (LASIX) 40 mg tablet TAKE 1/2 TABLET BY MOUTH DAILY 1/5/22  Yes Zoë Bird MD   topiramate (TOPAMAX) 200 mg tablet TAKE 1 & 1/2 TABLETS BY MOUTH TWICE DAILY 1/2/22  Yes Zoë Bird MD   divalproex DR (DEPAKOTE) 500 mg tablet TAKE ONE TABLET BY MOUTH EVERY MORNING AND TWO TABLETS AT NIGHT 10/7/21  Yes Nikkie Riley MD   pioglitazone (ACTOS) 45 mg tablet TAKE ONE TABLET BY MOUTH ONE TIME DAILY  10/4/21  Yes Zoë Bird MD   atorvastatin (LIPITOR) 40 mg tablet TAKE ONE TABLET BY MOUTH ONCE EVERY EVENING 10/4/21  Yes Zoë Bird MD   glimepiride (AMARYL) 4 mg tablet TAKE TWO TABLETS BY MOUTH IN THE MORNING 10/4/21  Yes Zoë Bird MD   folic acid (FOLVITE) 1 mg tablet TAKE ONE TABLET BY MOUTH ONE TIME DAILY  10/4/21  Yes Zoë Bird MD   Tradjenta 5 mg tablet TAKE ONE TABLET BY MOUTH ONE TIME DAILY  10/4/21  Yes Zoë Bird MD   metFORMIN ER (GLUCOPHAGE XR) 500 mg tablet TAKE TWO TABLETS BY MOUTH TWICE DAILY  10/4/21  Yes Zoë Bird MD   triamcinolone acetonide (KENALOG) 0.1 % ointment Apply  to affected area two (2) times a day. Use thin layer. For dry spots on legs. 7/9/21  Yes Zoë Bird MD   Calcium-Cholecalciferol, D3, (CALTRATE-600 PLUS VITAMIN D3) 600-400 mg-unit Tab Take 1 Tab by mouth daily.    Yes Provider, Historical     Patient Active Problem List   Diagnosis Code    Intellectual disability F68    Seizure disorder ([facility] G40.909    Psoriasis L40.9    Venous stasis of lower extremity I87.8    Thrombocytopenia (HCC) D69.6    Hypertension, essential I10    HLD (hyperlipidemia) E78.5    Type 2 diabetes mellitus with diabetic polyneuropathy, without long-term current use of insulin (HCC) E11.42    Myelodysplasia (myelodysplastic syndrome) (HCC) D46.9    Cirrhosis ([facility] K74.60    Lumbar stenosis M48.061       Patient-Reported Vitals 1/10/2022   Patient-Reported Weight 195lb       Alida Salter, who was evaluated through a patient-initiated, synchronous (real-time) audio only encounter, and/or her healthcare decision maker, is aware that it is a billable service, with coverage as determined by his insurance carrier. He provided verbal consent to proceed: Yes. He has not had a related appointment within my department in the past 7 days or scheduled within the next 24 hours. On this date 01/10/2022 I have spent 30 minutes reviewing previous notes, test results and face to face (virtual) with the patient discussing the diagnosis and importance of compliance with the treatment plan as well as documenting on the day of the visit.     Charissa Pham MD

## 2022-01-13 ENCOUNTER — TELEPHONE (OUTPATIENT)
Dept: ONCOLOGY | Age: 67
End: 2022-01-13

## 2022-01-13 NOTE — TELEPHONE ENCOUNTER
Spoke with Colorectal surgeon over the phone yesterday corresponding with patient's visit. Discussed that at present, VUS findings are typically not used to inform medical decision making. Discussed personal medical history to guide clinical care and management.

## 2022-01-13 NOTE — TELEPHONE ENCOUNTER
Phone call to patient/patient's family to follow up after visit with colorectal surgeon. Colorectal surgeon was able to review results of genetic testing again with patient and family yesterday. Mother states plan of care will be to perform repeat colonoscopies for removal of polyps and frequent surveillance. She states she was instructed to call Dr. Jackie Mariano office and schedule an appointment for follow up for 3 months. She informs me that she will be doing this either tomorrow or next week. Provided my contact information for any questions in the future. All questions answered. Mother expressed understanding of and agreement with the plan. Silvia Garner MSN RN OCN ANP-BC ACGN  GI Oncology Nurse Navigator  Advanced Clinical Genetic Nurse [facility])

## 2022-02-10 NOTE — TELEPHONE ENCOUNTER
I spoke with Ms. Aliyah Carterippo (on HIPPA) and verified the patient by name and date of birth. She wanted to speak with Dr. Jenny Vides. Patient has been having a problem with urinary frequency and wants to know if there is something Dr. Jenny Vides can prescribe. Does not know if she will be able to get him to [facility] and his next appointment is not until April. I suggested making an appointment and scheduled them for tomorrow.

## 2022-02-10 NOTE — TELEPHONE ENCOUNTER
Pt mother called and asked for the provider to please call her about some problem the pt has recently started having

## 2022-02-11 NOTE — PATIENT INSTRUCTIONS
Learning About Adult Protective Underwear for Men  Adult protective underwear may be helpful for a person who has incontinence. A person who has incontinence has trouble controlling urine or stool. This underwear helps absorb urine and catch stool. There are different types of adult underwear. A washable type may be useful when a loved one has trouble using the disposable type. When putting on adult underwear, make sure the tabs are in the back. Make sure the underwear is the right size so that it fits well. This is important for people who are very thin or overweight. The elastic at the legs should fit well and not be too loose. A good fit can help stop leaks. And it can keep the skin from getting sore. What are the types of adult underwear for men? Some types of adult underwear are a pull-up style (made of elastic or cloth), and some use adhesive tabs or an elastic band with buttons. Elastic    An elastic pull-up style is made of a stretchy material. With any type of adult underwear, the penis should be positioned down and centered on the body. Adhesive    Adhesive tab styles have tabs to help adjust and secure the underwear. Cloth    Cloth pull-up styles are made to look and feel like standard briefs. Elastic bands and buttons    Elastic band and button styles use stretchy bands with buttons at the end to secure the pull-up on each side. Where can you learn more? Go to http://www.gray.com/  Enter Y080 in the search box to learn more about \"Learning About Adult Protective Underwear for Men. \"  Current as of: March 17, 2021               Content Version: 13.0  © 2415-7359 [employer]. Care instructions adapted under license by [employer] (which disclaims liability or warranty for this information).  If you have questions about a medical condition or this instruction, always ask your healthcare professional. [place] [address] any warranty or liability for your use of this information. Bladder Training: Care Instructions  Your Care Instructions     Bladder training is used to treat urge incontinence and stress incontinence. Urge incontinence means that the need to urinate comes on so fast that you can't get to a toilet in time. Stress incontinence means that you leak urine because of pressure on your bladder. For example, it may happen when you laugh, cough, or lift something heavy. Bladder training can increase how long you can wait before you have to urinate. It can also help your bladder hold more urine. And it can give you better control over the urge to urinate. It is important to remember that bladder training takes a few weeks to a few months to make a difference. You may not see results right away, but don't give up. Follow-up care is a key part of your treatment and safety. Be sure to make and go to all appointments, and call your doctor if you are having problems. It's also a good idea to know your test results and keep a list of the medicines you take. How can you care for yourself at home? Work with your doctor to come up with a bladder training program that is right for you. You may use one or more of the following methods. Delayed urination  · In the beginning, try to keep from urinating for 5 minutes after you first feel the need to go. · While you wait, take deep, slow breaths to relax. Kegel exercises can also help you delay the need to go to the bathroom. · After some practice, when you can easily wait 5 minutes to urinate, try to wait 10 minutes before you urinate. · Slowly increase the waiting period until you are able to control when you have to urinate. Scheduled urination  · Empty your bladder when you first wake up in the morning. · Schedule times throughout the day when you will urinate. · Start by going to the bathroom every hour, even if you don't need to go.   · Slowly increase the time between trips to the bathroom. · When you have found a schedule that works well for you, keep doing it. · If you wake up during the night and have to urinate, do it. Apply your schedule to waking hours only. Kegel exercises  These tighten and strengthen pelvic muscles, which can help you control the flow of urine. To do Kegel exercises:  · Squeeze the same muscles you would use to stop your urine. Your belly and thighs should not move. · Hold the squeeze for 3 seconds, and then relax for 3 seconds. · Start with 3 seconds. Then add 1 second each week until you are able to squeeze for 10 seconds. · Repeat the exercise 10 to 15 times a session. Do three or more sessions a day. When should you call for help? Watch closely for changes in your health, and be sure to contact your doctor if:    · Your incontinence is getting worse.     · You do not get better as expected. Where can you learn more? Go to http://www.gray.com/  Enter R243 in the search box to learn more about \"Bladder Training: Care Instructions. \"  Current as of: February 10, 2021               Content Version: 13.0  © 9608-0015 [employer]. Care instructions adapted under license by [employer] (which disclaims liability or warranty for this information). If you have questions about a medical condition or this instruction, always ask your healthcare professional. [address] any warranty or liability for your use of this information.

## 2022-02-11 NOTE — PROGRESS NOTES
Chucho Serrano  Identified pt with two pt identifiers(name and ). Chief Complaint   Patient presents with    Urinary Frequency     Room 3B       Reviewed record In preparation for visit and have obtained necessary documentation. 1. Have you been to the ER, urgent care clinic or hospitalized since your last visit? No     2. Have you seen or consulted any other health care providers outside of the [address] [place] [address] Drive since your last visit? Include any pap smears or colon screening. No    Vitals reviewed with provider.     Health Maintenance reviewed:     Health Maintenance Due   Topic    Foot Exam Q1     Lipid Screen           Wt Readings from Last 3 Encounters:   22 200 lb (90.7 kg)   21 184 lb 3.2 oz (83.6 kg)   03/10/21 198 lb (89.8 kg)        Temp Readings from Last 3 Encounters:   22 97.8 °F (36.6 °C) (Oral)   21 98.1 °F (36.7 °C) (Oral)   21 98 °F (36.7 °C) (Oral)        BP Readings from Last 3 Encounters:   22 (!) 147/71   21 131/79   21 127/80        Pulse Readings from Last 3 Encounters:   22 89   21 76   21 70        Vitals:    22 0912   BP: (!) 147/71   Pulse: 89   Resp: 12   Temp: 97.8 °F (36.6 °C)   TempSrc: Oral   SpO2: 98%   Weight: 200 lb (90.7 kg)   Height: 6' (1.829 m)   PainSc:   0 - No pain          Learning Assessment:   :       Learning Assessment 2014   PRIMARY LEARNER Patient   HIGHEST LEVEL OF EDUCATION - PRIMARY LEARNER  DID NOT GRADUATE HIGH SCHOOL   BARRIERS PRIMARY LEARNER COGNITIVE   CO-LEARNER CAREGIVER Yes   CO-LEARNER NAME Lalit Redd   PRIMARY LANGUAGE ENGLISH   LEARNER PREFERENCE PRIMARY DEMONSTRATION     READING   ANSWERED BY mother   RELATIONSHIP LEGAL GUARDIAN        Depression Screening:   :       3 most recent PHQ Screens 1/10/2022   Little interest or pleasure in doing things Not at all   Feeling down, depressed, irritable, or hopeless Not at all   Total Score PHQ 2 0 Trouble falling or staying asleep, or sleeping too much -   Feeling tired or having little energy -   Poor appetite, weight loss, or overeating -   Feeling bad about yourself - or that you are a failure or have let yourself or your family down -   Trouble concentrating on things such as school, work, reading, or watching TV -   Moving or speaking so slowly that other people could have noticed; or the opposite being so fidgety that others notice -   Thoughts of being better off dead, or hurting yourself in some way -   PHQ 9 Score -   How difficult have these problems made it for you to do your work, take care of your home and get along with others -        Fall Risk Assessment:   :       Fall Risk Assessment, last 12 mths 1/10/2022   Able to walk? Yes   Fall in past 12 months? 0   Do you feel unsteady? 0   Are you worried about falling 0   Is TUG test greater than 12 seconds? -   Is the gait abnormal? -   Number of falls in past 12 months -   Fall with injury? -        Abuse Screening:   :       Abuse Screening Questionnaire 1/10/2022 4/29/2020 6/4/2019 10/16/2018 2/16/2017   Do you ever feel afraid of your partner? N N N N N   Are you in a relationship with someone who physically or mentally threatens you? N N N N N   Is it safe for you to go home?  Y Y Y Y Y        ADL Screening:   :       ADL Assessment 1/10/2022   Feeding yourself No Help Needed   Getting from bed to chair Help Needed   Getting dressed Help Needed   Bathing or showering Help Needed   Walk across the room (includes cane/walker) Help Needed   Using the telphone Help Needed   Taking your medications Help Needed   Preparing meals Help Needed   Managing money (expenses/bills) Help Needed   Moderately strenuous housework (laundry) Help Needed   Shopping for personal items (toiletries/medicines) Help Needed   Shopping for groceries Help Needed   Driving Help Needed   Climbing a flight of stairs Help Needed   Getting to places beyond walking distances Help Needed

## 2022-02-11 NOTE — PROGRESS NOTES
CC:   Chief Complaint   Patient presents with    Urinary Frequency     Room 3B       HISTORY OF PRESENT ILLNESS  Anand Denis is a 73 y.o. male. Accompanied by his mother, Dio Hunter, who provides the history. Complains of patient urinating more at nighttime. She gets up every 2 hrs to help him urinate (has a urinal container) and does not have him drink anything after 7 pm but he urinates through the night and wets himself. Wears an adult diaper and has large pads on bed. He is in wet in bed with urine up his back. She sometimes has to get him out of bed and clean him 2 times a night. Neither of them is sleeping well. Has been worse over the past week. Patient denies burning or pain with urination. He has adenomatous polyposis with over 30 polyps. His mother says genetic testing was negative. She declined him having a total colectomy so plan now is to have a colonoscopy every 6 months to remove a few polyps each time. Mother reports his legs are weak. He is having harder time walking.      Patient Active Problem List   Diagnosis Code    Intellectual disability F68    Seizure disorder ([facility] G40.909    Psoriasis L40.9    Venous stasis of lower extremity I87.8    Thrombocytopenia (HCC) D69.6    Hypertension, essential I10    HLD (hyperlipidemia) E78.5    Type 2 diabetes mellitus with diabetic polyneuropathy, without long-term current use of insulin (HCC) E11.42    Myelodysplasia (myelodysplastic syndrome) (HCC) D46.9    Cirrhosis ([facility] K74.60    Lumbar stenosis M48.061    Colon polyposis K63.5    Vitamin D deficiency E55.9     Past Medical History:   Diagnosis Date    Contact dermatitis and other eczema, due to unspecified cause     psoriasis    Diabetes ([facility]     Eosinophilia     Hypercholesterolemia     Hypertension     Lower extremity weakness 11/28/2020    Mental retardation     Pneumonia due to COVID-19 virus 1/27/2021    Seizures ([facility]     Skin cancer     Sleep disorder 8/23/2012    Episode of screaming as if he is terrified, lasts about one minutes, no memory of it , frequency once a months, duration  For > 20 years     Strongyloides stercoralis infection 8/28/2014     No Known Allergies    Current Outpatient Medications   Medication Sig Dispense Refill    furosemide (LASIX) 40 mg tablet TAKE 1/2 TABLET BY MOUTH DAILY 45 Tablet 3    topiramate (TOPAMAX) 200 mg tablet TAKE 1 & 1/2 TABLETS BY MOUTH TWICE DAILY 270 Tablet 1    divalproex DR (DEPAKOTE) 500 mg tablet TAKE ONE TABLET BY MOUTH EVERY MORNING AND TWO TABLETS AT NIGHT 270 Tablet 3    pioglitazone (ACTOS) 45 mg tablet TAKE ONE TABLET BY MOUTH ONE TIME DAILY  90 Tablet 3    atorvastatin (LIPITOR) 40 mg tablet TAKE ONE TABLET BY MOUTH ONCE EVERY EVENING 90 Tablet 3    glimepiride (AMARYL) 4 mg tablet TAKE TWO TABLETS BY MOUTH IN THE MORNING 418 Tablet 3    folic acid (FOLVITE) 1 mg tablet TAKE ONE TABLET BY MOUTH ONE TIME DAILY  90 Tablet 3    Tradjenta 5 mg tablet TAKE ONE TABLET BY MOUTH ONE TIME DAILY  90 Tablet 3    metFORMIN ER (GLUCOPHAGE XR) 500 mg tablet TAKE TWO TABLETS BY MOUTH TWICE DAILY  360 Tablet 3    triamcinolone acetonide (KENALOG) 0.1 % ointment Apply  to affected area two (2) times a day. Use thin layer. For dry spots on legs. 30 g 0    Calcium-Cholecalciferol, D3, (CALTRATE-600 PLUS VITAMIN D3) 600-400 mg-unit Tab Take 1 Tab by mouth daily. PHYSICAL EXAM  Visit Vitals  /72 (BP 1 Location: Left upper arm, BP Patient Position: Sitting)   Pulse 89   Temp 97.8 °F (36.6 °C) (Oral)   Resp 12   Ht 6' (1.829 m)   Wt 200 lb (90.7 kg)   SpO2 98%   BMI 27.12 kg/m²       General: Well-developed and well-nourished, no distress. In wheelchair. HEENT:  Head normocephalic/atraumatic, no scleral icterus  Lungs:  Clear to ausculation bilaterally. Good air movement.   Heart:  Regular rate and rhythm, normal S1 and S2, no murmur, gallop, or rub  Extremities: No clubbing, cyanosis, or edema. Dry scaling skin at anterior lower legs. Neurological: Alert and oriented. Psychiatric: Normal mood and affect. Behavior is normal.     Results for orders placed or performed in visit on 02/11/22   AMB POC URINALYSIS DIP STICK AUTO W/O MICRO   Result Value Ref Range    Color (UA POC) Yellow     Clarity (UA POC) Clear     Glucose (UA POC) Negative Negative    Bilirubin (UA POC) Negative Negative    Ketones (UA POC) Negative Negative    Specific gravity (UA POC) 1.020 1.001 - 1.035    Blood (UA POC) Negative Negative    pH (UA POC) 7.5 4.6 - 8.0    Protein (UA POC) Negative Negative    Urobilinogen (UA POC) 0.2 mg/dL 0.2 - 1    Nitrites (UA POC) Negative Negative    Leukocyte esterase (UA POC) Negative Negative         ASSESSMENT AND PLAN    ICD-10-CM ICD-9-CM    1. Urinary frequency  R35.0 788.41 AMB POC URINALYSIS DIP STICK AUTO W/O MICRO   2. Functional urinary incontinence  R39.81 788.91      Diagnoses and all orders for this visit:    1. Urinary frequency  No evidence of UTI.  -     AMB POC URINALYSIS DIP STICK AUTO W/O MICRO    2. Functional urinary incontinence  Likely has both urge an functional urinary incontinence. Discussed decreasing caffeine intake (he drinks tea in the morning, sometimes afternoon) and having scheduled bathroom breaks. Patient's mother is having a harder time caring for him. I had a long discussion with patient's mother regarding his declining health status and considering hospice care. Follow-up and Dispositions    · Return if symptoms worsen or fail to improve, for Scheduled appointment on 4/8/22. I have discussed the diagnosis with the patient and the intended plan as seen in the above orders. Patient is in agreement. The patient has received an after-visit summary and questions were answered concerning future plans. I have discussed medication side effects and warnings with the patient as well.

## 2022-03-07 NOTE — TELEPHONE ENCOUNTER
Mother wants Gutierrez Ward to call her she wants something sent to the pharmacy where she can test his sugar everyday.

## 2022-04-02 ENCOUNTER — APPOINTMENT (OUTPATIENT)
Dept: ULTRASOUND IMAGING | Age: 67
DRG: 823 | End: 2022-04-02
Attending: INTERNAL MEDICINE
Payer: MEDICARE

## 2022-04-02 ENCOUNTER — HOSPITAL ENCOUNTER (INPATIENT)
Age: 67
LOS: 23 days | Discharge: NURSING, REHAB OR LONG-TERM CARE | DRG: 823 | End: 2022-04-25
Attending: EMERGENCY MEDICINE | Admitting: INTERNAL MEDICINE
Payer: MEDICARE

## 2022-04-02 DIAGNOSIS — M79.661 PAIN IN BOTH LOWER LEGS: ICD-10-CM

## 2022-04-02 DIAGNOSIS — R53.81 DEBILITY: ICD-10-CM

## 2022-04-02 DIAGNOSIS — M79.662 PAIN IN BOTH LOWER LEGS: ICD-10-CM

## 2022-04-02 DIAGNOSIS — Z71.89 GOALS OF CARE, COUNSELING/DISCUSSION: ICD-10-CM

## 2022-04-02 DIAGNOSIS — Z51.5 PALLIATIVE CARE ENCOUNTER: ICD-10-CM

## 2022-04-02 DIAGNOSIS — K92.2 CHRONIC LOWER GI BLEEDING: ICD-10-CM

## 2022-04-02 DIAGNOSIS — R45.89 NEED FOR EMOTIONAL SUPPORT: ICD-10-CM

## 2022-04-02 DIAGNOSIS — D64.9 ACUTE ANEMIA: Primary | ICD-10-CM

## 2022-04-02 LAB
ALBUMIN SERPL-MCNC: 2.6 G/DL (ref 3.5–5)
ALBUMIN/GLOB SERPL: 0.7 {RATIO} (ref 1.1–2.2)
ALP SERPL-CCNC: 65 U/L (ref 45–117)
ALT SERPL-CCNC: 11 U/L (ref 12–78)
ANION GAP SERPL CALC-SCNC: 8 MMOL/L (ref 5–15)
APPEARANCE UR: CLEAR
AST SERPL-CCNC: 11 U/L (ref 15–37)
BACTERIA URNS QL MICRO: NEGATIVE /HPF
BILIRUB SERPL-MCNC: 0.4 MG/DL (ref 0.2–1)
BILIRUB UR QL: NEGATIVE
BUN SERPL-MCNC: 24 MG/DL (ref 6–20)
BUN/CREAT SERPL: 30 (ref 12–20)
CALCIUM SERPL-MCNC: 8.5 MG/DL (ref 8.5–10.1)
CHLORIDE SERPL-SCNC: 111 MMOL/L (ref 97–108)
CO2 SERPL-SCNC: 21 MMOL/L (ref 21–32)
COLOR UR: NORMAL
CREAT SERPL-MCNC: 0.8 MG/DL (ref 0.7–1.3)
EPITH CASTS URNS QL MICRO: NORMAL /LPF
GLOBULIN SER CALC-MCNC: 3.6 G/DL (ref 2–4)
GLUCOSE BLD STRIP.AUTO-MCNC: 105 MG/DL (ref 65–117)
GLUCOSE BLD STRIP.AUTO-MCNC: 150 MG/DL (ref 65–117)
GLUCOSE SERPL-MCNC: 140 MG/DL (ref 65–100)
GLUCOSE UR STRIP.AUTO-MCNC: NEGATIVE MG/DL
HGB UR QL STRIP: NEGATIVE
HISTORY CHECKED?,CKHIST: NORMAL
HYALINE CASTS URNS QL MICRO: NORMAL /LPF (ref 0–5)
KETONES UR QL STRIP.AUTO: NEGATIVE MG/DL
LEUKOCYTE ESTERASE UR QL STRIP.AUTO: NEGATIVE
NITRITE UR QL STRIP.AUTO: NEGATIVE
PH UR STRIP: 7.5 [PH] (ref 5–8)
POTASSIUM SERPL-SCNC: 4.4 MMOL/L (ref 3.5–5.1)
PROT SERPL-MCNC: 6.2 G/DL (ref 6.4–8.2)
PROT UR STRIP-MCNC: NEGATIVE MG/DL
RBC #/AREA URNS HPF: NORMAL /HPF (ref 0–5)
SERVICE CMNT-IMP: ABNORMAL
SERVICE CMNT-IMP: NORMAL
SODIUM SERPL-SCNC: 140 MMOL/L (ref 136–145)
SP GR UR REFRACTOMETRY: 1.01 (ref 1–1.03)
UA: UC IF INDICATED,UAUC: NORMAL
UROBILINOGEN UR QL STRIP.AUTO: 1 EU/DL (ref 0.2–1)
WBC URNS QL MICRO: NORMAL /HPF (ref 0–4)

## 2022-04-02 PROCEDURE — 93970 EXTREMITY STUDY: CPT

## 2022-04-02 PROCEDURE — 74011250637 HC RX REV CODE- 250/637: Performed by: INTERNAL MEDICINE

## 2022-04-02 PROCEDURE — 30233N1 TRANSFUSION OF NONAUTOLOGOUS RED BLOOD CELLS INTO PERIPHERAL VEIN, PERCUTANEOUS APPROACH: ICD-10-PCS | Performed by: EMERGENCY MEDICINE

## 2022-04-02 PROCEDURE — 99285 EMERGENCY DEPT VISIT HI MDM: CPT

## 2022-04-02 PROCEDURE — 74011636637 HC RX REV CODE- 636/637: Performed by: INTERNAL MEDICINE

## 2022-04-02 PROCEDURE — 36430 TRANSFUSION BLD/BLD COMPNT: CPT

## 2022-04-02 PROCEDURE — 86923 COMPATIBILITY TEST ELECTRIC: CPT

## 2022-04-02 PROCEDURE — 65660000000 HC RM CCU STEPDOWN

## 2022-04-02 PROCEDURE — 74011000250 HC RX REV CODE- 250: Performed by: INTERNAL MEDICINE

## 2022-04-02 PROCEDURE — 82962 GLUCOSE BLOOD TEST: CPT

## 2022-04-02 PROCEDURE — 74011250636 HC RX REV CODE- 250/636: Performed by: INTERNAL MEDICINE

## 2022-04-02 PROCEDURE — 80053 COMPREHEN METABOLIC PANEL: CPT

## 2022-04-02 PROCEDURE — C9113 INJ PANTOPRAZOLE SODIUM, VIA: HCPCS | Performed by: INTERNAL MEDICINE

## 2022-04-02 PROCEDURE — 86900 BLOOD TYPING SEROLOGIC ABO: CPT

## 2022-04-02 PROCEDURE — P9016 RBC LEUKOCYTES REDUCED: HCPCS

## 2022-04-02 PROCEDURE — 77030040361 HC SLV COMPR DVT MDII -B

## 2022-04-02 PROCEDURE — 76700 US EXAM ABDOM COMPLETE: CPT

## 2022-04-02 PROCEDURE — 85025 COMPLETE CBC W/AUTO DIFF WBC: CPT

## 2022-04-02 PROCEDURE — 2709999900 HC NON-CHARGEABLE SUPPLY

## 2022-04-02 PROCEDURE — 36415 COLL VENOUS BLD VENIPUNCTURE: CPT

## 2022-04-02 PROCEDURE — 77030040831 HC BAG URINE DRNG MDII -A

## 2022-04-02 PROCEDURE — 81001 URINALYSIS AUTO W/SCOPE: CPT

## 2022-04-02 RX ORDER — DIVALPROEX SODIUM 500 MG/1
1000 TABLET, DELAYED RELEASE ORAL
COMMUNITY

## 2022-04-02 RX ORDER — MAGNESIUM SULFATE 100 %
4 CRYSTALS MISCELLANEOUS AS NEEDED
Status: DISCONTINUED | OUTPATIENT
Start: 2022-04-02 | End: 2022-04-25 | Stop reason: HOSPADM

## 2022-04-02 RX ORDER — INSULIN LISPRO 100 [IU]/ML
INJECTION, SOLUTION INTRAVENOUS; SUBCUTANEOUS
Status: DISCONTINUED | OUTPATIENT
Start: 2022-04-02 | End: 2022-04-25 | Stop reason: HOSPADM

## 2022-04-02 RX ORDER — ACETAMINOPHEN 325 MG/1
650 TABLET ORAL
Status: DISCONTINUED | OUTPATIENT
Start: 2022-04-02 | End: 2022-04-06

## 2022-04-02 RX ORDER — ATORVASTATIN CALCIUM 40 MG/1
40 TABLET, FILM COATED ORAL DAILY
Status: DISCONTINUED | OUTPATIENT
Start: 2022-04-03 | End: 2022-04-25 | Stop reason: HOSPADM

## 2022-04-02 RX ORDER — FOLIC ACID 1 MG/1
1 TABLET ORAL DAILY
Status: DISCONTINUED | OUTPATIENT
Start: 2022-04-03 | End: 2022-04-25 | Stop reason: HOSPADM

## 2022-04-02 RX ORDER — SODIUM CHLORIDE 0.9 % (FLUSH) 0.9 %
5-40 SYRINGE (ML) INJECTION AS NEEDED
Status: DISCONTINUED | OUTPATIENT
Start: 2022-04-02 | End: 2022-04-05 | Stop reason: SDUPTHER

## 2022-04-02 RX ORDER — DIVALPROEX SODIUM 500 MG/1
1000 TABLET, DELAYED RELEASE ORAL
Status: DISCONTINUED | OUTPATIENT
Start: 2022-04-02 | End: 2022-04-25 | Stop reason: HOSPADM

## 2022-04-02 RX ORDER — DIVALPROEX SODIUM 500 MG/1
500 TABLET, DELAYED RELEASE ORAL DAILY
Status: DISCONTINUED | OUTPATIENT
Start: 2022-04-03 | End: 2022-04-25 | Stop reason: HOSPADM

## 2022-04-02 RX ORDER — SODIUM CHLORIDE 0.9 % (FLUSH) 0.9 %
5-40 SYRINGE (ML) INJECTION EVERY 8 HOURS
Status: DISCONTINUED | OUTPATIENT
Start: 2022-04-02 | End: 2022-04-05 | Stop reason: SDUPTHER

## 2022-04-02 RX ORDER — ACETAMINOPHEN 650 MG/1
650 SUPPOSITORY RECTAL
Status: DISCONTINUED | OUTPATIENT
Start: 2022-04-02 | End: 2022-04-06

## 2022-04-02 RX ORDER — TOPIRAMATE 100 MG/1
200 TABLET, FILM COATED ORAL 2 TIMES DAILY
Status: DISCONTINUED | OUTPATIENT
Start: 2022-04-02 | End: 2022-04-25 | Stop reason: HOSPADM

## 2022-04-02 RX ORDER — POLYETHYLENE GLYCOL 3350 17 G/17G
17 POWDER, FOR SOLUTION ORAL DAILY PRN
Status: DISCONTINUED | OUTPATIENT
Start: 2022-04-02 | End: 2022-04-25 | Stop reason: HOSPADM

## 2022-04-02 RX ORDER — ONDANSETRON 4 MG/1
4 TABLET, ORALLY DISINTEGRATING ORAL
Status: DISCONTINUED | OUTPATIENT
Start: 2022-04-02 | End: 2022-04-25 | Stop reason: HOSPADM

## 2022-04-02 RX ORDER — SODIUM CHLORIDE 9 MG/ML
250 INJECTION, SOLUTION INTRAVENOUS AS NEEDED
Status: DISCONTINUED | OUTPATIENT
Start: 2022-04-02 | End: 2022-04-04 | Stop reason: SDUPTHER

## 2022-04-02 RX ORDER — ONDANSETRON 2 MG/ML
4 INJECTION INTRAMUSCULAR; INTRAVENOUS
Status: DISCONTINUED | OUTPATIENT
Start: 2022-04-02 | End: 2022-04-25 | Stop reason: HOSPADM

## 2022-04-02 RX ORDER — FUROSEMIDE 10 MG/ML
20 INJECTION INTRAMUSCULAR; INTRAVENOUS DAILY
Status: DISCONTINUED | OUTPATIENT
Start: 2022-04-02 | End: 2022-04-05

## 2022-04-02 RX ADMIN — SODIUM CHLORIDE 40 MG: 9 INJECTION, SOLUTION INTRAMUSCULAR; INTRAVENOUS; SUBCUTANEOUS at 17:49

## 2022-04-02 RX ADMIN — TOPIRAMATE 200 MG: 100 TABLET, FILM COATED ORAL at 18:39

## 2022-04-02 RX ADMIN — SODIUM CHLORIDE, PRESERVATIVE FREE 10 ML: 5 INJECTION INTRAVENOUS at 21:58

## 2022-04-02 RX ADMIN — Medication 2 UNITS: at 18:38

## 2022-04-02 RX ADMIN — FUROSEMIDE 20 MG: 10 INJECTION, SOLUTION INTRAMUSCULAR; INTRAVENOUS at 18:39

## 2022-04-02 RX ADMIN — SODIUM CHLORIDE 40 MG: 9 INJECTION, SOLUTION INTRAMUSCULAR; INTRAVENOUS; SUBCUTANEOUS at 21:56

## 2022-04-02 RX ADMIN — SODIUM CHLORIDE, PRESERVATIVE FREE 10 ML: 5 INJECTION INTRAVENOUS at 17:51

## 2022-04-02 RX ADMIN — DIVALPROEX SODIUM 1000 MG: 500 TABLET, DELAYED RELEASE ORAL at 21:56

## 2022-04-02 NOTE — ED NOTES
Patient is being transferred to [facility], Room # 1113. Report given to Jose Guadalupe May RN on Alvin Restrepo for routine progression of care. Report consisted of the following information SBAR, Kardex and ED Summary. Patient transferred to receiving unit by: Transport (RN or tech name). Outstanding consults needed: Yes    Next labs due: Yes    The following personal items will be sent with the patient during transfer to the floor: All valuables:    Cardiac monitoring ordered: No     The following CURRENT information was reported to the receiving RN:    Code status: Full Code at time of transfer    Last set of vital signs:  Vital Signs  Level of Consciousness: Alert (0) (04/02/22 1441)  Temp: 98 °F (36.7 °C) (04/02/22 1441)  Temp Source: Oral (04/02/22 1326)  Pulse (Heart Rate): 84 (04/02/22 1326)  Resp Rate: 16 (04/02/22 1441)  BP: (!) 119/54 (04/02/22 1441)  MAP (Monitor): 73 (04/02/22 1441)  MAP (Calculated): 76 (04/02/22 1441)  MEWS Score: 1 (04/02/22 1326)         Oxygen Therapy  O2 Sat (%): 96 % (04/02/22 1441)  Pulse via Oximetry: 87 beats per minute (04/02/22 1441)  O2 Device: None (Room air) (04/02/22 1326)      Last pain assessment:  Pain 1  Pain Scale 1: Numeric (0 - 10)  Pain Intensity 1: 0      Wounds: No     Urinary catheter: voiding  Is there a de anda order: No     LDAs:            Opportunity for questions and clarification was provided.     Rhonda Vaughn RN

## 2022-04-02 NOTE — H&P
Hospitalist Admission Note    NAME: Abhishek Golden   :  1955   MRN:  [id number]     Date/Time:  2022 2:33 PM    Patient PCP: Alex Montelongo MD  ______________________________________________________________________  Given the patient's current clinical presentation, I have a high level of concern for decompensation if discharged from the emergency department. Complex decision making was performed, which includes reviewing the patient's available past medical records, laboratory results, and x-ray films. My assessment of this patient's clinical condition and my plan of care is as follows. Assessment / Plan:  History of multiple problems with biopsy showed adenoma  Acute anemia likely due to GI bleed  Hemoglobin 4.2. Receiving 1 unit of PRBC in the ER. Trend H&H, transfuse for hemoglobin less than 7  History recent colonoscopy was in 2021 which showed multiple polyps status post biopsied. Partial colectomy was recommended however patient's mom declined. Start IV PPI  Check iron panel, B12, folate  Hold antihypertensive for now due to borderline low BP  Allow CLD. Not anticipating any procedure done this weekend. GI is consulted    Thrombocytopenia  No recent labs. Pt's mother does not recall pt having \"other significant labs\"  Monitor plt daily    LE edema  Check echo and LE duplex  IV Lasix 20g daily    Type 2 diabetes  Seizure disorder  Intellectual disability  SSI, hold PTA mes  Resume Depakote, Topamax  CM to help with dispo      Code Status: Full  Surrogate Decision Maker: Patient's mother  DVT Prophylaxis: SCD  GI Prophylaxis: not indicated  Baseline: From home      Subjective:   CHIEF COMPLAINT: Anemia, abnormal labs    HISTORY OF PRESENT ILLNESS:     Abhishek Golden is a 73 y.o.   male with PMHx significant for epilepsy, intellectual disability, type 2 diabetes, seizure, presents to the ER for evaluation of abnormal labs.   Patient was seen by his PCP yesterday with routine lab work performed then. He was notified today to come to the ER due to hemoglobin of 4.4. History is obtained by patient's mom at bedside. Patient had a colonoscopy in December 2021 due to positive FOBT. He had multiple polyps removed. Biopsy showed adenoma. Partial colectomy has been recommended by his mom did not want for him to this. On arrival vitals are stable. Repeat hemoglobin was 4.2. He also has a leukocytosis wbc 15. Per patient's mom no obvious bright red blood per rectum or melena. Vitals/labs/imaging reviewed. We were asked to admit for work up and evaluation of the above problems. Past Medical History:   Diagnosis Date    Diabetes ([facility] [address]     Epilepsy ([facility] [address]     Seizures ([facility] [address]         Past Surgical History:   Procedure Laterality Date    COLONOSCOPY N/A 12/1/2021    COLONOSCOPY performed by Radha Castillo MD at [facility]COPY. Multiple sessile polyps (>30). Medium-sized int hemorrhoids. Social History     Tobacco Use    Smoking status: Never Smoker    Smokeless tobacco: Never Used   Substance Use Topics    Alcohol use: Never        Family History   Problem Relation Age of Onset    Hypertension Mother     Cancer Father         unknown     No Known Allergies     Prior to Admission medications    Medication Sig Start Date End Date Taking? Authorizing Provider   topiramate (TOPAMAX) 200 mg tablet Take  by mouth two (2) times a day. Provider, Historical   divalproex sodium (DEPAKOTE PO) Take 500 mg by mouth. Provider, Historical   metFORMIN (GLUCOPHAGE) 500 mg tablet Take  by mouth two (2) times daily (with meals). Provider, Historical   glimepiride (AMARYL) 4 mg tablet Take  by mouth every morning. Provider, Historical   linaGLIPtin (Tradjenta) 5 mg tablet Take 5 mg by mouth daily. Provider, Historical   pioglitazone (ACTOS) 15 mg tablet Take 45 mg by mouth.     Provider, Historical   folic acid (FOLVITE) 1 mg tablet Take  by mouth daily.    Provider, Historical   Calcium-Cholecalciferol, D3, 600 mg(1,500mg) -400 unit chew Take  by mouth. Provider, Historical   furosemide (LASIX) 40 mg tablet Take  by mouth daily. Provider, Historical   atorvastatin (Lipitor) 40 mg tablet Take 40 mg by mouth daily. Provider, Historical   chlorhexidine (PERIDEX) 0.12 % solution 15 mL by Swish and Spit route every twelve (12) hours. Provider, Historical   triamcinolone acetonide 0.025 % lotion Apply  to affected area two (2) times a day. Provider, Historical       REVIEW OF SYSTEMS:     I am not able to complete the review of systems because:    The patient is intubated and sedated    The patient has altered mental status due to his acute medical problems    The patient has baseline aphasia from prior stroke(s)    The patient has baseline dementia and is not reliable historian    The patient is in acute medical distress and unable to provide information   xx The patient has intellectual disability able to provide information       Total of 12 systems reviewed as follows:       POSITIVE= BOLD text  Negative = text not BOLD  General:  fever, chills, sweats, generalized weakness, weight loss/gain,      loss of appetite   Eyes:    blurred vision, eye pain, loss of vision, double vision  ENT:    rhinorrhea, pharyngitis   Respiratory:   cough, sputum production, SOB, GRULLON, wheezing, pleuritic pain   Cardiology:   chest pain, palpitations, orthopnea, PND, edema, syncope   Gastrointestinal:  abdominal pain , N/V, diarrhea, dysphagia, constipation, bleeding   Genitourinary:  frequency, urgency, dysuria, hematuria, incontinence   Muskuloskeletal :  arthralgia, myalgia, back pain  Hematology:  easy bruising, nose or gum bleeding, lymphadenopathy   Dermatological: rash, ulceration, pruritis, color change / jaundice  Endocrine:   hot flashes or polydipsia   Neurological:  headache, dizziness, confusion, focal weakness, paresthesia,     Speech difficulties, memory loss, gait difficulty  Psychological: Feelings of anxiety, depression, agitation    Objective:   VITALS:    Visit Vitals  BP (!) 108/58   Pulse 84   Temp 98.6 °F (37 °C)   Resp 16   Ht 6' (1.829 m)   Wt 88.5 kg (195 lb)   SpO2 97%   BMI 26.45 kg/m²       PHYSICAL EXAM:    General:    Now in bed, NAD. HEENT: Atraumatic, anicteric sclerae     No oral ulcers, mucosa moist, throat clear  Neck:  Supple, symmetrical,  thyroid: non tender  Lungs:   CTA b/l. No wheezing or Rhonchi. No rales. Chest wall:  No tenderness. No accessory muscle use. Heart:   Regular  rhythm,  No  Murmur. ++ b/l LE edema  Abdomen:   Soft, NT. ND  BS+  Extremities: No cyanosis. No clubbing,      Skin turgor normal, Radial dial pulse 2+. Capillary refill normal  Skin:     Not pale. Not Jaundiced  No rashes   Psych:  Not depressed. Not anxious or agitated. Neurologic: Awake, moving all extremities. _______________________________________________________________________  Care Plan discussed with:    Comments   Patient x    Family  x  patient's mom   RN x    Care Manager                    Consultant:  naveed ED physician   _______________________________________________________________________  Expected  Disposition:   Home with Family    HH/PT/OT/RN x   SNF/LTC    DANI    ________________________________________________________________________  TOTAL TIME:  72 Minutes    Critical Care Provided     Minutes non procedure based      Comments    x Reviewed previous records   >50% of visit spent in counseling and coordination of care x Discussion with patient and/or family and questions answered       ________________________________________________________________________  Signed: Marquise Beard MD    Procedures: see electronic medical records for all procedures/Xrays and details which were not copied into this note but were reviewed prior to creation of Plan.     LAB DATA REVIEWED:    Recent Results (from the past 24 hour(s))   CBC WITH AUTOMATED DIFF    Collection Time: 04/02/22  1:54 PM   Result Value Ref Range    WBC 15.0 (H) 4.1 - 11.1 K/uL    RBC 1.03 (L) 4.10 - 5.70 M/uL    HGB 4.2 (LL) 12.1 - 17.0 g/dL    HCT 14.7 (LL) 36.6 - 50.3 %    .7 (H) 80.0 - 99.0 FL    MCH 40.8 (H) 26.0 - 34.0 PG    MCHC 28.6 (L) 30.0 - 36.5 g/dL    RDW 15.7 (H) 11.5 - 14.5 %    PLATELET 64 (L) 081 - 400 K/uL    MPV 10.1 8.9 - 12.9 FL    NRBC 0.0 0  WBC    ABSOLUTE NRBC 0.00 0.00 - 0.01 K/uL    NEUTROPHILS PENDING %    LYMPHOCYTES PENDING %    MONOCYTES PENDING %    EOSINOPHILS PENDING %    BASOPHILS PENDING %    IMMATURE GRANULOCYTES PENDING %    ABS. NEUTROPHILS PENDING K/UL    ABS. LYMPHOCYTES PENDING K/UL    ABS. MONOCYTES PENDING K/UL    ABS. EOSINOPHILS PENDING K/UL    ABS. BASOPHILS PENDING K/UL    ABS. IMM. GRANS.  PENDING K/UL    DF PENDING

## 2022-04-02 NOTE — PROGRESS NOTES
Hayden Lowe Dr, on call for Dr. Misty Cristina for STAT consult    18:15  Dr. Hayden Benjamin notified of consult and abd xray results. Dr. Hayden Benjamin stated he will see pt in the morning, but nursing to notify him if pt has active GI  bleeding tonight. 1930:  Bedside shift change report given to Sukhi Ken (oncoming nurse) by Tia Marlow (offgoing nurse). Report included the following information SBAR, Kardex, MAR, Recent Results and Cardiac Rhythm NSR,PVC,PAC.

## 2022-04-02 NOTE — ED PROVIDER NOTES
EMERGENCY DEPARTMENT HISTORY AND PHYSICAL EXAM      Date: 4/2/2022  Patient Name: Betty Harding    History of Presenting Illness     Chief Complaint   Patient presents with    Abnormal Lab Results     per pcp pt has low hgb.  pt fell 3-4 days ago and has a healing laceration to his posterior scalp. pt was normal baseline mentation.  Fall       History Provided By: Patient's Mother and EMS    HPI: Betty Harding, 73 y.o. male  presents to the ED with cc of low hemoglobin levels. Patient has a history of intellectual disability and history was obtained from his mother. He saw his PCP yesterday and had lab work performed. They called today stating that he is anemic with a hemoglobin of 4.4. No history of anemia. He has not received any blood transfusions in the past.  Mom has not noted any bright red blood per rectum or melena. No abnormal bleeding that she is aware of. He is not on any antiplatelets or anticoagulants. She has noted that for the past 6 months he has had swelling in his legs. She denies any signs of shortness of breath. She states his color does not look well and he has been pale. Medical records note that he had a colonoscopy in December 2021 due to occult positive blood. He had multiple polyps removed. Partial colectomy has been recommended but mom is not going to put him through this. Is also noted that the patient did have a fall yesterday. This appeared to be a mechanical fall. No syncope reported. This resulted in a small abrasion to his posterior scalp which is currently scabbed over. Past History     Past Medical History:  Past Medical History:   Diagnosis Date    Diabetes ([facility] [address]     Epile[facility] [address]     Seizures ([facility] [address]        Past Surgical History:  Past Surgical History:   Procedure Laterality Date    COLONOSCOPY N/A 12/1/2021    COLONOSCOPY performed by Wilmer Webster MD at [facility] ENDOSCOPY. Multiple sessile polyps (>30). Medium-sized int hemorrhoids. Medications:  No current facility-administered medications on file prior to encounter. Current Outpatient Medications on File Prior to Encounter   Medication Sig Dispense Refill    divalproex DR (DEPAKOTE) 500 mg tablet Take 1,000 mg by mouth nightly.  topiramate (TOPAMAX) 200 mg tablet Take  by mouth two (2) times a day.  divalproex sodium (DEPAKOTE PO) Take 500 mg by mouth daily.  metFORMIN (GLUCOPHAGE) 500 mg tablet Take  by mouth two (2) times daily (with meals).  glimepiride (AMARYL) 4 mg tablet Take  by mouth every morning.  linaGLIPtin (Tradjenta) 5 mg tablet Take 5 mg by mouth daily.  pioglitazone (ACTOS) 15 mg tablet Take 45 mg by mouth.  folic acid (FOLVITE) 1 mg tablet Take  by mouth daily.  Calcium-Cholecalciferol, D3, 600 mg(1,500mg) -400 unit chew Take  by mouth.  furosemide (LASIX) 40 mg tablet Take  by mouth daily.  atorvastatin (Lipitor) 40 mg tablet Take 40 mg by mouth daily.  chlorhexidine (PERIDEX) 0.12 % solution 15 mL by Swish and Spit route every twelve (12) hours.  triamcinolone acetonide 0.025 % lotion Apply  to affected area two (2) times a day. Family History:  Family History   Problem Relation Age of Onset    Hypertension Mother     Cancer Father         unknown       Social History:  Social History     Tobacco Use    Smoking status: Never Smoker    Smokeless tobacco: Never Used   Vaping Use    Vaping Use: Never used   Substance Use Topics    Alcohol use: Never    Drug use: Never       Allergies:  No Known Allergies    All the above components of the past  history are auto-populated from the electronic record. They have been reviewed and the patient has been interviewed for any pertinent past history that pertains to the patient's chief complaint and reason for visit. Not all pre-populated components may be accurate at the time this note was generated.     Review of Systems   Review of Systems   Unable to perform ROS: Other (Intellectual disability)       Physical Exam   Physical Exam  Vitals and nursing note reviewed. Constitutional:       General: He is not in acute distress. Appearance: He is well-developed. He is not ill-appearing. HENT:      Head: Normocephalic. Abrasion (midline parietal scalp) present. Eyes:      Conjunctiva/sclera: Conjunctivae normal.   Cardiovascular:      Rate and Rhythm: Normal rate and regular rhythm. Pulmonary:      Effort: Pulmonary effort is normal. No accessory muscle usage or respiratory distress. Abdominal:      General: There is no distension. Musculoskeletal:      Cervical back: Normal range of motion. Skin:     General: Skin is warm and dry. Coloration: Skin is pale. Neurological:      Mental Status: He is alert. Mental status is at baseline. Due to the COVID-19 pandemic, in order to reduce the spread and transmission of the virus, some basic elements of the physical exam have been deferred to reduce direct or close contact with the patient unless they are deemed to be absolutely necessary, regardless of whether the virus is highly suspected or not. Diagnostic Study Results     Labs -     Recent Results (from the past 24 hour(s))   CBC WITH AUTOMATED DIFF    Collection Time: 04/02/22  1:54 PM   Result Value Ref Range    WBC 15.0 (H) 4.1 - 11.1 K/uL    RBC 1.03 (L) 4.10 - 5.70 M/uL    HGB 4.2 (LL) 12.1 - 17.0 g/dL    HCT 14.7 (LL) 36.6 - 50.3 %    .7 (H) 80.0 - 99.0 FL    MCH 40.8 (H) 26.0 - 34.0 PG    MCHC 28.6 (L) 30.0 - 36.5 g/dL    RDW 15.7 (H) 11.5 - 14.5 %    PLATELET 64 (L) 848 - 400 K/uL    MPV 10.1 8.9 - 12.9 FL    NRBC 0.0 0  WBC    ABSOLUTE NRBC 0.00 0.00 - 0.01 K/uL    NEUTROPHILS 14 (L) 32 - 75 %    BAND NEUTROPHILS 1 %    LYMPHOCYTES 79 (H) 12 - 49 %    MONOCYTES 3 (L) 5 - 13 %    EOSINOPHILS 3 0 - 7 %    BASOPHILS 0 0 - 1 %    IMMATURE GRANULOCYTES 0 0.0 - 0.5 %    ABS. NEUTROPHILS 2.3 1.8 - 8.0 K/UL    ABS. LYMPHOCYTES 11.7 (H) 0.8 - 3.5 K/UL    ABS. MONOCYTES 0.5 0.0 - 1.0 K/UL    ABS. EOSINOPHILS 0.5 (H) 0.0 - 0.4 K/UL    ABS. BASOPHILS 0.0 0.0 - 0.1 K/UL    ABS. IMM. GRANS. 0.0 0.00 - 0.04 K/UL    DF MANUAL      RBC COMMENTS MACROCYTOSIS  1+        RBC COMMENTS ANISOCYTOSIS  PRESENT        WBC COMMENTS ATYPICAL LYMPHOCYTES PRESENT     METABOLIC PANEL, COMPREHENSIVE    Collection Time: 04/02/22  1:54 PM   Result Value Ref Range    Sodium 140 136 - 145 mmol/L    Potassium 4.4 3.5 - 5.1 mmol/L    Chloride 111 (H) 97 - 108 mmol/L    CO2 21 21 - 32 mmol/L    Anion gap 8 5 - 15 mmol/L    Glucose 140 (H) 65 - 100 mg/dL    BUN 24 (H) 6 - 20 MG/DL    Creatinine 0.80 0.70 - 1.30 MG/DL    BUN/Creatinine ratio 30 (H) 12 - 20      GFR est AA >60 >60 ml/min/1.73m2    GFR est non-AA >60 >60 ml/min/1.73m2    Calcium 8.5 8.5 - 10.1 MG/DL    Bilirubin, total 0.4 0.2 - 1.0 MG/DL    ALT (SGPT) 11 (L) 12 - 78 U/L    AST (SGOT) 11 (L) 15 - 37 U/L    Alk. phosphatase 65 45 - 117 U/L    Protein, total 6.2 (L) 6.4 - 8.2 g/dL    Albumin 2.6 (L) 3.5 - 5.0 g/dL    Globulin 3.6 2.0 - 4.0 g/dL    A-G Ratio 0.7 (L) 1.1 - 2.2     TYPE & SCREEN    Collection Time: 04/02/22  1:54 PM   Result Value Ref Range    Crossmatch Expiration 04/05/2022,2359     ABO/Rh(D) A POSITIVE     Antibody screen NEG     Unit number [id number]     Blood component type RC LR     Unit division 00     Status of unit ALLOCATED     Crossmatch result Compatible    RBC, ALLOCATE    Collection Time: 04/02/22  2:30 PM   Result Value Ref Range    HISTORY CHECKED? Historical check performed        Radiologic Studies -   DUPLEX LOWER EXT VENOUS BILAT    (Results Pending)   US ABD COMP    (Results Pending)     CT Results  (Last 48 hours)    None        CXR Results  (Last 48 hours)    None            Medical Decision Making     I reviewed the vital signs, available nursing notes, past medical history, past surgical history, family history and social history.     Vital Signs-I have reviewed the vital signs that have been made available during the patient's emergency department visit. The vital signs auto-populated below are obtained mostly by electronic means through monitoring devices that have been downloaded into the patient's chart by the nursing staff. Some vital signs are not downloaded into the chart until after the patient has been discharged and this note has been completed, therefore some vital signs may not be available to the physician for review prior to patient's discharge or admission. The physician has reviewed the patient's triage vital signs, monitored the electronic monitoring devices remotely for any significant vital sign abnormalities, and have reviewed vital signs prior to discharge. Some vital signs reviewed at bedside or remotely utilizing electronic monitoring devices may be different than the vital signs downloaded into the electronic medical record. Some vital signs may be erroneous and inaccurate since they are obtained by electronic monitoring devices, and not all vital signs are verified for accuracy by nursing staff prior to downloading into the patient's chart. Patient Vitals for the past 24 hrs:   Temp Pulse Resp BP SpO2   04/02/22 1522 97.9 °F (36.6 °C) 83 16 (!) 122/55 97 %   04/02/22 1441 98 °F (36.7 °C)  16 (!) 119/54 96 %   04/02/22 1426    (!) 116/58 97 %   04/02/22 1326 98.6 °F (37 °C) 84 16 (!) 108/58 97 %         Records Reviewed: Nursing notes for today's visit have been reviewed. I have also reviewed most recent medical records pertinent to today's complaints, if available in our medical record system. I have also reviewed all labs and imaging results from previous results in comparison to results obtained today. If an EKG was obtained today, it has been compared to previous EKGs, if available.   If arriving via EMS, the EMS report has been reviewed if made available to us within the patient's time in the emergency department. Provider Notes (Medical Decision Making):   Patient with a history of occult bleeding in the GI system presents with anemia. This was found on routine labs performed by primary care. Is likely been anemic for some time. He will need blood transfusion which she will likely need several units. He is vitally stable. Mother has not noted any melena or bright red blood per rectum. No hematemesis. Recommend admission as he will need several units. Hospitalist requesting GI consultation. Other labs look unremarkable. I have discussed with the parent the rationale for blood component transfusion; its benefits in treating or preventing fatigue, organ damage, or death; and its risk which includes mild transfusion reactions, rare risk of blood borne infection, or more serious but rare reactions. I have discussed the alternatives to transfusion, including the risk and consequences of not receiving transfusion. The parent had an opportunity to ask questions and had agreed to proceed with transfusion of blood components. ED Course:   Initial assessment performed. The patients presenting problems have been discussed, and they are in agreement with the care plan formulated and outlined with them. I have encouraged them to ask questions as they arise throughout their visit.          Orders Placed This Encounter    US ABD COMP    CBC WITH AUTOMATED DIFF    METABOLIC PANEL, COMPREHENSIVE    METABOLIC PANEL, BASIC    HGB & HCT    OCCULT BLOOD, STOOL    URINALYSIS W/ REFLEX CULTURE    VITAMIN B12    FERRITIN    FOLATE    IRON PROFILE    PATHOLOGIST REVIEW    ADULT DIET Clear Liquid; 3 carb choices (45 gm/meal)    TRANSFUSE PACKED RBC'S, 1 Units    CARDIAC MONITORING    VITAL SIGNS    NOTIFY PROVIDER: SPECIFY Notify physician for pulse less than 50 or greater than 120, respiratory rate less than 12 or greater than 25, oral temperature greater than 101.3 F (74.6 C), systolic BP less than 90 or greater than 902, diastolic BP less. ..    ACTIVITY AS TOLERATED W/ASSIST    INTAKE AND OUTPUT    ELEVATE HEAD OF BED    APPLY/MAINTAIN SEQUENTIAL COMPRESSION DEVICE    FULL CODE    IP CONSULT TO GASTROENTEROLOGY    SAMPLE TO BLOOD BANK    TYPE & SCREEN    RBC, ALLOCATE, 1 Units Date needed for transfusion: 4/2/2022    INSERT PERIPHERAL IV ONE TIME STAT    0.9% sodium chloride infusion 250 mL    sodium chloride (NS) flush 5-40 mL    sodium chloride (NS) flush 5-40 mL    OR Linked Order Group     acetaminophen (TYLENOL) tablet 650 mg     acetaminophen (TYLENOL) suppository 650 mg    polyethylene glycol (MIRALAX) packet 17 g    OR Linked Order Group     ondansetron (ZOFRAN ODT) tablet 4 mg     ondansetron (ZOFRAN) injection 4 mg    pantoprazole (PROTONIX) 40 mg in 0.9% sodium chloride 10 mL injection    divalproex DR (DEPAKOTE) tablet 356 mg    folic acid (FOLVITE) tablet 1 mg    DISCONTD: pioglitazone (ACTOS) tablet 45 mg    topiramate (TOPAMAX) tablet 200 mg    atorvastatin (LIPITOR) tablet 40 mg    insulin lispro (HUMALOG) injection    glucose chewable tablet 16 g    glucagon (GLUCAGEN) injection 1 mg    divalproex DR (DEPAKOTE) tablet 1,000 mg    divalproex DR (DEPAKOTE) 500 mg tablet    furosemide (LASIX) injection 20 mg    IP CONSULT TO HOSPITALIST    INITIAL PHYSICIAN ORDER: INPATIENT Telemetry; Yes; 9. Other (further clarification in H&P documentation)       Procedures      Critical Care Time:   I have spent 35 minutes of critical care time in evaluating and treating this patient. This includes time spent at bedside, time with family and decision makers, documentation, review of labs and imaging, and/or consultation with specialists. It does not include time spent on separately billed procedures.      This patient presents with a critical illness or injury that acutely impairs one or more vital organ systems such that there is a high probability of imminent or life threatening deterioration in the patient's condition. This case involved decision making of high complexity to assess, manipulate, and support vital organ system failure and/or to prevent further life threatening deterioration of the patient's condition. Failure to initiate these interventions on an urgent basis would likely result in sudden, clinically significant or life threatening deterioration in the patient's condition. Abnormal findings supporting critical care: Anemia  Interventions to support critical care: Blood transfusions  Failure to intervene may result in: Respiratory failure, cardiac failure      Disposition:  Admit      Diagnosis     Clinical Impression:   1. Acute anemia    2.  Chronic lower GI bleeding

## 2022-04-03 ENCOUNTER — APPOINTMENT (OUTPATIENT)
Dept: CT IMAGING | Age: 67
DRG: 823 | End: 2022-04-03
Attending: INTERNAL MEDICINE
Payer: MEDICARE

## 2022-04-03 LAB
ANION GAP SERPL CALC-SCNC: 7 MMOL/L (ref 5–15)
BASOPHILS # BLD: 0 K/UL (ref 0–0.1)
BASOPHILS NFR BLD: 0 % (ref 0–1)
BUN SERPL-MCNC: 22 MG/DL (ref 6–20)
BUN/CREAT SERPL: 31 (ref 12–20)
CALCIUM SERPL-MCNC: 8.3 MG/DL (ref 8.5–10.1)
CHLORIDE SERPL-SCNC: 112 MMOL/L (ref 97–108)
CO2 SERPL-SCNC: 21 MMOL/L (ref 21–32)
CREAT SERPL-MCNC: 0.7 MG/DL (ref 0.7–1.3)
DIFFERENTIAL METHOD BLD: ABNORMAL
EOSINOPHIL # BLD: 0.5 K/UL (ref 0–0.4)
EOSINOPHIL NFR BLD: 3 % (ref 0–7)
ERYTHROCYTE [DISTWIDTH] IN BLOOD BY AUTOMATED COUNT: 15.7 % (ref 11.5–14.5)
FERRITIN SERPL-MCNC: 92 NG/ML (ref 26–388)
FOLATE SERPL-MCNC: 37.1 NG/ML (ref 5–21)
GLUCOSE BLD STRIP.AUTO-MCNC: 134 MG/DL (ref 65–117)
GLUCOSE BLD STRIP.AUTO-MCNC: 88 MG/DL (ref 65–117)
GLUCOSE BLD STRIP.AUTO-MCNC: 97 MG/DL (ref 65–117)
GLUCOSE BLD STRIP.AUTO-MCNC: 99 MG/DL (ref 65–117)
GLUCOSE SERPL-MCNC: 81 MG/DL (ref 65–100)
HCT VFR BLD AUTO: 14.7 % (ref 36.6–50.3)
HCT VFR BLD AUTO: 15.8 % (ref 36.6–50.3)
HGB BLD-MCNC: 4.2 G/DL (ref 12.1–17)
HGB BLD-MCNC: 4.6 G/DL (ref 12.1–17)
HGB BLD-MCNC: 7.8 G/DL (ref 12.1–17)
HISTORY CHECKED?,CKHIST: NORMAL
IMM GRANULOCYTES # BLD AUTO: 0 K/UL (ref 0–0.04)
IMM GRANULOCYTES NFR BLD AUTO: 0 % (ref 0–0.5)
IRON SATN MFR SERPL: 68 % (ref 20–50)
IRON SERPL-MCNC: 191 UG/DL (ref 35–150)
LYMPHOCYTES # BLD: 11.7 K/UL (ref 0.8–3.5)
LYMPHOCYTES NFR BLD: 79 % (ref 12–49)
MCH RBC QN AUTO: 40.8 PG (ref 26–34)
MCHC RBC AUTO-ENTMCNC: 28.6 G/DL (ref 30–36.5)
MCV RBC AUTO: 142.7 FL (ref 80–99)
MONOCYTES # BLD: 0.5 K/UL (ref 0–1)
MONOCYTES NFR BLD: 3 % (ref 5–13)
NEUTS BAND NFR BLD MANUAL: 1 %
NEUTS SEG # BLD: 2.3 K/UL (ref 1.8–8)
NEUTS SEG NFR BLD: 14 % (ref 32–75)
NRBC # BLD: 0 K/UL (ref 0–0.01)
NRBC BLD-RTO: 0 PER 100 WBC
PATH REV BLD -IMP: ABNORMAL
PATH REV BLD -IMP: NORMAL
PLATELET # BLD AUTO: 64 K/UL (ref 150–400)
PMV BLD AUTO: 10.1 FL (ref 8.9–12.9)
POTASSIUM SERPL-SCNC: 4 MMOL/L (ref 3.5–5.1)
RBC # BLD AUTO: 1.03 M/UL (ref 4.1–5.7)
RBC MORPH BLD: ABNORMAL
RBC MORPH BLD: ABNORMAL
SERVICE CMNT-IMP: ABNORMAL
SERVICE CMNT-IMP: NORMAL
SODIUM SERPL-SCNC: 140 MMOL/L (ref 136–145)
TIBC SERPL-MCNC: 282 UG/DL (ref 250–450)
VIT B12 SERPL-MCNC: 426 PG/ML (ref 193–986)
WBC # BLD AUTO: 15 K/UL (ref 4.1–11.1)
WBC MORPH BLD: ABNORMAL

## 2022-04-03 PROCEDURE — 36415 COLL VENOUS BLD VENIPUNCTURE: CPT

## 2022-04-03 PROCEDURE — 74178 CT ABD&PLV WO CNTR FLWD CNTR: CPT

## 2022-04-03 PROCEDURE — 80048 BASIC METABOLIC PNL TOTAL CA: CPT

## 2022-04-03 PROCEDURE — 74011000636 HC RX REV CODE- 636: Performed by: INTERNAL MEDICINE

## 2022-04-03 PROCEDURE — 83540 ASSAY OF IRON: CPT

## 2022-04-03 PROCEDURE — 82962 GLUCOSE BLOOD TEST: CPT

## 2022-04-03 PROCEDURE — 82728 ASSAY OF FERRITIN: CPT

## 2022-04-03 PROCEDURE — 36430 TRANSFUSION BLD/BLD COMPNT: CPT

## 2022-04-03 PROCEDURE — 82746 ASSAY OF FOLIC ACID SERUM: CPT

## 2022-04-03 PROCEDURE — 74011000250 HC RX REV CODE- 250: Performed by: INTERNAL MEDICINE

## 2022-04-03 PROCEDURE — P9016 RBC LEUKOCYTES REDUCED: HCPCS

## 2022-04-03 PROCEDURE — 74011250637 HC RX REV CODE- 250/637: Performed by: INTERNAL MEDICINE

## 2022-04-03 PROCEDURE — 85018 HEMOGLOBIN: CPT

## 2022-04-03 PROCEDURE — 74011250636 HC RX REV CODE- 250/636: Performed by: INTERNAL MEDICINE

## 2022-04-03 PROCEDURE — 2709999900 HC NON-CHARGEABLE SUPPLY

## 2022-04-03 PROCEDURE — 74011250637 HC RX REV CODE- 250/637: Performed by: NURSE PRACTITIONER

## 2022-04-03 PROCEDURE — 82607 VITAMIN B-12: CPT

## 2022-04-03 PROCEDURE — 65660000000 HC RM CCU STEPDOWN

## 2022-04-03 RX ORDER — PANTOPRAZOLE SODIUM 40 MG/1
40 TABLET, DELAYED RELEASE ORAL
Status: DISCONTINUED | OUTPATIENT
Start: 2022-04-03 | End: 2022-04-25 | Stop reason: HOSPADM

## 2022-04-03 RX ORDER — LEVOFLOXACIN 5 MG/ML
750 INJECTION, SOLUTION INTRAVENOUS EVERY 24 HOURS
Status: COMPLETED | OUTPATIENT
Start: 2022-04-03 | End: 2022-04-09

## 2022-04-03 RX ORDER — GUAIFENESIN 100 MG/5ML
200 SOLUTION ORAL
Status: DISCONTINUED | OUTPATIENT
Start: 2022-04-03 | End: 2022-04-25 | Stop reason: HOSPADM

## 2022-04-03 RX ORDER — SODIUM CHLORIDE 9 MG/ML
250 INJECTION, SOLUTION INTRAVENOUS AS NEEDED
Status: DISCONTINUED | OUTPATIENT
Start: 2022-04-03 | End: 2022-04-04 | Stop reason: SDUPTHER

## 2022-04-03 RX ORDER — METRONIDAZOLE 500 MG/100ML
500 INJECTION, SOLUTION INTRAVENOUS EVERY 12 HOURS
Status: DISPENSED | OUTPATIENT
Start: 2022-04-03 | End: 2022-04-09

## 2022-04-03 RX ADMIN — PANTOPRAZOLE SODIUM 40 MG: 40 TABLET, DELAYED RELEASE ORAL at 09:50

## 2022-04-03 RX ADMIN — DIVALPROEX SODIUM 1000 MG: 500 TABLET, DELAYED RELEASE ORAL at 22:13

## 2022-04-03 RX ADMIN — GUAIFENESIN 200 MG: 200 SOLUTION ORAL at 04:57

## 2022-04-03 RX ADMIN — ATORVASTATIN CALCIUM 40 MG: 40 TABLET, FILM COATED ORAL at 09:50

## 2022-04-03 RX ADMIN — Medication 1 LOZENGE: at 06:04

## 2022-04-03 RX ADMIN — METRONIDAZOLE 500 MG: 500 INJECTION, SOLUTION INTRAVENOUS at 22:18

## 2022-04-03 RX ADMIN — LEVOFLOXACIN 750 MG: 5 INJECTION, SOLUTION INTRAVENOUS at 20:39

## 2022-04-03 RX ADMIN — TOPIRAMATE 200 MG: 100 TABLET, FILM COATED ORAL at 18:15

## 2022-04-03 RX ADMIN — GUAIFENESIN 200 MG: 200 SOLUTION ORAL at 22:13

## 2022-04-03 RX ADMIN — SODIUM CHLORIDE, PRESERVATIVE FREE 10 ML: 5 INJECTION INTRAVENOUS at 05:00

## 2022-04-03 RX ADMIN — Medication 1 LOZENGE: at 22:14

## 2022-04-03 RX ADMIN — DIVALPROEX SODIUM 500 MG: 500 TABLET, DELAYED RELEASE ORAL at 09:50

## 2022-04-03 RX ADMIN — SODIUM CHLORIDE, PRESERVATIVE FREE 10 ML: 5 INJECTION INTRAVENOUS at 22:22

## 2022-04-03 RX ADMIN — IOPAMIDOL 100 ML: 755 INJECTION, SOLUTION INTRAVENOUS at 16:53

## 2022-04-03 RX ADMIN — FOLIC ACID 1 MG: 1 TABLET ORAL at 09:50

## 2022-04-03 RX ADMIN — TOPIRAMATE 200 MG: 100 TABLET, FILM COATED ORAL at 09:50

## 2022-04-03 RX ADMIN — SODIUM CHLORIDE, PRESERVATIVE FREE 10 ML: 5 INJECTION INTRAVENOUS at 18:16

## 2022-04-03 NOTE — CONSULTS
GI Consultation Note (for Simone Pulido)    NAME: Jose Antonio Garcia : 1955 MRN: [id number]   ATTG: Joce Hassan MD   Primary Gastroenterologist: Hans Bradford MD  PCP: Shantanu White MD  Date/Time:  4/3/2022 7:21 AM  Subjective:   REASON FOR CONSULT:      Yoana Sanders is a 73 y.o.  male w/ hx epilepsy, DM, intellectual disability/autism, and innumerable colon polyps, who I was asked to see for evaluation of anemia with chronic GIB. He was referred to ER after seeing PCP day prior with labs then noting Hgb 4.4 with no evidence of overt GIB and no prior hx of anemia. Tala Ames He and his mother denied evidence of BRBPR, hematochezia, or melena. He denies CP, SOB, or palpitations. His mother states he has been pale and looking unwell. Repeat Hgb in ER was 4.2. He has been transfused two of three units PRBC so far with Recheck Hgb 4.6. Admission WBC 15.0 and platelets 64, BUN:Cr 24/0.80, albumin 2.6, no LFT elevation. He is not on ASA or OAC. Tala Ames No evidence of liver dz on US here. There is splenomegaly and a 1.4cm hypoechoic lesion seen in the head of the pancreas. He had undergone colonoscopy for positive ColoGuard stool test with Familia Bazan on 21 demonstrating numerous sessile polyps (>40) ranging in size from 3 mm to 15 mm through colon which were sampled demonstrating adenomatous polyps and suspicion for attenuated FAP (familial adenomatous polyposis syndrome) in addition to cecal focal colitis, and medium sized internal hemorrhoid seen on retroflexion. Recommendations were for consideration for genetic testing and referral for colectomy, which mother was hesitant to pursue. He completed his genetic testing, which was reviewed, noting that he did not inherit a known harmful mutation associated with increased cancer or polyp risk in any of the 30genes examined. He did a subtle change, called a Variant of Uncertain Significance (VUS), in one copy of the MSH6 gene. This change or variant was found on one copy of the MSH6 gene (not both copies of MSH6). There is not enough evidence to determine the role of this MSH6 variant in cancer risk. It might be a complete harmless finding or normal genetic variation. This was reviewed with the CRS who note that VUS are not used to guide colectomy. Per mother the plan was to perform repeat colonoscopies for removal of polyps and frequent surveillance and that she was instructed to call Dr. Sid Smith office and schedule an appointment for follow up for 3 months. Past Medical History:   Diagnosis Date    Diabetes ([facility] [address]     Epilepsy ([facility] [address]     Seizures ([facility] [address]       Past Surgical History:   Procedure Laterality Date    COLONOSCOPY N/A 12/1/2021    COL[facility]OS[facility] performed by Irving [facility] MD [facility] ENDOSCOPY. Multiple sessile polyps (>30). Medium-sized int hemorrhoids. Social History     Tobacco Use    Smoking status: Never Smoker    Smokeless tobacco: Never Used   Substance Use Topics    Alcohol use: Never      Family History   Problem Relation Age of Onset    Hypertension Mother     Cancer Father         unknown      No Known Allergies   Home Medications:  Prior to Admission Medications   Prescriptions Last Dose Informant Patient Reported? Taking? Calcium-Cholecalciferol, D3, 600 mg(1,500mg) -400 unit chew 4/2/2022 at Unknown time  Yes Yes   Sig: Take  by mouth. atorvastatin (Lipitor) 40 mg tablet 4/1/2022 at Unknown time  Yes Yes   Sig: Take 40 mg by mouth daily. chlorhexidine (PERIDEX) 0.12 % solution Unknown at Unknown time  Yes No   Sig: 15 mL by Swish and Spit route every twelve (12) hours. divalproex DR (DEPAKOTE) 500 mg tablet 4/1/2022 at Unknown time  Yes Yes   Sig: Take 1,000 mg by mouth nightly. divalproex sodium (DEPAKOTE PO) 4/2/2022 at Unknown time  Yes Yes   Sig: Take 500 mg by mouth daily. folic acid (FOLVITE) 1 mg tablet 4/2/2022 at Unknown time  Yes Yes   Sig: Take  by mouth daily.    furosemide (LASIX) 40 mg tablet 4/2/2022 at Unknown time  Yes Yes   Sig: Take 20 mg by mouth daily. glimepiride (AMARYL) 4 mg tablet 4/2/2022 at Unknown time  Yes Yes   Sig: Take 8 mg by mouth every morning. linaGLIPtin (Tradjenta) 5 mg tablet 4/2/2022 at Unknown time  Yes Yes   Sig: Take 5 mg by mouth daily. metFORMIN (GLUCOPHAGE) 500 mg tablet 4/2/2022 at Unknown time  Yes Yes   Sig: Take 1,000 mg by mouth two (2) times daily (with meals). pioglitazone (ACTOS) 15 mg tablet 4/2/2022 at Unknown time  Yes Yes   Sig: Take 45 mg by mouth. topiramate (TOPAMAX) 200 mg tablet 4/2/2022 at Unknown time  Yes Yes   Sig: Take 300 mg by mouth two (2) times a day. triamcinolone acetonide 0.025 % lotion 4/2/2022 at Unknown time  Yes Yes   Sig: Apply  to affected area two (2) times a day.       Facility-Administered Medications: None     Hospital medications:  Current Facility-Administered Medications   Medication Dose Route Frequency    0.9% sodium chloride infusion 250 mL  250 mL IntraVENous PRN    benzocaine-menthoL (CHLORASEPTIC MAX) lozenge 1 Lozenge  1 Lozenge Oral Q2H PRN    guaiFENesin (ROBITUSSIN) 100 mg/5 mL oral liquid 200 mg  200 mg Oral Q4H PRN    0.9% sodium chloride infusion 250 mL  250 mL IntraVENous PRN    sodium chloride (NS) flush 5-40 mL  5-40 mL IntraVENous Q8H    sodium chloride (NS) flush 5-40 mL  5-40 mL IntraVENous PRN    acetaminophen (TYLENOL) tablet 650 mg  650 mg Oral Q6H PRN    Or    acetaminophen (TYLENOL) suppository 650 mg  650 mg Rectal Q6H PRN    polyethylene glycol (MIRALAX) packet 17 g  17 g Oral DAILY PRN    ondansetron (ZOFRAN ODT) tablet 4 mg  4 mg Oral Q8H PRN    Or    ondansetron (ZOFRAN) injection 4 mg  4 mg IntraVENous Q6H PRN    pantoprazole (PROTONIX) 40 mg in 0.9% sodium chloride 10 mL injection  40 mg IntraVENous Q12H    divalproex DR (DEPAKOTE) tablet 500 mg  500 mg Oral DAILY    folic acid (FOLVITE) tablet 1 mg  1 mg Oral DAILY    topiramate (TOPAMAX) tablet 200 mg  200 mg Oral BID    atorvastatin (LIPITOR) tablet 40 mg  40 mg Oral DAILY    insulin lispro (HUMALOG) injection   SubCUTAneous AC&HS    glucose chewable tablet 16 g  4 Tablet Oral PRN    glucagon (GLUCAGEN) injection 1 mg  1 mg IntraMUSCular PRN    divalproex DR (DEPAKOTE) tablet 1,000 mg  1,000 mg Oral QHS    furosemide (LASIX) injection 20 mg  20 mg IntraVENous DAILY     REVIEW OF SYSTEMS:     [x]    Total of 11 systems reviewed as follows:  Const:  negative fever, negative chills, negative weight loss  Eyes:   negative diplopia or visual changes, negative eye pain  ENT:   negative coryza, negative sore throat  Resp:   negative cough, hemoptysis, dyspnea  Cards:  negative for chest pain, palpitations, lower extremity edema  :  negative for frequency, dysuria and hematuria  Skin:   negative for rash and pruritus  Heme:  negative for easy bruising and gum/nose bleeding  MS:  negative for myalgias, arthralgias, back pain and muscle weakness  Neurolo:  negative for headaches, dizziness, vertigo, memory problems   Psych:  negative for feelings of anxiety, depression     Pertinent Positives include :    Objective:   VITALS:    Visit Vitals  BP (!) 107/58 (BP 1 Location: Right upper arm, BP Patient Position: At rest)   Pulse 69   Temp 98.2 °F (36.8 °C)   Resp 17   Ht 6' (1.829 m)   Wt 88.5 kg (195 lb)   SpO2 100%   BMI 26.45 kg/m²     Temp (24hrs), Av.1 °F (36.7 °C), Min:97.3 °F (36.3 °C), Max:99 °F (37.2 °C)    PHYSICAL EXAM:   General:    Alert, cooperative, no distress, appears stated age. Head:   Normocephalic, without obvious abnormality, atraumatic. Eyes:   Conjunctivae clear, anicteric sclerae. Pupils are equal  Nose:  Nares normal. No drainage or sinus tenderness. Throat:    Lips, mucosa, and tongue normal.  No Thrush  Neck:  Supple, symmetrical,  no adenopathy, thyroid: non tender  Back:    Symmetric,  No CVA tenderness. Lungs:   CTA bilaterally. No wheezing/rhonchi/rales.   Chest wall:  No tenderness or deformity. No Accessory muscle use. Heart:   Regular rate and rhythm,  no murmur, rub or gallop. Abdomen:   Soft, non-tender. Not distended. Bowel sounds normal. No masses  Extremities: Atraumatic, No cyanosis. No edema. No clubbing  Skin:     Texture, turgor normal. No rashes/lesions/jaundice  Lymph: Cervical, supraclavicular normal.  Psych:  Good insight. Not depressed. Not anxious or agitated. Neurologic: EOMs intact. No facial asymmetry/aphasia/slurred speech. Normal strength, A/O X name, place, but not purpose or time. LAB DATA REVIEWED:    Recent Results (from the past 48 hour(s))   CBC WITH AUTOMATED DIFF    Collection Time: 04/02/22  1:54 PM   Result Value Ref Range    WBC 15.0 (H) 4.1 - 11.1 K/uL    RBC 1.03 (L) 4.10 - 5.70 M/uL    HGB 4.2 (LL) 12.1 - 17.0 g/dL    HCT 14.7 (LL) 36.6 - 50.3 %    .7 (H) 80.0 - 99.0 FL    MCH 40.8 (H) 26.0 - 34.0 PG    MCHC 28.6 (L) 30.0 - 36.5 g/dL    RDW 15.7 (H) 11.5 - 14.5 %    PLATELET 64 (L) 632 - 400 K/uL    MPV 10.1 8.9 - 12.9 FL    NRBC 0.0 0  WBC    ABSOLUTE NRBC 0.00 0.00 - 0.01 K/uL    NEUTROPHILS 14 (L) 32 - 75 %    BAND NEUTROPHILS 1 %    LYMPHOCYTES 79 (H) 12 - 49 %    MONOCYTES 3 (L) 5 - 13 %    EOSINOPHILS 3 0 - 7 %    BASOPHILS 0 0 - 1 %    IMMATURE GRANULOCYTES 0 0.0 - 0.5 %    ABS. NEUTROPHILS 2.3 1.8 - 8.0 K/UL    ABS. LYMPHOCYTES 11.7 (H) 0.8 - 3.5 K/UL    ABS. MONOCYTES 0.5 0.0 - 1.0 K/UL    ABS. EOSINOPHILS 0.5 (H) 0.0 - 0.4 K/UL    ABS. BASOPHILS 0.0 0.0 - 0.1 K/UL    ABS. IMM.  GRANS. 0.0 0.00 - 0.04 K/UL    DF MANUAL      RBC COMMENTS MACROCYTOSIS  1+        RBC COMMENTS ANISOCYTOSIS  PRESENT        WBC COMMENTS ATYPICAL LYMPHOCYTES PRESENT     METABOLIC PANEL, COMPREHENSIVE    Collection Time: 04/02/22  1:54 PM   Result Value Ref Range    Sodium 140 136 - 145 mmol/L    Potassium 4.4 3.5 - 5.1 mmol/L    Chloride 111 (H) 97 - 108 mmol/L    CO2 21 21 - 32 mmol/L    Anion gap 8 5 - 15 mmol/L    Glucose 140 (H) 65 - 100 mg/dL BUN 24 (H) 6 - 20 MG/DL    Creatinine 0.80 0.70 - 1.30 MG/DL    BUN/Creatinine ratio 30 (H) 12 - 20      GFR est AA >60 >60 ml/min/1.73m2    GFR est non-AA >60 >60 ml/min/1.73m2    Calcium 8.5 8.5 - 10.1 MG/DL    Bilirubin, total 0.4 0.2 - 1.0 MG/DL    ALT (SGPT) 11 (L) 12 - 78 U/L    AST (SGOT) 11 (L) 15 - 37 U/L    Alk. phosphatase 65 45 - 117 U/L    Protein, total 6.2 (L) 6.4 - 8.2 g/dL    Albumin 2.6 (L) 3.5 - 5.0 g/dL    Globulin 3.6 2.0 - 4.0 g/dL    A-G Ratio 0.7 (L) 1.1 - 2.2     TYPE & SCREEN    Collection Time: 04/02/22  1:54 PM   Result Value Ref Range    Crossmatch Expiration 04/05/2022,2359     ABO/Rh(D) A POSITIVE     Antibody screen NEG     Unit number [id number]     Blood component type [facility]     Unit division 00     Status of unit ISSUED     Crossmatch result Compatible     Unit number [id number]     Blood component type [facility]     Unit division 00     Status of unit ISSUED     Crossmatch result Compatible     Unit number [id number]     Blood component type [facility]     Unit division 00     Status of unit ALLOCATED     Crossmatch result Compatible    RBC, ALLOCATE    Collection Time: 04/02/22  2:30 PM   Result Value Ref Range    HISTORY CHECKED?  Historical check performed    GLUCOSE, POC    Collection Time: 04/02/22  4:56 PM   Result Value Ref Range    Glucose (POC) 150 (H) 65 - 117 mg/dL    Performed by Jp Tyson    URINALYSIS W/ REFLEX CULTURE    Collection Time: 04/02/22  7:16 PM    Specimen: Urine   Result Value Ref Range    Color YELLOW/STRAW      Appearance CLEAR CLEAR      Specific gravity 1.015 1.003 - 1.030      pH (UA) 7.5 5.0 - 8.0      Protein Negative NEG mg/dL    Glucose Negative NEG mg/dL    Ketone Negative NEG mg/dL    Bilirubin Negative NEG      Blood Negative NEG      Urobilinogen 1.0 0.2 - 1.0 EU/dL    Nitrites Negative NEG      Leukocyte Esterase Negative NEG      WBC 0-4 0 - 4 /hpf    RBC 0-5 0 - 5 /hpf    Epithelial cells FEW FEW /lpf    Bacteria Negative NEG /hpf UA: UC IF INDICATED CULTURE NOT INDICATED BY UA RESULT CNI      Hyaline cast 0-2 0 - 5 /lpf   GLUCOSE, POC    Collection Time: 04/02/22  9:21 PM   Result Value Ref Range    Glucose (POC) 105 65 - 117 mg/dL    Performed by Tunde Lord RN    HGB & HCT    Collection Time: 04/03/22  1:14 AM   Result Value Ref Range    HGB 4.6 (LL) 12.1 - 17.0 g/dL    HCT 15.8 (LL) 36.6 - 71.2 %   METABOLIC PANEL, BASIC    Collection Time: 04/03/22  1:14 AM   Result Value Ref Range    Sodium 140 136 - 145 mmol/L    Potassium 4.0 3.5 - 5.1 mmol/L    Chloride 112 (H) 97 - 108 mmol/L    CO2 21 21 - 32 mmol/L    Anion gap 7 5 - 15 mmol/L    Glucose 81 65 - 100 mg/dL    BUN 22 (H) 6 - 20 MG/DL    Creatinine 0.70 0.70 - 1.30 MG/DL    BUN/Creatinine ratio 31 (H) 12 - 20      GFR est AA >60 >60 ml/min/1.73m2    GFR est non-AA >60 >60 ml/min/1.73m2    Calcium 8.3 (L) 8.5 - 10.1 MG/DL   RBC, ALLOCATE    Collection Time: 04/03/22  2:00 AM   Result Value Ref Range    HISTORY CHECKED? Historical check performed      IMAGING RESULTS:   [x]      I have personally reviewed the actual   []    CXR  []    CT  [x]     US  US ABD COMP 4/2/22  FINDINGS:  LIVER:   The liver is normal in echotexture with no mass or other focal abnormality. LIVER VASCULATURE:   The portal vein flow is normal, toward the liver.   GALLBLADDER:  The gallbladder is contracted. No gallstone is evident . COMMON BILE DUCT:  There is no biliary duct dilatation and the common duct measures 3 mm in  diameter. PANCREAS:  There is a 1.0 x 1.4 x 1.2 cm hypoechoic lesion in the head of the pancreas. SPLEEN:  The spleen is enlarged, measuring 18.5 cm in length with estimated volume of  1122 mL.     IMPRESSION  1. Nonspecific hypoechoic lesion in the head of the pancreas measuring 1.4 cm in  size. 2. Splenomegaly with length of 18.5 cm an estimated volume of 11 22 mL. 3. Contracted gallbladder. 4. Normal appearance of liver.     Recommendations/Plan:      Active Problems:    Anemia (4/2/2022)       ___________________________________________________  RECOMMENDATIONS:    71yo M with innumerable adenomatous colon polyps with newly noted profound anemia concerning for chronic blood loss. He has received 2 units of PRBC with little improvement, but there is no overt bleeding noted. There is no evidence of upper GI bleeding.   Will need further evaluation of abnl pancreatic lesion on US with CT  Plan:  1) Continue to transfuse PRBC to get Hgb >7  2) Recheck CBC tomorrow to follow WBC and plt   3) Daily PPI  4) Check pending B12, Fol, Fe panel  5) Allow CLD, but will not make NPO or advance until Hgb improved and discussion with mother can be had to determine if colectomy can be considered  6) The innumerable polyps cannot be cleared by repeated colonoscopy and will recur making this an ongoing problem that will not be solved by colonoscopic harvesting  7) Will need EGD and consider for use of side viewing scope to evaluate the ampulla to assess for SB adenomas  8) Arrange for CT abd/pelvis to eval pancreatic lesion and anemia     will assume consultation role in AM.    Reviewed Code Status:    [x]    Full Code      []    DNR    ___________________________________________________  Care Plan discussed with:    [x]    Patient   []    Family   [x]    Nursing   []    Attending  Total Time :  50   minutes   ___________________________________________________  GI: Humberto Chou MD

## 2022-04-03 NOTE — PROGRESS NOTES
Problem: Falls - Risk of  Goal: *Absence of Falls  Description: Document Dennis Snyder Fall Risk and appropriate interventions in the flowsheet.   Outcome: Progressing Towards Goal  Note: Fall Risk Interventions:  Mobility Interventions: Bed/chair exit alarm,Communicate number of staff needed for ambulation/transfer,PT Consult for mobility concerns,Utilize walker, cane, or other assistive device,OT consult for ADLs    Mentation Interventions: Adequate sleep, hydration, pain control,Bed/chair exit alarm,Door open when patient unattended,Familiar objects from home,More frequent rounding,Reorient patient,Toileting rounds,Update white board,Room close to nurse's station    Medication Interventions: Bed/chair exit alarm,Evaluate medications/consider consulting pharmacy,Patient to call before getting OOB,Teach patient to arise slowly    Elimination Interventions: Call light in reach,Bed/chair exit alarm,Patient to call for help with toileting needs,Stay With Me (per policy),Toileting schedule/hourly rounds    History of Falls Interventions: Bed/chair exit alarm,Door open when patient unattended,Room close to nurse's station

## 2022-04-03 NOTE — PROGRESS NOTES
Clinical Pharmacy Note: Metronidazole Dosing    Please note that the metronidazole dose for Nguyen Lockhart has been changed to 500 mg IV q12h per [facility]-approved protocol. Please contact the pharmacy with any questions.     David Garcia

## 2022-04-03 NOTE — PROGRESS NOTES
Labs showed marked anemia with Hg 4.4, Hct 13.7. WBC high at 16.3 K, elevated myelocytes and metamyelocytes, low platelet count at 56 K. He was sent to ED and is presently hospitalized. Has known myelodysplastic syndrome. Normal kidney and liver tests, lipid panel (tot chol 92, LDL 49), vitamin B12, and folate. Slightly low vitamin D. Fasting glucose 106.

## 2022-04-03 NOTE — PROGRESS NOTES
Hospitalist Progress Note    NAME: Eduarda Burkett   :  1955   MRN:  [id number]       Assessment / Plan:  History of multiple problems with biopsy showed adenoma  Acute anemia likely due to GI bleed  Pancreatic lesion  Hemoglobin 4.2 on admission. Currently receiving blood for hgb 4.6 today  Trend H&H, continue to transfuse for hemoglobin less than 7  History recent colonoscopy was in 2021 which showed multiple polyps status post biopsied. Partial colectomy was recommended however patient's mom declined. Abd US showed pancreatic lesion. Normal liver  CT abd/pelvis to eval pancreatic lesion and anemia  Cont' IV PPI  iron panel, B12, folate pending but pt already received multiple units of blood. Order was placed on admission  Hold antihypertensive for now due to borderline low BP  CLD. Possible EGD, timing as per GI  GI is consulted     Thrombocytopenia  No recent labs. Pt's mother does not recall pt having \"other significant labs\"  CBC in the AM     LE edema  Check echo. LE duplex neg for DVT  IV Lasix 20g daily     Type 2 diabetes  Seizure disorder  Intellectual disability  SSI, hold PTA diabetic meds  Cont' Depakote, Topamax  CM to help with dispo. Pt's mother who is 81 yo is his primary care giver. She voiced needing additional help at discharge.          Code Status: Full  Surrogate Decision Maker: Patient's mother  DVT Prophylaxis: SCD  GI Prophylaxis: not indicated  Baseline: From home     Subjective:     Chief Complaint / Reason for Physician Visit  NAD. Discussed with RN events overnight.      Review of Systems:  Symptom Y/N Comments  Symptom Y/N Comments   Fever/Chills    Chest Pain     Poor Appetite    Edema     Cough    Abdominal Pain     Sputum    Joint Pain     SOB/GRULLON    Pruritis/Rash     Nausea/vomit    Tolerating PT/OT     Diarrhea    Tolerating Diet     Constipation    Other       Could NOT obtain due to:      Objective:     VITALS:   Last 24hrs VS reviewed since prior progress note. Most recent are:  Patient Vitals for the past 24 hrs:   Temp Pulse Resp BP SpO2   04/03/22 0657 98.2 °F (36.8 °C) 69 17 (!) 107/58 100 %   04/03/22 0430 97.7 °F (36.5 °C) 75 17 111/63 99 %   04/03/22 0347 99 °F (37.2 °C) 77 18 124/61 97 %   04/02/22 2357 -- 77 -- -- --   04/02/22 2234 97.4 °F (36.3 °C) 82 18 (!) 119/56 99 %   04/02/22 2010 98.6 °F (37 °C) 83 18 (!) 117/49 100 %   04/02/22 2000 -- 83 -- -- --   04/02/22 1917 98.5 °F (36.9 °C) 72 18 (!) 118/58 100 %   04/02/22 1902 97.3 °F (36.3 °C) 92 16 120/63 100 %   04/02/22 1901 97.6 °F (36.4 °C) 93 18 120/67 98 %   04/02/22 1839 -- 78 -- 123/69 --   04/02/22 1522 97.9 °F (36.6 °C) 83 16 (!) 122/55 97 %   04/02/22 1441 98 °F (36.7 °C) -- 16 (!) 119/54 96 %   04/02/22 1426 -- -- -- (!) 116/58 97 %   04/02/22 1326 98.6 °F (37 °C) 84 16 (!) 108/58 97 %       Intake/Output Summary (Last 24 hours) at 4/3/2022 1064  Last data filed at 4/3/2022 1786  Gross per 24 hour   Intake 667.5 ml   Output 1700 ml   Net -1032.5 ml        I had a face to face encounter and independently examined this patient on 4/3/2022, as outlined below:  PHYSICAL EXAM:  General: WD, WN. Alert, cooperative, no acute distress    EENT:  EOMI. Anicteric sclerae. MMM  Resp:  CTA bilaterally, no wheezing or rales. No accessory muscle use  CV:  Regular  rhythm,  ++ b/l LE edema  GI:  Soft, Non distended, Non tender. +Bowel sounds  Neurologic:  Awake, following commands. Psych:   . Not anxious nor agitated  Skin:  No rashes.   No jaundice    Reviewed most current lab test results and cultures  YES  Reviewed most current radiology test results   YES  Review and summation of old records today    NO  Reviewed patient's current orders and MAR    YES  PMH/SH reviewed - no change compared to H&P  ________________________________________________________________________  Care Plan discussed with:    Comments   Patient x    Family      RN x    Care Manager     Consultant Multidiciplinary team rounds were held today with , nursing, pharmacist and clinical coordinator. Patient's plan of care was discussed; medications were reviewed and discharge planning was addressed. ________________________________________________________________________  Total NON critical care TIME:  35   Minutes    Total CRITICAL CARE TIME Spent:   Minutes non procedure based      Comments   >50% of visit spent in counseling and coordination of care     ________________________________________________________________________  Tanja Carney MD     Procedures: see electronic medical records for all procedures/Xrays and details which were not copied into this note but were reviewed prior to creation of Plan. LABS:  I reviewed today's most current labs and imaging studies.   Pertinent labs include:  Recent Labs     04/03/22  0114 04/02/22  1354   WBC  --  15.0*   HGB 4.6* 4.2*   HCT 15.8* 14.7*   PLT  --  64*     Recent Labs     04/03/22  0114 04/02/22  1354    140   K 4.0 4.4   * 111*   CO2 21 21   GLU 81 140*   BUN 22* 24*   CREA 0.70 0.80   CA 8.3* 8.5   ALB  --  2.6*   TBILI  --  0.4   ALT  --  11*       Signed: Tanja Carney MD

## 2022-04-03 NOTE — PROGRESS NOTES
End of Shift Note    Bedside shift change report given to SHABNAM Sherman (oncoming nurse) by Jailene Bucio RN (offgoing nurse). Report included the following information SBAR, Kardex, Intake/Output, MAR, Recent Results, Cardiac Rhythm NSR to SV Rhythm and Dual Neuro Assessment    Shift worked:  7p-7a     Shift summary and any significant changes:     Pt stable, scheduled meds given, no pain complaints, no insulin coverage , 1 unit of PRBC finished and then labs drawn 2 hours post infusion completion Hgb LOW 4.6, Hct LOW 15.8 - received orders from JAZIEL Ryan for 2 units PRBC, incontinence check and pt voiding well with Condom cath in place, NO BM for occult stool overnight, Messaged NP Heide about pt's dry hacking non-productive cough and received orders for PRN Robitussin and Chloraseptic Max lozenge     Concerns for physician to address: Hgb LOW 4.6, Hct LOW 15.8 - received orders for an additional 2 units PRBC --> See above     Zone phone for oncoming shift:  [phone]       Activity:  Activity Level: Bed Rest  Number times ambulated in hallways past shift: 0  Number of times OOB to chair past shift: 0    Cardiac:   Cardiac Monitoring: Yes      Cardiac Rhythm: Sinus Rhythm    Access:   Current line(s): PIV     Genitourinary:   Urinary status: voiding, incontinent and external catheter    Respiratory:   O2 Device: None (Room air)  Chronic home O2 use?: NO  Incentive spirometer at bedside: NO       GI:  Last Bowel Movement Date: 04/01/22  Current diet:  ADULT DIET Clear Liquid; 3 carb choices (45 gm/meal)  Passing flatus: YES  Tolerating current diet: YES       Pain Management:   Patient states pain is manageable on current regimen: YES    Skin:  Troy Score: 16  Interventions: float heels, increase time out of bed, PT/OT consult, internal/external urinary devices and nutritional support     Patient Safety:  Fall Score:  Total Score: 5  Interventions: bed/chair alarm, assistive device (walker, cane, etc), gripper socks, pt to call before getting OOB and stay with me (per policy)  High Fall Risk: Yes    Length of Stay:  Expected LOS: - - -  Actual LOS: 1      Kavon Jarrell RN

## 2022-04-03 NOTE — PROGRESS NOTES
CM presented to pt's room to complete initial assessment, verify demographic information. Hx of intellectual disability. No family present at this time. CM attempted to contact pt's mother ([phone]), phone rang off the hook with no option to leave VM Confidential VM. Number on pt's communication board in room and chart. CM spoke with floor nurse, mother expected to return later today. CM to follow-up with pt's mother. Per MD's notes, mother is 79 YO and needs increased assistance at home. Pt is [employer] Medicare [employer] patient. Confidetial VM left with [employer] SHANELL Davalos, [phone]) requesting follow-up on Monday to determine CM POC to discuss additional needs for d/c planning.      Elisabet Cristobal. Neelima Willis  Manager  [phone]

## 2022-04-03 NOTE — PROGRESS NOTES
Received notification from bedside RN about patient with regards to: H/H 4.6/15.8 s/p 1 unit PRBC transfusion  VS: /56, HR 77, RR 18, O2 sat 99% on RA    Intervention given: Transfuse additional 2 units PRBC, has orders for H/H q 6 hours    0444: Notified of persistent dry hacking cough, requesting medication for relief  VS: /63, HR 75, RR 17, O2 sat 99% on RA    - Robitussin PO PRN, Chloraseptic lozenges PRN ordered

## 2022-04-04 ENCOUNTER — APPOINTMENT (OUTPATIENT)
Dept: NON INVASIVE DIAGNOSTICS | Age: 67
DRG: 823 | End: 2022-04-04
Attending: INTERNAL MEDICINE
Payer: MEDICARE

## 2022-04-04 ENCOUNTER — ANESTHESIA EVENT (OUTPATIENT)
Dept: ENDOSCOPY | Age: 67
DRG: 823 | End: 2022-04-04
Payer: MEDICARE

## 2022-04-04 ENCOUNTER — TELEPHONE (OUTPATIENT)
Dept: INTERNAL MEDICINE CLINIC | Age: 67
End: 2022-04-04

## 2022-04-04 LAB
ANION GAP SERPL CALC-SCNC: 6 MMOL/L (ref 5–15)
BUN SERPL-MCNC: 19 MG/DL (ref 6–20)
BUN/CREAT SERPL: 25 (ref 12–20)
CALCIUM SERPL-MCNC: 8 MG/DL (ref 8.5–10.1)
CHLORIDE SERPL-SCNC: 112 MMOL/L (ref 97–108)
CO2 SERPL-SCNC: 23 MMOL/L (ref 21–32)
CREAT SERPL-MCNC: 0.75 MG/DL (ref 0.7–1.3)
ECHO AO ROOT DIAM: 3.1 CM
ECHO AO ROOT INDEX: 1.47 CM/M2
ECHO AV AREA PEAK VELOCITY: 2.5 CM2
ECHO AV AREA VTI: 2.5 CM2
ECHO AV AREA/BSA PEAK VELOCITY: 1.2 CM2/M2
ECHO AV AREA/BSA VTI: 1.2 CM2/M2
ECHO AV MEAN GRADIENT: 5 MMHG
ECHO AV MEAN VELOCITY: 1 M/S
ECHO AV PEAK GRADIENT: 9 MMHG
ECHO AV PEAK VELOCITY: 1.5 M/S
ECHO AV VELOCITY RATIO: 0.8
ECHO AV VTI: 36.9 CM
ECHO LA DIAMETER INDEX: 1.71 CM/M2
ECHO LA DIAMETER: 3.6 CM
ECHO LA TO AORTIC ROOT RATIO: 1.16
ECHO LA VOL 4C: 81 ML (ref 18–58)
ECHO LA VOLUME INDEX A4C: 38 ML/M2 (ref 16–34)
ECHO LV E' LATERAL VELOCITY: 2 CM/S
ECHO LV E' SEPTAL VELOCITY: 11 CM/S
ECHO LV EDV A4C: 192 ML
ECHO LV EDV INDEX A4C: 91 ML/M2
ECHO LV EJECTION FRACTION A4C: 39 %
ECHO LV ESV A4C: 117 ML
ECHO LV ESV INDEX A4C: 55 ML/M2
ECHO LV FRACTIONAL SHORTENING: 16 % (ref 28–44)
ECHO LV INTERNAL DIMENSION DIASTOLE INDEX: 2.32 CM/M2
ECHO LV INTERNAL DIMENSION DIASTOLIC: 4.9 CM (ref 4.2–5.9)
ECHO LV INTERNAL DIMENSION SYSTOLIC INDEX: 1.94 CM/M2
ECHO LV INTERNAL DIMENSION SYSTOLIC: 4.1 CM
ECHO LV IVSD: 1.2 CM (ref 0.6–1)
ECHO LV MASS 2D: 239.9 G (ref 88–224)
ECHO LV MASS INDEX 2D: 113.7 G/M2 (ref 49–115)
ECHO LV POSTERIOR WALL DIASTOLIC: 1.3 CM (ref 0.6–1)
ECHO LV RELATIVE WALL THICKNESS RATIO: 0.53
ECHO LVOT AREA: 3.5 CM2
ECHO LVOT AV VTI INDEX: 0.75
ECHO LVOT DIAM: 2.1 CM
ECHO LVOT MEAN GRADIENT: 3 MMHG
ECHO LVOT PEAK GRADIENT: 5 MMHG
ECHO LVOT PEAK VELOCITY: 1.2 M/S
ECHO LVOT STROKE VOLUME INDEX: 45.3 ML/M2
ECHO LVOT SV: 95.5 ML
ECHO LVOT VTI: 27.6 CM
ECHO MV A VELOCITY: 1.04 M/S
ECHO MV E DECELERATION TIME (DT): 111.2 MS
ECHO MV E VELOCITY: 0.87 M/S
ECHO MV E/A RATIO: 0.84
ECHO MV E/E' LATERAL: 43.5
ECHO MV E/E' RATIO (AVERAGED): 25.7
ECHO MV E/E' SEPTAL: 7.91
ECHO RV FREE WALL PEAK S': 12 CM/S
ECHO TV REGURGITANT MAX VELOCITY: 2.47 M/S
ECHO TV REGURGITANT PEAK GRADIENT: 24 MMHG
ERYTHROCYTE [DISTWIDTH] IN BLOOD BY AUTOMATED COUNT: ABNORMAL % (ref 11.5–14.5)
FIBRINOGEN PPP-MCNC: 178 MG/DL (ref 200–475)
GLUCOSE BLD STRIP.AUTO-MCNC: 114 MG/DL (ref 65–117)
GLUCOSE BLD STRIP.AUTO-MCNC: 119 MG/DL (ref 65–117)
GLUCOSE BLD STRIP.AUTO-MCNC: 142 MG/DL (ref 65–117)
GLUCOSE BLD STRIP.AUTO-MCNC: 96 MG/DL (ref 65–117)
GLUCOSE SERPL-MCNC: 120 MG/DL (ref 65–100)
HAPTOGLOB SERPL-MCNC: 137 MG/DL (ref 30–200)
HCT VFR BLD AUTO: 20.8 % (ref 36.6–50.3)
HCT VFR BLD AUTO: 22 % (ref 36.6–50.3)
HCT VFR BLD AUTO: 25 % (ref 36.6–50.3)
HCT VFR BLD AUTO: 26.1 % (ref 36.6–50.3)
HGB BLD-MCNC: 6.7 G/DL (ref 12.1–17)
HGB BLD-MCNC: 7.1 G/DL (ref 12.1–17)
HGB BLD-MCNC: 8.1 G/DL (ref 12.1–17)
HGB BLD-MCNC: 8.5 G/DL (ref 12.1–17)
HIV 1+2 AB+HIV1 P24 AG SERPL QL IA: NONREACTIVE
HIV12 RESULT COMMENT, HHIVC: NORMAL
LDH SERPL L TO P-CCNC: 173 U/L (ref 85–241)
MCH RBC QN AUTO: 36.6 PG (ref 26–34)
MCHC RBC AUTO-ENTMCNC: 32.3 G/DL (ref 30–36.5)
MCV RBC AUTO: 113.4 FL (ref 80–99)
NRBC # BLD: 0 K/UL (ref 0–0.01)
NRBC BLD-RTO: 0 PER 100 WBC
PLATELET # BLD AUTO: 58 K/UL (ref 150–400)
PMV BLD AUTO: 9.7 FL (ref 8.9–12.9)
POTASSIUM SERPL-SCNC: 4.1 MMOL/L (ref 3.5–5.1)
RBC # BLD AUTO: 1.94 M/UL (ref 4.1–5.7)
RETICS # AUTO: 0.05 M/UL (ref 0.03–0.1)
RETICS/RBC NFR AUTO: 2.1 % (ref 0.7–2.1)
SERVICE CMNT-IMP: ABNORMAL
SERVICE CMNT-IMP: ABNORMAL
SERVICE CMNT-IMP: NORMAL
SERVICE CMNT-IMP: NORMAL
SODIUM SERPL-SCNC: 141 MMOL/L (ref 136–145)
WBC # BLD AUTO: 12.3 K/UL (ref 4.1–11.1)

## 2022-04-04 PROCEDURE — 36430 TRANSFUSION BLD/BLD COMPNT: CPT

## 2022-04-04 PROCEDURE — 74011250636 HC RX REV CODE- 250/636: Performed by: INTERNAL MEDICINE

## 2022-04-04 PROCEDURE — 83615 LACTATE (LD) (LDH) ENZYME: CPT

## 2022-04-04 PROCEDURE — 93308 TTE F-UP OR LMTD: CPT | Performed by: INTERNAL MEDICINE

## 2022-04-04 PROCEDURE — 93306 TTE W/DOPPLER COMPLETE: CPT

## 2022-04-04 PROCEDURE — 80048 BASIC METABOLIC PNL TOTAL CA: CPT

## 2022-04-04 PROCEDURE — 82784 ASSAY IGA/IGD/IGG/IGM EACH: CPT

## 2022-04-04 PROCEDURE — 74011250637 HC RX REV CODE- 250/637: Performed by: INTERNAL MEDICINE

## 2022-04-04 PROCEDURE — 36415 COLL VENOUS BLD VENIPUNCTURE: CPT

## 2022-04-04 PROCEDURE — 93321 DOPPLER ECHO F-UP/LMTD STD: CPT | Performed by: INTERNAL MEDICINE

## 2022-04-04 PROCEDURE — 85384 FIBRINOGEN ACTIVITY: CPT

## 2022-04-04 PROCEDURE — 85045 AUTOMATED RETICULOCYTE COUNT: CPT

## 2022-04-04 PROCEDURE — 83921 ORGANIC ACID SINGLE QUANT: CPT

## 2022-04-04 PROCEDURE — 83010 ASSAY OF HAPTOGLOBIN QUANT: CPT

## 2022-04-04 PROCEDURE — 74011000250 HC RX REV CODE- 250: Performed by: INTERNAL MEDICINE

## 2022-04-04 PROCEDURE — 86803 HEPATITIS C AB TEST: CPT

## 2022-04-04 PROCEDURE — 86704 HEP B CORE ANTIBODY TOTAL: CPT

## 2022-04-04 PROCEDURE — 74011636637 HC RX REV CODE- 636/637: Performed by: INTERNAL MEDICINE

## 2022-04-04 PROCEDURE — 85018 HEMOGLOBIN: CPT

## 2022-04-04 PROCEDURE — 87389 HIV-1 AG W/HIV-1&-2 AB AG IA: CPT

## 2022-04-04 PROCEDURE — P9016 RBC LEUKOCYTES REDUCED: HCPCS

## 2022-04-04 PROCEDURE — 93325 DOPPLER ECHO COLOR FLOW MAPG: CPT | Performed by: INTERNAL MEDICINE

## 2022-04-04 PROCEDURE — 85027 COMPLETE CBC AUTOMATED: CPT

## 2022-04-04 PROCEDURE — 65660000000 HC RM CCU STEPDOWN

## 2022-04-04 PROCEDURE — 82962 GLUCOSE BLOOD TEST: CPT

## 2022-04-04 RX ORDER — HYDRALAZINE HYDROCHLORIDE 20 MG/ML
10 INJECTION INTRAMUSCULAR; INTRAVENOUS
Status: DISCONTINUED | OUTPATIENT
Start: 2022-04-04 | End: 2022-04-25 | Stop reason: HOSPADM

## 2022-04-04 RX ORDER — LANOLIN ALCOHOL/MO/W.PET/CERES
1000 CREAM (GRAM) TOPICAL DAILY
Status: DISCONTINUED | OUTPATIENT
Start: 2022-04-04 | End: 2022-04-25 | Stop reason: HOSPADM

## 2022-04-04 RX ORDER — SODIUM CHLORIDE 9 MG/ML
250 INJECTION, SOLUTION INTRAVENOUS AS NEEDED
Status: DISCONTINUED | OUTPATIENT
Start: 2022-04-04 | End: 2022-04-25 | Stop reason: SDUPTHER

## 2022-04-04 RX ADMIN — METRONIDAZOLE 500 MG: 500 INJECTION, SOLUTION INTRAVENOUS at 22:58

## 2022-04-04 RX ADMIN — SODIUM CHLORIDE, PRESERVATIVE FREE 10 ML: 5 INJECTION INTRAVENOUS at 06:32

## 2022-04-04 RX ADMIN — DIVALPROEX SODIUM 500 MG: 500 TABLET, DELAYED RELEASE ORAL at 09:33

## 2022-04-04 RX ADMIN — DIVALPROEX SODIUM 1000 MG: 500 TABLET, DELAYED RELEASE ORAL at 21:30

## 2022-04-04 RX ADMIN — CYANOCOBALAMIN TAB 500 MCG 1000 MCG: 500 TAB at 11:54

## 2022-04-04 RX ADMIN — FOLIC ACID 1 MG: 1 TABLET ORAL at 09:33

## 2022-04-04 RX ADMIN — ATORVASTATIN CALCIUM 40 MG: 40 TABLET, FILM COATED ORAL at 09:33

## 2022-04-04 RX ADMIN — TOPIRAMATE 200 MG: 100 TABLET, FILM COATED ORAL at 09:33

## 2022-04-04 RX ADMIN — METRONIDAZOLE 500 MG: 500 INJECTION, SOLUTION INTRAVENOUS at 09:33

## 2022-04-04 RX ADMIN — Medication 2 UNITS: at 11:54

## 2022-04-04 RX ADMIN — TOPIRAMATE 200 MG: 100 TABLET, FILM COATED ORAL at 17:21

## 2022-04-04 RX ADMIN — IRON SUCROSE 300 MG: 20 INJECTION, SOLUTION INTRAVENOUS at 13:09

## 2022-04-04 RX ADMIN — FUROSEMIDE 20 MG: 10 INJECTION, SOLUTION INTRAMUSCULAR; INTRAVENOUS at 09:33

## 2022-04-04 RX ADMIN — SODIUM CHLORIDE, PRESERVATIVE FREE 10 ML: 5 INJECTION INTRAVENOUS at 22:37

## 2022-04-04 RX ADMIN — SODIUM CHLORIDE, PRESERVATIVE FREE 10 ML: 5 INJECTION INTRAVENOUS at 16:33

## 2022-04-04 RX ADMIN — PANTOPRAZOLE SODIUM 40 MG: 40 TABLET, DELAYED RELEASE ORAL at 09:33

## 2022-04-04 RX ADMIN — LEVOFLOXACIN 750 MG: 5 INJECTION, SOLUTION INTRAVENOUS at 21:34

## 2022-04-04 NOTE — PROGRESS NOTES
End of Shift Note    Bedside shift change report given to Ronda Rush RN (oncoming nurse) by Lacie Collins RN (offgoing nurse). Report included the following information SBAR, Kardex, Intake/Output and MAR    Shift worked:  0645-0491     Shift summary and any significant changes:     Patient stable through shift, no complaints of pain. 1 unit PRBC's given, andrea H&H 2 hours after completion of infusion, Hgb 7.8 up from 4.6 last night. All scheduled meds and frequent rounding provided, no insulin coverage needed through shift. Patient off unit in afternoon for CT of Abd/Pelvis. Mother at bedside in the afternoon. Concerns for physician to address:  Please call Pt's mother in the morning. Zone phone for oncoming shift:          Activity:  Activity Level: Bed Rest  Number times ambulated in hallways past shift: 0  Number of times OOB to chair past shift: 0    Cardiac:   Cardiac Monitoring: Yes      Cardiac Rhythm: Sinus Rhythm    Access:   Current line(s): PIV     Genitourinary:   Urinary status: voiding and external catheter    Respiratory:   O2 Device: None (Room air)  Chronic home O2 use?: NO  Incentive spirometer at bedside: NO       GI:  Last Bowel Movement Date: 04/02/22  Current diet:  ADULT DIET Clear Liquid; 3 carb choices (45 gm/meal)  Passing flatus: YES  Tolerating current diet: YES       Pain Management:   Patient states pain is manageable on current regimen: YES    Skin:  Troy Score: 15  Interventions: PT/OT consult and internal/external urinary devices    Patient Safety:  Fall Score:  Total Score: 5  Interventions: bed/chair alarm, assistive device (walker, cane, etc), gripper socks and pt to call before getting OOB  High Fall Risk: Yes    Length of Stay:  Expected LOS: - - -  Actual LOS: 1      Lacie Collins RN

## 2022-04-04 NOTE — PROGRESS NOTES
End of Shift Note    Bedside shift change report given to [place], RN (oncoming nurse) by Ary Forman RN (offgoing nurse). Report included the following information SBAR, Kardex, Intake/Output, MAR, Recent Results, Cardiac Rhythm NSR to SV Rhythm and Dual Neuro Assessment    Shift worked:  7p-7a     Shift summary and any significant changes:     Pt stable, scheduled meds given, no pain complaints, no insulin coverage BG 88, incontinence check and CHG pt voiding well with Condom cath in place, NO BM for occult stool overnight, Labs drawn and H&H performed     Concerns for physician to address: n/a   Zone phone for oncoming shift:  [phone]       Activity:  Activity Level: Bed Rest  Number times ambulated in hallways past shift: 0  Number of times OOB to chair past shift: 0    Cardiac:   Cardiac Monitoring: Yes      Cardiac Rhythm: Sinus Rhythm    Access:   Current line(s): PIV     Genitourinary:   Urinary status: voiding, incontinent and external catheter    Respiratory:   O2 Device: None (Room air)  Chronic home O2 use?: NO  Incentive spirometer at bedside: NO       GI:  Last Bowel Movement Date: 04/01/22  Current diet:  ADULT DIET Clear Liquid; 3 carb choices (45 gm/meal)  Passing flatus: YES  Tolerating current diet: YES       Pain Management:   Patient states pain is manageable on current regimen: YES    Skin:  Troy Score: 15  Interventions: float heels, increase time out of bed, PT/OT consult, internal/external urinary devices and nutritional support     Patient Safety:  Fall Score:  Total Score: 5  Interventions: bed/chair alarm, assistive device (walker, cane, etc), gripper socks, pt to call before getting OOB and stay with me (per policy)  High Fall Risk: Yes    Length of Stay:  Expected LOS: - - -  Actual LOS: 2      Ary Forman RN

## 2022-04-04 NOTE — ANESTHESIA PREPROCEDURE EVALUATION
Relevant Problems   HEMATOLOGY   (+) Anemia       Anesthetic History   No history of anesthetic complications            Review of Systems / Medical History  Patient summary reviewed, nursing notes reviewed and pertinent labs reviewed    Pulmonary  Within defined limits                 Neuro/Psych     seizures: well controlled        Comments: Neuropathy    Intellectual disability Cardiovascular  Within defined limits                Exercise tolerance: <4 METS  Comments: No ECG on file   GI/Hepatic/Renal               Comments: Recent colonoscopy showing innumerable adenomatous colon polyps (12/1/21)  Pancreatic lesion  Colitis Endo/Other    Diabetes: type 2    Anemia (Hgb=4.2 on 4/2/22, After 4U PRBC's - Hgb=7.1, now trending to 6.7 on 4/4/22)    Comments:  Thrombocytopenia (Platelets = 18A on 0/6/00)  Lymphocytosis Other Findings   Comments: Splenomegaly         Physical Exam    Airway  Mallampati: I  TM Distance: 4 - 6 cm  Neck ROM: normal range of motion   Mouth opening: Normal     Cardiovascular  Regular rate and rhythm,  S1 and S2 normal,  no murmur, click, rub, or gallop  Rhythm: regular  Rate: normal         Dental    Dentition: Poor dentition     Pulmonary  Breath sounds clear to auscultation               Abdominal  GI exam deferred       Other Findings            Anesthetic Plan    ASA: 3  Anesthesia type: MAC          Induction: Intravenous  Anesthetic plan and risks discussed with: Patient

## 2022-04-04 NOTE — PROGRESS NOTES
Transition of Care Plan:    RUR: 16%  Disposition: Home with family-eval and treat for additional assistance   Follow up appointments: Follow up with PCP and/or Specialist   DME needed:  Pt has wheelchair at home   Transportation at Discharge: BLS transport required   Yumiko Livers or means to access home: Family will assist with transport   IM Medicare Letter: 2nd IM Medicare Letter to be given   Is patient a BCPI-A Bundle:    CM will provide if required        If yes, was Bundle Letter given?:    Is patient a  and connected with the [address]? N/A              If yes, was [employer] transfer form completed and [facility] notified? Caregiver Contact: Colie Libman (father) [phone] and Alem Bradley (brother) [phone]  Discharge Caregiver contacted prior to discharge? Family to be contacted   Care Conference needed?:   Not at this time     Reason for Admission: Acute Anemia                       RUR Score:     16%             PCP: First and Last name:   Neto Grady MD     Name of Practice:    Are you a current patient: Yes/No: Yes   Approximate date of last visit: April 2022   Can you participate in a virtual visit if needed: Yes, with family assistance     Do you (patient/family) have any concerns for transition/discharge? Not at this time                 Plan for utilizing home health:   Eval and treat for additional services    Current Advanced Directive/Advance Care Plan:  Full Code    Advance Care Planning     General Advance Care Planning (ACP) Conversation      Date of Conversation: 4/4/22  Conducted with: Patient with Decision Making Capacity    Healthcare Decision Maker:   No healthcare decision makers have been documented. Click here to complete [address] including selection of the Healthcare Decision Maker Relationship (ie \"Primary\")    Today we documented Decision Maker(s) consistent with Legal Next of Kin hierarchy.     Content/Action Overview:   DECLINED ACP conversation - will revisit periodically   Reviewed DNR/DNI and patient elects Full Code (Attempt Resuscitation)      Length of Voluntary ACP Conversation in minutes:  16 minutes    Marybel Gavin               Transition of Care Plan:          CM completed assessment with pt's mother. Pt is known to reside with pt's family in their one story. Pt is known to be active with PCP: seen April 2022, and use [facility] [employer] ([place]). Pt needs assistance with ADLs, and doesn't drive. Pt will require BLS transport. Pt has wheelchair at home: wheelchair. Pt has had HHC and SNF. CM will continue to follow. Care Management Interventions  PCP Verified by CM: Yes  Mode of Transport at Discharge:  Other (see comment)  Transition of Care Consult (CM Consult): Discharge Planning  Discharge Durable Medical Equipment: No  Physical Therapy Consult: No  Occupational Therapy Consult: No  Speech Therapy Consult: No  Support Systems: Parent(s)  Confirm Follow Up Transport: Family  Discharge Location  Patient Expects to be Discharged to[de-identified] STEPHANI/ Jonathan Arnett 42, MSW, [facility]

## 2022-04-04 NOTE — PROGRESS NOTES
Problem: Falls - Risk of  Goal: *Absence of Falls  Description: Document Poppy Campo Fall Risk and appropriate interventions in the flowsheet.   Outcome: Progressing Towards Goal  Note: Fall Risk Interventions:  Mobility Interventions: Bed/chair exit alarm,Communicate number of staff needed for ambulation/transfer,Patient to call before getting OOB,PT Consult for mobility concerns,OT consult for ADLs,Utilize walker, cane, or other assistive device,Assess mobility with egress test    Mentation Interventions: Bed/chair exit alarm,Adequate sleep, hydration, pain control,Door open when patient unattended,More frequent rounding,Reorient patient,Toileting rounds,Update white board,Room close to nurse's station    Medication Interventions: Bed/chair exit alarm,Evaluate medications/consider consulting pharmacy,Patient to call before getting OOB,Teach patient to arise slowly    Elimination Interventions: Bed/chair exit alarm,Call light in reach,Patient to call for help with toileting needs,Toilet paper/wipes in reach,Toileting schedule/hourly rounds    History of Falls Interventions: Bed/chair exit alarm,Door open when patient unattended,Room close to nurse's station

## 2022-04-04 NOTE — PROGRESS NOTES
Hospitalist Progress Note    NAME: Reginald Miller   :  1955   MRN:  [id number]       Assessment / Plan:  History of multiple problems with biopsy showed adenoma  Acute anemia likely due to GI bleed  Pancreatic lesion  Hemoglobin 4.2 on admission. Currently receiving blood for hgb 4.6 today  Trend H&H, continue to transfuse for hemoglobin less than 7.  hgb is 6.7. Will transfuse another unit prbc today  History recent colonoscopy was in 2021 which showed multiple polyps status post biopsied. Partial colectomy was recommended however patient's mom declined. Abd US showed pancreatic lesion. Normal liver  CT abd/pelvis showed evidence of colitis. No mass or ductal dilatation at the pancrease  Cont' IV PPI  Ferritin 92 after prbc transfusion. Will start empiric IV levaquin/flagyl for colitis  Hold antihypertensive to avoid hypotension. Prn IV Hydralazine   CLD. Possible EGD, timing as per GI  GI is following   I spoke to pt's mother and gave updates    Thrombocytopenia  No recent labs. Pt's mother denies any hx of low plt  PLT 59 today, abd us does not reveal any liver pathology. ? decreased production from hypocellular bone marrow vs TP? Order peripheral smear  Will ask hematology for further recs     LE edema  Echo ordered and is still. LE duplex neg for DVT  IV Lasix 20g daily     Type 2 diabetes  Seizure disorder  Intellectual disability  SSI, hold PTA diabetic meds  Cont' Depakote, Topamax  CM to help with dispo. Pt's mother who is 81 yo is his primary care giver. CM to help with dispo        Code Status: Full  Surrogate Decision Maker: Patient's mother  DVT Prophylaxis: SCD  GI Prophylaxis: not indicated  Baseline: From home     Subjective:     Chief Complaint / Reason for Physician Visit  NAD. Discussed with RN events overnight.      Review of Systems:  Symptom Y/N Comments  Symptom Y/N Comments   Fever/Chills    Chest Pain     Poor Appetite    Edema     Cough    Abdominal Pain     Sputum    Joint Pain     SOB/GRULLON    Pruritis/Rash     Nausea/vomit    Tolerating PT/OT     Diarrhea    Tolerating Diet     Constipation    Other       Could NOT obtain due to:      Objective:     VITALS:   Last 24hrs VS reviewed since prior progress note. Most recent are:  Patient Vitals for the past 24 hrs:   Temp Pulse Resp BP SpO2   04/04/22 0745 99 °F (37.2 °C) 77 18 130/65 96 %   04/04/22 0400 -- 74 -- -- --   04/04/22 0330 97.7 °F (36.5 °C) 73 18 114/61 97 %   04/04/22 0000 -- 78 -- -- --   04/03/22 2225 97.7 °F (36.5 °C) 78 17 130/75 99 %   04/03/22 1946 97.5 °F (36.4 °C) 71 17 (!) 123/58 100 %   04/03/22 1432 98.3 °F (36.8 °C) 71 18 118/66 99 %   04/03/22 1203 98.7 °F (37.1 °C) 72 18 118/75 98 %   04/03/22 1112 97.5 °F (36.4 °C) 68 18 (!) 114/57 100 %       Intake/Output Summary (Last 24 hours) at 4/4/2022 0816  Last data filed at 4/3/2022 2234  Gross per 24 hour   Intake 715.8 ml   Output 1300 ml   Net -584.2 ml        I had a face to face encounter and independently examined this patient on 4/4/2022, as outlined below:  PHYSICAL EXAM:  General: WD, WN. Alert, cooperative, no acute distress    EENT:  EOMI. Anicteric sclerae. MMM  Resp:  CTA bilaterally, no wheezing or rales. No accessory muscle use  CV:  Regular  rhythm,  ++ b/l LE edema  GI:  Soft, Non distended, Non tender. +Bowel sounds  Neurologic:  Awake, following commands. Psych:   . Not anxious nor agitated  Skin:  No rashes.   No jaundice    Reviewed most current lab test results and cultures  YES  Reviewed most current radiology test results   YES  Review and summation of old records today    NO  Reviewed patient's current orders and MAR    YES  PMH/SH reviewed - no change compared to H&P  ________________________________________________________________________  Care Plan discussed with:    Comments   Patient x    Family  x Pt's mother   RN x    Care Manager     Consultant  x hematology                     Multidiciplinary team rounds were held today with , nursing, pharmacist and clinical coordinator. Patient's plan of care was discussed; medications were reviewed and discharge planning was addressed. ________________________________________________________________________  Total NON critical care TIME:  35   Minutes    Total CRITICAL CARE TIME Spent:   Minutes non procedure based      Comments   >50% of visit spent in counseling and coordination of care     ________________________________________________________________________  Matilda Thomson MD     Procedures: see electronic medical records for all procedures/Xrays and details which were not copied into this note but were reviewed prior to creation of Plan. LABS:  I reviewed today's most current labs and imaging studies. Pertinent labs include:  Recent Labs     04/04/22  0042 04/03/22  1728 04/03/22  0114 04/02/22  1354 04/02/22  1354   WBC 12.3*  --   --   --  15.0*   HGB 7.1* 7.8* 4.6*   < > 4.2*   HCT 22.0*  --  15.8*  --  14.7*   PLT 58*  --   --   --  64*    < > = values in this interval not displayed.      Recent Labs     04/04/22  0042 04/03/22  0114 04/02/22  1354    140 140   K 4.1 4.0 4.4   * 112* 111*   CO2 23 21 21   * 81 140*   BUN 19 22* 24*   CREA 0.75 0.70 0.80   CA 8.0* 8.3* 8.5   ALB  --   --  2.6*   TBILI  --   --  0.4   ALT  --   --  11*       Signed: Matilda Thomson MD

## 2022-04-04 NOTE — PROGRESS NOTES
GI PROGRESS NOTE  Dusty Reinoso NP  [phone] NP in-hospital cell phone M-F until 4:30  After 5pm or on weekends, please call  for physician on call    Caren Corral :1955 [facility]:[id number]   ATTG: Dr. Donna Velasquez  PCP: Brandyn Sanders MD  Date/Time:  2022 10:06 AM   Primary GI: Dr. Sandeep Damon; Dr. Renea Tyson covering  Reason for following: Anemia     Assessment:     Acute anemia   Recent CLN showing innumerable adenomatous colon polyps (2021)  · Hgb 6.7; was 4.2 on admission  · BUN/Cr ratio 25  · CT abd/pel: No definite pancreatic mass seen by this technique. Colitis diffuse with fat stranding in the peritoneal cavity    Seizure disorder  Intellectual disability  · 80year old mother is primary caregiver  · Treatment per primary team      Plan:     · Will plan for EGD during this admission pending improvement to hgb. Possibly tomorrow  · Follow H&H; goal for Hgb >7  · Monitor for s/s of bleeding; transfuse as clinically indicated   · Continue PPI  · CLD for now; NPO after midnight  · Symptomatic care per primary team   *Plan of care discussed with Dr. Renea Tyson      Subjective:   Discussed with RN events overnight. Patient resting in bed, eating breakfast.  No family at bedside. Patient denies any complaints. 1207: Attempted to call patients mother to update on plan of care and discuss EGD scheduled for tomorrow. There was no answer and no VM. Will try to call back later today. 1520:  Spoke with patients mother over the phone and updated her on the plan of care. She is agreeable for EGD tomorrow with Dr. Renea Tyson; please obtain consent. Review of Systems:  Symptom Y/N Comments  Symptom Y/N Comments   Fever/Chills n   Chest Pain n    Cough n   Headaches n    Sputum n   Joint Pain n    SOB/GRULLON n   Pruritis/Rash n    Tolerating Diet y   Other       Could NOT obtain due to:      Objective:   VITALS:   Last 24hrs VS reviewed since prior progress note.  Most recent are:  Visit Vitals  /65 (BP 1 Location: Right arm, BP Patient Position: At rest)   Pulse 77   Temp 99 °F (37.2 °C)   Resp 18   Ht 6' (1.829 m)   Wt 88.5 kg (195 lb)   SpO2 96%   BMI 26.45 kg/m²       Intake/Output Summary (Last 24 hours) at 4/4/2022 1006  Last data filed at 4/3/2022 2234  Gross per 24 hour   Intake 715.8 ml   Output 1300 ml   Net -584.2 ml     PHYSICAL EXAM:  General: Pleasant  male. NAD  HEENT: NC, Atraumatic. Anicteric sclerae. Lungs:  CTA Bilaterally. No Wheezing/Rhonchi/Rales. Heart:  Regular  rhythm,  No murmur, No Rubs, No Gallops  Abdomen: Soft, Non distended, Non tender. +Bowel sounds, no HSM  Extremities: No c/c/e  Neurologic:  Alert with intellectual delay. No acute neurological distress   Psych:   Little to no insight. Not anxious nor agitated. Lab and Radiology Data Reviewed: (see below)    Medications Reviewed: (see below)  PMH/SH reviewed - no change compared to H&P  ________________________________________________________________________  Total time spent with patient: 20 minutes ________________________________________________________________________  Care Plan discussed with:  Patient x   Family     RN x              Consultant:       Jairo Giraldo NP     Procedures: see electronic medical records for all procedures/Xrays and details which were not copied into this note but were reviewed prior to creation of Plan. LABS:  Recent Labs     04/04/22  0633 04/04/22  0042 04/03/22  0114 04/02/22  1354   WBC  --  12.3*  --  15.0*   HGB 6.7* 7.1*   < > 4.2*   HCT 20.8* 22.0*   < > 14.7*   PLT  --  58*  --  64*    < > = values in this interval not displayed.      Recent Labs     04/04/22  0042 04/03/22  0114 04/02/22  1354    140 140   K 4.1 4.0 4.4   * 112* 111*   CO2 23 21 21   BUN 19 22* 24*   CREA 0.75 0.70 0.80   * 81 140*   CA 8.0* 8.3* 8.5     Recent Labs     04/02/22  1354   AP 65   TP 6.2*   ALB 2.6*   GLOB 3.6     No results for input(s): INR, PTP, APTT, INREXT in the last 72 hours. Recent Labs     04/03/22  0114   TIBC 282   PSAT 68*   FERR 92      Lab Results   Component Value Date/Time    Folate 37.1 (H) 04/03/2022 01:14 AM     No results for input(s): PH, PCO2, PO2 in the last 72 hours. No results for input(s): CPK, CKMB in the last 72 hours.     No lab exists for component: TROPONINI  Lab Results   Component Value Date/Time    Color YELLOW/STRAW 04/02/2022 07:16 PM    Appearance CLEAR 04/02/2022 07:16 PM    Specific gravity 1.015 04/02/2022 07:16 PM    pH (UA) 7.5 04/02/2022 07:16 PM    Protein Negative 04/02/2022 07:16 PM    Glucose Negative 04/02/2022 07:16 PM    Ketone Negative 04/02/2022 07:16 PM    Bilirubin Negative 04/02/2022 07:16 PM    Urobilinogen 1.0 04/02/2022 07:16 PM    Nitrites Negative 04/02/2022 07:16 PM    Leukocyte Esterase Negative 04/02/2022 07:16 PM    Epithelial cells FEW 04/02/2022 07:16 PM    Bacteria Negative 04/02/2022 07:16 PM    WBC 0-4 04/02/2022 07:16 PM    RBC 0-5 04/02/2022 07:16 PM       MEDICATIONS:  Current Facility-Administered Medications   Medication Dose Route Frequency    hydrALAZINE (APRESOLINE) 20 mg/mL injection 10 mg  10 mg IntraVENous Q6H PRN    0.9% sodium chloride infusion 250 mL  250 mL IntraVENous PRN    cyanocobalamin (VITAMIN B12) tablet 1,000 mcg  1,000 mcg Oral DAILY    iron sucrose (VENOFER) 300 mg in 0.9% sodium chloride 250 mL IVPB  300 mg IntraVENous Q24H    benzocaine-menthoL (CHLORASEPTIC MAX) lozenge 1 Lozenge  1 Lozenge Oral Q2H PRN    guaiFENesin (ROBITUSSIN) 100 mg/5 mL oral liquid 200 mg  200 mg Oral Q4H PRN    pantoprazole (PROTONIX) tablet 40 mg  40 mg Oral ACB    metroNIDAZOLE (FLAGYL) IVPB premix 500 mg  500 mg IntraVENous Q12H    levoFLOXacin (LEVAQUIN) 750 mg in D5W IVPB  750 mg IntraVENous Q24H    sodium chloride (NS) flush 5-40 mL  5-40 mL IntraVENous Q8H    sodium chloride (NS) flush 5-40 mL  5-40 mL IntraVENous PRN    acetaminophen (TYLENOL) tablet 650 mg 650 mg Oral Q6H PRN    Or    acetaminophen (TYLENOL) suppository 650 mg  650 mg Rectal Q6H PRN    polyethylene glycol (MIRALAX) packet 17 g  17 g Oral DAILY PRN    ondansetron (ZOFRAN ODT) tablet 4 mg  4 mg Oral Q8H PRN    Or    ondansetron (ZOFRAN) injection 4 mg  4 mg IntraVENous Q6H PRN    divalproex DR (DEPAKOTE) tablet 500 mg  500 mg Oral DAILY    folic acid (FOLVITE) tablet 1 mg  1 mg Oral DAILY    topiramate (TOPAMAX) tablet 200 mg  200 mg Oral BID    atorvastatin (LIPITOR) tablet 40 mg  40 mg Oral DAILY    insulin lispro (HUMALOG) injection   SubCUTAneous AC&HS    glucose chewable tablet 16 g  4 Tablet Oral PRN    glucagon (GLUCAGEN) injection 1 mg  1 mg IntraMUSCular PRN    divalproex DR (DEPAKOTE) tablet 1,000 mg  1,000 mg Oral QHS    furosemide (LASIX) injection 20 mg  20 mg IntraVENous DAILY

## 2022-04-04 NOTE — CONSULTS
Hematology Oncology Consultation    Reason for consult: Cytopenias    Admitting Diagnosis: Anemia [D64.9]    Impression:   *) Severe macrocytic anemia, thrombocytopenia, lymphocytosis:  - MCV markedly elevated to 142, severe anemia with  hgb 4.2.  - Iron studies after transfusion not markedly elevated as I would suspect, will give small dose of venofer.  - B12 on low end of normal, will check MMA and start replacment empirically  - PBS concerning for lymphoproliferative d/o such as CLL? Will send flow cytometry, hemolysis labs, spep/SIFE, HIV, HBV, HCV. - continue folic acid daily  - Had 3U PRBC and 1U pending now, continue to transfuse for hgb <7, plt <10K  - will order BMBX to assess marrow further- mother is agreeable, npo midnight  - Depakote can cause thrombocytopenia but doubt would explain severe anemia. Unfortunately, we don't have old labs for comparison, Topamax has 1-3% for anemia, these are not new medications so doubt everything is drug related. *) Colitis:  - CT scan showing diffuse colitis, on IV flagyl  - GI following    Called and discussed with his mother, Dr. Lauren Moy, and bedside nurse today. Thank you for this kind referral, we will follow along with you. Discussion: Kelly Beasley is a  73y.o. year old seen in consultation at the request of Dr. Lauren Moy for anemia, thrombocytopenia, leukocytosis, lymphocytosis. Mr Mounika Nettles was admitted with profound anemia hgb 4.2, has received 3U PRBC during stay and hgb this am still low again at 6.7. He was also found to have elevated WBC 15 with lymphocytosis and plt 64k upon admission, no old labs to compare. Discussed with Mother on the phone, denies diarrhea, Fever, chills at home. She doesn't recall having been told he has low counts in past and is bringing records in with her later today. He has never had transfusion before. He is on Depakote and Topamax and has been on same doses for years about 60 years she says.   Iron studies done post transfusion show ferritin 92, %sat 68, B12 426, FA 37. He had CT A/P yesterday showing diffuse colitis, is on IV flagyl. Imagin/3/22 CT A/P:  IMPRESSION  . No definite pancreatic mass seen by this technique. 2. Colitis diffuse with fat stranding in the peritoneal cavity. 3. Splenomegaly.         Recent Results (from the past 24 hour(s))   GLUCOSE, POC    Collection Time: 22 11:19 AM   Result Value Ref Range    Glucose (POC) 134 (H) 65 - 117 mg/dL    Performed by Parag Finger    GLUCOSE, POC    Collection Time: 22  4:28 PM   Result Value Ref Range    Glucose (POC) 99 65 - 117 mg/dL    Performed by Parag Finger    HGB & HCT    Collection Time: 22  5:28 PM   Result Value Ref Range    HGB 7.8 (L) 12.1 - 17.0 g/dL   GLUCOSE, POC    Collection Time: 22  9:39 PM   Result Value Ref Range    Glucose (POC) 88 65 - 117 mg/dL    Performed by Parag Finger    CBC W/O DIFF    Collection Time: 22 12:42 AM   Result Value Ref Range    WBC 12.3 (H) 4.1 - 11.1 K/uL    RBC 1.94 (L) 4.10 - 5.70 M/uL    HGB 7.1 (L) 12.1 - 17.0 g/dL    HCT 22.0 (L) 36.6 - 50.3 %    .4 (H) 80.0 - 99.0 FL    MCH 36.6 (H) 26.0 - 34.0 PG    MCHC 32.3 30.0 - 36.5 g/dL    RDW ABNORMAL 11.5 - 14.5 %    PLATELET 58 (L) 229 - 400 K/uL    MPV 9.7 8.9 - 12.9 FL    NRBC 0.0 0  WBC    ABSOLUTE NRBC 0.00 0.00 - 2.58 K/uL   METABOLIC PANEL, BASIC    Collection Time: 22 12:42 AM   Result Value Ref Range    Sodium 141 136 - 145 mmol/L    Potassium 4.1 3.5 - 5.1 mmol/L    Chloride 112 (H) 97 - 108 mmol/L    CO2 23 21 - 32 mmol/L    Anion gap 6 5 - 15 mmol/L    Glucose 120 (H) 65 - 100 mg/dL    BUN 19 6 - 20 MG/DL    Creatinine 0.75 0.70 - 1.30 MG/DL    BUN/Creatinine ratio 25 (H) 12 - 20      GFR est AA >60 >60 ml/min/1.73m2    GFR est non-AA >60 >60 ml/min/1.73m2    Calcium 8.0 (L) 8.5 - 10.1 MG/DL   HGB & HCT    Collection Time: 22  6:33 AM   Result Value Ref Range    HGB 6.7 (L) 12.1 - 17.0 g/dL    HCT 20.8 (L) 36.6 - 50.3 %   GLUCOSE, POC    Collection Time: 04/04/22  8:14 AM   Result Value Ref Range    Glucose (POC) 119 (H) 65 - 117 mg/dL    Performed by Parag Rosales        History:  Past Medical History:   Diagnosis Date    Diabetes ([facility] [address]     Epilepsy ([facility] [address]     Seizures ([facility] [address]       Past Surgical History:   Procedure Laterality Date    COLONOSCOPY N/A 12/1/2021    COLONOSCOPY performed by Kenard Goodell, MD at [facility] ENDOSCOPY. Multiple sessile polyps (>30). Medium-sized int hemorrhoids. Prior to Admission medications    Medication Sig Start Date End Date Taking? Authorizing Provider   divalproex DR (DEPAKOTE) 500 mg tablet Take 1,000 mg by mouth nightly. Yes Provider, Historical   topiramate (TOPAMAX) 200 mg tablet Take 300 mg by mouth two (2) times a day. Yes Provider, Historical   divalproex sodium (DEPAKOTE PO) Take 500 mg by mouth daily. Yes Provider, Historical   metFORMIN (GLUCOPHAGE) 500 mg tablet Take 1,000 mg by mouth two (2) times daily (with meals). Yes Provider, Historical   glimepiride (AMARYL) 4 mg tablet Take 8 mg by mouth every morning. Yes Provider, Historical   linaGLIPtin (Tradjenta) 5 mg tablet Take 5 mg by mouth daily. Yes Provider, Historical   pioglitazone (ACTOS) 15 mg tablet Take 45 mg by mouth. Yes Provider, Historical   folic acid (FOLVITE) 1 mg tablet Take  by mouth daily. Yes Provider, Historical   Calcium-Cholecalciferol, D3, 600 mg(1,500mg) -400 unit chew Take  by mouth. Yes Provider, Historical   furosemide (LASIX) 40 mg tablet Take 20 mg by mouth daily. Yes Provider, Historical   atorvastatin (Lipitor) 40 mg tablet Take 40 mg by mouth daily. Yes Provider, Historical   triamcinolone acetonide 0.025 % lotion Apply  to affected area two (2) times a day. Yes Provider, Historical   chlorhexidine (PERIDEX) 0.12 % solution 15 mL by Swish and Spit route every twelve (12) hours.     Provider, Historical     No Known Allergies   Social History     Tobacco Use    Smoking status: Never Smoker    Smokeless tobacco: Never Used   Substance Use Topics    Alcohol use: Never      Family History   Problem Relation Age of Onset    Hypertension Mother     Cancer Father         unknown        Current Medications:  Current Facility-Administered Medications   Medication Dose Route Frequency    hydrALAZINE (APRESOLINE) 20 mg/mL injection 10 mg  10 mg IntraVENous Q6H PRN    0.9% sodium chloride infusion 250 mL  250 mL IntraVENous PRN    benzocaine-menthoL (CHLORASEPTIC MAX) lozenge 1 Lozenge  1 Lozenge Oral Q2H PRN    guaiFENesin (ROBITUSSIN) 100 mg/5 mL oral liquid 200 mg  200 mg Oral Q4H PRN    pantoprazole (PROTONIX) tablet 40 mg  40 mg Oral ACB    metroNIDAZOLE (FLAGYL) IVPB premix 500 mg  500 mg IntraVENous Q12H    levoFLOXacin (LEVAQUIN) 750 mg in D5W IVPB  750 mg IntraVENous Q24H    sodium chloride (NS) flush 5-40 mL  5-40 mL IntraVENous Q8H    sodium chloride (NS) flush 5-40 mL  5-40 mL IntraVENous PRN    acetaminophen (TYLENOL) tablet 650 mg  650 mg Oral Q6H PRN    Or    acetaminophen (TYLENOL) suppository 650 mg  650 mg Rectal Q6H PRN    polyethylene glycol (MIRALAX) packet 17 g  17 g Oral DAILY PRN    ondansetron (ZOFRAN ODT) tablet 4 mg  4 mg Oral Q8H PRN    Or    ondansetron (ZOFRAN) injection 4 mg  4 mg IntraVENous Q6H PRN    divalproex DR (DEPAKOTE) tablet 500 mg  500 mg Oral DAILY    folic acid (FOLVITE) tablet 1 mg  1 mg Oral DAILY    topiramate (TOPAMAX) tablet 200 mg  200 mg Oral BID    atorvastatin (LIPITOR) tablet 40 mg  40 mg Oral DAILY    insulin lispro (HUMALOG) injection   SubCUTAneous AC&HS    glucose chewable tablet 16 g  4 Tablet Oral PRN    glucagon (GLUCAGEN) injection 1 mg  1 mg IntraMUSCular PRN    divalproex DR (DEPAKOTE) tablet 1,000 mg  1,000 mg Oral QHS    furosemide (LASIX) injection 20 mg  20 mg IntraVENous DAILY         Review of Systems:  Constitutional No fevers, chills, night sweats, excessive fatigue or weight loss.   Allergic/Immunologic No recent allergic reactions   Eyes No significant visual difficulties. No diplopia. ENMT No problems with hearing, no sore throat, no sinus drainage. Endocrine No hot flashes or night sweats. No cold intolerance, polyuria, or polydipsia   Hematologic/Lymphatic No easy bruising or bleeding. The patient denies any tender or palpable lymph nodes   Breasts No abnormal masses of breast, nipple discharge or pain. Respiratory No dyspnea on exertion, orthopnea, chest pain, cough or hemoptysis. Cardiovascular No anginal chest pain, irregular heart beat, tachycardia, palpitations or orthopnea. Gastrointestinal No nausea, vomiting, diarrhea, constipation, cramping, dysphagia, reflux, heartburn, GI bleeding, or early satiety. No change in bowel habits. Genitourinary (M) No hematuria, dysuria, increased frequency, urgency, hesitancy or incontinence. Musculoskeletal No joint pain, swelling or redness. No decreased range of motion. Integumentary No chronic rashes, inflammation, ulcerations, pruritus, petechiae, purpura, ecchymoses, or skin changes. Neurologic No headache, blurred vision, and no areas of focal weakness or numbness. Normal gait. No sensory problems. Psychiatric No insomnia, depression, javi or mood swings. No psychotropic drugs. Physical Exam:  Constitutional Alert, cooperative, oriented. Mood and affect appropriate. Pale in appearance   Head Normocephalic; no scars   Eyes Conjunctivae and sclerae are clear and without icterus. Pupils are reactive and equal.   ENMT Sinuses are nontender. No oral exudates, ulcers, masses, thrush or mucositis. Oropharynx clear. Tongue normal.   Neck Supple without masses or thyromegaly. No jugular venous distension. Hematologic/Lymphatic No petechiae or purpura. No tender or palpable lymph nodes in the cervical, supraclavicular, axillary or inguinal area.    Respiratory Lungs are clear to auscultation without rhonchi or wheezing. Cardiovascular Regular rate and rhythm of heart without murmurs, gallops or rubs. Chest / Line Site Chest is symmetric with no chest wall deformities. Abdomen Non-tender, non-distended, no masses, ascites or hepatosplenomegaly. Good bowel sounds. No guarding or rebound tenderness. No pulsatile masses. Musculoskeletal No tenderness or swelling, normal range of motion without obvious weakness. Extremities No visible deformities, no cyanosis, clubbing or edema. Skin No rashes, scars, or lesions suggestive of malignancy. No petechiae, purpura, or ecchymoses. No excoriations. Neurologic No sensory or motor deficits, normal cerebellar function, normal gait, cranial nerves intact. Psychiatric Alert and oriented times three. Coherent speech. Verbalizes understanding of our discussions today.

## 2022-04-04 NOTE — ADT AUTH CERT NOTES
AVAILTY IS DOWN, SO SUBMITTING THIS WAY. [facility]     FACILITY NPI : [id number]        [facility]  [facility] MEDICAL ONCOLOGY  [address] [facility]  [place] [facility] [address] [phone]        David Medina 30: [phone]  Fax: [phone]         Patient Name :Felicia Aase   : 1955 (73 yrs)  MRN : [id number]     Patient Mailing Address [address]                                          [address] [47] , [address]                                                             .         Insurance Plan Payor: BLUE CROSS MEDICARE / Plan: [email or web address] HMO / Product Type: Managed Care Medicare /      Primary Coverage Subscriber ID : [id number]     Secondary Coverage:  BLUE CROSS MEDICAID     Secondary Subscriber ID :  [id number]      Current Patient Class : INPATIENT  Admit Date : 2022     REQUESTED LEVEL OF CARE: INPATIENT [101]                                                           Diagnosis : Anemia                          ICD10 Code : Anemia [D64.9]     Current Room and Bed 1113/01     Admitting and Attending Info:  Admitting Provider : Eleazar Chavez MD   NPI: [id number]  Admitting Provider Phone.  [phone]  Admitting Provider Address: [address] [place] [address] [address]     Attending Provider Veronique Kruse MD   [id number]  Attending Provider Address:  [address] [place] [address]     Attending Provider Phone: Attending lonnie phone: [phone]               Utilization Reviews         Gastrointestinal Bleeding, Lower - Care Day 2 (4/3/2022) by Caron Fatima RN       Review Entered Review Status   2022 10:42 Completed      Criteria Review      Care Day: 2 Care Date: 4/3/2022 Level of Care: Telemetry    Guideline Day 2    Level Of Care    (X) Floor    4/4/2022 10:42:48 EDT by [facility]      tele    Clinical Status    (X) * Hypotension absent    4/4/2022 10:42:48 EDT by [facility]      99 78 123/58 18 97%RA    ( ) * Bleeding absent or reduced    Activity    (X) Activity as tolerated    4/4/2022 10:42:48 EDT by [facility]      activity as tolerated with assist    Routes    (X) * Oral hydration tolerated    4/4/2022 10:42:48 EDT by [facility]      Clear Liquid; 3 carb choices (45 gm/meal)    (X) * Oral diet tolerated    4/4/2022 10:42:48 EDT by [facility]      Clear Liquid; 3 carb choices (45 gm/meal)    Interventions    (X) Hgb/Hct    4/4/2022 10:42:48 EDT by [facility]      HGB: 4.6, 7.8  HCT: 15.8 (LL)    * Milestone   Additional Notes   4/3 LOC IP Tele      Labs   HGB: 4.6, 7.8   HCT: 15.8 (LL)   Sodium: 140   Potassium: 4.0   Chloride: 112 (H)   CO2: 21   Anion gap: 7   Glucose: 81   BUN: 22 (H)   Creatinine: 0.70   BUN/Creatinine ratio: 31 (H)   Calcium: 8.3 (L)   GFR est non-AA: >60   GFR est AA: >60   Vitamin B12: 426   Folate: 37.1 (H)   Iron: 191 (H)   TIBC: 282   Iron % saturation: 68 (H)   Ferritin: 92   BS 97 134 99 88      CT abd   1. No definite pancreatic mass seen by this technique. 2. Colitis diffuse with fat stranding in the peritoneal cavity. 3. Splenomegaly.       Meds: lipitor 40mg podaily, depakote 1000mg poqbedtime, depakote 500mg podaily, folvite 1mg podaily, robitussin 200mg poq4 prnx2, levaquin 750mg ivq24, flagyl 500mg ivq12, protonix 40mg podaily, topamax 200mg [id number]       GI   RECOMMENDATIONS:     73yo M with innumerable adenomatous colon polyps with newly noted profound anemia concerning for chronic blood loss.  He has received 2 units of PRBC with little improvement, but there is no overt bleeding noted.  There is no evidence of upper GI bleeding.  Will need further evaluation of abnl pancreatic lesion on US with CT   Plan:   1) Continue to transfuse PRBC to get Hgb >7   2) Recheck CBC tomorrow to follow WBC and plt    3) Daily PPI   4) Check pending B12, Fol, Fe panel   5) Allow CLD, but will not make NPO or advance until Hgb improved and discussion with mother can be had to determine if colectomy can be considered   6) The innumerable polyps cannot be cleared by repeated colonoscopy and will recur making this an ongoing problem that will not be solved by colonoscopic harvesting   7) Will need EGD and consider for use of side viewing scope to evaluate the ampulla to assess for SB adenomas   8) Arrange for CT abd/pelvis to eval pancreatic lesion and anemia      Attending   Assessment / Plan:   History of multiple problems with biopsy showed adenoma   Acute anemia likely due to GI bleed   Pancreatic lesion   Hemoglobin 4.2 on admission. Currently receiving blood for hgb 4.6 today   Trend H&H, continue to transfuse for hemoglobin less than 7   History recent colonoscopy was in December 2021 which showed multiple polyps status post biopsied.  Partial colectomy was recommended however patient's mom declined.     Abd US showed pancreatic lesion.  Normal liver   CT abd/pelvis to eval pancreatic lesion and anemia   Cont' IV PPI   iron panel, B12, folate pending but pt already received multiple units of blood.  Order was placed on admission   Hold antihypertensive for now due to borderline low BP   CLD. Possible EGD, timing as per GI   GI is consulted   Thrombocytopenia   No recent labs.  Pt's mother does not recall pt having \"other significant labs\"   CBC in the AM   LE edema   Check echo.  LE duplex neg for DVT   IV Lasix 20g daily   Type 2 diabetes   Seizure disorder   Intellectual disability   SSI, hold PTA diabetic meds   Cont' Depakote, Topamax      PHYSICAL EXAM:   General:          WD, WN. Alert, cooperative, no acute distress     EENT:              EOMI. Anicteric sclerae.  MMM   Resp:               CTA bilaterally, no wheezing or rales.  No accessory muscle use   CV:                  Regular  rhythm,  ++ b/l LE edema   GI:                   Soft, Non distended, Non tender.  +Bowel sounds   Neurologic:       Awake, following commands. Psych:   . Not anxious nor agitated   Skin:                No rashes.  No jaundice        Gastrointestinal Bleeding, Lower - Care Day 1 (4/2/2022) by Levi Garrison RN       Review Entered Review Status   4/3/2022 16:30 Completed      Criteria Review      Care Day: 1 Care Date: 4/2/2022 Level of Care: Telemetry    Guideline Day 1    Level Of Care    (X) ICU or floor    4/3/2022 16:30:37 EDT by Levi way    Clinical Status    (X) * Clinical Indications met    4/3/2022 16:30:37 EDT by Levi Garrison      VS: 97.9, 93, 122/55, 18, 98% Ra, 88.5 kg    Activity    (X) Activity as tolerated    Routes    (X) IV medications    4/3/2022 16:30:37 EDT by Levi Garrison      lasix 20mg IV qd, protonix 40mg iV bid    (X) Possible liquid diet in evening    Interventions    (X) Hgb/Hct    4/3/2022 16:30:37 EDT by Patrick Henderson h/h 4.2/14.7    (X) Monitor vital signs and blood counts closely    (X) Transfusion as necessary    4/3/2022 16:30:37 EDT by Levi Garrison      1 unit PRBC    * Milestone   Additional Notes   4/2      Results: wbc 15, platelets 64,       Abd US: 1. Nonspecific hypoechoic lesion in the head of the pancreas measuring 1.4 cm in   size. 2. Splenomegaly with length of 18.5 cm an estimated volume of 11 22 mL. 3. Contracted gallbladder. 4. Normal appearance of liver.       No ED admin meds      Tx: Full code, I&O, scd's         HPI:   73 y.o. male  presents to the ED with cc of low hemoglobin levels.  Patient has a history of intellectual disability and history was obtained from his mother. Trevin Lepe saw his PCP yesterday and had lab work performed. Joaquina Diaz called today stating that he is anemic with a hemoglobin of 4.4.  No history of anemia. Trevin Lepe has not received any blood transfusions in the past.  Mom has not noted any bright red blood per rectum or melena.  No abnormal bleeding that she is aware of. Deangelo Blevins is not on any antiplatelets or anticoagulants.  She has noted that for the past 6 months he has had swelling in his legs.  She denies any signs of shortness of breath.  She states his color does not look well and he has been pale.  Medical records note that he had a colonoscopy in December 2021 due to occult positive blood.        Plan:   History of multiple problems with biopsy showed adenoma   Acute anemia likely due to GI bleed   Hemoglobin 4.2.  Receiving 1 unit of PRBC in the ER. Trend H&H, transfuse for hemoglobin less than 7   History recent colonoscopy was in December 2021 which showed multiple polyps status post biopsied.  Partial colectomy was recommended however patient's mom declined. Start IV PPI   Check iron panel, B12, folate   Hold antihypertensive for now due to borderline low BP   Allow CLD.  Not anticipating any procedure done this weekend. GI is consulted       Thrombocytopenia   No recent labs.  Pt's mother does not recall pt having \"other significant labs\"   Monitor plt daily       LE edema   Check echo and LE duplex   IV Lasix 20g daily       Type 2 diabetes   Seizure disorder   Intellectual disability   SSI, hold PTA mes   Resume Depakote, Topamax   CM to help with dispo          Exam:   General:          Now in bed, NAD.      HEENT:           Atraumatic, anicteric sclerae                           No oral ulcers, mucosa moist, throat clear   Neck:               Supple, symmetrical,  thyroid: non tender   Lungs:             CTA b/l.  No wheezing or Rhonchi. No rales. Chest wall:      No tenderness.  No accessory muscle use. Heart:              Regular  rhythm,  No  Murmur.   ++ b/l LE edema   Abdomen:        Soft, NT. ND  BS+   Extremities:     No cyanosis.  No clubbing,                             Skin turgor normal, Radial dial pulse 2+.  Capillary refill normal   Skin:                Not pale.  Not Jaundiced  No rashes    Psych:             Not depressed.  Not anxious or agitated.    Neurologic:      Awake, moving all extremities.         Gastrointestinal Bleeding, Lower - Clinical Indications for Admission to Inpatient Care by Levi Garrison RN       Review Entered Review Status   4/3/2022 16:28 Completed      Criteria Review      Clinical Indications for Admission to Inpatient Care    Most Recent : Levi Garrison Most Recent Date: 4/3/2022 16:28:53 EDT    (X) Admission is indicated for  1 or more  of the following  (1) (2) (3) (4) (5) (6):       (X) Ongoing active bleeding (eg, decreasing hematocrit)       4/3/2022 16:28:53 EDT by Levi Garrison         h/h 4.2/14.7       (X) Coagulopathy that is not quickly reversible (eg, advanced liver disease, irreversible anticoagulation       therapy, thrombocytopenia)       4/3/2022 16:28:53 EDT by Levi Garrison         thrombocytopenia, platelets 64       H&P Notes       H&P by Phong Guerin MD at 22 1433 documented on ED to Hosp-Admission (Current) from 2022 in [facility] MEDICAL ONCOLOGY    Author: Phong Guerin MD Author Type: Physician Filed: 22 [id number]   Note Status: Signed Cosign: Cosign Not Required Date of Service: 22 1433   : Phong Guerin MD (Physician)               [facility]                    Hospitalist Admission Note     NAME:            Reginald Miller   :               1955   MRN:               [id number]      Date/Time:      2022 2:33 PM     Patient PCP: Tierra Mcclure MD  ______________________________________________________________________  Given the patient's current clinical presentation, I have a high level of concern for decompensation if discharged from the emergency department.  Complex decision making was performed, which includes reviewing the patient's available past medical records, laboratory results, and x-ray films.        My assessment of this patient's clinical condition and my plan of care is as follows.     Assessment / Plan:  History of multiple problems with biopsy showed adenoma  Acute anemia likely due to GI bleed  Hemoglobin 4.2. Receiving 1 unit of PRBC in the ER. Trend H&H, transfuse for hemoglobin less than 7  History recent colonoscopy was in December 2021 which showed multiple polyps status post biopsied. Partial colectomy was recommended however patient's mom declined. Start IV PPI  Check iron panel, B12, folate  Hold antihypertensive for now due to borderline low BP  Allow CLD. Not anticipating any procedure done this weekend. GI is consulted     Thrombocytopenia  No recent labs. Pt's mother does not recall pt having \"other significant labs\"  Monitor plt daily     LE edema  Check echo and LE duplex  IV Lasix 20g daily     Type 2 diabetes  Seizure disorder  Intellectual disability  SSI, hold PTA mes  Resume Depakote, Topamax  CM to help with dispo        Code Status: Full  Surrogate Decision Maker: Patient's mother  DVT Prophylaxis: SCD  GI Prophylaxis: not indicated  Baseline: From home                 Subjective:   CHIEF COMPLAINT: Anemia, abnormal labs     HISTORY OF PRESENT ILLNESS:     Eduarda Burkett is a 73 y.o.   male with PMHx significant for epilepsy, intellectual disability, type 2 diabetes, seizure, presents to the ER for evaluation of abnormal labs. Patient was seen by his PCP yesterday with routine lab work performed then. He was notified today to come to the ER due to hemoglobin of 4.4. History is obtained by patient's mom at bedside. Patient had a colonoscopy in December 2021 due to positive FOBT. He had multiple polyps removed. Biopsy showed adenoma. Partial colectomy has been recommended by his mom did not want for him to this. On arrival vitals are stable. Repeat hemoglobin was 4.2. He also has a leukocytosis wbc 15. Per patient's mom no obvious bright red blood per rectum or melena. Vitals/labs/imaging reviewed.   We were asked to admit for work up and evaluation of the above problems.           Past Medical History:   Diagnosis Date    [facility] [address]      Epilepsy ([facility] [address]      Seizures ([facility] [address]                 Past Surgical History:   Procedure Laterality Date    COLONOSCOPY N/A 12/1/2021     COLONOSCOPY performed by Jesusita Bianchi MD at [facility] ENDOSCOPY. Multiple sessile polyps (>30). Medium-sized int hemorrhoids.         Social History           Tobacco Use    Smoking status: Never Smoker    Smokeless tobacco: Never Used   Substance Use Topics    Alcohol use: Never               Family History   Problem Relation Age of Onset    Hypertension Mother      Cancer Father           unknown      No Known Allergies              Prior to Admission medications    Medication Sig Start Date End Date Taking? Authorizing Provider   topiramate (TOPAMAX) 200 mg tablet Take  by mouth two (2) times a day.       Provider, Historical   divalproex sodium (DEPAKOTE PO) Take 500 mg by mouth.       Provider, Historical   metFORMIN (GLUCOPHAGE) 500 mg tablet Take  by mouth two (2) times daily (with meals).        Provider, Historical   glimepiride (AMARYL) 4 mg tablet Take  by mouth every morning.       Provider, Historical   linaGLIPtin (Tradjenta) 5 mg tablet Take 5 mg by mouth daily.       Provider, Historical   pioglitazone (ACTOS) 15 mg tablet Take 45 mg by mouth.       Provider, Historical   folic acid (FOLVITE) 1 mg tablet Take  by mouth daily.       Provider, Historical   Calcium-Cholecalciferol, D3, 600 mg(1,500mg) -400 unit chew Take  by mouth.       Provider, Historical   furosemide (LASIX) 40 mg tablet Take  by mouth daily.       Provider, Historical   atorvastatin (Lipitor) 40 mg tablet Take 40 mg by mouth daily.       Provider, Historical   chlorhexidine (PERIDEX) 0.12 % solution 15 mL by Swish and Spit route every twelve (12) hours.       Provider, Historical   triamcinolone acetonide 0.025 % lotion Apply  to affected area two (2) times a day.       Provider, Historical         REVIEW OF SYSTEMS:     I am not able to complete the review of systems because:    The patient is intubated and sedated     The patient has altered mental status due to his acute medical problems     The patient has baseline aphasia from prior stroke(s)     The patient has baseline dementia and is not reliable historian     The patient is in acute medical distress and unable to provide information   xx The patient has intellectual disability able to provide information         Total of 12 systems reviewed as follows:                                        POSITIVE= BOLD text  Negative = text not BOLD  General:                     fever, chills, sweats, generalized weakness, weight loss/gain,                                       loss of appetite   Eyes:                           blurred vision, eye pain, loss of vision, double vision  ENT:                            rhinorrhea, pharyngitis   Respiratory:               cough, sputum production, SOB, GRULLON, wheezing, pleuritic pain   Cardiology:                chest pain, palpitations, orthopnea, PND, edema, syncope   Gastrointestinal:       abdominal pain , N/V, diarrhea, dysphagia, constipation, bleeding   Genitourinary:           frequency, urgency, dysuria, hematuria, incontinence   Muskuloskeletal :      arthralgia, myalgia, back pain  Hematology:              easy bruising, nose or gum bleeding, lymphadenopathy   Dermatological:         rash, ulceration, pruritis, color change / jaundice  Endocrine:                 hot flashes or polydipsia   Neurological:             headache, dizziness, confusion, focal weakness, paresthesia,                                      Speech difficulties, memory loss, gait difficulty  Psychological:          Feelings of anxiety, depression, agitation     Objective:   VITALS:    Visit Vitals  BP (!) 108/58   Pulse 84   Temp 98.6 °F (37 °C)   Resp 16   Ht 6' (1.829 m)   Wt 88.5 kg (195 lb)   SpO2 97%   BMI 26.45 kg/m²         PHYSICAL EXAM:     General:          Now in bed, NAD. HEENT:           Atraumatic, anicteric sclerae                          No oral ulcers, mucosa moist, throat clear  Neck:               Supple, symmetrical,  thyroid: non tender  Lungs:             CTA b/l. No wheezing or Rhonchi. No rales. Chest wall:      No tenderness. No accessory muscle use. Heart:              Regular  rhythm,  No  Murmur. ++ b/l LE edema  Abdomen:        Soft, NT. ND  BS+  Extremities:     No cyanosis. No clubbing,                            Skin turgor normal, Radial dial pulse 2+. Capillary refill normal  Skin:                Not pale. Not Jaundiced  No rashes   Psych:             Not depressed. Not anxious or agitated.   Neurologic:      Awake, moving all extremities.      _______________________________________________________________________  Care Plan discussed with:      Comments   Patient x     Family  x  patient's mom   RN x     Care Manager                      Consultant:  naveed ED physician   _______________________________________________________________________  Expected  Disposition:   Home with Family     HH/PT/OT/RN x   SNF/LTC     DANI     ________________________________________________________________________  TOTAL TIME:  72 Minutes     Critical Care Provided     Minutes non procedure based         Comments     x Reviewed previous records   >50% of visit spent in counseling and coordination of care x Discussion with patient and/or family and questions answered         ________________________________________________________________________  Signed: Charleen Zarate MD     Procedures: see electronic medical records for all procedures/Xrays and details which were not copied into this note but were reviewed prior to creation of Plan.     LAB DATA REVIEWED:    Recent Results         Recent Results (from the past 24 hour(s))   CBC WITH AUTOMATED DIFF     Collection Time: 04/02/22  1:54 PM   Result Value Ref Range     WBC 15.0 (H) 4.1 - 11.1 K/uL     RBC 1.03 (L) 4.10 - 5.70 M/uL     HGB 4.2 (LL) 12.1 - 17.0 g/dL     HCT 14.7 (LL) 36.6 - 50.3 %     .7 (H) 80.0 - 99.0 FL     MCH 40.8 (H) 26.0 - 34.0 PG     MCHC 28.6 (L) 30.0 - 36.5 g/dL     RDW 15.7 (H) 11.5 - 14.5 %     PLATELET 64 (L) 884 - 400 K/uL     MPV 10.1 8.9 - 12.9 FL     NRBC 0.0 0  WBC     ABSOLUTE NRBC 0.00 0.00 - 0.01 K/uL     NEUTROPHILS PENDING %     LYMPHOCYTES PENDING %     MONOCYTES PENDING %     EOSINOPHILS PENDING %     BASOPHILS PENDING %     IMMATURE GRANULOCYTES PENDING %     ABS. NEUTROPHILS PENDING K/UL     ABS. LYMPHOCYTES PENDING K/UL     ABS. MONOCYTES PENDING K/UL     ABS. EOSINOPHILS PENDING K/UL     ABS. BASOPHILS PENDING K/UL     ABS. IMM. GRANS.  PENDING K/UL     DF PENDING

## 2022-04-04 NOTE — PROGRESS NOTES
Problem: Falls - Risk of  Goal: *Absence of Falls  Description: Document Mindy Ruiz Fall Risk and appropriate interventions in the flowsheet.   Outcome: Progressing Towards Goal  Note: Fall Risk Interventions:  Mobility Interventions: Bed/chair exit alarm,Communicate number of staff needed for ambulation/transfer    Mentation Interventions: Bed/chair exit alarm    Medication Interventions: Bed/chair exit alarm    Elimination Interventions: Call light in reach,Patient to call for help with toileting needs    History of Falls Interventions: Bed/chair exit alarm

## 2022-04-04 NOTE — TELEPHONE ENCOUNTER
On call page received on Saturday from [employer] stating patient Hgb 4.4. Spoke with patient's mom as she stated patient non-verbal and advised she take pt to ED now. She agreed. Of note patient does have two identical health charts that need to be merged.

## 2022-04-05 ENCOUNTER — APPOINTMENT (OUTPATIENT)
Dept: CT IMAGING | Age: 67
DRG: 823 | End: 2022-04-05
Attending: INTERNAL MEDICINE
Payer: MEDICARE

## 2022-04-05 ENCOUNTER — ANESTHESIA (OUTPATIENT)
Dept: ENDOSCOPY | Age: 67
DRG: 823 | End: 2022-04-05
Payer: MEDICARE

## 2022-04-05 LAB
ABO + RH BLD: NORMAL
ALBUMIN SERPL ELPH-MCNC: 2.4 G/DL (ref 2.9–4.4)
ALBUMIN/GLOB SERPL: 1 {RATIO} (ref 0.7–1.7)
ALPHA1 GLOB SERPL ELPH-MCNC: 0.3 G/DL (ref 0–0.4)
ALPHA2 GLOB SERPL ELPH-MCNC: 0.7 G/DL (ref 0.4–1)
ANION GAP SERPL CALC-SCNC: 5 MMOL/L (ref 5–15)
B-GLOBULIN SERPL ELPH-MCNC: 0.6 G/DL (ref 0.7–1.3)
BASOPHILS # BLD: 0 K/UL (ref 0–0.1)
BASOPHILS NFR BLD: 0 % (ref 0–1)
BLD PROD TYP BPU: NORMAL
BLOOD GROUP ANTIBODIES SERPL: NORMAL
BPU ID: NORMAL
BUN SERPL-MCNC: 19 MG/DL (ref 6–20)
BUN/CREAT SERPL: 28 (ref 12–20)
CALCIUM SERPL-MCNC: 7.8 MG/DL (ref 8.5–10.1)
CHLORIDE SERPL-SCNC: 113 MMOL/L (ref 97–108)
CO2 SERPL-SCNC: 23 MMOL/L (ref 21–32)
CREAT SERPL-MCNC: 0.68 MG/DL (ref 0.7–1.3)
CROSSMATCH RESULT,%XM: NORMAL
DIFFERENTIAL METHOD BLD: ABNORMAL
EOSINOPHIL # BLD: 0.1 K/UL (ref 0–0.4)
EOSINOPHIL NFR BLD: 1 % (ref 0–7)
ERYTHROCYTE [DISTWIDTH] IN BLOOD BY AUTOMATED COUNT: 24 % (ref 11.5–14.5)
FLOW CYTOMETRY: NORMAL
GAMMA GLOB SERPL ELPH-MCNC: 0.9 G/DL (ref 0.4–1.8)
GLOBULIN SER-MCNC: 2.5 G/DL (ref 2.2–3.9)
GLUCOSE BLD STRIP.AUTO-MCNC: 104 MG/DL (ref 65–117)
GLUCOSE BLD STRIP.AUTO-MCNC: 139 MG/DL (ref 65–117)
GLUCOSE BLD STRIP.AUTO-MCNC: 164 MG/DL (ref 65–117)
GLUCOSE BLD STRIP.AUTO-MCNC: 97 MG/DL (ref 65–117)
GLUCOSE SERPL-MCNC: 90 MG/DL (ref 65–100)
HBV CORE AB SERPL QL IA: NEGATIVE
HCT VFR BLD AUTO: 24.1 % (ref 36.6–50.3)
HGB BLD-MCNC: 7.8 G/DL (ref 12.1–17)
IGA SERPL-MCNC: 111 MG/DL (ref 61–437)
IGG SERPL-MCNC: 982 MG/DL (ref 603–1613)
IGM SERPL-MCNC: 118 MG/DL (ref 20–172)
IMM GRANULOCYTES # BLD AUTO: 0 K/UL (ref 0–0.04)
IMM GRANULOCYTES NFR BLD AUTO: 0 % (ref 0–0.5)
INTERPRETATION SERPL IEP-IMP: ABNORMAL
KAPPA LC FREE SER-MCNC: 65.6 MG/L (ref 3.3–19.4)
KAPPA LC FREE/LAMBDA FREE SER: 2.45 {RATIO} (ref 0.26–1.65)
LAMBDA LC FREE SERPL-MCNC: 26.8 MG/L (ref 5.7–26.3)
LYMPHOCYTES # BLD: 10.3 K/UL (ref 0.8–3.5)
LYMPHOCYTES NFR BLD: 77 % (ref 12–49)
M PROTEIN SERPL ELPH-MCNC: 0.2 G/DL
MAGNESIUM SERPL-MCNC: 1.9 MG/DL (ref 1.6–2.4)
MCH RBC QN AUTO: 35.8 PG (ref 26–34)
MCHC RBC AUTO-ENTMCNC: 32.4 G/DL (ref 30–36.5)
MCV RBC AUTO: 110.6 FL (ref 80–99)
MONOCYTES # BLD: 0.5 K/UL (ref 0–1)
MONOCYTES NFR BLD: 4 % (ref 5–13)
MYELOCYTES NFR BLD MANUAL: 1 %
NEUTS SEG # BLD: 2.3 K/UL (ref 1.8–8)
NEUTS SEG NFR BLD: 17 % (ref 32–75)
NRBC # BLD: 0 K/UL (ref 0–0.01)
NRBC BLD-RTO: 0 PER 100 WBC
PLATELET # BLD AUTO: 53 K/UL (ref 150–400)
PMV BLD AUTO: 9.5 FL (ref 8.9–12.9)
POTASSIUM SERPL-SCNC: 3.9 MMOL/L (ref 3.5–5.1)
PROT SERPL-MCNC: 4.9 G/DL (ref 6–8.5)
RBC # BLD AUTO: 2.18 M/UL (ref 4.1–5.7)
RBC MORPH BLD: ABNORMAL
RBC MORPH BLD: ABNORMAL
SERVICE CMNT-IMP: ABNORMAL
SERVICE CMNT-IMP: ABNORMAL
SERVICE CMNT-IMP: NORMAL
SERVICE CMNT-IMP: NORMAL
SODIUM SERPL-SCNC: 141 MMOL/L (ref 136–145)
SPECIMEN EXP DATE BLD: NORMAL
STATUS OF UNIT,%ST: NORMAL
UNIT DIVISION, %UDIV: 0
WBC # BLD AUTO: 13.4 K/UL (ref 4.1–11.1)
WBC MORPH BLD: ABNORMAL

## 2022-04-05 PROCEDURE — 82962 GLUCOSE BLOOD TEST: CPT

## 2022-04-05 PROCEDURE — 80048 BASIC METABOLIC PNL TOTAL CA: CPT

## 2022-04-05 PROCEDURE — 74011250636 HC RX REV CODE- 250/636: Performed by: INTERNAL MEDICINE

## 2022-04-05 PROCEDURE — 83735 ASSAY OF MAGNESIUM: CPT

## 2022-04-05 PROCEDURE — 0DJ08ZZ INSPECTION OF UPPER INTESTINAL TRACT, VIA NATURAL OR ARTIFICIAL OPENING ENDOSCOPIC: ICD-10-PCS | Performed by: INTERNAL MEDICINE

## 2022-04-05 PROCEDURE — 74011250637 HC RX REV CODE- 250/637: Performed by: INTERNAL MEDICINE

## 2022-04-05 PROCEDURE — 74011000250 HC RX REV CODE- 250: Performed by: ANESTHESIOLOGY

## 2022-04-05 PROCEDURE — 74011000250 HC RX REV CODE- 250: Performed by: INTERNAL MEDICINE

## 2022-04-05 PROCEDURE — 76060000031 HC ANESTHESIA FIRST 0.5 HR: Performed by: INTERNAL MEDICINE

## 2022-04-05 PROCEDURE — 2709999900 HC NON-CHARGEABLE SUPPLY: Performed by: INTERNAL MEDICINE

## 2022-04-05 PROCEDURE — 76040000019: Performed by: INTERNAL MEDICINE

## 2022-04-05 PROCEDURE — 74011250637 HC RX REV CODE- 250/637: Performed by: NURSE PRACTITIONER

## 2022-04-05 PROCEDURE — 65660000000 HC RM CCU STEPDOWN

## 2022-04-05 PROCEDURE — 36415 COLL VENOUS BLD VENIPUNCTURE: CPT

## 2022-04-05 PROCEDURE — 85025 COMPLETE CBC W/AUTO DIFF WBC: CPT

## 2022-04-05 PROCEDURE — 88184 FLOWCYTOMETRY/ TC 1 MARKER: CPT

## 2022-04-05 PROCEDURE — 88185 FLOWCYTOMETRY/TC ADD-ON: CPT

## 2022-04-05 PROCEDURE — 74011250636 HC RX REV CODE- 250/636: Performed by: ANESTHESIOLOGY

## 2022-04-05 RX ORDER — NALOXONE HYDROCHLORIDE 0.4 MG/ML
0.4 INJECTION, SOLUTION INTRAMUSCULAR; INTRAVENOUS; SUBCUTANEOUS
Status: DISCONTINUED | OUTPATIENT
Start: 2022-04-05 | End: 2022-04-05 | Stop reason: HOSPADM

## 2022-04-05 RX ORDER — FUROSEMIDE 20 MG/1
20 TABLET ORAL
Status: DISCONTINUED | OUTPATIENT
Start: 2022-04-05 | End: 2022-04-25 | Stop reason: HOSPADM

## 2022-04-05 RX ORDER — FLUMAZENIL 0.1 MG/ML
0.2 INJECTION INTRAVENOUS
Status: DISCONTINUED | OUTPATIENT
Start: 2022-04-05 | End: 2022-04-05 | Stop reason: HOSPADM

## 2022-04-05 RX ORDER — DEXTROMETHORPHAN/PSEUDOEPHED 2.5-7.5/.8
1.2 DROPS ORAL
Status: DISCONTINUED | OUTPATIENT
Start: 2022-04-05 | End: 2022-04-05 | Stop reason: HOSPADM

## 2022-04-05 RX ORDER — PROPOFOL 10 MG/ML
INJECTION, EMULSION INTRAVENOUS AS NEEDED
Status: DISCONTINUED | OUTPATIENT
Start: 2022-04-05 | End: 2022-04-05 | Stop reason: HOSPADM

## 2022-04-05 RX ORDER — ATROPINE SULFATE 0.1 MG/ML
0.5 INJECTION INTRAVENOUS
Status: DISCONTINUED | OUTPATIENT
Start: 2022-04-05 | End: 2022-04-05 | Stop reason: HOSPADM

## 2022-04-05 RX ORDER — LIDOCAINE HYDROCHLORIDE 20 MG/ML
INJECTION, SOLUTION EPIDURAL; INFILTRATION; INTRACAUDAL; PERINEURAL AS NEEDED
Status: DISCONTINUED | OUTPATIENT
Start: 2022-04-05 | End: 2022-04-05 | Stop reason: HOSPADM

## 2022-04-05 RX ORDER — MIDAZOLAM HYDROCHLORIDE 1 MG/ML
.25-5 INJECTION, SOLUTION INTRAMUSCULAR; INTRAVENOUS
Status: DISCONTINUED | OUTPATIENT
Start: 2022-04-05 | End: 2022-04-05 | Stop reason: HOSPADM

## 2022-04-05 RX ORDER — TRAMADOL HYDROCHLORIDE 50 MG/1
50 TABLET ORAL
Status: DISCONTINUED | OUTPATIENT
Start: 2022-04-05 | End: 2022-04-11

## 2022-04-05 RX ORDER — SODIUM CHLORIDE 0.9 % (FLUSH) 0.9 %
5-40 SYRINGE (ML) INJECTION AS NEEDED
Status: DISCONTINUED | OUTPATIENT
Start: 2022-04-05 | End: 2022-04-25 | Stop reason: HOSPADM

## 2022-04-05 RX ORDER — SODIUM CHLORIDE 0.9 % (FLUSH) 0.9 %
5-40 SYRINGE (ML) INJECTION EVERY 8 HOURS
Status: DISCONTINUED | OUTPATIENT
Start: 2022-04-05 | End: 2022-04-25 | Stop reason: HOSPADM

## 2022-04-05 RX ORDER — EPINEPHRINE 0.1 MG/ML
1 INJECTION INTRACARDIAC; INTRAVENOUS
Status: DISCONTINUED | OUTPATIENT
Start: 2022-04-05 | End: 2022-04-05 | Stop reason: HOSPADM

## 2022-04-05 RX ORDER — SODIUM CHLORIDE 9 MG/ML
100 INJECTION, SOLUTION INTRAVENOUS CONTINUOUS
Status: DISPENSED | OUTPATIENT
Start: 2022-04-05 | End: 2022-04-05

## 2022-04-05 RX ADMIN — FUROSEMIDE 20 MG: 20 TABLET ORAL at 17:45

## 2022-04-05 RX ADMIN — LIDOCAINE HYDROCHLORIDE 100 MG: 20 INJECTION, SOLUTION EPIDURAL; INFILTRATION; INTRACAUDAL; PERINEURAL at 12:11

## 2022-04-05 RX ADMIN — SODIUM CHLORIDE 100 ML/HR: 0.9 INJECTION, SOLUTION INTRAVENOUS at 11:55

## 2022-04-05 RX ADMIN — METRONIDAZOLE 500 MG: 500 INJECTION, SOLUTION INTRAVENOUS at 08:50

## 2022-04-05 RX ADMIN — IRON SUCROSE 300 MG: 20 INJECTION, SOLUTION INTRAVENOUS at 17:46

## 2022-04-05 RX ADMIN — TOPIRAMATE 200 MG: 100 TABLET, FILM COATED ORAL at 17:48

## 2022-04-05 RX ADMIN — CYANOCOBALAMIN TAB 500 MCG 1000 MCG: 500 TAB at 08:49

## 2022-04-05 RX ADMIN — PROPOFOL 80 MG: 10 INJECTION, EMULSION INTRAVENOUS at 12:17

## 2022-04-05 RX ADMIN — FOLIC ACID 1 MG: 1 TABLET ORAL at 08:49

## 2022-04-05 RX ADMIN — LEVOFLOXACIN 750 MG: 5 INJECTION, SOLUTION INTRAVENOUS at 20:08

## 2022-04-05 RX ADMIN — SODIUM CHLORIDE, PRESERVATIVE FREE 10 ML: 5 INJECTION INTRAVENOUS at 17:47

## 2022-04-05 RX ADMIN — TOPIRAMATE 200 MG: 100 TABLET, FILM COATED ORAL at 08:49

## 2022-04-05 RX ADMIN — SODIUM CHLORIDE, PRESERVATIVE FREE 10 ML: 5 INJECTION INTRAVENOUS at 21:58

## 2022-04-05 RX ADMIN — METRONIDAZOLE 500 MG: 500 INJECTION, SOLUTION INTRAVENOUS at 21:52

## 2022-04-05 RX ADMIN — DIVALPROEX SODIUM 1000 MG: 500 TABLET, DELAYED RELEASE ORAL at 21:52

## 2022-04-05 RX ADMIN — DIVALPROEX SODIUM 500 MG: 500 TABLET, DELAYED RELEASE ORAL at 08:49

## 2022-04-05 RX ADMIN — PANTOPRAZOLE SODIUM 40 MG: 40 TABLET, DELAYED RELEASE ORAL at 08:49

## 2022-04-05 RX ADMIN — GUAIFENESIN 200 MG: 200 SOLUTION ORAL at 00:18

## 2022-04-05 RX ADMIN — ATORVASTATIN CALCIUM 40 MG: 40 TABLET, FILM COATED ORAL at 08:49

## 2022-04-05 NOTE — PROGRESS NOTES
-Hematology / Oncology ([facility]) -  -Primary Oncologist-   -CC-    -S-  Sitting in bed, no complaints. No family at bedside. -O-    Patient Vitals for the past 24 hrs:   Temp Pulse Resp BP SpO2   04/05/22 0814 98.2 °F (36.8 °C) 82 17 122/62 97 %   04/05/22 0406 98.1 °F (36.7 °C) 67 18 (!) 113/53 97 %   04/05/22 0142 98.7 °F (37.1 °C) 74 18 (!) 121/54 97 %   04/04/22 2035 99.4 °F (37.4 °C) 88 18 109/60 98 %   04/04/22 1600 98.6 °F (37 °C) 77 18 121/67 96 %   04/04/22 1311 99.1 °F (37.3 °C) 76 18 124/66 95 %   04/04/22 1110 97.7 °F (36.5 °C) 71 16 130/68 98 %   04/04/22 1020 98.6 °F (37 °C) 77 18 131/73 --     No intake/output data recorded. Gen: nad  Chest: bilateral breath sounds present  Cardiac: rrr  Abd: s/nt    -Labs-    Recent Labs     04/05/22  0310 04/04/22  1902 04/04/22  1257 04/04/22  7277 04/04/22  0042 04/04/22  0042 04/03/22  1728 04/03/22  0114 04/02/22  1354 04/02/22  1354   WBC 13.4*  --   --   --   --  12.3*  --   --   --  15.0*   HGB 7.8* 8.5* 8.1* 6.7*   < > 7.1*   < > 4.6*   < > 4.2*   PLT 53*  --   --   --   --  58*  --   --   --  64*   ANEU 2.3  --   --   --   --   --   --   --   --  2.3     --   --   --   --  141  --  140  --  140   K 3.9  --   --   --   --  4.1  --  4.0  --  4.4   GLU 90  --   --   --   --  120*  --  81  --  140*   BUN 19  --   --   --   --  19  --  22*  --  24*   CREA 0.68*  --   --   --   --  0.75  --  0.70  --  0.80   ALT  --   --   --   --   --   --   --   --   --  11*   TBILI  --   --   --   --   --   --   --   --   --  0.4   AP  --   --   --   --   --   --   --   --   --  65   CA 7.8*  --   --   --   --  8.0*  --  8.3*  --  8.5    < > = values in this interval not displayed. -Imaging-   4/3/22 CT A/P:  IMPRESSION  . No definite pancreatic mass seen by this technique. 2. Colitis diffuse with fat stranding in the peritoneal cavity.   3. Splenomegaly.       -Assessment + Plan-      *) Severe macrocytic anemia, thrombocytopenia, lymphocytosis:  - MCV markedly elevated to 142, severe anemia with  hgb 4.2 on admission.  - Iron studies after transfusion not markedly elevated as I would suspect, will give dose of venofer 2/2 today.  - B12 on low end of normal, will check MMA and start replacment empirically  - PBS concerning for lymphoproliferative d/o such as CLL? Will send flow cytometry, asked nurse to send this am as it looks like it wasn't sent yesterday. - No hemolysis, HIV negative, fibrinogen slightly low but no overt dic. - Pending labs: spep/SIFE, HBV, HCV. - continue folic acid daily  - Had 4U PRBC. continue to transfuse for hgb <7, plt <10K  - BMBX today  - Depakote can cause thrombocytopenia but doubt would explain severe anemia. Unfortunately, we don't have old labs for comparison, Topamax has 1-3% for anemia, these are not new medications so doubt everything is drug related.    *) Colitis:  - CT scan showing diffuse colitis, on IV flagyl  - GI following and planning EGD, had multiple polyps and GI recommended colectomy that was declined.

## 2022-04-05 NOTE — PROCEDURES
[facility]                   Endoscopic Gastroduodenoscopy Procedure Note      4/5/2022  Hernan Dilling  1955  [id number]    Procedure: Endoscopic Gastroduodenoscopy --diagnostic    Indication: ANemia, suspected possible chronic GI blood loss     Pre-operative Diagnosis: see indication above    Post-operative Diagnosis: see findings below    : CRISTINA Powell MD    Referring Provider:  Demond Hassan MD      Anesthesia/Sedation:  MAC anesthesia Propofol    Airway assessment: No airway problems anticipated    Pre-Procedural Exam:      Airway: clear, no airway problems anticipated  Heart: RRR, without gallops or rubs  Lungs: clear bilaterally without wheezes, crackles, or rhonchi  Abdomen: soft, nontender, nondistended, bowel sounds present  Mental Status: awake, alert and oriented to person, place and time       Procedure Details     After infomed consent was obtained for the procedure, with all risks and benefits of procedure explained the patient was taken to the endoscopy suite and placed in the left lateral decubitus position. Following sequential administration of sedation as per above, the endoscope was inserted into the mouth and advanced under direct vision to second portion of the duodenum. A careful inspection was made as the gastroscope was withdrawn, including a retroflexed view of the proximal stomach; findings and interventions are described below. Findings:   Esophagus:normal  Stomach: normal   Duodenum: normal    Therapies:  none    Specimens: none           Complications:   None; patient tolerated the procedure well. EBL:  None. Impression:      -Normal upper endoscopy, with no endoscopic evidence of neoplasia or mucosal abnormality.     Recommendations:  -Hematology workup    Malia Phillips., AD1/5/5/8383 no

## 2022-04-05 NOTE — PROGRESS NOTES
Physician Progress Note      Cary Marroquin  CSN #:                  [id number]  :                       1955  ADMIT DATE:       2022 1:15 PM  [address] [place] DATE:  RESPONDING  PROVIDER #:        Aleshia Diaz MD          QUERY TEXT:    Dr Mary Pradhan  Pt admitted with anemia and has colitis documented. If possible, please document the type of colitis in the medical record. The medical record reflects the following:  Risk Factors: presents with anemia, leg edema, DM, seizures  Clinical Indicators: CT shows -Colitis diffuse with fat stranding in the peritoneal cavity. Treatment: Levaquin, Flagyl;  Options provided:  -- Bacterial Colitis  -- Toxic Colitis  -- Acute Ischemic Colitis  -- Chronic Ischemic Colitis  -- Ulcerative Colitis  -- Left-sided Ulcerative Colitis  -- Colitis d/t radiation  -- Colitis due to other etiology, Please document other etiology. -- Other - I will add my own diagnosis  -- Disagree - Not applicable / Not valid  -- Disagree - Clinically unable to determine / Unknown  -- Refer to Clinical Documentation Reviewer    PROVIDER RESPONSE TEXT:    This patient has bacterial colitis. Query created by:  Vita Morris on 2022 1:48 PM      Electronically signed by:  Aleshia Diaz MD 2022 3:31 PM

## 2022-04-05 NOTE — PROGRESS NOTES
End of Shift Note    Bedside shift change report given to Doctor Salvatore Atkinson (oncoming nurse) by Anna Mascorro RN (offgoing nurse). Report included the following information SBAR, Kardex, ED Summary, Intake/Output, MAR, Recent Results and Cardiac Rhythm NSR    Shift worked:  [id number]     Shift summary and any significant changes:     Patient no c/o pain at this time. Vitals WNL. Patient condom cath voiding QS. Tolerating PO. Informed patietn NPO after MN. Concerns for physician to address:  see above     Zone phone for oncoming shift:  [phone]     Activity:  Activity Level: Bed Rest  Number times ambulated in hallways past shift: 0  Number of times OOB to chair past shift: 0    Cardiac:   Cardiac Monitoring: Yes      Cardiac Rhythm: Sinus Rhythm    Access:   Current line(s): PIV     Genitourinary:   Urinary status: incontinent and external catheter    Respiratory:   O2 Device: None (Room air)  Chronic home O2 use?: NO  Incentive spirometer at bedside: NO       GI:  Last Bowel Movement Date: 04/01/22  Current diet:  DIET NPO  Passing flatus: YES  Tolerating current diet: YES       Pain Management:   Patient states pain is manageable on current regimen: YES    Skin:  [place] Score: 15  Interventions: turn team, speciality bed, float heels, increase time out of bed, PT/OT consult and internal/external urinary devices    Patient Safety:  Fall Score:  Total Score: 4  Interventions: bed/chair alarm, assistive device (walker, cane, etc), gripper socks and pt to call before getting OOB  High Fall Risk: Yes    Length of Stay:  Expected LOS: - - -  Actual LOS: 3      Rylee Henry RN

## 2022-04-05 NOTE — PROGRESS NOTES
Hospitalist Progress Note    NAME: Bhavana Dalal   :  1955   MRN:  [id number]       Assessment / Plan:  History of multiple problems with biopsy showed adenoma  Acute anemia likely due to GI bleed  Pancreatic lesion  Hemoglobin 4.2 on admission. Currently receiving blood for hgb 4.6 today  Trend H&H, continue to transfuse for hemoglobin less than 7.  hgb is 6.7. Will transfuse another unit prbc today  History recent colonoscopy was in 2021 which showed multiple polyps status post biopsied. Partial colectomy was recommended however patient's mom declined. Abd US showed pancreatic lesion. Normal liver  CT abd/pelvis showed evidence of colitis. No mass or ductal dilatation at the pancrease  Cont' IV PPI  Ferritin 92 after prbc transfusion. IV Iron x3 doses    Cont' empiric IV levaquin/flagyl for colitis  Hold antihypertensive to avoid hypotension. PRN IV Hydralazine   NPO. EGD today  GI is following, appreciate recs I spoke to pt's mother and gave updates     Thrombocytopenia  Lymphocytosis    PLT 53, abd us does not reveal any liver pathology. ? decreased production from hypocellular bone marrow vs ITP vs CLL  For BM biopsy today  Hematology following, appreciate recs. Discussed with Dr Porsche Preston     LE edema in the setting of hypoalbuminemia   Echo showed nl EF. LE duplex neg for DVT  Change to PO lasix     Type 2 diabetes  Seizure disorder  Intellectual disability  SSI, hold PTA diabetic meds  Cont' Depakote, Topamax  CM to help with dispo. Pt's mother who is 79 yo is his primary care giver. CM to help with dispo        Code Status: Full  Surrogate Decision Maker: Patient's mother  DVT Prophylaxis: SCD  GI Prophylaxis: not indicated  Baseline: From home     Subjective:     Chief Complaint / Reason for Physician Visit  NAD. C/o leg pain. Discussed with RN events overnight.      Review of Systems:  Symptom Y/N Comments  Symptom Y/N Comments   Fever/Chills    Chest Pain Poor Appetite    Edema     Cough    Abdominal Pain     Sputum    Joint Pain     SOB/GRULLON    Pruritis/Rash     Nausea/vomit    Tolerating PT/OT     Diarrhea    Tolerating Diet     Constipation    Other       Could NOT obtain due to:      Objective:     VITALS:   Last 24hrs VS reviewed since prior progress note. Most recent are:  Patient Vitals for the past 24 hrs:   Temp Pulse Resp BP SpO2   04/05/22 0814 98.2 °F (36.8 °C) 82 17 122/62 97 %   04/05/22 0406 98.1 °F (36.7 °C) 67 18 (!) 113/53 97 %   04/05/22 0142 98.7 °F (37.1 °C) 74 18 (!) 121/54 97 %   04/04/22 2035 99.4 °F (37.4 °C) 88 18 109/60 98 %   04/04/22 1600 98.6 °F (37 °C) 77 18 121/67 96 %   04/04/22 1311 99.1 °F (37.3 °C) 76 18 124/66 95 %   04/04/22 1110 97.7 °F (36.5 °C) 71 16 130/68 98 %   04/04/22 1020 98.6 °F (37 °C) 77 18 131/73 --       Intake/Output Summary (Last 24 hours) at 4/5/2022 0838  Last data filed at 4/5/2022 0550  Gross per 24 hour   Intake 587.5 ml   Output 1750 ml   Net -1162.5 ml        I had a face to face encounter and independently examined this patient on 4/5/2022, as outlined below:  PHYSICAL EXAM:  General: WD, WN. Alert, cooperative, no acute distress    EENT:  EOMI. Anicteric sclerae. MMM  Resp:  CTA bilaterally, no wheezing or rales. No accessory muscle use  CV:  Regular  rhythm,   b/l LE edema  GI:  Soft, Non distended, Non tender. +Bowel sounds  Neurologic:  Awake, following commands. Psych:   . Not anxious nor agitated  Skin:  No rashes.   No jaundice    Reviewed most current lab test results and cultures  YES  Reviewed most current radiology test results   YES  Review and summation of old records today    NO  Reviewed patient's current orders and MAR    YES  PMH/SH reviewed - no change compared to H&P  ________________________________________________________________________  Care Plan discussed with:    Comments   Patient x    Family      RN x    Care Manager     Consultant  x hematology Multidiciplinary team rounds were held today with , nursing, pharmacist and clinical coordinator. Patient's plan of care was discussed; medications were reviewed and discharge planning was addressed. ________________________________________________________________________  Total NON critical care TIME:  35   Minutes    Total CRITICAL CARE TIME Spent:   Minutes non procedure based      Comments   >50% of visit spent in counseling and coordination of care     ________________________________________________________________________  Eufemia Holbrook MD     Procedures: see electronic medical records for all procedures/Xrays and details which were not copied into this note but were reviewed prior to creation of Plan. LABS:  I reviewed today's most current labs and imaging studies. Pertinent labs include:  Recent Labs     04/05/22 0310 04/04/22  1902 04/04/22  1257 04/04/22  0633 04/04/22  0042 04/03/22  0114 04/02/22  1354   WBC 13.4*  --   --   --  12.3*  --  15.0*   HGB 7.8* 8.5* 8.1*   < > 7.1*   < > 4.2*   HCT 24.1* 26.1* 25.0*   < > 22.0*   < > 14.7*   PLT 53*  --   --   --  58*  --  64*    < > = values in this interval not displayed. Recent Labs     04/05/22 0310 04/04/22  0042 04/03/22  0114 04/02/22  1354 04/02/22  1354    141 140   < > 140   K 3.9 4.1 4.0   < > 4.4   * 112* 112*   < > 111*   CO2 23 23 21   < > 21   GLU 90 120* 81   < > 140*   BUN 19 19 22*   < > 24*   CREA 0.68* 0.75 0.70   < > 0.80   CA 7.8* 8.0* 8.3*   < > 8.5   ALB  --   --   --   --  2.6*   TBILI  --   --   --   --  0.4   ALT  --   --   --   --  11*    < > = values in this interval not displayed.        Signed: Eufemia Holbrook MD

## 2022-04-05 NOTE — ANESTHESIA POSTPROCEDURE EVALUATION
Procedure(s):  ESOPHAGOGASTRODUODENOSCOPY (EGD). total IV anesthesia    Anesthesia Post Evaluation        Patient location during evaluation: PACU  Note status: Adequate. Level of consciousness: responsive to verbal stimuli and sleepy but conscious  Pain management: satisfactory to patient  Airway patency: patent  Anesthetic complications: no  Cardiovascular status: acceptable  Respiratory status: acceptable  Hydration status: acceptable  Comments: +Post-Anesthesia Evaluation and Assessment    Patient: Dayanara Hernadez MRN: [id number]  SSN: [id number]   YOB: 1955  Age: 73 y.o. Sex: male      Cardiovascular Function/Vital Signs    /70   Pulse 67   Temp 36.8 °C (98.2 °F)   Resp 19   Ht 6' (1.829 m)   Wt 88.5 kg (195 lb)   SpO2 94%   BMI 26.45 kg/m²     Patient is status post Procedure(s):  ESOPHAGOGASTRODUODENOSCOPY (EGD). Nausea/Vomiting: Controlled. Postoperative hydration reviewed and adequate. Pain:  Pain Scale 1: Numeric (0 - 10) (04/05/22 1141)  Pain Intensity 1: 0 (04/05/22 1141)   Managed. Neurological Status: At baseline. Mental Status and Level of Consciousness: Arousable. Pulmonary Status:   O2 Device: CO2 nasal cannula (04/05/22 1219)   Adequate oxygenation and airway patent. Complications related to anesthesia: None    Post-anesthesia assessment completed. No concerns. Signed By: Karyle Spires, MD    4/5/2022  Post anesthesia nausea and vomiting:  controlled      INITIAL Post-op Vital signs:   Vitals Value Taken Time   /65 04/05/22 1236   Temp     Pulse 65 04/05/22 1238   Resp 18 04/05/22 1238   SpO2 95 % 04/05/22 1238   Vitals shown include unvalidated device data.

## 2022-04-05 NOTE — PROGRESS NOTES
Problem: Falls - Risk of  Goal: *Absence of Falls  Description: Document Cindia Neigh Fall Risk and appropriate interventions in the flowsheet.   4/5/2022 0008 by Joanna Akhtar RN  Outcome: Progressing Towards Goal  Note: Fall Risk Interventions:  Mobility Interventions: Bed/chair exit alarm,OT consult for ADLs,Patient to call before getting OOB,PT Consult for mobility concerns,PT Consult for assist device competence,Utilize walker, cane, or other assistive device    Mentation Interventions: Bed/chair exit alarm,Door open when patient unattended,Increase mobility,More frequent rounding,Reorient patient,Room close to nurse's station,Toileting rounds    Medication Interventions: Assess postural VS orthostatic hypotension,Bed/chair exit alarm,Patient to call before getting OOB,Teach patient to arise slowly    Elimination Interventions: Bed/chair exit alarm,Call light in reach,Patient to call for help with toileting needs,Toilet paper/wipes in reach,Toileting schedule/hourly rounds,Urinal in reach    History of Falls Interventions: Bed/chair exit alarm,Consult care management for discharge planning,Door open when patient unattended,Room close to nurse's station      4/5/2022 0008 by Rylee Ackerman Che, RN  Outcome: Progressing Towards Goal  Note: Fall Risk Interventions:  Mobility Interventions: Bed/chair exit alarm,OT consult for ADLs,Patient to call before getting OOB,PT Consult for mobility concerns,PT Consult for assist device competence,Utilize walker, cane, or other assistive device    Mentation Interventions: Bed/chair exit alarm,Door open when patient unattended,Increase mobility,More frequent rounding,Reorient patient,Room close to nurse's station,Toileting rounds    Medication Interventions: Assess postural VS orthostatic hypotension,Bed/chair exit alarm,Patient to call before getting OOB,Teach patient to arise slowly    Elimination Interventions: Bed/chair exit alarm,Call light in reach,Patient to call for help with toileting needs,Toilet paper/wipes in reach,Toileting schedule/hourly rounds,Urinal in reach    History of Falls Interventions: Bed/chair exit alarm,Consult care management for discharge planning,Door open when patient unattended,Room close to nurse's station         Problem: Patient Education: Go to Patient Education Activity  Goal: Patient/Family Education  4/5/2022 0008 by Pepe Elias RN  Outcome: Progressing Towards Goal  4/5/2022 0008 by Pepe Elias RN  Outcome: Progressing Towards Goal     Problem: Anemia Care Plan (Adult and Pediatric)  Goal: *Labs within defined limits  Outcome: Progressing Towards Goal

## 2022-04-05 NOTE — PERIOP NOTES
TRANSFER - OUT REPORT:    Verbal report given to Bailey RN(name) on Nguyen Lockhart  being transferred to [place] [address](unit) for routine progression of care       Report consisted of patients Situation, Background, Assessment and   Recommendations(SBAR). Information from the following report(s) SBAR was reviewed with the receiving nurse. Lines:   Peripheral IV 04/05/22 Posterior;Right Forearm (Active)   Site Assessment Clean, dry, & intact 04/05/22 0850   Phlebitis Assessment 0 04/05/22 0850   Infiltration Assessment 0 04/05/22 0850   Dressing Status Clean, dry, & intact 04/05/22 0850   Dressing Type Tape;Transparent 04/05/22 0850   Hub Color/Line Status Blue;Flushed;Patent; Infusing 04/05/22 0850        Opportunity for questions and clarification was provided.

## 2022-04-05 NOTE — PERIOP NOTES
Anesthesia reports 80mg Propofol, 100mg Lidocaine and 100mL NS given during procedure. Received report from anesthesia staff on vital signs and status of patient. Endoscope was pre-cleaned at the bedside immediately following procedure by Shad VIEIRA

## 2022-04-05 NOTE — ROUTINE PROCESS
Mera Norriskenny  1955  [id number]    Situation:  Verbal report received from: Candelaria  Procedure: Procedure(s):  ESOPHAGOGASTRODUODENOSCOPY (EGD)    Background:    Preoperative diagnosis: anemia  Postoperative diagnosis: Normal EGD    :  Dr. Kody Trevino  Assistant(s): Endoscopy Technician-1: Catherine Lombardi  Endoscopy RN-1: Elijah Medina RN    Specimens: * No specimens in log *  H. Pylori  no    Assessment:  Intra-procedure medications     Anesthesia gave intra-procedure sedation and medications, see anesthesia flow sheet yes    Intravenous fluids: NS@ KVO     Vital signs stable     Abdominal assessment: round and soft     Recommendation:  Discharge patient per MD orde.   Return to floor  Family or Friend   Permission to share finding with family or friend yes

## 2022-04-06 ENCOUNTER — APPOINTMENT (OUTPATIENT)
Dept: CT IMAGING | Age: 67
DRG: 823 | End: 2022-04-06
Attending: INTERNAL MEDICINE
Payer: MEDICARE

## 2022-04-06 ENCOUNTER — APPOINTMENT (OUTPATIENT)
Dept: GENERAL RADIOLOGY | Age: 67
DRG: 823 | End: 2022-04-06
Attending: INTERNAL MEDICINE
Payer: MEDICARE

## 2022-04-06 LAB
ANION GAP SERPL CALC-SCNC: 8 MMOL/L (ref 5–15)
BASOPHILS # BLD: 0 K/UL (ref 0–0.1)
BASOPHILS NFR BLD: 0 % (ref 0–1)
BUN SERPL-MCNC: 28 MG/DL (ref 6–20)
BUN/CREAT SERPL: 24 (ref 12–20)
CALCIUM SERPL-MCNC: 7.8 MG/DL (ref 8.5–10.1)
CHLORIDE SERPL-SCNC: 112 MMOL/L (ref 97–108)
CO2 SERPL-SCNC: 20 MMOL/L (ref 21–32)
CREAT SERPL-MCNC: 1.15 MG/DL (ref 0.7–1.3)
DIFFERENTIAL METHOD BLD: ABNORMAL
EOSINOPHIL # BLD: 0 K/UL (ref 0–0.4)
EOSINOPHIL NFR BLD: 0 % (ref 0–7)
ERYTHROCYTE [DISTWIDTH] IN BLOOD BY AUTOMATED COUNT: 23.7 % (ref 11.5–14.5)
GLUCOSE BLD STRIP.AUTO-MCNC: 125 MG/DL (ref 65–117)
GLUCOSE BLD STRIP.AUTO-MCNC: 149 MG/DL (ref 65–117)
GLUCOSE BLD STRIP.AUTO-MCNC: 210 MG/DL (ref 65–117)
GLUCOSE SERPL-MCNC: 147 MG/DL (ref 65–100)
HCT VFR BLD AUTO: 26.7 % (ref 36.6–50.3)
HGB BLD-MCNC: 8.6 G/DL (ref 12.1–17)
IMM GRANULOCYTES # BLD AUTO: 0 K/UL (ref 0–0.04)
IMM GRANULOCYTES NFR BLD AUTO: 0 % (ref 0–0.5)
LACTATE SERPL-SCNC: 1 MMOL/L (ref 0.4–2)
LYMPHOCYTES # BLD: 6.7 K/UL (ref 0.8–3.5)
LYMPHOCYTES NFR BLD: 61 % (ref 12–49)
MCH RBC QN AUTO: 35.5 PG (ref 26–34)
MCHC RBC AUTO-ENTMCNC: 32.2 G/DL (ref 30–36.5)
MCV RBC AUTO: 110.3 FL (ref 80–99)
METAMYELOCYTES NFR BLD MANUAL: 2 %
MONOCYTES # BLD: 0.7 K/UL (ref 0–1)
MONOCYTES NFR BLD: 6 % (ref 5–13)
NEUTS BAND NFR BLD MANUAL: 1 %
NEUTS SEG # BLD: 3.4 K/UL (ref 1.8–8)
NEUTS SEG NFR BLD: 30 % (ref 32–75)
NRBC # BLD: 0 K/UL (ref 0–0.01)
NRBC BLD-RTO: 0 PER 100 WBC
PLATELET # BLD AUTO: 59 K/UL (ref 150–400)
PMV BLD AUTO: 9.2 FL (ref 8.9–12.9)
POTASSIUM SERPL-SCNC: 3.9 MMOL/L (ref 3.5–5.1)
PROCALCITONIN SERPL-MCNC: 0.34 NG/ML
RBC # BLD AUTO: 2.42 M/UL (ref 4.1–5.7)
RBC MORPH BLD: ABNORMAL
RBC MORPH BLD: ABNORMAL
SERVICE CMNT-IMP: ABNORMAL
SODIUM SERPL-SCNC: 140 MMOL/L (ref 136–145)
WBC # BLD AUTO: 11 K/UL (ref 4.1–11.1)
WBC MORPH BLD: ABNORMAL

## 2022-04-06 PROCEDURE — 88237 TISSUE CULTURE BONE MARROW: CPT

## 2022-04-06 PROCEDURE — 74011000250 HC RX REV CODE- 250: Performed by: INTERNAL MEDICINE

## 2022-04-06 PROCEDURE — 88305 TISSUE EXAM BY PATHOLOGIST: CPT

## 2022-04-06 PROCEDURE — 81450 HL NEO GSAP 5-50DNA/DNA&RNA: CPT

## 2022-04-06 PROCEDURE — 84145 PROCALCITONIN (PCT): CPT

## 2022-04-06 PROCEDURE — 87040 BLOOD CULTURE FOR BACTERIA: CPT

## 2022-04-06 PROCEDURE — 74011250636 HC RX REV CODE- 250/636: Performed by: RADIOLOGY

## 2022-04-06 PROCEDURE — 74011636637 HC RX REV CODE- 636/637: Performed by: INTERNAL MEDICINE

## 2022-04-06 PROCEDURE — 65660000000 HC RM CCU STEPDOWN

## 2022-04-06 PROCEDURE — 74011250637 HC RX REV CODE- 250/637: Performed by: INTERNAL MEDICINE

## 2022-04-06 PROCEDURE — 74011250637 HC RX REV CODE- 250/637

## 2022-04-06 PROCEDURE — 88311 DECALCIFY TISSUE: CPT

## 2022-04-06 PROCEDURE — 36415 COLL VENOUS BLD VENIPUNCTURE: CPT

## 2022-04-06 PROCEDURE — 88185 FLOWCYTOMETRY/TC ADD-ON: CPT

## 2022-04-06 PROCEDURE — 74011250636 HC RX REV CODE- 250/636: Performed by: INTERNAL MEDICINE

## 2022-04-06 PROCEDURE — 88184 FLOWCYTOMETRY/ TC 1 MARKER: CPT

## 2022-04-06 PROCEDURE — 88374 M/PHMTRC ALYS ISHQUANT/SEMIQ: CPT

## 2022-04-06 PROCEDURE — 85025 COMPLETE CBC W/AUTO DIFF WBC: CPT

## 2022-04-06 PROCEDURE — 88341 IMHCHEM/IMCYTCHM EA ADD ANTB: CPT

## 2022-04-06 PROCEDURE — 07DR3ZX EXTRACTION OF ILIAC BONE MARROW, PERCUTANEOUS APPROACH, DIAGNOSTIC: ICD-10-PCS | Performed by: RADIOLOGY

## 2022-04-06 PROCEDURE — 74011250637 HC RX REV CODE- 250/637: Performed by: NURSE PRACTITIONER

## 2022-04-06 PROCEDURE — 80048 BASIC METABOLIC PNL TOTAL CA: CPT

## 2022-04-06 PROCEDURE — 2709999900 HC NON-CHARGEABLE SUPPLY

## 2022-04-06 PROCEDURE — 83605 ASSAY OF LACTIC ACID: CPT

## 2022-04-06 PROCEDURE — 82962 GLUCOSE BLOOD TEST: CPT

## 2022-04-06 PROCEDURE — 88342 IMHCHEM/IMCYTCHM 1ST ANTB: CPT

## 2022-04-06 PROCEDURE — 88264 CHROMOSOME ANALYSIS 20-25: CPT

## 2022-04-06 PROCEDURE — 88360 TUMOR IMMUNOHISTOCHEM/MANUAL: CPT

## 2022-04-06 PROCEDURE — 71045 X-RAY EXAM CHEST 1 VIEW: CPT

## 2022-04-06 PROCEDURE — 88313 SPECIAL STAINS GROUP 2: CPT

## 2022-04-06 PROCEDURE — 99152 MOD SED SAME PHYS/QHP 5/>YRS: CPT

## 2022-04-06 RX ORDER — FENTANYL CITRATE 50 UG/ML
100 INJECTION, SOLUTION INTRAMUSCULAR; INTRAVENOUS
Status: DISCONTINUED | OUTPATIENT
Start: 2022-04-06 | End: 2022-04-06

## 2022-04-06 RX ORDER — ACETAMINOPHEN 650 MG/1
650 SUPPOSITORY RECTAL
Status: DISCONTINUED | OUTPATIENT
Start: 2022-04-06 | End: 2022-04-25 | Stop reason: HOSPADM

## 2022-04-06 RX ORDER — ACETAMINOPHEN 325 MG/1
650 TABLET ORAL
Status: DISCONTINUED | OUTPATIENT
Start: 2022-04-06 | End: 2022-04-21

## 2022-04-06 RX ORDER — VANCOMYCIN 2 GRAM/500 ML IN 0.9 % SODIUM CHLORIDE INTRAVENOUS
2000 ONCE
Status: COMPLETED | OUTPATIENT
Start: 2022-04-06 | End: 2022-04-06

## 2022-04-06 RX ORDER — SODIUM CHLORIDE 9 MG/ML
25 INJECTION, SOLUTION INTRAVENOUS CONTINUOUS
Status: DISCONTINUED | OUTPATIENT
Start: 2022-04-06 | End: 2022-04-06

## 2022-04-06 RX ORDER — MIDAZOLAM HYDROCHLORIDE 1 MG/ML
5 INJECTION, SOLUTION INTRAMUSCULAR; INTRAVENOUS
Status: DISCONTINUED | OUTPATIENT
Start: 2022-04-06 | End: 2022-04-06

## 2022-04-06 RX ADMIN — VANCOMYCIN HYDROCHLORIDE 2000 MG: 10 INJECTION, POWDER, LYOPHILIZED, FOR SOLUTION INTRAVENOUS at 11:30

## 2022-04-06 RX ADMIN — MIDAZOLAM HYDROCHLORIDE 1 MG: 1 INJECTION, SOLUTION INTRAMUSCULAR; INTRAVENOUS at 14:08

## 2022-04-06 RX ADMIN — FENTANYL CITRATE 25 MCG: 50 INJECTION, SOLUTION INTRAMUSCULAR; INTRAVENOUS at 14:34

## 2022-04-06 RX ADMIN — CYANOCOBALAMIN TAB 500 MCG 1000 MCG: 500 TAB at 11:21

## 2022-04-06 RX ADMIN — Medication 2 UNITS: at 22:03

## 2022-04-06 RX ADMIN — DIVALPROEX SODIUM 1000 MG: 500 TABLET, DELAYED RELEASE ORAL at 22:03

## 2022-04-06 RX ADMIN — METRONIDAZOLE 500 MG: 500 INJECTION, SOLUTION INTRAVENOUS at 11:30

## 2022-04-06 RX ADMIN — PANTOPRAZOLE SODIUM 40 MG: 40 TABLET, DELAYED RELEASE ORAL at 11:24

## 2022-04-06 RX ADMIN — TOPIRAMATE 200 MG: 100 TABLET, FILM COATED ORAL at 17:44

## 2022-04-06 RX ADMIN — ACETAMINOPHEN 325MG 650 MG: 325 TABLET ORAL at 01:45

## 2022-04-06 RX ADMIN — FUROSEMIDE 20 MG: 20 TABLET ORAL at 07:30

## 2022-04-06 RX ADMIN — TOPIRAMATE 200 MG: 100 TABLET, FILM COATED ORAL at 11:21

## 2022-04-06 RX ADMIN — ATORVASTATIN CALCIUM 40 MG: 40 TABLET, FILM COATED ORAL at 11:22

## 2022-04-06 RX ADMIN — LEVOFLOXACIN 750 MG: 5 INJECTION, SOLUTION INTRAVENOUS at 21:29

## 2022-04-06 RX ADMIN — ACETAMINOPHEN 325MG 650 MG: 325 TABLET ORAL at 06:35

## 2022-04-06 RX ADMIN — FUROSEMIDE 20 MG: 20 TABLET ORAL at 17:44

## 2022-04-06 RX ADMIN — SODIUM CHLORIDE, PRESERVATIVE FREE 10 ML: 5 INJECTION INTRAVENOUS at 21:29

## 2022-04-06 RX ADMIN — SODIUM CHLORIDE 25 ML/HR: 9 INJECTION, SOLUTION INTRAVENOUS at 13:15

## 2022-04-06 RX ADMIN — TRAMADOL HYDROCHLORIDE 50 MG: 50 TABLET, COATED ORAL at 22:03

## 2022-04-06 RX ADMIN — DIVALPROEX SODIUM 500 MG: 500 TABLET, DELAYED RELEASE ORAL at 11:21

## 2022-04-06 RX ADMIN — SODIUM CHLORIDE, PRESERVATIVE FREE 10 ML: 5 INJECTION INTRAVENOUS at 17:45

## 2022-04-06 RX ADMIN — FENTANYL CITRATE 50 MCG: 50 INJECTION, SOLUTION INTRAMUSCULAR; INTRAVENOUS at 14:16

## 2022-04-06 RX ADMIN — FOLIC ACID 1 MG: 1 TABLET ORAL at 11:21

## 2022-04-06 RX ADMIN — GUAIFENESIN 200 MG: 200 SOLUTION ORAL at 21:27

## 2022-04-06 NOTE — ROUTINE PROCESS
Received pt. On his inpt. Stretcher. Pt noted sitting upright on inpt. Bed. Pt awake, alert to person and date of birth - states he is at the \"hospital\" -didn't know the name or date. Pt noted on telemetry on arrival to unit and IV saline lock right upper forearm (#22 angiocath). Pt voicing he is concerned regarding the redness on both lower extremities.

## 2022-04-06 NOTE — ROUTINE PROCESS
TRANSFER - OUT REPORT:    Verbal report given to Moe Reaves (name) on Bhavana Dalal  being transferred to Oncology(unit) for routine progression of care       Report consisted of patients Situation, Background, Assessment and   Recommendations(SBAR). Information from the following report(s) Procedure Summary was reviewed with the receiving nurse. Lines:   Peripheral IV 04/05/22 Posterior;Right Forearm (Active)   Site Assessment Clean, dry, & intact 04/06/22 0300   Phlebitis Assessment 0 04/06/22 0300   Infiltration Assessment 0 04/06/22 0300   Dressing Status Clean, dry, & intact 04/06/22 0300   Dressing Type Transparent;Tape 04/06/22 0300   Hub Color/Line Status Patent; Flushed 04/06/22 0300   Alcohol Cap Used Yes 04/06/22 0300        Opportunity for questions and clarification was provided.       Patient transported with:   Monitor - pt on telemetry for procedure and post procedure

## 2022-04-06 NOTE — PROGRESS NOTES
ALEX Loo, in to speak with pt. About procedure/explain procedure and get his verbal consent for procedure (pt.'s mom signed consent in room with nurse).

## 2022-04-06 NOTE — PROGRESS NOTES
57- Received perfectserve from nursing that stated: \"Patients initial oral temperature was 101.8, Tylenol was given at 0145, when it was rechecked, at 0541 temperature was 101.2. Tylenol is PRN q6hrs. Please advise. \" Noted that he is on Vanc and Flagyl. Ordered lactic acid, paired blood cultures and procalcitonin. Changed frequency of Tylenol to q4 hrs.

## 2022-04-06 NOTE — PROGRESS NOTES
Pharmacy Antimicrobial Kinetic Dosing    Indication for Antimicrobials: HAP/IAI     Current Regimen of Each Antimicrobial:  Vancomycin 2000 mg IV LD, then 750 mg IV q12h (Start Date ; Day 1)  Levofloxacin 750 mg IV q24h (Start Date 4/3; Day 4)  Metronidazole 500 mg IV q12h (Start Date 4/3; Day 4)    Previous Antimicrobial Therapy:    Goal Level: AUC: 400-600 mg/hr/Liter/day    Date Dose & Interval Measured (mcg/mL) Predicted AUC/SUNITHA                       Date & time of next level:  with AM labs    Dosing calculator used: [employer] calculator    Significant Positive Cultures:    blood: NGTD    Conditions for Dosing Consideration: None    Labs:  Recent Labs     22   CREA 1.15 0.68* 0.75   BUN 28* 19 19     Recent Labs     22   WBC 11.0 13.4* 12.3*   BANDS 1  --   --      Temp (24hrs), Av.2 °F (37.3 °C), Min:97.2 °F (36.2 °C), Max:101.8 °F (38.8 °C)        Creatinine Clearance (mL/min):   CrCl (Ideal Body Weight): 70.3   If actual weight < IBW: CrCl (Actual Body Weight) 80.1    Impression/Plan:   Started vanc for post-op fever for possible aspiration  Creatinine bumped today so will dose conservatively  Give 2000 mg IV loading dose, then 750 mg q12h for projected AUC of 430 and trough of 14.4  Vancomycin trough  with AM labs  Continue Flagyl, Levaquin  Antimicrobial stop date TBD     Pharmacy will follow daily and adjust medications as appropriate for renal function and/or serum levels.     Thank you,  Caren Schilling PHARMD    Vancomycin Dosing Document    Documents located on pharmacy Teams site: Clinical Practice -> Antimicrobial Stewardship -> Antibiotics_Vancomycin     Aminoglycoside Dosing Document    Documents located on pharmacy Teams site: Clinical Practice -> Antimicrobial Stewardship -> Antibiotics_Aminoglycosides

## 2022-04-06 NOTE — PROGRESS NOTES
GI PROGRESS NOTE  Aydin Gotti, NP  [phone] NP in-hospital cell phone M-F until 4:30  After 5pm or on weekends, please call  for physician on call    Kavya West :1955 TJO:[id number]   ATTG: Dr. Maria Luisa Ramirez  PCP: Stephanie Camarena MD  Date/Time:  2022 10:06 AM   Primary GI: Dr. Kenton Boudreaux; Dr. Selina Nageotte covering  Reason for following: Anemia     Assessment:     Acute anemia   Recent CLN showing innumerable adenomatous colon polyps (2021)  · Hgb 8.6; was 4.2 on admission  · CT abd/pel: No definite pancreatic mass seen by this technique. Colitis diffuse with fat stranding in the peritoneal cavity  · 2022:  EGD that was unremarkable     Seizure disorder  Intellectual disability  · 80year old mother is primary caregiver  · Treatment per primary team      Plan:     · Appreciate hematology input   · Follow H&H; goal for Hgb >7  · Monitor for s/s of bleeding; transfuse as clinically indicated   · Continue PPI  · Diet as tolerated   · Symptomatic care per primary team   · Nothing further to add from a GI standpoint. GI signing-off. Please call with any questions. Thank you for this consult. *Plan of care discussed with Dr. Selina Nageotte      Subjective:   Discussed with RN events overnight. Patient denies any new complaints. No family at bedside. Review of Systems:  Symptom Y/N Comments  Symptom Y/N Comments   Fever/Chills n   Chest Pain n    Cough n   Headaches n    Sputum n   Joint Pain n    SOB/GRULLON n   Pruritis/Rash n    Tolerating Diet y   Other       Could NOT obtain due to:      Objective:   VITALS:   Last 24hrs VS reviewed since prior progress note.  Most recent are:  Visit Vitals  BP (!) 142/65 (BP 1 Location: Left arm, BP Patient Position: At rest)   Pulse 71   Temp 98.4 °F (36.9 °C)   Resp 16   Ht 6' (1.829 m)   Wt 88.5 kg (195 lb)   SpO2 98%   BMI 26.45 kg/m²       Intake/Output Summary (Last 24 hours) at 2022 1022  Last data filed at 2022 1243  Gross per 24 hour   Intake 100 ml   Output 310 ml   Net -210 ml     PHYSICAL EXAM:  General: Pleasant  male. NAD  HEENT: NC, Atraumatic. Anicteric sclerae. Lungs:  CTA Bilaterally. No Wheezing/Rhonchi/Rales. Heart:  Regular  rhythm,  No murmur, No Rubs, No Gallops  Abdomen: Soft, Non distended, Non tender. +Bowel sounds, no HSM  Extremities: No c/c/e  Neurologic:  Alert with intellectual delay. No acute neurological distress   Psych:   Little to no insight. Not anxious nor agitated. Lab and Radiology Data Reviewed: (see below)    Medications Reviewed: (see below)  PMH/SH reviewed - no change compared to H&P  ________________________________________________________________________  Total time spent with patient: 20 minutes ________________________________________________________________________  Care Plan discussed with:  Patient x   Family     RN x              Consultant:       Foster Argueta NP     Procedures: see electronic medical records for all procedures/Xrays and details which were not copied into this note but were reviewed prior to creation of Plan. LABS:  Recent Labs     04/06/22 0451 04/05/22  0310   WBC 11.0 13.4*   HGB 8.6* 7.8*   HCT 26.7* 24.1*   PLT 59* 53*     Recent Labs     04/06/22  0451 04/05/22  0310 04/04/22  0042    141 141   K 3.9 3.9 4.1   * 113* 112*   CO2 20* 23 23   BUN 28* 19 19   CREA 1.15 0.68* 0.75   * 90 120*   CA 7.8* 7.8* 8.0*   MG  --  1.9  --      Recent Labs     04/04/22  1030   TP 4.9*     No results for input(s): INR, PTP, APTT, INREXT, INREXT in the last 72 hours. No results for input(s): FE, TIBC, PSAT, FERR in the last 72 hours. Lab Results   Component Value Date/Time    Folate 37.1 (H) 04/03/2022 01:14 AM     No results for input(s): PH, PCO2, PO2 in the last 72 hours. No results for input(s): CPK, CKMB in the last 72 hours.     No lab exists for component: TROPONINI  Lab Results   Component Value Date/Time    Color YELLOW/STRAW 04/02/2022 07:16 PM    Appearance CLEAR 04/02/2022 07:16 PM    Specific gravity 1.015 04/02/2022 07:16 PM    pH (UA) 7.5 04/02/2022 07:16 PM    Protein Negative 04/02/2022 07:16 PM    Glucose Negative 04/02/2022 07:16 PM    Ketone Negative 04/02/2022 07:16 PM    Bilirubin Negative 04/02/2022 07:16 PM    Urobilinogen 1.0 04/02/2022 07:16 PM    Nitrites Negative 04/02/2022 07:16 PM    Leukocyte Esterase Negative 04/02/2022 07:16 PM    Epithelial cells FEW 04/02/2022 07:16 PM    Bacteria Negative 04/02/2022 07:16 PM    WBC 0-4 04/02/2022 07:16 PM    RBC 0-5 04/02/2022 07:16 PM       MEDICATIONS:  Current Facility-Administered Medications   Medication Dose Route Frequency    acetaminophen (TYLENOL) tablet 650 mg  650 mg Oral Q4H PRN    Or    acetaminophen (TYLENOL) suppository 650 mg  650 mg Rectal Q4H PRN    vancomycin (VANCOCIN) 2000 mg in  ml infusion  2,000 mg IntraVENous ONCE    vancomycin (VANCOCIN) 750 mg in 0.9% sodium chloride 250 mL (VIAL-MATE)  750 mg IntraVENous Q12H    [START ON 4/7/2022] Vancomycin trough 4/7 with AM labs   Other ONCE    sodium chloride (NS) flush 5-40 mL  5-40 mL IntraVENous Q8H    sodium chloride (NS) flush 5-40 mL  5-40 mL IntraVENous PRN    furosemide (LASIX) tablet 20 mg  20 mg Oral ACB&D    traMADoL (ULTRAM) tablet 50 mg  50 mg Oral Q6H PRN    hydrALAZINE (APRESOLINE) 20 mg/mL injection 10 mg  10 mg IntraVENous Q6H PRN    0.9% sodium chloride infusion 250 mL  250 mL IntraVENous PRN    cyanocobalamin (VITAMIN B12) tablet 1,000 mcg  1,000 mcg Oral DAILY    benzocaine-menthoL (CHLORASEPTIC MAX) lozenge 1 Lozenge  1 Lozenge Oral Q2H PRN    guaiFENesin (ROBITUSSIN) 100 mg/5 mL oral liquid 200 mg  200 mg Oral Q4H PRN    pantoprazole (PROTONIX) tablet 40 mg  40 mg Oral ACB    metroNIDAZOLE (FLAGYL) IVPB premix 500 mg  500 mg IntraVENous Q12H    levoFLOXacin (LEVAQUIN) 750 mg in D5W IVPB  750 mg IntraVENous Q24H    polyethylene glycol (MIRALAX) packet 17 g  17 g Oral DAILY PRN    ondansetron (ZOFRAN ODT) tablet 4 mg  4 mg Oral Q8H PRN    Or    ondansetron (ZOFRAN) injection 4 mg  4 mg IntraVENous Q6H PRN    divalproex DR (DEPAKOTE) tablet 500 mg  500 mg Oral DAILY    folic acid (FOLVITE) tablet 1 mg  1 mg Oral DAILY    topiramate (TOPAMAX) tablet 200 mg  200 mg Oral BID    atorvastatin (LIPITOR) tablet 40 mg  40 mg Oral DAILY    insulin lispro (HUMALOG) injection   SubCUTAneous AC&HS    glucose chewable tablet 16 g  4 Tablet Oral PRN    glucagon (GLUCAGEN) injection 1 mg  1 mg IntraMUSCular PRN    divalproex DR (DEPAKOTE) tablet 1,000 mg  1,000 mg Oral QHS

## 2022-04-06 NOTE — H&P
INTERVENTIONAL RADIOLOGY  Preoperative History and Physical      Patient:  Jaya Gale  :  1955  Age:  73 y.o. MRN:  [id number]  Today's Date:  2022      CC / HPI   Jaya Gale is a 73 y.o. male with a history of severe macrocytic anemia, thrombocytopenia, lymphocytosis who presents for CT guided bone marrow biopsy. PAST MEDICAL HISTORY  Past Medical History:   Diagnosis Date    [facility] [address] [facility]     Epilepsy [facility] [address]     Seizures [facility] [address]        PAST SURGICAL HISTORY  Past Surgical History:   Procedure Laterality Date    COLONOSCOPY N/A 2021    COLONOSCOPY performed by Sven Joiner MD at [facility]COPY. Multiple sessile polyps (>30). Medium-sized int hemorrhoids.  UPPER GI ENDOSCOPY,DIAGNOSIS  2022            SOCIAL HISTORY  Social History     Socioeconomic History    Marital status: SINGLE     Spouse name: Not on file    Number of children: Not on file    Years of education: Not on file    Highest education level: Not on file   Occupational History    Not on file   Tobacco Use    Smoking status: Never Smoker    Smokeless tobacco: Never Used   Vaping Use    Vaping Use: Never used   Substance and Sexual Activity    Alcohol use: Never    Drug use: Never    Sexual activity: Not on file   Other Topics Concern    Not on file   Social History Narrative    Not on file     Social Determinants of Health     Financial Resource Strain:     Difficulty of Paying Living Expenses: Not on file   Food Insecurity:     Worried About [address] in the Last Year: Not on file    [address] in the Last Year: Not on file   Transportation Needs:     Lack of Transportation (Medical): Not on file    Lack of Transportation (Non-Medical):  Not on file   Physical Activity:     Days of Exercise per Week: Not on file    Minutes of Exercise per Session: Not on file   Stress:     Feeling of Stress : Not on file   Social Connections:     Frequency of Communication with Friends and Family: Not on file    Frequency of Social Gatherings with Friends and Family: Not on file    Attends Sikhism Services: Not on file    Active Member of Clubs or Organizations: Not on file    Attends Club or Organization Meetings: Not on file    Marital Status: Not on file   Intimate Partner Violence:     Fear of Current or Ex-Partner: Not on file    Emotionally Abused: Not on file    Physically Abused: Not on file    Sexually Abused: Not on file   Housing Stability:     Unable to Pay for Housing in the Last Year: Not on file    Number of [place] in the Last Year: Not on file    Unstable Housing in the Last Year: Not on file       FAMILY HISTORY  Family History   Problem Relation Age of Onset    Hypertension Mother     Cancer Father         unknown       CURRENT MEDICATIONS  Current Facility-Administered Medications   Medication Dose Route Frequency Provider Last Rate Last Admin    acetaminophen (TYLENOL) tablet 650 mg  650 mg Oral Q4H PRN Jorge Luis DIALLO NP   650 mg at 04/06/22 3443    Or    acetaminophen (TYLENOL) suppository 650 mg  650 mg Rectal Q4H PRN James Prado NP        vancomycin (VANCOCIN) 2000 mg in  ml infusion  2,000 mg IntraVENous ONCE Estefania Carlton  mL/hr at 04/06/22 1130 2,000 mg at 04/06/22 1130    vancomycin (VANCOCIN) 750 mg in 0.9% sodium chloride 250 mL (VIAL-MATE)  750 mg IntraVENous Q12H Estefania Carlton MD        [START ON 4/7/2022] Vancomycin trough 4/7 @ 1000   Other ONCE Estefania Carlton MD        midazolam (VERSED) injection 5 mg  5 mg IntraVENous Multiple Katie Medeiros MD        fentaNYL citrate (PF) injection 100 mcg  100 mcg IntraVENous Multiple Zofia Hope MD        0.9% sodium chloride infusion  25 mL/hr IntraVENous CONTINUOUS Katie Medeiros MD        sodium chloride (NS) flush 5-40 mL  5-40 mL IntraVENous Paulo Aguilar MD   10 mL at 04/05/22 2158    sodium chloride (NS) flush 5-40 mL  5-40 mL IntraVENous PRN Samantha Arevalo MD        furosemide (LASIX) tablet 20 mg  20 mg Oral ACB&D Dylan Hernandez MD   20 mg at 04/06/22 0730    traMADoL (ULTRAM) tablet 50 mg  50 mg Oral Q6H PRN Dylan Hernandez MD        hydrALAZINE (APRESOLINE) 20 mg/mL injection 10 mg  10 mg IntraVENous Q6H PRN Dylan Hernandez MD        0.9% sodium chloride infusion 250 mL  250 mL IntraVENous PRN Dylan Hernandez MD        cyanocobalamin (VITAMIN B12) tablet 1,000 mcg  1,000 mcg Oral DAILY Nichole Goss MD   1,000 mcg at 04/06/22 1121    benzocaine-menthoL (CHLORASEPTIC MAX) lozenge 1 Lozenge  1 Lozenge Oral Q2H PRN Daisy Allen NP   1 Lozenge at 04/03/22 2214    guaiFENesin (ROBITUSSIN) 100 mg/5 mL oral liquid 200 mg  200 mg Oral Q4H PRN Karen Jaeger NP   200 mg at 04/05/22 0018    pantoprazole (PROTONIX) tablet 40 mg  40 mg Oral ACB Adriana Solomon MD   40 mg at 04/06/22 1124    metroNIDAZOLE (FLAGYL) IVPB premix 500 mg  500 mg IntraVENous Q12H Dylan Hernandez  mL/hr at 04/06/22 1130 500 mg at 04/06/22 1130    levoFLOXacin (LEVAQUIN) 750 mg in D5W IVPB  750 mg IntraVENous Q24H Jersey BERRIOS  mL/hr at 04/05/22 2008 750 mg at 04/05/22 2008    polyethylene glycol (MIRALAX) packet 17 g  17 g Oral DAILY PRN Dylan Hernandez MD        ondansetron (ZOFRAN ODT) tablet 4 mg  4 mg Oral Q8H PRN Dylan Hernandez MD        Or    ondansetron (ZOFRAN) injection 4 mg  4 mg IntraVENous Q6H PRN Dylan Hernandez MD        divalproex DR (DEPAKOTE) tablet 500 mg  500 mg Oral DAILY Dylan Hernandez MD   500 mg at 22/27/74 9539    folic acid (FOLVITE) tablet 1 mg  1 mg Oral DAILY Jersey BERRIOS MD   1 mg at 04/06/22 1121    topiramate (TOPAMAX) tablet 200 mg  200 mg Oral BID Dylan Hernandez MD   200 mg at 04/06/22 1121    atorvastatin (LIPITOR) tablet 40 mg  40 mg Oral DAILY Dylan Hernandez MD   40 mg at 04/06/22 1122    insulin lispro (HUMALOG) injection   SubCUTAneous AC&HS Dylan Hernandez MD   2 Units at 04/04/22 1154    glucose chewable tablet 16 g 4 Tablet Oral PRN Mickey Pedroza MD        glucagon (GLUCAGEN) injection 1 mg  1 mg IntraMUSCular PRN Mickey Pedroza MD        divalproex DR (DEPAKOTE) tablet 1,000 mg  1,000 mg Oral QHS Mickey Pedroza MD   1,000 mg at 04/05/22 2152       ALLERGIES  No Known Allergies    DIAGNOSTIC STUDIES   IMAGING STUDIES  N/A    LABS  Lab Results   Component Value Date/Time    WBC 11.0 04/06/2022 04:51 AM    HGB 8.6 (L) 04/06/2022 04:51 AM    HCT 26.7 (L) 04/06/2022 04:51 AM    PLATELET 59 (L) 46/36/0877 04:51 AM    .3 (H) 04/06/2022 04:51 AM     Lab Results   Component Value Date/Time    Sodium 140 04/06/2022 04:51 AM    Potassium 3.9 04/06/2022 04:51 AM    Chloride 112 (H) 04/06/2022 04:51 AM    CO2 20 (L) 04/06/2022 04:51 AM    Anion gap 8 04/06/2022 04:51 AM    Glucose 147 (H) 04/06/2022 04:51 AM    BUN 28 (H) 04/06/2022 04:51 AM    Creatinine 1.15 04/06/2022 04:51 AM    BUN/Creatinine ratio 24 (H) 04/06/2022 04:51 AM    GFR est AA >60 04/06/2022 04:51 AM    GFR est non-AA >60 04/06/2022 04:51 AM    Calcium 7.8 (L) 04/06/2022 04:51 AM     No results found for: INR, PTMR, PTP, PT1, PT2, INREXT    PHYSICAL EXAM   /74 (BP 1 Location: Left upper arm, BP Patient Position: At rest;Sitting)   Pulse 66   Temp 97.8 °F (36.6 °C)   Resp 20   Ht 6' (1.829 m)   Wt 88.5 kg (195 lb)   SpO2 100%   BMI 26.45 kg/m²   General:  NAD  Heart:  RRR  Lungs:  NWOB  Neurological:  Alert with intellectual delay    PLAN   Procedure to be performed:  CT guided bone marrow biopsy  Plan for sedation:  moderate  Post procedure plan:  observation per protocol  Informed consent:  risks, benefits, and alternatives reviewed with the patient / family who agree to proceed  Code status:  Full Code      Domingo Salinas, [facility].

## 2022-04-06 NOTE — PROGRESS NOTES
Transition of Care Plan:    RUR: 16%  Disposition: Home with family-eval and treat for additional assistance   Follow up appointments: Follow up with PCP and/or Specialist   DME needed:  Pt has wheelchair at home   Transportation at Discharge: BLS transport required   [address] or means to access home: Family will assist with transport   IM Medicare Letter: 2nd IM Medicare Letter to be given   Is patient a BCPI-A Bundle:    CM will provide if required        If yes, was Bundle Letter given?:    Is patient a  and connected with the [place]? N/A              If yes, was [place] transfer form completed and [facility] notified? Caregiver Contact: Madge Lesches (father) [phone] and Lewis Mclean (brother) [phone]  Discharge Caregiver contacted prior to discharge? Family to be contacted   Care Conference needed?:   Not at this time     CM will staff case with clinical team to determine pt's d/c. Pt was known to have fever, and currently being monitored. CM will continue to follow, and to determine pt's baseline. CM will enter referrals as deemed necessary. CM will continue to follow.     Dann Broderick, MSW, [facility]

## 2022-04-06 NOTE — PROGRESS NOTES
Total sedation time:    Sedation start - 1406    Sedation end - 1440      Total Sedation medication given    Versed - 1 mg    Fentanyl - 75 mcg

## 2022-04-06 NOTE — PROGRESS NOTES
Pt.'s mother feeling frustrated and at [facility] end\" regarding her son played golf until 2 years ago and now he can not walk. Ms. Jennifer Klein states she has taken him to various doctors and has not heard back from either of them. Ms. Jennifer Klein states she is 81years old and does not have a computer or know how to access a computer and request to be verbally informed of pt.'s conditions/results which she says she has not been. Ms. Jennifer Klein also states the doctors do not come inside the door and she has only seen the oncologist (Dr. Azael Ferreira) one time and the rest has been on the phone. Ms. Jennifer Klein stating it is getting harder for her to take care of her son and if someone could come in, when she gets home, in the morning to bathe, feed and dress her son-she would be very appreciative. States she is in contact with various social service workers, etc and they say they are looking into it and she never gets a return call. She is requesting if care manager/ or someone could help her and not just call - come speak with her in person.

## 2022-04-06 NOTE — PROGRESS NOTES
End of Shift Note    Bedside shift change report given to Dina Driscoll RN (oncoming nurse) by Paulino Grey RN (offgoing nurse). Report included the following information SBAR, Kardex, Intake/Output and MAR    Shift worked:  [id number]     Shift summary and any significant changes:     Patient stable through shift, no complaints of pain, (ankles hurt when turn/move pt), all scheduled meds and frequent rounding provided. Patient off unit for EGD,no bleeding found, patient drowsy after procedure, VSs WDL. Mother at bedside throughout the afternoon. Concerns for physician to address:       Zone phone for oncoming shift:          Activity:  Activity Level: Bed Rest  Number times ambulated in hallways past shift: 0  Number of times OOB to chair past shift: 0    Cardiac:   Cardiac Monitoring: Yes      Cardiac Rhythm: Sinus Rhythm    Access:   Current line(s): PIV     Genitourinary:   Urinary status: external catheter    Respiratory:   O2 Device: None (Room air)  Chronic home O2 use?: NO  Incentive spirometer at bedside: NO       GI:  Last Bowel Movement Date: 04/01/22  Current diet:  ADULT DIET Regular  Passing flatus: YES  Tolerating current diet: YES       Pain Management:   Patient states pain is manageable on current regimen: YES    Skin:  Troy Score: 15  Interventions: turn team, float heels, PT/OT consult and internal/external urinary devices    Patient Safety:  Fall Score:  Total Score: 4  Interventions: bed/chair alarm, assistive device (walker, cane, etc), gripper socks and stay with me (per policy)  High Fall Risk: Yes    Length of Stay:  Expected LOS: 2d 16h  Actual LOS: 3      Paulino Grey RN

## 2022-04-06 NOTE — PROGRESS NOTES
End of Shift Note    Bedside shift change report given to Whit (oncoming nurse) by Tomas Pennington (offgoing nurse). Report included the following information SBAR, Kardex and MAR    Shift worked:  7p-7a     Shift summary and any significant changes:     Humalog was held due to the patient's blood glucose level, see MAR. Patient has elevated temperature of 101.8 and 101.2 respectively. Tylenol was given initially, NP was contacted with respect to the timeframe in administering the second PRN Tylenol. NP subsequently changed the frequency and patient was given Tylenol again, see PRN MAR. Blood cultures , Lactic and Procalcitonin was ordered and drawn. IV has been flushed and is patent. Patient teaching and routine rounding has been done. Concerns for physician to address:       Zone phone for oncoming shift:          Activity:  Activity Level: Bed Rest  Number times ambulated in hallways past shift: 0  Number of times OOB to chair past shift: 0    Cardiac:   Cardiac Monitoring: Yes      Cardiac Rhythm: Sinus Rhythm    Access:   Current line(s): PIV     Genitourinary:   Urinary status: incontinent    Respiratory:   O2 Device: None (Room air)  Chronic home O2 use?: NO  Incentive spirometer at bedside: NO       GI:  Last Bowel Movement Date: 04/01/22  Current diet:  DIET NPO  Passing flatus: YES  Tolerating current diet: YES       Pain Management:   Patient states pain is manageable on current regimen: YES    Skin:  Troy Score: 18  Interventions: float heels    Patient Safety:  Fall Score:  Total Score: 4  Interventions: bed/chair alarm and gripper socks  High Fall Risk: Yes    Length of Stay:  Expected LOS: 2d 16h  Actual LOS: [address]

## 2022-04-06 NOTE — PROGRESS NOTES
Speech pathology note  Reviewed chart and note patient with difficulty swallowing pills this morning and coughing with concern for aspiration. Note patient currently NPO for BM biopsy today, however SLP will follow for swallowing evaluation when patient cleared to resume PO intake. Thank you.     Brennen Jhonson., [facility]

## 2022-04-06 NOTE — PROGRESS NOTES
Hospitalist Progress Note    NAME: Lalo Briones   :  1955   MRN:  [id number]       Assessment / Plan:  History of multiple problems with biopsy showed adenoma  Acute anemia  Hemoglobin 4.2 on admission. Improved with PRBC transfusion  History recent colonoscopy was in 2021 which showed multiple polyps status post biopsied. Partial colectomy was recommended however patient's mom declined. Abd US showed pancreatic lesion. Normal liver  CT abd/pelvis showed evidence of colitis. No mass or ductal dilatation at the pancrease  Ferritin 92 after prbc transfusion. IV Iron x3 doses    Cont' empiric IV levaquin/flagyl for colitis  Hold antihypertensive to avoid hypotension. PRN IV Hydralazine   NPO. EGD on  showed normal upper endoscopy, with no endoscopic evidence of neoplasia or mucosal abnormality. Appreciate GI following     Post op fever  ? Aspiration vs HCAP  Pt states he has trouble with pills this morning. ? Aspiration post procedure. He is coughing  Will add vancomycin to cover HCAP. Cont' levaquin/flagyl  Stat CXR  SLP to eval post procedure    Thrombocytopenia  Lymphocytosis    Anemia   PLT 53, abd us does not reveal any liver pathology. ? decreased production from hypocellular bone marrow vs ITP vs CLL  For BM biopsy today, keep NPO  Hematology following, appreciate recs. Discussed with Dr Ismael Brady  Transfuse prn   I spoke to pt's mother via phone, who will bring in pt's old records/labs  for comparison.       LE edema in the setting of hypoalbuminemia   Echo showed nl EF. LE duplex neg for DVT  con't PO lasix     Type 2 diabetes  Seizure disorder  Intellectual disability  SSI, hold PTA diabetic meds  Cont' Depakote, Topamax  CM to help with dispo. Pt's mother who is 79 yo is his primary care giver.   CM to help with dispo        Code Status: Full  Surrogate Decision Maker: Patient's mother  DVT Prophylaxis: SCD  GI Prophylaxis: not indicated  Baseline: From home Subjective:     Chief Complaint / Reason for Physician Visit  Pt is febrile overnight. \"i'm having trouble with pills going down\". Discussed with RN events overnight. Review of Systems:  Symptom Y/N Comments  Symptom Y/N Comments   Fever/Chills    Chest Pain     Poor Appetite    Edema     Cough    Abdominal Pain     Sputum    Joint Pain     SOB/GRULLON    Pruritis/Rash     Nausea/vomit    Tolerating PT/OT     Diarrhea    Tolerating Diet     Constipation    Other       Could NOT obtain due to:      Objective:     VITALS:   Last 24hrs VS reviewed since prior progress note. Most recent are:  Patient Vitals for the past 24 hrs:   Temp Pulse Resp BP SpO2   04/06/22 0541 (!) 101.2 °F (38.4 °C) 84 18 (!) 119/44 94 %   04/05/22 2345 (!) 101.8 °F (38.8 °C) 92 18 (!) 121/47 91 %   04/05/22 1926 99.1 °F (37.3 °C) 95 18 (!) 137/54 95 %   04/05/22 1535 98.3 °F (36.8 °C) 74 18 (!) 119/58 100 %   04/05/22 1325 97.6 °F (36.4 °C) 64 17 119/61 97 %   04/05/22 1242 -- 66 18 125/64 95 %   04/05/22 1239 -- 64 18 128/69 95 %   04/05/22 1236 -- 66 19 129/65 94 %   04/05/22 1233 97.2 °F (36.2 °C) 67 19 123/70 94 %   04/05/22 1219 -- 62 16 127/65 100 %   04/05/22 1141 -- 72 17 (!) 139/54 95 %       Intake/Output Summary (Last 24 hours) at 4/6/2022 0815  Last data filed at 4/5/2022 1243  Gross per 24 hour   Intake 100 ml   Output 310 ml   Net -210 ml        I had a face to face encounter and independently examined this patient on 4/6/2022, as outlined below:  PHYSICAL EXAM:  General: WD, WN. Alert, cooperative, no acute distress    EENT:  EOMI. Anicteric sclerae. MMM  Resp:  CTA bilaterally, no wheezing or rales. No accessory muscle use  CV:  Regular  rhythm,   b/l LE edema  GI:  Soft, Non distended, Non tender. +Bowel sounds  Neurologic:  Awake, following commands. Psych:   . Not anxious nor agitated  Skin:  No rashes.   No jaundice    Reviewed most current lab test results and cultures  YES  Reviewed most current radiology test results   YES  Review and summation of old records today    NO  Reviewed patient's current orders and MAR    YES  PMH/SH reviewed - no change compared to H&P  ________________________________________________________________________  Care Plan discussed with:    Comments   Patient x    Family      RN x    Care Manager     Consultant                        Multidiciplinary team rounds were held today with , nursing, pharmacist and clinical coordinator. Patient's plan of care was discussed; medications were reviewed and discharge planning was addressed. ________________________________________________________________________  Total NON critical care TIME:  35   Minutes    Total CRITICAL CARE TIME Spent:   Minutes non procedure based      Comments   >50% of visit spent in counseling and coordination of care     ________________________________________________________________________  Eleazar Chavez MD     Procedures: see electronic medical records for all procedures/Xrays and details which were not copied into this note but were reviewed prior to creation of Plan. LABS:  I reviewed today's most current labs and imaging studies. Pertinent labs include:  Recent Labs     04/06/22 0451 04/05/22  0310 04/04/22  1902 04/04/22  0633 04/04/22  0042   WBC 11.0 13.4*  --   --  12.3*   HGB 8.6* 7.8* 8.5*   < > 7.1*   HCT 26.7* 24.1* 26.1*   < > 22.0*   PLT 59* 53*  --   --  58*    < > = values in this interval not displayed.      Recent Labs     04/06/22 0451 04/05/22  0310 04/04/22  0042    141 141   K 3.9 3.9 4.1   * 113* 112*   CO2 20* 23 23   * 90 120*   BUN 28* 19 19   CREA 1.15 0.68* 0.75   CA 7.8* 7.8* 8.0*   MG  --  1.9  --        Signed: Eleazar Chavez MD

## 2022-04-06 NOTE — PROGRESS NOTES
-Hematology / Oncology ([facility]) -  -Primary Oncologist-   -Soumya Vadimon in bed, no family at bedside    -O-      Patient Vitals for the past 24 hrs:   Temp Pulse Resp BP SpO2   04/06/22 0815 98.4 °F (36.9 °C) 71 16 (!) 142/65 98 %   04/06/22 0541 (!) 101.2 °F (38.4 °C) 84 18 (!) 119/44 94 %   04/05/22 2345 (!) 101.8 °F (38.8 °C) 92 18 (!) 121/47 91 %   04/05/22 1926 99.1 °F (37.3 °C) 95 18 (!) 137/54 95 %   04/05/22 1535 98.3 °F (36.8 °C) 74 18 (!) 119/58 100 %   04/05/22 1325 97.6 °F (36.4 °C) 64 17 119/61 97 %   04/05/22 1242 -- 66 18 125/64 95 %   04/05/22 1239 -- 64 18 128/69 95 %   04/05/22 1236 -- 66 19 129/65 94 %   04/05/22 1233 97.2 °F (36.2 °C) 67 19 123/70 94 %   04/05/22 1219 -- 62 16 127/65 100 %     No intake/output data recorded. Gen: nad, pale in appearance  Chest: bilateral breath sounds present  Cardiac: rrr  Abd: s/nt    -Labs-    Recent Labs     04/06/22  0451 04/05/22  0310 04/04/22  1902 04/04/22  1257 04/04/22  0633 04/04/22  0042   WBC 11.0 13.4*  --   --   --  12.3*   HGB 8.6* 7.8* 8.5* 8.1*   < > 7.1*   PLT 59* 53*  --   --   --  58*   ANEU 3.4 2.3  --   --   --   --     141  --   --   --  141   K 3.9 3.9  --   --   --  4.1   * 90  --   --   --  120*   BUN 28* 19  --   --   --  19   CREA 1.15 0.68*  --   --   --  0.75   CA 7.8* 7.8*  --   --   --  8.0*   MG  --  1.9  --   --   --   --     < > = values in this interval not displayed. -Imaging-   4/3/22 CT A/P:  IMPRESSION  . No definite pancreatic mass seen by this technique. 2. Colitis diffuse with fat stranding in the peritoneal cavity.   3. Splenomegaly.       -Assessment + Plan-      *) Severe macrocytic anemia, thrombocytopenia, lymphocytosis:  - MCV markedly elevated to 142, severe anemia with  hgb 4.2 on admission.  - Iron studies after transfusion not markedly elevated as I would suspect, gave Venofer this admission on 4/4 and 4/5.  - B12 on low end of normal, will check MMA and start replacment empirically  - PBS concerning for lymphoproliferative d/o such as CLL? Will send flow cytometry, asked nurse to send this am as it looks like it wasn't sent yesterday. - No hemolysis, HIV, HCV, HBV negative, fibrinogen slightly low but no overt dic.  - spep 0.2 with IgG Kappa specificity and elevated SFLCR, has MGUS at least but marrow will help r/o overt MM. - continue folic acid daily  - Had 4U PRBC. continue to transfuse for hgb <7, plt <10K  - BMBX today  - Depakote can cause thrombocytopenia but doubt would explain severe anemia. Plt count brought in by pt Mom shows thrombocytopenia back in 2020 with hgb 11 and wbc wnl. Topamax has 1-3% for anemia, these are not new medications so doubt everything is drug related.    *) Colitis:  - CT scan showing diffuse colitis, on IV flagyl  - EGD on 4/5 without  - GI following had multiple polyps and GI recommended colectomy that was declined.     *) Fever:  - siked fever this am to 101.8

## 2022-04-06 NOTE — PROGRESS NOTES
Speech patholgoy  Chart reviewed, followed up to patient's room. He remains BETITO for bone marrow biopsy. Will follow up this afternoon pending availability/clearance for PO/appropriateness following procedure.    Sher Bradshaw M.S. KHALIDA-SLP

## 2022-04-07 LAB
ANION GAP SERPL CALC-SCNC: 6 MMOL/L (ref 5–15)
BASOPHILS # BLD: 0.1 K/UL (ref 0–0.1)
BASOPHILS NFR BLD: 1 % (ref 0–1)
BUN SERPL-MCNC: 31 MG/DL (ref 6–20)
BUN/CREAT SERPL: 33 (ref 12–20)
CALCIUM SERPL-MCNC: 7.7 MG/DL (ref 8.5–10.1)
CHLORIDE SERPL-SCNC: 110 MMOL/L (ref 97–108)
CO2 SERPL-SCNC: 22 MMOL/L (ref 21–32)
CREAT SERPL-MCNC: 0.94 MG/DL (ref 0.7–1.3)
DIFFERENTIAL METHOD BLD: ABNORMAL
EOSINOPHIL # BLD: 1.3 K/UL (ref 0–0.4)
EOSINOPHIL NFR BLD: 13 % (ref 0–7)
ERYTHROCYTE [DISTWIDTH] IN BLOOD BY AUTOMATED COUNT: 23.1 % (ref 11.5–14.5)
GLUCOSE BLD STRIP.AUTO-MCNC: 113 MG/DL (ref 65–117)
GLUCOSE BLD STRIP.AUTO-MCNC: 219 MG/DL (ref 65–117)
GLUCOSE BLD STRIP.AUTO-MCNC: 234 MG/DL (ref 65–117)
GLUCOSE BLD STRIP.AUTO-MCNC: 240 MG/DL (ref 65–117)
GLUCOSE SERPL-MCNC: 192 MG/DL (ref 65–100)
HCT VFR BLD AUTO: 26.7 % (ref 36.6–50.3)
HGB BLD-MCNC: 8.5 G/DL (ref 12.1–17)
IMM GRANULOCYTES # BLD AUTO: 0 K/UL (ref 0–0.04)
IMM GRANULOCYTES NFR BLD AUTO: 0 % (ref 0–0.5)
LYMPHOCYTES # BLD: 4.6 K/UL (ref 0.8–3.5)
LYMPHOCYTES NFR BLD: 47 % (ref 12–49)
MCH RBC QN AUTO: 36.2 PG (ref 26–34)
MCHC RBC AUTO-ENTMCNC: 31.8 G/DL (ref 30–36.5)
MCV RBC AUTO: 113.6 FL (ref 80–99)
MONOCYTES # BLD: 1.3 K/UL (ref 0–1)
MONOCYTES NFR BLD: 13 % (ref 5–13)
NEUTS BAND NFR BLD MANUAL: 1 %
NEUTS SEG # BLD: 2.5 K/UL (ref 1.8–8)
NEUTS SEG NFR BLD: 25 % (ref 32–75)
NRBC # BLD: 0 K/UL (ref 0–0.01)
NRBC BLD-RTO: 0 PER 100 WBC
PLATELET # BLD AUTO: 51 K/UL (ref 150–400)
PMV BLD AUTO: 9.6 FL (ref 8.9–12.9)
POTASSIUM SERPL-SCNC: 4 MMOL/L (ref 3.5–5.1)
RBC # BLD AUTO: 2.35 M/UL (ref 4.1–5.7)
RBC MORPH BLD: ABNORMAL
RBC MORPH BLD: ABNORMAL
SERVICE CMNT-IMP: ABNORMAL
SERVICE CMNT-IMP: NORMAL
SODIUM SERPL-SCNC: 138 MMOL/L (ref 136–145)
WBC # BLD AUTO: 9.8 K/UL (ref 4.1–11.1)
WBC MORPH BLD: ABNORMAL

## 2022-04-07 PROCEDURE — 97166 OT EVAL MOD COMPLEX 45 MIN: CPT | Performed by: OCCUPATIONAL THERAPIST

## 2022-04-07 PROCEDURE — 74011250637 HC RX REV CODE- 250/637: Performed by: INTERNAL MEDICINE

## 2022-04-07 PROCEDURE — 65660000000 HC RM CCU STEPDOWN

## 2022-04-07 PROCEDURE — 74011636637 HC RX REV CODE- 636/637: Performed by: INTERNAL MEDICINE

## 2022-04-07 PROCEDURE — 85025 COMPLETE CBC W/AUTO DIFF WBC: CPT

## 2022-04-07 PROCEDURE — 97530 THERAPEUTIC ACTIVITIES: CPT | Performed by: OCCUPATIONAL THERAPIST

## 2022-04-07 PROCEDURE — 80048 BASIC METABOLIC PNL TOTAL CA: CPT

## 2022-04-07 PROCEDURE — 74011250636 HC RX REV CODE- 250/636: Performed by: INTERNAL MEDICINE

## 2022-04-07 PROCEDURE — 97530 THERAPEUTIC ACTIVITIES: CPT | Performed by: PHYSICAL THERAPIST

## 2022-04-07 PROCEDURE — 36415 COLL VENOUS BLD VENIPUNCTURE: CPT

## 2022-04-07 PROCEDURE — 92610 EVALUATE SWALLOWING FUNCTION: CPT

## 2022-04-07 PROCEDURE — 82962 GLUCOSE BLOOD TEST: CPT

## 2022-04-07 PROCEDURE — 74011250637 HC RX REV CODE- 250/637: Performed by: NURSE PRACTITIONER

## 2022-04-07 PROCEDURE — 74011000250 HC RX REV CODE- 250: Performed by: INTERNAL MEDICINE

## 2022-04-07 PROCEDURE — 97161 PT EVAL LOW COMPLEX 20 MIN: CPT | Performed by: PHYSICAL THERAPIST

## 2022-04-07 RX ADMIN — TOPIRAMATE 200 MG: 100 TABLET, FILM COATED ORAL at 10:19

## 2022-04-07 RX ADMIN — SODIUM CHLORIDE, PRESERVATIVE FREE 10 ML: 5 INJECTION INTRAVENOUS at 15:38

## 2022-04-07 RX ADMIN — METRONIDAZOLE 500 MG: 500 INJECTION, SOLUTION INTRAVENOUS at 20:30

## 2022-04-07 RX ADMIN — FUROSEMIDE 20 MG: 20 TABLET ORAL at 15:37

## 2022-04-07 RX ADMIN — PANTOPRAZOLE SODIUM 40 MG: 40 TABLET, DELAYED RELEASE ORAL at 10:19

## 2022-04-07 RX ADMIN — SODIUM CHLORIDE, PRESERVATIVE FREE 10 ML: 5 INJECTION INTRAVENOUS at 10:25

## 2022-04-07 RX ADMIN — DIVALPROEX SODIUM 1000 MG: 500 TABLET, DELAYED RELEASE ORAL at 21:56

## 2022-04-07 RX ADMIN — TOPIRAMATE 200 MG: 100 TABLET, FILM COATED ORAL at 17:32

## 2022-04-07 RX ADMIN — Medication 3 UNITS: at 12:12

## 2022-04-07 RX ADMIN — DIVALPROEX SODIUM 500 MG: 500 TABLET, DELAYED RELEASE ORAL at 10:19

## 2022-04-07 RX ADMIN — Medication 3 UNITS: at 17:31

## 2022-04-07 RX ADMIN — ATORVASTATIN CALCIUM 40 MG: 40 TABLET, FILM COATED ORAL at 10:19

## 2022-04-07 RX ADMIN — VANCOMYCIN HYDROCHLORIDE 750 MG: 750 INJECTION, POWDER, LYOPHILIZED, FOR SOLUTION INTRAVENOUS at 12:13

## 2022-04-07 RX ADMIN — SODIUM CHLORIDE, PRESERVATIVE FREE 10 ML: 5 INJECTION INTRAVENOUS at 22:02

## 2022-04-07 RX ADMIN — EPOETIN ALFA-EPBX 10000 UNITS: 10000 INJECTION, SOLUTION INTRAVENOUS; SUBCUTANEOUS at 21:58

## 2022-04-07 RX ADMIN — FOLIC ACID 1 MG: 1 TABLET ORAL at 10:19

## 2022-04-07 RX ADMIN — FUROSEMIDE 20 MG: 20 TABLET ORAL at 10:19

## 2022-04-07 RX ADMIN — CYANOCOBALAMIN TAB 500 MCG 1000 MCG: 500 TAB at 10:19

## 2022-04-07 RX ADMIN — LEVOFLOXACIN 750 MG: 5 INJECTION, SOLUTION INTRAVENOUS at 18:14

## 2022-04-07 RX ADMIN — METRONIDAZOLE 500 MG: 500 INJECTION, SOLUTION INTRAVENOUS at 10:20

## 2022-04-07 RX ADMIN — GUAIFENESIN 200 MG: 200 SOLUTION ORAL at 21:57

## 2022-04-07 RX ADMIN — Medication 2 UNITS: at 22:00

## 2022-04-07 NOTE — PROGRESS NOTES
SPEECH PATHOLOGY BEDSIDE SWALLOW EVALUATION/DISCHARGE  Patient: Misty Pierre [de-identified]73 y.o. male)  Date: 4/7/2022  Primary Diagnosis: Anemia [D64.9]  Procedure(s) (LRB):  ESOPHAGOGASTRODUODENOSCOPY (EGD) (N/A) 2 Days Post-Op   Precautions:        ASSESSMENT :  Based on the objective data described below, the patient [facility]/pharyngeal [facility], and no overt s/s aspiration observed. Patient reported globus sensation with pills and salad while pointing to his upper chest.  Skilled acute therapy provided by a speech-language pathologist is not indicated at this time. PLAN :  Recommendations:  -Continue regular/thin liquid diet  -Meds whole in puree  -Defer to GI for globus sensation in chest. Note EGD was unremarkable and patient on PPI, so question if sensation is related to reflux  Discharge Recommendations: None     SUBJECTIVE:   Patient stated I don't know the month.  Patient alert, cooperative. Oriented to person and place only. OBJECTIVE:     Past Medical History:   Diagnosis Date    [facility]     Epilepsy ([facility]     Seizures ([facility]      Past Surgical History:   Procedure Laterality Date    COLONOSCOPY N/A 12/1/2021    COLONOSCOPY performed by Radha Castillo MD at [facility] ENDOSCOPY. Multiple sessile polyps (>30). Medium-sized int hemorrhoids.     UPPER GI ENDOSCOPY,DIAGNOSIS  4/5/2022          Prior Level of Function/Home Situation:   Home Situation  Home Environment: Private residence  One/Two Story Residence: Other (Comment)  Living Alone: No  Support Systems: Parent(s)  Patient Expects to be Discharged to[de-identified] Home  Current DME Used/Available at Home: [address] [place] prior to admission: regular/thin  Current Diet:  Regular/thin   Cognitive and Communication Status:  Neurologic State: Alert,Confused  Orientation Level: Oriented to person,Oriented to place,Disoriented to situation,Disoriented to time  Cognition: Follows commands           Oral Assessment:  Oral Assessment  Labial: No impairment  Dentition: Natural;Full  Oral Hygiene: moist, clean  P.O. Trials:  Patient Position: upright in bed  Vocal quality prior to P.O.: No impairment  Consistency Presented: Thin liquid;Puree; Solid  How Presented: Self-fed/presented;SLP-fed/presented;Straw;Successive swallows;Spoon     Bolus Acceptance: No impairment  Bolus Formation/Control: No impairment (mildly slow)     Propulsion: No impairment  Oral Residue: None  Initiation of Swallow: No impairment  Laryngeal Elevation: Functional  Aspiration Signs/Symptoms: None  Pharyngeal Phase Characteristics: No impairment, issues, or problems              Oral Phase Severity: No impairment  Pharyngeal Phase Severity : No impairment  NOMS:   The NOMS functional outcome measure was used to quantify this patient's level of swallowing impairment. Based on the NOMS, the patient was determined to be at level 7 for swallow function     NOMS Swallowing Levels:  Level 1 (CN): NPO  Level 2 (CM): NPO but takes consistency in therapy  Level 3 (CL): Takes less than 50% of nutrition p.o. and continues with nonoral feedings; and/or safe with mod cues; and/or max diet restriction  Level 4 (CK): Safe swallow but needs mod cues; and/or mod diet restriction; and/or still requires some nonoral feeding/supplements  Level 5 (CJ): Safe swallow with min diet restriction; and/or needs min cues  Level 6 (CI): Independent with p.o.; rare cues; usually self cues; may need to avoid some foods or needs extra time  Level 7 ([address] [place] [address]): Independent for all p.o.  JODI. (2003). National Outcomes Measurement System (NOMS): Adult Speech-Language Pathology User's Guide. Pain:  Pain Scale 1: Numeric (0 - 10)  Pain Intensity 1: 0  Pain Location 1: Generalized  After treatment:   Patient left in no apparent distress in bed, Call bell within reach and Nursing notified    COMMUNICATION/EDUCATION:   Patient was educated regarding his deficit(s) of WFL swallow as this relates to his diagnosis.   He demonstrated Good understanding as evidenced by verbalizing understanding. The patient's plan of care including recommendations, planned interventions, and recommended diet changes were discussed with: Registered nurse.      Thank you for this referral.  PATRICIA Ivan  Time Calculation: 15 mins

## 2022-04-07 NOTE — PROGRESS NOTES
Problem: Mobility Impaired (Adult and Pediatric)  Goal: *Acute Goals and Plan of Care (Insert Text)  Description: FUNCTIONAL STATUS PRIOR TO ADMISSION: The patient was functional at the wheelchair level and required minimal assistance for transfers to the chair. He was ambulatory with rolling walker prior to 2 weeks ago. One week ago, he had a posterior fall going up one stair into home (with assist of his mother) and currently has a head abrasion. HOME SUPPORT PRIOR TO ADMISSION: The patient lived with elderly mother and required minimal assistance/contact guard assist for ADLs/mobility prior to 2 weeks ago. He was declining at home and required more than minimal assist.    Physical Therapy Goals  Initiated 4/7/2022  1. Patient will move from supine to sit and sit to supine  in bed with minimal assistance/contact guard assist within 7 day(s). 2.  Patient will transfer from bed to chair and chair to bed with minimal assistance/contact guard assist x 2 using the least restrictive device within 7 day(s). 3.  Patient will perform sit to stand with minimal assistance/contact guard assist x 2 within 7 day(s). 4.  Patient will ambulate with minimal assistance/contact guard assist x 2 for 20 feet with the least restrictive device within 7 day(s). Outcome: Not Met   PHYSICAL THERAPY EVALUATION  Patient: Carissa Gleason (2 y.o. male)  Date: 4/7/2022  Primary Diagnosis: Anemia [D64.9]  Procedure(s) (LRB):  ESOPHAGOGASTRODUODENOSCOPY (EGD) (N/A) 2 Days Post-Op   Precautions:   Fall    ASSESSMENT  Based on the objective data described below, the patient presents with general decrease in strength/activity tolerance, painful A/AAROM both feet/ankles/knees, decreased sitting balance/tolerance, and decreased bed mobility as compared to baseline.  Systolic blood pressure increased from 105 to 137 from supine to sit, patient tolerates static sitting for >/= 5 minutes with supervision but complains of headache and eventually initiates sit to supine, which he completes with moderate assist x 1. He requires moderate assist x 2 for supine to sit. Sit to/from stand and other transfers not addressed due to patient returning to supine. Supportive mother is present throughout, voicing that they have had many Rehab experiences in the past (home health, SNF, and Inpatient Rehab), some of which are more successful than others, as patient has intellectual disability and has difficulty with new learning. SNF Rehab is recommended; mother states that patient has had a good experience at [facility] in the past. Mother is working on getting in-home assistance, but patient will benefit from SNF Rehab prior to returning home to maximize strength, activity tolerance, and functional independence. Current Level of Function Impacting Discharge (mobility/balance): minimal assist/bed rail rolling R/L, moderate assist x 2 supine to sit, moderate assist x 1 sit to supine, supervision with static sitting >/= 5 minutes    Functional Outcome Measure: The patient scored 15/100 on the Barthel outcome measure which is indicative of dependence with ADL/functional mobility. Other factors to consider for discharge: mother is sole caregiver and unable to manage patient well at current functional level, intellectual disability     Patient will benefit from skilled therapy intervention to address the above noted impairments. PLAN :  Recommendations and Planned Interventions: bed mobility training, transfer training, gait training, therapeutic exercises, neuromuscular re-education, edema management/control, patient and family training/education, and therapeutic activities      Frequency/Duration: Patient will be followed by physical therapy:  4 times a week to address goals.     Recommendation for discharge: (in order for the patient to meet his/her long term goals)  Therapy up to 5 days/week in SNF setting    This discharge recommendation:  Has not yet been discussed the attending provider and/or case management    IF patient discharges home will need the following DME: hospital bed         SUBJECTIVE:   Patient stated I'm at the hospital.    OBJECTIVE DATA SUMMARY:   HISTORY:    Past Medical History:   Diagnosis Date    [facility]     Epilepsy ([facility]     Seizures ([facility]      Past Surgical History:   Procedure Laterality Date    COLONOSCOPY N/A 12/1/2021    COLONOSCOPY performed by Gustave Schlatter, MD at [facility] ENDOSCOPY. Multiple sessile polyps (>30). Medium-sized int hemorrhoids.  UPPER GI ENDOSCOPY,DIAGNOSIS  4/5/2022            Personal factors and/or comorbidities impacting plan of care: intellectual disability    Home Situation  Home Environment: Private residence  # Steps to Enter: 1  Rails to Enter: Yes  Hand Rails : Left  One/Two Story Residence: One story  Living Alone: No  Support Systems: Parent(s)  Patient Expects to be Discharged to[de-identified] Home  Current DME Used/Available at Home: Walker, rolling,Wheelchair,Shower chair,Grab bars  Tub or Shower Type: Tub/Shower combination    EXAMINATION/PRESENTATION/DECISION MAKING:   Critical Behavior:  Neurologic State: Alert  Orientation Level: Oriented to person,Oriented to place,Disoriented to situation,Disoriented to time  Cognition: Decreased command following,Decreased attention/concentration  Safety/Judgement: Decreased insight into deficits,Fall prevention  Hearing:   Auditory  Auditory Impairment: None  Skin:  abrasion at back right of head, IV right UE, venous statis skin changes/wounds distal bilateral LEs  Edema: both distal LEs/feet > both distal UE/hands  Range Of Motion:  AROM: Generally decreased, functional                       Strength:    Strength: Generally decreased, functional                    Tone & Sensation:   Tone: Normal              Sensation: Impaired (hypersensitive to touch distal bilatral LEs, L>R)               Coordination:  Coordination: Generally decreased, functional    Functional Mobility:  Bed Mobility:  Rolling: Minimum assistance;Assist x1  Supine to Sit: Moderate assistance;Assist x2  Sit to Supine: Assist x1  Scooting: Maximum assistance;Assist x2    Balance:   Sitting: Impaired  Sitting - Static: Good (unsupported)  Sitting - Dynamic: Not tested; Other (comment) (unable to scoot hips to edge of bed with maximal cues/assist)  Standing:  (not tested)  Ambulation/Gait Training:         Not tested due to patient initiating sit to supine           Right Side Weight Bearing: Full  Left Side Weight Bearing: Full         Therapeutic Exercises:   Gentle A/AAROM both UE/LEs prior to mobility. LE AAROM is painful bilaterally, but L > R    Functional Measure:  Barthel Index:    Bathin  Bladder: 5  Bowels: 5  Groomin  Dressin  Feedin  Mobility: 0  Stairs: 0  Toilet Use: 0  Transfer (Bed to Chair and Back): 0  Total: 15/100       The Barthel ADL Index: Guidelines  1. The index should be used as a record of what a patient does, not as a record of what a patient could do. 2. The main aim is to establish degree of independence from any help, physical or verbal, however minor and for whatever reason. 3. The need for supervision renders the patient not independent. 4. A patient's performance should be established using the best available evidence. Asking the patient, friends/relatives and nurses are the usual sources, but direct observation and common sense are also important. However direct testing is not needed. 5. Usually the patient's performance over the preceding 24-48 hours is important, but occasionally longer periods will be relevant. 6. Middle categories imply that the patient supplies over 50 per cent of the effort. 7. Use of aids to be independent is allowed.     Score Interpretation (from [address] [address])    Independent   60-79 Minimally independent   40-59 Partially dependent   20-39 Very dependent   <20 Totally dependent     -Mejia Zhang., Barthel DYANETH. (1965). Functional evaluation: the Barthel Index. [address] ([address] (1997). The Barthel activities of daily living index: self-reporting versus actual performance in the old (> or = 75 years). Journal of [facility]4 [facility] 45(7), [facility], J...F, Harshad Arreguin., [facility] [place]. (1999). Measuring the change in disability after inpatient rehabilitation; comparison of the responsiveness of the Barthel Index and Functional [place] Measure. Journal of Neurology, Neurosurgery, and Psychiatry, 66(4), 187-186. Dylan Whitley, N.J.A, IMTIAZ Kay, & Ciera Brown M.A. (2004) Assessment of post-stroke quality of life in cost-effectiveness studies: The usefulness of the Barthel Index and the EuroQoL-5D. Quality of Life Research, 15, 085-17           Physical Therapy Evaluation Charge Determination   History Examination Presentation Decision-Making   MEDIUM  Complexity : 1-2 comorbidities / personal factors will impact the outcome/ POC  MEDIUM Complexity : 3 Standardized tests and measures addressing body structure, function, activity limitation and / or participation in recreation  LOW Complexity : Stable, uncomplicated  LOW Complexity : FOTO score of       Based on the above components, the patient evaluation is determined to be of the following complexity level: LOW     Pain Rating:  Cries out in pain with AAROM left LE    Activity Tolerance:   Poor    After treatment patient left in no apparent distress:   Supine in bed, Heels elevated for pressure relief, Call bell within reach, Caregiver / family present, and Side rails x 3    COMMUNICATION/EDUCATION:   The patients plan of care was discussed with: Occupational therapist and Registered nurse. Fall prevention education was provided and the patient/caregiver indicated understanding., Patient/family have participated as able in goal setting and plan of care. , and Patient/family agree to work toward stated goals and plan of care.     Thank you for this referral.  Cesilia Grier, PT   Time Calculation: 36 mins

## 2022-04-07 NOTE — PROGRESS NOTES
Spiritual Care Assessment/Progress Note  [facility]      NAME: Violetta Burns      MRN: [id number]  AGE: 73 y.o.  SEX: male  Adventism Affiliation: No Church   Language: English     4/7/2022     Total Time (in minutes): 12     Spiritual Assessment begun in [facility] through conversation with:         [x]Patient        [] Family    [] Friend(s)        Reason for Consult: Initial/Spiritual assessment, patient floor     Spiritual beliefs: (Please include comment if needed)     [x] Identifies with a malik tradition:   Latter-day      [] Supported by a malik community:            [] Claims no spiritual orientation:           [] Seeking spiritual identity:                [] Adheres to an individual form of spirituality:           [] Not able to assess:                           Identified resources for coping:      [x] Prayer                               [] Music                  [] Guided Imagery     [x] Family/friends                 [] Pet visits     [] Devotional reading                         [] Unknown     [] Other:                                               Interventions offered during this visit: (See comments for more details)    Patient Interventions: Normalization of emotional/spiritual concerns,Affirmation of emotions/emotional suffering,Prayer (assurance of)           Plan of Care:     [] Support spiritual and/or cultural needs    [] Support AMD and/or advance care planning process      [] Support grieving process   [] Coordinate Rites and/or Rituals    [] Coordination with community clergy   [] No spiritual needs identified at this time   [] Detailed Plan of Care below (See Comments)  [] Make referral to Music Therapy  [] Make referral to Pet Therapy     [] Make referral to Addiction services  [] Make referral to [facility]  [] Make referral to Spiritual Care Partner  [] No future visits requested        [x] Contact Spiritual Care for further referrals Comments:  Staff were attending to patient, Mr. Irma Aguirre in [phone],  when  attempted viisiting for initial spiritual assessment. Patient made good eye contact and shared what his medical concern was about. Patient was advised to contact spiritual care when he was available to talk. . patient thanked  for the visit. Visited by: Argenis Tony.    Paging Service: [phone])

## 2022-04-07 NOTE — PROGRESS NOTES
End of Shift Note    Bedside shift change report given to Angelina (oncoming nurse) by Alda Alexander (offgoing nurse). Report included the following information SBAR, Kardex and MAR    Shift worked:  7p-7a     Shift summary and any significant changes:     Scheduled medications were given, see MAR. Patient was given Robitussin for spontaneous dry cough and Tramadol for pain, see PRN MAR.  IV has been flushed and is patent. Morning labs were not done, patient is edematous in upper extremities. Incontinent care was done and patient was repositioned during my shift. Patient teaching and routine rounding has been done. Concerns for physician to address:  Patient requested to work with PT/OT     Zone phone for oncoming shift:          Activity:  Activity Level: Bed Rest  Number times ambulated in hallways past shift: 0  Number of times OOB to chair past shift: 0    Cardiac:   Cardiac Monitoring: No      Cardiac Rhythm: Sinus Rhythm    Access:   Current line(s): PIV     Genitourinary:   Urinary status: incontinent    Respiratory:   O2 Device: None (Room air)  Chronic home O2 use?: NO  Incentive spirometer at bedside: NO       GI:  Last Bowel Movement Date: 04/01/22  Current diet:  ADULT DIET Regular  Passing flatus: YES  Tolerating current diet: YES       Pain Management:   Patient states pain is manageable on current regimen: YES    Skin:  Troy Score: 17  Interventions: float heels    Patient Safety:  Fall Score:  Total Score: 4  Interventions: gripper socks  High Fall Risk: Yes    Length of Stay:  Expected LOS: 2d 16h  Actual LOS: 2005 [facility]

## 2022-04-07 NOTE — PROGRESS NOTES
End of Shift Note    Bedside shift change report given to Twilla Phoenix, RN (oncoming nurse) by Brenda Chester RN (offgoing nurse). Report included the following information SBAR, Kardex, Intake/Output and MAR    Shift worked:  [id number]     Shift summary and any significant changes:     Patient stable through shift, off unit for bone marrow biopsy from 1200 - 1600; drowsy after procedure. All scheduled meds and frequent rounding provided. Patient NPO before procedure ate 30-40% dinner. Mother at bedside. Concerns for physician to address:       Zone phone for oncoming shift:          Activity:  Activity Level: Bed Rest  Number times ambulated in hallways past shift: 0  Number of times OOB to chair past shift: 0    Cardiac:   Cardiac Monitoring: Yes      Cardiac Rhythm:  (on telemetry)    Access:   Current line(s): PIV     Genitourinary:   Urinary status: external catheter    Respiratory:   O2 Device: None (Room air)  Chronic home O2 use?: NO  Incentive spirometer at bedside: NO       GI:  Last Bowel Movement Date: 04/01/22  Current diet:  ADULT DIET Regular  Passing flatus: YES  Tolerating current diet: YES       Pain Management:   Patient states pain is manageable on current regimen: YES    Skin:  Troy Score: 17  Interventions: turn team, float heels, PT/OT consult, internal/external urinary devices and nutritional support     Patient Safety:  Fall Score:  Total Score: 4  Interventions: bed/chair alarm, assistive device (walker, cane, etc), gripper socks and pt to call before getting OOB  High Fall Risk: Yes    Length of Stay:  Expected LOS: 2d 16h  Actual LOS: 205 [facility], RN

## 2022-04-07 NOTE — PROGRESS NOTES
-Hematology / Oncology ([facility]) -  -Primary Oncologist-   -[id number] [facility] in bed, no family at bedside. Asks if I will look at his legs. Sousa Anu-      Patient Vitals for the past 24 hrs:   Temp Pulse Resp BP SpO2   04/07/22 0745 97.5 °F (36.4 °C) 66 16 (!) 112/51 97 %   04/07/22 0320 96.9 °F (36.1 °C) 63 16 (!) 123/58 98 %   04/06/22 2030 97.2 °F (36.2 °C) 65 16 (!) 119/54 100 %   04/06/22 1535 -- 60 20 (!) 110/59 98 %   04/06/22 1505 -- 63 20 110/60 96 %   04/06/22 1500 -- 64 16 114/62 95 %   04/06/22 1440 -- 66 15 116/66 99 %   04/06/22 1435 -- 67 12 117/65 98 %   04/06/22 1430 -- 68 16 117/66 99 %   04/06/22 1425 -- 68 10 119/67 98 %   04/06/22 1420 -- 71 9 123/71 99 %   04/06/22 1415 -- 70 12 122/72 100 %   04/06/22 1404 -- 76 19 132/70 98 %   04/06/22 1202 97.8 °F (36.6 °C) 66 20 134/74 100 %   04/06/22 1130 97.6 °F (36.4 °C) (!) 59 16 132/62 99 %     No intake/output data recorded. Gen: nad, pale in appearance  Chest: bilateral breath sounds present  Cardiac: rrr  Abd: s/nt  Ext: chronic venous stasis b/l with large plaques scaling off, mild erythema on lower leg b/l    -Labs-    Recent Labs     04/06/22  0451 04/05/22  0310 04/04/22  1902 04/04/22  1257   WBC 11.0 13.4*  --   --    HGB 8.6* 7.8* 8.5* 8.1*   PLT 59* 53*  --   --    ANEU 3.4 2.3  --   --     141  --   --    K 3.9 3.9  --   --    * 90  --   --    BUN 28* 19  --   --    CREA 1.15 0.68*  --   --    CA 7.8* 7.8*  --   --    MG  --  1.9  --   --        -Imaging-   4/3/22 CT A/P:  IMPRESSION  . No definite pancreatic mass seen by this technique. 2. Colitis diffuse with fat stranding in the peritoneal cavity.   3. Splenomegaly.       -Assessment + Plan-      *) Severe macrocytic anemia, thrombocytopenia, lymphocytosis:  - MCV markedly elevated to 142, severe anemia with  hgb 4.2 on admission.  - Iron studies after transfusion not markedly elevated as I would suspect, gave Venofer this admission on 4/4 and 4/5.  - B12 on low end of normal, MMA pending, replacment started empirically  - PBS concerning for lymphoproliferative d/o such as CLL? Flow on PB pending  - No hemolysis, HIV, HCV, HBV negative, fibrinogen slightly low but no overt dic.  - spep 0.2 with IgG Kappa specificity and elevated SFLCR, has MGUS at least but marrow will help r/o overt MM. - continue folic acid daily  - Had 4U PRBC. continue to transfuse for hgb <7, plt <10K  - BMBX 4/6/22 and results pending  -will start retacrit since he already had marrow  - Depakote can cause thrombocytopenia but doubt would explain severe anemia. Plt count brought in by pt Mom shows thrombocytopenia back in 2020 with hgb 11 and wbc wnl. Topamax has 1-3% for anemia, these are not new medications so doubt everything is drug related.    *) Colitis:  - CT scan showing diffuse colitis, on IV flagyl  - EGD on 4/5 without acute findings. - GI following had multiple polyps and GI recommended colectomy that was declined.     *) Fever:  - Afebrile last 24 hours

## 2022-04-07 NOTE — PROGRESS NOTES
Hospitalist Progress Note    NAME: Nguyen Lockhart   :  1955   MRN:  [id number]       Assessment / Plan:  History of multiple problems with biopsy showed adenoma  Acute anemia  Hemoglobin 4.2 on admission. Improved with PRBC transfusion  History recent colonoscopy was in 2021 which showed multiple polyps status post biopsied. Partial colectomy was recommended however patient's mom declined. Abd US showed pancreatic lesion. Normal liver  CT abd/pelvis showed evidence of colitis. No mass or ductal dilatation at the pancrease  Ferritin 92 after prbc transfusion. IV Iron x3 doses    Cont' empiric IV levaquin/flagyl for colitis  Hold antihypertensive to avoid hypotension. PRN IV Hydralazine   NPO. EGD on  showed normal upper endoscopy, with no endoscopic evidence of neoplasia or mucosal abnormality. Appreciate GI following   PT/OT to eval    Post op fever  Presumed Aspiration PNA  Pt states he has trouble with pills this morning. ? Aspiration post procedure. Had post op fever on .  cont' vancomycin to cover HCAP. Cont' levaquin/flagyl  CXR showed possible airspace disease   SLP to eval    Thrombocytopenia  Lymphocytosis    Anemia   PLT 53, abd us does not reveal any liver pathology. ? decreased production from hypocellular bone marrow vs ITP vs CLL  S/p BM biopsy on , result pendn=ing  Hematology following, appreciate recs. Transfuse prn     LE edema in the setting of hypoalbuminemia   Echo showed nl EF. LE duplex neg for DVT  con't PO lasix     Type 2 diabetes  Seizure disorder  Intellectual disability  SSI, hold PTA diabetic meds  Cont' Depakote, Topamax  CM to help with dispo. Pt's mother who is 79 yo is his primary care giver.   CM to help with dispo      NIKOS:  pending clearance from oncology      Code Status: Full  Surrogate Decision Maker: Patient's mother  DVT Prophylaxis: SCD  GI Prophylaxis: not indicated  Baseline: From home     Subjective:     Chief Complaint / Reason for Physician Visit  NAD. Discussed with RN events overnight. Review of Systems:  Symptom Y/N Comments  Symptom Y/N Comments   Fever/Chills    Chest Pain     Poor Appetite    Edema     Cough    Abdominal Pain     Sputum    Joint Pain     SOB/GRULLON    Pruritis/Rash     Nausea/vomit    Tolerating PT/OT     Diarrhea    Tolerating Diet     Constipation    Other       Could NOT obtain due to:      Objective:     VITALS:   Last 24hrs VS reviewed since prior progress note. Most recent are:  Patient Vitals for the past 24 hrs:   Temp Pulse Resp BP SpO2   04/07/22 0745 97.5 °F (36.4 °C) 66 16 (!) 112/51 97 %   04/07/22 0320 96.9 °F (36.1 °C) 63 16 (!) 123/58 98 %   04/06/22 2030 97.2 °F (36.2 °C) 65 16 (!) 119/54 100 %   04/06/22 1535 -- 60 20 (!) 110/59 98 %   04/06/22 1505 -- 63 20 110/60 96 %   04/06/22 1500 -- 64 16 114/62 95 %   04/06/22 1440 -- 66 15 116/66 99 %   04/06/22 1435 -- 67 12 117/65 98 %   04/06/22 1430 -- 68 16 117/66 99 %   04/06/22 1425 -- 68 10 119/67 98 %   04/06/22 1420 -- 71 9 123/71 99 %   04/06/22 1415 -- 70 12 122/72 100 %   04/06/22 1404 -- 76 19 132/70 98 %   04/06/22 1202 97.8 °F (36.6 °C) 66 20 134/74 100 %   04/06/22 1130 97.6 °F (36.4 °C) (!) 59 16 132/62 99 %     No intake or output data in the 24 hours ending 04/07/22 0842     I had a face to face encounter and independently examined this patient on 4/7/2022, as outlined below:  PHYSICAL EXAM:  General: WD, WN. Alert, cooperative, no acute distress    EENT:  EOMI. Anicteric sclerae. MMM  Resp:  CTA bilaterally, no wheezing or rales. No accessory muscle use  CV:  Regular  rhythm,   b/l LE edema  GI:  Soft, Non distended, Non tender. +Bowel sounds  Neurologic:  Awake, following commands. Psych:   . Not anxious nor agitated  Skin:  No rashes.   No jaundice    Reviewed most current lab test results and cultures  YES  Reviewed most current radiology test results   YES  Review and summation of old records today NO  Reviewed patient's current orders and MAR    YES  PMH/SH reviewed - no change compared to H&P  ________________________________________________________________________  Care Plan discussed with:    Comments   Patient x    Family      RN x    Care Manager     Consultant                        Multidiciplinary team rounds were held today with , nursing, pharmacist and clinical coordinator. Patient's plan of care was discussed; medications were reviewed and discharge planning was addressed. ________________________________________________________________________  Total NON critical care TIME:  35   Minutes    Total CRITICAL CARE TIME Spent:   Minutes non procedure based      Comments   >50% of visit spent in counseling and coordination of care     ________________________________________________________________________  Dino Burroughs MD     Procedures: see electronic medical records for all procedures/Xrays and details which were not copied into this note but were reviewed prior to creation of Plan. LABS:  I reviewed today's most current labs and imaging studies.   Pertinent labs include:  Recent Labs     04/06/22 0451 04/05/22 0310 04/04/22  1902   WBC 11.0 13.4*  --    HGB 8.6* 7.8* 8.5*   HCT 26.7* 24.1* 26.1*   PLT 59* 53*  --      Recent Labs     04/06/22 0451 04/05/22  0310    141   K 3.9 3.9   * 113*   CO2 20* 23   * 90   BUN 28* 19   CREA 1.15 0.68*   CA 7.8* 7.8*   MG  --  1.9       Signed: Dino Burroughs MD

## 2022-04-07 NOTE — PROGRESS NOTES
Problem: Self Care Deficits Care Plan (Adult)  Goal: *Acute Goals and Plan of Care (Insert Text)  Description: FUNCTIONAL STATUS PRIOR TO ADMISSION: Up until the past few weeks, he was able to manage basic self-care using a RW with supervision. His mother completes IADL. HOME SUPPORT: Mother    Occupational Therapy Goals  Initiated 4/7/2022  1. Patient will perform grooming seated edge of bed with supervision/set-up within 7 day(s). 2.  Patient will perform bathing with moderate assistance  within 7 day(s). 3.  Patient will perform lower body dressing with moderate assistance  within 7 day(s). 4.  Patient will perform toilet transfers with moderate assistance  within 7 day(s). 5.  Patient will perform all aspects of toileting with moderate assistance  within 7 day(s). 6.  Patient will participate in upper extremity therapeutic exercise/activities with Min cues for 10 minutes within 7 day(s). Outcome: Not Met    OCCUPATIONAL THERAPY EVALUATION  Patient: Bhavana Dalal (20 y.o. male)  Date: 4/7/2022  Primary Diagnosis: Anemia [D64.9]  Procedure(s) (LRB):  ESOPHAGOGASTRODUODENOSCOPY (EGD) (N/A) 2 Days Post-Op   Precautions:  Fall    ASSESSMENT  Based on the objective data described below, the patient presents with deficits in self-care, primarily due to decreased activity tolerance, significant weakness, pain, impaired cognition (intellectual disability), and decreased insight. He was oriented to self and place. His mother reports he is not typically oriented to time. Patient follows simple commands during functional tasks. He c/o pain in B LE, L > R. Patient is functioning well below his baseline level and his mother is currently not able to provide the level of care necessary at this time. He will benefit from skilled OT treatment to maximize independence and safety in ADL. Current Level of Function Impacting Discharge (ADLs/self-care): up to Total A    Functional Outcome Measure:   The patient scored 15/100 on the Barthel Index. Other factors to consider for discharge: Intellectual disability     Patient will benefit from skilled therapy intervention to address the above noted impairments. PLAN :  Recommendations and Planned Interventions: self care training, functional mobility training, therapeutic exercise, balance training, therapeutic activities, cognitive retraining, endurance activities, neuromuscular re-education, patient education, home safety training, and family training/education    Frequency/Duration: Patient will be followed by occupational therapy 3 times a week to address goals. Recommendation for discharge: (in order for the patient to meet his/her long term goals)  Therapy up to 5 days/week in SNF setting    This discharge recommendation:  A follow-up discussion with the attending provider and/or case management is planned    IF patient discharges home will need the following DME: TBD       SUBJECTIVE:   Patient stated I like puzzles.     OBJECTIVE DATA SUMMARY:   HISTORY:   Past Medical History:   Diagnosis Date    [facility] [address]     Epilepsy [facility] [address]     Seizures ([facility] [address]      Past Surgical History:   Procedure Laterality Date    COLONOSCOPY N/A 12/1/2021    COLONOSCOPY performed by Aimee Tristan MD at \[facility]\"" ENDOSCOPY. Multiple sessile polyps (>30). Medium-sized int hemorrhoids.     UPPER GI ENDOSCOPY,DIAGNOSIS  4/5/2022            Expanded or extensive additional review of patient history:     Home Situation  Home Environment: Private residence  # Steps to Enter: 1  Rails to Enter: Yes  Hand Rails : Left  One/Two Story Residence: One story  Living Alone: No  Support Systems: Parent(s)  Patient Expects to be Discharged to[de-identified] Home  Current DME Used/Available at Home: Walker, rolling,Wheelchair,Shower chair,Grab bars  Tub or Shower Type: Tub/Shower combination    Hand dominance: Right    EXAMINATION OF PERFORMANCE DEFICITS:  Cognitive/Behavioral Status:  Neurologic State: Alert  Orientation Level: Oriented to person;Oriented to place; Disoriented to situation;Disoriented to time  Cognition: Decreased command following;Decreased attention/concentration  Perception: Appears intact  Perseveration: No perseveration noted  Safety/Judgement: Decreased insight into deficits; Fall prevention    Edema: B UE and LE    Hearing: Auditory  Auditory Impairment: None    Vision/Perceptual:                                     Range of Motion:  AROM: Generally decreased, functional                         Strength:  Strength: Generally decreased, functional                Coordination:  Coordination: Generally decreased, functional  Fine Motor Skills-Upper: Left Impaired;Right Impaired    Gross Motor Skills-Upper: Left Intact; Right Intact (grossly)    Tone & Sensation:  Tone: Normal  Sensation: Impaired (hypersensitive to touch distal bilatral LEs, L>R)                      Balance:  Sitting: Impaired  Sitting - Static: Good (unsupported)  Sitting - Dynamic: Not tested; Other (comment) (unable to scoot hips to edge of bed with maximal cues/assist)  Standing:  (not tested)    Functional Mobility and Transfers for ADLs:  Bed Mobility:  Rolling: Minimum assistance;Assist x1  Supine to Sit: Moderate assistance;Assist x2  Sit to Supine: Assist x1  Scooting: Maximum assistance;Assist x2    Transfers: Toilet Transfer : Total assistance (unable to assess)    ADL Assessment:  Feeding: Moderate assistance    Oral Facial Hygiene/Grooming: Moderate assistance    Bathing: Maximum assistance    Upper Body Dressing: Maximum assistance    Lower Body Dressing: Total assistance    Toileting: Total assistance                Functional Measure:    Barthel Index:  Bathin  Bladder: 5  Bowels: 5  Groomin  Dressin  Feedin  Mobility: 0  Stairs: 0  Toilet Use: 0  Transfer (Bed to Chair and Back): 0  Total: 15/100      The Barthel ADL Index: Guidelines  1.  The index should be used as a record of what a patient does, not as a record of what a patient could do. 2. The main aim is to establish degree of independence from any help, physical or verbal, however minor and for whatever reason. 3. The need for supervision renders the patient not independent. 4. A patient's performance should be established using the best available evidence. Asking the patient, friends/relatives and nurses are the usual sources, but direct observation and common sense are also important. However direct testing is not needed. 5. Usually the patient's performance over the preceding 24-48 hours is important, but occasionally longer periods will be relevant. 6. Middle categories imply that the patient supplies over 50 per cent of the effort. 7. Use of aids to be independent is allowed. Score Interpretation (from [address] [facility] [address])    Independent   60-79 Minimally independent   40-59 Partially dependent   20-39 Very dependent   <20 Totally dependent     -Baldemar Cooper., BarthelZENON. (1965). Functional evaluation: the Barthel Index. [address] [facility] ([address] (1997). The Barthel activities of daily living index: self-reporting versus actual performance in the old (> or = 75 years). Journal of [address] [place] [address] 45(7), [address] [facility], JKELLIE, Edy Samayoa, Evangelista Balbuena. (1999). Measuring the change in disability after inpatient rehabilitation; comparison of the responsiveness of the Barthel Index and Functional [place] Measure. Journal of Neurology, Neurosurgery, and Psychiatry, 66(4), 112-515. Ese Ott, N.J.A, IMTIAZ Kay, & Sunshine Castaneda, M.A. (2004) Assessment of post-stroke quality of life in cost-effectiveness studies: The usefulness of the Barthel Index and the EuroQoL-5D.  Quality of Life Research, 15, 811-82        Occupational Therapy Evaluation Charge Determination   History Examination Decision-Making   MEDIUM Complexity : Expanded review of history including physical, cognitive and psychosocial  history  HIGH Complexity : 5 or more performance deficits relating to physical, cognitive , or psychosocial skils that result in activity limitations and / or participation restrictions MEDIUM Complexity : Patient may present with comorbidities that affect occupational performnce. Miniml to moderate modification of tasks or assistance (eg, physical or verbal ) with assesment(s) is necessary to enable patient to complete evaluation       Based on the above components, the patient evaluation is determined to be of the following complexity level: MEDIUM  Pain Rating:  B LE, L > R LE with movement    Activity Tolerance:   Fair    After treatment patient left in no apparent distress:    Supine in bed, Call bell within reach, Bed / chair alarm activated, Caregiver / family present, and Side rails x 3    COMMUNICATION/EDUCATION:   The patients plan of care was discussed with: Physical therapist and Registered nurse. Patient is unable to participate in goal setting and plan of care. This patients plan of care is appropriate for delegation to [facility].     Thank you for this referral.  Annika Lawson OTR/L  Time Calculation: 36 mins

## 2022-04-07 NOTE — PROGRESS NOTES
.End of Shift Note    Bedside shift change report given to Donato Palafox (oncoming nurse) by Aric Paris RN (offgoing nurse). Report included the following information SBAR, Intake/Output, MAR, Recent Results and Med Rec Status    Shift worked:  [id number]     Shift summary and any significant changes:     Patient worked with PT today, was able to sit up at edge of bed but could not stand. prescribed medications given per MAR, rounding every hour done. Concerns for physician to address:  Patient has a dry cough, could benefit with a medication. Mother is upset and would like a physcian to contact her after seeing pt in the morning on home phone [phone]. And if after 11:00am please call pt's room to speak directly to her. Zone phone for oncoming shift:   [phone]       Activity:  Activity Level: Bed Rest  Number times ambulated in hallways past shift: 0  Number of times OOB to chair past shift: 0    Cardiac:   Cardiac Monitoring: No      Cardiac Rhythm: Sinus Rhythm    Access:   Current line(s): PIV     Genitourinary:   Urinary status: incontinent    Respiratory:   O2 Device: None (Room air)  Chronic home O2 use?: NO  Incentive spirometer at bedside: NO       GI:  Last Bowel Movement Date: 04/01/22  Current diet:  ADULT DIET Regular  Passing flatus: YES  Tolerating current diet: YES       Pain Management:   Patient states pain is manageable on current regimen: YES    Skin:  Troy Score: 17  Interventions: float heels    Patient Safety:  Fall Score:  Total Score: 4  Interventions: gripper socks  High Fall Risk: Yes    Length of Stay:  Expected LOS: 2d 16h  Actual LOS: [address] RN

## 2022-04-08 LAB
ANION GAP SERPL CALC-SCNC: 5 MMOL/L (ref 5–15)
BASOPHILS # BLD: 0 K/UL (ref 0–0.1)
BASOPHILS NFR BLD: 0 % (ref 0–1)
BUN SERPL-MCNC: 29 MG/DL (ref 6–20)
BUN/CREAT SERPL: 32 (ref 12–20)
CALCIUM SERPL-MCNC: 7.5 MG/DL (ref 8.5–10.1)
CHLORIDE SERPL-SCNC: 112 MMOL/L (ref 97–108)
CO2 SERPL-SCNC: 23 MMOL/L (ref 21–32)
CREAT SERPL-MCNC: 0.91 MG/DL (ref 0.7–1.3)
DIFFERENTIAL METHOD BLD: ABNORMAL
EOSINOPHIL # BLD: 0.9 K/UL (ref 0–0.4)
EOSINOPHIL NFR BLD: 9 % (ref 0–7)
ERYTHROCYTE [DISTWIDTH] IN BLOOD BY AUTOMATED COUNT: 22.5 % (ref 11.5–14.5)
GLUCOSE BLD STRIP.AUTO-MCNC: 128 MG/DL (ref 65–117)
GLUCOSE BLD STRIP.AUTO-MCNC: 187 MG/DL (ref 65–117)
GLUCOSE BLD STRIP.AUTO-MCNC: 236 MG/DL (ref 65–117)
GLUCOSE BLD STRIP.AUTO-MCNC: 273 MG/DL (ref 65–117)
GLUCOSE SERPL-MCNC: 118 MG/DL (ref 65–100)
HCT VFR BLD AUTO: 24.5 % (ref 36.6–50.3)
HCV AB S/CO SERPL IA: <0.1 S/CO RATIO (ref 0–0.9)
HCV AB SERPL QL IA: NORMAL
HGB BLD-MCNC: 7.7 G/DL (ref 12.1–17)
IMM GRANULOCYTES # BLD AUTO: 0 K/UL (ref 0–0.04)
IMM GRANULOCYTES NFR BLD AUTO: 0 % (ref 0–0.5)
LYMPHOCYTES # BLD: 6.8 K/UL (ref 0.8–3.5)
LYMPHOCYTES NFR BLD: 65 % (ref 12–49)
MCH RBC QN AUTO: 35.8 PG (ref 26–34)
MCHC RBC AUTO-ENTMCNC: 31.4 G/DL (ref 30–36.5)
MCV RBC AUTO: 114 FL (ref 80–99)
METHYLMALONATE SERPL-SCNC: 193 NMOL/L (ref 0–378)
MONOCYTES # BLD: 0.4 K/UL (ref 0–1)
MONOCYTES NFR BLD: 4 % (ref 5–13)
NEUTS SEG # BLD: 2.3 K/UL (ref 1.8–8)
NEUTS SEG NFR BLD: 22 % (ref 32–75)
NRBC # BLD: 0.02 K/UL (ref 0–0.01)
NRBC BLD-RTO: 0.2 PER 100 WBC
PLATELET # BLD AUTO: 51 K/UL (ref 150–400)
PMV BLD AUTO: 10.2 FL (ref 8.9–12.9)
POTASSIUM SERPL-SCNC: 4.2 MMOL/L (ref 3.5–5.1)
RBC # BLD AUTO: 2.15 M/UL (ref 4.1–5.7)
RBC MORPH BLD: ABNORMAL
RBC MORPH BLD: ABNORMAL
SERVICE CMNT-IMP: ABNORMAL
SODIUM SERPL-SCNC: 140 MMOL/L (ref 136–145)
WBC # BLD AUTO: 10.4 K/UL (ref 4.1–11.1)
WBC MORPH BLD: ABNORMAL

## 2022-04-08 PROCEDURE — 65660000000 HC RM CCU STEPDOWN

## 2022-04-08 PROCEDURE — 85025 COMPLETE CBC W/AUTO DIFF WBC: CPT

## 2022-04-08 PROCEDURE — 36415 COLL VENOUS BLD VENIPUNCTURE: CPT

## 2022-04-08 PROCEDURE — 74011000250 HC RX REV CODE- 250: Performed by: INTERNAL MEDICINE

## 2022-04-08 PROCEDURE — 80048 BASIC METABOLIC PNL TOTAL CA: CPT

## 2022-04-08 PROCEDURE — 74011250637 HC RX REV CODE- 250/637: Performed by: INTERNAL MEDICINE

## 2022-04-08 PROCEDURE — 74011636637 HC RX REV CODE- 636/637: Performed by: INTERNAL MEDICINE

## 2022-04-08 PROCEDURE — 74011250637 HC RX REV CODE- 250/637: Performed by: NURSE PRACTITIONER

## 2022-04-08 PROCEDURE — 97530 THERAPEUTIC ACTIVITIES: CPT

## 2022-04-08 PROCEDURE — 74011250636 HC RX REV CODE- 250/636: Performed by: INTERNAL MEDICINE

## 2022-04-08 PROCEDURE — 82962 GLUCOSE BLOOD TEST: CPT

## 2022-04-08 RX ADMIN — GUAIFENESIN 200 MG: 200 SOLUTION ORAL at 20:57

## 2022-04-08 RX ADMIN — CYANOCOBALAMIN TAB 500 MCG 1000 MCG: 500 TAB at 09:11

## 2022-04-08 RX ADMIN — SODIUM CHLORIDE, PRESERVATIVE FREE 10 ML: 5 INJECTION INTRAVENOUS at 14:54

## 2022-04-08 RX ADMIN — FOLIC ACID 1 MG: 1 TABLET ORAL at 09:10

## 2022-04-08 RX ADMIN — Medication 3 UNITS: at 17:03

## 2022-04-08 RX ADMIN — Medication 2 UNITS: at 12:55

## 2022-04-08 RX ADMIN — TOPIRAMATE 200 MG: 100 TABLET, FILM COATED ORAL at 17:04

## 2022-04-08 RX ADMIN — FUROSEMIDE 20 MG: 20 TABLET ORAL at 09:11

## 2022-04-08 RX ADMIN — VANCOMYCIN HYDROCHLORIDE 750 MG: 750 INJECTION, POWDER, LYOPHILIZED, FOR SOLUTION INTRAVENOUS at 00:26

## 2022-04-08 RX ADMIN — METRONIDAZOLE 500 MG: 500 INJECTION, SOLUTION INTRAVENOUS at 20:52

## 2022-04-08 RX ADMIN — PANTOPRAZOLE SODIUM 40 MG: 40 TABLET, DELAYED RELEASE ORAL at 09:10

## 2022-04-08 RX ADMIN — METRONIDAZOLE 500 MG: 500 INJECTION, SOLUTION INTRAVENOUS at 09:10

## 2022-04-08 RX ADMIN — DIVALPROEX SODIUM 1000 MG: 500 TABLET, DELAYED RELEASE ORAL at 20:52

## 2022-04-08 RX ADMIN — Medication 3 UNITS: at 22:00

## 2022-04-08 RX ADMIN — FUROSEMIDE 20 MG: 20 TABLET ORAL at 16:29

## 2022-04-08 RX ADMIN — SODIUM CHLORIDE, PRESERVATIVE FREE 10 ML: 5 INJECTION INTRAVENOUS at 09:12

## 2022-04-08 RX ADMIN — DIVALPROEX SODIUM 500 MG: 500 TABLET, DELAYED RELEASE ORAL at 09:10

## 2022-04-08 RX ADMIN — SODIUM CHLORIDE, PRESERVATIVE FREE 10 ML: 5 INJECTION INTRAVENOUS at 22:37

## 2022-04-08 RX ADMIN — ATORVASTATIN CALCIUM 40 MG: 40 TABLET, FILM COATED ORAL at 09:10

## 2022-04-08 RX ADMIN — LEVOFLOXACIN 750 MG: 5 INJECTION, SOLUTION INTRAVENOUS at 18:13

## 2022-04-08 RX ADMIN — TOPIRAMATE 200 MG: 100 TABLET, FILM COATED ORAL at 09:11

## 2022-04-08 NOTE — PROGRESS NOTES
Comprehensive Nutrition Assessment    Type and Reason for Visit: Initial,Positive nutrition screen    Nutrition Recommendations/Plan:   Consistent carb diet  Add Ensure high protein (low CHO, high protein supplement) BID  Please document % meals and supplements consumed in flowsheet I/O's under intake     Nutrition Assessment:      Chart reviewed for LOS. Pt noted for intellectual disability, anemia, adenoma, pna v aspiration, DM2, colitis, lymphoproliferative disorder (BM biopsy pending). Pt sleeping at time of visit with mother at bedside. He was sleeping through lunch today, but she reports he usually eats well. She says he has trouble chewing meat but eats a lot of vegetables. He drinks one Boost shake per day at home to help with protein intake. Mother agreeable to Ensure high protein shakes while he's here. No reports of weight loss. No BM documented since 4/1. Per notes, pt is medically stable for discharge pending placement to SNF. Wt Readings from Last 5 Encounters:   04/02/22 88.5 kg (195 lb)   12/01/21 88.5 kg (195 lb)   ]    Estimated Daily Nutrient Needs:  Energy (kcal): 2220 kcals (BMR x 1. 3AF); Weight Used for Energy Requirements: Current  Protein (g): 71-86g (0.8-1.0g/kg); Weight Used for Protein Requirements: Current  Fluid (ml/day): 2200mL; Method Used for Fluid Requirements: 1 ml/kcal      Nutrition Related Findings:  Labs: -[phone]. Meds: B12, lasix, humalog, levaquin, flagyl, vancomycin. Edema: 1+ all extremities. BM 4/1? Wounds:    None       Current Nutrition Therapies:  ADULT ORAL NUTRITION SUPPLEMENT Breakfast, Dinner; Low Calorie/High Protein  ADULT DIET Regular; 4 carb choices (60 gm/meal)    Anthropometric Measures:  · Height:  6' (182.9 cm)  · Current Body Wt:  88.5 kg (195 lb)   · Ideal Body Wt:  178 lbs:  109.6 %   · BMI Category: Overweight (BMI 25.0-29. 9)       Nutrition Diagnosis:   · Inadequate protein intake related to biting/chewing (masticatory) difficulty (per mother) as evidenced by  (trouble chewing meat)      Nutrition Interventions:   Food and/or Nutrient Delivery: Continue current diet,Start oral nutrition supplement  Nutrition Education and Counseling: No recommendations at this time  Coordination of Nutrition Care: Continue to monitor while inpatient    Goals:  PO intake >50% meals and supplements next 3-5 days       Nutrition Monitoring and Evaluation:   Behavioral-Environmental Outcomes: None identified  Food/Nutrient Intake Outcomes: Food and nutrient intake,Supplement intake  Physical Signs/Symptoms Outcomes: Biochemical data,GI status,Weight    Discharge Planning:    Continue current diet,Continue oral nutrition supplement     Electronically signed by Alethea Oneill RD on 4/8/2022 at 1:25 PM    Contact: [id number]

## 2022-04-08 NOTE — PROGRESS NOTES
.End of Shift Note    Bedside shift change report given to Usama Byrd (oncoming nurse) by Romario Matson RN (offgoing nurse). Report included the following information SBAR, Intake/Output, MAR and Med Rec Status    Shift worked:  [id number]     Shift summary and any significant changes:     Patient medications given per MAR. No c/o of N/V. Mother at bedside. Rounding every hour done.       Concerns for physician to address:  none     Zone phone for oncoming shift:   [phone], RN

## 2022-04-08 NOTE — PROGRESS NOTES
-Hematology / Oncology ([facility]) -  -Primary Oncologist-   -[phone] [facility] in bed, no family at bedside. -O-      Patient Vitals for the past 24 hrs:   Temp Pulse Resp BP SpO2   04/08/22 0724 98.7 °F (37.1 °C) 75 18 (!) 120/50 95 %   04/08/22 0344 99 °F (37.2 °C) 74 18 103/68 98 %   04/07/22 2250 98.4 °F (36.9 °C) 72 18 (!) 106/49 99 %   04/07/22 1916 97.4 °F (36.3 °C) 72 18 (!) 108/45 98 %   04/07/22 1515 97.7 °F (36.5 °C) 68 16 (!) 107/49 97 %   04/07/22 1414 -- 79 -- 137/60 --   04/07/22 1402 -- 74 -- (!) 105/59 96 %     No intake/output data recorded. Gen: nad, pale in appearance  Chest: bilateral breath sounds present  Cardiac: rrr  Abd: s/nt  Ext: chronic venous stasis b/l with large plaques scaling off, mild erythema on lower leg b/l    -Labs-    Recent Labs     04/08/22  0421 04/07/22  1011 04/06/22  0451   WBC 10.4 9.8 11.0   HGB 7.7* 8.5* 8.6*   PLT 51* 51* 59*   ANEU 2.3 2.5 3.4    138 140   K 4.2 4.0 3.9   * 192* 147*   BUN 29* 31* 28*   CREA 0.91 0.94 1.15   CA 7.5* 7.7* 7.8*       -Imaging-   4/3/22 CT A/P:  IMPRESSION  . No definite pancreatic mass seen by this technique. 2. Colitis diffuse with fat stranding in the peritoneal cavity. 3. Splenomegaly.       -Assessment + Plan-      *) Severe macrocytic anemia, thrombocytopenia, lymphocytosis:  - MCV markedly elevated to 142, severe anemia with  hgb 4.2 on admission.  - Iron studies after transfusion not markedly elevated as I would suspect, gave Venofer this admission on 4/4 and 4/5.  - B12 on low end of normal, MMA pending, replacment started empirically  - PBS concerning for lymphoproliferative d/o such as CLL? Flow on PB pending  - No hemolysis, HIV, HCV, HBV negative, fibrinogen slightly low but no overt dic.  - spep 0.2 with IgG Kappa specificity and elevated SFLCR, has MGUS at least but marrow will help r/o overt MM. - continue folic acid daily  - Had 4U PRBC.   continue to transfuse for hgb <7, plt <10K  - BMBX 4/6/22 and results pending  -will start retacrit since he already had marrow, will continue as outpt  - peripheral flow suggestive of b cell lymphoma, don't think he will be tx candidate for aggressive therapies given his intelectual delay. FU with me after dc from [facility] planned tentatively for 5/6/22 2:15pm  - Depakote can cause thrombocytopenia but doubt would explain severe anemia. Plt count brought in by pt Mom shows thrombocytopenia back in 2020 with hgb 11 and wbc wnl. Topamax has 1-3% for anemia, these are not new medications so doubt everything is drug related.  -discussed with Dr. Terrance Enrique, pt likely leaving today     *) Colitis:  - CT scan showing diffuse colitis, on IV flagyl  - EGD on 4/5 without acute findings. - GI following had multiple polyps and GI recommended colectomy that was declined.     *) Fever:  - Afebrile last 24 hours, is on Levaquin and IV flagyl

## 2022-04-08 NOTE — PROGRESS NOTES
Hospitalist Progress Note    NAME: Mario Jones   :  1955   MRN:  [id number]       Assessment / Plan:  History of multiple problems with biopsy showed adenoma  Acute anemia  Hemoglobin 4.2 on admission. Improved with PRBC transfusion  History recent colonoscopy was in 2021 which showed multiple polyps status post biopsied. Partial colectomy was recommended however patient's mom declined. Abd US showed pancreatic lesion. Normal liver  CT abd/pelvis showed evidence of colitis. No mass or ductal dilatation at the pancrease  Ferritin 92 after prbc transfusion. s/p IV Iron x3 doses    Cont' empiric IV levaquin/flagyl for colitis  EGD on  showed normal upper endoscopy, with no endoscopic evidence of neoplasia or mucosal abnormality. Appreciate GI recs     Pt's mother was updated again via phone today . Questions/concerns addressed. She would like for pt to be discharge to SNF. He is medically stable for discharge pending placement. Post op fever  ? Aspiration vs HCAP  Pt states he has trouble with pills this morning. ? Aspiration post procedure. He is coughing   CXR showed small left pleural effusion. Hazy left basilar atelectasis/airspace disease. cont' levaquin/flagyl and vancomycin. SLP to eval post procedure    Thrombocytopenia  Lymphocytosis    Anemia   PLT 53, abd us does not reveal any liver pathology. ? B cell lymphoma based on peripheral flow. S/p BM biopsy on  pending result. Peripheral smear showed monoclonal B-cells, consistent with a B-cell lymphoproliferative disorder. Started on retacrit  Oncology following, appreciate recs. Discussed with Dr Diandra Quintero    LE edema in the setting of hypoalbuminemia   Venous stasis   Echo showed nl EF.   LE duplex neg for DVT  Transitioned to PO lasix.       Type 2 diabetes  Seizure disorder  Intellectual disability  SSI, hold PTA diabetic meds  Cont' Depakote, Topamax        Code Status: Full  Surrogate Decision Maker: Patient's mother  DVT Prophylaxis: SCD  GI Prophylaxis: not indicated  Baseline: From home     Subjective:     Chief Complaint / Reason for Physician Visit  NAD. Discussed with RN events overnight. Review of Systems:  Symptom Y/N Comments  Symptom Y/N Comments   Fever/Chills    Chest Pain     Poor Appetite    Edema     Cough    Abdominal Pain     Sputum    Joint Pain     SOB/GRULLON    Pruritis/Rash     Nausea/vomit    Tolerating PT/OT     Diarrhea    Tolerating Diet     Constipation    Other       Could NOT obtain due to:      Objective:     VITALS:   Last 24hrs VS reviewed since prior progress note. Most recent are:  Patient Vitals for the past 24 hrs:   Temp Pulse Resp BP SpO2   04/08/22 0724 98.7 °F (37.1 °C) 75 18 (!) 120/50 95 %   04/08/22 0344 99 °F (37.2 °C) 74 18 103/68 98 %   04/07/22 2250 98.4 °F (36.9 °C) 72 18 (!) 106/49 99 %   04/07/22 1916 97.4 °F (36.3 °C) 72 18 (!) 108/45 98 %   04/07/22 1515 97.7 °F (36.5 °C) 68 16 (!) 107/49 97 %   04/07/22 1414 -- 79 -- 137/60 --   04/07/22 1402 -- 74 -- (!) 105/59 96 %     No intake or output data in the 24 hours ending 04/08/22 0911     I had a face to face encounter and independently examined this patient on 4/8/2022, as outlined below:  PHYSICAL EXAM:  General: WD, WN. Alert, cooperative, no acute distress    EENT:  EOMI. Anicteric sclerae. MMM  Resp:  CTA bilaterally, no wheezing or rales. No accessory muscle use  CV:  Regular  rhythm,   b/l LE edema  GI:  Soft, Non distended, Non tender. +Bowel sounds  Neurologic:  Awake, following commands. Psych:   . Not anxious nor agitated  Skin:  No rashes.   No jaundice    Reviewed most current lab test results and cultures  YES  Reviewed most current radiology test results   YES  Review and summation of old records today    NO  Reviewed patient's current orders and MAR    YES  PMH/SH reviewed - no change compared to H&P  ________________________________________________________________________  Care Plan discussed with:    Comments   Patient x    Family  x Pt's mother   RN x    Care Manager     Consultant  x oncology                     Multidiciplinary team rounds were held today with , nursing, pharmacist and clinical coordinator. Patient's plan of care was discussed; medications were reviewed and discharge planning was addressed. ________________________________________________________________________  Total NON critical care TIME:  35   Minutes    Total CRITICAL CARE TIME Spent:   Minutes non procedure based      Comments   >50% of visit spent in counseling and coordination of care     ________________________________________________________________________  Saroj Macias MD     Procedures: see electronic medical records for all procedures/Xrays and details which were not copied into this note but were reviewed prior to creation of Plan. LABS:  I reviewed today's most current labs and imaging studies.   Pertinent labs include:  Recent Labs     04/08/22 0421 04/07/22  1011 04/06/22  0451   WBC 10.4 9.8 11.0   HGB 7.7* 8.5* 8.6*   HCT 24.5* 26.7* 26.7*   PLT 51* 51* 59*     Recent Labs     04/08/22 0421 04/07/22  1011 04/06/22  0451    138 140   K 4.2 4.0 3.9   * 110* 112*   CO2 23 22 20*   * 192* 147*   BUN 29* 31* 28*   CREA 0.91 0.94 1.15   CA 7.5* 7.7* 7.8*       Signed: Saroj Macias MD

## 2022-04-08 NOTE — PROGRESS NOTES
Problem: Mobility Impaired (Adult and Pediatric)  Goal: *Acute Goals and Plan of Care (Insert Text)  Description: FUNCTIONAL STATUS PRIOR TO ADMISSION: The patient was functional at the wheelchair level and required minimal assistance for transfers to the chair. He was ambulatory with rolling walker prior to 2 weeks ago. One week ago, he had a posterior fall going up one stair into home (with assist of his mother) and currently has a head abrasion. HOME SUPPORT PRIOR TO ADMISSION: The patient lived with elderly mother and required minimal assistance/contact guard assist for ADLs/mobility prior to 2 weeks ago. He was declining at home and required more than minimal assist.    Physical Therapy Goals  Initiated 4/7/2022  1. Patient will move from supine to sit and sit to supine  in bed with minimal assistance/contact guard assist within 7 day(s). 2.  Patient will transfer from bed to chair and chair to bed with minimal assistance/contact guard assist x 2 using the least restrictive device within 7 day(s). 3.  Patient will perform sit to stand with minimal assistance/contact guard assist x 2 within 7 day(s). 4.  Patient will ambulate with minimal assistance/contact guard assist x 2 for 20 feet with the least restrictive device within 7 day(s). Outcome: Progressing Towards Goal   PHYSICAL THERAPY TREATMENT  Patient: Carley Underwood (84 y.o. male)  Date: 4/8/2022  Diagnosis: Anemia [D64.9] <principal problem not specified>  Procedure(s) (LRB):  ESOPHAGOGASTRODUODENOSCOPY (EGD) (N/A) 3 Days Post-Op  Precautions: Fall  Chart, physical therapy assessment, plan of care and goals were reviewed. ASSESSMENT  Patient continues with skilled PT services and is progressing towards goals. Pt alert, follows simple commands with repetition and guiding. He c/o pain when LEs are moved L>R. Did gentle rocking and gentle ROM and pt able to tolerate LLE to near straight; seems to c/o pain with sudden movements.  He was able to come to the edge of the bed with mod A. He is able to sit with supervision and intermittent assist with cues to maintain COG over pelvis to eliminate posterior lean. He was able to tolerate sitting at edge of bed for 8 min and work on balance. He attempted sit to stand x1 with max A of 2 and achieved 75% movement to stand but then requested to sit back down. Attempted also to have pt do AROM of LEs at edge of bed but pt became very anxious to lay back down. Mother present for session. Pt positioned in upright in the bed, call bell in reach and alarm on. Pt will benefit from SNF rehab as he is functioning well below his baseline of 2-3 weeks ago. Current Level of Function Impacting Discharge (mobility/balance): mod to max A of 2, see above    Other factors to consider for discharge: intellectual disability, fall risk, A of 2 persons         PLAN :  Patient continues to benefit from skilled intervention to address the above impairments. Continue treatment per established plan of care. to address goals. Recommendation for discharge: (in order for the patient to meet his/her long term goals)  Therapy up to 5 days/week in SNF setting    This discharge recommendation:  Has been made in collaboration with the attending provider and/or case management    IF patient discharges home will need the following DME: to be determined (TBD)       SUBJECTIVE:   Patient stated I want to lay down.     OBJECTIVE DATA SUMMARY:   Critical Behavior:  Neurologic State: Alert  Orientation Level: Oriented to person,Oriented to place,Disoriented to situation,Disoriented to time  Cognition: Follows commands  Safety/Judgement: Decreased insight into deficits,Fall prevention  Functional Mobility Training:  Bed Mobility:  Rolling:  Moderate assistance  Supine to Sit: Moderate assistance;Assist x2  Sit to Supine: Minimum assistance;Assist x2  Scooting: Maximum assistance        Transfers:  Sit to Stand: Maximum assistance;Assist x2;Other (comment) (to attempt)  Stand to Sit: Maximum assistance;Assist x2                             Balance:  Sitting: Impaired  Sitting - Static: Good (unsupported); Other (comment) (cues to bring weight over knees)  Sitting - Dynamic: Fair (occasional)  Standing:  (attempted but did not achieve full stand)  Ambulation/Gait Training:              Gait Description (WDL):  (unable to amb at this time)                               Pain Rating:  As noted above in LEs, pt unable to rate; LEs positioned on pillows in bed for comfort    Activity Tolerance:   Fair    After treatment patient left in no apparent distress:   Supine in bed, Heels elevated for pressure relief, Call bell within reach, Caregiver / family present, and Side rails x 3    COMMUNICATION/COLLABORATION:   The patients plan of care was discussed with: Registered nurse.      Krys Muhammad, PT   Time Calculation: 20 mins

## 2022-04-08 NOTE — PROGRESS NOTES
End of Shift Note    Bedside shift change report given to Macey Juarez (oncoming nurse) by Claudia Marin RN (offgoing nurse). Report included the following information SBAR, Kardex and MAR    Shift worked: 7am-7pm     Shift summary and any significant changes:    Pt tolerated care fairly throughout shift. Hourly rounding completed. Medications given and education provided. Pt family member at the bedside. VS and assessment performed Q8. Pt is clean and dry at the moment. Patient resting comfortably.

## 2022-04-08 NOTE — ADT AUTH CERT NOTES
PT was admitted on 22 not 22   [id number]  Philip Bryson -1955. I HAVE RESENT DAY 1 22, DAY 2 4/3/22 CLINICAL, H&P, AS WELL AS MY MOST RESENT REVIEW 22. PT is still in house. [address]     FACILITY NPI : [id number]     [address]  [facility] MEDICAL ONCOLOGY  [address]  [address] [facility] [id number] [phone]     Crispin Bernal SANIYAPasha [address] 30: [phone]  Fax: [phone]      Patient Name :Dayanara Hernadez   : 1955 (73 yrs)  MRN : [id number]     Patient Mailing Address [address]                                          [address] [facility] [47] , [address]                                                             .         Insurance Plan Payor: BLUE CROSS MEDICARE / Plan: [address] [place] [address] HMO / Product Type: Managed Care Medicare /      Primary Coverage Subscriber ID : [id number]     Secondary Coverage:  BLUE CROSS MEDICAID     Secondary Subscriber ID :  [id number]      Current Patient Class : INPATIENT  Admit Date : 2022     REQUESTED LEVEL OF CARE: INPATIENT [101]                                                           Diagnosis : Anemia                          ICD10 Code : Anemia [D64.9]     Current Room and Bed 1113/01     Admitting and Attending Info:  Admitting Provider : Alan Cruz MD   NPI: [id number]  Admitting Provider Phone.  [phone]  Admitting Provider Address: [address] [place] [address] Kate Pattersoncharli  [address]     Attending Provider Patria Ramirez MD   [id number]  Attending Provider Address:  [address] [place] [address]                                                   [address]     Attending Provider Phone: Attending lonnie phone: [phone]                Utilization Reviews         Gastrointestinal Bleeding, Lower - Care Day 5 (2022) by Major Wendolyn Sicard A       Review Entered Review Status   4/7/2022 14:43 Completed      Criteria Review      Care Day: 5 Care Date: 4/6/2022 Level of Care: Telemetry    Guideline Day 2    Level Of Care    (X) Floor    4/7/2022 14:43:06 EDT by Robert Ley Oncology. Diff post acute sec to pt with intellectual disability and primary caregiver is 91yr old mother. Mother req asst at home sec to pt's inability to walk. Clinical Status    (X) * Hypotension absent    4/7/2022 14:43:06 EDT by Aditi Jamison      110/59, 63, Tmax 101.2, 20, RA sat 98%. Pt to IR for bone marrow biopsy today under mod sedation. CXR: sm left pleural effusion. Hazy lt basilar atelectasis/airspace disease    (X) * Bleeding absent or reduced    4/7/2022 14:43:06 EDT by Aditi Jamison      N/A    Activity    (X) Activity as tolerated    4/7/2022 14:43:06 EDT by Frank Alvarado for 2H then up ad thai    Routes    (X) * Oral hydration tolerated    4/7/2022 14:43:06 EDT by Aditi Jamison      MEDS: Vancomycin 2000mg IV x1, flagyl 500mg IV Q12H, Levaquin 750mg IV Q24H, protonix 40mg po qd, depakote DR 1000mg po qhs, folvite 1mg po qd, lasix 20mg po bid, topamax 200mg po bid, ultram 50mg po q6H PRN x1, Tylenol 650mg po q4H PRN x2    (X) * Oral diet tolerated    4/7/2022 14:43:06 EDT by Aditi Jamison      Reg diet post bone marrow biopsy. GLUCOSE CONTROL: humalog ssi ac/hs: (0un)- (Shaylee Luci)- (Shaylee Luci)- (Toño Andrade). ADD MEDS: vit B12 1000mcg po qd, lipitor 40mg po qd    Interventions    (X) Hgb/Hct    4/7/2022 14:43:06 EDT by Aditi Jamison      Hgb 8.6, hct 26.7, plt 59. Chl 112, Chl 20, gluc 147, bun 28, ulysses 7.8. Bld cx x1    * Milestone   Additional Notes   Assessment / Plan:   History of multiple problems with biopsy showed adenoma   Acute anemia   Hemoglobin 4.2 on admission.  Improved with PRBC transfusion   History recent colonoscopy was in December 2021 which showed multiple polyps status post biopsied.  Partial colectomy was recommended however patient's mom declined.     Abd US showed pancreatic lesion.  Normal liver   CT abd/pelvis showed evidence of colitis.  No mass or ductal dilatation at the pancrease   Ferritin 92 after prbc transfusion.  IV Iron x3 doses     Cont' empiric IV levaquin/flagyl for colitis   Hold antihypertensive to avoid hypotension.  PRN IV Hydralazine    NPO.     EGD on 4/5 showed normal upper endoscopy, with no endoscopic evidence of neoplasia or mucosal abnormality. Appreciate GI following       Post op fever   ?  Aspiration vs HCAP   Pt states he has trouble with pills this morning.  ? Aspiration post procedure.  He is coughing   Will add vancomycin to cover HCAP.  Cont' levaquin/flagyl   Stat CXR   SLP to eval post procedure       Thrombocytopenia   Lymphocytosis     Anemia    PLT 53, abd us does not reveal any liver pathology.  ? decreased production from hypocellular bone marrow vs ITP vs CLL   For BM biopsy today, keep NPO   Hematology following, appreciate recs.  Discussed with Dr Jacob Myles   Transfuse prn    I spoke to pt's mother via phone, who will bring in pt's old records/labs  for comparison.         LE edema in the setting of hypoalbuminemia    Echo showed nl EF.  LE duplex neg for DVT   con't PO lasix       Type 2 diabetes   Seizure disorder   Intellectual disability   SSI, hold PTA diabetic meds   Cont' Depakote, Topamax   CM to help with dispo.  Pt's mother who is 81 yo is his primary care giver.  CM to help with dispo              Gastrointestinal Bleeding, Lower - Care Day 2 (4/3/2022) by Ren Kraus RN       Review Entered Review Status   4/4/2022 10:42 Completed      Criteria Review      Care Day: 2 Care Date: 4/3/2022 Level of Care: Telemetry    Guideline Day 2    Level Of Care    (X) Floor    4/4/2022 10:42:48 EDT by Ren Kraus      tele    Clinical Status    (X) * Hypotension absent    4/4/2022 10:42:48 EDT by Ren Kraus      99 78 123/58 18 97%RA    ( ) * Bleeding absent or reduced Activity    (X) Activity as tolerated    4/4/2022 10:42:48 EDT by Celso Johnson      activity as tolerated with assist    Routes    (X) * Oral hydration tolerated    4/4/2022 10:42:48 EDT by Celso Johnson      Clear Liquid; 3 carb choices (45 gm/meal)    (X) * Oral diet tolerated    4/4/2022 10:42:48 EDT by Celso Johnson      Clear Liquid; 3 carb choices (45 gm/meal)    Interventions    (X) Hgb/Hct    4/4/2022 10:42:48 EDT by Celso Johnson      HGB: 4.6, 7.8  HCT: 15.8 (LL)    * Milestone   Additional Notes   4/3 LOC IP Tele      Labs   HGB: 4.6, 7.8   HCT: 15.8 (LL)   Sodium: 140   Potassium: 4.0   Chloride: 112 (H)   CO2: 21   Anion gap: 7   Glucose: 81   BUN: 22 (H)   Creatinine: 0.70   BUN/Creatinine ratio: 31 (H)   Calcium: 8.3 (L)   GFR est non-AA: >60   GFR est AA: >60   Vitamin B12: 426   Folate: 37.1 (H)   Iron: 191 (H)   TIBC: 282   Iron % saturation: 68 (H)   Ferritin: 92   BS 97 134 99 88      CT abd   1. No definite pancreatic mass seen by this technique. 2. Colitis diffuse with fat stranding in the peritoneal cavity. 3. Splenomegaly.       Meds: lipitor 40mg podaily, depakote 1000mg poqbedtime, depakote 500mg podaily, folvite 1mg podaily, robitussin 200mg poq4 prnx2, levaquin 750mg ivq24, flagyl 500mg ivq12, protonix 40mg podaily, topamax 200mg [id number]       GI   RECOMMENDATIONS:     71yo M with innumerable adenomatous colon polyps with newly noted profound anemia concerning for chronic blood loss.  He has received 2 units of PRBC with little improvement, but there is no overt bleeding noted.  There is no evidence of upper GI bleeding.  Will need further evaluation of abnl pancreatic lesion on US with CT   Plan:   1) Continue to transfuse PRBC to get Hgb >7   2) Recheck CBC tomorrow to follow WBC and plt    3) Daily PPI   4) Check pending B12, Fol, Fe panel   5) Allow CLD, but will not make NPO or advance until Hgb improved and discussion with mother can be had to determine if colectomy can be considered   6) The innumerable polyps cannot be cleared by repeated colonoscopy and will recur making this an ongoing problem that will not be solved by colonoscopic harvesting   7) Will need EGD and consider for use of side viewing scope to evaluate the ampulla to assess for SB adenomas   8) Arrange for CT abd/pelvis to eval pancreatic lesion and anemia      Attending   Assessment / Plan:   History of multiple problems with biopsy showed adenoma   Acute anemia likely due to GI bleed   Pancreatic lesion   Hemoglobin 4.2 on admission. Currently receiving blood for hgb 4.6 today   Trend H&H, continue to transfuse for hemoglobin less than 7   History recent colonoscopy was in December 2021 which showed multiple polyps status post biopsied.  Partial colectomy was recommended however patient's mom declined.     Abd US showed pancreatic lesion.  Normal liver   CT abd/pelvis to eval pancreatic lesion and anemia   Cont' IV PPI   iron panel, B12, folate pending but pt already received multiple units of blood.  Order was placed on admission   Hold antihypertensive for now due to borderline low BP   CLD. Possible EGD, timing as per GI   GI is consulted   Thrombocytopenia   No recent labs.  Pt's mother does not recall pt having \"other significant labs\"   CBC in the AM   LE edema   Check echo.  LE duplex neg for DVT   IV Lasix 20g daily   Type 2 diabetes   Seizure disorder   Intellectual disability   SSI, hold PTA diabetic meds   Cont' Depakote, Topamax      PHYSICAL EXAM:   General:          WD, WN. Alert, cooperative, no acute distress     EENT:              EOMI. Anicteric sclerae. MMM   Resp:               CTA bilaterally, no wheezing or rales.  No accessory muscle use   CV:                  Regular  rhythm,  ++ b/l LE edema   GI:                   Soft, Non distended, Non tender.  +Bowel sounds   Neurologic:       Awake, following commands.    Psych:   . Not anxious nor agitated   Skin:                No rashes.  No jaundice        Gastrointestinal Bleeding, Lower - Care Day 1 (4/2/2022) by Beny Pond RN       Review Entered Review Status   4/3/2022 16:30 Completed      Criteria Review      Care Day: 1 Care Date: 4/2/2022 Level of Care: Telemetry    Guideline Day 1    Level Of Care    (X) ICU or floor    4/3/2022 16:30:37 EDT by Beny way    Clinical Status    (X) * Clinical Indications met    4/3/2022 16:30:37 EDT by Beny Pond      VS: 97.9, 93, 122/55, 18, 98% Ra, 88.5 kg    Activity    (X) Activity as tolerated    Routes    (X) IV medications    4/3/2022 16:30:37 EDT by Beny Pond      lasix 20mg IV qd, protonix 40mg iV bid    (X) Possible liquid diet in evening    Interventions    (X) Hgb/Hct    4/3/2022 16:30:37 EDT by Ladena Babinski h/h 4.2/14.7    (X) Monitor vital signs and blood counts closely    (X) Transfusion as necessary    4/3/2022 16:30:37 EDT by Beny Pond      1 unit PRBC    * Milestone   Additional Notes   4/2      Results: wbc 15, platelets 64,       Abd US: 1. Nonspecific hypoechoic lesion in the head of the pancreas measuring 1.4 cm in   size. 2. Splenomegaly with length of 18.5 cm an estimated volume of 11 22 mL. 3. Contracted gallbladder. 4. Normal appearance of liver.       No ED admin meds      Tx: Full code, I&O, scd's         HPI:   73 y.o. male  presents to the ED with cc of low hemoglobin levels.  Patient has a history of intellectual disability and history was obtained from his mother. Vaibhav Lucero saw his PCP yesterday and had lab work performed. Vivienne Salas called today stating that he is anemic with a hemoglobin of 4.4.  No history of anemia. Vaibhav Lucero has not received any blood transfusions in the past.  Mom has not noted any bright red blood per rectum or melena.  No abnormal bleeding that she is aware of. Vaibhav Lucero is not on any antiplatelets or anticoagulants.  She has noted that for the past 6 months he has had swelling in his legs.  She denies any signs of shortness of breath.  She states his color does not look well and he has been pale.  Medical records note that he had a colonoscopy in December 2021 due to occult positive blood.        Plan:   History of multiple problems with biopsy showed adenoma   Acute anemia likely due to GI bleed   Hemoglobin 4.2.  Receiving 1 unit of PRBC in the ER. Trend H&H, transfuse for hemoglobin less than 7   History recent colonoscopy was in December 2021 which showed multiple polyps status post biopsied.  Partial colectomy was recommended however patient's mom declined. Start IV PPI   Check iron panel, B12, folate   Hold antihypertensive for now due to borderline low BP   Allow CLD.  Not anticipating any procedure done this weekend. GI is consulted       Thrombocytopenia   No recent labs.  Pt's mother does not recall pt having \"other significant labs\"   Monitor plt daily       LE edema   Check echo and LE duplex   IV Lasix 20g daily       Type 2 diabetes   Seizure disorder   Intellectual disability   SSI, hold PTA mes   Resume Depakote, Topamax   CM to help with dispo          Exam:   General:          Now in bed, NAD.      HEENT:           Atraumatic, anicteric sclerae                           No oral ulcers, mucosa moist, throat clear   Neck:               Supple, symmetrical,  thyroid: non tender   Lungs:             CTA b/l.  No wheezing or Rhonchi. No rales. Chest wall:      No tenderness.  No accessory muscle use. Heart:              Regular  rhythm,  No  Murmur.   ++ b/l LE edema   Abdomen:        Soft, NT. ND  BS+   Extremities:     No cyanosis.  No clubbing,                             Skin turgor normal, Radial dial pulse 2+. Capillary refill normal   Skin:                Not pale.  Not Jaundiced  No rashes    Psych:             Not depressed.  Not anxious or agitated.    Neurologic:      Awake, moving all extremities.         Gastrointestinal Bleeding, Lower - Clinical Indications for Admission to Inpatient Care by Jeannie Pineda Debbie Gandara RN       Review Entered Review Status   4/3/2022 16:28 Completed      Criteria Review      Clinical Indications for Admission to Inpatient Care    Most Recent : Theodora Liu Most Recent Date: 4/3/2022 16:28:53 EDT    (X) Admission is indicated for  1 or more  of the following  (1) (2) (3) (4) (5) (6):       (X) Ongoing active bleeding (eg, decreasing hematocrit)       4/3/2022 16:28:53 EDT by Theodora Liu         h/h 4.2/14.7       (X) Coagulopathy that is not quickly reversible (eg, advanced liver disease, irreversible anticoagulation       therapy, thrombocytopenia)       4/3/2022 16:28:53 EDT by Theodora Liu         thrombocytopenia, platelets 64       H&P Notes       H&P by FLO Avendano at 22 1311 documented on ED to Hosp-Admission (Current) from 2022 in [facility] 1 MEDICAL ONCOLOGY    Author: FLO Avendano Author Type: Physician Assistant Filed: 22 1313   Note Status: Cosign Needed Cosign: Cosign Required Date of Service: 22 1311   : FLO Avendano (Physician Assistant)                  INTERVENTIONAL RADIOLOGY  Preoperative History and Physical        Patient:  Carissa Gleason  :  1955  Age:  73 y.o. MRN:  [id number]  Today's Date:  2022        CC / HPI   Carissa Gleason is a 73 y.o. male with a history of severe macrocytic anemia, thrombocytopenia, lymphocytosis who presents for CT guided bone marrow biopsy.     PAST MEDICAL HISTORY       Past Medical History:   Diagnosis Date    Diabetes ([address]      Epilepsy [address]      Seizures ([facility]           PAST SURGICAL HISTORY        Past Surgical History:   Procedure Laterality Date    COLONOSCOPY N/A 2021     COLONOSCOPY performed by Ginger Hanson MD at [facility] ENDOSCOPY. Multiple sessile polyps (>30). Medium-sized int hemorrhoids.     UPPER GI ENDOSCOPY,DIAGNOSIS   2022               SOCIAL HISTORY  Social History            Socioeconomic History    Marital status: SINGLE       Spouse name: Not on file    Number of children: Not on file    Years of education: Not on file    Highest education level: Not on file   Occupational History    Not on file   Tobacco Use    Smoking status: Never Smoker    Smokeless tobacco: Never Used   Vaping Use    Vaping Use: Never used   Substance and Sexual Activity    Alcohol use: Never    Drug use: Never    Sexual activity: Not on file   Other Topics Concern    Not on file   Social History Narrative    Not on file      Social Determinants of Health          Financial Resource Strain:     Difficulty of Paying Living Expenses: Not on file   Food Insecurity:     Worried About [employer] in the Last Year: Not on file    Katherine of Food in the Last Year: Not on file   Transportation Needs:     Lack of Transportation (Medical): Not on file    Lack of Transportation (Non-Medical):  Not on file   Physical Activity:     Days of Exercise per Week: Not on file    Minutes of Exercise per Session: Not on file   Stress:     Feeling of Stress : Not on file   Social Connections:     Frequency of Communication with Friends and Family: Not on file    Frequency of Social Gatherings with Friends and Family: Not on file    Attends Latter-day Services: Not on file    Active Member of [address] [place] [address] [employer] or Organizations: Not on file    Attends Club or Organization Meetings: Not on file    Marital Status: Not on file   Intimate Partner Violence:     Fear of Current or Ex-Partner: Not on file    Emotionally Abused: Not on file    Physically Abused: Not on file    Sexually Abused: Not on file   Housing Stability:     Unable to Pay for Housing in the Last Year: Not on file    Number of [place] in the Last Year: Not on file    Unstable Housing in the Last Year: Not on file         FAMILY HISTORY        Family History   Problem Relation Age of Onset    Hypertension Mother      Cancer Father           unknown         CURRENT MEDICATIONS            Current Facility-Administered Medications   Medication Dose Route Frequency Provider Last Rate Last Admin    acetaminophen (TYLENOL) tablet 650 mg  650 mg Oral Q4H PRN Jada DIALLO NP   650 mg at 04/06/22 2472     Or    acetaminophen (TYLENOL) suppository 650 mg  650 mg Rectal Q4H PRN Jaelyn Patterson NP        vancomycin (VANCOCIN) 2000 mg in  ml infusion  2,000 mg IntraVENous ONCE Sylvain Weinstein  mL/hr at 04/06/22 1130 2,000 mg at 04/06/22 1130    vancomycin (VANCOCIN) 750 mg in 0.9% sodium chloride 250 mL (VIAL-MATE)  750 mg IntraVENous Q12H Sylvain Weinstein MD        [START ON 4/7/2022] Vancomycin trough 4/7 @ 1000   Other ONCE Sylvain Weinstein MD        midazolam (VERSED) injection 5 mg  5 mg IntraVENous Multiple Katie Medeiros MD        fentaNYL citrate (PF) injection 100 mcg  100 mcg IntraVENous Multiple Katie Medeiros MD        0.9% sodium chloride infusion  25 mL/hr IntraVENous CONTINUOUS Katie Medeiros MD        sodium chloride (NS) flush 5-40 mL  5-40 mL IntraVENous Q8H Mick Bell MD   10 mL at 04/05/22 2158    sodium chloride (NS) flush 5-40 mL  5-40 mL IntraVENous PRN Mick Bell MD        furosemide (LASIX) tablet 20 mg  20 mg Oral ACB&D Sylvain Weinstein MD   20 mg at 04/06/22 0730    traMADoL (ULTRAM) tablet 50 mg  50 mg Oral Q6H PRN Sylvain Weinstein MD        hydrALAZINE (APRESOLINE) 20 mg/mL injection 10 mg  10 mg IntraVENous Q6H PRN Sylvain Weinstein MD        0.9% sodium chloride infusion 250 mL  250 mL IntraVENous PRN Sylvain Weinstein MD        cyanocobalamin (VITAMIN B12) tablet 1,000 mcg  1,000 mcg Oral DAILY Karla Cordova MD   1,000 mcg at 04/06/22 1121    benzocaine-menthoL (CHLORASEPTIC MAX) lozenge 1 Lozenge  1 Lozenge Oral Q2H PRN Clyda Kocher, NP   1 Lozenge at 04/03/22 2214    guaiFENesin (ROBITUSSIN) 100 mg/5 mL oral liquid 200 mg  200 mg Oral Q4H PRN Clyda Kocher, NP   200 mg at 04/05/22 0018    pantoprazole (PROTONIX) tablet 40 mg  40 mg Oral ACB Adriana Oh MD   40 mg at 04/06/22 1124    metroNIDAZOLE (FLAGYL) IVPB premix 500 mg  500 mg IntraVENous Q12H Zandra BERRIOS  mL/hr at 04/06/22 1130 500 mg at 04/06/22 1130    levoFLOXacin (LEVAQUIN) 750 mg in D5W IVPB  750 mg IntraVENous Q24H Zandra BERRIOS  mL/hr at 04/05/22 2008 750 mg at 04/05/22 2008    polyethylene glycol (MIRALAX) packet 17 g  17 g Oral DAILY PRN Annmarie Oden MD        ondansetron (ZOFRAN ODT) tablet 4 mg  4 mg Oral Q8H PRN Annmarie Oden MD         Or    ondansetron (ZOFRAN) injection 4 mg  4 mg IntraVENous Q6H PRN MD Geovanni Landaalproex  (DEPAKOTE) tablet 500 mg  500 mg Oral DAILY Annmarie Oden MD   500 mg at 68/37/69 8691    folic acid (FOLVITE) tablet 1 mg  1 mg Oral DAILY Annmarie Oden MD   1 mg at 04/06/22 1121    topiramate (TOPAMAX) tablet 200 mg  200 mg Oral BID Annmarie Oden MD   200 mg at 04/06/22 1121    atorvastatin (LIPITOR) tablet 40 mg  40 mg Oral DAILY Annmarie Oden MD   40 mg at 04/06/22 1122    insulin lispro (HUMALOG) injection   SubCUTAneous AC&HS Annmarie Oden MD   2 Units at 04/04/22 1154    glucose chewable tablet 16 g  4 Tablet Oral PRN Annmarie Oden MD        glucagon (GLUCAGEN) injection 1 mg  1 mg IntraMUSCular PRN Annmarie Oden MD        divalproex  (DEPAKOTE) tablet 1,000 mg  1,000 mg Oral QHS Zandra BERRIOS MD   1,000 mg at 04/05/22 2152         ALLERGIES  No Known Allergies     DIAGNOSTIC STUDIES   IMAGING STUDIES  N/A     LABS        Lab Results   Component Value Date/Time     WBC 11.0 04/06/2022 04:51 AM     HGB 8.6 (L) 04/06/2022 04:51 AM     HCT 26.7 (L) 04/06/2022 04:51 AM     PLATELET 59 (L) 29/67/5413 04:51 AM     .3 (H) 04/06/2022 04:51 AM            Lab Results   Component Value Date/Time     Sodium 140 04/06/2022 04:51 AM     Potassium 3.9 04/06/2022 04:51 AM     Chloride 112 (H) 04/06/2022 04:51 AM     CO2 20 (L) 04/06/2022 04:51 AM     Anion gap 8 04/06/2022 04:51 AM     Glucose 147 (H) 04/06/2022 04:51 AM     BUN 28 (H) 2022 04:51 AM     Creatinine 1.15 2022 04:51 AM     BUN/Creatinine ratio 24 (H) 2022 04:51 AM     GFR est AA >60 2022 04:51 AM     GFR est non-AA >60 2022 04:51 AM     Calcium 7.8 (L) 2022 04:51 AM      No results found for: INR, PTMR, PTP, PT1, PT2, INREXT     PHYSICAL EXAM   /74 (BP 1 Location: Left upper arm, BP Patient Position: At rest;Sitting)   Pulse 66   Temp 97.8 °F (36.6 °C)   Resp 20   Ht 6' (1.829 m)   Wt 88.5 kg (195 lb)   SpO2 100%   BMI 26.45 kg/m²   General:  NAD  Heart:  RRR  Lungs:  NWOB  Neurological:  Alert with intellectual delay     PLAN   Procedure to be performed:  CT guided bone marrow biopsy  Plan for sedation:  moderate  Post procedure plan:  observation per protocol  Informed consent:  risks, benefits, and alternatives reviewed with the patient / family who agree to proceed  Code status:  Full Code        Alondra Odom, [address] [place] [address] [facility]                H&P by Rickey Packer MD at 22 1433 documented on ED to Hosp-Admission (Current) from 2022 in [facility] 1 MEDICAL ONCOLOGY    Author: Rickey Packer MD Author Type: Physician Filed: 22 3567   Note Status: Signed Cosign: Cosign Not Required Date of Service: 22 1433   : Rickey Packer MD (Physician)               [facility]                    Hospitalist Admission Note     NAME:            Pola Talbot   :               1955   MRN:               [id number]      Date/Time:      2022 2:33 PM     Patient PCP: Ingris Islas MD  ______________________________________________________________________  Given the patient's current clinical presentation, I have a high level of concern for decompensation if discharged from the emergency department.  Complex decision making was performed, which includes reviewing the patient's available past medical records, laboratory results, and x-ray films.        My assessment of this patient's clinical condition and my plan of care is as follows.     Assessment / Plan:  History of multiple problems with biopsy showed adenoma  Acute anemia likely due to GI bleed  Hemoglobin 4.2. Receiving 1 unit of PRBC in the ER. Trend H&H, transfuse for hemoglobin less than 7  History recent colonoscopy was in December 2021 which showed multiple polyps status post biopsied. Partial colectomy was recommended however patient's mom declined. Start IV PPI  Check iron panel, B12, folate  Hold antihypertensive for now due to borderline low BP  Allow CLD. Not anticipating any procedure done this weekend. GI is consulted     Thrombocytopenia  No recent labs. Pt's mother does not recall pt having \"other significant labs\"  Monitor plt daily     LE edema  Check echo and LE duplex  IV Lasix 20g daily     Type 2 diabetes  Seizure disorder  Intellectual disability  SSI, hold PTA mes  Resume Depakote, Topamax  CM to help with dispo        Code Status: Full  Surrogate Decision Maker: Patient's mother  DVT Prophylaxis: SCD  GI Prophylaxis: not indicated  Baseline: From home                 Subjective:   CHIEF COMPLAINT: Anemia, abnormal labs     HISTORY OF PRESENT ILLNESS:     Simone Madrid is a 73 y.o.   male with PMHx significant for epilepsy, intellectual disability, type 2 diabetes, seizure, presents to the ER for evaluation of abnormal labs. Patient was seen by his PCP yesterday with routine lab work performed then. He was notified today to come to the ER due to hemoglobin of 4.4. History is obtained by patient's mom at bedside. Patient had a colonoscopy in December 2021 due to positive FOBT. He had multiple polyps removed. Biopsy showed adenoma. Partial colectomy has been recommended by his mom did not want for him to this. On arrival vitals are stable. Repeat hemoglobin was 4.2. He also has a leukocytosis wbc 15. Per patient's mom no obvious bright red blood per rectum or melena.   Vitals/labs/imaging reviewed. We were asked to admit for work up and evaluation of the above problems.           Past Medical History:   Diagnosis Date    Diabetes ([facility]      Epilepsy ([facility]      Seizures ([facility] [address] [facility]                 Past Surgical History:   Procedure Laterality Date    COLONOSCOPY N/A 12/1/2021     COLONOSCOPY performed by Kimberly Ferreira MD at [facility]. Multiple sessile polyps (>30). Medium-sized int hemorrhoids.         Social History           Tobacco Use    Smoking status: Never Smoker    Smokeless tobacco: Never Used   Substance Use Topics    Alcohol use: Never               Family History   Problem Relation Age of Onset    Hypertension Mother      Cancer Father           unknown      No Known Allergies              Prior to Admission medications    Medication Sig Start Date End Date Taking? Authorizing Provider   topiramate (TOPAMAX) 200 mg tablet Take  by mouth two (2) times a day.       Provider, Historical   divalproex sodium (DEPAKOTE PO) Take 500 mg by mouth.       Provider, Historical   metFORMIN (GLUCOPHAGE) 500 mg tablet Take  by mouth two (2) times daily (with meals).        Provider, Historical   glimepiride (AMARYL) 4 mg tablet Take  by mouth every morning.       Provider, Historical   linaGLIPtin (Tradjenta) 5 mg tablet Take 5 mg by mouth daily.       Provider, Historical   pioglitazone (ACTOS) 15 mg tablet Take 45 mg by mouth.       Provider, Historical   folic acid (FOLVITE) 1 mg tablet Take  by mouth daily.       Provider, Historical   Calcium-Cholecalciferol, D3, 600 mg(1,500mg) -400 unit chew Take  by mouth.       Provider, Historical   furosemide (LASIX) 40 mg tablet Take  by mouth daily.       Provider, Historical   atorvastatin (Lipitor) 40 mg tablet Take 40 mg by mouth daily.       Provider, Historical   chlorhexidine (PERIDEX) 0.12 % solution 15 mL by Swish and Spit route every twelve (12) hours.       Provider, Historical   triamcinolone acetonide 0.025 % lotion Apply  to affected area two (2) times a day.       Provider, Historical         REVIEW OF SYSTEMS:     I am not able to complete the review of systems because:    The patient is intubated and sedated     The patient has altered mental status due to his acute medical problems     The patient has baseline aphasia from prior stroke(s)     The patient has baseline dementia and is not reliable historian     The patient is in acute medical distress and unable to provide information   xx The patient has intellectual disability able to provide information         Total of 12 systems reviewed as follows:                                        POSITIVE= BOLD text  Negative = text not BOLD  General:                     fever, chills, sweats, generalized weakness, weight loss/gain,                                       loss of appetite   Eyes:                           blurred vision, eye pain, loss of vision, double vision  ENT:                            rhinorrhea, pharyngitis   Respiratory:               cough, sputum production, SOB, GRULLON, wheezing, pleuritic pain   Cardiology:                chest pain, palpitations, orthopnea, PND, edema, syncope   Gastrointestinal:       abdominal pain , N/V, diarrhea, dysphagia, constipation, bleeding   Genitourinary:           frequency, urgency, dysuria, hematuria, incontinence   Muskuloskeletal :      arthralgia, myalgia, back pain  Hematology:              easy bruising, nose or gum bleeding, lymphadenopathy   Dermatological:         rash, ulceration, pruritis, color change / jaundice  Endocrine:                 hot flashes or polydipsia   Neurological:             headache, dizziness, confusion, focal weakness, paresthesia,                                      Speech difficulties, memory loss, gait difficulty  Psychological:          Feelings of anxiety, depression, agitation     Objective:   VITALS:    Visit Vitals  BP (!) 108/58   Pulse 84   Temp 98.6 °F (37 °C)   Resp 16   Ht 6' (1.829 m) Wt 88.5 kg (195 lb)   SpO2 97%   BMI 26.45 kg/m²         PHYSICAL EXAM:     General:          Now in bed, NAD. HEENT:           Atraumatic, anicteric sclerae                          No oral ulcers, mucosa moist, throat clear  Neck:               Supple, symmetrical,  thyroid: non tender  Lungs:             CTA b/l. No wheezing or Rhonchi. No rales. Chest wall:      No tenderness. No accessory muscle use. Heart:              Regular  rhythm,  No  Murmur. ++ b/l LE edema  Abdomen:        Soft, NT. ND  BS+  Extremities:     No cyanosis. No clubbing,                            Skin turgor normal, Radial dial pulse 2+. Capillary refill normal  Skin:                Not pale. Not Jaundiced  No rashes   Psych:             Not depressed. Not anxious or agitated.   Neurologic:      Awake, moving all extremities.      _______________________________________________________________________  Care Plan discussed with:      Comments   Patient x     Family  x  patient's mom   RN x     Care Manager                      Consultant:  naveed ED physician   _______________________________________________________________________  Expected  Disposition:   Home with Family     HH/PT/OT/RN x   SNF/LTC     DANI     ________________________________________________________________________  TOTAL TIME:  72 Minutes     Critical Care Provided     Minutes non procedure based         Comments     x Reviewed previous records   >50% of visit spent in counseling and coordination of care x Discussion with patient and/or family and questions answered         ________________________________________________________________________  Signed: Maki Paulson MD     Procedures: see electronic medical records for all procedures/Xrays and details which were not copied into this note but were reviewed prior to creation of Plan.     LAB DATA REVIEWED:    Recent Results         Recent Results (from the past 24 hour(s))   CBC WITH AUTOMATED DIFF     Collection Time: 04/02/22  1:54 PM   Result Value Ref Range     WBC 15.0 (H) 4.1 - 11.1 K/uL     RBC 1.03 (L) 4.10 - 5.70 M/uL     HGB 4.2 (LL) 12.1 - 17.0 g/dL     HCT 14.7 (LL) 36.6 - 50.3 %     .7 (H) 80.0 - 99.0 FL     MCH 40.8 (H) 26.0 - 34.0 PG     MCHC 28.6 (L) 30.0 - 36.5 g/dL     RDW 15.7 (H) 11.5 - 14.5 %     PLATELET 64 (L) 720 - 400 K/uL     MPV 10.1 8.9 - 12.9 FL     NRBC 0.0 0  WBC     ABSOLUTE NRBC 0.00 0.00 - 0.01 K/uL     NEUTROPHILS PENDING %     LYMPHOCYTES PENDING %     MONOCYTES PENDING %     EOSINOPHILS PENDING %     BASOPHILS PENDING %     IMMATURE GRANULOCYTES PENDING %     ABS. NEUTROPHILS PENDING K/UL     ABS. LYMPHOCYTES PENDING K/UL     ABS. MONOCYTES PENDING K/UL     ABS. EOSINOPHILS PENDING K/UL     ABS. BASOPHILS PENDING K/UL     ABS. IMM. GRANS.  PENDING K/UL     DF PENDING

## 2022-04-09 LAB
ANION GAP SERPL CALC-SCNC: 5 MMOL/L (ref 5–15)
BASOPHILS # BLD: 0.1 K/UL (ref 0–0.1)
BASOPHILS NFR BLD: 1 % (ref 0–1)
BUN SERPL-MCNC: 30 MG/DL (ref 6–20)
BUN/CREAT SERPL: 32 (ref 12–20)
CALCIUM SERPL-MCNC: 7.5 MG/DL (ref 8.5–10.1)
CHLORIDE SERPL-SCNC: 110 MMOL/L (ref 97–108)
CO2 SERPL-SCNC: 24 MMOL/L (ref 21–32)
CREAT SERPL-MCNC: 0.95 MG/DL (ref 0.7–1.3)
DIFFERENTIAL METHOD BLD: ABNORMAL
EOSINOPHIL # BLD: 0.4 K/UL (ref 0–0.4)
EOSINOPHIL NFR BLD: 3 % (ref 0–7)
ERYTHROCYTE [DISTWIDTH] IN BLOOD BY AUTOMATED COUNT: 22.9 % (ref 11.5–14.5)
GLUCOSE BLD STRIP.AUTO-MCNC: 151 MG/DL (ref 65–117)
GLUCOSE BLD STRIP.AUTO-MCNC: 172 MG/DL (ref 65–117)
GLUCOSE BLD STRIP.AUTO-MCNC: 200 MG/DL (ref 65–117)
GLUCOSE BLD STRIP.AUTO-MCNC: 207 MG/DL (ref 65–117)
GLUCOSE SERPL-MCNC: 137 MG/DL (ref 65–100)
HCT VFR BLD AUTO: 25.6 % (ref 36.6–50.3)
HGB BLD-MCNC: 8 G/DL (ref 12.1–17)
IMM GRANULOCYTES # BLD AUTO: 0 K/UL (ref 0–0.04)
IMM GRANULOCYTES NFR BLD AUTO: 0 % (ref 0–0.5)
LYMPHOCYTES # BLD: 7.4 K/UL (ref 0.8–3.5)
LYMPHOCYTES NFR BLD: 62 % (ref 12–49)
MCH RBC QN AUTO: 35.4 PG (ref 26–34)
MCHC RBC AUTO-ENTMCNC: 31.3 G/DL (ref 30–36.5)
MCV RBC AUTO: 113.3 FL (ref 80–99)
METAMYELOCYTES NFR BLD MANUAL: 1 %
MONOCYTES # BLD: 0.5 K/UL (ref 0–1)
MONOCYTES NFR BLD: 4 % (ref 5–13)
NEUTS BAND NFR BLD MANUAL: 2 %
NEUTS SEG # BLD: 3.5 K/UL (ref 1.8–8)
NEUTS SEG NFR BLD: 27 % (ref 32–75)
NRBC # BLD: 0 K/UL (ref 0–0.01)
NRBC BLD-RTO: 0 PER 100 WBC
PLATELET # BLD AUTO: 53 K/UL (ref 150–400)
PLATELET COMMENTS,PCOM: ABNORMAL
PMV BLD AUTO: 10.1 FL (ref 8.9–12.9)
POTASSIUM SERPL-SCNC: 4.2 MMOL/L (ref 3.5–5.1)
RBC # BLD AUTO: 2.26 M/UL (ref 4.1–5.7)
RBC MORPH BLD: ABNORMAL
RBC MORPH BLD: ABNORMAL
SERVICE CMNT-IMP: ABNORMAL
SODIUM SERPL-SCNC: 139 MMOL/L (ref 136–145)
WBC # BLD AUTO: 11.9 K/UL (ref 4.1–11.1)
WBC MORPH BLD: ABNORMAL

## 2022-04-09 PROCEDURE — 65660000000 HC RM CCU STEPDOWN

## 2022-04-09 PROCEDURE — 80048 BASIC METABOLIC PNL TOTAL CA: CPT

## 2022-04-09 PROCEDURE — 74011250637 HC RX REV CODE- 250/637

## 2022-04-09 PROCEDURE — 74011250637 HC RX REV CODE- 250/637: Performed by: NURSE PRACTITIONER

## 2022-04-09 PROCEDURE — 74011250637 HC RX REV CODE- 250/637: Performed by: INTERNAL MEDICINE

## 2022-04-09 PROCEDURE — 74011636637 HC RX REV CODE- 636/637: Performed by: INTERNAL MEDICINE

## 2022-04-09 PROCEDURE — 85025 COMPLETE CBC W/AUTO DIFF WBC: CPT

## 2022-04-09 PROCEDURE — 82962 GLUCOSE BLOOD TEST: CPT

## 2022-04-09 PROCEDURE — 74011250636 HC RX REV CODE- 250/636: Performed by: INTERNAL MEDICINE

## 2022-04-09 PROCEDURE — 74011000250 HC RX REV CODE- 250: Performed by: INTERNAL MEDICINE

## 2022-04-09 PROCEDURE — 74011250636 HC RX REV CODE- 250/636: Performed by: NURSE PRACTITIONER

## 2022-04-09 PROCEDURE — 36415 COLL VENOUS BLD VENIPUNCTURE: CPT

## 2022-04-09 RX ORDER — PEPPERMINT OIL
SPIRIT ORAL ONCE
Status: COMPLETED | OUTPATIENT
Start: 2022-04-10 | End: 2022-04-09

## 2022-04-09 RX ORDER — AMMONIUM LACTATE 12 G/100G
LOTION TOPICAL 2 TIMES DAILY
Status: DISCONTINUED | OUTPATIENT
Start: 2022-04-09 | End: 2022-04-25 | Stop reason: HOSPADM

## 2022-04-09 RX ADMIN — Medication 2 UNITS: at 08:03

## 2022-04-09 RX ADMIN — CYANOCOBALAMIN TAB 500 MCG 1000 MCG: 500 TAB at 08:03

## 2022-04-09 RX ADMIN — DIVALPROEX SODIUM 500 MG: 500 TABLET, DELAYED RELEASE ORAL at 08:03

## 2022-04-09 RX ADMIN — SODIUM CHLORIDE, PRESERVATIVE FREE 10 ML: 5 INJECTION INTRAVENOUS at 22:15

## 2022-04-09 RX ADMIN — Medication 2 UNITS: at 22:14

## 2022-04-09 RX ADMIN — Medication: at 18:01

## 2022-04-09 RX ADMIN — PANTOPRAZOLE SODIUM 40 MG: 40 TABLET, DELAYED RELEASE ORAL at 08:03

## 2022-04-09 RX ADMIN — FUROSEMIDE 20 MG: 20 TABLET ORAL at 16:03

## 2022-04-09 RX ADMIN — FOLIC ACID 1 MG: 1 TABLET ORAL at 08:03

## 2022-04-09 RX ADMIN — TOPIRAMATE 200 MG: 100 TABLET, FILM COATED ORAL at 18:00

## 2022-04-09 RX ADMIN — EPOETIN ALFA-EPBX 10000 UNITS: 10000 INJECTION, SOLUTION INTRAVENOUS; SUBCUTANEOUS at 22:15

## 2022-04-09 RX ADMIN — LEVOFLOXACIN 750 MG: 5 INJECTION, SOLUTION INTRAVENOUS at 18:00

## 2022-04-09 RX ADMIN — ATORVASTATIN CALCIUM 40 MG: 40 TABLET, FILM COATED ORAL at 08:03

## 2022-04-09 RX ADMIN — Medication 3 UNITS: at 16:17

## 2022-04-09 RX ADMIN — METRONIDAZOLE 500 MG: 500 INJECTION, SOLUTION INTRAVENOUS at 20:52

## 2022-04-09 RX ADMIN — Medication 2 UNITS: at 11:50

## 2022-04-09 RX ADMIN — TRAMADOL HYDROCHLORIDE 50 MG: 50 TABLET, COATED ORAL at 20:50

## 2022-04-09 RX ADMIN — FUROSEMIDE 20 MG: 20 TABLET ORAL at 08:03

## 2022-04-09 RX ADMIN — DIVALPROEX SODIUM 1000 MG: 500 TABLET, DELAYED RELEASE ORAL at 20:49

## 2022-04-09 RX ADMIN — Medication: at 11:51

## 2022-04-09 RX ADMIN — SODIUM CHLORIDE, PRESERVATIVE FREE 10 ML: 5 INJECTION INTRAVENOUS at 16:18

## 2022-04-09 RX ADMIN — Medication: at 23:44

## 2022-04-09 RX ADMIN — GUAIFENESIN 200 MG: 200 SOLUTION ORAL at 16:03

## 2022-04-09 RX ADMIN — METRONIDAZOLE 500 MG: 500 INJECTION, SOLUTION INTRAVENOUS at 08:03

## 2022-04-09 RX ADMIN — GUAIFENESIN 200 MG: 200 SOLUTION ORAL at 20:49

## 2022-04-09 RX ADMIN — TOPIRAMATE 200 MG: 100 TABLET, FILM COATED ORAL at 08:03

## 2022-04-09 RX ADMIN — TRAMADOL HYDROCHLORIDE 50 MG: 50 TABLET, COATED ORAL at 01:17

## 2022-04-09 NOTE — PROGRESS NOTES
Problem: Falls - Risk of  Goal: *Absence of Falls  Description: Document Kemal Lo Fall Risk and appropriate interventions in the flowsheet.   Outcome: Progressing Towards Goal  Note: Fall Risk Interventions:  Mobility Interventions: Bed/chair exit alarm,Communicate number of staff needed for ambulation/transfer,PT Consult for mobility concerns    Mentation Interventions: Bed/chair exit alarm,Increase mobility,More frequent rounding,Reorient patient    Medication Interventions: Bed/chair exit alarm    Elimination Interventions: Call light in reach,Patient to call for help with toileting needs    History of Falls Interventions: Bed/chair exit alarm

## 2022-04-09 NOTE — PROGRESS NOTES
End of Shift Note    Bedside shift change report given to Pawan Coleman RN (oncoming nurse) by Todd Murry RN (offgoing nurse). Report included the following information SBAR, Kardex, ED Summary, Procedure Summary, Intake/Output and MAR    Shift worked:  0114-2921     Shift summary and any significant changes:     Pt took all medication per MAR. Pt had some complaints of leg and back pain, PRN medication was given. Safety rounding and toileting needs were met.       Concerns for physician to address:       Zone phone for oncoming shift:            Todd Murry RN

## 2022-04-09 NOTE — PROGRESS NOTES
Hospitalist Progress Note    NAME: Carissa Gleason   :  1955   MRN:  [id number]     I reviewed pertinent labs and imaging, and discussed /agreed on the plan of care with Dr. Alyce Garcia. Assessment / Plan:  History of multiple problems with biopsy showed adenoma  Acute anemia  -Hemoglobin 4.2 on admission. Improved with PRBC transfusion  -History recent colonoscopy was in 2021 which showed multiple polyps status post biopsied.  Partial colectomy was recommended however patient's mom declined.    -Abd US showed pancreatic lesion. Normal liver  -CT abd/pelvis showed evidence of colitis. No mass or ductal dilatation at the pancrease  -Ferritin 92 after prbc transfusion. s/p IV Iron x3 doses    -Continue empiric IV levaquin/flagyl for colitis  -EGD on  showed normal upper endoscopy, with no endoscopic evidence of neoplasia or mucosal abnormality. -Appreciate GI - now signed off     Post op fever  Aspiration PNA vs HCAP  -Pt states he has trouble with pills this morning. ? Aspiration post procedure. He is coughing   -CXR showed small left pleural effusion. Hazy left basilar atelectasis/airspace disease.   -Completed 7 days of levaquin and metronidazole since admission  -Stop abx and check procalcitonin in AM  -Monitor for fever    -Speech consulted, pending evaluation      Severe macrocytic anemia, thrombocytopenia, lymphocytosis   Concern for B cell lymphoma vs CLL   -Appreciate Hematology input - Has scheduled f/u with Dr. Luiza Tripathi on 2022, peripheral smear concerning for B cell lymphoma vs CLL     -S/p BM biopsy on  pending result. Peripheral smear showed monoclonal B-cells, consistent with a B-cell lymphoproliferative disorder.  -Started on retacrit with plans to continue OP      LE edema in the setting of hypoalbuminemia   Venous stasis   -ECHOshowed nl EF.   LE duplex neg for DVT  -Transitioned to PO lasix  -Dry skin noted to BLE - added lac hydrin lotion      Type 2 diabetes  Seizure disorder  Intellectual disability  SSI, hold PTA diabetic meds  Cont' Depakote, Topamax      25.0 - 29.9 Overweight / Body mass index is 26.45 kg/m². Estimated discharge date: April 9  Barriers: placement and will need level 2 screening     Code status: Full  Prophylaxis: SCD's  Recommended Disposition: SNF/LTC     Subjective:     Chief Complaint / Reason for Physician Visit  Patient seen at bedside, reports he is \"OK\" this morning. Discussed plan of care, patient verbalized understanding and agreement. Discussed with RN events overnight. Review of Systems:  Symptom Y/N Comments  Symptom Y/N Comments   Fever/Chills n   Chest Pain n    Poor Appetite n   Edema n    Cough n   Abdominal Pain n    Sputum n   Joint Pain n    SOB/GRULLON n   Pruritis/Rash n    Nausea/vomit n   Tolerating PT/OT y    Diarrhea n   Tolerating Diet y    Constipation n   Other       Could NOT obtain due to:      Objective:     VITALS:   Last 24hrs VS reviewed since prior progress note. Most recent are:  Patient Vitals for the past 24 hrs:   Temp Pulse Resp BP SpO2   04/09/22 0746 98.2 °F (36.8 °C) 75 18 (!) 110/49 94 %   04/08/22 2317 97.7 °F (36.5 °C) 90 18 (!) 120/46 99 %   04/08/22 1923 98.5 °F (36.9 °C) 78 18 (!) 120/52 99 %   04/08/22 1529 97.5 °F (36.4 °C) 72 18 (!) 117/54 96 %       Intake/Output Summary (Last 24 hours) at 4/9/2022 1233  Last data filed at 4/8/2022 4279  Gross per 24 hour   Intake --   Output 550 ml   Net -550 ml        I had a face to face encounter and independently examined this patient on 4/9/2022, as outlined below:  PHYSICAL EXAM:  General: WD, WN. Alert, cooperative, no acute distress    EENT:  EOMI. Anicteric sclerae. MMM  Resp:  LS diminished at bases. No accessory muscle use  CV:  Regular  rhythm,  No edema  GI:  Soft, obese, Non tender. +Bowel sounds  Neurologic:  Alert and oriented X 3, normal speech,   Psych:   Good insight. Not anxious nor agitated  Skin:  No rashes.   No jaundice    Reviewed most current lab test results and cultures  YES  Reviewed most current radiology test results   YES  Review and summation of old records today    NO  Reviewed patient's current orders and MAR    YES  PMH/SH reviewed - no change compared to H&P  ________________________________________________________________________  Care Plan discussed with:    Comments   Patient x    Family      RN x    Care Manager x    Consultant                        Multidiciplinary team rounds were held today with , nursing, pharmacist and clinical coordinator. Patient's plan of care was discussed; medications were reviewed and discharge planning was addressed. ________________________________________________________________________  Total NON critical care TIME:  35   Minutes    Total CRITICAL CARE TIME Spent:   Minutes non procedure based      Comments   >50% of visit spent in counseling and coordination of care x    ________________________________________________________________________  Mohamud Bustillo NP     Procedures: see electronic medical records for all procedures/Xrays and details which were not copied into this note but were reviewed prior to creation of Plan. LABS:  I reviewed today's most current labs and imaging studies.   Pertinent labs include:  Recent Labs     04/09/22  1050 04/08/22  0421 04/07/22  1011   WBC 11.9* 10.4 9.8   HGB 8.0* 7.7* 8.5*   HCT 25.6* 24.5* 26.7*   PLT 53* 51* 51*     Recent Labs     04/09/22  1050 04/08/22  0421 04/07/22  1011    140 138   K 4.2 4.2 4.0   * 112* 110*   CO2 24 23 22   * 118* 192*   BUN 30* 29* 31*   CREA 0.95 0.91 0.94   CA 7.5* 7.5* 7.7*       Signed: Mohamud Bustillo, JAZIEL

## 2022-04-09 NOTE — PROGRESS NOTES
End of Shift Note    Bedside shift change report given to Walker Molina (oncoming nurse) by Jessica Byers RN (offgoing nurse).   Report included the following information SBAR, Kardex, ED Summary, Intake/Output, MAR and Recent Results      Jessica Byers RN

## 2022-04-09 NOTE — PROGRESS NOTES
Bedside and Verbal shift change report given to [address] (oncoming nurse) by LEDA Conde RN (offgoing nurse). Report given with SBAR, Kardex, Intake/Output, MAR and Recent Results.

## 2022-04-10 ENCOUNTER — APPOINTMENT (OUTPATIENT)
Dept: GENERAL RADIOLOGY | Age: 67
DRG: 823 | End: 2022-04-10
Payer: MEDICARE

## 2022-04-10 LAB
ANION GAP SERPL CALC-SCNC: 6 MMOL/L (ref 5–15)
BASOPHILS # BLD: 0 K/UL (ref 0–0.1)
BASOPHILS NFR BLD: 0 % (ref 0–1)
BUN SERPL-MCNC: 30 MG/DL (ref 6–20)
BUN/CREAT SERPL: 38 (ref 12–20)
CALCIUM SERPL-MCNC: 7.7 MG/DL (ref 8.5–10.1)
CHLORIDE SERPL-SCNC: 110 MMOL/L (ref 97–108)
CO2 SERPL-SCNC: 24 MMOL/L (ref 21–32)
COVID-19 RAPID TEST, COVR: NOT DETECTED
CREAT SERPL-MCNC: 0.8 MG/DL (ref 0.7–1.3)
DIFFERENTIAL METHOD BLD: ABNORMAL
EOSINOPHIL # BLD: 0.5 K/UL (ref 0–0.4)
EOSINOPHIL NFR BLD: 5 % (ref 0–7)
ERYTHROCYTE [DISTWIDTH] IN BLOOD BY AUTOMATED COUNT: 22.6 % (ref 11.5–14.5)
GLUCOSE BLD STRIP.AUTO-MCNC: 165 MG/DL (ref 65–117)
GLUCOSE BLD STRIP.AUTO-MCNC: 165 MG/DL (ref 65–117)
GLUCOSE BLD STRIP.AUTO-MCNC: 168 MG/DL (ref 65–117)
GLUCOSE BLD STRIP.AUTO-MCNC: 200 MG/DL (ref 65–117)
GLUCOSE SERPL-MCNC: 133 MG/DL (ref 65–100)
HCT VFR BLD AUTO: 23.9 % (ref 36.6–50.3)
HGB BLD-MCNC: 7.5 G/DL (ref 12.1–17)
IMM GRANULOCYTES # BLD AUTO: 0 K/UL (ref 0–0.04)
IMM GRANULOCYTES NFR BLD AUTO: 0 % (ref 0–0.5)
LYMPHOCYTES # BLD: 7.6 K/UL (ref 0.8–3.5)
LYMPHOCYTES NFR BLD: 70 % (ref 12–49)
MCH RBC QN AUTO: 35.9 PG (ref 26–34)
MCHC RBC AUTO-ENTMCNC: 31.4 G/DL (ref 30–36.5)
MCV RBC AUTO: 114.4 FL (ref 80–99)
MONOCYTES # BLD: 0.8 K/UL (ref 0–1)
MONOCYTES NFR BLD: 7 % (ref 5–13)
MYELOCYTES NFR BLD MANUAL: 1 %
NEUTS BAND NFR BLD MANUAL: 2 %
NEUTS SEG # BLD: 1.6 K/UL (ref 1.8–8)
NEUTS SEG NFR BLD: 13 % (ref 32–75)
NRBC # BLD: 0 K/UL (ref 0–0.01)
NRBC BLD-RTO: 0 PER 100 WBC
OTHER CELLS NFR BLD MANUAL: 2 %
PLATELET # BLD AUTO: 47 K/UL (ref 150–400)
PLATELET COMMENTS,PCOM: ABNORMAL
PMV BLD AUTO: 10.5 FL (ref 8.9–12.9)
POTASSIUM SERPL-SCNC: 4.1 MMOL/L (ref 3.5–5.1)
PROCALCITONIN SERPL-MCNC: 0.19 NG/ML
RBC # BLD AUTO: 2.09 M/UL (ref 4.1–5.7)
RBC MORPH BLD: ABNORMAL
RBC MORPH BLD: ABNORMAL
SERVICE CMNT-IMP: ABNORMAL
SODIUM SERPL-SCNC: 140 MMOL/L (ref 136–145)
SOURCE, COVRS: NORMAL
WBC # BLD AUTO: 10.9 K/UL (ref 4.1–11.1)
WBC MORPH BLD: ABNORMAL

## 2022-04-10 PROCEDURE — 74011250637 HC RX REV CODE- 250/637: Performed by: INTERNAL MEDICINE

## 2022-04-10 PROCEDURE — 74011000250 HC RX REV CODE- 250: Performed by: INTERNAL MEDICINE

## 2022-04-10 PROCEDURE — 80048 BASIC METABOLIC PNL TOTAL CA: CPT

## 2022-04-10 PROCEDURE — 74011000250 HC RX REV CODE- 250

## 2022-04-10 PROCEDURE — 74011250637 HC RX REV CODE- 250/637

## 2022-04-10 PROCEDURE — 84145 PROCALCITONIN (PCT): CPT

## 2022-04-10 PROCEDURE — 71045 X-RAY EXAM CHEST 1 VIEW: CPT

## 2022-04-10 PROCEDURE — 87635 SARS-COV-2 COVID-19 AMP PRB: CPT

## 2022-04-10 PROCEDURE — 65660000000 HC RM CCU STEPDOWN

## 2022-04-10 PROCEDURE — 82962 GLUCOSE BLOOD TEST: CPT

## 2022-04-10 PROCEDURE — 94640 AIRWAY INHALATION TREATMENT: CPT

## 2022-04-10 PROCEDURE — 85025 COMPLETE CBC W/AUTO DIFF WBC: CPT

## 2022-04-10 PROCEDURE — 74011636637 HC RX REV CODE- 636/637: Performed by: INTERNAL MEDICINE

## 2022-04-10 PROCEDURE — 74011250637 HC RX REV CODE- 250/637: Performed by: NURSE PRACTITIONER

## 2022-04-10 PROCEDURE — 74011250636 HC RX REV CODE- 250/636: Performed by: NURSE PRACTITIONER

## 2022-04-10 RX ORDER — METRONIDAZOLE 500 MG/100ML
500 INJECTION, SOLUTION INTRAVENOUS EVERY 12 HOURS
Status: COMPLETED | OUTPATIENT
Start: 2022-04-10 | End: 2022-04-16

## 2022-04-10 RX ORDER — LANOLIN ALCOHOL/MO/W.PET/CERES
3 CREAM (GRAM) TOPICAL
Status: DISCONTINUED | OUTPATIENT
Start: 2022-04-10 | End: 2022-04-25 | Stop reason: HOSPADM

## 2022-04-10 RX ORDER — LEVOFLOXACIN 5 MG/ML
750 INJECTION, SOLUTION INTRAVENOUS EVERY 24 HOURS
Status: DISPENSED | OUTPATIENT
Start: 2022-04-10 | End: 2022-04-16

## 2022-04-10 RX ORDER — BALSAM PERU/CASTOR OIL
OINTMENT (GRAM) TOPICAL 2 TIMES DAILY
Status: DISCONTINUED | OUTPATIENT
Start: 2022-04-10 | End: 2022-04-25 | Stop reason: HOSPADM

## 2022-04-10 RX ORDER — IPRATROPIUM BROMIDE AND ALBUTEROL SULFATE 2.5; .5 MG/3ML; MG/3ML
3 SOLUTION RESPIRATORY (INHALATION)
Status: COMPLETED | OUTPATIENT
Start: 2022-04-10 | End: 2022-04-10

## 2022-04-10 RX ADMIN — TRAMADOL HYDROCHLORIDE 50 MG: 50 TABLET, COATED ORAL at 09:10

## 2022-04-10 RX ADMIN — METRONIDAZOLE 500 MG: 500 INJECTION, SOLUTION INTRAVENOUS at 19:59

## 2022-04-10 RX ADMIN — TRAMADOL HYDROCHLORIDE 50 MG: 50 TABLET, COATED ORAL at 15:34

## 2022-04-10 RX ADMIN — GUAIFENESIN 200 MG: 200 SOLUTION ORAL at 19:59

## 2022-04-10 RX ADMIN — PANTOPRAZOLE SODIUM 40 MG: 40 TABLET, DELAYED RELEASE ORAL at 09:10

## 2022-04-10 RX ADMIN — SODIUM CHLORIDE, PRESERVATIVE FREE 10 ML: 5 INJECTION INTRAVENOUS at 23:11

## 2022-04-10 RX ADMIN — SODIUM CHLORIDE, PRESERVATIVE FREE 10 ML: 5 INJECTION INTRAVENOUS at 15:35

## 2022-04-10 RX ADMIN — TOPIRAMATE 200 MG: 100 TABLET, FILM COATED ORAL at 09:10

## 2022-04-10 RX ADMIN — MELATONIN 3 MG: at 23:10

## 2022-04-10 RX ADMIN — Medication 2 UNITS: at 18:12

## 2022-04-10 RX ADMIN — FUROSEMIDE 20 MG: 20 TABLET ORAL at 09:09

## 2022-04-10 RX ADMIN — FOLIC ACID 1 MG: 1 TABLET ORAL at 09:09

## 2022-04-10 RX ADMIN — DIVALPROEX SODIUM 500 MG: 500 TABLET, DELAYED RELEASE ORAL at 09:09

## 2022-04-10 RX ADMIN — LEVOFLOXACIN 750 MG: 5 INJECTION, SOLUTION INTRAVENOUS at 09:10

## 2022-04-10 RX ADMIN — METRONIDAZOLE 500 MG: 500 INJECTION, SOLUTION INTRAVENOUS at 12:07

## 2022-04-10 RX ADMIN — CYANOCOBALAMIN TAB 500 MCG 1000 MCG: 500 TAB at 09:09

## 2022-04-10 RX ADMIN — Medication: at 15:29

## 2022-04-10 RX ADMIN — TRAMADOL HYDROCHLORIDE 50 MG: 50 TABLET, COATED ORAL at 23:10

## 2022-04-10 RX ADMIN — IPRATROPIUM BROMIDE AND ALBUTEROL SULFATE 3 ML: .5; 3 SOLUTION RESPIRATORY (INHALATION) at 02:41

## 2022-04-10 RX ADMIN — Medication 2 UNITS: at 09:09

## 2022-04-10 RX ADMIN — TOPIRAMATE 200 MG: 100 TABLET, FILM COATED ORAL at 18:13

## 2022-04-10 RX ADMIN — FUROSEMIDE 20 MG: 20 TABLET ORAL at 18:12

## 2022-04-10 RX ADMIN — Medication 3 UNITS: at 13:05

## 2022-04-10 RX ADMIN — ATORVASTATIN CALCIUM 40 MG: 40 TABLET, FILM COATED ORAL at 09:08

## 2022-04-10 RX ADMIN — MELATONIN 3 MG: at 00:42

## 2022-04-10 RX ADMIN — DIVALPROEX SODIUM 1000 MG: 500 TABLET, DELAYED RELEASE ORAL at 23:11

## 2022-04-10 RX ADMIN — GUAIFENESIN 200 MG: 200 SOLUTION ORAL at 15:34

## 2022-04-10 NOTE — PROGRESS NOTES
0206-nursing is concerned about cough and states that his lungs sound wet. Nurse states that cough is not responding to Robitussin. Hesitant to add cough suppressant due to aspiration pneumonia versus HCAP especially as he is pending a speech consult. Saturating okay per nursing. Ordered chest x-ray and breathing treatment. Pt apprx 8 weeks preg, c/o vag bleeding x 2 days. States she's used 2 pads in the past 10 hours. Denies abd cramping. . Denies pmhx

## 2022-04-10 NOTE — PROGRESS NOTES
End of Shift Note    Bedside shift change report given to  (oncoming nurse) by Heraclio Lou RN (offgoing nurse). Report included the following information SBAR, Kardex, ED Summary, Procedure Summary, Intake/Output and MAR    Shift worked:  1900-0730     Shift summary and any significant changes:     Pt had a difficult night, he was experiencing leg pain and general issues with getting comfortable. Pt was coughing without much relief from Robitussin and other efforts (NP suggested peppermint oil, per not being able to add cough suppressant due to possible aspiration issues.) Pt's lungs sounding wet and NP notified of continued worsening cough. NP ordered chest x-ray and a breathing treatment. The breathing treatment seemed to provide some relief. Edema in Pt's legs look like they are getting worse, pain seems to be worsening as well. Safety rounding and incontinence care done, with CHG bath given. Concerns for physician to address:  Pt continues to cough and not gaining any relief from Robitussin. Edema in legs is worsening along with pain in his legs. Generalized edema noted all over, including scrotum. Zone phone for oncoming shift:          Activity:  Activity Level: Bed Rest  Number times ambulated in hallways past shift: 0  Number of times OOB to chair past shift: 0    Cardiac:   Cardiac Monitoring: No      Cardiac Rhythm: Sinus Rhythm    Access:   Current line(s): PIV     Genitourinary:   Urinary status: voiding    Respiratory:   O2 Device: None (Room air)  Chronic home O2 use?: NO  Incentive spirometer at bedside: NO       GI:  Last Bowel Movement Date: 04/08/22  Current diet:  ADULT ORAL NUTRITION SUPPLEMENT Breakfast, Dinner;  Low Calorie/High Protein  ADULT DIET Regular; 4 carb choices (60 gm/meal)  Passing flatus: YES  Tolerating current diet: YES       Pain Management:   Patient states pain is manageable on current regimen: NO    Skin:  Troy Score: 13  Interventions: float heels    Patient Safety:  Fall Score:  Total Score: 4  Interventions: gripper socks  High Fall Risk: Yes    Length of Stay:  Expected LOS: 2d 16h  Actual LOS: 8      Melvin Wyatt RN

## 2022-04-10 NOTE — PROGRESS NOTES
Hospitalist Progress Note    NAME: Miguelina Gerber   :  1955   MRN:  [id number]     I reviewed pertinent labs and imaging, and discussed /agreed on the plan of care with Dr. Kenyatta Delgado. Assessment / Plan:  History of multiple problems with biopsy showed adenoma  Acute anemia  -Hemoglobin 4.2 on admission. Improved with PRBC transfusion  -History recent colonoscopy was in 2021 which showed multiple polyps status post biopsied.  Partial colectomy was recommended however patient's mom declined.    -Abd US showed pancreatic lesion. Normal liver  -CT abd/pelvis showed evidence of colitis. No mass or ductal dilatation at the pancrease  -Ferritin 92 after prbc transfusion. s/p IV Iron x3 doses    -Completed 7 days of empiric IV levaquin/flagyl for colitis  -EGD on  showed normal upper endoscopy, with no endoscopic evidence of neoplasia or mucosal abnormality. -Appreciate GI - now signed off  -Hgb stable, follow      Post op fever  Aspiration PNA vs HCAP  -Pt states he has trouble with pills this morning. ? Aspiration post procedure. He is coughing   -CXR showed small left pleural effusion. Hazy left basilar atelectasis/airspace disease.   -Completed 7 days of levaquin and metronidazole since admission  -Procalcitonin 0.19  -COVID negative 4/10  -Repeat CXR 4/10 showed \"Mild diffuse bilateral airspa\"ce disease, similar to the prior study. \"  -Continue levaquin and metronidazole - Day 8     Anasarca   Malnutrition   -Pitting edema to upper and lower extremities   -On PO lasix  -Check prealbumin, suspect this is chronic and r/t poor PO intake      Severe macrocytic anemia, thrombocytopenia, lymphocytosis   Concern for B cell lymphoma vs CLL   -Appreciate Hematology input - Has scheduled f/u with Dr. Bobby Smith on 2022, peripheral smear concerning for B cell lymphoma vs CLL     -S/p BM biopsy on  pending result.   Peripheral smear showed monoclonal B-cells, consistent with a B-cell lymphoproliferative disorder.  -Started on retacrit with plans to continue OP      LE edema in the setting of hypoalbuminemia   Venous stasis   -ECHO showed nl EF. LE duplex neg for DVT  -Transitioned to PO lasix  -Dry skin noted to BLE - continue lac hydrin lotion      Type 2 diabetes  Seizure disorder  Intellectual disability  SSI, hold PTA diabetic meds  Cont' Depakote, Topamax    Placed palliative care consult as guarded long term prognosis and his primary care giver is his elderly mother who reports difficulty caring for him at home. 25.0 - 29.9 Overweight / Body mass index is 26.45 kg/m². Estimated discharge date: April 11  Barriers: placement and will need level 2 screening     Code status: Full  Prophylaxis: SCD's  Recommended Disposition: SNF/LTC     Subjective:     Chief Complaint / Reason for Physician Visit  Patient seen at bedside, reports he is \"OK\" this morning. Discussed plan of care, patient verbalized understanding and agreement. Discussed with RN events overnight. Review of Systems:  Symptom Y/N Comments  Symptom Y/N Comments   Fever/Chills n   Chest Pain n    Poor Appetite n   Edema n    Cough n   Abdominal Pain n    Sputum n   Joint Pain n    SOB/GRULLON n   Pruritis/Rash n    Nausea/vomit n   Tolerating PT/OT y    Diarrhea n   Tolerating Diet y    Constipation n   Other       Could NOT obtain due to:      Objective:     VITALS:   Last 24hrs VS reviewed since prior progress note.  Most recent are:  Patient Vitals for the past 24 hrs:   Temp Pulse Resp BP SpO2   04/10/22 0758 98 °F (36.7 °C) 70 19 (!) 115/41 92 %   04/10/22 0241 -- -- -- -- 92 %   04/09/22 2331 98.5 °F (36.9 °C) 78 18 (!) 117/56 98 %   04/09/22 1929 97.7 °F (36.5 °C) 74 19 112/70 99 %   04/09/22 1532 98 °F (36.7 °C) 72 20 (!) 111/56 97 %       Intake/Output Summary (Last 24 hours) at 4/10/2022 1054  Last data filed at 4/10/2022 1008  Gross per 24 hour   Intake 1790 ml   Output 1300 ml   Net 490 ml        I had a face to face encounter and independently examined this patient on 4/10/2022, as outlined below:  PHYSICAL EXAM:  General: WD, WN. Alert, cooperative, no acute distress    EENT:  EOMI. Anicteric sclerae. MMM  Resp:  LS diminished at bases. No accessory muscle use  CV:  Regular  rhythm,  No edema  GI:  Soft, obese, Non tender. +Bowel sounds  Neurologic:  Alert and oriented X 3, normal speech,   Psych:   Good insight. Not anxious nor agitated  Skin:  No rashes. No jaundice    Reviewed most current lab test results and cultures  YES  Reviewed most current radiology test results   YES  Review and summation of old records today    NO  Reviewed patient's current orders and MAR    YES  PMH/SH reviewed - no change compared to H&P  ________________________________________________________________________  Care Plan discussed with:    Comments   Patient x    Family      RN x    Care Manager x    Consultant                        Multidiciplinary team rounds were held today with , nursing, pharmacist and clinical coordinator. Patient's plan of care was discussed; medications were reviewed and discharge planning was addressed. ________________________________________________________________________  Total NON critical care TIME:  35   Minutes    Total CRITICAL CARE TIME Spent:   Minutes non procedure based      Comments   >50% of visit spent in counseling and coordination of care x    ________________________________________________________________________  Julia Collins NP     Procedures: see electronic medical records for all procedures/Xrays and details which were not copied into this note but were reviewed prior to creation of Plan. LABS:  I reviewed today's most current labs and imaging studies.   Pertinent labs include:  Recent Labs     04/10/22  0427 04/09/22  1050 04/08/22  0421   WBC 10.9 11.9* 10.4   HGB 7.5* 8.0* 7.7*   HCT 23.9* 25.6* 24.5*   PLT 47* 53* 51*     Recent Labs     04/10/22  0427 04/09/22  1050 04/08/22  0421    139 140   K 4.1 4.2 4.2   * 110* 112*   CO2 24 24 23   * 137* 118*   BUN 30* 30* 29*   CREA 0.80 0.95 0.91   CA 7.7* 7.5* 7.5*       Signed: Elinor Nice, NP

## 2022-04-10 NOTE — ROUTINE PROCESS
End of Shift Note    Bedside shift change report given to na  (oncoming nurse) by Noelle Lai RN (offgoing nurse). Report included the following information SBAR    Shift worked:  day      Shift summary and any significant changes:     patient very sedentary, bedbound. Difficulty moving legs which have some edema and are very tender to any tough. Arms have some edema also. Non productive cough and medicated with cough syrup. Condom cath intact. Complained of shoulder pain. Medicated for pain      Concerns for physician to address:  see [facility] phone for oncoming shift:   na        Activity:  Activity Level: Bed Rest  Number times ambulated in hallways past shift: 0  Number of times OOB to chair past shift: 0    Cardiac:   Cardiac Monitoring: No      Cardiac Rhythm: Sinus Rhythm    Access:   Current line(s): PIV     Genitourinary:   Urinary status: external catheter    Respiratory:   O2 Device: None (Room air)  Chronic home O2 use?: NO  Incentive spirometer at bedside: NO       GI:  Last Bowel Movement Date: 04/08/22  Current diet:  ADULT ORAL NUTRITION SUPPLEMENT Breakfast, Dinner; Low Calorie/High Protein  ADULT DIET Regular; 4 carb choices (60 gm/meal)  Passing flatus: NO  Tolerating current diet: YES       Pain Management:   Patient states pain is manageable on current regimen: NO    Skin:  Troy Score: 13  Interventions: turn team and float heels    Patient Safety:  Fall Score:  Total Score: 4  Interventions: pt to call before getting OOB  High Fall Risk: Yes    Length of Stay:  Expected LOS: 2d 16h  Actual LOS: 8      English Reina Rocha RN

## 2022-04-11 LAB
BACTERIA SPEC CULT: NORMAL
GLUCOSE BLD STRIP.AUTO-MCNC: 153 MG/DL (ref 65–117)
GLUCOSE BLD STRIP.AUTO-MCNC: 158 MG/DL (ref 65–117)
GLUCOSE BLD STRIP.AUTO-MCNC: 187 MG/DL (ref 65–117)
GLUCOSE BLD STRIP.AUTO-MCNC: 222 MG/DL (ref 65–117)
PREALB SERPL-MCNC: 8.1 MG/DL (ref 20–40)
SERVICE CMNT-IMP: ABNORMAL
SERVICE CMNT-IMP: NORMAL

## 2022-04-11 PROCEDURE — 74011250637 HC RX REV CODE- 250/637: Performed by: INTERNAL MEDICINE

## 2022-04-11 PROCEDURE — 99223 1ST HOSP IP/OBS HIGH 75: CPT | Performed by: INTERNAL MEDICINE

## 2022-04-11 PROCEDURE — 65660000000 HC RM CCU STEPDOWN

## 2022-04-11 PROCEDURE — 74011250636 HC RX REV CODE- 250/636: Performed by: NURSE PRACTITIONER

## 2022-04-11 PROCEDURE — 84134 ASSAY OF PREALBUMIN: CPT

## 2022-04-11 PROCEDURE — 36415 COLL VENOUS BLD VENIPUNCTURE: CPT

## 2022-04-11 PROCEDURE — 74011636637 HC RX REV CODE- 636/637: Performed by: INTERNAL MEDICINE

## 2022-04-11 PROCEDURE — 74011000250 HC RX REV CODE- 250: Performed by: INTERNAL MEDICINE

## 2022-04-11 PROCEDURE — 82962 GLUCOSE BLOOD TEST: CPT

## 2022-04-11 PROCEDURE — 74011250637 HC RX REV CODE- 250/637: Performed by: NURSE PRACTITIONER

## 2022-04-11 RX ORDER — TRAMADOL HYDROCHLORIDE 50 MG/1
50 TABLET ORAL 3 TIMES DAILY
Status: DISCONTINUED | OUTPATIENT
Start: 2022-04-11 | End: 2022-04-11

## 2022-04-11 RX ORDER — OXYCODONE AND ACETAMINOPHEN 5; 325 MG/1; MG/1
0.5 TABLET ORAL 3 TIMES DAILY
Status: DISCONTINUED | OUTPATIENT
Start: 2022-04-11 | End: 2022-04-25 | Stop reason: HOSPADM

## 2022-04-11 RX ADMIN — LEVOFLOXACIN 750 MG: 5 INJECTION, SOLUTION INTRAVENOUS at 10:30

## 2022-04-11 RX ADMIN — GUAIFENESIN 200 MG: 200 SOLUTION ORAL at 03:11

## 2022-04-11 RX ADMIN — Medication: at 09:12

## 2022-04-11 RX ADMIN — TOPIRAMATE 200 MG: 100 TABLET, FILM COATED ORAL at 08:56

## 2022-04-11 RX ADMIN — Medication 2 UNITS: at 17:26

## 2022-04-11 RX ADMIN — Medication: at 17:28

## 2022-04-11 RX ADMIN — FUROSEMIDE 20 MG: 20 TABLET ORAL at 08:56

## 2022-04-11 RX ADMIN — Medication 2 UNITS: at 08:55

## 2022-04-11 RX ADMIN — DIVALPROEX SODIUM 1000 MG: 500 TABLET, DELAYED RELEASE ORAL at 20:24

## 2022-04-11 RX ADMIN — DIVALPROEX SODIUM 500 MG: 500 TABLET, DELAYED RELEASE ORAL at 08:56

## 2022-04-11 RX ADMIN — Medication 2 UNITS: at 21:45

## 2022-04-11 RX ADMIN — Medication 2 UNITS: at 11:50

## 2022-04-11 RX ADMIN — Medication: at 10:36

## 2022-04-11 RX ADMIN — Medication: at 21:47

## 2022-04-11 RX ADMIN — SODIUM CHLORIDE, PRESERVATIVE FREE 10 ML: 5 INJECTION INTRAVENOUS at 13:10

## 2022-04-11 RX ADMIN — ATORVASTATIN CALCIUM 40 MG: 40 TABLET, FILM COATED ORAL at 08:56

## 2022-04-11 RX ADMIN — METRONIDAZOLE 500 MG: 500 INJECTION, SOLUTION INTRAVENOUS at 20:24

## 2022-04-11 RX ADMIN — FUROSEMIDE 20 MG: 20 TABLET ORAL at 15:48

## 2022-04-11 RX ADMIN — Medication 1 LOZENGE: at 21:44

## 2022-04-11 RX ADMIN — OXYCODONE AND ACETAMINOPHEN 0.5 TABLET: 5; 325 TABLET ORAL at 21:43

## 2022-04-11 RX ADMIN — Medication 1 LOZENGE: at 15:49

## 2022-04-11 RX ADMIN — METRONIDAZOLE 500 MG: 500 INJECTION, SOLUTION INTRAVENOUS at 08:56

## 2022-04-11 RX ADMIN — PANTOPRAZOLE SODIUM 40 MG: 40 TABLET, DELAYED RELEASE ORAL at 08:56

## 2022-04-11 RX ADMIN — SODIUM CHLORIDE, PRESERVATIVE FREE 10 ML: 5 INJECTION INTRAVENOUS at 22:00

## 2022-04-11 RX ADMIN — Medication 1 LOZENGE: at 10:30

## 2022-04-11 RX ADMIN — FOLIC ACID 1 MG: 1 TABLET ORAL at 08:56

## 2022-04-11 RX ADMIN — CYANOCOBALAMIN TAB 500 MCG 1000 MCG: 500 TAB at 08:56

## 2022-04-11 RX ADMIN — TOPIRAMATE 200 MG: 100 TABLET, FILM COATED ORAL at 17:26

## 2022-04-11 RX ADMIN — SODIUM CHLORIDE, PRESERVATIVE FREE 10 ML: 5 INJECTION INTRAVENOUS at 08:55

## 2022-04-11 NOTE — PROGRESS NOTES
End of Shift Note    Bedside shift change report given to SHABNAM Alfaro (oncoming nurse) by Tramaine Lutz RN (offgoing nurse). Report included the following information SBAR, Kardex, ED Summary, Procedure Summary, Intake/Output and MAR    Shift worked:  1900-0730     Shift summary and any significant changes:    Pt had major swelling in feet during shift. Removed SCD's per Pt request, and elevated feet has high as possible. Pt states, \"my left foot is still broken\" states he broke it during fall, this is the first RN is hearing of this, sent message to NP. Pt took all medication per [facility] and PRN medication for pain. Safety rounding and toileting issues completed throughout shift. Concerns for physician to address:  Pt complaining his left foot feels broken still from his fall at home. Zone phone for oncoming shift:         Activity:  Activity Level: Up with Assistance  Number times ambulated in hallways past shift: 0  Number of times OOB to chair past shift: 0    Cardiac:   Cardiac Monitoring: {YES/NO:55668}      Cardiac Rhythm: Sinus Rhythm    Access:   Current line(s): {access:12370}     Genitourinary:   Urinary status: {:80552}    Respiratory:   O2 Device: None (Room air)  Chronic home O2 use?: {YES/NO/NA:84267}  Incentive spirometer at bedside: {YES/NO/NA:17814}       GI:  Last Bowel Movement Date: 04/09/22  Current diet:  ADULT ORAL NUTRITION SUPPLEMENT Breakfast, Dinner; Low Calorie/High Protein  ADULT DIET Regular; 4 carb choices (60 gm/meal)  Passing flatus: {YES/NO:91178}  Tolerating current diet: {YES/NO:39967}       Pain Management:   Patient states pain is manageable on current regimen: {YES/NO/NA:00712}    Skin:  Troy Score: 13  Interventions: {troy interventions:97450}    Patient Safety:  Fall Score:  Total Score: 4  Interventions: {fall interventions:69414}  High Fall Risk: Yes    Length of Stay:  Expected LOS: 2d 16h  Actual LOS: 9      Tramaine Lutz RN

## 2022-04-11 NOTE — PROGRESS NOTES
.End of Shift Note    Bedside shift change report given to Sangita (oncoming nurse) by Jackelyn Nugent RN (offgoing nurse). Report included the following information SBAR, Intake/Output, MAR, Recent Results and Med Rec Status    Shift worked:  5133-1916     Shift summary and any significant changes:     Patient continues to have dry cough, lozenges seem to help some. Mother at bedside. Patient still c/o pain in left ankle.       Concerns for physician to address:  none     Zone phone for oncoming shift:   [address] Ef[address] Hinojosa, RN

## 2022-04-11 NOTE — PROGRESS NOTES
Hospitalist Progress Note    NAME: Reginald Miller   :  1955   MRN:  [id number]     HPI excerpted from admission H&P:  Terrell Novoa is a 73 y.o.   male with PMHx significant for epilepsy, intellectual disability, type 2 diabetes, seizure, presents to the ER for evaluation of abnormal labs. Patient was seen by his PCP yesterday with routine lab work performed then. He was notified today to come to the ER due to hemoglobin of 4.4. History is obtained by patient's mom at bedside. Patient had a colonoscopy in 2021 due to positive FOBT. He had multiple polyps removed. Biopsy showed adenoma. Partial colectomy has been recommended by his mom did not want for him to this. On arrival vitals are stable. Repeat hemoglobin was 4.2. He also has a leukocytosis wbc 15. Per patient's mom no obvious bright red blood per rectum or melena. \"    Assessment / Plan:  H/O colonic adenoma   Severe macrocytic anemia   Thrombocytopenia   B-cell lymphoproliferative disorder  Abdominal US 22:  1. Nonspecific hypoechoic lesion in the head of the pancreas measuring 1.4 cm in  size. 2.  Splenomegaly with length of 18.5 cm an estimated volume of 11 22 mL. 3.  Contracted gallbladder. 4.  Normal appearance of liver. EGD 22:  CT abdomen/pelvis 22:  1. No definite pancreatic mass seen by this technique. 2.  Colitis diffuse with fat stranding in the peritoneal cavity. 3.  Splenomegaly. Esophagus:  Normal.  Stomach:  Normal.  Duodenum:  Normal.  - Heme/Onc input appreciated. - GI input appreciated, now signed off. - Bone marrow bx completed 22.    - Hgb 4.2 on admission.  - Patient has received total of 4 units PRBCs so far this hospitalization.   - Patient received 3 doses of IV iron. - H/H overall stable since last transfusion.   - Continue to monitor blood count. - Continue cyanocobalamin 1000 mcg po daily. - Continue folic acid 1 mg po daily.     - Continue epoetin 10,000 units sc 3 times weekly. Colitis, resolved  Aspiration PNA vs HACP  Leucocytosis, resolved  Fever, resolved  CXR 04/06/22:  Small left pleural effusion. Hazy left basilar atelectasis/airspace disease. CXR 04/10/22:  Mild diffuse bilateral airspace disease, similar to the prior study. - Continue current abx (levofloxacin and metronidazole). - Procalcitonin 0.19 on 04/10/22, down from 0.34    DM II  Hyperglycemia   - Continue FSBS with SSI correction scale. Malnutiriioin  Anasarca, improved  LE venous dopplers  04/02/22:  · No evidence of acute deep vein thrombosis in the right common femoral, femoral, popliteal, posterior tibial, and peroneal veins. The veins were imaged in the transverse and longitudinal planes. The vessels showed normal color filling and compressibility. Doppler interrogation showed phasic and spontaneous flow. · No evidence of acute deep vein thrombosis in the left common femoral, femoral, popliteal, posterior tibial, and peroneal veins. The veins were imaged in the transverse and longitudinal planes. The vessels showed normal color filling and compressibility. Doppler interrogation showed phasic and spontaneous flow. Echo 04/04/22:  Arlina Emiliano: Left ventricle is mildly dilated. Increased wall thickness. Normal wall motion. Low normal left ventricular systolic function with a visually estimated EF of 50 - 55%.   Left Atrium: Left atrium is mildly dilated.   Technical qualifiers: Echo study was technically difficult due to patient's heart rhythm. - Continue current diet and nutritional supplements. - Continue furosemide 20 mg po bid. Dyslipidemia   - Continue atorvastatin 40 mg po daily. Seizure disorder  - Continue divalproex  mg po daily and 1000 mg po daily at hs.  - Continue topiramate 200 mg po bid. GERD  - Continue pantoprazole 40 mg po daily. Psoriasis  - Continue ammonium lactate 12% top bid.     Intellectual disability   - Supportive care. 25.0 - 29.9 Overweight / Body mass index is 26.45 kg/m². Estimated discharge date: April 12  Barriers:    Code status: Full  Prophylaxis: SCD's  Recommended Disposition: SNF/LTC     Subjective:     Chief Complaint / Reason for Physician Visit  Patient denies pain. No complaints. Plan of care and pertinent events reviewed with bedside nurse. Review of Systems:  Symptom Y/N Comments  Symptom Y/N Comments   Fever/Chills N   Chest Pain N    Poor Appetite N   Edema Y    Cough N   Abdominal Pain N    Sputum N   Joint Pain N    SOB/GRULLON N   Pruritis/Rash N    Nausea/vomit N   Tolerating PT/OT     Diarrhea N   Tolerating Diet Y    Constipation    Other       Could NOT obtain due to:      Objective:     VITALS:   Last 24hrs VS reviewed since prior progress note. Most recent are:  Patient Vitals for the past 24 hrs:   Temp Pulse Resp BP SpO2   04/10/22 2317 98.5 °F (36.9 °C) 69 20 (!) 109/49 97 %   04/10/22 2058 98.5 °F (36.9 °C) 74 22 118/66 93 %   04/10/22 1415 98.1 °F (36.7 °C) 76 20 110/61 94 %   04/10/22 0758 98 °F (36.7 °C) 70 19 (!) 115/41 92 %       Intake/Output Summary (Last 24 hours) at 4/11/2022 0701  Last data filed at 4/10/2022 1823  Gross per 24 hour   Intake 640 ml   Output 400 ml   Net 240 ml        I had a face to face encounter and independently examined this patient on 4/11/2022, as outlined below:  PHYSICAL EXAM:  General:  A/A/O  NAD. HEENT:  Normocephalic. Sclera anicteric. Mucous membranes moist.    Chest:  Resps even/unlabored with symmetrical CWE. Air entry diminished at bases. Lungs CTA. No use of accessory muscles. CV:  RRR. Normal S1/S2. No M/C/R appreciated. No JVD. Generalized edema/anasarca. Cap refill < 3 sec. Peripheral pulses 1+. GI:  Abdomen soft/NT/ND. ABT X 4.    :  Voiding/condom cath. Neurologic:  Nonfocal.  Face symmetrical.  Speech normal.     Psych:  Cooperative. No anxiety or agitation. Skin:  Warm and color appropriate. No rashes or jaundice. Turgor elastic. Reviewed most current lab test results and cultures  YES  Reviewed most current radiology test results   YES  Review and summation of old records today    NO  Reviewed patient's current orders and MAR    YES  PMH/SH reviewed - no change compared to H&P  ________________________________________________________________________  Care Plan discussed with:    Comments   Patient [address]     Consultant                        Multidiciplinary team rounds were held today with , nursing, pharmacist and clinical coordinator. Patient's plan of care was discussed; medications were reviewed and discharge planning was addressed. ________________________________________________________________________  Marcos Kunz NP     Procedures: see electronic medical records for all procedures/Xrays and details which were not copied into this note but were reviewed prior to creation of Plan. LABS:  I reviewed today's most current labs and imaging studies.   Pertinent labs include:  Recent Labs     04/10/22  0427 04/09/22  1050   WBC 10.9 11.9*   HGB 7.5* 8.0*   HCT 23.9* 25.6*   PLT 47* 53*     Recent Labs     04/10/22  0427 04/09/22  1050    139   K 4.1 4.2   * 110*   CO2 24 24   * 137*   BUN 30* 30*   CREA 0.80 0.95   CA 7.7* 7.5*       Signed: Marcos Kunz NP

## 2022-04-11 NOTE — PROGRESS NOTES
Occupational Therapy  Chart reviewed; RN requests therapy defer until after ankle x-ray this morning as pain has become severe with difficult night due to ankle pain and coughing. Will retry later as able.  Deniz PEÑALOZA/NAKITA

## 2022-04-11 NOTE — PROGRESS NOTES
Physical Therapy:  Chart reviewed; RN requests therapy defer until after ankle x-ray this morning as pain has become severe with difficult night due to ankle pain and coughing. Will retry later as able.

## 2022-04-11 NOTE — CONSULTS
Palliative Medicine Consult  [place]: [phone] ([phone])    Patient Name: Dakota Mcleod  YOB: 1955    Date of Initial Consult: 4/11/22  Reason for Consult: care, decisions  Requesting Provider: Roque Chou NP  Primary Care Physician: Yeimi Church MD     SUMMARY:   Dakota Mcleod is a 73 y.o. male  with a past history of epilepsy, intellectual disability, DM type 2, , who was admitted on 4/2/2022 from home for acute abnormal labs/HB of 4.2( done a day prior ), patient  Had colonoscopy for FOBT s/p resection of multiple adenoma,  in Dec 2021, due to positive FOBT), He has severe macrocytic anemia, thrombocytopenia and supected B cell lymphoproliferative disorder , manutrition , anasarca. Bone marrow biopsy is consitentwith B cell lymphoma. Current medical issues leading to Palliative Medicine involvement include: care decisions, base line debility with intellectual disability with new diagnosis of lymphoma. Social ; lives with his elderly mom who is primary care giver, at base line  Has intellectual disability, was able to walk with support, for past few days has been very weak and bed bound total care . Legal next of kin:  Ned Pickard/Mom     PALLIATIVE DIAGNOSES:   1. Poor cognition /intellectaul disability. 2. Pain in legs. 3. Palliative care encounter   4. Need for emotional support (for care giver mom )  5. New diagnosis of B cell lymphoma       PLAN:   1. Met with patient and her mom /legal next of kin Ms Lorne Huffmaneting [facility] 2. Patient is alert, oriented to self and place, unable to menage in conversant , very pleasant, not able to comprehend his medical issues/lacks capacity to make medical decisions due to intellectual disability . 3. Introduce Palliative care service and added layer of support.   4. Mom /ms Hanane White is very supportive and dedicated care giver , understand that she rohit not be able to take care of him because of his care giving needs and considering long term placement . 5. She had spoken to oncology today and has some understanding of his medical issues . 6. She asked for biopsy results, reviewed results, we discuss he is not a candidate for any aggressive therapies/chemotherapy given his debility and disability , his mom will wait for further input from oncology to discuss in detail biopsy results. 7. We talked about burden of CPR ( within the context of his debility and cognitive dysfunction )if his heart to stop or he stops breathing , she currently wants to continue with full code status . 8. We will continue to support . 9. Pain in right leg : on tramado 50 mg every 6 hrs prn , on an average using 2-3 doses per day , he is not bale to ask for pain medication because  poor cognitive abilities , I will Schedule tramadol to 50 mg tid . I got  Call from pharmacy to avoid tramadol given patient has hx of seizures. I have discontinued tramadol , for percocet 5/325, half tablet tid . 10. Initial consult note routed to primary continuity provider and/or primary health care team members  6. Communicated plan of care with: Palliative IDT, [address] Team     GOALS OF CARE / TREATMENT PREFERENCES:     GOALS OF CARE:  Patient/Health Care Proxy Stated Goals: Prolong life    TREATMENT PREFERENCES:   Code Status: Full Code    Patient and family's personal goals include: continue to treatment of active medical issues. Important upcoming milestones or family events: The patient identifies the following as important for living well: not able to tell us because of poor cognition due to intellectual disability .       Advance Care Planning:  [x] [facility] Interdisciplinary Team has updated the ACP Navigator with Health Care Decision Maker and Patient Capacity      Primary Decision Maker: Shaina Tiwari - Mother - [phone]  Advance Care Planning 4/2/2022   Confirm Advance Directive None       Medical Interventions: Full interventions       Other:    As far as possible, the palliative care team has discussed with patient / health care proxy about goals of care / treatment preferences for patient. HISTORY:     History obtained from: chart, mother . CHIEF COMPLAINT: \" I am fine \"    HPI/SUBJECTIVE:    The patient is:   [] Verbal and participatory    He is pleasant , answer some yes and no questions, C/O pain in legs, unable to describe pain. Clinical Pain Assessment (nonverbal scale for severity on nonverbal patients):   Clinical Pain Assessment  Severity: 5  Location: right leg  Character: unable to describe  Duration: unable to describe  Effect: unable to describe  Factors: unable to describe  Frequency: unable to describe          Duration: for how long has pt been experiencing pain (e.g., 2 days, 1 month, years)  Frequency: how often pain is an issue (e.g., several times per day, once every few days, constant)     FUNCTIONAL ASSESSMENT:     Palliative Performance Scale (PPS):  PPS: 40       PSYCHOSOCIAL/SPIRITUAL SCREENING:     Palliative IDT has assessed this patient for cultural preferences / practices and a referral made as appropriate to needs (Cultural Services, Patient Advocacy, Ethics, etc.)    Any spiritual / [employer]ology concerns:  [] Yes /  [x] No   If \"Yes\" to discuss with pastoral care during IDT     Does caregiver feel burdened by caring for their loved one:   [] Yes /  [x] No /  [] No Caregiver Present    If \"Yes\" to discuss with social work during IDT    Anticipatory grief assessment:   [x] Normal  / [] Maladaptive     If \"Maladaptive\" to discuss with social work during IDT    ESAS Anxiety: Anxiety: 0    ESAS Depression: Depression: 0        REVIEW OF SYSTEMS:     Positive and pertinent negative findings in ROS are noted above in HPI. The following systems were [x] reviewed / [] unable to be reviewed as noted in HPI  Other findings are noted below.   Systems: constitutional, ears/nose/mouth/throat, respiratory, gastrointestinal, genitourinary, musculoskeletal, integumentary, neurologic, psychiatric, endocrine. Positive findings noted below. Modified ESAS Completed by: provider   Fatigue: 9 Drowsiness: 0   Depression: 0 Pain: 5   Anxiety: 0 Nausea: 0   Anorexia: 4 Dyspnea: 0     Constipation: No     Stool Occurrence(s): 1        PHYSICAL EXAM:     From RN flowsheet:  Wt Readings from Last 3 Encounters:   04/02/22 195 lb (88.5 kg)   12/01/21 195 lb (88.5 kg)     Blood pressure (!) 115/49, pulse 71, temperature 98.3 °F (36.8 °C), resp. rate 16, height 6' (1.829 m), weight 195 lb (88.5 kg), SpO2 98 %. Pain Scale 1: Numeric (0 - 10)  Pain Intensity 1: 0  Pain Onset 1: chronic  Pain Location 1: Knee     Pain Description 1: Aching  Pain Intervention(s) 1: Medication (see MAR)  Last bowel movement, if known:     Constitutional: age appropriate, alert, oriented to place and person, does  not engages in conversation, not in any distress. Eyes: pupils equal, anicteric  ENMT: no nasal discharge, moist mucous membranes  Cardiovascular: regular rhythm, swelling of feet . Respiratory: breathing not labored, symmetric  Gastrointestinal: soft non-tender, +bowel sounds  Musculoskeletal: no deformity, no tenderness to palpation  Skin: warm, dry  Neurologic: following commands, moving all extremities  Psychiatric: pleasant     Other:       HISTORY:     Active Problems:    Anemia (4/2/2022)      Past Medical History:   Diagnosis Date    [facility]     Epilepsy ([facility]     Seizures ([facility]       Past Surgical History:   Procedure Laterality Date    COLONOSCOPY N/A 12/1/2021    COLONOSCOPY performed [facility] Madhu Patrick [facility] at [facility]. Multiple sessile polyps (>30). Medium-sized int hemorrhoids.     UPPER GI ENDOSCOPY,DIAGNOSIS  4/5/2022           Family History   Problem Relation Age of Onset    Hypertension Mother     Cancer Father         unknown      History reviewed, no pertinent family history.   Social History     Tobacco Use    Smoking status: Never Smoker    Smokeless tobacco: Never Used   Substance Use Topics    Alcohol use: Never     No Known Allergies   Current Facility-Administered Medications   Medication Dose Route Frequency    melatonin tablet 3 mg  3 mg Oral QHS PRN    levoFLOXacin (LEVAQUIN) 750 mg in D5W IVPB  750 mg IntraVENous Q24H    metroNIDAZOLE (FLAGYL) IVPB premix 500 mg  500 mg IntraVENous Q12H    balsam peru-castor oiL (VENELEX) ointment   Topical BID    ammonium lactate (LAC-HYDRIN) 12 % lotion   Topical BID    epoetin camilo-epbx (RETACRIT) injection 10,000 Units  10,000 Units SubCUTAneous Q TUE, THU & SAT    acetaminophen (TYLENOL) tablet 650 mg  650 mg Oral Q4H PRN    Or    acetaminophen (TYLENOL) suppository 650 mg  650 mg Rectal Q4H PRN    sodium chloride (NS) flush 5-40 mL  5-40 mL IntraVENous Q8H    sodium chloride (NS) flush 5-40 mL  5-40 mL IntraVENous PRN    furosemide (LASIX) tablet 20 mg  20 mg Oral ACB&D    traMADoL (ULTRAM) tablet 50 mg  50 mg Oral Q6H PRN    hydrALAZINE (APRESOLINE) 20 mg/mL injection 10 mg  10 mg IntraVENous Q6H PRN    0.9% sodium chloride infusion 250 mL  250 mL IntraVENous PRN    cyanocobalamin (VITAMIN B12) tablet 1,000 mcg  1,000 mcg Oral DAILY    benzocaine-menthoL (CHLORASEPTIC MAX) lozenge 1 Lozenge  1 Lozenge Oral Q2H PRN    guaiFENesin (ROBITUSSIN) 100 mg/5 mL oral liquid 200 mg  200 mg Oral Q4H PRN    pantoprazole (PROTONIX) tablet 40 mg  40 mg Oral ACB    polyethylene glycol (MIRALAX) packet 17 g  17 g Oral DAILY PRN    ondansetron (ZOFRAN ODT) tablet 4 mg  4 mg Oral Q8H PRN    Or    ondansetron (ZOFRAN) injection 4 mg  4 mg IntraVENous Q6H PRN    divalproex DR (DEPAKOTE) tablet 500 mg  500 mg Oral DAILY    folic acid (FOLVITE) tablet 1 mg  1 mg Oral DAILY    topiramate (TOPAMAX) tablet 200 mg  200 mg Oral BID    atorvastatin (LIPITOR) tablet 40 mg  40 mg Oral DAILY    insulin lispro (HUMALOG) injection   SubCUTAneous AC&HS    glucose chewable tablet 16 g  4 Tablet Oral PRN    glucagon (GLUCAGEN) injection 1 mg  1 mg IntraMUSCular PRN    divalproex DR (DEPAKOTE) tablet 1,000 mg  1,000 mg Oral QHS          LAB AND IMAGING FINDINGS:     Lab Results   Component Value Date/Time    WBC 10.9 04/10/2022 04:27 AM    HGB 7.5 (L) 04/10/2022 04:27 AM    PLATELET 47 (LL) 55/82/8355 04:27 AM     Lab Results   Component Value Date/Time    Sodium 140 04/10/2022 04:27 AM    Potassium 4.1 04/10/2022 04:27 AM    Chloride 110 (H) 04/10/2022 04:27 AM    CO2 24 04/10/2022 04:27 AM    BUN 30 (H) 04/10/2022 04:27 AM    Creatinine 0.80 04/10/2022 04:27 AM    Calcium 7.7 (L) 04/10/2022 04:27 AM    Magnesium 1.9 04/05/2022 03:10 AM      Lab Results   Component Value Date/Time    Alk. phosphatase 65 04/02/2022 01:54 PM    Protein, total 4.9 (L) 04/04/2022 10:30 AM    Albumin 2.6 (L) 04/02/2022 01:54 PM    Globulin 3.6 04/02/2022 01:54 PM     No results found for: INR, PTMR, PTP, PT1, PT2, APTT, INREXT, INREXT   Lab Results   Component Value Date/Time    Iron 191 (H) 04/03/2022 01:14 AM    TIBC 282 04/03/2022 01:14 AM    Iron % saturation 68 (H) 04/03/2022 01:14 AM    Ferritin 92 04/03/2022 01:14 AM      No results found for: PH, PCO2, PO2  No components found for: GLPOC   No results found for: CPK, CKMB             Total time: 70 mins  Counseling / coordination time, spent as noted above: 55 mins  > 50% counseling / coordination?: yes     Prolonged service was provided for  []30 min   []75 min in face to face time in the presence of the patient, spent as noted above. Time Start:   Time End:   Note: this can only be billed with 11725 (initial) or 14967 (follow up). If multiple start / stop times, list each separately.

## 2022-04-11 NOTE — PROGRESS NOTES
-Hematology / Oncology ([facility]) -  -Primary Oncologist-   -CC-F/U for possible lymphoproliferative disorder    -S-  No complaints    -O-      Patient Vitals for the past 24 hrs:   Temp Pulse Resp BP SpO2   04/11/22 0800 97.5 °F (36.4 °C) 70 18 (!) 100/46 98 %   04/10/22 2317 98.5 °F (36.9 °C) 69 20 (!) 109/49 97 %   04/10/22 2058 98.5 °F (36.9 °C) 74 22 118/66 93 %   04/10/22 1415 98.1 °F (36.7 °C) 76 20 110/61 94 %     No intake/output data recorded. Gen: nad, pale in appearance  Chest:no distress  Abd: s/nt  Ext: chronic venous stasis b/l with large plaques scaling off, mild erythema on lower leg b/l    -Labs-    Recent Labs     04/10/22  0427 04/09/22  1050   WBC 10.9 11.9*   HGB 7.5* 8.0*   PLT 47* 53*   ANEU 1.6* 3.5    139   K 4.1 4.2   * 137*   BUN 30* 30*   CREA 0.80 0.95   CA 7.7* 7.5*       -Imaging-   4/3/22 CT A/P:  IMPRESSION  . No definite pancreatic mass seen by this technique. 2. Colitis diffuse with fat stranding in the peritoneal cavity. 3. Splenomegaly.       -Assessment + Plan-      *) Severe macrocytic anemia, thrombocytopenia, lymphocytosis:  - MCV markedly elevated to 142, severe anemia with  hgb 4.2 on admission.  - Iron studies after transfusion not markedly elevated as I would suspect, gave Venofer this admission on 4/4 and 4/5.  - B12 on low end of normal, MMA pending, replacment started empirically  - PBS concerning for lymphoproliferative d/o such as CLL? Flow on PB pending  - No hemolysis, HIV, HCV, HBV negative, fibrinogen slightly low but no overt dic.  - spep 0.2 with IgG Kappa specificity and elevated SFLCR, has MGUS at least but marrow will help r/o overt MM. - continue folic acid daily  - Had 4U PRBC.   continue to transfuse for hgb <7, plt <10K  - BMBX 4/6/22 and results pending  -started retacrit since he already had marrow, will continue as outpt  - peripheral flow suggestive of b cell lymphoma, don't think he will be tx candidate for aggressive therapies given his intelectual delay. FU with me after dc from [facility] planned tentatively for 5/6/22 2:15pm  - Depakote can cause thrombocytopenia but doubt would explain severe anemia. Plt count brought in by pt Mom shows thrombocytopenia back in 2020 with hgb 11 and wbc wnl. Topamax has 1-3% for anemia, these are not new medications so doubt everything is drug related.    *) Colitis:  - CT scan showing diffuse colitis, on IV flagyl  - EGD on 4/5 without acute findings. - GI following had multiple polyps and GI recommended colectomy that was declined.     *) Fever:  - Afebrile last 24 hours, is on Levaquin and IV flagyl

## 2022-04-11 NOTE — PROGRESS NOTES
Transition of Care Plan:    RUR:  18%  Moderate Risk  LOS:  9 Days  Disposition:  SNF vs. LTC  Follow up appointments:  PCP/Specialists (Per Facility)  DME needed:   Provided by SNF/LTC  Transportation at Discharge:  Nataly Kong or means to access home:  Mother has access to home      IM Medicare Letter:  2nd IM prior to discharge  Is patient a BCPI-A Bundle:  n/a         If yes, was Bundle Letter given?:    Is patient a  and connected with the [place]? If yes, was [facility] transfer form completed and [facility] notified? n/a  Caregiver Contact:  Stalin Evans (mother) [phone]  Discharge Caregiver contacted prior to discharge? Care Conference needed?:   Not at present time    Continue with disposition planning for SNF vs. [address]. Patient pleasant during conversation, but unable to indicate specific needs. Did mention his foot - RN stated he has been having pain in foot and there may be an X-ray today to check on any medical issues. Patient with significant cough during conversation. [facility]ed [facility] to initiate Level II which will be needed due to patient's intellectual challenges. Referral faxed to [phone]; received return call that LTSS will be needed which has not yet been completed.   Will work on [employer] in the AM and send to [facility] per their request.    Rodney Sinclair, BSW, CCM, ACM-SW  Complex CM/CM Team  [phone]

## 2022-04-11 NOTE — PROGRESS NOTES
Palliative Medicine      Code Status: Full Code    Advance Care Planning:  Advance Care Planning 4/2/2022   Confirm Advance Directive None       Patient / Family Encounter Documentation    Participants (names): Patient able to voice some responses to questions but is not decisional (secondary to intellectual deficits from birth), patient's mother Shira Borrero, Palliative team of Dr Fito Alvares and this Nicole Morfin. Narrative: Jose Antonio Garcia is a 73 y.o.   male with PMHx significant for epilepsy, intellectual disability, type 2 diabetes, seizure, presents to the ER for evaluation of abnormal labs. Patient was seen by his PCP yesterday with routine lab work performed then. He was notified today to come to the ER due to hemoglobin of 4.4 (From H&P). Patient is a pleasant man, who is likeable and usually says, \"I'm fine\", when asked how he is doing. He apparently got sick about 2-3 years ago after going to a large event (mom notes this may have been Covid, but it was not as rampant at that time) and he has declined in overall functioning ever since then. He is now unable to bear weight and walk, mom thinks he may have neuropathy. Patient had swelling in his legs and feet when he first came in, now they are less swollen. He did tell us that his legs and feet \"hurt\". Patient's elderly mother has been his primary caregiver and continues to care for him. However with his mobility affected now, she does not feel that she can care for him at home. She is quite realistic and considering him going to a [facility], where patient has been before and which is close to mother's home - for Long term Care. She indicated that patient needs an \"assessment done\" before this can happen, unsure if this means a [facility]AI. (Have let Perla REECE know about this). Caregiver Mary D: High - which is why patient's mother/mPOA is considering placement at a LTC facility.  Mother, Hayden Wren, is very devoted to patient and he trusts her implicitly. They have a close bond. Psychosocial Issues Identified/ Resilience Factors: Accepting, realistic mother. Patient has two brothers who are local, but both work. Patient has always lived with his mother. Does the caregiver feel confident administering medication? Yes    Does the caregiver need any help connecting with community resources? No - she is aware of resources and patient has a CM via [employer]. Does the caregiver feel confident assisting with activities of daily living? No - this is too much for his elderly mother. Goals of Care / Plan: Palliative team discussed that patient's functional level is compromised and that oncology has recommended seeing patient as an outpatient. Unsure how he will be able to get to his appointments; this has been a challenge for patient. His mother would like to know what his options are for treatment. We also discussed Code status and the option of a focus on comfort if there are no treatments recommended. Tasia Cooley not quite ready to sign DDNR today, noted \"It depends\" and she would like to discuss this with his brothers. We talked about the burden of CPR vs benefit for patient. We have given her a blank DDNR, she will let us know if she wants this document when patient leaves here. Tasia Cooley is older but capable and a strong advocate for patient. She is a little Cheesh-Na per her conversation with us and it helps to take mask off when talking to her. LCSW asked music therapist to see patient, he noted he likes [place] kind\" of music. His mother noted that he likes country music. Thank you for including Palliative Medicine in the care of Mr Kobe Cruz.

## 2022-04-11 NOTE — PROGRESS NOTES
Music Therapy Assessment  [address]    Libertad Davies [id number]     1955  73 y.o.  male    Patient Telephone Number: [phone] (home)   Faith Affiliation: No Faith   Language: English   Patient Active Problem List    Diagnosis Date Noted    Anemia 04/02/2022        Date: 4/11/2022            Total Time (in minutes): 30          [facility] 1 MEDICAL ONCOLOGY    Mental Status:   [x] Alert [  ] Catheryn Farrow [  ]  Confused  [  ] Minimally responsive  [  ] Sleeping    Communication Status: [  ] Impaired Speech [  ] Nonverbal -N/A    Physical Status:   [  ] Oxygen in use  [  ] Hard of Hearing [  ] Vision Impaired  [  ] Ambulatory  [  ] Ambulatory with assistance [  ] Non-ambulatory -N/A    Music Preferences, Background: Country, especially [facility] and [address] [place] [address]. Clinical Problem addressed: Positive social interaction. Goal(s) met in session:  Physical/Pain management (Scale of 1-10):    Pre-session rating: Pt reported pain in his legs but couldn't rate it when asked. Post-session rating: Pt didn't report on pain.   [x] Increased relaxation   [  ] Affected breathing patterns  [  ] Decreased muscle tension   [  ] Decreased agitation  [  ] Affected heart rate    [  ] Increased alertness     Emotional/Psychological:  [x] Increased self-expression   [  ] Decreased aggressive behavior   [  ] Decreased feelings of stress  [  ] Discussed healthy coping skills     [  ] Improved mood    [  ] Decreased withdrawn behavior     Social:  [  ] Decreased feelings of isolation/loneliness [x] Positive social interaction   [x] Provided support and/or comfort for family/friends    Spiritual:  [  ] Spiritual support    [  ] Expressed peace  [  ] Expressed malik    [  ] Discussed beliefs    Techniques Utilized (Check all that apply):   [  ] Procedural support MT [  ] Music for relaxation [x] Patient preferred music  [  ] Sandy analysis  [  ] Song choice  [  ] Music for validation  [  ] Entrainment  [ ] Movement to music [  ] Guided visualization  [  ] Cleo Goddard  [  ] Patient instrument playing [  ] Onvak Chess writing  [  ] Margarito Argueta along   [  ] Elma Gómez  [  ] Sensory stimulation  [x] Active Listening  [  ] Music for spiritual support [  ] Making of CDs as gifts    Session Observations:  Referral from Saniya Feliz, Palliative . Patient (pt) was alert lying in bed and his mother was at bedside. This music therapist (MT) introduced self and asked the pt how he was feeling. Pt reported pain in his legs, and when asked what he would rate it on a scale of 1-10, pt said he couldn't move his legs. MT offered music therapy to help with relaxation and redirecting the pt's focus from his pain to something more pleasant, music. Pt and his mother were agreeable to this. The Palliative  shared with this MT that the pt said he likes anything when asked about his favorite music, and his mother shared he most likes Country. MT asked the pt who his favorite Country singers/bands were. Pt said Larry Burrows was one so MT sang and played with MyDatingTreer Ring of Fire. Pt engaged in eye contact with MT during this song and expressed enjoyment in the music. The pt's mother encouraged the pt to share who one of his other favorite Country singers is and pt did so - Orlando Mckinnon. MT sang and played with XOR.MOTORSitar her song Lauryn. Afterward, the MT asked the pt what his interests were when he was home and feeling better than he is now. With help from his mother, the pt shared he liked doing jigsaw puzzles, bowling and golfing. Pt asked MT if any songs about golf could be played. MT didn't know any but offered a song about baseball, which pt said was better. MT sang and played the Parma Sellar song The Greatest with MyDatingTreer. Pt increased relaxation in response to the music as evidenced by (AEB) closing his eyes and his facial expression relaxing. Pt's mother observed this aloud following the song.  Pt acknowledged feeling tired and wanting to take a nap. MT concluded the session. Pt and his mother thanked MT for the music and visit.     CUCO Rodrigez (Music Therapist-Board Certified)  Spiritual Care Department  Referral-based service

## 2022-04-12 ENCOUNTER — APPOINTMENT (OUTPATIENT)
Dept: GENERAL RADIOLOGY | Age: 67
DRG: 823 | End: 2022-04-12
Payer: MEDICARE

## 2022-04-12 LAB
ANION GAP SERPL CALC-SCNC: 6 MMOL/L (ref 5–15)
BASOPHILS # BLD: 0 K/UL (ref 0–0.1)
BASOPHILS NFR BLD: 0 % (ref 0–1)
BUN SERPL-MCNC: 31 MG/DL (ref 6–20)
BUN/CREAT SERPL: 39 (ref 12–20)
CALCIUM SERPL-MCNC: 8 MG/DL (ref 8.5–10.1)
CHLORIDE SERPL-SCNC: 108 MMOL/L (ref 97–108)
CO2 SERPL-SCNC: 24 MMOL/L (ref 21–32)
CREAT SERPL-MCNC: 0.8 MG/DL (ref 0.7–1.3)
DIFFERENTIAL METHOD BLD: ABNORMAL
EOSINOPHIL # BLD: 1.1 K/UL (ref 0–0.4)
EOSINOPHIL NFR BLD: 8 % (ref 0–7)
ERYTHROCYTE [DISTWIDTH] IN BLOOD BY AUTOMATED COUNT: 22.6 % (ref 11.5–14.5)
GLUCOSE BLD STRIP.AUTO-MCNC: 158 MG/DL (ref 65–117)
GLUCOSE BLD STRIP.AUTO-MCNC: 158 MG/DL (ref 65–117)
GLUCOSE BLD STRIP.AUTO-MCNC: 161 MG/DL (ref 65–117)
GLUCOSE BLD STRIP.AUTO-MCNC: 200 MG/DL (ref 65–117)
GLUCOSE SERPL-MCNC: 112 MG/DL (ref 65–100)
HCT VFR BLD AUTO: 25 % (ref 36.6–50.3)
HGB BLD-MCNC: 7.8 G/DL (ref 12.1–17)
IMM GRANULOCYTES # BLD AUTO: 0 K/UL (ref 0–0.04)
IMM GRANULOCYTES NFR BLD AUTO: 0 % (ref 0–0.5)
LYMPHOCYTES # BLD: 8.2 K/UL (ref 0.8–3.5)
LYMPHOCYTES NFR BLD: 61 % (ref 12–49)
MCH RBC QN AUTO: 36.1 PG (ref 26–34)
MCHC RBC AUTO-ENTMCNC: 31.2 G/DL (ref 30–36.5)
MCV RBC AUTO: 115.7 FL (ref 80–99)
MONOCYTES # BLD: 2.7 K/UL (ref 0–1)
MONOCYTES NFR BLD: 20 % (ref 5–13)
NEUTS SEG # BLD: 1.5 K/UL (ref 1.8–8)
NEUTS SEG NFR BLD: 11 % (ref 32–75)
NRBC # BLD: 0 K/UL (ref 0–0.01)
NRBC BLD-RTO: 0 PER 100 WBC
PLATELET # BLD AUTO: 62 K/UL (ref 150–400)
PMV BLD AUTO: 10.1 FL (ref 8.9–12.9)
POTASSIUM SERPL-SCNC: 4.3 MMOL/L (ref 3.5–5.1)
RBC # BLD AUTO: 2.16 M/UL (ref 4.1–5.7)
RBC MORPH BLD: ABNORMAL
SERVICE CMNT-IMP: ABNORMAL
SODIUM SERPL-SCNC: 138 MMOL/L (ref 136–145)
WBC # BLD AUTO: 13.5 K/UL (ref 4.1–11.1)

## 2022-04-12 PROCEDURE — 74011250636 HC RX REV CODE- 250/636: Performed by: NURSE PRACTITIONER

## 2022-04-12 PROCEDURE — 97535 SELF CARE MNGMENT TRAINING: CPT

## 2022-04-12 PROCEDURE — 74011000250 HC RX REV CODE- 250: Performed by: INTERNAL MEDICINE

## 2022-04-12 PROCEDURE — 73630 X-RAY EXAM OF FOOT: CPT

## 2022-04-12 PROCEDURE — 82962 GLUCOSE BLOOD TEST: CPT

## 2022-04-12 PROCEDURE — 74011250637 HC RX REV CODE- 250/637: Performed by: INTERNAL MEDICINE

## 2022-04-12 PROCEDURE — 74011636637 HC RX REV CODE- 636/637: Performed by: INTERNAL MEDICINE

## 2022-04-12 PROCEDURE — 85025 COMPLETE CBC W/AUTO DIFF WBC: CPT

## 2022-04-12 PROCEDURE — 36415 COLL VENOUS BLD VENIPUNCTURE: CPT

## 2022-04-12 PROCEDURE — 65660000000 HC RM CCU STEPDOWN

## 2022-04-12 PROCEDURE — 74011250636 HC RX REV CODE- 250/636: Performed by: INTERNAL MEDICINE

## 2022-04-12 PROCEDURE — 99233 SBSQ HOSP IP/OBS HIGH 50: CPT | Performed by: INTERNAL MEDICINE

## 2022-04-12 PROCEDURE — 80048 BASIC METABOLIC PNL TOTAL CA: CPT

## 2022-04-12 PROCEDURE — 74011250637 HC RX REV CODE- 250/637: Performed by: NURSE PRACTITIONER

## 2022-04-12 PROCEDURE — 97530 THERAPEUTIC ACTIVITIES: CPT

## 2022-04-12 RX ADMIN — Medication 2 UNITS: at 13:50

## 2022-04-12 RX ADMIN — DIVALPROEX SODIUM 1000 MG: 500 TABLET, DELAYED RELEASE ORAL at 21:31

## 2022-04-12 RX ADMIN — ATORVASTATIN CALCIUM 40 MG: 40 TABLET, FILM COATED ORAL at 08:45

## 2022-04-12 RX ADMIN — EPOETIN ALFA-EPBX 10000 UNITS: 10000 INJECTION, SOLUTION INTRAVENOUS; SUBCUTANEOUS at 22:31

## 2022-04-12 RX ADMIN — LEVOFLOXACIN 750 MG: 5 INJECTION, SOLUTION INTRAVENOUS at 10:59

## 2022-04-12 RX ADMIN — DIVALPROEX SODIUM 500 MG: 500 TABLET, DELAYED RELEASE ORAL at 08:44

## 2022-04-12 RX ADMIN — METRONIDAZOLE 500 MG: 500 INJECTION, SOLUTION INTRAVENOUS at 20:22

## 2022-04-12 RX ADMIN — Medication 1 LOZENGE: at 21:36

## 2022-04-12 RX ADMIN — Medication 1 LOZENGE: at 01:02

## 2022-04-12 RX ADMIN — Medication 2 UNITS: at 17:38

## 2022-04-12 RX ADMIN — SODIUM CHLORIDE, PRESERVATIVE FREE 10 ML: 5 INJECTION INTRAVENOUS at 20:22

## 2022-04-12 RX ADMIN — TOPIRAMATE 200 MG: 100 TABLET, FILM COATED ORAL at 08:42

## 2022-04-12 RX ADMIN — Medication: at 17:06

## 2022-04-12 RX ADMIN — TOPIRAMATE 200 MG: 100 TABLET, FILM COATED ORAL at 18:46

## 2022-04-12 RX ADMIN — PANTOPRAZOLE SODIUM 40 MG: 40 TABLET, DELAYED RELEASE ORAL at 08:45

## 2022-04-12 RX ADMIN — Medication: at 17:05

## 2022-04-12 RX ADMIN — OXYCODONE AND ACETAMINOPHEN 0.5 TABLET: 5; 325 TABLET ORAL at 21:31

## 2022-04-12 RX ADMIN — Medication: at 08:47

## 2022-04-12 RX ADMIN — Medication 2 UNITS: at 08:57

## 2022-04-12 RX ADMIN — CYANOCOBALAMIN TAB 500 MCG 1000 MCG: 500 TAB at 08:43

## 2022-04-12 RX ADMIN — SODIUM CHLORIDE, PRESERVATIVE FREE 10 ML: 5 INJECTION INTRAVENOUS at 17:39

## 2022-04-12 RX ADMIN — OXYCODONE AND ACETAMINOPHEN 0.5 TABLET: 5; 325 TABLET ORAL at 17:03

## 2022-04-12 RX ADMIN — FOLIC ACID 1 MG: 1 TABLET ORAL at 08:43

## 2022-04-12 RX ADMIN — METRONIDAZOLE 500 MG: 500 INJECTION, SOLUTION INTRAVENOUS at 08:40

## 2022-04-12 RX ADMIN — SODIUM CHLORIDE, PRESERVATIVE FREE 10 ML: 5 INJECTION INTRAVENOUS at 05:43

## 2022-04-12 RX ADMIN — Medication 2 UNITS: at 22:30

## 2022-04-12 RX ADMIN — Medication: at 21:34

## 2022-04-12 RX ADMIN — OXYCODONE AND ACETAMINOPHEN 0.5 TABLET: 5; 325 TABLET ORAL at 08:43

## 2022-04-12 NOTE — PROGRESS NOTES
-Hematology / Oncology ([facility]) -  -Primary Oncologist-   -CC-F/U for possible lymphoproliferative disorder    -S-  No complaints    -O-      Patient Vitals for the past 24 hrs:   Temp Pulse Resp BP SpO2   04/12/22 0808 99.3 °F (37.4 °C) 67 18 (!) 108/50 94 %   04/11/22 2324 98.2 °F (36.8 °C) 75 18 113/65 96 %   04/11/22 2036 98.4 °F (36.9 °C) 73 17 117/62 99 %   04/11/22 1515 98.3 °F (36.8 °C) 71 16 (!) 115/49 98 %     No intake/output data recorded. Gen: nad, pale in appearance  Chest:no distress  Abd: s/nt  Ext: chronic venous stasis b/l with large plaques scaling off, mild erythema on lower leg b/l    -Labs-    Recent Labs     04/12/22  0436 04/10/22  0427 04/09/22  1050   WBC 13.5* 10.9 11.9*   HGB 7.8* 7.5* 8.0*   PLT 62* 47* 53*   ANEU 1.5* 1.6* 3.5    140 139   K 4.3 4.1 4.2   * 133* 137*   BUN 31* 30* 30*   CREA 0.80 0.80 0.95   CA 8.0* 7.7* 7.5*       -Imaging-   4/3/22 CT A/P:  IMPRESSION  . No definite pancreatic mass seen by this technique. 2. Colitis diffuse with fat stranding in the peritoneal cavity. 3. Splenomegaly.       -Assessment + Plan-      *) Severe macrocytic anemia, thrombocytopenia, lymphocytosis:  - MCV markedly elevated to 142, severe anemia with  hgb 4.2 on admission.  - Iron studies after transfusion not markedly elevated as I would suspect, gave Venofer this admission on 4/4 and 4/5.  - B12 on low end of normal, MMA pending, replacment started empirically  - PBS concerning for lymphoproliferative d/o such as CLL? Flow on PB pending  - No hemolysis, HIV, HCV, HBV negative, fibrinogen slightly low but no overt dic.  - spep 0.2 with IgG Kappa specificity and elevated SFLCR, has MGUS at least but marrow will help r/o overt MM. - continue folic acid daily  - Had 4U PRBC.   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 FU with Dr. John Thibodeaux after dc from [facility] planned tentatively for 5/6/22 2:15pm         *) Colitis:  - CT scan showing diffuse colitis, on IV flagyl  - EGD on 4/5 without acute findings. - GI following had multiple polyps and GI recommended colectomy that was declined.     *) Fever:  - Afebrile last 24 hours, is on Levaquin and IV flagyl

## 2022-04-12 NOTE — PROGRESS NOTES
End of Shift Note    Bedside shift change report given to  (oncoming nurse) by Kamilla Emerson (offgoing nurse). Report included the following information SBAR and Kardex    Shift worked:  7a-7p     Shift summary and any significant changes:     Patient rested comfortably or most of shift. Meds given per MAR. sliding scale insulin given. Pain controlled with scheduled Percocet. XRay completed. Mother at the bedside. Patient turned q2      Concerns for physician to address:       Zone phone for oncoming shift:          Activity:  Activity Level: Up with Assistance  Number times ambulated in hallways past shift: 0  Number of times OOB to chair past shift: 0    Cardiac:   Cardiac Monitoring: No      Cardiac Rhythm: Sinus Rhythm    Access:   Current line(s): PIV     Genitourinary:   Urinary status: external catheter    Respiratory:   O2 Device: None (Room air)  Chronic home O2 use?: NO  Incentive spirometer at bedside: N/A       GI:  Last Bowel Movement Date: 04/09/22  Current diet:  ADULT ORAL NUTRITION SUPPLEMENT Breakfast, Dinner; Low Calorie/High Protein  ADULT DIET Regular; 4 carb choices (60 gm/meal)  Passing flatus: YES  Tolerating current diet: YES       Pain Management:   Patient states pain is manageable on current regimen: YES    Skin:  Troy Score: 13  Interventions: internal/external urinary devices and nutritional support     Patient Safety:  Fall Score:  Total Score: 2  Interventions: bed/chair alarm and pt to call before getting OOB  High Fall Risk: Yes    Length of Stay:  Expected LOS: 2d 16h  [facility]

## 2022-04-12 NOTE — PROGRESS NOTES
Problem: Mobility Impaired (Adult and Pediatric)  Goal: *Acute Goals and Plan of Care (Insert Text)  Description: FUNCTIONAL STATUS PRIOR TO ADMISSION: The patient was functional at the wheelchair level and required minimal assistance for transfers to the chair. He was ambulatory with rolling walker prior to 2 weeks ago. One week ago, he had a posterior fall going up one stair into home (with assist of his mother) and currently has a head abrasion. HOME SUPPORT PRIOR TO ADMISSION: The patient lived with elderly mother and required minimal assistance/contact guard assist for ADLs/mobility prior to 2 weeks ago. He was declining at home and required more than minimal assist.    Physical Therapy Goals  Initiated 4/7/2022  1. Patient will move from supine to sit and sit to supine  in bed with minimal assistance/contact guard assist within 7 day(s). 2.  Patient will transfer from bed to chair and chair to bed with minimal assistance/contact guard assist x 2 using the least restrictive device within 7 day(s). 3.  Patient will perform sit to stand with minimal assistance/contact guard assist x 2 within 7 day(s). 4.  Patient will ambulate with minimal assistance/contact guard assist x 2 for 20 feet with the least restrictive device within 7 day(s). Outcome: Not Progressing Towards Goal    PHYSICAL THERAPY TREATMENT  Patient: Dakota Mcleod (58 y.o. male)  Date: 4/12/2022  Diagnosis: Anemia [D64.9] <principal problem not specified>  Procedure(s) (LRB):  ESOPHAGOGASTRODUODENOSCOPY (EGD) (N/A) 7 Days Post-Op  Precautions: Fall  Chart, physical therapy assessment, plan of care and goals were reviewed. ASSESSMENT  Patient continues with skilled PT services and is not progressing towards goals. Pt received supine in bed, continues to be limited by pain with any activity, pt also indicates continued confusion when identifying pain location. Pt continues with impairs speech, and delayed response as well.  This session co treat with OT. Pt only able to tolerate bed repositioning with Total A x2. Pt able to show slight ROM with B ankles during session, but indicated increased pain with PROM when elevating LE for pressure relief for heals. Pt completed session in chair position in bed with call bell within reach and set up for breakfast with all needs met at the time. Rn notified of session. Current Level of Function Impacting Discharge (mobility/balance): Total A, rolling or scooting due to pain. Other factors to consider for discharge: pain management, confusion. PLAN :  Patient continues to benefit from skilled intervention to address the above impairments. Continue treatment per established plan of care. to address goals. Recommendation for discharge: (in order for the patient to meet his/her long term goals)  Therapy up to 5 days/week in SNF setting/ HHPT with 24 hr assistance for ADL's and mobility    This discharge recommendation:  Has not yet been discussed the attending provider and/or case management    IF patient discharges home will need the following DME: to be determined (TBD)       SUBJECTIVE:   Patient stated my mommy was there when I fell.     OBJECTIVE DATA SUMMARY:   Critical Behavior:  Neurologic State: Alert  Orientation Level: Oriented to person,Oriented to situation,Oriented to place,Disoriented to time (knows Bday; not his age)  Cognition: Follows commands,Impaired decision making,Impulsive,Memory loss,Decreased attention/concentration (limited today by pain B LE L worse than R, )  Safety/Judgement: Decreased insight into deficits,Decreased awareness of need for safety,Awareness of environment,Fall prevention  Functional Mobility Training:  Bed Mobility:  Rolling: Total assistance;Assist x2  Supine to Sit: Total assistance;Assist x2; Additional time (inferred due to NOT TOLERATED; this is worse p! than on eval)  Sit to Supine:  (unable to reposition LEs himself, high risk for heel pressur)  Scooting: Total assistance;Assist x2; Additional time; Adaptive equipment;Bed Modified (trendelenberg)     Balance:  Sitting: Impaired; With support (unable to tolerate sitting edge of bed due to B LE pain)  Sitting - Static:  (MD assessing pain)    Pain Rating:  Pt reported pain with BLE with any activity, R ear (with L ear injury)     Activity Tolerance:   Poor    After treatment patient left in no apparent distress:   Supine in bed, Heels elevated for pressure relief, Call bell within reach, and Bed / chair alarm activated    COMMUNICATION/COLLABORATION:   The patients plan of care was discussed with: Registered nurse.      Anish Knutson PTA   Time Calculation: 18 mins

## 2022-04-12 NOTE — CONSULTS
Palliative Medicine Consult  [place]: [phone] ([phone])    Patient Name: Eduarda Burkett  YOB: 1955    Date of Initial Consult: 4/11/22  Reason for Consult: care, decisions  Requesting Provider: Renetta Rodriguez NP  Primary Care Physician: Stew Lauren MD     SUMMARY:   Eduarda Burkett is a 73 y.o. male  with a past history of epilepsy, intellectual disability, DM type 2, , who was admitted on 4/2/2022 from home for acute abnormal labs/HB of 4.2( done a day prior ), patient  Had colonoscopy for FOBT s/p resection of multiple adenoma,  in Dec 2021, due to positive FOBT), He has severe macrocytic anemia, thrombocytopenia and supected B cell lymphoproliferative disorder , manutrition , anasarca. 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 Current medical issues leading to Palliative Medicine involvement include: care decisions, base line debility with intellectual disability with new diagnosis of lymphoma. Social ; lives with his elderly mom who is primary care giver, at base line  Has intellectual disability, was able to walk with support, for past few days has been very weak and bed bound total care . Legal next of kin:  Ned Pickard/Mom     PALLIATIVE DIAGNOSES:   1. Poor cognition /intellectual disability. 2. Pain in legs. 3. Debility   4. Goals of care /DNR discussion . 5. Need for emotional support (for care giver mom )  6. 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  follow up visit . 2. Met with patient and her mom /legal next of kin Ms Marian Eller. 3. Patient is alert, oriented to self and place, unable to engage  in conversation , voucabalary and comprehension is very limited , very pleasant, not able to comprehend his medical issues/lacks capacity to make medical decisions due to intellectual disability .   4. Pain in both legs : better on low dose percocet 5/325, half tab BID and prn tylenol. 5. CPR conversation : follow up on our conversation for protective DNR,  his mom/Ms Carlyon Severin  is waiting to discuss with his other sons to get input. 6. Current goal is long term placement  and to follow up with oncology, in out patient for  Treatment of his blood disorder/cnacer  recommended by oncology . 7. Initial consult note routed to primary continuity provider and/or primary health care team members  8. Communicated plan of care with: Palliative IDT, [facility] Team     GOALS OF CARE / TREATMENT PREFERENCES:     GOALS OF CARE:  Patient/Health Care Proxy Stated Goals: Prolong life    TREATMENT PREFERENCES:   Code Status: Full Code    Patient and family's personal goals include: continue to treatment of active medical issues. Important upcoming milestones or family events: The patient identifies the following as important for living well: not able to tell us because of poor cognition due to intellectual disability . Advance Care Planning:  [x] The [facility] Interdisciplinary Team has updated the ACP Navigator with Health Care Decision Maker and Patient Capacity      Primary Decision Maker (Active): Autumn Barnard - Mother - [phone]  Advance Care Planning 4/2/2022   Confirm Advance Directive None       Medical Interventions: Full interventions       Other:    As far as possible, the palliative care team has discussed with patient / health care proxy about goals of care / treatment preferences for patient. HISTORY:     History obtained from: chart, mother . CHIEF COMPLAINT: \" I am fine \"    HPI/SUBJECTIVE:    The patient is:   [] Verbal and participatory    He is pleasant , answer some yes and no questions, C/O pain in legs, unable to describe pain. 4/12/22; Patient is able to say pain is better, his vocabulary is limited and comprehension is limited, answer some yes and no questions.     Clinical Pain Assessment (nonverbal scale for severity on nonverbal patients):   Clinical Pain Assessment  Severity: 0  Location: right leg  Character: unable to describe  Duration: unable to describe  Effect: unable to describe  Factors: unable to describe  Frequency: unable to describe     Activity (Movement): Lying quietly, normal position    Duration: for how long has pt been experiencing pain (e.g., 2 days, 1 month, years)  Frequency: how often pain is an issue (e.g., several times per day, once every few days, constant)     FUNCTIONAL ASSESSMENT:     Palliative Performance Scale (PPS):  PPS: 40       PSYCHOSOCIAL/SPIRITUAL SCREENING:     Palliative IDT has assessed this patient for cultural preferences / practices and a referral made as appropriate to needs (Cultural Services, Patient Advocacy, Ethics, etc.)    Any spiritual / Sabianist concerns:  [] Yes /  [x] No   If \"Yes\" to discuss with pastoral care during IDT     Does caregiver feel burdened by caring for their loved one:   [] Yes /  [x] No /  [] No Caregiver Present    If \"Yes\" to discuss with social work during IDT    Anticipatory grief assessment:   [x] Normal  / [] Maladaptive     If \"Maladaptive\" to discuss with social work during IDT    ESAS Anxiety: Anxiety: 0    ESAS Depression: Depression: 0        REVIEW OF SYSTEMS:     Positive and pertinent negative findings in ROS are noted above in HPI. The following systems were [x] reviewed / [] unable to be reviewed as noted in HPI  Other findings are noted below. Systems: constitutional, ears/nose/mouth/throat, respiratory, gastrointestinal, genitourinary, musculoskeletal, integumentary, neurologic, psychiatric, endocrine. Positive findings noted below.   Modified ESAS Completed by: provider   Fatigue: 9 Drowsiness: 0   Depression: 0 Pain: 0   Anxiety: 0 Nausea: 0   Anorexia: 6 Dyspnea: 0     Constipation: No     Stool Occurrence(s): 1        PHYSICAL EXAM:     From RN flowsheet:  Wt Readings from Last 3 Encounters:   04/02/22 195 lb (88.5 kg) 12/01/21 195 lb (88.5 kg)     Blood pressure (!) 108/50, pulse 67, temperature 99.3 °F (37.4 °C), resp. rate 18, height 6' (1.829 m), weight 195 lb (88.5 kg), SpO2 94 %. Pain Scale 1: Numeric (0 - 10)  Pain Intensity 1: 4  Pain Onset 1: chronic   Pain Location 1: Knee     Pain Description 1: Aching  Pain Intervention(s) 1: Medication (see MAR)  Last bowel movement, if known:     Constitutional: age appropriate, alert, oriented to place and person, does  not engages in conversation, not in any distress. Eyes: pupils equal, anicteric  ENMT: no nasal discharge, moist mucous membranes  Cardiovascular: regular rhythm, swelling of feet . Respiratory: breathing not labored, symmetric  Gastrointestinal: soft non-tender, +bowel sounds  Musculoskeletal: no deformity, no tenderness to palpation  Skin: warm, dry  Neurologic: following commands, moving all extremities  Psychiatric: pleasant     Other:       HISTORY:     Active Problems:    Anemia (4/2/2022)      Past Medical History:   Diagnosis Date    Diabetes ([facility]     Epilepsy ([facility]     Seizures ([facility]       Past Surgical History:   Procedure Laterality Date    COLONOSCOPY N/A 12/1/2021    COLONOSCOPY performed by Irina Scott MD at [facility]OSCOPY. Multiple sessile polyps (>30). Medium-sized int hemorrhoids.  UPPER GI ENDOSCOPY,DIAGNOSIS  4/5/2022           Family History   Problem Relation Age of Onset    Hypertension Mother     Cancer Father         unknown      History reviewed, no pertinent family history.   Social History     Tobacco Use    Smoking status: Never Smoker    Smokeless tobacco: Never Used   Substance Use Topics    Alcohol use: Never     No Known Allergies   Current Facility-Administered Medications   Medication Dose Route Frequency    oxyCODONE-acetaminophen (PERCOCET) 5-325 mg per tablet 0.5 Tablet  0.5 Tablet Oral TID    melatonin tablet 3 mg  3 mg Oral QHS PRN    levoFLOXacin (LEVAQUIN) 750 mg in D5W IVPB  750 mg IntraVENous Q24H    metroNIDAZOLE (FLAGYL) IVPB premix 500 mg  500 mg IntraVENous Q12H    balsam peru-castor oiL (VENELEX) ointment   Topical BID    ammonium lactate (LAC-HYDRIN) 12 % lotion   Topical BID    epoetin camilo-epbx (RETACRIT) injection 10,000 Units  10,000 Units SubCUTAneous Q TUE, THU & SAT    acetaminophen (TYLENOL) tablet 650 mg  650 mg Oral Q4H PRN    Or    acetaminophen (TYLENOL) suppository 650 mg  650 mg Rectal Q4H PRN    sodium chloride (NS) flush 5-40 mL  5-40 mL IntraVENous Q8H    sodium chloride (NS) flush 5-40 mL  5-40 mL IntraVENous PRN    furosemide (LASIX) tablet 20 mg  20 mg Oral ACB&D    hydrALAZINE (APRESOLINE) 20 mg/mL injection 10 mg  10 mg IntraVENous Q6H PRN    0.9% sodium chloride infusion 250 mL  250 mL IntraVENous PRN    cyanocobalamin (VITAMIN B12) tablet 1,000 mcg  1,000 mcg Oral DAILY    benzocaine-menthoL (CHLORASEPTIC MAX) lozenge 1 Lozenge  1 Lozenge Oral Q2H PRN    guaiFENesin (ROBITUSSIN) 100 mg/5 mL oral liquid 200 mg  200 mg Oral Q4H PRN    pantoprazole (PROTONIX) tablet 40 mg  40 mg Oral ACB    polyethylene glycol (MIRALAX) packet 17 g  17 g Oral DAILY PRN    ondansetron (ZOFRAN ODT) tablet 4 mg  4 mg Oral Q8H PRN    Or    ondansetron (ZOFRAN) injection 4 mg  4 mg IntraVENous Q6H PRN    divalproex DR (DEPAKOTE) tablet 500 mg  500 mg Oral DAILY    folic acid (FOLVITE) tablet 1 mg  1 mg Oral DAILY    topiramate (TOPAMAX) tablet 200 mg  200 mg Oral BID    atorvastatin (LIPITOR) tablet 40 mg  40 mg Oral DAILY    insulin lispro (HUMALOG) injection   SubCUTAneous AC&HS    glucose chewable tablet 16 g  4 Tablet Oral PRN    glucagon (GLUCAGEN) injection 1 mg  1 mg IntraMUSCular PRN    divalproex DR (DEPAKOTE) tablet 1,000 mg  1,000 mg Oral QHS          LAB AND IMAGING FINDINGS:     Lab Results   Component Value Date/Time    WBC 13.5 (H) 04/12/2022 04:36 AM    HGB 7.8 (L) 04/12/2022 04:36 AM    PLATELET 62 (L) 17/92/0558 04:36 AM     Lab Results   Component Value Date/Time    Sodium 138 04/12/2022 04:36 AM    Potassium 4.3 04/12/2022 04:36 AM    Chloride 108 04/12/2022 04:36 AM    CO2 24 04/12/2022 04:36 AM    BUN 31 (H) 04/12/2022 04:36 AM    Creatinine 0.80 04/12/2022 04:36 AM    Calcium 8.0 (L) 04/12/2022 04:36 AM    Magnesium 1.9 04/05/2022 03:10 AM      Lab Results   Component Value Date/Time    Alk. phosphatase 65 04/02/2022 01:54 PM    Protein, total 4.9 (L) 04/04/2022 10:30 AM    Albumin 2.6 (L) 04/02/2022 01:54 PM    Globulin 3.6 04/02/2022 01:54 PM     No results found for: INR, PTMR, PTP, PT1, PT2, APTT, INREXT, INREXT   Lab Results   Component Value Date/Time    Iron 191 (H) 04/03/2022 01:14 AM    TIBC 282 04/03/2022 01:14 AM    Iron % saturation 68 (H) 04/03/2022 01:14 AM    Ferritin 92 04/03/2022 01:14 AM      No results found for: PH, PCO2, PO2  No components found for: GLPOC   No results found for: CPK, CKMB             Total time: 35 mins  Counseling / coordination time, spent as noted above: 25 mins  > 50% counseling / coordination?: yes     Prolonged service was provided for  []30 min   []75 min in face to face time in the presence of the patient, spent as noted above. Time Start:   Time End:   Note: this can only be billed with 54673 (initial) or 16872 (follow up). If multiple start / stop times, list each separately.

## 2022-04-12 NOTE — PROGRESS NOTES
Problem: Self Care Deficits Care Plan (Adult)  Goal: *Acute Goals and Plan of Care (Insert Text)  Description: FUNCTIONAL STATUS PRIOR TO ADMISSION: Up until the past few weeks, he was able to manage basic self-care using a RW with supervision. His mother completes IADL. HOME SUPPORT: Mother; patient reports he fell walking with his mother; chart indicates mother has said she is unable to care for him at current level    Occupational Therapy Goals  Initiated 4/7/2022  1. Patient will perform grooming seated edge of bed with supervision/set-up within 7 day(s). 2.  Patient will perform bathing with moderate assistance  within 7 day(s). 3.  Patient will perform lower body dressing with moderate assistance  within 7 day(s). 4.  Patient will perform toilet transfers with moderate assistance  within 7 day(s). 5.  Patient will perform all aspects of toileting with moderate assistance  within 7 day(s). 6.  Patient will participate in upper extremity therapeutic exercise/activities with Min cues for 10 minutes within 7 day(s). Outcome: Not Progressing Towards Goal   OCCUPATIONAL THERAPY TREATMENT  Patient: Jaya Gale (48 y.o. male)  Date: 4/12/2022  Diagnosis: Anemia [D64.9] <principal problem not specified>  Procedure(s) (LRB):  ESOPHAGOGASTRODUODENOSCOPY (EGD) (N/A) 7 Days Post-Op  Precautions: Fall; Autism spectrum  Chart, occupational therapy assessment, plan of care, and goals were reviewed. ASSESSMENT  Patient continues with skilled OT services and is not progressing towards goals. Patient with continuing decline in self care and functional mobility skills. Pain increasing: Patient unable to quantify pain but yells out consistently with gentle PROM B LE, L worse than R; xrays today showed no L foot deficit; may need further assessment as he was ambulatory PTA and is now fully non functional due to pain; also has open sore area L ear; head and B heels repositioned for pressure relief.  He reports sore throat and is noted to cough frequently after drinking. Poor PO noted after increased time needed for tray/food placement, needed due to B UE weakness and incoordination, so he could feed himself. Took only 2 bites. Stated he cant eat any more because his throat hurts. RN notified. Current Level of Function Impacting Discharge (ADLs): D self care except self feeding 2 bites of breakfast and did drink coffee, with much coughing as noted. Incoordination with combing hair and oral care efforts. Other factors to consider for discharge: mother reports he had been mod I at RW level, but recently more weak with self care before the fall         PLAN :  Patient continues to benefit from skilled intervention to address the above impairments. Continue treatment per established plan of care to address goals. Recommend with staff: attempt bed to chair position for meals as tolerated    Recommend next OT session: ZULMA Alexandra    Recommendation for discharge: (in order for the patient to meet his/her long term goals)  Therapy up to 5 days/week in SNF setting    This discharge recommendation:  Has been made in collaboration with the attending provider and/or case management    IF patient discharges home will need the following DME: bedside commode, hospital bed, transfer bench and wheelchair       SUBJECTIVE:   Patient stated I cant eat no more cause my throat hurts.     OBJECTIVE DATA SUMMARY:   Cognitive/Behavioral Status:  Neurologic State: Alert  Orientation Level: Oriented to person;Oriented to situation;Oriented to place; Disoriented to time (knows Bday; not his age)  Cognition: Follows commands; Impaired decision making; Impulsive;Memory loss;Decreased attention/concentration (limited today by pain B LE L worse than R, )  Perception: Appears intact  Perseveration: No perseveration noted  Safety/Judgement: Decreased insight into deficits; Decreased awareness of need for safety; Awareness of environment; Fall prevention    Functional Mobility and Transfers for ADLs:  Bed Mobility:  Rolling: Total assistance;Assist x2  Supine to Sit: Total assistance;Assist x2; Additional time (inferred due to NOT TOLERATED; this is worse p! than on eval)  Sit to Supine:  (unable to reposition LEs himself, high risk for heel pressur)  Scooting: Total assistance;Assist x2; Additional time; Adaptive equipment;Bed Modified (trendelenberg)    Transfers:     Functional Transfers  Bathroom Mobility:  (not yet tolerating toilet transfer)       Balance:  Sitting: Impaired; With support (unable to tolerate sitting edge of bed due to B LE pain)  Sitting - Static:  (MD assessing pain)    ADL Intervention:  Feeding  Feeding Assistance: Set-up; Stand-by assistance (food has to be very specifically placed or he cannot manage )  Container Management: Total assistance (dependent)  Cutting Food: Total assistance (dependent)  Utensil Management: Stand-by assistance  Food to Mouth: Stand-by assistance (after plate positioned not hogh or low due to UE weakness)  Drink to Mouth: Stand-by assistance; Compensatory technique training (BUEs w/weakness/incoordination; 1/2 full covered cup helps)    Grooming  Grooming Assistance: Minimum assistance; Moderate assistance                        Toileting  Toileting Assistance: Total assistance(dependent)    Cognitive Retraining  Attention to Task: Single task  Maintains Attention For (Time): Greater than 10 minutes  Following Commands: Follows one step commands/directions (as physically able, very limited by pain)  Safety/Judgement: Decreased insight into deficits; Decreased awareness of need for safety; Awareness of environment; Fall prevention        Pain:  Patient unable to quantify pain but yells out consistently with gentle PROM B LE, L worse than R; xrays today showed no L foot deficit; may need further assessment as he was ambulatory PTA and is now fully non functional due to pain; also has open sore area L ear; head and B heels repositioned for pressure relief. Activity Tolerance:   Poor and requires frequent rest breaks    After treatment patient left in no apparent distress:   Call bell within reach, Bed / chair alarm activated, Side rails x 3, and semi fowlers position with meal full set up    COMMUNICATION/COLLABORATION:   The patients plan of care was discussed with: Physical therapy assistant, Registered nurse and Certified nursing assistant/patient care technician.      Do Roger OTR/L  Time Calculation: 23 mins

## 2022-04-12 NOTE — PROGRESS NOTES
CM: Nadira Faulkner is currently working with pt in the [place] [facility]. CM received call from admissions coordinator from [facility], regarding referral that was sent on 4/8/22. CM informed that facility can accept pt and initiate insurance auth on today with pt's insurance. Pt is currently being followed by [facility] and will require Level II. CM made aware that pt currently doesn't have an expectant d/c date. Considerations of LTC are being recommended.     Nadira Faulkner, MSW, [address] [place] [address]

## 2022-04-12 NOTE — PROGRESS NOTES
End of Shift Note    Bedside shift change report given to Lisa Frias (oncoming nurse) by Melissa Cuadra (offgoing nurse). Report included the following information SBAR, Kardex and MAR    Shift worked:  7p-7a     Shift summary and any significant changes:     Scheduled medications were given, see MAR. Patient was given Chloraseptic Max lozenges twice for cough, see PRN MAR.  IV has been flushed and is patent. Morning labs were done. Patient was repositioned during my shift. Patient teaching and routine rounding has been done. Concerns for physician to address:       Zone phone for oncoming shift:          Activity:  Activity Level: Up with Assistance  Number times ambulated in hallways past shift: 0  Number of times OOB to chair past shift: 0    Cardiac:   Cardiac Monitoring: No      Cardiac Rhythm: Sinus Rhythm    Access:   Current line(s): PIV     Genitourinary:   Urinary status: incontinent    Respiratory:   O2 Device: None (Room air)  Chronic home O2 use?: NO  Incentive spirometer at bedside: NO       GI:  Last Bowel Movement Date: 04/09/22  Current diet:  ADULT ORAL NUTRITION SUPPLEMENT Breakfast, Dinner; Low Calorie/High Protein  ADULT DIET Regular; 4 carb choices (60 gm/meal)  Passing flatus: YES  Tolerating current diet: YES       Pain Management:   Patient states pain is manageable on current regimen: YES    Skin:  Troy Score: 13  Interventions: float heels and internal/external urinary devices    Patient Safety:  Fall Score:  Total Score: 3  Interventions: gripper socks  High Fall Risk: Yes    Length of Stay:  Expected LOS: 2d 16h  Actual LOS: [facility]

## 2022-04-12 NOTE — PROGRESS NOTES
Hospitalist Progress Note    NAME: Libertad Davies   :  1955   MRN:  [id number]     HPI excerpted from admission H&P:  Gregory Simon is a 73 y.o.   male with PMHx significant for epilepsy, intellectual disability, type 2 diabetes, seizure, presents to the ER for evaluation of abnormal labs. Patient was seen by his PCP yesterday with routine lab work performed then. He was notified today to come to the ER due to hemoglobin of 4.4. History is obtained by patient's mom at bedside. Patient had a colonoscopy in 2021 due to positive FOBT. He had multiple polyps removed. Biopsy showed adenoma. Partial colectomy has been recommended by his mom did not want for him to this. On arrival vitals are stable. Repeat hemoglobin was 4.2. He also has a leukocytosis wbc 15. Per patient's mom no obvious bright red blood per rectum or melena. \"    Assessment / Plan:  H/O colonic adenoma   Severe macrocytic anemia   Thrombocytopenia   Myelodysplastic syndrome  Lymphoma  Abdominal US 22:  1. Nonspecific hypoechoic lesion in the head of the pancreas measuring 1.4 cm in  size. 2.  Splenomegaly with length of 18.5 cm an estimated volume of 11 22 mL. 3.  Contracted gallbladder. 4.  Normal appearance of liver. EGD 22:  CT abdomen/pelvis 22:  1. No definite pancreatic mass seen by this technique. 2.  Colitis diffuse with fat stranding in the peritoneal cavity. 3.  Splenomegaly. Esophagus:  Normal.  Stomach:  Normal.  Duodenum:  Normal.  - Heme/Onc input appreciated. - GI input appreciated, now signed off. - Bone marrow bx completed 22.    - Hgb 4.2 on admission.  - Patient has received total of 4 units PRBCs so far this hospitalization.   - Patient received 3 doses of IV iron. - H/H overall stable since last transfusion.   - Continue to monitor blood count. - Continue cyanocobalamin 1000 mcg po daily. - Continue folic acid 1 mg po daily.     - Continue epoetin 10,000 units sc 3 times weekly. Acute pain  - Patient continues to complain of generalized LE pain. - Continue oxycodone/apap 5/325 mg 1/2 tab po tid (MME = 11.25). Colitis, resolved  Aspiration PNA vs HACP  Leucocytosis  Fever, resolved  CXR 04/06/22:  Small left pleural effusion. Hazy left basilar atelectasis/airspace disease. CXR 04/10/22:  Mild diffuse bilateral airspace disease, similar to the prior study. - Continue current abx (levofloxacin and metronidazole). - Procalcitonin 0.19 on 04/10/22, down from 0.34    DM II  Hyperglycemia   - Continue FSBS with SSI correction scale. Malnutiriioin  Anasarca, improved  LE venous dopplers  04/02/22:  · No evidence of acute deep vein thrombosis in the right common femoral, femoral, popliteal, posterior tibial, and peroneal veins. The veins were imaged in the transverse and longitudinal planes. The vessels showed normal color filling and compressibility. Doppler interrogation showed phasic and spontaneous flow. · No evidence of acute deep vein thrombosis in the left common femoral, femoral, popliteal, posterior tibial, and peroneal veins. The veins were imaged in the transverse and longitudinal planes. The vessels showed normal color filling and compressibility. Doppler interrogation showed phasic and spontaneous flow. Echo 04/04/22:  Korene Dine: Left ventricle is mildly dilated. Increased wall thickness. Normal wall motion. Low normal left ventricular systolic function with a visually estimated EF of 50 - 55%.   Left Atrium: Left atrium is mildly dilated.   Technical qualifiers: Echo study was technically difficult due to patient's heart rhythm. - Prealbumin 8.1  - Nutritional status likely contributing to edema. - Continue current diet and nutritional supplements. - Continue furosemide 20 mg po bid. Dyslipidemia   - Continue atorvastatin 40 mg po daily.       Seizure disorder  - Continue divalproex  mg po daily and 1000 mg po daily at hs.  - Continue topiramate 200 mg po bid. GERD  - Continue pantoprazole 40 mg po daily. Psoriasis  - Continue ammonium lactate 12% top bid. Intellectual disability   - Supportive care. 25.0 - 29.9 Overweight / Body mass index is 26.45 kg/m². Estimated discharge date: April 12  Barriers:    Code status: Full  Prophylaxis: SCD's  Recommended Disposition: SNF/LTC     Subjective:     Chief Complaint / Reason for Physician Visit  Patient complaining of generalized barry LE pain. Plan of care and pertinent events reviewed with bedside nurse. Review of Systems:  Symptom Y/N Comments  Symptom Y/N Comments   Fever/Chills N   Chest Pain N    Poor Appetite Y   Edema Y    Cough N   Abdominal Pain N    Sputum N   Joint Pain N    SOB/GRULLON N   Pruritis/Rash N    Nausea/vomit N   Tolerating PT/OT     Diarrhea N   Tolerating Diet Y    Constipation    Other       Could NOT obtain due to:      Objective:     VITALS:   Last 24hrs VS reviewed since prior progress note. Most recent are:  Patient Vitals for the past 24 hrs:   Temp Pulse Resp BP SpO2   04/11/22 2324 98.2 °F (36.8 °C) 75 18 113/65 96 %   04/11/22 2036 98.4 °F (36.9 °C) 73 17 117/62 99 %   04/11/22 1515 98.3 °F (36.8 °C) 71 16 (!) 115/49 98 %   04/11/22 0800 97.5 °F (36.4 °C) 70 18 (!) 100/46 98 %       Intake/Output Summary (Last 24 hours) at 4/12/2022 0770  Last data filed at 4/11/2022 2335  Gross per 24 hour   Intake 240 ml   Output 2450 ml   Net -2210 ml        I had a face to face encounter and independently examined this patient on 4/12/2022, as outlined below:  PHYSICAL EXAM:  General:  A/A/O to person and place. NAD. HEENT:  Normocephalic. Sclera anicteric. Mucous membranes moist.    Chest:  Resps even/unlabored with symmetrical CWE. Air entry diminished at bases. Lungs CTA. No use of accessory muscles. CV:  RRR. Normal S1/S2. No M/C/R appreciated. No JVD. Generalized edema/anasarca. Cap refill < 3 sec. Peripheral pulses 1+. GI:  Abdomen soft/NT/ND. ABT X 4.    :  Voiding/condom cath. Neurologic:  Nonfocal.  Face symmetrical.  Speech normal.     Psych:  Cooperative. No anxiety or agitation. Skin:  Warm and color appropriate. No rashes or jaundice. Turgor elastic. Reviewed most current lab test results and cultures  YES  Reviewed most current radiology test results   YES  Review and summation of old records today    NO  Reviewed patient's current orders and MAR    YES  PMH/SH reviewed - no change compared to H&P  ________________________________________________________________________  Care Plan discussed with:    Comments   Patient [address]     Consultant                        Multidiciplinary team rounds were held today with , nursing, pharmacist and clinical coordinator. Patient's plan of care was discussed; medications were reviewed and discharge planning was addressed. ________________________________________________________________________  Piotr Cedillo NP     Procedures: see electronic medical records for all procedures/Xrays and details which were not copied into this note but were reviewed prior to creation of Plan. LABS:  I reviewed today's most current labs and imaging studies.   Pertinent labs include:  Recent Labs     04/12/22  0436 04/10/22  0427 04/09/22  1050   WBC 13.5* 10.9 11.9*   HGB 7.8* 7.5* 8.0*   HCT 25.0* 23.9* 25.6*   PLT 62* 47* 53*     Recent Labs     04/12/22  0436 04/10/22  0427 04/09/22  1050    140 139   K 4.3 4.1 4.2    110* 110*   CO2 24 24 24   * 133* 137*   BUN 31* 30* 30*   CREA 0.80 0.80 0.95   CA 8.0* 7.7* 7.5*       Signed: Piotr Cedillo NP

## 2022-04-12 NOTE — PROGRESS NOTES
Occupational Therapy  Chart reviewed; patient current with x-ray of foot; will retry later as able.  Johnathan Gaytan OTR/L

## 2022-04-13 LAB
GLUCOSE BLD STRIP.AUTO-MCNC: 160 MG/DL (ref 65–117)
GLUCOSE BLD STRIP.AUTO-MCNC: 219 MG/DL (ref 65–117)
GLUCOSE BLD STRIP.AUTO-MCNC: 220 MG/DL (ref 65–117)
GLUCOSE BLD STRIP.AUTO-MCNC: 261 MG/DL (ref 65–117)
SERVICE CMNT-IMP: ABNORMAL

## 2022-04-13 PROCEDURE — 74011000250 HC RX REV CODE- 250: Performed by: INTERNAL MEDICINE

## 2022-04-13 PROCEDURE — 74011250637 HC RX REV CODE- 250/637: Performed by: INTERNAL MEDICINE

## 2022-04-13 PROCEDURE — 82962 GLUCOSE BLOOD TEST: CPT

## 2022-04-13 PROCEDURE — 97535 SELF CARE MNGMENT TRAINING: CPT

## 2022-04-13 PROCEDURE — 74011250637 HC RX REV CODE- 250/637

## 2022-04-13 PROCEDURE — 74011250636 HC RX REV CODE- 250/636: Performed by: NURSE PRACTITIONER

## 2022-04-13 PROCEDURE — 97530 THERAPEUTIC ACTIVITIES: CPT

## 2022-04-13 PROCEDURE — 74011636637 HC RX REV CODE- 636/637: Performed by: INTERNAL MEDICINE

## 2022-04-13 PROCEDURE — 65660000000 HC RM CCU STEPDOWN

## 2022-04-13 RX ADMIN — Medication: at 09:35

## 2022-04-13 RX ADMIN — Medication 3 UNITS: at 23:36

## 2022-04-13 RX ADMIN — Medication: at 17:10

## 2022-04-13 RX ADMIN — DIVALPROEX SODIUM 1000 MG: 500 TABLET, DELAYED RELEASE ORAL at 23:37

## 2022-04-13 RX ADMIN — OXYCODONE AND ACETAMINOPHEN 0.5 TABLET: 5; 325 TABLET ORAL at 23:37

## 2022-04-13 RX ADMIN — TOPIRAMATE 200 MG: 100 TABLET, FILM COATED ORAL at 09:37

## 2022-04-13 RX ADMIN — ATORVASTATIN CALCIUM 40 MG: 40 TABLET, FILM COATED ORAL at 09:34

## 2022-04-13 RX ADMIN — Medication: at 09:33

## 2022-04-13 RX ADMIN — OXYCODONE AND ACETAMINOPHEN 0.5 TABLET: 5; 325 TABLET ORAL at 09:40

## 2022-04-13 RX ADMIN — SODIUM CHLORIDE, PRESERVATIVE FREE 10 ML: 5 INJECTION INTRAVENOUS at 21:00

## 2022-04-13 RX ADMIN — TOPIRAMATE 200 MG: 100 TABLET, FILM COATED ORAL at 17:17

## 2022-04-13 RX ADMIN — DIVALPROEX SODIUM 500 MG: 500 TABLET, DELAYED RELEASE ORAL at 09:35

## 2022-04-13 RX ADMIN — CYANOCOBALAMIN TAB 500 MCG 1000 MCG: 500 TAB at 09:35

## 2022-04-13 RX ADMIN — PANTOPRAZOLE SODIUM 40 MG: 40 TABLET, DELAYED RELEASE ORAL at 09:37

## 2022-04-13 RX ADMIN — SODIUM CHLORIDE, PRESERVATIVE FREE 10 ML: 5 INJECTION INTRAVENOUS at 13:00

## 2022-04-13 RX ADMIN — FUROSEMIDE 20 MG: 20 TABLET ORAL at 10:29

## 2022-04-13 RX ADMIN — ACETAMINOPHEN 325MG 650 MG: 325 TABLET ORAL at 02:23

## 2022-04-13 RX ADMIN — LEVOFLOXACIN 750 MG: 5 INJECTION, SOLUTION INTRAVENOUS at 12:39

## 2022-04-13 RX ADMIN — Medication 3 UNITS: at 17:10

## 2022-04-13 RX ADMIN — Medication 2 UNITS: at 09:36

## 2022-04-13 RX ADMIN — FOLIC ACID 1 MG: 1 TABLET ORAL at 09:36

## 2022-04-13 RX ADMIN — FUROSEMIDE 20 MG: 20 TABLET ORAL at 17:38

## 2022-04-13 RX ADMIN — Medication: at 21:00

## 2022-04-13 RX ADMIN — OXYCODONE AND ACETAMINOPHEN 0.5 TABLET: 5; 325 TABLET ORAL at 17:11

## 2022-04-13 RX ADMIN — METRONIDAZOLE 500 MG: 500 INJECTION, SOLUTION INTRAVENOUS at 09:37

## 2022-04-13 RX ADMIN — METRONIDAZOLE 500 MG: 500 INJECTION, SOLUTION INTRAVENOUS at 20:59

## 2022-04-13 RX ADMIN — Medication 3 UNITS: at 12:38

## 2022-04-13 NOTE — PROGRESS NOTES
Hospitalist Progress Note    NAME: Miguelina Gerber   :  1955   MRN:  [id number]       Assessment / Plan:  History of colonic adenoma  Severe macrocytic anemia  Thrombocytopenia  Lymphoma  Acute pain  Colitis resolved  Aspiration  Fever resolved  Diabetes mellitus type 2  Hyperglycemia  Anasarca improved  Clinical patient stable pending insurance authorization. Body mass index is 26.45 kg/m². Estimated discharge date: 2022  Barriers: Insurance authorization    Code status: Full code  Prophylaxis: SCDs woman  Recommended Disposition: Pending authorization     Subjective:     Chief Complaint / Reason for Physician Visit   Discussed with RN events overnight. Patient seen and examined at bedside comfortable awake verbalizing. Review of Systems:  Symptom Y/N Comments  Symptom Y/N Comments   Fever/Chills    Chest Pain     Poor Appetite    Edema     Cough    Abdominal Pain     Sputum    Joint Pain     SOB/GRULLON    Pruritis/Rash     Nausea/vomit    Tolerating PT/OT     Diarrhea    Tolerating Diet     Constipation    Other       Could NOT obtain due to:      Objective:     VITALS:   Last 24hrs VS reviewed since prior progress note. Most recent are:  Patient Vitals for the past 24 hrs:   Temp Pulse Resp BP SpO2   22 1028 -- 65 -- -- 96 %   22 0934 -- -- -- (!) 122/55 --   22 0800 97.5 °F (36.4 °C) 67 16 (!) 109/44 95 %   22 2335 98.5 °F (36.9 °C) 78 18 (!) 124/57 97 %   22 1939 97.4 °F (36.3 °C) 67 18 (!) 113/54 96 %   22 1526 98.9 °F (37.2 °C) 61 17 (!) 101/55 95 %       Intake/Output Summary (Last 24 hours) at 2022 1236  Last data filed at 2022 1000  Gross per 24 hour   Intake 240 ml   Output --   Net 240 ml        I had a face to face encounter and independently examined this patient on 2022, as outlined below:  PHYSICAL EXAM:  General: WD, WN. Alert, cooperative, no acute distress    EENT:  EOMI. Anicteric sclerae.  MMM  Resp:  CTA bilaterally, no wheezing or rales. No accessory muscle use  CV:  Regular  rhythm,  No edema  GI:  Soft, Non distended, Non tender. +Bowel sounds  Neurologic:  Alert and oriented X 3, normal speech,   Psych:   Good insight. Not anxious nor agitated  Skin:  No rashes. No jaundice    Reviewed most current lab test results and cultures  YES  Reviewed most current radiology test results   YES  Review and summation of old records today    NO  Reviewed patient's current orders and MAR    YES  PMH/SH reviewed - no change compared to H&P  ________________________________________________________________________  Care Plan discussed with:    Comments   Patient x    Family      RN x    Care Manager     Consultant                        Multidiciplinary team rounds were held today with , nursing, pharmacist and clinical coordinator. Patient's plan of care was discussed; medications were reviewed and discharge planning was addressed. ________________________________________________________________________  Total NON critical care TIME:    Total CRITICAL CARE TIME Spent:   Minutes non procedure based      Comments   >50% of visit spent in counseling and coordination of care     ________________________________________________________________________  Shea Guzman MD     Procedures: see electronic medical records for all procedures/Xrays and details which were not copied into this note but were reviewed prior to creation of Plan. LABS:  I reviewed today's most current labs and imaging studies.   Pertinent labs include:  Recent Labs     04/12/22 0436   WBC 13.5*   HGB 7.8*   HCT 25.0*   PLT 62*     Recent Labs     04/12/22 0436      K 4.3      CO2 24   *   BUN 31*   CREA 0.80   CA 8.0*       Signed: Shea Guzman MD

## 2022-04-13 NOTE — PROGRESS NOTES
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 Gen: nad, pale in appearance  Chest:no distress      -Labs-    Recent Labs     04/12/22  0436   WBC 13.5*   HGB 7.8*   PLT 62*   ANEU 1.5*      K 4.3   *   BUN 31*   CREA 0.80   CA 8.0*       -Imaging-   4/3/22 CT A/P:  IMPRESSION  . No definite pancreatic mass seen by this technique. 2. Colitis diffuse with fat stranding in the peritoneal cavity. 3. Splenomegaly.       -Assessment + Plan-      *) Severe macrocytic anemia, thrombocytopenia, lymphocytosis:  - MCV markedly elevated to 142, severe anemia with  hgb 4.2 on admission.  - Iron studies after transfusion not markedly elevated as I would suspect, gave Venofer this admission on 4/4 and 4/5.  - B12 on low end of normal, MMA pending, replacment started empirically  - PBS concerning for lymphoproliferative d/o such as CLL? Flow on PB pending  - No hemolysis, HIV, HCV, HBV negative, fibrinogen slightly low but no overt dic.  - spep 0.2 with IgG Kappa specificity and elevated SFLCR, has MGUS at least but marrow will help r/o overt MM. - continue folic acid daily  - Had 4U PRBC.   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 Freddie Pate after dc from [facility] planned tentatively for 5/6/22 2:15pm         *) Colitis:  - CT scan showing diffuse colitis, on IV flagyl  - EGD on 4/5 without acute findings. - GI following had multiple polyps and GI recommended colectomy that was declined.   - on Levaquin and IV flagyl

## 2022-04-13 NOTE — PROGRESS NOTES
Problem: Self Care Deficits Care Plan (Adult)  Goal: *Acute Goals and Plan of Care (Insert Text)  Description: FUNCTIONAL STATUS PRIOR TO ADMISSION: Up until the past few weeks, he was able to manage basic self-care using a RW with supervision. His mother completes IADL. HOME SUPPORT: Mother    Occupational Therapy Goals  Initiated 4/7/2022  1. Patient will perform grooming seated edge of bed with supervision/set-up within 7 day(s). 2.  Patient will perform bathing with moderate assistance  within 7 day(s). 3.  Patient will perform lower body dressing with moderate assistance  within 7 day(s). 4.  Patient will perform toilet transfers with moderate assistance  within 7 day(s). 5.  Patient will perform all aspects of toileting with moderate assistance  within 7 day(s). 6.  Patient will participate in upper extremity therapeutic exercise/activities with Min cues for 10 minutes within 7 day(s). Outcome: Not Progressing Towards Goal   OCCUPATIONAL THERAPY TREATMENT  Patient: Mercedes Hernandez (75 y.o. male)  Date: 4/13/2022  Diagnosis: Anemia [D64.9] <principal problem not specified>  Procedure(s) (LRB):  ESOPHAGOGASTRODUODENOSCOPY (EGD) (N/A) 8 Days Post-Op  Precautions: Fall,Bed Alarm,Seizure (Autism Spectrum)  Chart, occupational therapy assessment, plan of care, and goals were reviewed. ASSESSMENT  Patient continues with skilled OT services and is not progressing towards goals. poor activity/ADL functional mobility tolerance due to pain in feet L worse than R; may need further assessment due to negative x-ray of L ankle. Note edema B UEs, R (pitting edema lateral/volar side of forearm near elbow) more than L, and B LE with lasix in use today (none yesterday per RN.)  These finding are reportedly significant change for patient who was ambulatory at S level PTA until 2 weeks before admission when he began to need min A; he ten \"fell with momma; I broke my foot; it hurts. \"        Current Level of Function Impacting Discharge (ADLs): S-min A feeding and grooming with limitations of sequencing, oral motor skills, drooling with oral care without notice, 2 hands needed to bring cup near mouth still had difficulty managing straw use; less coughing noted today than yesterday    Other factors to consider for discharge: patient was S level of mobility ~ 1 month ago         PLAN :  Patient continues to benefit from skilled intervention to address the above impairments. Continue treatment per established plan of care to address goals. Recommend with staff: bed in chair position for meals as tolerated; 1:1 feeding PRN for improved PO; calorie count PRN    Recommend next OT session: c/o tx for PT to attempt edge of bed again as able    Recommendation for discharge: (in order for the patient to meet his/her long term goals)  Therapy up to 5 days/week in SNF setting    This discharge recommendation:  Has been made in collaboration with the attending provider and/or case management    IF patient discharges home will need the following DME: bedside commode, hospital bed, transfer bench, and wheelchair       SUBJECTIVE:   Patient stated It hurts my foot; dont touch it.     OBJECTIVE DATA SUMMARY:   Cognitive/Behavioral Status:  Neurologic State: Alert  Orientation Level: Oriented to person;Oriented to place; Disoriented to time;Disoriented to situation (aware he fell w/mom, aware B feet hurt, L worse than R)  Cognition: Follows commands; Impaired decision making;Decreased attention/concentration;Poor safety awareness  Perception:  (?? hyper sensitivity, Can't  tolerate AROM LE ADLs P! feet)  Perseveration: No perseveration noted  Safety/Judgement: Decreased insight into deficits; Decreased awareness of need for safety; Fall prevention (I wont get up by myself)    Functional Mobility and Transfers for ADLs:  Bed Mobility:  Rolling:  Total assistance;Maximum assistance (very limited by B foot ankle pain, L worse than R)    Transfers:     Functional Transfers  Bathroom Mobility: Dependent/total assistance (not yet tolerating out of bed due to foot pain)       Balance:       ADL Intervention:  Feeding  Feeding Assistance: Minimum assistance;Supervision (continues with decreased PO; recommend 1:1 feeding >PO)  Container Management: Total assistance (dependent)  Cutting Food: Total assistance (dependent)  Utensil Management: Stand-by assistance (w/specific set up due to decreased coordination/endurance)  Drink to Mouth:  (using 2 hands to bring straw close to mouth; decreased stren)  Cues: Tactile cues provided;Verbal cues provided;Visual cues provided (less coughing today)    Grooming  Grooming Assistance: Minimum assistance  Position Performed:  (semifowlers; poor tolerance for chair position, B LE P!)  Washing Face: Supervision (cues for quality)  Washing Hands: Supervision (vc for quality)  Brushing Teeth: Minimum assistance (tactile cues for sequencing, decreased attention to secretio)  Brushing/Combing Hair: Minimum assistance; Moderate assistance (very limited by ?psoraisis under hair, painful combing hair)                   Lower Body Dressing Assistance  Dressing Assistance: Total assistance(dependent) (inferred; intolerant of LE AROM due to ankle pain)    Toileting  Toileting Assistance: Total assistance(dependent)    Cognitive Retraining  Attention to Task: Single task  Maintains Attention For (Time): Greater than 10 minutes  Following Commands: Follows one step commands/directions (asphysically able)  Safety/Judgement: Decreased insight into deficits; Decreased awareness of need for safety; Fall prevention (I wont get up by myself)        Pain:  Poor tolerance for functional mobility and LE ADLs due to L ankle pain worse than R ankle pain    Activity Tolerance:   Fair, Poor, SpO2 stable on RA, requires frequent rest breaks, observed SOB with activity, and poor activity/ADL functional mobility tolerance due to pain in feet L worse than R; may need further assessment due to negative x-ray    After treatment patient left in no apparent distress:   Call bell within reach, Bed / chair alarm activated, Side rails x 3, and semi fowlers position    COMMUNICATION/COLLABORATION:   The patients plan of care was discussed with: Registered nurse.      Tatyana Chávez OTR/L  Time Calculation: 28 mins

## 2022-04-13 NOTE — PROGRESS NOTES
End of Shift Note    Bedside shift change report given to Amado Navarro (oncoming nurse) by Loan Jefferson (offgoing nurse). Report included the following information SBAR, Kardex and MAR    Shift worked:  7am to 7pm     Shift summary and any significant changes:     Patient tolerated care fairly throughout shift. Hourly rounding completed. Medications given and education given. Complaints of pain treated with PRN Pain meds. Patient leg are very uncomfortable to move. Patient turned Q2 hours. PT/OT worked with the patient.           Loan Jefferson

## 2022-04-13 NOTE — PROGRESS NOTES
Comprehensive Nutrition Assessment    Type and Reason for Visit: Reassess    Nutrition Recommendations/Plan:   Continue consistent carb diet (60g/meal)  Continue Ensure high protein (low CHO, high protein supplement) BID  Please document % meals and supplements consumed in flowsheet I/O's under intake     Nutrition Assessment:      Chart reviewed. New dx of MDS and lymphoma confirmed. RD visited pt and mother at bedside. She reports he is eating well (as long as the food is not hard and someone helps him eat) and he is drinking the supplements as ordered. No new nutrition concerns at this time. Patient Vitals for the past 168 hrs:   % Diet Eaten   04/13/22 1000 51 - 75%   04/11/22 1015 51 - 75%   04/10/22 1823 1 - 25%   04/10/22 1306 26 - 50%   04/10/22 1008 1 - 25%     Wt Readings from Last 5 Encounters:   04/02/22 88.5 kg (195 lb)   12/01/21 88.5 kg (195 lb)   ]    Estimated Daily Nutrient Needs:  Energy (kcal): 2220 kcals (BMR x 1. 3AF); Weight Used for Energy Requirements: Current  Protein (g): 71-86g (0.8-1.0g/kg); Weight Used for Protein Requirements: Current  Fluid (ml/day): 2200mL; Method Used for Fluid Requirements: 1 ml/kcal      Nutrition Related Findings:  Labs: -160-200. Meds: lipitor, B12, folvite, lasix, hualog, levaquin, flagyl, percocet. Edema: 2+LUE, 2+/3+ RUE, 3+BLE. BM 4/13. Wounds:    None       Current Nutrition Therapies:  ADULT ORAL NUTRITION SUPPLEMENT Breakfast, Dinner; Low Calorie/High Protein  ADULT DIET Regular; 4 carb choices (60 gm/meal)    Anthropometric Measures:  · Height:  6' (182.9 cm)  · Current Body Wt:  88.5 kg (195 lb)   · Ideal Body Wt:  178 lbs:  109.6 %   · BMI Category: Overweight (BMI 25.0-29. 9)       Nutrition Diagnosis:   · Inadequate protein intake related to biting/chewing (masticatory) difficulty (per mother) as evidenced by  (trouble chewing meat)  Dx continues, improving with meal and supplement intake.      Nutrition Interventions:   Food and/or Nutrient Delivery: Continue current diet,Continue oral nutrition supplement  Nutrition Education and Counseling: No recommendations at this time  Coordination of Nutrition Care: Continue to monitor while inpatient    Goals:  PO intake >70% meals and supplments next 5-7 days       Nutrition Monitoring and Evaluation:   Behavioral-Environmental Outcomes: None identified  Food/Nutrient Intake Outcomes: Food and nutrient intake,Supplement intake  Physical Signs/Symptoms Outcomes: Biochemical data,GI status,Weight    Discharge Planning:    Continue current diet,Continue oral nutrition supplement     Electronically signed by Marquis Raymond RD on 4/13/2022 at 4:13 PM    Contact: [phone]

## 2022-04-13 NOTE — PROGRESS NOTES
End of Shift Note    Bedside shift change report given to Orlando Mckinnon (oncoming nurse) by Lexi Haynes (offgoing nurse).   Report included the following information SBAR, Kardex, MAR and Recent Results    Shift worked:  7p-7a     Shift summary and any significant changes:     patient complains of generalized aches frequently and tylenol given for headache, he rested fairly this night sleeping at short intervals only     Concerns for physician to address:  none               Lexi Haynes

## 2022-04-13 NOTE — PROGRESS NOTES
Problem: Mobility Impaired (Adult and Pediatric)  Goal: *Acute Goals and Plan of Care (Insert Text)  Description: FUNCTIONAL STATUS PRIOR TO ADMISSION: The patient was functional at the wheelchair level and required minimal assistance for transfers to the chair. He was ambulatory with rolling walker prior to 2 weeks ago. One week ago, he had a posterior fall going up one stair into home (with assist of his mother) and currently has a head abrasion. HOME SUPPORT PRIOR TO ADMISSION: The patient lived with elderly mother and required minimal assistance/contact guard assist for ADLs/mobility prior to 2 weeks ago. He was declining at home and required more than minimal assist.    Physical Therapy Goals  Initiated 4/7/2022  1. Patient will move from supine to sit and sit to supine  in bed with minimal assistance/contact guard assist within 7 day(s). 2.  Patient will transfer from bed to chair and chair to bed with minimal assistance/contact guard assist x 2 using the least restrictive device within 7 day(s). 3.  Patient will perform sit to stand with minimal assistance/contact guard assist x 2 within 7 day(s). 4.  Patient will ambulate with minimal assistance/contact guard assist x 2 for 20 feet with the least restrictive device within 7 day(s). Outcome: Not Progressing Towards Goals  PHYSICAL THERAPY TREATMENT  Patient: Kelly Beasley (89 y.o. male)  Date: 4/13/2022  Diagnosis: Anemia [D64.9] <principal problem not specified>  Procedure(s) (LRB):  ESOPHAGOGASTRODUODENOSCOPY (EGD) (N/A) 8 Days Post-Op  Precautions: Fall,Bed Alarm,Seizure (Autism Spectrum)  Chart, physical therapy assessment, plan of care and goals were reviewed. ASSESSMENT  Patient continues with skilled PT services and is not progressing towards goals. Pt received in bed. He yells out with any attempts at movement of LEs off of the pillow.  Able to distract some and get him to roll with max A holding rail and come to sit with total A of 2. Once sitting, he was calm and showed good static balance. Attempted to encourage pt to attempt standing with HHA but pt adamantly refused and cried out for his mom. Attempted to distract or give targeted goal, but not able to obtain cooperation to stand. Pt repeating he wanted to lie back down. He moved to supine with min A of 2. Current Level of Function Impacting Discharge (mobility/balance): max A roll; total A of 2 to come to sit and min A of 2 to return to bed; refused to attempt standing    Other factors to consider for discharge: intellectual disability and strange environment make progression of mobility more complex; LE pain and swelling, fall risk; Mother unable to assist pt at this level of function         PLAN :  Patient continues to benefit from skilled intervention to address the above impairments. Continue treatment per established plan of care. to address goals. Recommendation for discharge: (in order for the patient to meet his/her long term goals)  Therapy up to 5 days/week in SNF setting/LTC    This discharge recommendation:  Has been made in collaboration with the attending provider and/or case management    IF patient discharges home will need the following DME: to be determined (TBD)       SUBJECTIVE:   Patient stated [facility] Nabil Umanzor    OBJECTIVE DATA SUMMARY:   Critical Behavior:  Neurologic State: Alert  Orientation Level: Oriented to person,Oriented to place,Disoriented to time,Disoriented to situation (aware he fell w/mom, aware B feet hurt, L worse than R)  Cognition: Follows commands,Impaired decision making,Decreased attention/concentration,Poor safety awareness  Safety/Judgement: Decreased insight into deficits,Decreased awareness of need for safety,Fall prevention (I wont get up by myself)  Functional Mobility Training:  Bed Mobility:  Rolling: Maximum assistance  Supine to Sit: Total assistance;Assist x2  Sit to Supine: Minimum assistance;Assist x2  Scooting: Maximum assistance          Balance:  Sitting: Impaired  Sitting - Static: Good (unsupported)  Sitting - Dynamic: Fair (occasional)  Standing:  (did not agree to stand attempt)    Pain Rating:  C/o pain with movement of LEs, does not rate; heels floated    Activity Tolerance:   Poor    After treatment patient left in no apparent distress:   Supine in bed, Call bell within reach, and Side rails x 3    COMMUNICATION/COLLABORATION:   The patients plan of care was discussed with: Registered nurse.      Merna Muhammad, PT   Time Calculation: 12 mins

## 2022-04-14 LAB
ANION GAP SERPL CALC-SCNC: 5 MMOL/L (ref 5–15)
BASOPHILS # BLD: 0 K/UL (ref 0–0.1)
BASOPHILS NFR BLD: 0 % (ref 0–1)
BUN SERPL-MCNC: 34 MG/DL (ref 6–20)
BUN/CREAT SERPL: 43 (ref 12–20)
CALCIUM SERPL-MCNC: 7.8 MG/DL (ref 8.5–10.1)
CHLORIDE SERPL-SCNC: 109 MMOL/L (ref 97–108)
CO2 SERPL-SCNC: 26 MMOL/L (ref 21–32)
CREAT SERPL-MCNC: 0.8 MG/DL (ref 0.7–1.3)
DIFFERENTIAL METHOD BLD: ABNORMAL
EOSINOPHIL # BLD: 0.6 K/UL (ref 0–0.4)
EOSINOPHIL NFR BLD: 5 % (ref 0–7)
ERYTHROCYTE [DISTWIDTH] IN BLOOD BY AUTOMATED COUNT: 22.7 % (ref 11.5–14.5)
GLUCOSE BLD STRIP.AUTO-MCNC: 163 MG/DL (ref 65–117)
GLUCOSE BLD STRIP.AUTO-MCNC: 185 MG/DL (ref 65–117)
GLUCOSE BLD STRIP.AUTO-MCNC: 214 MG/DL (ref 65–117)
GLUCOSE BLD STRIP.AUTO-MCNC: 219 MG/DL (ref 65–117)
GLUCOSE SERPL-MCNC: 125 MG/DL (ref 65–100)
HCT VFR BLD AUTO: 25.5 % (ref 36.6–50.3)
HGB BLD-MCNC: 8 G/DL (ref 12.1–17)
IMM GRANULOCYTES # BLD AUTO: 0 K/UL (ref 0–0.04)
IMM GRANULOCYTES NFR BLD AUTO: 0 % (ref 0–0.5)
LYMPHOCYTES # BLD: 6.2 K/UL (ref 0.8–3.5)
LYMPHOCYTES NFR BLD: 54 % (ref 12–49)
MCH RBC QN AUTO: 35.7 PG (ref 26–34)
MCHC RBC AUTO-ENTMCNC: 31.4 G/DL (ref 30–36.5)
MCV RBC AUTO: 113.8 FL (ref 80–99)
MONOCYTES # BLD: 0.7 K/UL (ref 0–1)
MONOCYTES NFR BLD: 6 % (ref 5–13)
NEUTS BAND NFR BLD MANUAL: 13 %
NEUTS SEG # BLD: 3.3 K/UL (ref 1.8–8)
NEUTS SEG NFR BLD: 16 % (ref 32–75)
NRBC # BLD: 0 K/UL (ref 0–0.01)
NRBC BLD-RTO: 0 PER 100 WBC
OTHER CELLS NFR BLD MANUAL: 6 %
PLATELET # BLD AUTO: 73 K/UL (ref 150–400)
PMV BLD AUTO: 9.8 FL (ref 8.9–12.9)
POTASSIUM SERPL-SCNC: 4 MMOL/L (ref 3.5–5.1)
RBC # BLD AUTO: 2.24 M/UL (ref 4.1–5.7)
RBC MORPH BLD: ABNORMAL
RBC MORPH BLD: ABNORMAL
SERVICE CMNT-IMP: ABNORMAL
SODIUM SERPL-SCNC: 140 MMOL/L (ref 136–145)
WBC # BLD AUTO: 11.5 K/UL (ref 4.1–11.1)

## 2022-04-14 PROCEDURE — 74011250637 HC RX REV CODE- 250/637: Performed by: INTERNAL MEDICINE

## 2022-04-14 PROCEDURE — 82962 GLUCOSE BLOOD TEST: CPT

## 2022-04-14 PROCEDURE — 97530 THERAPEUTIC ACTIVITIES: CPT

## 2022-04-14 PROCEDURE — 74011250636 HC RX REV CODE- 250/636: Performed by: INTERNAL MEDICINE

## 2022-04-14 PROCEDURE — 85025 COMPLETE CBC W/AUTO DIFF WBC: CPT

## 2022-04-14 PROCEDURE — 74011250637 HC RX REV CODE- 250/637

## 2022-04-14 PROCEDURE — 74011000250 HC RX REV CODE- 250: Performed by: INTERNAL MEDICINE

## 2022-04-14 PROCEDURE — 97535 SELF CARE MNGMENT TRAINING: CPT

## 2022-04-14 PROCEDURE — 65660000000 HC RM CCU STEPDOWN

## 2022-04-14 PROCEDURE — 74011250636 HC RX REV CODE- 250/636: Performed by: NURSE PRACTITIONER

## 2022-04-14 PROCEDURE — 74011636637 HC RX REV CODE- 636/637: Performed by: INTERNAL MEDICINE

## 2022-04-14 PROCEDURE — 74011250637 HC RX REV CODE- 250/637: Performed by: NURSE PRACTITIONER

## 2022-04-14 PROCEDURE — 36415 COLL VENOUS BLD VENIPUNCTURE: CPT

## 2022-04-14 PROCEDURE — 80048 BASIC METABOLIC PNL TOTAL CA: CPT

## 2022-04-14 RX ADMIN — METRONIDAZOLE 500 MG: 500 INJECTION, SOLUTION INTRAVENOUS at 09:28

## 2022-04-14 RX ADMIN — FUROSEMIDE 20 MG: 20 TABLET ORAL at 09:30

## 2022-04-14 RX ADMIN — GUAIFENESIN 200 MG: 200 SOLUTION ORAL at 01:55

## 2022-04-14 RX ADMIN — Medication 2 UNITS: at 21:42

## 2022-04-14 RX ADMIN — Medication: at 17:26

## 2022-04-14 RX ADMIN — Medication 2 UNITS: at 09:28

## 2022-04-14 RX ADMIN — OXYCODONE AND ACETAMINOPHEN 0.5 TABLET: 5; 325 TABLET ORAL at 21:43

## 2022-04-14 RX ADMIN — OXYCODONE AND ACETAMINOPHEN 0.5 TABLET: 5; 325 TABLET ORAL at 09:30

## 2022-04-14 RX ADMIN — Medication 1 LOZENGE: at 01:55

## 2022-04-14 RX ADMIN — EPOETIN ALFA-EPBX 10000 UNITS: 10000 INJECTION, SOLUTION INTRAVENOUS; SUBCUTANEOUS at 20:15

## 2022-04-14 RX ADMIN — SODIUM CHLORIDE, PRESERVATIVE FREE 10 ML: 5 INJECTION INTRAVENOUS at 05:29

## 2022-04-14 RX ADMIN — DIVALPROEX SODIUM 500 MG: 500 TABLET, DELAYED RELEASE ORAL at 09:28

## 2022-04-14 RX ADMIN — Medication: at 10:28

## 2022-04-14 RX ADMIN — Medication 2 UNITS: at 17:20

## 2022-04-14 RX ADMIN — TOPIRAMATE 200 MG: 100 TABLET, FILM COATED ORAL at 17:20

## 2022-04-14 RX ADMIN — MELATONIN 3 MG: at 01:55

## 2022-04-14 RX ADMIN — SODIUM CHLORIDE, PRESERVATIVE FREE 10 ML: 5 INJECTION INTRAVENOUS at 17:21

## 2022-04-14 RX ADMIN — SODIUM CHLORIDE, PRESERVATIVE FREE 10 ML: 5 INJECTION INTRAVENOUS at 21:43

## 2022-04-14 RX ADMIN — OXYCODONE AND ACETAMINOPHEN 0.5 TABLET: 5; 325 TABLET ORAL at 17:19

## 2022-04-14 RX ADMIN — PANTOPRAZOLE SODIUM 40 MG: 40 TABLET, DELAYED RELEASE ORAL at 09:30

## 2022-04-14 RX ADMIN — DIVALPROEX SODIUM 1000 MG: 500 TABLET, DELAYED RELEASE ORAL at 20:14

## 2022-04-14 RX ADMIN — LEVOFLOXACIN 750 MG: 5 INJECTION, SOLUTION INTRAVENOUS at 10:29

## 2022-04-14 RX ADMIN — Medication 3 UNITS: at 12:06

## 2022-04-14 RX ADMIN — FUROSEMIDE 20 MG: 20 TABLET ORAL at 17:20

## 2022-04-14 RX ADMIN — TOPIRAMATE 200 MG: 100 TABLET, FILM COATED ORAL at 09:29

## 2022-04-14 RX ADMIN — Medication: at 21:45

## 2022-04-14 RX ADMIN — METRONIDAZOLE 500 MG: 500 INJECTION, SOLUTION INTRAVENOUS at 20:14

## 2022-04-14 RX ADMIN — CYANOCOBALAMIN TAB 500 MCG 1000 MCG: 500 TAB at 09:29

## 2022-04-14 RX ADMIN — ATORVASTATIN CALCIUM 40 MG: 40 TABLET, FILM COATED ORAL at 09:30

## 2022-04-14 RX ADMIN — FOLIC ACID 1 MG: 1 TABLET ORAL at 10:29

## 2022-04-14 NOTE — PROGRESS NOTES
Problem: Self Care Deficits Care Plan (Adult)  Goal: *Acute Goals and Plan of Care (Insert Text)  Description: FUNCTIONAL STATUS PRIOR TO ADMISSION: Up until the past few weeks, he was able to manage basic self-care using a RW with supervision. His mother completes IADL. HOME SUPPORT: Mother    Occupational Therapy Goals  Initiated 4/7/2022  1. Patient will perform grooming seated edge of bed with supervision/set-up within 7 day(s). 2.  Patient will perform bathing with moderate assistance  within 7 day(s). 3.  Patient will perform lower body dressing with moderate assistance  within 7 day(s). 4.  Patient will perform toilet transfers with moderate assistance  within 7 day(s). 5.  Patient will perform all aspects of toileting with moderate assistance  within 7 day(s). 6.  Patient will participate in upper extremity therapeutic exercise/activities with Min cues for 10 minutes within 7 day(s). Outcome: Progressing Towards Goal   OCCUPATIONAL THERAPY TREATMENT  Patient: Simone Madrid (57 y.o. male)  Date: 4/14/2022  Diagnosis: Anemia [D64.9] <principal problem not specified>  Procedure(s) (LRB):  ESOPHAGOGASTRODUODENOSCOPY (EGD) (N/A) 9 Days Post-Op  Precautions: Fall,Bed Alarm,Seizure (Autism Spectrum)  Chart, occupational therapy assessment, plan of care, and goals were reviewed. ASSESSMENT  Patient continues with skilled OT services and is progressing towards goals. Improved participation today despite continued B foot/ankle pain and edema, L worse than R. Edema still B UEs and scrotum, but slightly less than yesterday. Foot pain is primary limiting factor; unable to take a step in standing; Patient was ambulatory PTA. Loss of functional sitting balance backwards when attempting to comb hair; spontaneously maintains static sitting balance by holding onto bed frame at thighs. Poor ability to scoot. Attempting to follow familiar simple 1 step commands consistently today.  Poor appetite noted; does patient need calorie count? Current Level of Function Impacting Discharge (ADLs): min A-Max A UE ADLs, D LE ADLs and mod/max A bed mobility ans sit to stand. Unable to complete transfer to chair despite standing x 4, max A x 2 w/heavy use RW    Other factors to consider for discharge: mother does not feel she is able to assist in the home at this level of weakness         PLAN :  Patient continues to benefit from skilled intervention to address the above impairments. Continue treatment per established plan of care to address goals. Recommend with staff: bed in chair position for all meals, provide encouragement for meals and 1:1 PRN; calorie count? ? Recommend next OT session: stand aide/functional standing/transport assist to chair; UE dressing unsupported EOB    Recommendation for discharge: (in order for the patient to meet his/her long term goals)  Therapy up to 5 days/week in SNF setting    This discharge recommendation:  Has been made in collaboration with the attending provider and/or case management    IF patient discharges home will need the following DME: TBA       SUBJECTIVE:   Patient stated I use a walker at home.     OBJECTIVE DATA SUMMARY:   Cognitive/Behavioral Status:  Neurologic State: Alert; Appropriate for age;Confused (impaired intellectual ability)  Orientation Level: Oriented to person;Oriented to place;Oriented to situation (\"hospital\"; \"I fell down with Momma. \")  Cognition: Follows commands; Impulsive; Impaired decision making;Memory loss (stayed on task full session today)  Perception: Appears intact  Perseveration: No perseveration noted  Safety/Judgement: Fall prevention;Decreased insight into deficits; Awareness of environment    Functional Mobility and Transfers for ADLs:  Bed Mobility:  Rolling: Moderate assistance;Maximum assistance; Additional time;Assist x2 (improved)  Supine to Sit: Maximum assistance; Additional time; Adaptive equipment;Assist x2  Sit to Supine: Minimum assistance;Assist x2  Scooting: Maximum assistance; Total assistance; Additional time;Assist x2    Transfers:  Sit to Stand: Maximum assistance;Assist x2; Additional time; Adaptive equipment (RW, heavy gaitbelt use)     Bed to Chair:  (recommend use of stand-aide or other stand assist device for)    Balance:  Sitting: Impaired  Sitting - Static: Good (unsupported); Supported sitting (consistently holding on to front of bed for support)  Sitting - Dynamic: Fair (occasional) (loss of balance backward while trying to use comb R hand)  Standing: Impaired; With support  Standing - Static: Constant support;Fair;Poor (A x 2, RW)  Standing - Dynamic : Constant support;Poor (unable to take a step any direction max A x 2 standing (4x))    ADL Intervention:  Feeding  Feeding Assistance: Set-up; Total assistance (dependent) (very poor PO, 1-3 bites of food; \"Im full\" calorie count?)  Utensil Management:  (very brief utensil use, poor appetite, need effect set up)  Drink to Mouth:  (covered cup working well 1/2 full recommended)    Grooming  Grooming Assistance: Minimum assistance; Moderate assistance  Brushing/Combing Hair: Minimum assistance; Moderate assistance (head pain w/psorisis; loss of balance backwards w/comb use)  Cues: Tactile cues provided;Verbal cues provided;Visual cues provided                   Lower Body Dressing Assistance  Dressing Assistance: Total assistance(dependent)    Toileting  Toileting Assistance:  (\"Im not wearing catheter\")    Cognitive Retraining  Attention to Task: Single task  Maintains Attention For (Time): Greater than 10 minutes  Following Commands: Follows one step commands/directions (well well with command, \"show me how you do it at home. \")  Safety/Judgement: Fall prevention;Decreased insight into deficits; Awareness of environment      Pain:  Continues to c/o B LE foot pain but able to briefly stand this session, pain increased with attempt to take a step but he did tolerate standing ~ 30 seconds 4 times with max A x 2 and heavy use RW for support    Activity Tolerance:   Fair, Poor, SpO2 stable on RA, requires rest breaks, and small nice improvement today    After treatment patient left in no apparent distress:   Heels elevated for pressure relief, Call bell within reach, Bed / chair alarm activated, Side rails x 3, and bed in chair position    COMMUNICATION/COLLABORATION:   The patients plan of care was discussed with: Physical therapist and Registered nurse.      Tess Otero OTR/L  Time Calculation: 40 mins

## 2022-04-14 NOTE — PROGRESS NOTES
Transition of Care Plan:    RUR: 18%  Disposition: SN[facility] placement[facility] ([facility]-pending [facility] and insurance auth  Follow up appointments: Follow up with PCP and/or Specialist   DME needed: will require at the facility  Transportation at Discharge:[facility] transport-CM will arrange   Keys or means to access home:   N/A    IM Medicare Letter: 2nd  Medicare Letter to be given  Is patient a BCPI-A Bundle:  CM will provide if required    If yes, was Bundle Letter given?:    Is patient a [facility] and connected with the [address] [facility]? N/A            If yes, was [place] transfer form completed and [facility] notified? Caregiver Contact:Melly Gutierrez (mother) [phone]  Discharge Caregiver contacted prior to discharge? Family to be contacted-mother by bedside  Care Conference needed?:     Not at this time      CM made pt's mother aware that [facility] was [facility] and insurance Richy Mckeon has been received. CM made aware that pt is waiting for [facility] ([facility]). CM will inform pt's mother of the following.     Mara Cooley, SHAAN, [address] [place] [address]

## 2022-04-14 NOTE — PROGRESS NOTES
Problem: Falls - Risk of  Goal: *Absence of Falls  Description: Document Orlin Anand Fall Risk and appropriate interventions in the flowsheet. Outcome: Progressing Towards Goal  Note: Fall Risk Interventions:  Mobility Interventions: Bed/chair exit alarm,PT Consult for mobility concerns,OT consult for ADLs,Communicate number of staff needed for ambulation/transfer,Strengthening exercises (ROM-active/passive),Assess mobility with egress test    Mentation Interventions: Bed/chair exit alarm,Door open when patient unattended,More frequent rounding,Room close to nurse's station,Reorient patient,Toileting rounds,Familiar objects from home    Medication Interventions: Bed/chair exit alarm,Evaluate medications/consider consulting pharmacy,Patient to call before getting OOB,Teach patient to arise slowly    Elimination Interventions: Bed/chair exit alarm,Call light in reach,Patient to call for help with toileting needs,Toilet paper/wipes in reach,Toileting schedule/hourly rounds,Stay With Me (per policy)    History of Falls Interventions: Bed/chair exit alarm,Door open when patient unattended,Evaluate medications/consider consulting pharmacy,Room close to nurse's station         Problem: Pressure Injury - Risk of  Goal: *Prevention of pressure injury  Description: Document Troy Scale and appropriate interventions in the flowsheet. Outcome: Progressing Towards Goal  Note: Pressure Injury Interventions:  Sensory Interventions: Assess changes in LOC,Avoid rigorous massage over bony prominences,Discuss PT/OT consult with provider,Maintain/enhance activity level,Keep linens dry and wrinkle-free,Pad between skin to skin,Turn and reposition approx.  every two hours (pillows and wedges if needed)    Moisture Interventions: Absorbent underpads,Apply protective barrier, creams and emollients,Limit adult briefs,Maintain skin hydration (lotion/cream),Moisture barrier,Check for incontinence Q2 hours and as needed    Activity Interventions: PT/OT evaluation,Increase time out of bed    Mobility Interventions: PT/OT evaluation,HOB 30 degrees or less,Float heels,Turn and reposition approx.  every two hours(pillow and wedges)    Nutrition Interventions: Document food/fluid/supplement intake,Offer support with meals,snacks and hydration    Friction and Shear Interventions: Apply protective barrier, creams and emollients,Feet elevated on foot rest,HOB 30 degrees or less,Lift team/patient mobility team                Problem: Breathing Pattern - Ineffective  Goal: *Absence of hypoxia  Outcome: Progressing Towards Goal

## 2022-04-14 NOTE — PROGRESS NOTES
Problem: Mobility Impaired (Adult and Pediatric)  Goal: *Acute Goals and Plan of Care (Insert Text)  Description: FUNCTIONAL STATUS PRIOR TO ADMISSION: The patient was functional at the wheelchair level and required minimal assistance for transfers to the chair. He was ambulatory with rolling walker prior to 2 weeks ago. One week ago, he had a posterior fall going up one stair into home (with assist of his mother) and currently has a head abrasion. HOME SUPPORT PRIOR TO ADMISSION: The patient lived with elderly mother and required minimal assistance/contact guard assist for ADLs/mobility prior to 2 weeks ago. He was declining at home and required more than minimal assist.    Physical Therapy Goals  Initiated 4/7/2022  1. Patient will move from supine to sit and sit to supine  in bed with minimal assistance/contact guard assist within 7 day(s). 2.  Patient will transfer from bed to chair and chair to bed with minimal assistance/contact guard assist x 2 using the least restrictive device within 7 day(s). 3.  Patient will perform sit to stand with minimal assistance/contact guard assist x 2 within 7 day(s). 4.  Patient will ambulate with minimal assistance/contact guard assist x 2 for 20 feet with the least restrictive device within 7 day(s). Revised 4/14/2022  1. Patient will move from supine to sit and sit to supine  in bed with moderate assist within 7 day(s). 2.  Patient will transfer from bed to chair and chair to bed with moderate assist using the least restrictive device within 7 day(s). 3.  Patient will perform sit to stand with moderate assist within 7 day(s). 4.  Patient will ambulate with moderate assist for 5' feet with the least restrictive device within 7 day(s).      Outcome: Progressing Towards Goal   PHYSICAL THERAPY TREATMENT: WEEKLY REASSESSMENT  Patient: Violetta Burns (18 y.o. male)  Date: 4/14/2022  Primary Diagnosis: Anemia [D64.9]  Procedure(s) (LRB):  ESOPHAGOGASTRODUODENOSCOPY (EGD) (N/A) 9 Days Post-Op   Precautions:   Irene Me (Autism Spectrum)      ASSESSMENT  Patient continues with skilled PT services and is slowly progressing towards goals. He has been limited by his cognitive status with intellectual disability given he is in an unfamiliar environment and c/o LE pain specifically in feet L>R and BLE swelling. He has been able to progress today to mod -max A of 2 to roll, max A of 2 with better pt participation in supine to sit, min A of 2 sit to supine and showed fair sitting balance with some dynamic intermittent posterior lean. He was motivated and able to attempt sit to stand x4 to walker today. He did not have significant c/o pain in stand until very end when fatigued. He did c/o pain with donning of socks and initial move to sitting at edge of bed but less so this session than prior. Pt was unable to maintain full stand long enough to safely turn to chair with the walker; pt attempted to move forward to shift toward pivot transfers but was unsafe to complete. He may benefit from use of Best  for transfer attempt next session. Patient's progression toward goals since last assessment: has improved in participation, attempts at standing, and motivation to move to chair. His still requires significant A of 2 for mobility but is showing improved self-initiated movement. He was better able to follow commands today and tolerated mobility better from a pain standpoint. Functional Outcome Measure: The patient scored 10/100 on the barthel outcome measure which is indicative of 90% functional impairment. Other factors to consider for discharge: Pt with need of significant A of 2 for mobility, fall risk, intellectual disability, mother no longer able to care for him at this level of function         PLAN :  Goals have been updated based on progression since last assessment.   Patient continues to benefit from skilled intervention to address the above impairments. Recommendations and Planned Interventions: bed mobility training, transfer training, gait training, therapeutic exercises, patient and family training/education, and therapeutic activities      Frequency/Duration: Patient will be followed by physical therapy:  4 times a week to address goals. Recommendation for discharge: (in order for the patient to meet his/her long term goals)  Therapy up to 5 days/week in SNF setting    This discharge recommendation:  Has been made in collaboration with the attending provider and/or case management    IF patient discharges home will need the following DME: to be determined (TBD)         SUBJECTIVE:   Patient stated I want to get in the chair.     OBJECTIVE DATA SUMMARY:   HISTORY:    Past Medical History:   Diagnosis Date    Diabetes ([facility] [address]     Epilepsy ([facility] [address]     Seizures ([facility] [address]      Past Surgical History:   Procedure Laterality Date    COLONOSCOPY N/A 12/1/2021    COLONOSCOPY performed by Nadia Garner MD at [facility] ENDOSCOPY. Multiple sessile polyps (>30). Medium-sized int hemorrhoids. UPPER GI ENDOSCOPY,DIAGNOSIS  4/5/2022            Personal factors and/or comorbidities impacting plan of care: intellectual disability    Home Situation  Home Environment: Private residence  # Steps to Enter: 1  Rails to Enter: Yes  Hand Rails : Left  One/Two Story Residence: One story  Living Alone: No  Support Systems: Parent(s)  Patient Expects to be Discharged to[de-identified] Home with home health  Current DME Used/Available at Home: Walker, rolling,Wheelchair,Shower chair,Grab bars  Tub or Shower Type: Tub/Shower combination    EXAMINATION/PRESENTATION/DECISION MAKING:   Critical Behavior:  Neurologic State: Alert,Appropriate for age,Confused (impaired intellectual ability)  Orientation Level: Oriented to person,Oriented to place,Oriented to situation (\"hospital\"; \"I fell down with Momma. \")  Cognition: Follows commands,Impulsive,Impaired decision making,Memory loss (stayed on task full session today)  Safety/Judgement: Fall prevention,Decreased insight into deficits,Awareness of environment  Hearing: Auditory  Auditory Impairment: None       Functional Mobility:  Bed Mobility:  Rolling: Moderate assistance;Maximum assistance; Additional time;Assist x2 (improved)  Supine to Sit: Maximum assistance; Additional time; Adaptive equipment;Assist x2  Sit to Supine: Minimum assistance;Assist x2  Scooting: Maximum assistance; Total assistance; Additional time;Assist x2  Transfers:  Sit to Stand: Maximum assistance;Assist x2; Additional time; Adaptive equipment (RW, heavy gaitbelt use)  Stand to Sit: Minimum assistance;Assist x2;Adaptive equipment (RW)        Bed to Chair:  (recommend use of stand-aide or other stand assist device for)              Balance:   Sitting: Impaired  Sitting - Static: Good (unsupported); Supported sitting (consistently holding on to front of bed for support)  Sitting - Dynamic: Fair (occasional) (loss of balance backward while trying to use comb R hand)  Standing: Impaired; With support  Standing - Static: Constant support;Poor (A x 2, RW)  Standing - Dynamic : Constant support;Poor (unable to take a step any direction max A x 2 standing (4x))      Functional Measure:  Barthel Index:    Bathin  Bladder: 0  Bowels: 5  Groomin  Dressin  Feedin  Mobility: 0  Stairs: 0  Toilet Use: 0  Transfer (Bed to Chair and Back): 0  Total: 10/100       The Barthel ADL Index: Guidelines  1. The index should be used as a record of what a patient does, not as a record of what a patient could do. 2. The main aim is to establish degree of independence from any help, physical or verbal, however minor and for whatever reason. 3. The need for supervision renders the patient not independent. 4. A patient's performance should be established using the best available evidence.  Asking the patient, friends/relatives and nurses are the usual sources, but direct observation and common sense are also important. However direct testing is not needed. 5. Usually the patient's performance over the preceding 24-48 hours is important, but occasionally longer periods will be relevant. 6. Middle categories imply that the patient supplies over 50 per cent of the effort. 7. Use of aids to be independent is allowed. Score Interpretation (from [facility] ([place], [place]) 83)    Independent   60-79 Minimally independent   40-59 Partially dependent   20-39 Very dependent   <20 Totally dependent     -Baldemar Cooper., Barthel, DPashaW. (1965). Functional evaluation: the Barthel Index. [facility] ([address] [facility] [address] (1997). The Barthel activities of daily living index: self-reporting versus actual performance in the old (> or = 75 years). Journal [address] 45(7), [address] [facility], WILLIAMS, Jaron Velasquez., Candice Arevalo. (1999). Measuring the change in disability after inpatient rehabilitation; comparison of the responsiveness of the Barthel Index and Functional [place] Measure. Journal of Neurology, Neurosurgery, and Psychiatry, 66(4), 687-979. Chantelle Cleary, N.J.A, IMTIAZ Kay, & Darlene Tinoco MJENNY. (2004) Assessment of post-stroke quality of life in cost-effectiveness studies: The usefulness of the Barthel Index and the EuroQoL-5D. Quality of Life Research, 13, 427-43          Pain Rating:  See above, pt unable to rate    Activity Tolerance:   Fair    After treatment patient left in no apparent distress:   Call bell within reach, Bed / chair alarm activated, Side rails x 3, and pt in chair position upright in bed    COMMUNICATION/EDUCATION:   The patients plan of care was discussed with: Occupational therapist and Registered nurse. Patient is unable to participate in goal setting and plan of care.     Thank you for this referral.  Marcia Kimbrough, PT   Time Calculation: 26 mins

## 2022-04-14 NOTE — PROGRESS NOTES
Hospitalist Progress Note              Terry Morris MD.                                                             Cell: [phone]                               NAME:  Jojo Sherwood  :  1955  MRN:  [id number]  Date of Service:  2022    Summary:  Jojo Sherwood is a 73 y.o.   male with PMHx significant for epilepsy, intellectual disability, type 2 diabetes, seizure, presents to the ER for evaluation of abnormal labs. Patient was seen by his PCP yesterday with routine lab work performed then. He was notified today to come to the ER due to hemoglobin of 4.4       Assessment/Plan:  History of multiple problems with biopsy showed adenoma  Severe macrocytic anemia  - Hb 4.2 on admission   -  Has received up to 4 units of prbc since   - Hb now 8.0  - Vit b12 levels 426, MMA: 193  - Hem/Onc following    MDS: F/u with Oncology on an OP basis    Thrombocytopenia  DM type 2: Continue ISS as per protocol. LE edema  Anasarca: Likely due to hypoalbuminemia  Echo: EF 50-55%  Continue lasix 20 mg BID     Seizure disorder  Intellectual disability  Continue Depakote, Topamax     Debility: Continue PT/OT     Code Status: Full  Surrogate Decision Maker: Patient's mother  DVT Prophylaxis: SCD  GI Prophylaxis: not indicated  Baseline: From home   Dispo: Pending placement     Interval History/Subjective:  F/u for severe anemia, MDS  Poor historian. He has a cough  Review of Systems:  Pertinent items are noted in HPI. Objective:     VITALS:   Last 24hrs VS reviewed since prior progress note.  Most recent are:  Visit Vitals  BP (!) 113/51   Pulse 76   Temp 98.9 °F (37.2 °C)   Resp 18   Ht 6' (1.829 m)   Wt 88.5 kg (195 lb)   SpO2 95%   BMI 26.45 kg/m²       Intake/Output Summary (Last 24 hours) at 2022 0855  Last data filed at 2022 1000  Gross per 24 hour   Intake 240 ml   Output --   Net 240 ml        PHYSICAL EXAM:  General: No acute distress, cooperative, pleasant   EENT: EOMI. Anicteric sclerae. Oral mucous moist, oropharynx benign  Resp: CTA bilaterally. No wheezing/rhonchi/rales. No accessory muscle use  CV: Regular rhythm, normal rate, no murmurs, gallops, rubs  GI: Soft, non distended, non tender. normoactive bowel sounds, no hepatosplenomegaly Extremities: 1+ edema, warm, 2+ pulses throughout  Neurologic: Moves all extremities. AAOx3, CN II-XII grossly intact  Psych: Good insight. Not anxious nor agitated. Skin: Good Turgor, no rashes or ulcers    Lab Data Personally Reviewed: (see below)     Medications list Personally Reviewed:  x YES  NO     _______________________________________________________________________  Care Plan discussed with:  Patient/Family, Nurse and     Total NON critical care TIME:  30 minutes    Kenya Jimenez MD     Procedures: see electronic medical records for all procedures/Xrays and details which were not copied into this note but were reviewed prior to creation of Plan. LABS:  Recent Labs     04/14/22 0454 04/12/22  0436   WBC 11.5* 13.5*   HGB 8.0* 7.8*   HCT 25.5* 25.0*   PLT 73* 62*     Recent Labs     04/14/22 0454 04/12/22  0436    138   K 4.0 4.3   * 108   CO2 26 24   BUN 34* 31*   CREA 0.80 0.80   * 112*   CA 7.8* 8.0*     No results for input(s): ALT, AP, TBIL, TBILI, TP, ALB, GLOB, GGT, AML, LPSE in the last 72 hours. No lab exists for component: SGOT, GPT, AMYP, HLPSE  No results for input(s): INR, PTP, APTT, INREXT in the last 72 hours. No results for input(s): FE, TIBC, PSAT, FERR in the last 72 hours. Lab Results   Component Value Date/Time    Folate 37.1 (H) 04/03/2022 01:14 AM      No results for input(s): PH, PCO2, PO2 in the last 72 hours. No results for input(s): CPK, CKNDX, TROIQ in the last 72 hours.     No lab exists for component: CPKMB  No results found for: CHOL, CHOLX, CHLST, CHOLV, HDL, HDLP, LDL, LDLC, DLDLP, TGLX, TRIGL, TRIGP, CHHD, [facility]  Lab Results   Component Value Date/Time    Glucose (POC) 163 (H) 04/14/2022 07:38 AM    Glucose (POC) 261 (H) 04/13/2022 09:28 PM    Glucose (POC) 219 (H) 04/13/2022 04:01 PM    Glucose (POC) 220 (H) 04/13/2022 11:20 AM    Glucose (POC) 160 (H) 04/13/2022 08:21 AM     Lab Results   Component Value Date/Time    Color YELLOW/STRAW 04/02/2022 07:16 PM    Appearance CLEAR 04/02/2022 07:16 PM    Specific gravity 1.015 04/02/2022 07:16 PM    pH (UA) 7.5 04/02/2022 07:16 PM    Protein Negative 04/02/2022 07:16 PM    Glucose Negative 04/02/2022 07:16 PM    Ketone Negative 04/02/2022 07:16 PM    Bilirubin Negative 04/02/2022 07:16 PM    Urobilinogen 1.0 04/02/2022 07:16 PM    Nitrites Negative 04/02/2022 07:16 PM    Leukocyte Esterase Negative 04/02/2022 07:16 PM    Epithelial cells FEW 04/02/2022 07:16 PM    Bacteria Negative 04/02/2022 07:16 PM    WBC 0-4 04/02/2022 07:16 PM    RBC 0-5 04/02/2022 07:16 PM

## 2022-04-14 NOTE — PROGRESS NOTES
End of Shift Note    Bedside shift change report given to Al (oncoming nurse) by Graham Ferrara RN (offgoing nurse). Report included the following information SBAR, Kardex, Intake/Output, MAR and Recent Results    Shift worked:  [id number]     Shift summary and any significant changes:     Pleasant. Assisted patient with breakfast. Denies pain. Assisted in calling mother in morning. Mom at bedside. Upset that he has poor appetite. Requested to speak to case management. Marybel at bedside to answer placement questions. Mom inquiring about scrotal edema. Explained that he is on furosemide, but she requested that a doctor be notified. Dr. Christine Maldonado paged at  to be made aware of questions regarding scrotal edema. Instructed to elevate. Continue to turn, assist patient with food. Work with PT and plan for safe discharge.           Concerns for physician to address:       Zone phone for oncoming shift:                Graham Ferrara RN

## 2022-04-14 NOTE — PROGRESS NOTES
End of Shift Note    Bedside shift change report given to Luz Harden RN (oncoming nurse) by Jody Mccollum RN (offgoing nurse). Report included the following information SBAR, Kardex, Intake/Output, MAR and Recent Results    Shift worked:  7p-7a     Shift summary and any significant changes:     Pt stable, scheduled meds given, cough suppressing PRN meds given, melatonin given, labs drawn, pt still very generalized edematous, pt voiding incontinent - Condom cath no longer working due to scrotum edema, BM overnight, CHG care performed     Concerns for physician to address:  Pt needs increased pain management - edema causing pt lots of pain especially when he is being turned/moved    Cough meds not really helping pt's cough   Zone phone for oncoming shift:   [phone]       Activity:  Activity Level: Bed Rest  Number times ambulated in hallways past shift: 0  Number of times OOB to chair past shift: 0    Cardiac:   Cardiac Monitoring: No      Cardiac Rhythm: Sinus Rhythm    Access:   Current line(s): PIV     Genitourinary:   Urinary status: voiding and incontinent    Respiratory:   O2 Device: None (Room air)  Chronic home O2 use?: NO  Incentive spirometer at bedside: NO       GI:  Last Bowel Movement Date: 04/13/22  Current diet:  ADULT ORAL NUTRITION SUPPLEMENT Breakfast, Dinner; Low Calorie/High Protein  ADULT DIET Regular; 4 carb choices (60 gm/meal)  Passing flatus: YES  Tolerating current diet: YES       Pain Management:   Patient states pain is manageable on current regimen: NO    Skin:  [place] Score: 13  Interventions: turn team, float heels, PT/OT consult, limit briefs, internal/external urinary devices and nutritional support     Patient Safety:  Fall Score:  Total Score: 3  Interventions: bed/chair alarm, gripper socks, pt to call before getting OOB and stay with me (per policy)  High Fall Risk: Yes    Length of Stay:  Expected LOS: 2d 16h  Actual LOS: Mendoza Martinez RN

## 2022-04-14 NOTE — PROGRESS NOTES
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 Gen: nad, pale in appearance  Chest:no distress      -Labs-    Recent Labs     04/14/22  0454 04/12/22  0436   WBC 11.5* 13.5*   HGB 8.0* 7.8*   PLT 73* 62*   ANEU 3.3 1.5*    138   K 4.0 4.3   * 112*   BUN 34* 31*   CREA 0.80 0.80   CA 7.8* 8.0*       -Imaging-   4/3/22 CT A/P:  IMPRESSION  . No definite pancreatic mass seen by this technique. 2. Colitis diffuse with fat stranding in the peritoneal cavity. 3. Splenomegaly.       -Assessment + Plan-      *) Severe macrocytic anemia, thrombocytopenia, lymphocytosis:  - MCV markedly elevated to 142, severe anemia with  hgb 4.2 on admission.  - Iron studies after transfusion not markedly elevated as I would suspect, gave Venofer this admission on 4/4 and 4/5.  - B12 on low end of normal, MMA pending, replacment started empirically  - PBS concerning for lymphoproliferative d/o such as CLL? Flow on PB pending  - No hemolysis, HIV, HCV, HBV negative, fibrinogen slightly low but no overt dic.  - spep 0.2 with IgG Kappa specificity and elevated SFLCR, has MGUS at least but marrow will help r/o overt MM. - continue folic acid daily  - Had 4U PRBC.   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 FU with Dr. Cassy Lewis after dc from [facility] planned tentatively for 5/6/22 2:15pm         *) Colitis:  - CT scan showing diffuse colitis, on IV flagyl  - EGD on 4/5 without acute findings. - GI following had multiple polyps and GI recommended colectomy that was declined.   - on Levaquin and IV flagyl

## 2022-04-14 NOTE — PROGRESS NOTES
Problem: Falls - Risk of  Goal: *Absence of Falls  Description: Document [address] Fall Risk and appropriate interventions in the flowsheet.   Outcome: Progressing Towards Goal  Note: Fall Risk Interventions:  Mobility Interventions: Bed/chair exit alarm,PT Consult for mobility concerns,OT consult for ADLs,Communicate number of staff needed for ambulation/transfer,Strengthening exercises (ROM-active/passive),Assess mobility with egress test    Mentation Interventions: Bed/chair exit alarm,Door open when patient unattended,More frequent rounding,Room close to nurse's station,Reorient patient,Toileting rounds,Familiar objects from home    Medication Interventions: Bed/chair exit alarm,Evaluate medications/consider consulting pharmacy,Patient to call before getting OOB,Teach patient to arise slowly    Elimination Interventions: Bed/chair exit alarm,Call light in reach,Patient to call for help with toileting needs,Toilet paper/wipes in reach,Toileting schedule/hourly rounds,Stay With Me (per policy)    History of Falls Interventions: Bed/chair exit alarm,Door open when patient unattended,Evaluate medications/consider consulting pharmacy,Room close to nurse's station

## 2022-04-15 LAB
GLUCOSE BLD STRIP.AUTO-MCNC: 147 MG/DL (ref 65–117)
GLUCOSE BLD STRIP.AUTO-MCNC: 186 MG/DL (ref 65–117)
GLUCOSE BLD STRIP.AUTO-MCNC: 207 MG/DL (ref 65–117)
GLUCOSE BLD STRIP.AUTO-MCNC: 237 MG/DL (ref 65–117)
SERVICE CMNT-IMP: ABNORMAL

## 2022-04-15 PROCEDURE — 74011250637 HC RX REV CODE- 250/637: Performed by: INTERNAL MEDICINE

## 2022-04-15 PROCEDURE — 74011250636 HC RX REV CODE- 250/636: Performed by: NURSE PRACTITIONER

## 2022-04-15 PROCEDURE — 74011000250 HC RX REV CODE- 250: Performed by: INTERNAL MEDICINE

## 2022-04-15 PROCEDURE — 82962 GLUCOSE BLOOD TEST: CPT

## 2022-04-15 PROCEDURE — 74011636637 HC RX REV CODE- 636/637: Performed by: INTERNAL MEDICINE

## 2022-04-15 PROCEDURE — 74011250637 HC RX REV CODE- 250/637: Performed by: NURSE PRACTITIONER

## 2022-04-15 PROCEDURE — 65660000000 HC RM CCU STEPDOWN

## 2022-04-15 RX ADMIN — Medication: at 08:32

## 2022-04-15 RX ADMIN — SODIUM CHLORIDE, PRESERVATIVE FREE 10 ML: 5 INJECTION INTRAVENOUS at 17:18

## 2022-04-15 RX ADMIN — TOPIRAMATE 200 MG: 100 TABLET, FILM COATED ORAL at 17:16

## 2022-04-15 RX ADMIN — Medication 3 UNITS: at 13:31

## 2022-04-15 RX ADMIN — SODIUM CHLORIDE, PRESERVATIVE FREE 10 ML: 5 INJECTION INTRAVENOUS at 05:05

## 2022-04-15 RX ADMIN — CYANOCOBALAMIN TAB 500 MCG 1000 MCG: 500 TAB at 08:35

## 2022-04-15 RX ADMIN — SODIUM CHLORIDE, PRESERVATIVE FREE 10 ML: 5 INJECTION INTRAVENOUS at 21:49

## 2022-04-15 RX ADMIN — OXYCODONE AND ACETAMINOPHEN 0.5 TABLET: 5; 325 TABLET ORAL at 21:48

## 2022-04-15 RX ADMIN — Medication 3 UNITS: at 17:17

## 2022-04-15 RX ADMIN — Medication 2 UNITS: at 08:35

## 2022-04-15 RX ADMIN — DIVALPROEX SODIUM 500 MG: 500 TABLET, DELAYED RELEASE ORAL at 08:35

## 2022-04-15 RX ADMIN — GUAIFENESIN 200 MG: 200 SOLUTION ORAL at 03:53

## 2022-04-15 RX ADMIN — METRONIDAZOLE 500 MG: 500 INJECTION, SOLUTION INTRAVENOUS at 20:14

## 2022-04-15 RX ADMIN — DIVALPROEX SODIUM 1000 MG: 500 TABLET, DELAYED RELEASE ORAL at 20:12

## 2022-04-15 RX ADMIN — TOPIRAMATE 200 MG: 100 TABLET, FILM COATED ORAL at 08:34

## 2022-04-15 RX ADMIN — OXYCODONE AND ACETAMINOPHEN 0.5 TABLET: 5; 325 TABLET ORAL at 17:17

## 2022-04-15 RX ADMIN — METRONIDAZOLE 500 MG: 500 INJECTION, SOLUTION INTRAVENOUS at 08:36

## 2022-04-15 RX ADMIN — Medication: at 21:48

## 2022-04-15 RX ADMIN — LEVOFLOXACIN 750 MG: 5 INJECTION, SOLUTION INTRAVENOUS at 10:23

## 2022-04-15 RX ADMIN — FUROSEMIDE 20 MG: 20 TABLET ORAL at 08:34

## 2022-04-15 RX ADMIN — FOLIC ACID 1 MG: 1 TABLET ORAL at 08:35

## 2022-04-15 RX ADMIN — PANTOPRAZOLE SODIUM 40 MG: 40 TABLET, DELAYED RELEASE ORAL at 08:35

## 2022-04-15 RX ADMIN — ATORVASTATIN CALCIUM 40 MG: 40 TABLET, FILM COATED ORAL at 08:34

## 2022-04-15 RX ADMIN — FUROSEMIDE 20 MG: 20 TABLET ORAL at 17:17

## 2022-04-15 RX ADMIN — OXYCODONE AND ACETAMINOPHEN 0.5 TABLET: 5; 325 TABLET ORAL at 08:34

## 2022-04-15 RX ADMIN — Medication: at 08:36

## 2022-04-15 NOTE — PROGRESS NOTES
Hospitalist Progress Note              Jerome Smith MD.                                                             Cell: [phone]                               NAME:  Bhaskar Ho  :  1955  MRN:  [id number]  Date of Service:  4/15/2022    Summary:  Bhaskar Ho is a 73 y.o.   male with PMHx significant for epilepsy, intellectual disability, type 2 diabetes, seizure, presents to the ER for evaluation of abnormal labs. Patient was seen by his PCP a day PTA with routine lab work performed and was found to have HB of 4.4. Assessment/Plan:    Adenomatous polyps, innumerable  ? Infectious colitis  -History of recent colonoscopy in  which showed multiple polyps which were biopsied. Partial colectomy was recommended however family declined  -S/p EGD on : Normal upper endoscopy  -CT abdomen/pelvis: Colitis diffuse fat stranding in the peritoneal cavity  -Abdominal ultrasound showed pancreatic lesion. CT abdomen negative for pancreatic mass.  -Completing course of Flagyl    MDS and splenic marginal zone lymphoma  Severe macrocytic anemia/Thrombocytopenia/lymphocytosis  -Hb 4.2 on admission  -S/p 3 units of PRBC since   -S/p IV Venofer  -S/p bone marrow biopsy on : Suspicious for splenic marginal zone lymphoma and MDS, final report pending  -Hb at 8. Platelet count improving.  -Heme-onc following. Appreciate input. Follow-up with Dr. Justin Zazueta OP    Presumed aspiration pneumonia  -Fever and chest x-ray infiltrates postprocedure  -Completing a course of Levaquin and Flagyl    DM type 2: Continue ISS as per protocol. LE edema  Anasarca: Likely due to hypoalbuminemia  Echo: EF 50-55%  Continue lasix 20 mg BID for now. BUN to creatinine ratio going up. Creatinine remains stable.   Will check CMP in a.m. and if Albumin low will consider giving IV albumin with diuresis     Seizure disorder  Intellectual disability  Continue Depakote, Topamax     Debility: Continue PT/OT     Code Status: Full  Surrogate Decision Maker: Patient's mother  DVT Prophylaxis: SCD  GI Prophylaxis: not indicated  Baseline: From home   Dispo: Pending placement     Interval History/Subjective:  F/u for severe anemia, MDS  Has upper and lower extremity swelling  Unreliable historian    Review of Systems:  Pertinent items are noted in HPI. Objective:     VITALS:   Last 24hrs VS reviewed since prior progress note. Most recent are:  Visit Vitals  /75 (BP 1 Location: Right upper arm, BP Patient Position: At rest)   Pulse 73   Temp 97.5 °F (36.4 °C)   Resp 18   Ht 6' (1.829 m)   Wt 88.5 kg (195 lb)   SpO2 92%   BMI 26.45 kg/m²     No intake or output data in the 24 hours ending 04/15/22 0730     PHYSICAL EXAM:  General: No acute distress, cooperative, pleasant   EENT: EOMI. Anicteric sclerae. Oral mucous moist, oropharynx benign  Resp: CTA bilaterally. No wheezing/rhonchi/rales. No accessory muscle use  CV: Regular rhythm, normal rate, no murmurs, gallops, rubs,   GI: Soft, non distended, non tender. normoactive bowel sounds, no hepatosplenomegaly Extremities: 1+ edema upper and lower extremity, warm, 2+ pulses throughout  Neurologic: Moves all extremities. AAOx3, CN II-XII grossly intact  Psych: Good insight. Not anxious nor agitated. Skin: Good Turgor, venous stasis changes to bilateral lower extremity    Lab Data Personally Reviewed: (see below)     Medications list Personally Reviewed:  x YES  NO     _______________________________________________________________________  Care Plan discussed with:  Patient/Family, Nurse and     Total NON critical care TIME:  30 minutes    Megha Briceno MD     Procedures: see electronic medical records for all procedures/Xrays and details which were not copied into this note but were reviewed prior to creation of Plan.       LABS:  Recent Labs     04/14/22  0454   WBC 11.5*   HGB 8.0*   HCT 25.5* PLT 73*     Recent Labs     04/14/22  0454      K 4.0   *   CO2 26   BUN 34*   CREA 0.80   *   CA 7.8*     No results for input(s): ALT, AP, TBIL, TBILI, TP, ALB, GLOB, GGT, AML, LPSE in the last 72 hours. No lab exists for component: SGOT, GPT, AMYP, HLPSE  No results for input(s): INR, PTP, APTT, INREXT, INREXT in the last 72 hours. No results for input(s): FE, TIBC, PSAT, FERR in the last 72 hours. Lab Results   Component Value Date/Time    Folate 37.1 (H) 04/03/2022 01:14 AM      No results for input(s): PH, PCO2, PO2 in the last 72 hours. No results for input(s): CPK, CKNDX, TROIQ in the last 72 hours.     No lab exists for component: CPKMB  No results found for: CHOL, CHOLX, CHLST, CHOLV, HDL, HDLP, LDL, LDLC, DLDLP, TGLX, TRIGL, TRIGP, CHHD, CHHDX  Lab Results   Component Value Date/Time    Glucose (POC) 147 (H) 04/15/2022 07:20 AM    Glucose (POC) 214 (H) 04/14/2022 09:19 PM    Glucose (POC) 185 (H) 04/14/2022 04:26 PM    Glucose (POC) 219 (H) 04/14/2022 11:14 AM    Glucose (POC) 163 (H) 04/14/2022 07:38 AM     Lab Results   Component Value Date/Time    Color YELLOW/STRAW 04/02/2022 07:16 PM    Appearance CLEAR 04/02/2022 07:16 PM    Specific gravity 1.015 04/02/2022 07:16 PM    pH (UA) 7.5 04/02/2022 07:16 PM    Protein Negative 04/02/2022 07:16 PM    Glucose Negative 04/02/2022 07:16 PM    Ketone Negative 04/02/2022 07:16 PM    Bilirubin Negative 04/02/2022 07:16 PM    Urobilinogen 1.0 04/02/2022 07:16 PM    Nitrites Negative 04/02/2022 07:16 PM    Leukocyte Esterase Negative 04/02/2022 07:16 PM    Epithelial cells FEW 04/02/2022 07:16 PM    Bacteria Negative 04/02/2022 07:16 PM    WBC 0-4 04/02/2022 07:16 PM    RBC 0-5 04/02/2022 07:16 PM

## 2022-04-15 NOTE — PROGRESS NOTES
-Hematology / Oncology ([facility]) -  -Primary Oncologist-   -DELFINA-alexi DYSON historian    -Vamsich 89 in bed, no family at bedside. No complaints today    -O-      Patient Vitals for the past 24 hrs:   Temp Pulse Resp BP SpO2   04/15/22 0716 97.5 °F (36.4 °C) 73 18 114/75 92 %   04/14/22 2250 97.9 °F (36.6 °C) 64 18 (!) 106/53 96 %   04/14/22 2025 98.1 °F (36.7 °C) 88 18 118/71 95 %   04/14/22 1515 97.5 °F (36.4 °C) 98 18 (!) 113/53 95 %     No intake/output data recorded. Gen: nad, pale in appearance  Chest: bilateral breath sounds present  Cardiac: rrr  Abd: s/nt  Ext: chronic venous stasis b/l with large plaques scaling off, mild erythema on lower leg b/l    -Labs-    Recent Labs     04/14/22  0454   WBC 11.5*   HGB 8.0*   PLT 73*   ANEU 3.3      K 4.0   *   BUN 34*   CREA 0.80   CA 7.8*       -Imaging-   4/3/22 CT A/P:  IMPRESSION  . No definite pancreatic mass seen by this technique. 2. Colitis diffuse with fat stranding in the peritoneal cavity. 3. Splenomegaly.       -Assessment + Plan-      *) Severe macrocytic anemia, thrombocytopenia, lymphocytosis:  - MCV markedly elevated to 142, severe anemia with  hgb 4.2 on admission.  - Iron studies after transfusion not markedly elevated as I would suspect, gave Venofer this admission on 4/4 and 4/5.  - B12 on low end of normal, MMA wnl, can dc b12  - PBS concerning for lymphoproliferative d/o such as CLL? 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 - No hemolysis, HIV, HCV, HBV negative, fibrinogen slightly low but no overt dic.  - spep 0.2 with IgG Kappa specificity and elevated SFLCR, has MGUS at least but marrow will help r/o overt MM. - continue folic acid daily  - Had 4U PRBC.   continue to transfuse for hgb <7, plt <10K  - BMBX 4/6/22 shows: splenic marginal zone lymphoma and MDS with multilienage dysplasia, final pending  -discussed with his mom, we could continue to follow him and continue procrit for his MDS, his lymphoma is a low grade lymphoma and could treat with rituxan as outpt  -retacrit here t, th, sat with plans to continue as outpt  - FU with me as outpt scheduled, due to change pending DC plans 5/6/22 2:15pm, may move earlier if he goes to long term care and not snf       *) Colitis:  - CT scan showing diffuse colitis, is completing IV flagyl today  - EGD on 4/5 without acute findings. - GI  Was following had multiple polyps and GI recommended colectomy that was declined.     *) Fever:  - fever resolved, is on Levaquin and IV flagyl    Please call with questions over the weekend, otherwise our service will  on Monday

## 2022-04-15 NOTE — PROGRESS NOTES
Physician Progress Note      Sindhu Fernández  CSN #:                  [id number]  :                       1955  ADMIT DATE:       2022 1:15 PM  [address] [place] DATE:  RESPONDING  PROVIDER #:        Jakub Bennett MD          QUERY TEXT:    Dr Romie Ortiz  Pt admitted with severe anemia. Pt noted to have BMBX 22 shows what appears to be a splenic marginal zone lymphoma and MDS . If possible, please document in progress notes and discharge summary the relationship, if any, between severe anemia and MDS/ splenic marginal zone lymphoma . The medical record reflects the following:  Risk Factors: colonic adenoma; Thrombocytopenia; Myelodysplastic syndrome; Lymphoma, anasarca  Clinical Indicators: BMBX 22 shows what appears to be a splenic marginal zone lymphoma and MDS, final pending  Treatment: Retacrit, Rituxan, Procrit, transfusions as needed (4units PRBCs); Venofer  Options provided:  -- severe anemia  due to splenic marginal zone lymphoma  -- severe anemia due to MDS  -- severe anemia due to MDS/splenic marginal zone lymphoma  -- Other - I will add my own diagnosis  -- Disagree - Not applicable / Not valid  -- Disagree - Clinically unable to determine / Unknown  -- Refer to Clinical Documentation Reviewer    PROVIDER RESPONSE TEXT:    This patient has severe anemia due to MDS. Query created by:  Olman Erwin on 2022 2:18 PM      Electronically signed by:  Jakub Bennett MD 4/15/2022 6:47 AM

## 2022-04-16 LAB
ALBUMIN SERPL-MCNC: 1.9 G/DL (ref 3.5–5)
ALBUMIN/GLOB SERPL: 0.7 {RATIO} (ref 1.1–2.2)
ALP SERPL-CCNC: 69 U/L (ref 45–117)
ALT SERPL-CCNC: 14 U/L (ref 12–78)
ANION GAP SERPL CALC-SCNC: 3 MMOL/L (ref 5–15)
AST SERPL-CCNC: 17 U/L (ref 15–37)
BASOPHILS # BLD: 0.1 K/UL (ref 0–0.1)
BASOPHILS NFR BLD: 1 % (ref 0–1)
BILIRUB SERPL-MCNC: 0.4 MG/DL (ref 0.2–1)
BUN SERPL-MCNC: 28 MG/DL (ref 6–20)
BUN/CREAT SERPL: 35 (ref 12–20)
CALCIUM SERPL-MCNC: 7.8 MG/DL (ref 8.5–10.1)
CHLORIDE SERPL-SCNC: 109 MMOL/L (ref 97–108)
CO2 SERPL-SCNC: 26 MMOL/L (ref 21–32)
CREAT SERPL-MCNC: 0.81 MG/DL (ref 0.7–1.3)
DIFFERENTIAL METHOD BLD: ABNORMAL
EOSINOPHIL # BLD: 0.6 K/UL (ref 0–0.4)
EOSINOPHIL NFR BLD: 6 % (ref 0–7)
ERYTHROCYTE [DISTWIDTH] IN BLOOD BY AUTOMATED COUNT: 22.5 % (ref 11.5–14.5)
GLOBULIN SER CALC-MCNC: 2.8 G/DL (ref 2–4)
GLUCOSE BLD STRIP.AUTO-MCNC: 187 MG/DL (ref 65–117)
GLUCOSE BLD STRIP.AUTO-MCNC: 191 MG/DL (ref 65–117)
GLUCOSE BLD STRIP.AUTO-MCNC: 194 MG/DL (ref 65–117)
GLUCOSE BLD STRIP.AUTO-MCNC: 226 MG/DL (ref 65–117)
GLUCOSE SERPL-MCNC: 103 MG/DL (ref 65–100)
HCT VFR BLD AUTO: 24 % (ref 36.6–50.3)
HGB BLD-MCNC: 7.6 G/DL (ref 12.1–17)
IMM GRANULOCYTES # BLD AUTO: 0 K/UL (ref 0–0.04)
IMM GRANULOCYTES NFR BLD AUTO: 0 % (ref 0–0.5)
LYMPHOCYTES # BLD: 6.8 K/UL (ref 0.8–3.5)
LYMPHOCYTES NFR BLD: 63 % (ref 12–49)
MCH RBC QN AUTO: 36.5 PG (ref 26–34)
MCHC RBC AUTO-ENTMCNC: 31.7 G/DL (ref 30–36.5)
MCV RBC AUTO: 115.4 FL (ref 80–99)
METAMYELOCYTES NFR BLD MANUAL: 1 %
MONOCYTES # BLD: 0.6 K/UL (ref 0–1)
MONOCYTES NFR BLD: 6 % (ref 5–13)
NEUTS BAND NFR BLD MANUAL: 5 %
NEUTS SEG # BLD: 2.5 K/UL (ref 1.8–8)
NEUTS SEG NFR BLD: 18 % (ref 32–75)
NRBC # BLD: 0 K/UL (ref 0–0.01)
NRBC BLD-RTO: 0 PER 100 WBC
PLATELET # BLD AUTO: 80 K/UL (ref 150–400)
PMV BLD AUTO: 9.8 FL (ref 8.9–12.9)
POTASSIUM SERPL-SCNC: 4.2 MMOL/L (ref 3.5–5.1)
PROT SERPL-MCNC: 4.7 G/DL (ref 6.4–8.2)
RBC # BLD AUTO: 2.08 M/UL (ref 4.1–5.7)
RBC MORPH BLD: ABNORMAL
RBC MORPH BLD: ABNORMAL
SERVICE CMNT-IMP: ABNORMAL
SODIUM SERPL-SCNC: 138 MMOL/L (ref 136–145)
WBC # BLD AUTO: 10.8 K/UL (ref 4.1–11.1)
WBC MORPH BLD: ABNORMAL

## 2022-04-16 PROCEDURE — 65660000000 HC RM CCU STEPDOWN

## 2022-04-16 PROCEDURE — 36415 COLL VENOUS BLD VENIPUNCTURE: CPT

## 2022-04-16 PROCEDURE — 74011250636 HC RX REV CODE- 250/636: Performed by: INTERNAL MEDICINE

## 2022-04-16 PROCEDURE — 74011250637 HC RX REV CODE- 250/637: Performed by: INTERNAL MEDICINE

## 2022-04-16 PROCEDURE — 74011250636 HC RX REV CODE- 250/636: Performed by: STUDENT IN AN ORGANIZED HEALTH CARE EDUCATION/TRAINING PROGRAM

## 2022-04-16 PROCEDURE — 80053 COMPREHEN METABOLIC PANEL: CPT

## 2022-04-16 PROCEDURE — 85025 COMPLETE CBC W/AUTO DIFF WBC: CPT

## 2022-04-16 PROCEDURE — P9047 ALBUMIN (HUMAN), 25%, 50ML: HCPCS | Performed by: STUDENT IN AN ORGANIZED HEALTH CARE EDUCATION/TRAINING PROGRAM

## 2022-04-16 PROCEDURE — 82962 GLUCOSE BLOOD TEST: CPT

## 2022-04-16 PROCEDURE — 74011000250 HC RX REV CODE- 250: Performed by: INTERNAL MEDICINE

## 2022-04-16 PROCEDURE — 74011636637 HC RX REV CODE- 636/637: Performed by: INTERNAL MEDICINE

## 2022-04-16 PROCEDURE — 74011250636 HC RX REV CODE- 250/636: Performed by: NURSE PRACTITIONER

## 2022-04-16 RX ORDER — ALBUMIN HUMAN 250 G/1000ML
12.5 SOLUTION INTRAVENOUS ONCE
Status: COMPLETED | OUTPATIENT
Start: 2022-04-16 | End: 2022-04-16

## 2022-04-16 RX ORDER — FUROSEMIDE 10 MG/ML
40 INJECTION INTRAMUSCULAR; INTRAVENOUS ONCE
Status: COMPLETED | OUTPATIENT
Start: 2022-04-16 | End: 2022-04-16

## 2022-04-16 RX ADMIN — SODIUM CHLORIDE, PRESERVATIVE FREE 10 ML: 5 INJECTION INTRAVENOUS at 06:23

## 2022-04-16 RX ADMIN — CYANOCOBALAMIN TAB 500 MCG 1000 MCG: 500 TAB at 08:11

## 2022-04-16 RX ADMIN — ALBUMIN (HUMAN) 12.5 G: 0.25 INJECTION, SOLUTION INTRAVENOUS at 08:12

## 2022-04-16 RX ADMIN — Medication: at 08:13

## 2022-04-16 RX ADMIN — DIVALPROEX SODIUM 500 MG: 500 TABLET, DELAYED RELEASE ORAL at 08:11

## 2022-04-16 RX ADMIN — Medication: at 17:52

## 2022-04-16 RX ADMIN — EPOETIN ALFA-EPBX 10000 UNITS: 10000 INJECTION, SOLUTION INTRAVENOUS; SUBCUTANEOUS at 22:30

## 2022-04-16 RX ADMIN — FUROSEMIDE 40 MG: 10 INJECTION, SOLUTION INTRAMUSCULAR; INTRAVENOUS at 08:12

## 2022-04-16 RX ADMIN — TOPIRAMATE 200 MG: 100 TABLET, FILM COATED ORAL at 17:51

## 2022-04-16 RX ADMIN — ATORVASTATIN CALCIUM 40 MG: 40 TABLET, FILM COATED ORAL at 08:11

## 2022-04-16 RX ADMIN — Medication: at 22:24

## 2022-04-16 RX ADMIN — Medication 2 UNITS: at 12:17

## 2022-04-16 RX ADMIN — OXYCODONE AND ACETAMINOPHEN 0.5 TABLET: 5; 325 TABLET ORAL at 08:08

## 2022-04-16 RX ADMIN — PANTOPRAZOLE SODIUM 40 MG: 40 TABLET, DELAYED RELEASE ORAL at 08:11

## 2022-04-16 RX ADMIN — SODIUM CHLORIDE, PRESERVATIVE FREE 5 ML: 5 INJECTION INTRAVENOUS at 22:24

## 2022-04-16 RX ADMIN — OXYCODONE AND ACETAMINOPHEN 0.5 TABLET: 5; 325 TABLET ORAL at 22:12

## 2022-04-16 RX ADMIN — FOLIC ACID 1 MG: 1 TABLET ORAL at 08:11

## 2022-04-16 RX ADMIN — Medication 2 UNITS: at 08:07

## 2022-04-16 RX ADMIN — Medication 2 UNITS: at 17:51

## 2022-04-16 RX ADMIN — TOPIRAMATE 200 MG: 100 TABLET, FILM COATED ORAL at 08:11

## 2022-04-16 RX ADMIN — Medication 2 UNITS: at 22:19

## 2022-04-16 RX ADMIN — DIVALPROEX SODIUM 1000 MG: 500 TABLET, DELAYED RELEASE ORAL at 22:11

## 2022-04-16 RX ADMIN — METRONIDAZOLE 500 MG: 500 INJECTION, SOLUTION INTRAVENOUS at 08:09

## 2022-04-16 RX ADMIN — OXYCODONE AND ACETAMINOPHEN 0.5 TABLET: 5; 325 TABLET ORAL at 17:51

## 2022-04-16 RX ADMIN — SODIUM CHLORIDE, PRESERVATIVE FREE 10 ML: 5 INJECTION INTRAVENOUS at 17:52

## 2022-04-16 NOTE — PROGRESS NOTES
Hospitalist Progress Note              Estefani Sanford MD.                                                             Cell: [phone]                               NAME:  Mercedes Hernandez  :  1955  MRN:  [id number]  Date of Service:  2022    Summary:  Mercedes Hernandez is a 73 y.o.   male with PMHx significant for epilepsy, intellectual disability, type 2 diabetes, seizure, presents to the ER for evaluation of abnormal labs. Patient was seen by his PCP a day PTA with routine lab work performed and was found to have HB of 4.4. Assessment/Plan:    Adenomatous polyps, innumerable  ? Infectious colitis  -History of recent colonoscopy in  which showed multiple polyps which were biopsied. Partial colectomy was recommended however family declined  -S/p EGD on : Normal upper endoscopy  -CT abdomen/pelvis: Colitis diffuse fat stranding in the peritoneal cavity  -Abdominal ultrasound showed pancreatic lesion. CT abdomen negative for pancreatic mass.  -Completed empiric course of Levaquin and Flagyl    MDS and splenic marginal zone lymphoma  Severe macrocytic anemia/Thrombocytopenia/lymphocytosis  -Hb 4.2 on admission  -S/p 3 units of PRBC since   -S/p IV Venofer  -S/p bone marrow biopsy on : Suspicious for splenic marginal zone lymphoma and MDS, final report pending  -Follow H&H. We will try to minimize blood draws. Platelet count improving  -Heme-onc following. Appreciate input. Follow-up with Dr. Gilda Andres OP    Presumed aspiration pneumonia  -Fever and chest x-ray infiltrates postprocedure  -Completing a course of Levaquin and Flagyl    DM type 2: Continue ISS as per protocol. LE edema  Anasarca: Likely due to hypoalbuminemia  Echo: EF 50-55%  Hold p.o. Lasix for now.   Will give 40 mg of IV Lasix along with IV albumin  Monitor volume status     Seizure disorder  Intellectual disability  Continue Depakote, Topamax     Debility: Continue PT/OT     Code Status: Full  Surrogate Decision Maker: Patient's mother  DVT Prophylaxis: SCD  GI Prophylaxis: not indicated  Baseline: From home   Dispo: Pending placement     Interval History/Subjective:  F/u for severe anemia, MDS  No acute events overnight  Denies pain    Review of Systems:  Pertinent items are noted in HPI. Objective:     VITALS:   Last 24hrs VS reviewed since prior progress note. Most recent are:  Visit Vitals  BP (!) 119/56 (BP 1 Location: Right upper arm, BP Patient Position: At rest)   Pulse 80   Temp 98.1 °F (36.7 °C)   Resp 18   Ht 6' (1.829 m)   Wt 88.5 kg (195 lb)   SpO2 96%   BMI 26.45 kg/m²       Intake/Output Summary (Last 24 hours) at 4/16/2022 1118  Last data filed at 4/15/2022 1634  Gross per 24 hour   Intake --   Output 1250 ml   Net -1250 ml        PHYSICAL EXAM:  General: No acute distress, cooperative, pleasant   EENT: EOMI. Anicteric sclerae. Oral mucous moist, oropharynx benign  Resp: CTA bilaterally. No wheezing/rhonchi/rales. No accessory muscle use  CV: Regular rhythm, normal rate, no murmurs, gallops, rubs,   GI: Soft, non distended, non tender. normoactive bowel sounds, no hepatosplenomegaly Extremities: 1+ edema upper and lower extremity, scrotal swelling, warm, 2+ pulses throughout  Neurologic: Moves all extremities. AAOx3, CN II-XII grossly intact  Psych: Good insight. Not anxious nor agitated.   Skin: Good Turgor, venous stasis changes to bilateral lower extremity    Lab Data Personally Reviewed: (see below)     Medications list Personally Reviewed:  x YES  NO     _______________________________________________________________________  Care Plan discussed with:  Patient/Family, Nurse and     Total NON critical care TIME:  30 minutes    Yasmeen Crawford MD     Procedures: see electronic medical records for all procedures/Xrays and details which were not copied into this note but were reviewed prior to creation of Plan.      LABS:  Recent Labs     04/16/22 0434 04/14/22 0454   WBC 10.8 11.5*   HGB 7.6* 8.0*   HCT 24.0* 25.5*   PLT 80* 73*     Recent Labs     04/16/22 0434 04/14/22 0454    140   K 4.2 4.0   * 109*   CO2 26 26   BUN 28* 34*   CREA 0.81 0.80   * 125*   CA 7.8* 7.8*     Recent Labs     04/16/22 0434   ALT 14   AP 69   TBILI 0.4   TP 4.7*   ALB 1.9*   GLOB 2.8     No results for input(s): INR, PTP, APTT, INREXT, INREXT in the last 72 hours. No results for input(s): FE, TIBC, PSAT, FERR in the last 72 hours. Lab Results   Component Value Date/Time    Folate 37.1 (H) 04/03/2022 01:14 AM      No results for input(s): PH, PCO2, PO2 in the last 72 hours. No results for input(s): CPK, CKNDX, TROIQ in the last 72 hours.     No lab exists for component: CPKMB  No results found for: CHOL, CHOLX, CHLST, CHOLV, HDL, HDLP, LDL, LDLC, DLDLP, TGLX, TRIGL, TRIGP, CHHD, CHHDX  Lab Results   Component Value Date/Time    Glucose (POC) 191 (H) 04/16/2022 07:15 AM    Glucose (POC) 186 (H) 04/15/2022 09:35 PM    Glucose (POC) 207 (H) 04/15/2022 04:00 PM    Glucose (POC) 237 (H) 04/15/2022 11:17 AM    Glucose (POC) 147 (H) 04/15/2022 07:20 AM     Lab Results   Component Value Date/Time    Color YELLOW/STRAW 04/02/2022 07:16 PM    Appearance CLEAR 04/02/2022 07:16 PM    Specific gravity 1.015 04/02/2022 07:16 PM    pH (UA) 7.5 04/02/2022 07:16 PM    Protein Negative 04/02/2022 07:16 PM    Glucose Negative 04/02/2022 07:16 PM    Ketone Negative 04/02/2022 07:16 PM    Bilirubin Negative 04/02/2022 07:16 PM    Urobilinogen 1.0 04/02/2022 07:16 PM    Nitrites Negative 04/02/2022 07:16 PM    Leukocyte Esterase Negative 04/02/2022 07:16 PM    Epithelial cells FEW 04/02/2022 07:16 PM    Bacteria Negative 04/02/2022 07:16 PM    WBC 0-4 04/02/2022 07:16 PM    RBC 0-5 04/02/2022 07:16 PM

## 2022-04-16 NOTE — PROGRESS NOTES
End of Shift Note    Bedside shift change report given to Sangita (oncoming nurse) by Angus Ballard RN (offgoing nurse).   Report included the following information SBAR, Kardex, ED Summary, Intake/Output, MAR and Recent Results    Shift worked:  7a-7p     Shift summary and any significant changes:     incontinence care complete, tolerating meds     Concerns for physician to address: none   Zone phone for oncoming shift:  none         Angus Ballard RN

## 2022-04-16 NOTE — PROGRESS NOTES
End of Shift Note    Bedside shift change report given to Eloina (oncoming nurse) by Denice Sadler (offgoing nurse). Report included the following information SBAR, Kardex and MAR    Shift worked:  7p-7a     Shift summary and any significant changes:     Humalog was held due to the patient's blood glucose level, see MAR. Scheduled medications were given, see MAR. IV has been flushed and is patent. Morning labs were done. Patient teaching and routine rounding has been done. Concerns for physician to address:  Patient's scrotum is edematous. Zone phone for oncoming shift:          Activity:  Activity Level: Bed Rest  Number times ambulated in hallways past shift: 0  Number of times OOB to chair past shift: 0    Cardiac:   Cardiac Monitoring: No      Cardiac Rhythm: Sinus Rhythm    Access:   Current line(s): PIV     Genitourinary:   Urinary status: incontinent    Respiratory:   O2 Device: None (Room air)  Chronic home O2 use?: NO  Incentive spirometer at bedside: NO       GI:  Last Bowel Movement Date: 04/14/22  Current diet:  ADULT ORAL NUTRITION SUPPLEMENT Breakfast, Dinner; Low Calorie/High Protein  ADULT DIET Regular; 4 carb choices (60 gm/meal)  Passing flatus: YES  Tolerating current diet: YES       Pain Management:   Patient states pain is manageable on current regimen: YES    Skin:  Troy Score: 17  Interventions: float heels    Patient Safety:  Fall Score:  Total Score: 4  Interventions: bed/chair alarm  High Fall Risk: Yes    Length of Stay:  Expected LOS: 2d 16h  Actual LOS: [address] [facility]

## 2022-04-16 NOTE — PROGRESS NOTES
End of Shift Note    Bedside shift change report given to Denver Benedict, RN (oncoming nurse) by Pam Caballero RN (offgoing nurse). Report included the following information SBAR, Kardex, Intake/Output and MAR    Shift worked:  [id number]     Shift summary and any significant changes:     Patient stable through shift, complains of pain in feet and left knee only when move or turn him, scheduled pain med provided. All scheduled meds, incontinent care and frequent rounding provided. Patient had a condom cath which was too small, removed and noticed tip of penis excoriated. Notified provider, no orders given. Mother and brother at bedside in the afternoon. Concerns for physician to address:       Zone phone for oncoming shift:          Activity:  Activity Level: Bed Rest  Number times ambulated in hallways past shift: 0  Number of times OOB to chair past shift: 0    Cardiac:   Cardiac Monitoring: No      Cardiac Rhythm: Sinus Rhythm    Access:   Current line(s): PIV     Genitourinary:   Urinary status: incontinent    Respiratory:   O2 Device: None (Room air)  Chronic home O2 use?: NO  Incentive spirometer at bedside: NO       GI:  Last Bowel Movement Date: 04/14/22  Current diet:  ADULT ORAL NUTRITION SUPPLEMENT Breakfast, Dinner; Low Calorie/High Protein  ADULT DIET Regular; 4 carb choices (60 gm/meal)  Passing flatus: YES  Tolerating current diet: YES       Pain Management:   Patient states pain is manageable on current regimen: YES    Skin:  Troy Score: 14  Interventions: turn team, speciality bed, float heels and PT/OT consult    Patient Safety:  Fall Score:  Total Score: 4  Interventions: bed/chair alarm, assistive device (walker, cane, etc) and gripper socks  High Fall Risk: Yes    Length of Stay:  Expected LOS: 2d 16h  Actual LOS: [address], RN

## 2022-04-17 LAB
ANION GAP SERPL CALC-SCNC: 4 MMOL/L (ref 5–15)
BUN SERPL-MCNC: 30 MG/DL (ref 6–20)
BUN/CREAT SERPL: 39 (ref 12–20)
CALCIUM SERPL-MCNC: 7.9 MG/DL (ref 8.5–10.1)
CHLORIDE SERPL-SCNC: 110 MMOL/L (ref 97–108)
CO2 SERPL-SCNC: 27 MMOL/L (ref 21–32)
CREAT SERPL-MCNC: 0.77 MG/DL (ref 0.7–1.3)
GLUCOSE BLD STRIP.AUTO-MCNC: 140 MG/DL (ref 65–117)
GLUCOSE BLD STRIP.AUTO-MCNC: 181 MG/DL (ref 65–117)
GLUCOSE BLD STRIP.AUTO-MCNC: 187 MG/DL (ref 65–117)
GLUCOSE BLD STRIP.AUTO-MCNC: 249 MG/DL (ref 65–117)
GLUCOSE SERPL-MCNC: 102 MG/DL (ref 65–100)
POTASSIUM SERPL-SCNC: 3.9 MMOL/L (ref 3.5–5.1)
SERVICE CMNT-IMP: ABNORMAL
SODIUM SERPL-SCNC: 141 MMOL/L (ref 136–145)

## 2022-04-17 PROCEDURE — 36415 COLL VENOUS BLD VENIPUNCTURE: CPT

## 2022-04-17 PROCEDURE — 80048 BASIC METABOLIC PNL TOTAL CA: CPT

## 2022-04-17 PROCEDURE — 74011250637 HC RX REV CODE- 250/637: Performed by: INTERNAL MEDICINE

## 2022-04-17 PROCEDURE — 74011636637 HC RX REV CODE- 636/637: Performed by: INTERNAL MEDICINE

## 2022-04-17 PROCEDURE — 65660000000 HC RM CCU STEPDOWN

## 2022-04-17 PROCEDURE — 82962 GLUCOSE BLOOD TEST: CPT

## 2022-04-17 PROCEDURE — 74011000250 HC RX REV CODE- 250: Performed by: INTERNAL MEDICINE

## 2022-04-17 RX ADMIN — OXYCODONE AND ACETAMINOPHEN 0.5 TABLET: 5; 325 TABLET ORAL at 17:40

## 2022-04-17 RX ADMIN — Medication: at 09:00

## 2022-04-17 RX ADMIN — Medication: at 21:54

## 2022-04-17 RX ADMIN — OXYCODONE AND ACETAMINOPHEN 0.5 TABLET: 5; 325 TABLET ORAL at 08:59

## 2022-04-17 RX ADMIN — DIVALPROEX SODIUM 1000 MG: 500 TABLET, DELAYED RELEASE ORAL at 21:48

## 2022-04-17 RX ADMIN — TOPIRAMATE 200 MG: 100 TABLET, FILM COATED ORAL at 08:59

## 2022-04-17 RX ADMIN — FOLIC ACID 1 MG: 1 TABLET ORAL at 09:00

## 2022-04-17 RX ADMIN — FUROSEMIDE 20 MG: 20 TABLET ORAL at 17:40

## 2022-04-17 RX ADMIN — SODIUM CHLORIDE, PRESERVATIVE FREE 5 ML: 5 INJECTION INTRAVENOUS at 21:49

## 2022-04-17 RX ADMIN — Medication 2 UNITS: at 12:40

## 2022-04-17 RX ADMIN — PANTOPRAZOLE SODIUM 40 MG: 40 TABLET, DELAYED RELEASE ORAL at 08:59

## 2022-04-17 RX ADMIN — DIVALPROEX SODIUM 500 MG: 500 TABLET, DELAYED RELEASE ORAL at 09:00

## 2022-04-17 RX ADMIN — SODIUM CHLORIDE, PRESERVATIVE FREE 5 ML: 5 INJECTION INTRAVENOUS at 06:28

## 2022-04-17 RX ADMIN — CYANOCOBALAMIN TAB 500 MCG 1000 MCG: 500 TAB at 08:59

## 2022-04-17 RX ADMIN — Medication: at 17:41

## 2022-04-17 RX ADMIN — Medication 2 UNITS: at 08:58

## 2022-04-17 RX ADMIN — ATORVASTATIN CALCIUM 40 MG: 40 TABLET, FILM COATED ORAL at 08:59

## 2022-04-17 RX ADMIN — Medication 2 UNITS: at 21:49

## 2022-04-17 RX ADMIN — SODIUM CHLORIDE, PRESERVATIVE FREE 10 ML: 5 INJECTION INTRAVENOUS at 17:48

## 2022-04-17 RX ADMIN — Medication 2 UNITS: at 17:41

## 2022-04-17 RX ADMIN — OXYCODONE AND ACETAMINOPHEN 0.5 TABLET: 5; 325 TABLET ORAL at 21:47

## 2022-04-17 RX ADMIN — TOPIRAMATE 200 MG: 100 TABLET, FILM COATED ORAL at 17:48

## 2022-04-17 NOTE — PROGRESS NOTES
Problem: Falls - Risk of  Goal: *Absence of Falls  Description: Document Marisabel Bautista Fall Risk and appropriate interventions in the flowsheet. Outcome: Progressing Towards Goal  Note: Fall Risk Interventions:  Mobility Interventions: Communicate number of staff needed for ambulation/transfer,Patient to call before getting OOB    Mentation Interventions: Bed/chair exit alarm,Door open when patient unattended    Medication Interventions: Bed/chair exit alarm,Patient to call before getting OOB,Teach patient to arise slowly    Elimination Interventions: Bed/chair exit alarm,Call light in reach,Patient to call for help with toileting needs    History of Falls Interventions: Bed/chair exit alarm,Door open when patient unattended,Investigate reason for fall,Room close to nurse's station         Problem: Patient Education: Go to Patient Education Activity  Goal: Patient/Family Education  Outcome: Progressing Towards Goal     Problem: Anemia Care Plan (Adult and Pediatric)  Goal: *Labs within defined limits  Outcome: Progressing Towards Goal  Goal: *Tolerates increased activity  Outcome: Progressing Towards Goal     Problem: Patient Education: Go to Patient Education Activity  Goal: Patient/Family Education  Outcome: Progressing Towards Goal     Problem: Patient Education: Go to Patient Education Activity  Goal: Patient/Family Education  Outcome: Progressing Towards Goal     Problem: Patient Education: Go to Patient Education Activity  Goal: Patient/Family Education  Outcome: Progressing Towards Goal     Problem: Pressure Injury - Risk of  Goal: *Prevention of pressure injury  Description: Document Troy Scale and appropriate interventions in the flowsheet.   Outcome: Progressing Towards Goal  Note: Pressure Injury Interventions:  Sensory Interventions: Assess changes in LOC,Float heels,Minimize linen layers    Moisture Interventions: Absorbent underpads,Check for incontinence Q2 hours and as needed,Minimize layers    Activity Interventions: Pressure redistribution bed/mattress(bed type)    Mobility Interventions: HOB 30 degrees or less,Pressure redistribution bed/mattress (bed type)    Nutrition Interventions: Document food/fluid/supplement intake    Friction and Shear Interventions: Lift sheet,HOB 30 degrees or less,Minimize layers                Problem: Patient Education: Go to Patient Education Activity  Goal: Patient/Family Education  Outcome: Progressing Towards Goal     Problem: Breathing Pattern - Ineffective  Goal: *Absence of hypoxia  Outcome: Progressing Towards Goal  Goal: *Use of effective breathing techniques  Outcome: Progressing Towards Goal     Problem: Patient Education: Go to Patient Education Activity  Goal: Patient/Family Education  Outcome: Progressing Towards Goal

## 2022-04-17 NOTE — PROGRESS NOTES
End of Shift Note    Bedside shift change report given to SHABNAM Sherman (oncoming nurse) by Ramandeep Wright RN (offgoing nurse). Report included the following information SBAR, Kardex and MAR    Shift worked:  7P-7A       Shift summary and any significant changes:     Scheduled medications were given, see MAR. Patient had 1 BM during the shift. IV line is flushed and patent. Morning labs were drawn. Hygiene care was provided. Frequent rounding has been done. Concerns for physician to address:       Zone phone for oncoming shift:          Activity:  Activity Level: Bed Rest  Number times ambulated in hallways past shift: 0  Number of times OOB to chair past shift: 0    Cardiac:   Cardiac Monitoring: No      Cardiac Rhythm: Sinus Rhythm    Access:   Current line(s): PIV     Genitourinary:   Urinary status: incontinent    Respiratory:   O2 Device: None (Room air)  Chronic home O2 use?: NO  Incentive spirometer at bedside: NO       GI:  Last Bowel Movement Date: 04/16/22  Current diet:  ADULT ORAL NUTRITION SUPPLEMENT Breakfast, Dinner; Low Calorie/High Protein  ADULT DIET Regular; 4 carb choices (60 gm/meal)  Passing flatus: YES  Tolerating current diet: YES       Pain Management:   Patient states pain is manageable on current regimen: YES    Skin:  Troy Score: 15  Interventions: turn team, float heels and increase time out of bed    Patient Safety:  Fall Score:  Total Score: 4  Interventions: bed/chair alarm, assistive device (walker, cane, etc), gripper socks and pt to call before getting OOB  High Fall Risk: Yes    Length of Stay:  Expected LOS: 2d 16h  Actual LOS: [address], SHABNAM details… Affect and characteristics of appearance, verbalizations, behaviors are appropriate

## 2022-04-17 NOTE — PROGRESS NOTES
Hospitalist Progress Note              Estefani Sanford MD.                                                             Cell: [phone]                               NAME:  Mercedes Hernandez  :  1955  MRN:  [id number]  Date of Service:  2022    Summary:  Mercedes Hernandez is a 73 y.o.   male with PMHx significant for epilepsy, intellectual disability, type 2 diabetes, seizure, presents to the ER for evaluation of abnormal labs. Patient was seen by his PCP a day PTA with routine lab work performed and was found to have HB of 4.4. Assessment/Plan:    Adenomatous polyps, innumerable  ? Infectious colitis  -History of recent colonoscopy in  which showed multiple polyps which were biopsied. Partial colectomy was recommended however family declined  -S/p EGD on : Normal upper endoscopy  -CT abdomen/pelvis: Colitis diffuse fat stranding in the peritoneal cavity  -Abdominal ultrasound showed pancreatic lesion. CT abdomen negative for pancreatic mass.  -Completed empiric course of Levaquin and Flagyl    MDS and splenic marginal zone lymphoma  Severe macrocytic anemia/Thrombocytopenia/lymphocytosis  -Hb 4.2 on admission  -S/p 3 units of PRBC since   -S/p IV Venofer  -S/p bone marrow biopsy on : Suspicious for splenic marginal zone lymphoma and MDS, final report pending  -Follow H&H. We will try to minimize blood draws. Platelet count improving  -Heme-onc following. Appreciate input. Follow-up with Dr. Mere Nation OP    Presumed aspiration pneumonia  -Fever and chest x-ray infiltrates postprocedure  -Completed course of Levaquin and Flagyl    DM type 2: Continue ISS as per protocol. LE edema  Anasarca: Likely due to hypoalbuminemia  Echo: EF 50-55%  S/p 40 mg of IV Lasix along with IV albumin  Peripheral edema improving  Resume oral Lasix (on 20 mg of Lasix daily at home)  Hold p.o. Lasix for now.   Will give 40 mg of IV Lasix along with IV albumin  Monitor volume status     Seizure disorder  Intellectual disability  Continue Depakote, Topamax     Debility: Continue PT/OT     Code Status: Full  Surrogate Decision Maker: Patient's mother  DVT Prophylaxis: SCD  GI Prophylaxis: not indicated  Baseline: From home   Dispo: Pending placement     Interval History/Subjective:  F/u for severe anemia, MDS  Discussed with RN  Complains of pain to bilateral legs      Review of Systems:  Pertinent items are noted in HPI. Objective:     VITALS:   Last 24hrs VS reviewed since prior progress note. Most recent are:  Visit Vitals  BP (!) 108/49 (BP 1 Location: Right upper arm, BP Patient Position: At rest)   Pulse 68   Temp 98.3 °F (36.8 °C)   Resp 18   Ht 6' (1.829 m)   Wt 88.5 kg (195 lb)   SpO2 94%   BMI 26.45 kg/m²     No intake or output data in the 24 hours ending 04/17/22 0848     PHYSICAL EXAM:  General: No acute distress, cooperative, pleasant   EENT: EOMI. Anicteric sclerae. Oral mucous moist, oropharynx benign  Resp: CTA bilaterally. No wheezing/rhonchi/rales. No accessory muscle use  CV: Regular rhythm, normal rate, no murmurs, gallops, rubs,   GI: Soft, non distended, non tender. normoactive bowel sounds, no hepatosplenomegaly Extremities: 1+ edema upper and lower extremity, scrotal swelling (improved), warm, 2+ pulses throughout  Neurologic: Moves all extremities. AAOx3, CN II-XII grossly intact  Psych: Good insight. Not anxious nor agitated.   Skin: Good Turgor, venous stasis changes to bilateral lower extremity    Lab Data Personally Reviewed: (see below)     Medications list Personally Reviewed:  x YES  NO     _______________________________________________________________________  Care Plan discussed with:  Patient/Family, Nurse and     Total NON critical care TIME:  30 minutes    Patsy Olson MD     Procedures: see electronic medical records for all procedures/Xrays and details which were not copied into this note but were reviewed prior to creation of Plan. LABS:  Recent Labs     04/16/22 0434   WBC 10.8   HGB 7.6*   HCT 24.0*   PLT 80*     Recent Labs     04/17/22  0315 04/16/22 0434    138   K 3.9 4.2   * 109*   CO2 27 26   BUN 30* 28*   CREA 0.77 0.81   * 103*   CA 7.9* 7.8*     Recent Labs     04/16/22 0434   ALT 14   AP 69   TBILI 0.4   TP 4.7*   ALB 1.9*   GLOB 2.8     No results for input(s): INR, PTP, APTT, INREXT, INREXT in the last 72 hours. No results for input(s): FE, TIBC, PSAT, FERR in the last 72 hours. Lab Results   Component Value Date/Time    Folate 37.1 (H) 04/03/2022 01:14 AM      No results for input(s): PH, PCO2, PO2 in the last 72 hours. No results for input(s): CPK, CKNDX, TROIQ in the last 72 hours.     No lab exists for component: CPKMB  No results found for: CHOL, CHOLX, CHLST, CHOLV, HDL, HDLP, LDL, LDLC, DLDLP, TGLX, TRIGL, TRIGP, CHHD, CHHDX  Lab Results   Component Value Date/Time    Glucose (POC) 140 (H) 04/17/2022 07:20 AM    Glucose (POC) 226 (H) 04/16/2022 10:10 PM    Glucose (POC) 194 (H) 04/16/2022 03:59 PM    Glucose (POC) 187 (H) 04/16/2022 12:05 PM    Glucose (POC) 191 (H) 04/16/2022 07:15 AM     Lab Results   Component Value Date/Time    Color YELLOW/STRAW 04/02/2022 07:16 PM    Appearance CLEAR 04/02/2022 07:16 PM    Specific gravity 1.015 04/02/2022 07:16 PM    pH (UA) 7.5 04/02/2022 07:16 PM    Protein Negative 04/02/2022 07:16 PM    Glucose Negative 04/02/2022 07:16 PM    Ketone Negative 04/02/2022 07:16 PM    Bilirubin Negative 04/02/2022 07:16 PM    Urobilinogen 1.0 04/02/2022 07:16 PM    Nitrites Negative 04/02/2022 07:16 PM    Leukocyte Esterase Negative 04/02/2022 07:16 PM    Epithelial cells FEW 04/02/2022 07:16 PM    Bacteria Negative 04/02/2022 07:16 PM    WBC 0-4 04/02/2022 07:16 PM    RBC 0-5 04/02/2022 07:16 PM

## 2022-04-18 LAB
ANION GAP SERPL CALC-SCNC: 4 MMOL/L (ref 5–15)
BASOPHILS # BLD: 0 K/UL (ref 0–0.1)
BASOPHILS NFR BLD: 0 % (ref 0–1)
BUN SERPL-MCNC: 29 MG/DL (ref 6–20)
BUN/CREAT SERPL: 38 (ref 12–20)
CALCIUM SERPL-MCNC: 7.8 MG/DL (ref 8.5–10.1)
CHLORIDE SERPL-SCNC: 110 MMOL/L (ref 97–108)
CO2 SERPL-SCNC: 27 MMOL/L (ref 21–32)
CREAT SERPL-MCNC: 0.76 MG/DL (ref 0.7–1.3)
DIFFERENTIAL METHOD BLD: ABNORMAL
EOSINOPHIL # BLD: 0.3 K/UL (ref 0–0.4)
EOSINOPHIL NFR BLD: 2 % (ref 0–7)
ERYTHROCYTE [DISTWIDTH] IN BLOOD BY AUTOMATED COUNT: 22.7 % (ref 11.5–14.5)
GLUCOSE BLD STRIP.AUTO-MCNC: 137 MG/DL (ref 65–117)
GLUCOSE BLD STRIP.AUTO-MCNC: 193 MG/DL (ref 65–117)
GLUCOSE BLD STRIP.AUTO-MCNC: 211 MG/DL (ref 65–117)
GLUCOSE BLD STRIP.AUTO-MCNC: 234 MG/DL (ref 65–117)
GLUCOSE SERPL-MCNC: 123 MG/DL (ref 65–100)
HCT VFR BLD AUTO: 25.9 % (ref 36.6–50.3)
HGB BLD-MCNC: 8 G/DL (ref 12.1–17)
IMM GRANULOCYTES # BLD AUTO: 0 K/UL (ref 0–0.04)
IMM GRANULOCYTES NFR BLD AUTO: 0 % (ref 0–0.5)
LYMPHOCYTES # BLD: 10.5 K/UL (ref 0.8–3.5)
LYMPHOCYTES NFR BLD: 75 % (ref 12–49)
MCH RBC QN AUTO: 35.6 PG (ref 26–34)
MCHC RBC AUTO-ENTMCNC: 30.9 G/DL (ref 30–36.5)
MCV RBC AUTO: 115.1 FL (ref 80–99)
MONOCYTES # BLD: 2.7 K/UL (ref 0–1)
MONOCYTES NFR BLD: 19 % (ref 5–13)
NEUTS SEG # BLD: 0.6 K/UL (ref 1.8–8)
NEUTS SEG NFR BLD: 4 % (ref 32–75)
NRBC # BLD: 0 K/UL (ref 0–0.01)
NRBC BLD-RTO: 0 PER 100 WBC
PLATELET # BLD AUTO: 88 K/UL (ref 150–400)
PMV BLD AUTO: 9.4 FL (ref 8.9–12.9)
POTASSIUM SERPL-SCNC: 4.1 MMOL/L (ref 3.5–5.1)
RBC # BLD AUTO: 2.25 M/UL (ref 4.1–5.7)
RBC MORPH BLD: ABNORMAL
SARS-COV-2, COV2: NORMAL
SERVICE CMNT-IMP: ABNORMAL
SODIUM SERPL-SCNC: 141 MMOL/L (ref 136–145)
WBC # BLD AUTO: 14.1 K/UL (ref 4.1–11.1)
WBC MORPH BLD: ABNORMAL

## 2022-04-18 PROCEDURE — 74011250637 HC RX REV CODE- 250/637: Performed by: INTERNAL MEDICINE

## 2022-04-18 PROCEDURE — 97530 THERAPEUTIC ACTIVITIES: CPT | Performed by: OCCUPATIONAL THERAPIST

## 2022-04-18 PROCEDURE — 80048 BASIC METABOLIC PNL TOTAL CA: CPT

## 2022-04-18 PROCEDURE — 74011636637 HC RX REV CODE- 636/637: Performed by: INTERNAL MEDICINE

## 2022-04-18 PROCEDURE — 97530 THERAPEUTIC ACTIVITIES: CPT

## 2022-04-18 PROCEDURE — 85025 COMPLETE CBC W/AUTO DIFF WBC: CPT

## 2022-04-18 PROCEDURE — 65270000046 HC RM TELEMETRY

## 2022-04-18 PROCEDURE — 82962 GLUCOSE BLOOD TEST: CPT

## 2022-04-18 PROCEDURE — 74011000250 HC RX REV CODE- 250: Performed by: INTERNAL MEDICINE

## 2022-04-18 PROCEDURE — 97535 SELF CARE MNGMENT TRAINING: CPT | Performed by: OCCUPATIONAL THERAPIST

## 2022-04-18 PROCEDURE — 36415 COLL VENOUS BLD VENIPUNCTURE: CPT

## 2022-04-18 PROCEDURE — U0005 INFEC AGEN DETEC AMPLI PROBE: HCPCS

## 2022-04-18 RX ADMIN — CYANOCOBALAMIN TAB 500 MCG 1000 MCG: 500 TAB at 10:05

## 2022-04-18 RX ADMIN — SODIUM CHLORIDE, PRESERVATIVE FREE 10 ML: 5 INJECTION INTRAVENOUS at 21:47

## 2022-04-18 RX ADMIN — Medication: at 18:35

## 2022-04-18 RX ADMIN — PANTOPRAZOLE SODIUM 40 MG: 40 TABLET, DELAYED RELEASE ORAL at 10:04

## 2022-04-18 RX ADMIN — Medication: at 10:07

## 2022-04-18 RX ADMIN — OXYCODONE AND ACETAMINOPHEN 0.5 TABLET: 5; 325 TABLET ORAL at 10:06

## 2022-04-18 RX ADMIN — DIVALPROEX SODIUM 500 MG: 500 TABLET, DELAYED RELEASE ORAL at 10:03

## 2022-04-18 RX ADMIN — DIVALPROEX SODIUM 1000 MG: 500 TABLET, DELAYED RELEASE ORAL at 21:44

## 2022-04-18 RX ADMIN — Medication 3 UNITS: at 12:37

## 2022-04-18 RX ADMIN — SODIUM CHLORIDE, PRESERVATIVE FREE 5 ML: 5 INJECTION INTRAVENOUS at 05:46

## 2022-04-18 RX ADMIN — OXYCODONE AND ACETAMINOPHEN 0.5 TABLET: 5; 325 TABLET ORAL at 17:58

## 2022-04-18 RX ADMIN — SODIUM CHLORIDE, PRESERVATIVE FREE 10 ML: 5 INJECTION INTRAVENOUS at 17:59

## 2022-04-18 RX ADMIN — TOPIRAMATE 200 MG: 100 TABLET, FILM COATED ORAL at 10:04

## 2022-04-18 RX ADMIN — ATORVASTATIN CALCIUM 40 MG: 40 TABLET, FILM COATED ORAL at 10:06

## 2022-04-18 RX ADMIN — TOPIRAMATE 200 MG: 100 TABLET, FILM COATED ORAL at 17:59

## 2022-04-18 RX ADMIN — FOLIC ACID 1 MG: 1 TABLET ORAL at 10:05

## 2022-04-18 RX ADMIN — Medication 3 UNITS: at 17:25

## 2022-04-18 RX ADMIN — OXYCODONE AND ACETAMINOPHEN 0.5 TABLET: 5; 325 TABLET ORAL at 21:47

## 2022-04-18 RX ADMIN — FUROSEMIDE 20 MG: 20 TABLET ORAL at 10:11

## 2022-04-18 RX ADMIN — Medication: at 21:46

## 2022-04-18 RX ADMIN — FUROSEMIDE 20 MG: 20 TABLET ORAL at 17:59

## 2022-04-18 NOTE — PROGRESS NOTES
Hematology Oncology Progress Note           Follow up for: MDS, splenic marginal zone lymphoma     Chart notes reviewed since last visit. Case discussed with following: nurse at bedside    Patient complains of the following: has to use the bedpan. Worried that legs not moving well. Additional concerns noted by the staff: read PT notes - looks like pt's mobility had been getting worse at home. Patient Vitals for the past 24 hrs:   BP Temp Pulse Resp SpO2   04/18/22 0721 (!) 99/54 98.7 °F (37.1 °C) 63 16 95 %   04/17/22 2354 (!) 96/47 98.2 °F (36.8 °C) 83 16 98 %   04/17/22 2041 (!) 109/45 97.5 °F (36.4 °C) 63 16 96 %   04/17/22 1447 (!) 113/37 98.5 °F (36.9 °C) 75 18 95 %       Review of Systems: negative for 11 organ systems except as noted above. Physical Examination:  Constitutional Alert, cooperative. Mood and affect appropriate. Appears close to chronological age. Well nourished. Well developed. Head Normocephalic; no scars   Eyes Conjunctivae and sclerae are clear and without icterus. Pupils are round   ENMT Sinuses are nontender. No oral exudates, ulcers, masses, thrush or mucositis. Neck Supple without masses or thyromegaly. No jugular venous distension. Hematologic/Lymphatic No petechiae or purpura. No tender or palpable lymph nodes noted. Respiratory Lungs are clear to auscultation without rhonchi or wheezing. Cardiovascular Regular rate and rhythm of heart without murmurs, gallops or rubs. Chest / Line Site Chest is symmetric with no chest wall deformities. Abdomen Non-tender, non-distended, no masses, ascites or hepatosplenomegaly. Good bowel sounds. .   Musculoskeletal No tenderness or swelling, normal range of motion without obvious weakness. Extremities No visible deformities, no cyanosis, clubbing or edema. Chronic venous stasis changes. Skin No rashes, scars, or lesions suggestive of malignancy. No petechiae, purpura, or ecchymoses. No excoriations. Neurologic No sensory or motor deficits noted but not specifically tested. Does appear that legs are weak, right more than left this AM.    Psychiatric Alert. Coherent speech. Verbalizes understanding of our discussions today. Labs:  Recent Results (from the past 24 hour(s))   GLUCOSE, POC    Collection Time: 04/17/22 11:03 AM   Result Value Ref Range    Glucose (POC) 187 (H) 65 - 117 mg/dL    Performed by Greg Cage    GLUCOSE, POC    Collection Time: 04/17/22  4:02 PM   Result Value Ref Range    Glucose (POC) 181 (H) 65 - 117 mg/dL    Performed by Greg Cage    GLUCOSE, POC    Collection Time: 04/17/22  9:36 PM   Result Value Ref Range    Glucose (POC) 249 (H) 65 - 117 mg/dL    Performed by Mikey Devries PCT    CBC WITH AUTOMATED DIFF    Collection Time: 04/18/22  2:22 AM   Result Value Ref Range    WBC 14.1 (H) 4.1 - 11.1 K/uL    RBC 2.25 (L) 4.10 - 5.70 M/uL    HGB 8.0 (L) 12.1 - 17.0 g/dL    HCT 25.9 (L) 36.6 - 50.3 %    .1 (H) 80.0 - 99.0 FL    MCH 35.6 (H) 26.0 - 34.0 PG    MCHC 30.9 30.0 - 36.5 g/dL    RDW 22.7 (H) 11.5 - 14.5 %    PLATELET 88 (L) 998 - 400 K/uL    MPV 9.4 8.9 - 12.9 FL    NRBC 0.0 0  WBC    ABSOLUTE NRBC 0.00 0.00 - 0.01 K/uL    NEUTROPHILS 4 (L) 32 - 75 %    LYMPHOCYTES 75 (H) 12 - 49 %    MONOCYTES 19 (H) 5 - 13 %    EOSINOPHILS 2 0 - 7 %    BASOPHILS 0 0 - 1 %    IMMATURE GRANULOCYTES 0 0.0 - 0.5 %    ABS. NEUTROPHILS 0.6 (L) 1.8 - 8.0 K/UL    ABS. LYMPHOCYTES 10.5 (H) 0.8 - 3.5 K/UL    ABS. MONOCYTES 2.7 (H) 0.0 - 1.0 K/UL    ABS. EOSINOPHILS 0.3 0.0 - 0.4 K/UL    ABS. BASOPHILS 0.0 0.0 - 0.1 K/UL    ABS. IMM.  GRANS. 0.0 0.00 - 0.04 K/UL    DF MANUAL      RBC COMMENTS MACROCYTOSIS  2+        RBC COMMENTS MICROCYTOSIS  1+        RBC COMMENTS OVALOCYTES  1+        RBC COMMENTS ANISOCYTOSIS  2+        WBC COMMENTS SMUDGE CELLS SEEN     METABOLIC PANEL, BASIC    Collection Time: 04/18/22  2:22 AM   Result Value Ref Range    Sodium 141 136 - 145 mmol/L Potassium 4.1 3.5 - 5.1 mmol/L    Chloride 110 (H) 97 - 108 mmol/L    CO2 27 21 - 32 mmol/L    Anion gap 4 (L) 5 - 15 mmol/L    Glucose 123 (H) 65 - 100 mg/dL    BUN 29 (H) 6 - 20 MG/DL    Creatinine 0.76 0.70 - 1.30 MG/DL    BUN/Creatinine ratio 38 (H) 12 - 20      GFR est AA >60 >60 ml/min/1.73m2    GFR est non-AA >60 >60 ml/min/1.73m2    Calcium 7.8 (L) 8.5 - 10.1 MG/DL   GLUCOSE, POC    Collection Time: 04/18/22  7:25 AM   Result Value Ref Range    Glucose (POC) 137 (H) 65 - 117 mg/dL    Performed by Mason Saint      -Imaging-   4/3/22 CT A/P:  IMPRESSION  . No definite pancreatic mass seen by this technique. 2. Colitis diffuse with fat stranding in the peritoneal cavity. 3. Splenomegaly. Assessment and Plan:    *) Severe macrocytic anemia, thrombocytopenia, lymphocytosis:  - MCV markedly elevated to 142 with severe anemia with  hgb 4.2 on admission.  - Iron studies after transfusion not markedly elevated as would be expected. Given Venofer on 4/4 and 4/5.  - hgb stable at present and plts coming up nicely. Roughly 15% of iron deficient folks have low plts and another 15% have thrombocytosis for reasons unclear to me.   - B12 on low end of normal, MMA wnl, so not B12 deficient. - Peripheral blood smear was concerning for lymphoproliferative disorder. 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 - SPEP 0.2 with IgG Kappa specificity and elevated serum free light chain ratio. Monoclonal gammopathies typically are seen with myeloma, low grade lymphoma, CLL, Waldenstrom's macroglobulinemia and amyloidosis. In his particular case, it would seem to be associated with the low grade lymphoma seen in the BM (splenic MZL) with circulating lymphoma cells seen in the peripheral blood  - continue folic acid daily  - Had 4U PRBC.    - Bm Bx 4/6/22 reported splenic marginal zone lymphoma involving 20% of hytpercellular marrow with multilineage dysplasia (ie MDS with multilienage dysplasia). FISH panel negative for t(11:14), R(21;64), bcl6 rearrangement and MALT1 rearrangement. Cytogenetics normal male karyotype. - Dr. Samuels Samples discussed with his mother. Will continue to follow him and continue procrit for his MDS. He has a low grade lymphoma and could be treated with rituxan.  -retacrit here t, th, sat with plans to continue as outpt  - Follow-up with Dr. Samuels Samples as outpt scheduled for 5/6/22 2:15pm. May move earlier if he goes to long term care and not snf      *) Colitis:  - CT scan showing diffuse colitis. completed IV flagyl 4/15/22  - EGD on 4/5 without acute findings. - GI  Was following had multiple polyps and GI recommended colectomy that was declined.     *) Fever: (presumed aspiration pneumonia)  - fever resolved, treated with  Levaquin and IV flagyl    *) intellectual disability    *) DM    *) Seizure disorder  - on depakote, topamax    *) innumerable adenomatous polyps seen on colonoscopy in Dec 2021 - partial colectomy recommended. Pt's family declines.  EGD 4/5 normal.     *) Lower extremity edema, anasarca  - receiving IV albumin and lasix periodically   - attributed to hypoalbuminemia         Berlin Beauchamp MD [facility] office  [address]  [place], [facility] [address]  Phone [phone]  Fax [phone]

## 2022-04-18 NOTE — PROGRESS NOTES
Problem: Mobility Impaired (Adult and Pediatric)  Goal: *Acute Goals and Plan of Care (Insert Text)  Description: FUNCTIONAL STATUS PRIOR TO ADMISSION: The patient was functional at the wheelchair level and required minimal assistance for transfers to the chair. He was ambulatory with rolling walker prior to 2 weeks ago. One week ago, he had a posterior fall going up one stair into home (with assist of his mother) and currently has a head abrasion. HOME SUPPORT PRIOR TO ADMISSION: The patient lived with elderly mother and required minimal assistance/contact guard assist for ADLs/mobility prior to 2 weeks ago. He was declining at home and required more than minimal assist.    Physical Therapy Goals  Initiated 4/7/2022  1. Patient will move from supine to sit and sit to supine  in bed with minimal assistance/contact guard assist within 7 day(s). 2.  Patient will transfer from bed to chair and chair to bed with minimal assistance/contact guard assist x 2 using the least restrictive device within 7 day(s). 3.  Patient will perform sit to stand with minimal assistance/contact guard assist x 2 within 7 day(s). 4.  Patient will ambulate with minimal assistance/contact guard assist x 2 for 20 feet with the least restrictive device within 7 day(s). Revised 4/14/2022  1. Patient will move from supine to sit and sit to supine  in bed with moderate assist within 7 day(s). 2.  Patient will transfer from bed to chair and chair to bed with moderate assist using the least restrictive device within 7 day(s). 3.  Patient will perform sit to stand with moderate assist within 7 day(s). 4.  Patient will ambulate with moderate assist for 5' feet with the least restrictive device within 7 day(s).      Outcome: Progressing Towards Goal   PHYSICAL THERAPY TREATMENT  Patient: Mera Marie (31 y.o. male)  Date: 4/18/2022  Diagnosis: Anemia [D64.9] <principal problem not specified>  Procedure(s) (LRB):  ESOPHAGOGASTRODUODENOSCOPY (EGD) (N/A) 13 Days Post-Op  Precautions: Fall,Bed Alarm,Seizure (Autism Spectrum)  Chart, physical therapy assessment, plan of care and goals were reviewed. ASSESSMENT  Patient continues with skilled PT services and is progressing towards goals. Pt continues to c/o pain LEs with movement sonia when transitioning from supine to sit but is inconsistent in his report at other times. Suspect some is pain and some is anticipation of pain and fear in moving. Pt able to move to sitting at edge of bed with max A of 2. His balance is fair to good and with somewhat flexed posture. He is motivated to try standing and attempted x4 this session, twice with use of RW and twice with pull up on bar of recliner. Pt does not achieve full upright standing and sits fairly quickly. Unable to maintain steady enough to allow safe transfer to chair. Will try Best  next session. Feel pt would do poorly with leeann due to fearfulness. Pt continues to cooperate and would benefit from continued rehab. Current Level of Function Impacting Discharge (mobility/balance): see above; min A of 2 to max A of 2; limited standing but made 4 good attempts this session    Other factors to consider for discharge: fall risk, intellectual disability at baseline, A of 2 persons needed         PLAN :  Patient continues to benefit from skilled intervention to address the above impairments. Continue treatment per established plan of care. to address goals. Recommendation for discharge: (in order for the patient to meet his/her long term goals)  Therapy up to 5 days/week in SNF setting    This discharge recommendation:  Has been made in collaboration with the attending provider and/or case management    IF patient discharges home will need the following DME: to be determined (TBD)       SUBJECTIVE:   Patient stated I want to get up.     OBJECTIVE DATA SUMMARY:   Critical Behavior:  Neurologic State: Alert,Confused  Orientation Level: Oriented to person,Disoriented to time,Disoriented to place  Cognition: Decreased attention/concentration,Follows commands,Impaired decision making  Safety/Judgement: Fall prevention  Functional Mobility Training:  Bed Mobility:  Rolling: Maximum assistance; Additional time;Assist x2  Supine to Sit: Maximum assistance;Assist x2  Sit to Supine: Additional time;Assist x2;Minimum assistance  Scooting: Maximum assistance; Additional time;Assist x2 (seated edge of bed)        Transfers:  Sit to Stand: Maximum assistance; Additional time;Assist x2 (bed elevated, pulling from walker or bar recliner x4 trials)  Stand to Sit: Maximum assistance        Bed to Chair:  (unable at this time, poor tolerance to standing)                    Balance:  Sitting: Impaired  Sitting - Static: Good (unsupported)  Sitting - Dynamic: Fair (occasional); Good (unsupported)  Standing: Impaired  Standing - Static: Constant support;Poor (unable to come to full standing, flexed posture)    Pain Rating:  Variable c/o LE pain, seems L>R but has difficulty localizing and c/o are not consistent during activity    Activity Tolerance:   Fair    After treatment patient left in no apparent distress:   Call bell within reach, Side rails x 3, and bed in chair position; talked with RN re: bed alarm - she is looking into it as it did not set earlier    COMMUNICATION/COLLABORATION:   The patients plan of care was discussed with: Occupational therapist and Registered nurse.      Willian Muhammad, PT   Time Calculation: 27 mins

## 2022-04-18 NOTE — PROGRESS NOTES
Transition of Care Plan:    RUR: 18%  Disposition: SN[facility] placement[facility] (accept[facility]-pending level 2 and insurance auth)  Follow up appointments: Follow up with PCP and/or Specialist   DME needed: will require at the facility  Transportation at Discharge:BLS transport-CM will arrange   Keys or means to access home:   N/A    IM Medicare Letter: 2nd IM Medicare Letter to be given  Is patient a BCPI-A Bundle:  CM will provide if required    If yes, was Bundle Letter given?:    Is patient a [place] and connected with the [place]? N/A            If yes, was [employer] transfer form completed and [facility] notified? Caregiver Contact:Melly Deluna (mother) [phone]  Discharge Caregiver contacted prior to discharge? Family to be contacted-mother by bedside  Care Conference needed?:     Not at this time      CM will verify with Complex CM to determine when Level 2 will be completed, by [facility]. Pt is accepted to [facility]. Pt is known to receive insurance auth for rehab stay. CM will verify if an additional Ami Lizbeth is required, due to Ami Lizbeth being .      Pt will require medical transport at the time of d/c and rapid covid test.    SHAAN Ortega, [address] [place] [address]

## 2022-04-18 NOTE — PROGRESS NOTES
Problem: Falls - Risk of  Goal: *Absence of Falls  Description: Document Qing Desai Fall Risk and appropriate interventions in the flowsheet.   Outcome: Progressing Towards Goal  Note: Fall Risk Interventions:  Mobility Interventions: Bed/chair exit alarm,Communicate number of staff needed for ambulation/transfer,Patient to call before getting OOB    Mentation Interventions: Adequate sleep, hydration, pain control,Bed/chair exit alarm,Door open when patient unattended    Medication Interventions: Bed/chair exit alarm,Patient to call before getting OOB,Teach patient to arise slowly    Elimination Interventions: Bed/chair exit alarm,Call light in reach,Patient to call for help with toileting needs    History of Falls Interventions: Bed/chair exit alarm,Door open when patient unattended,Investigate reason for fall,Room close to nurse's station

## 2022-04-18 NOTE — PROGRESS NOTES
End of Shift Note    Bedside shift change report given to Mariza Conde RN (oncoming nurse) by Amy Lawrence RN (offgoing nurse). Report included the following information SBAR, Kardex and MAR    Shift worked:  7P-7A     Shift summary and any significant changes:     Scheduled medications were given, see MAR. IV line is flushed and patent. Patient is stable through the shift. Morning labs were drawn. Hygiene care was provided. Frequent rounding has been done. Concerns for physician to address:       Zone phone for oncoming shift:          Activity:  Activity Level: Bed Rest  Number times ambulated in hallways past shift: 0  Number of times OOB to chair past shift: 0    Cardiac:   Cardiac Monitoring: No      Cardiac Rhythm: Sinus Rhythm    Access:   Current line(s): PIV     Genitourinary:   Urinary status: incontinent    Respiratory:   O2 Device: None (Room air)  Chronic home O2 use?: NO  Incentive spirometer at bedside: NO       GI:  Last Bowel Movement Date: 04/17/22  Current diet:  ADULT ORAL NUTRITION SUPPLEMENT Breakfast, Dinner; Low Calorie/High Protein  ADULT DIET Regular; 4 carb choices (60 gm/meal)  Passing flatus: YES  Tolerating current diet: YES       Pain Management:   Patient states pain is manageable on current regimen: YES    Skin:  [place] Score: 15  Interventions: turn team, float heels and increase time out of bed    Patient Safety:  Fall Score:  Total Score: 4  Interventions: bed/chair alarm, assistive device (walker, cane, etc), gripper socks and pt to call before getting OOB  High Fall Risk: Yes    Length of Stay:  Expected LOS: 2d 16h  Actual LOS: [address] [facility] [place], RN

## 2022-04-18 NOTE — PROGRESS NOTES
Hospitalist Progress Note                                 NAME:  Hernan Batista  :  1955  MRN:  [id number]  Date of Service:  2022    Summary:  Hernan Batista is a 73 y.o.   male with PMHx significant for epilepsy, intellectual disability, type 2 diabetes, seizure, presents to the ER for evaluation of abnormal labs. Patient was seen by his PCP a day PTA with routine lab work performed and was found to have HB of 4.4. Assessment/Plan:    Adenomatous polyps, innumerable  ? Infectious colitis  -History of recent colonoscopy in  which showed multiple polyps which were biopsied. Partial colectomy was recommended however family declined  -S/p EGD on : Normal upper endoscopy  -CT abdomen/pelvis: Colitis diffuse fat stranding in the peritoneal cavity  -Abdominal ultrasound showed pancreatic lesion. CT abdomen negative for pancreatic mass.  -Completed empiric course of Levaquin and Flagyl    MDS and splenic marginal zone lymphoma  Severe macrocytic anemia/Thrombocytopenia/lymphocytosis  -Hb 4.2 on admission  -S/p 3 units of PRBC since   -S/p IV Venofer  -S/p bone marrow biopsy on : Suspicious for splenic marginal zone lymphoma and MDS, final report pending  -Follow H&H. We will try to minimize blood draws. Platelet count improving  -Heme-onc following. Appreciate input. Follow-up with Dr. Syed Dorado OP  -Hemoglobin and platelet stable on CBC today    Presumed aspiration pneumonia  -Fever and chest x-ray infiltrates postprocedure  -Completed course of Levaquin and Flagyl  - No more respiratory symptoms    DM type 2: Continue ISS as per protocol. LE edema  Anasarca: Likely due to hypoalbuminemia  Echo: EF 50-55%  S/p 40 mg of IV Lasix along with IV albumin  Peripheral edema improving  Resume oral Lasix (on 20 mg of Lasix daily at home)  Status post 40 mg of IV Lasix along with IV albumin yesterday. Back to p.o.  Lasix 20 mg twice daily now  Monitor volume status     Seizure disorder  Intellectual disability  Continue Depakote, Topamax  No seizure episodes reported    Debility: Continue PT/OT     Code Status: Full  Surrogate Decision Maker: Patient's mother  DVT Prophylaxis: SCD  GI Prophylaxis: not indicated  Baseline: From home   Dispo: Patient is medically stable to be discharged to next place of care. I discussed with , still awaiting placement. Input is appreciated. COVID-19 test was ordered. Patient was seen and examined. No acute events overnight. Discussed with RN overnight events. All patient's questions were answered. \"doing ok\"    Review of Systems:  Symptom Y/N Comments  Symptom Y/N Comments   Fever/Chills n   Chest Pain n    Poor Appetite    Edema     Cough n   Abdominal Pain n    Sputum    Joint Pain     SOB/GRULLON n   Pruritis/Rash     Nausea/vomit n   Tolerating PT/OT     Diarrhea    Tolerating Diet y    Constipation    Other       Could NOT obtain due to:                  Objective:     VITALS:   Last 24hrs VS reviewed since prior progress note. Most recent are:  Visit Vitals  BP (!) 144/118   Pulse 63   Temp 98.7 °F (37.1 °C)   Resp 16   Ht 6' (1.829 m)   Wt 88.5 kg (195 lb)   SpO2 95%   BMI 26.45 kg/m²     No intake or output data in the 24 hours ending 04/18/22 1116     PHYSICAL EXAM:  General: No acute distress, cooperative, pleasant   EENT: EOMI. Anicteric sclerae. Oral mucous moist, oropharynx benign  Resp: CTA bilaterally. No wheezing/rhonchi/rales. No accessory muscle use  CV: Regular rhythm, normal rate, no murmurs, gallops, rubs,   GI: Soft, non distended, non tender. normoactive bowel sounds, no hepatosplenomegaly Extremities: 1+ edema upper and lower extremity, scrotal swelling (improved), warm, 2+ pulses throughout  Neurologic: Moves all extremities. AAOx3, CN II-XII grossly intact  Psych: Good insight. Not anxious nor agitated.   Skin: Good Turgor, venous stasis changes to bilateral lower extremity    Lab Data Personally Reviewed: (see below)     Medications list Personally Reviewed:  x YES  NO     _______________________________________________________________________  Care Plan discussed with:  Patient/Family, Nurse and     Total NON critical care TIME:  30 minutes    Negin Larsen MD     Procedures: see electronic medical records for all procedures/Xrays and details which were not copied into this note but were reviewed prior to creation of Plan. LABS:  Recent Labs     04/18/22 0222 04/16/22  0434   WBC 14.1* 10.8   HGB 8.0* 7.6*   HCT 25.9* 24.0*   PLT 88* 80*     Recent Labs     04/18/22 0222 04/17/22 0315 04/16/22 0434    141 138   K 4.1 3.9 4.2   * 110* 109*   CO2 27 27 26   BUN 29* 30* 28*   CREA 0.76 0.77 0.81   * 102* 103*   CA 7.8* 7.9* 7.8*     Recent Labs     04/16/22 0434   ALT 14   AP 69   TBILI 0.4   TP 4.7*   ALB 1.9*   GLOB 2.8     No results for input(s): INR, PTP, APTT, INREXT, INREXT in the last 72 hours. No results for input(s): FE, TIBC, PSAT, FERR in the last 72 hours. Lab Results   Component Value Date/Time    Folate 37.1 (H) 04/03/2022 01:14 AM      No results for input(s): PH, PCO2, PO2 in the last 72 hours. No results for input(s): CPK, CKNDX, TROIQ in the last 72 hours.     No lab exists for component: CPKMB  No results found for: CHOL, CHOLX, CHLST, CHOLV, HDL, HDLP, LDL, LDLC, DLDLP, TGLX, TRIGL, TRIGP, CHHD, CHHDX  Lab Results   Component Value Date/Time    Glucose (POC) 137 (H) 04/18/2022 07:25 AM    Glucose (POC) 249 (H) 04/17/2022 09:36 PM    Glucose (POC) 181 (H) 04/17/2022 04:02 PM    Glucose (POC) 187 (H) 04/17/2022 11:03 AM    Glucose (POC) 140 (H) 04/17/2022 07:20 AM     Lab Results   Component Value Date/Time    Color YELLOW/STRAW 04/02/2022 07:16 PM    Appearance CLEAR 04/02/2022 07:16 PM    Specific gravity 1.015 04/02/2022 07:16 PM    pH (UA) 7.5 04/02/2022 07:16 PM    Protein Negative 04/02/2022 07:16 PM Glucose Negative 04/02/2022 07:16 PM    Ketone Negative 04/02/2022 07:16 PM    Bilirubin Negative 04/02/2022 07:16 PM    Urobilinogen 1.0 04/02/2022 07:16 PM    Nitrites Negative 04/02/2022 07:16 PM    Leukocyte Esterase Negative 04/02/2022 07:16 PM    Epithelial cells FEW 04/02/2022 07:16 PM    Bacteria Negative 04/02/2022 07:16 PM    WBC 0-4 04/02/2022 07:16 PM    RBC 0-5 04/02/2022 07:16 PM

## 2022-04-18 NOTE — PROGRESS NOTES
I talked with the patient's mother at bedside and she was updated on the plan, all questions were answered.

## 2022-04-18 NOTE — PROGRESS NOTES
End of Shift Note    Bedside shift change report given to Jeanine Collins (oncoming nurse) by Esteban Melendez RN (offgoing nurse). Report included the following information SBAR, Kardex, Intake/Output, MAR and Recent Results    Shift worked:  7905-9382     Shift summary and any significant changes:     Pleasant. Mom at bedside. Denies pain. Edema appears improved. Continue with po furosemide. Able to work more with PT, and able to move hands well to help feed himself. Scheduled Percocet given. Plan for safe discharge to facility.       Concerns for physician to address:       Zone phone for oncoming shift:            Esteban Melendez RN

## 2022-04-18 NOTE — PROGRESS NOTES
Problem: Self Care Deficits Care Plan (Adult)  Goal: *Acute Goals and Plan of Care (Insert Text)  Description: FUNCTIONAL STATUS PRIOR TO ADMISSION: Up until the past few weeks, he was able to manage basic self-care using a RW with supervision. His mother completes IADL. HOME SUPPORT: Mother    Occupational Therapy Goals  Weekly Re-eval 4/18/2022, no goals met but pt continues to make gains and goals remain appropriate    Initiated 4/7/2022  1. Patient will perform grooming seated edge of bed with supervision/set-up within 7 day(s). 2.  Patient will perform bathing with moderate assistance  within 7 day(s). 3.  Patient will perform lower body dressing with moderate assistance  within 7 day(s). 4.  Patient will perform toilet transfers with moderate assistance  within 7 day(s). 5.  Patient will perform all aspects of toileting with moderate assistance  within 7 day(s). 6.  Patient will participate in upper extremity therapeutic exercise/activities with Min cues for 10 minutes within 7 day(s). Outcome: Progressing Towards Goal   OCCUPATIONAL THERAPY RE-EVALUATION  Patient: Liz Ybarra (84 y.o. male)  Date: 4/18/2022  Diagnosis: Anemia [D64.9] <principal problem not specified>  Procedure(s) (LRB):  ESOPHAGOGASTRODUODENOSCOPY (EGD) (N/A) 13 Days Post-Op  Precautions: Fall,Bed Alarm,Seizure (Autism Spectrum)  Chart, occupational therapy assessment, plan of care, and goals were reviewed. ASSESSMENT  Based on the objective data described below, pt continues to be limited by foot/BLE lower leg pain and also anticipates pain. He remains motivated to participate but has limited tolerance to feet and lower legs being touched (L>R). Significant assist needed for supine to sit due to anticipation of pain and decreased motor planning. Max assist x2 on four trials with sit to stand with various methods and pt was unable to come to full standing.   He remains unable to side step or transfer to chair seated scooting or standing. Syliva Sol lift would be appropriate but pt maybe to fearful to attempt this. Best  transfer device may be appropriate as pt is able to pull into standing with assist and briefly remain standing. No goals met from previous re-eval but pt continues to benefit from OT services and is far from his baseline. Recommend SNF for rehab at discharge. Current Level of Function Impacting Discharge (ADLs): min to max assist bed mobility, max assist x2 sit to stand  Feeding: Stand-by assistance    Oral Facial Hygiene/Grooming: Stand-by assistance    Bathing: Maximum assistance    Upper Body Dressing: Minimum assistance    Lower Body Dressing: Total assistance    Toileting: Total assistance    Other factors to consider for discharge: elderly mother is pts caregiver and pt is unable to stand without two assist and requires significant assist with ADLs         PLAN :  Recommendations and Planned Interventions: self care training, functional mobility training, therapeutic exercise, balance training, therapeutic activities and patient education    Frequency/Duration: Patient will be followed by occupational therapy 3 times a week to address goals. Recommend with staff: chair position throughout the day    Recommend next OT session: ADLs, transfers    Recommendation for discharge: (in order for the patient to meet his/her long term goals)  Therapy up to 5 days/week in SNF setting    This discharge recommendation:  Has been made in collaboration with the attending provider and/or case management    Equipment recommendations for successful discharge (if) home: needs rehab       SUBJECTIVE:   Patient stated I want to get to the chair. Oh it hurts.     OBJECTIVE DATA SUMMARY:   Hospital course since last seen and reason for reevaluation: continued participation in therapy services, limited by pain in feet    Cognitive/Behavioral Status:  Neurologic State: Alert;Confused  Orientation Level: Oriented to person;Disoriented to time;Disoriented to place  Cognition: Decreased attention/concentration; Follows commands; Impaired decision making  Perception: Cues to maintain midline in standing; Tactile;Verbal;Visual  Perseveration: No perseveration noted  Safety/Judgement: Fall prevention        Edema: moderate to severe LUE, moderate RUE    Hearing: Auditory  Auditory Impairment: None    Vision/Perceptual:                           Acuity:  (grossly intact)         Range of Motion:    AROM: Generally decreased, functional                         Strength:    Strength: Generally decreased, functional                Coordination:  Coordination: Within functional limits  Fine Motor Skills-Upper: Left Intact; Right Intact    Gross Motor Skills-Upper: Left Intact; Right Intact    Tone & Sensation:    Tone: Normal  Sensation:  (hypersensitivity feet and lower legs)                        Functional Mobility and Transfers for ADLs:  Bed Mobility:  Rolling: Maximum assistance; Additional time;Assist x2  Supine to Sit: Maximum assistance;Assist x2  Sit to Supine: Additional time;Assist x2;Minimum assistance  Scooting: Maximum assistance; Additional time;Assist x2 (seated edge of bed)    Transfers:  Sit to Stand: Maximum assistance; Additional time;Assist x2 (bed elevated, pulling from walker or bar recliner x4 trials)  Functional Transfers  Bathroom Mobility:  (unable)  Toilet Transfer :  (unable at this time)  Bed to Chair:  (unable at this time, poor tolerance to standing)    Balance:  Sitting: Impaired  Sitting - Static: Good (unsupported)  Sitting - Dynamic: Fair (occasional); Good (unsupported)  Standing: Impaired  Standing - Static: Constant support;Poor (unable to come to full standing, flexed posture)    ADL Assessment:  Feeding: Stand-by assistance    Oral Facial Hygiene/Grooming: Stand-by assistance    Bathing: Maximum assistance    Upper Body Dressing: Minimum assistance    Lower Body Dressing:  Total assistance    Toileting: Total assistance                ADL Intervention and task modifications:     Donned hat seated edge of bed with good balance and set up  Cognitive Retraining  Safety/Judgement: Fall prevention    Therapeutic Exercises:   Sit to stand performed x4 trials. Unable to side step    Functional Measure:    Barthel Index:  Bathin  Bladder: 0  Bowels: 5  Groomin  Dressin  Feedin  Mobility: 5  Stairs: 0  Toilet Use: 0  Transfer (Bed to Chair and Back): 0  Total: 20/100      The Barthel ADL Index: Guidelines  1. The index should be used as a record of what a patient does, not as a record of what a patient could do. 2. The main aim is to establish degree of independence from any help, physical or verbal, however minor and for whatever reason. 3. The need for supervision renders the patient not independent. 4. A patient's performance should be established using the best available evidence. Asking the patient, friends/relatives and nurses are the usual sources, but direct observation and common sense are also important. However direct testing is not needed. 5. Usually the patient's performance over the preceding 24-48 hours is important, but occasionally longer periods will be relevant. 6. Middle categories imply that the patient supplies over 50 per cent of the effort. 7. Use of aids to be independent is allowed. Score Interpretation (from [address] [address])    Independent   60-79 Minimally independent   40-59 Partially dependent   20-39 Very dependent   <20 Totally dependent     -Baldemar Cooper., Barthel, D.W. (1965). Functional evaluation: the Barthel Index. [address] [facility] ([address] (). The Barthel activities of daily living index: self-reporting versus actual performance in the old (> or = 75 years). Journal of [address] [place] [address] 45(7), [facility], JKELLIE, Yanni Robert., Deb Tao. (1999).  Measuring the change in disability after inpatient rehabilitation; comparison of the responsiveness of the Barthel Index and Functional Sebastian Measure. Journal of Neurology, Neurosurgery, and Psychiatry, 66(4), 888-869. MAURICIO Blair, IMTIAZ Kay, & Roseann Hector M.A. (2004) Assessment of post-stroke quality of life in cost-effectiveness studies: The usefulness of the Barthel Index and the EuroQoL-5D. Quality of Life Research, 13, 427-43         Pain:  significant pain with sit to stand/weight bearing BLE    Activity Tolerance:   Fair and requires rest breaks    After treatment patient left in no apparent distress:   chair position in bed    COMMUNICATION/COLLABORATION:   The patients plan of care was discussed with: Physical therapist, Registered nurse and patient.      ANABELA Kay/L  Time Calculation: 23 mins

## 2022-04-18 NOTE — PROGRESS NOTES
End of Shift Note    Bedside shift change report given to Tierra Hamm RN (oncoming nurse) by Shankar Sorto RN (offgoing nurse). Report included the following information SBAR, Kardex, Intake/Output and MAR    Shift worked:  0463-2139     Shift summary and any significant changes:     Patient stable through shift, does not complain much when turning as previous shifts with patient. All scheduled meds, incontinent care and frequent rounding provided. Patient rings call bell for bedpan frequently, several times only smear but 2 times large formed BMs. Patient needed help with meal tray set up but feed himself, ate breakfast, lunch and had soup for dinner. Mother, brother and sister-in-law at bedside in the afternoon, evening. Concerns for physician to address:       Zone phone for oncoming shift:          Activity:  Activity Level: Bed Rest  Number times ambulated in hallways past shift: 0  Number of times OOB to chair past shift: 0    Cardiac:   Cardiac Monitoring: No      Cardiac Rhythm: Sinus Rhythm    Access:   Current line(s): PIV     Genitourinary:   Urinary status: incontinent    Respiratory:   O2 Device: None (Room air)  Chronic home O2 use?: NO  Incentive spirometer at bedside: NO       GI:  Last Bowel Movement Date: 04/17/22  Current diet:  ADULT ORAL NUTRITION SUPPLEMENT Breakfast, Dinner; Low Calorie/High Protein  ADULT DIET Regular; 4 carb choices (60 gm/meal)  Passing flatus: YES  Tolerating current diet: YES       Pain Management:   Patient states pain is manageable on current regimen: YES    Skin:  Troy Score: 15  Interventions: turn team, float heels and PT/OT consult    Patient Safety:  Fall Score:  Total Score: 4  Interventions: bed/chair alarm, assistive device (walker, cane, etc), gripper socks and stay with me (per policy)  High Fall Risk: Yes    Length of Stay:  Expected LOS: 2d 16h  Actual LOS: SHABNAM Rose

## 2022-04-19 LAB
ERYTHROCYTE [DISTWIDTH] IN BLOOD BY AUTOMATED COUNT: 22.6 % (ref 11.5–14.5)
GLUCOSE BLD STRIP.AUTO-MCNC: 142 MG/DL (ref 65–117)
GLUCOSE BLD STRIP.AUTO-MCNC: 231 MG/DL (ref 65–117)
GLUCOSE BLD STRIP.AUTO-MCNC: 302 MG/DL (ref 65–117)
GLUCOSE BLD STRIP.AUTO-MCNC: 306 MG/DL (ref 65–117)
HCT VFR BLD AUTO: 23.7 % (ref 36.6–50.3)
HGB BLD-MCNC: 7.2 G/DL (ref 12.1–17)
MCH RBC QN AUTO: 35 PG (ref 26–34)
MCHC RBC AUTO-ENTMCNC: 30.4 G/DL (ref 30–36.5)
MCV RBC AUTO: 115 FL (ref 80–99)
NRBC # BLD: 0 K/UL (ref 0–0.01)
NRBC BLD-RTO: 0 PER 100 WBC
PLATELET # BLD AUTO: 87 K/UL (ref 150–400)
PMV BLD AUTO: 9.9 FL (ref 8.9–12.9)
RBC # BLD AUTO: 2.06 M/UL (ref 4.1–5.7)
SARS-COV-2, XPLCVT: NOT DETECTED
SERVICE CMNT-IMP: ABNORMAL
SOURCE, COVRS: NORMAL
WBC # BLD AUTO: 10.2 K/UL (ref 4.1–11.1)

## 2022-04-19 PROCEDURE — 74011250637 HC RX REV CODE- 250/637: Performed by: INTERNAL MEDICINE

## 2022-04-19 PROCEDURE — 36415 COLL VENOUS BLD VENIPUNCTURE: CPT

## 2022-04-19 PROCEDURE — 85027 COMPLETE CBC AUTOMATED: CPT

## 2022-04-19 PROCEDURE — 65270000046 HC RM TELEMETRY

## 2022-04-19 PROCEDURE — 82962 GLUCOSE BLOOD TEST: CPT

## 2022-04-19 PROCEDURE — 97530 THERAPEUTIC ACTIVITIES: CPT

## 2022-04-19 PROCEDURE — 74011000250 HC RX REV CODE- 250: Performed by: INTERNAL MEDICINE

## 2022-04-19 PROCEDURE — 74011250636 HC RX REV CODE- 250/636: Performed by: INTERNAL MEDICINE

## 2022-04-19 PROCEDURE — 74011636637 HC RX REV CODE- 636/637: Performed by: INTERNAL MEDICINE

## 2022-04-19 RX ADMIN — SODIUM CHLORIDE, PRESERVATIVE FREE 10 ML: 5 INJECTION INTRAVENOUS at 23:54

## 2022-04-19 RX ADMIN — DIVALPROEX SODIUM 1000 MG: 500 TABLET, DELAYED RELEASE ORAL at 23:53

## 2022-04-19 RX ADMIN — Medication 7 UNITS: at 13:36

## 2022-04-19 RX ADMIN — OXYCODONE AND ACETAMINOPHEN 0.5 TABLET: 5; 325 TABLET ORAL at 08:51

## 2022-04-19 RX ADMIN — Medication 3 UNITS: at 17:48

## 2022-04-19 RX ADMIN — TOPIRAMATE 200 MG: 100 TABLET, FILM COATED ORAL at 08:51

## 2022-04-19 RX ADMIN — ATORVASTATIN CALCIUM 40 MG: 40 TABLET, FILM COATED ORAL at 08:52

## 2022-04-19 RX ADMIN — PANTOPRAZOLE SODIUM 40 MG: 40 TABLET, DELAYED RELEASE ORAL at 08:51

## 2022-04-19 RX ADMIN — OXYCODONE AND ACETAMINOPHEN 0.5 TABLET: 5; 325 TABLET ORAL at 23:52

## 2022-04-19 RX ADMIN — SODIUM CHLORIDE, PRESERVATIVE FREE 10 ML: 5 INJECTION INTRAVENOUS at 13:36

## 2022-04-19 RX ADMIN — CYANOCOBALAMIN TAB 500 MCG 1000 MCG: 500 TAB at 08:51

## 2022-04-19 RX ADMIN — DIVALPROEX SODIUM 500 MG: 500 TABLET, DELAYED RELEASE ORAL at 08:51

## 2022-04-19 RX ADMIN — TOPIRAMATE 200 MG: 100 TABLET, FILM COATED ORAL at 17:46

## 2022-04-19 RX ADMIN — Medication: at 08:54

## 2022-04-19 RX ADMIN — Medication: at 18:00

## 2022-04-19 RX ADMIN — Medication: at 08:56

## 2022-04-19 RX ADMIN — FUROSEMIDE 20 MG: 20 TABLET ORAL at 08:51

## 2022-04-19 RX ADMIN — Medication 2 UNITS: at 08:53

## 2022-04-19 RX ADMIN — EPOETIN ALFA-EPBX 10000 UNITS: 10000 INJECTION, SOLUTION INTRAVENOUS; SUBCUTANEOUS at 23:53

## 2022-04-19 RX ADMIN — FOLIC ACID 1 MG: 1 TABLET ORAL at 08:51

## 2022-04-19 RX ADMIN — OXYCODONE AND ACETAMINOPHEN 0.5 TABLET: 5; 325 TABLET ORAL at 17:46

## 2022-04-19 RX ADMIN — FUROSEMIDE 20 MG: 20 TABLET ORAL at 17:46

## 2022-04-19 NOTE — PROGRESS NOTES
Problem: Falls - Risk of  Goal: *Absence of Falls  Description: Document Shelton Pruitt Fall Risk and appropriate interventions in the flowsheet. Outcome: Progressing Towards Goal  Note: Fall Risk Interventions:  Mobility Interventions: Bed/chair exit alarm,Communicate number of staff needed for ambulation/transfer,Patient to call before getting OOB,PT Consult for mobility concerns,Utilize walker, cane, or other assistive device    Mentation Interventions: Adequate sleep, hydration, pain control,Bed/chair exit alarm,Door open when patient unattended,Familiar objects from home,More frequent rounding,Increase mobility,Reorient patient,Room close to nurse's station,Update white board    Medication Interventions: Bed/chair exit alarm,Patient to call before getting OOB,Teach patient to arise slowly    Elimination Interventions: Call light in reach,Patient to call for help with toileting needs,Toileting schedule/hourly rounds    History of Falls Interventions: Bed/chair exit alarm,Consult care management for discharge planning,Door open when patient unattended,Investigate reason for fall,Room close to nurse's station         Problem: Patient Education: Go to Patient Education Activity  Goal: Patient/Family Education  Outcome: Progressing Towards Goal     Problem: Pressure Injury - Risk of  Goal: *Prevention of pressure injury  Description: Document Troy Scale and appropriate interventions in the flowsheet.   Outcome: Progressing Towards Goal  Note: Pressure Injury Interventions:  Sensory Interventions: Assess changes in LOC,Assess need for specialty bed,Avoid rigorous massage over bony prominences,Discuss PT/OT consult with provider,Float heels,Keep linens dry and wrinkle-free,Maintain/enhance activity level,Minimize linen layers,Monitor skin under medical devices    Moisture Interventions: Absorbent underpads,Apply protective barrier, creams and emollients,Check for incontinence Q2 hours and as needed,Limit adult briefs,Maintain skin hydration (lotion/cream),Minimize layers    Activity Interventions: PT/OT evaluation,Increase time out of bed    Mobility Interventions: Assess need for specialty bed,PT/OT evaluation,HOB 30 degrees or less,Turn and reposition approx.  every two hours(pillow and wedges)    Nutrition Interventions: Document food/fluid/supplement intake,Offer support with meals,snacks and hydration    Friction and Shear Interventions: Minimize layers,Apply protective barrier, creams and emollients,Feet elevated on foot rest,Transferring/repositioning devices                Problem: Breathing Pattern - Ineffective  Goal: *Absence of hypoxia  Outcome: Progressing Towards Goal

## 2022-04-19 NOTE — PROGRESS NOTES
Transition of Care Plan:    RUR: 18%  Disposition: SNF placement-[facility] (accepted-pending level 2 and insurance auth)  Follow up appointments: Follow up with PCP and/or Specialist   DME needed: will require at the facility  Transportation at Discharge:BLS transport-CM will arrange   Keys or means to access home:   N/A    IM Medicare Letter: 2nd IM Medicare Letter to be given  Is patient a BCPI-A Bundle:  CM will provide if required    If yes, was Bundle Letter given?:    Is patient a [place] and connected with the [place]? N/A            If yes, was [employer] transfer form completed and [facility] notified? Caregiver Contact:Melly Solorzano (mother) [phone]  Discharge Caregiver contacted prior to discharge? Family to be contacted-mother by bedside  Care Conference needed?:     Not at this time    UPDATE: 12:20PM    CM informed that pt is currently NPO, due to planned procedure scheduled port placement on 22. CM will inform Complex CM. CM spoke with admin coordinator at [facility], via telephone and informed them of pt's medically stability and planned procedure. Admin coordinator reported that she will re-initiate auth for pt, due to past auth being . CM informed admin coordinator that pt should be medically stable within 48hrs. SHAAN Navarro,           CM staff case with Complex CM and was informed that level 2 has been completed by Viola. CM will contact snf: [facility] to verify if new insurance [place] is required before admission to facility. Pt will require rapid covid test and medical transport required. CM attempted contact with admin coordinator at [facility]. However, attempt was unsuccessful. CM left voicemail with admin coordinator for return call. CM will inform clinical team and continue to follow.     SHAAN Navarro, [address] [place] [address]

## 2022-04-19 NOTE — PROGRESS NOTES
End of Shift Note    Bedside shift change report given to Neris Spaulding RN (oncoming nurse) by SERJIO Montemayor (offgoing nurse). Report included the following information SBAR, Kardex, Intake/Output and MAR    Shift worked:  [id number]     Shift summary and any significant changes:     Pt did not complain of pain. Pt turned every 2 hours with turn team. Pt took all scheduled medications. Mother was at bedside during part of the shift. Consent was obtained by mother for tomorrows procedure. Pt did have to have correctional insulin today. Pt had an uneventful shift     Concerns for physician to address:       Zone phone for oncoming shift:          Activity:  Activity Level: Bed Rest  Number times ambulated in hallways past shift: 0  Number of times OOB to chair past shift: 0    Cardiac:   Cardiac Monitoring: No      Cardiac Rhythm: Sinus Rhythm    Access:   Current line(s): PIV     Genitourinary:   Urinary status: incontinent    Respiratory:   O2 Device: None (Room air)  Chronic home O2 use?: NO  Incentive spirometer at bedside: NO       GI:  Last Bowel Movement Date: 04/18/22  Current diet:  ADULT ORAL NUTRITION SUPPLEMENT Breakfast, Dinner; Low Calorie/High Protein  ADULT DIET Regular; 4 carb choices (60 gm/meal)  DIET NPO  Passing flatus: YES  Tolerating current diet: YES       Pain Management:   Patient states pain is manageable on current regimen: YES    Skin:  Troy Score: 14  Interventions: turn team, float heels and PT/OT consult    Patient Safety:  Fall Score:  Total Score: 4  Interventions: bed/chair alarm, assistive device (walker, cane, etc), gripper socks and stay with me (per policy)  High Fall Risk: Yes    Length of Stay:  Expected LOS: 2d 16h  Actual LOS: Aayush Titus [address], GN

## 2022-04-19 NOTE — PROGRESS NOTES
Problem: Falls - Risk of  Goal: *Absence of Falls  Description: Document [place] Fall Risk and appropriate interventions in the flowsheet. Outcome: Progressing Towards Goal  Note: Fall Risk Interventions:  Mobility Interventions: Bed/chair exit alarm,Communicate number of staff needed for ambulation/transfer,Patient to call before getting OOB,PT Consult for mobility concerns    Mentation Interventions: Adequate sleep, hydration, pain control,Bed/chair exit alarm,Door open when patient unattended    Medication Interventions: Bed/chair exit alarm,Patient to call before getting OOB,Teach patient to arise slowly    Elimination Interventions: Call light in reach,Patient to call for help with toileting needs    History of Falls Interventions: Bed/chair exit alarm,Door open when patient unattended         Problem: Patient Education: Go to Patient Education Activity  Goal: Patient/Family Education  Outcome: Progressing Towards Goal     Problem: Anemia Care Plan (Adult and Pediatric)  Goal: *Labs within defined limits  Outcome: Progressing Towards Goal  Goal: *Tolerates increased activity  Outcome: Progressing Towards Goal     Problem: Patient Education: Go to Patient Education Activity  Goal: Patient/Family Education  Outcome: Progressing Towards Goal     Problem: Patient Education: Go to Patient Education Activity  Goal: Patient/Family Education  Outcome: Progressing Towards Goal     Problem: Patient Education: Go to Patient Education Activity  Goal: Patient/Family Education  Outcome: Progressing Towards Goal     Problem: Pressure Injury - Risk of  Goal: *Prevention of pressure injury  Description: Document [place] Scale and appropriate interventions in the flowsheet.   Outcome: Progressing Towards Goal  Note: Pressure Injury Interventions:  Sensory Interventions: Assess changes in LOC,Check visual cues for pain,Maintain/enhance activity level,Pressure redistribution bed/mattress (bed type)    Moisture Interventions: Absorbent underpads,Check for incontinence Q2 hours and as needed,Internal/External urinary devices,Maintain skin hydration (lotion/cream)    Activity Interventions: Pressure redistribution bed/mattress(bed type),PT/OT evaluation    Mobility Interventions: Pressure redistribution bed/mattress (bed type),PT/OT evaluation    Nutrition Interventions: Document food/fluid/supplement intake    Friction and Shear Interventions: Apply protective barrier, creams and emollients,Feet elevated on foot rest,HOB 30 degrees or less,Lift sheet                Problem: Patient Education: Go to Patient Education Activity  Goal: Patient/Family Education  Outcome: Progressing Towards Goal     Problem: Breathing Pattern - Ineffective  Goal: *Absence of hypoxia  Outcome: Progressing Towards Goal  Goal: *Use of effective breathing techniques  Outcome: Progressing Towards Goal     Problem: Patient Education: Go to Patient Education Activity  Goal: Patient/Family Education  Outcome: Progressing Towards Goal

## 2022-04-19 NOTE — PROGRESS NOTES
Problem: Mobility Impaired (Adult and Pediatric)  Goal: *Acute Goals and Plan of Care (Insert Text)  Description: FUNCTIONAL STATUS PRIOR TO ADMISSION: The patient was functional at the wheelchair level and required minimal assistance for transfers to the chair. He was ambulatory with rolling walker prior to 2 weeks ago. One week ago, he had a posterior fall going up one stair into home (with assist of his mother) and currently has a head abrasion. HOME SUPPORT PRIOR TO ADMISSION: The patient lived with elderly mother and required minimal assistance/contact guard assist for ADLs/mobility prior to 2 weeks ago. He was declining at home and required more than minimal assist.    Physical Therapy Goals  Initiated 4/7/2022  1. Patient will move from supine to sit and sit to supine  in bed with minimal assistance/contact guard assist within 7 day(s). 2.  Patient will transfer from bed to chair and chair to bed with minimal assistance/contact guard assist x 2 using the least restrictive device within 7 day(s). 3.  Patient will perform sit to stand with minimal assistance/contact guard assist x 2 within 7 day(s). 4.  Patient will ambulate with minimal assistance/contact guard assist x 2 for 20 feet with the least restrictive device within 7 day(s). Revised 4/14/2022  1. Patient will move from supine to sit and sit to supine  in bed with moderate assist within 7 day(s). 2.  Patient will transfer from bed to chair and chair to bed with moderate assist using the least restrictive device within 7 day(s). 3.  Patient will perform sit to stand with moderate assist within 7 day(s). 4.  Patient will ambulate with moderate assist for 5' feet with the least restrictive device within 7 day(s).      Outcome: Progressing Towards Goal   PHYSICAL THERAPY TREATMENT  Patient: Mera Marie (37 y.o. male)  Date: 4/19/2022  Diagnosis: Anemia [D64.9] <principal problem not specified>  Procedure(s) (LRB):  ESOPHAGOGASTRODUODENOSCOPY (EGD) (N/A) 14 Days Post-Op  Precautions: Fall,Bed Alarm,Seizure (Autism Spectrum)  Chart, physical therapy assessment, plan of care and goals were reviewed. ASSESSMENT  Patient continues with skilled PT services and is progressing towards goals. Pt showing good improvement in bed mobility today, able to roll with min to mod A only using cues and rail. He was also improved in coming to sitting with cues and mod A of 2. Attempted use of Best  for sit to stand with pt given demo prior and agreeable to trying; pt very fearful once working with device and pushed back on bar instead of assisting self on bar. Pt admitted to being frightened to try. He was eager to try standing with walker again and was able to stand x2 with max A of 2 and did achieve near full stand but sat after brief stand each time. Did not have major c/o other than being fearful of standing. He then requested to return to bed. Encouraged him to try to attempt transfer again, but pt anxious and returned to bed. Bed placed in chair position at end of session. Current Level of Function Impacting Discharge (mobility/balance): see above; much improved rolling and supine to sit with decreased pain c/o BLE    Other factors to consider for discharge: Need for assist of 2; difficulty finding safe method of transfer as pt is fearful of mechanical assist devices         PLAN :  Patient continues to benefit from skilled intervention to address the above impairments. Continue treatment per established plan of care. to address goals.     Recommendation for discharge: (in order for the patient to meet his/her long term goals)  Therapy up to 5 days/week in SNF setting    This discharge recommendation:  Has been made in collaboration with the attending provider and/or case management    IF patient discharges home will need the following DME: to be determined (TBD)       SUBJECTIVE:   Patient stated I am scared.     OBJECTIVE DATA SUMMARY:   Critical Behavior:  Neurologic State: Alert  Orientation Level: Oriented to place,Oriented to person  Cognition: Decreased attention/concentration,Follows commands  Safety/Judgement: Fall prevention  Functional Mobility Training:  Bed Mobility:  Rolling: Minimum assistance; Moderate assistance;Bed Modified; Other (comment) (using rails)  Supine to Sit: Moderate assistance;Assist x2  Sit to Supine: Moderate assistance;Assist x2  Scooting: Maximum assistance        Transfers:  Sit to Stand: Maximum assistance;Assist x2  Stand to Sit: Maximum assistance;Assist x2                             Balance:  Sitting: Impaired  Sitting - Static: Good (unsupported)  Sitting - Dynamic: Fair (occasional)  Standing: Impaired  Standing - Static: Constant support;Poor    Pain Rating:  Unable to rate but much improved this session; actively moving LEs to assist in positioning and bed mobility without little to no c/o    Activity Tolerance:   Fair    After treatment patient left in no apparent distress:   Call bell within reach and bed in chair position, 3 rails up    COMMUNICATION/COLLABORATION:   The patients plan of care was discussed with: Registered nurse.      Sean Muhammad, PT   Time Calculation: 28 mins

## 2022-04-19 NOTE — PROGRESS NOTES
Hospitalist Progress Note                                      NAME:  John Orourke  :  1955  MRN:  [id number]  Date of Service:  2022    Summary:  John Orourke is a 73 y.o.   male with PMHx significant for epilepsy, intellectual disability, type 2 diabetes, seizure, presents to the ER for evaluation of abnormal labs. Patient was seen by his PCP a day PTA with routine lab work performed and was found to have HB of 4.4. Assessment/Plan:    Adenomatous polyps, innumerable  ? Infectious colitis  -History of recent colonoscopy in  which showed multiple polyps which were biopsied. Partial colectomy was recommended however family declined  -S/p EGD on : Normal upper endoscopy  -CT abdomen/pelvis: Colitis diffuse fat stranding in the peritoneal cavity  -Abdominal ultrasound showed pancreatic lesion. CT abdomen negative for pancreatic mass.  -Completed empiric course of Levaquin and Flagyl    MDS and splenic marginal zone lymphoma  Severe macrocytic anemia/Thrombocytopenia/lymphocytosis  Neutropenia  -Hb 4.2 on admission  -S/p 3 units of PRBC since   -S/p IV Venofer  -S/p bone marrow biopsy on : Suspicious for splenic marginal zone lymphoma and MDS, final report pending  -Follow H&H. We will try to minimize blood draws. Platelet count improving  -Heme-onc following. Appreciate input. Follow-up with Dr. Kacy Bender OP  -I discussed with hematology oncology team and they are concerned about his neutropenia left requires treatment earlier than what it was planned for. They will discuss with family about Port-A-Cath. Presumed aspiration pneumonia  -Fever and chest x-ray infiltrates postprocedure  -Completed course of Levaquin and Flagyl  - No more respiratory symptoms    DM type 2: Continue ISS as per protocol.      LE edema  Anasarca: Likely due to hypoalbuminemia  Echo: EF 50-55%  S/p 40 mg of IV Lasix along with IV albumin  Peripheral edema improving  Resume oral Lasix (on 20 mg of Lasix daily at home)  Status post 40 mg of IV Lasix along with IV albumin yesterday. Back to p.o. Lasix 20 mg twice daily now  Monitor volume status     Seizure disorder  Intellectual disability  Continue Depakote, Topamax  No seizure episodes reported    Debility: Continue PT/OT     Code Status: Full  Surrogate Decision Maker: Patient's mother  DVT Prophylaxis: SCD  GI Prophylaxis: not indicated  Baseline: From home   Dispo: Patient is requiring Port-A-Cath right now due to the evolving of neutropenia. Anticipated discharge is April 21. Patient was seen and examined. No acute events overnight. Discussed with RN overnight events. All patient's questions were answered. \"I am doing fine\"    Review of Systems:  Symptom Y/N Comments  Symptom Y/N Comments   Fever/Chills n   Chest Pain n    Poor Appetite    Edema     Cough n   Abdominal Pain n    Sputum    Joint Pain     SOB/GRULLON n   Pruritis/Rash     Nausea/vomit n   Tolerating PT/OT     Diarrhea    Tolerating Diet y    Constipation    Other       Could NOT obtain due to:                  Objective:     VITALS:   Last 24hrs VS reviewed since prior progress note. Most recent are:  Visit Vitals  /61 (BP 1 Location: Left upper arm, BP Patient Position: At rest)   Pulse 68   Temp 98.2 °F (36.8 °C)   Resp 17   Ht 6' (1.829 m)   Wt 88.5 kg (195 lb)   SpO2 96%   BMI 26.45 kg/m²     No intake or output data in the 24 hours ending 04/19/22 1518     PHYSICAL EXAM:  General: No acute distress, cooperative, pleasant   EENT: EOMI. Anicteric sclerae. Oral mucous moist, oropharynx benign  Resp: CTA bilaterally. No wheezing/rhonchi/rales. No accessory muscle use  CV: Regular rhythm, normal rate, no murmurs, gallops, rubs,   GI: Soft, non distended, non tender.  normoactive bowel sounds, no hepatosplenomegaly Extremities: 1+ edema upper and lower extremity, scrotal swelling (improved), warm, 2+ pulses throughout  Neurologic: Moves all extremities. AAOx3, CN II-XII grossly intact  Psych: Good insight. Not anxious nor agitated. Skin: Good Turgor, venous stasis changes to bilateral lower extremity    Lab Data Personally Reviewed: (see below)     Medications list Personally Reviewed:  x YES  NO     _______________________________________________________________________  Care Plan discussed with:  Patient/Family, Nurse and     Total NON critical care TIME:  30 minutes    Dennis Cohen MD     Procedures: see electronic medical records for all procedures/Xrays and details which were not copied into this note but were reviewed prior to creation of Plan. LABS:  Recent Labs     04/19/22  1126 04/18/22  0222   WBC 10.2 14.1*   HGB 7.2* 8.0*   HCT 23.7* 25.9*   PLT 87* 88*     Recent Labs     04/18/22 0222 04/17/22  0315    141   K 4.1 3.9   * 110*   CO2 27 27   BUN 29* 30*   CREA 0.76 0.77   * 102*   CA 7.8* 7.9*     No results for input(s): ALT, AP, TBIL, TBILI, TP, ALB, GLOB, GGT, AML, LPSE in the last 72 hours. No lab exists for component: SGOT, GPT, AMYP, HLPSE  No results for input(s): INR, PTP, APTT, INREXT, INREXT in the last 72 hours. No results for input(s): FE, TIBC, PSAT, FERR in the last 72 hours. Lab Results   Component Value Date/Time    Folate 37.1 (H) 04/03/2022 01:14 AM      No results for input(s): PH, PCO2, PO2 in the last 72 hours. No results for input(s): CPK, CKNDX, TROIQ in the last 72 hours.     No lab exists for component: CPKMB  No results found for: CHOL, CHOLX, CHLST, CHOLV, HDL, HDLP, LDL, LDLC, DLDLP, TGLX, TRIGL, TRIGP, CHHD, CHHDX  Lab Results   Component Value Date/Time    Glucose (POC) 306 (H) 04/19/2022 11:31 AM    Glucose (POC) 302 (H) 04/19/2022 11:30 AM    Glucose (POC) 142 (H) 04/19/2022 08:07 AM    Glucose (POC) 193 (H) 04/18/2022 09:53 PM    Glucose (POC) 234 (H) 04/18/2022 04:12 PM     Lab Results   Component Value Date/Time    Color YELLOW/STRAW 04/02/2022 07:16 PM    Appearance CLEAR 04/02/2022 07:16 PM    Specific gravity 1.015 04/02/2022 07:16 PM    pH (UA) 7.5 04/02/2022 07:16 PM    Protein Negative 04/02/2022 07:16 PM    Glucose Negative 04/02/2022 07:16 PM    Ketone Negative 04/02/2022 07:16 PM    Bilirubin Negative 04/02/2022 07:16 PM    Urobilinogen 1.0 04/02/2022 07:16 PM    Nitrites Negative 04/02/2022 07:16 PM    Leukocyte Esterase Negative 04/02/2022 07:16 PM    Epithelial cells FEW 04/02/2022 07:16 PM    Bacteria Negative 04/02/2022 07:16 PM    WBC 0-4 04/02/2022 07:16 PM    RBC 0-5 04/02/2022 07:16 PM

## 2022-04-20 ENCOUNTER — APPOINTMENT (OUTPATIENT)
Dept: INTERVENTIONAL RADIOLOGY/VASCULAR | Age: 67
DRG: 823 | End: 2022-04-20
Attending: INTERNAL MEDICINE
Payer: MEDICARE

## 2022-04-20 LAB
GLUCOSE BLD STRIP.AUTO-MCNC: 139 MG/DL (ref 65–117)
GLUCOSE BLD STRIP.AUTO-MCNC: 144 MG/DL (ref 65–117)
GLUCOSE BLD STRIP.AUTO-MCNC: 147 MG/DL (ref 65–117)
GLUCOSE BLD STRIP.AUTO-MCNC: 152 MG/DL (ref 65–117)
GLUCOSE BLD STRIP.AUTO-MCNC: 235 MG/DL (ref 65–117)
SERVICE CMNT-IMP: ABNORMAL

## 2022-04-20 PROCEDURE — B5181ZA FLUOROSCOPY OF SUPERIOR VENA CAVA USING LOW OSMOLAR CONTRAST, GUIDANCE: ICD-10-PCS | Performed by: STUDENT IN AN ORGANIZED HEALTH CARE EDUCATION/TRAINING PROGRAM

## 2022-04-20 PROCEDURE — 74011250636 HC RX REV CODE- 250/636: Performed by: STUDENT IN AN ORGANIZED HEALTH CARE EDUCATION/TRAINING PROGRAM

## 2022-04-20 PROCEDURE — 0JH60WZ INSERTION OF TOTALLY IMPLANTABLE VASCULAR ACCESS DEVICE INTO CHEST SUBCUTANEOUS TISSUE AND FASCIA, OPEN APPROACH: ICD-10-PCS | Performed by: STUDENT IN AN ORGANIZED HEALTH CARE EDUCATION/TRAINING PROGRAM

## 2022-04-20 PROCEDURE — C1788 PORT, INDWELLING, IMP: HCPCS

## 2022-04-20 PROCEDURE — 74011000250 HC RX REV CODE- 250: Performed by: INTERNAL MEDICINE

## 2022-04-20 PROCEDURE — 77030031139 HC SUT VCRL2 J&J -A

## 2022-04-20 PROCEDURE — 74011000250 HC RX REV CODE- 250: Performed by: STUDENT IN AN ORGANIZED HEALTH CARE EDUCATION/TRAINING PROGRAM

## 2022-04-20 PROCEDURE — C1892 INTRO/SHEATH,FIXED,PEEL-AWAY: HCPCS

## 2022-04-20 PROCEDURE — 74011636637 HC RX REV CODE- 636/637: Performed by: INTERNAL MEDICINE

## 2022-04-20 PROCEDURE — 2709999900 HC NON-CHARGEABLE SUPPLY

## 2022-04-20 PROCEDURE — 74011250637 HC RX REV CODE- 250/637

## 2022-04-20 PROCEDURE — 65270000046 HC RM TELEMETRY

## 2022-04-20 PROCEDURE — 74011250637 HC RX REV CODE- 250/637: Performed by: INTERNAL MEDICINE

## 2022-04-20 PROCEDURE — 02H633Z INSERTION OF INFUSION DEVICE INTO RIGHT ATRIUM, PERCUTANEOUS APPROACH: ICD-10-PCS | Performed by: STUDENT IN AN ORGANIZED HEALTH CARE EDUCATION/TRAINING PROGRAM

## 2022-04-20 PROCEDURE — 74011250637 HC RX REV CODE- 250/637: Performed by: NURSE PRACTITIONER

## 2022-04-20 PROCEDURE — 77001 FLUOROGUIDE FOR VEIN DEVICE: CPT

## 2022-04-20 PROCEDURE — 77030010507 HC ADH SKN DERMBND J&J -B

## 2022-04-20 RX ORDER — LIDOCAINE HYDROCHLORIDE AND EPINEPHRINE 10; 10 MG/ML; UG/ML
30 INJECTION, SOLUTION INFILTRATION; PERINEURAL ONCE
Status: COMPLETED | OUTPATIENT
Start: 2022-04-20 | End: 2022-04-20

## 2022-04-20 RX ORDER — SODIUM CHLORIDE 9 MG/ML
25 INJECTION, SOLUTION INTRAVENOUS CONTINUOUS
Status: DISCONTINUED | OUTPATIENT
Start: 2022-04-20 | End: 2022-04-20

## 2022-04-20 RX ORDER — MIDAZOLAM HYDROCHLORIDE 1 MG/ML
5 INJECTION, SOLUTION INTRAMUSCULAR; INTRAVENOUS
Status: DISCONTINUED | OUTPATIENT
Start: 2022-04-20 | End: 2022-04-20

## 2022-04-20 RX ORDER — FENTANYL CITRATE 50 UG/ML
100 INJECTION, SOLUTION INTRAMUSCULAR; INTRAVENOUS
Status: DISCONTINUED | OUTPATIENT
Start: 2022-04-20 | End: 2022-04-20

## 2022-04-20 RX ORDER — LIDOCAINE HYDROCHLORIDE 20 MG/ML
18 INJECTION, SOLUTION INFILTRATION; PERINEURAL ONCE
Status: COMPLETED | OUTPATIENT
Start: 2022-04-20 | End: 2022-04-20

## 2022-04-20 RX ORDER — HEPARIN SODIUM 200 [USP'U]/100ML
400 INJECTION, SOLUTION INTRAVENOUS ONCE
Status: COMPLETED | OUTPATIENT
Start: 2022-04-20 | End: 2022-04-20

## 2022-04-20 RX ORDER — HEPARIN 100 UNIT/ML
300 SYRINGE INTRAVENOUS ONCE
Status: COMPLETED | OUTPATIENT
Start: 2022-04-20 | End: 2022-04-20

## 2022-04-20 RX ADMIN — ACETAMINOPHEN 325MG 650 MG: 325 TABLET ORAL at 00:56

## 2022-04-20 RX ADMIN — SODIUM CHLORIDE, PRESERVATIVE FREE 300 UNITS: 5 INJECTION INTRAVENOUS at 15:58

## 2022-04-20 RX ADMIN — LIDOCAINE HYDROCHLORIDE 8 ML: 20 INJECTION, SOLUTION INFILTRATION; PERINEURAL at 15:58

## 2022-04-20 RX ADMIN — DIVALPROEX SODIUM 1000 MG: 500 TABLET, DELAYED RELEASE ORAL at 21:45

## 2022-04-20 RX ADMIN — Medication: at 21:50

## 2022-04-20 RX ADMIN — FUROSEMIDE 20 MG: 20 TABLET ORAL at 08:21

## 2022-04-20 RX ADMIN — DIVALPROEX SODIUM 500 MG: 500 TABLET, DELAYED RELEASE ORAL at 08:21

## 2022-04-20 RX ADMIN — LIDOCAINE HYDROCHLORIDE AND EPINEPHRINE 8 ML: 10; 10 INJECTION, SOLUTION INFILTRATION; PERINEURAL at 15:58

## 2022-04-20 RX ADMIN — OXYCODONE AND ACETAMINOPHEN 0.5 TABLET: 5; 325 TABLET ORAL at 08:23

## 2022-04-20 RX ADMIN — Medication: at 08:22

## 2022-04-20 RX ADMIN — SODIUM CHLORIDE, PRESERVATIVE FREE 10 ML: 5 INJECTION INTRAVENOUS at 14:06

## 2022-04-20 RX ADMIN — Medication: at 00:59

## 2022-04-20 RX ADMIN — SODIUM CHLORIDE 25 ML/HR: 9 INJECTION, SOLUTION INTRAVENOUS at 15:33

## 2022-04-20 RX ADMIN — GUAIFENESIN 200 MG: 200 SOLUTION ORAL at 21:58

## 2022-04-20 RX ADMIN — Medication: at 18:13

## 2022-04-20 RX ADMIN — ATORVASTATIN CALCIUM 40 MG: 40 TABLET, FILM COATED ORAL at 08:22

## 2022-04-20 RX ADMIN — FUROSEMIDE 20 MG: 20 TABLET ORAL at 18:12

## 2022-04-20 RX ADMIN — TOPIRAMATE 200 MG: 100 TABLET, FILM COATED ORAL at 18:12

## 2022-04-20 RX ADMIN — SODIUM CHLORIDE, PRESERVATIVE FREE 10 ML: 5 INJECTION INTRAVENOUS at 21:49

## 2022-04-20 RX ADMIN — Medication 2 UNITS: at 08:21

## 2022-04-20 RX ADMIN — OXYCODONE AND ACETAMINOPHEN 0.5 TABLET: 5; 325 TABLET ORAL at 21:45

## 2022-04-20 RX ADMIN — OXYCODONE AND ACETAMINOPHEN 0.5 TABLET: 5; 325 TABLET ORAL at 18:12

## 2022-04-20 RX ADMIN — Medication 2 UNITS: at 18:14

## 2022-04-20 RX ADMIN — TOPIRAMATE 200 MG: 100 TABLET, FILM COATED ORAL at 08:21

## 2022-04-20 RX ADMIN — Medication 2 UNITS: at 21:46

## 2022-04-20 RX ADMIN — HEPARIN SODIUM IN SODIUM CHLORIDE 40 ML: 200 INJECTION INTRAVENOUS at 15:58

## 2022-04-20 RX ADMIN — WATER 2 G: 1 INJECTION INTRAMUSCULAR; INTRAVENOUS; SUBCUTANEOUS at 15:33

## 2022-04-20 NOTE — PROGRESS NOTES
Occupational Therapy  Chart reviewed; patient for port-a-cath placement  today;defer OT today to lower stress level as patient does not manage change effectively.  Brennon Barrios OTR/L

## 2022-04-20 NOTE — PROGRESS NOTES
Check-in visit with [facility] palliative pt in 1113. Pt sitting up in bed awake and alert.  explored how pt was feeling and coping. Pt communicated that he was feeling better and believed he was being discharged tomorrow. Stated that he was going to his mother's and then stated a nursing home. Shared that his leg was red and hurting some. Did not have any other concerns, pleased with care. Could not elaborate on how he was coping but appeared calm and comfortable. Some of pt's answers were not comprehensible. Provided word of encouragement and extended well wishes. No family present. Contact Spiritual Care for any further referrals.   Melba Grimes M.Div, [facility]   Paging Service [phone])

## 2022-04-20 NOTE — PROGRESS NOTES
Comprehensive Nutrition Assessment    Type and Reason for Visit: Reassess    Nutrition Recommendations/Plan:   1. Resume consistent carb diet after procedure  2. Resume ensure high protein TID  3. Please document % meals and supplements consumed in flowsheet I/O's under intake        Nutrition Assessment:     Chart reviewed. Pt noted for new MDS, lymphoma, multilineage dysplasia, neutropenia, ? infectious colitis. RD visited pt and mother at bedside. He has been NPO for a portacath placement today, but she reports he has still been eating well. He really loves tomato soup and is drinking the supplements. No new nutrition concerns at this time. Nutrition Related Findings:    Labs: [phone]. Meds: lipitor, B12, folvite, lasix, humalog, percocet. Edema: 1+BLE & genital. BM 4/20. Wound Type: None    Current Nutrition Intake & Therapies:  Average Meal Intake: 51-75%  Average Supplement Intake: %  ADULT ORAL NUTRITION SUPPLEMENT Breakfast, Dinner; Low Calorie/High Protein  DIET NPO    Anthropometric Measures:  Height: 6' (182.9 cm)  Ideal Body Weight (IBW): 178 lbs (81 kg)  Current Body Wt:  88.5 kg (195 lb), 109.6 % IBW. Not specified  Current BMI (kg/m2): 26.4  BMI Category: Overweight (BMI 25.0-29. 9)    Estimated Daily Nutrient Needs:  Energy Requirements Based On: Formula  Weight Used for Energy Requirements: Current  Energy (kcal/day): 2220 kcals (BMR x 1. 3AF)  Weight Used for Protein Requirements: Current  Protein (g/day): 71-86g (0.8-1.0g/kg)  Method Used for Fluid Requirements: 1 ml/kcal  Fluid (ml/day): 2200mL    Nutrition Diagnosis:   · Inadequate protein intake related to biting/chewing (masticatory) difficulty (per mother) as evidenced by  (trouble chewing meat)  Dx improved.      Nutrition Interventions:   Food and/or Nutrient Delivery: Continue current diet,Continue oral nutrition supplement  Nutrition Education/Counseling: No recommendations at this time  Coordination of Nutrition Care: Continue to monitor while inpatient       Goals:  Previous Goal Met: Progressing toward goal(s)  Goals:  (PO intake >70% meals and supplements next 5-7 days)       Nutrition Monitoring and Evaluation:   Behavioral-Environmental Outcomes: None identified  Food/Nutrient Intake Outcomes: Food and nutrient intake,Supplement intake  Physical Signs/Symptoms Outcomes: Biochemical data,GI status,Fluid status or edema,Weight,Nutrition focused physical findings    Discharge Planning:    Continue current diet,Continue oral nutrition supplement    Jonathan Terrell RD  Contact: [phone]

## 2022-04-20 NOTE — PROGRESS NOTES
Received pt. On stretcher accomp. By transport with HOB elevated approx 30 degrees. Pt noted awake, alert and voices he recognizes it is the same nurse from last week. Pt. States he is having sternal pain on arrival to unit - discussed with pt.'s mother when getting phone consent for procedure today and she states he hasn't said anything to her about it. Mother gave phone consent which was witnessed by 2 nurses.

## 2022-04-20 NOTE — PROGRESS NOTES
Chart reviewed for possible procedure in radiology. Ports are usually placed as outpatients and should not hold up discharge.

## 2022-04-20 NOTE — PROGRESS NOTES
Bedside shift change report given to Tello Foster RN (oncoming nurse) by Elsie Cabral RN (offgoing nurse). Report included the following information SBAR, Kardex, ED Summary, Intake/Output, MAR and Recent Results.

## 2022-04-20 NOTE — PROGRESS NOTES
Hematology Oncology Progress Note           Follow up for: MDS, splenic marginal zone lymphoma     Chart notes reviewed since last visit. Case discussed with following: patient. No family at bedside at present. Mother yesterday as well as . Patient complains of the following: wants popcorn. Wants left wrist IV taken out NOW! Explained NPO and needs peripheral IV until portacath placed and then we can let him eat and take out peripheral IV as it inhibits his using walker effectively. Additional concerns noted by the staff: none    Patient Vitals for the past 24 hrs:   BP Temp Pulse Resp SpO2   04/20/22 0744 (!) 97/50 97.8 °F (36.6 °C) 66 18 97 %   04/19/22 2325 122/62 98.9 °F (37.2 °C) 80 18 95 %   04/19/22 1926 (!) 113/57 98.2 °F (36.8 °C) 73 17 95 %   04/19/22 1613 (!) 102/59 98 °F (36.7 °C) 71 18 94 %       Review of Systems: negative for 11 organ systems except as noted above. Physical Examination:  Constitutional Alert, cooperative. Mood and affect appropriate. Appears close to chronological age. Well nourished. Well developed. Head Normocephalic; no scars   Eyes Conjunctivae and sclerae are clear and without icterus. Pupils are round   ENMT Sinuses are nontender. No oral exudates, ulcers, masses, thrush or mucositis. Neck Supple without masses or thyromegaly. No jugular venous distension. Hematologic/Lymphatic No petechiae or purpura. No tender or palpable lymph nodes noted. Respiratory Lungs are clear to auscultation without rhonchi or wheezing. Cardiovascular Regular rate and rhythm of heart without murmurs, gallops or rubs. Chest / Line Site Chest is symmetric with no chest wall deformities. Abdomen Non-tender, non-distended, no masses, ascites or hepatosplenomegaly. Good bowel sounds. .   Musculoskeletal No tenderness or swelling, normal range of motion without obvious weakness. Extremities No visible deformities, no cyanosis, clubbing or edema.  Chronic Problem: Patient Care Overview  Goal: Plan of Care Review  Outcome: Ongoing (interventions implemented as appropriate)  Mother and father updated on patient status and plan of care at bedside. Questions and concerns addressed. No further questions at this time. Patient remains intubated on ventilator. Rate and FiO2 weaned, but no other changes made. Patient tolerated well. Diamox x1 for alkalotic blood gases. Minimal improvement noted. CXR shows very little improvement. Fentanyl infusing at 1 mcg/kg/hr. Precedex infusing at 0.5 mcg/kg/hr. Irritable with care, but usually settles after swaddling. Fentanyl x2 and Versed x1. KCl x3. Mg x1. VSS. Heparin infusing at 10 units/kg/hr. Milrinone infusing at  0.5 mcg/kg/min. A wires isolated and secured. No arrhythmias noted. CT output serosageuous (R 4 ml L 20 ml). Lasix and Diuril Q6H. Plan to continue to wean vent and start PS trials when able. Possible extubation Monday. Will continue to monitor.        venous stasis changes. Skin No rashes, scars, or lesions suggestive of malignancy. No petechiae, purpura, or ecchymoses. No excoriations. Neurologic No sensory or motor deficits noted but not specifically tested. Does appear that legs are weak, right more than left this AM.    Psychiatric Alert. Coherent speech. Verbalizes understanding of our discussions today. Labs:  Recent Results (from the past 24 hour(s))   CBC W/O DIFF    Collection Time: 04/19/22 11:26 AM   Result Value Ref Range    WBC 10.2 4.1 - 11.1 K/uL    RBC 2.06 (L) 4.10 - 5.70 M/uL    HGB 7.2 (L) 12.1 - 17.0 g/dL    HCT 23.7 (L) 36.6 - 50.3 %    .0 (H) 80.0 - 99.0 FL    MCH 35.0 (H) 26.0 - 34.0 PG    MCHC 30.4 30.0 - 36.5 g/dL    RDW 22.6 (H) 11.5 - 14.5 %    PLATELET 87 (L) 459 - 400 K/uL    MPV 9.9 8.9 - 12.9 FL    NRBC 0.0 0  WBC    ABSOLUTE NRBC 0.00 0.00 - 0.01 K/uL   GLUCOSE, POC    Collection Time: 04/19/22 11:30 AM   Result Value Ref Range    Glucose (POC) 302 (H) 65 - 117 mg/dL    Performed by [employer] PCT    GLUCOSE, POC    Collection Time: 04/19/22 11:31 AM   Result Value Ref Range    Glucose (POC) 306 (H) 65 - 117 mg/dL    Performed by [employer] PCT    GLUCOSE, POC    Collection Time: 04/19/22  4:40 PM   Result Value Ref Range    Glucose (POC) 231 (H) 65 - 117 mg/dL    Performed by [employer] PCT    GLUCOSE, POC    Collection Time: 04/19/22 11:56 PM   Result Value Ref Range    Glucose (POC) 144 (H) 65 - 117 mg/dL    Performed by [facility] PCT    GLUCOSE, POC    Collection Time: 04/20/22  7:47 AM   Result Value Ref Range    Glucose (POC) 152 (H) 65 - 117 mg/dL    Performed by [address] [place] [address],[facility] Floor-   4/3/22 CT A/P:  IMPRESSION  . No definite pancreatic mass seen by this technique. 2. Colitis diffuse with fat stranding in the peritoneal cavity. 3. Splenomegaly.     Assessment and Plan:    *) Severe macrocytic anemia, thrombocytopenia, lymphocytosis:  - MCV markedly elevated to 142 on admit with severe anemia(hgb 4.2). - Iron studies after transfusion not markedly elevated as would be expected. Given Venofer on 4/4 and 4/5.  - hgb stable at present and plts coming up nicely. Roughly 15% of iron deficient folks have low plts and another 15% have thrombocytosis for reasons unclear to me.   - B12 on low end of normal, MMA wnl, so not B12 deficient. - Peripheral blood smear was concerning for lymphoproliferative disorder. 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 - SPEP 0.2 with IgG Kappa specificity and elevated serum free light chain ratio. Monoclonal gammopathies typically are seen with myeloma, low grade lymphoma, CLL, Waldenstrom's macroglobulinemia and amyloidosis. In his particular case, it would seem to be associated with the low grade lymphoma seen in the BM (splenic MZL) with circulating lymphoma cells seen in the peripheral blood  - continue folic acid daily  - received 4U PRBC. 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 FISH panel negative for t(11:14), W(06;03), bcl6 rearrangement and MALT1 rearrangement. Cytogenetics normal male karyotype. - Dr. Lonni Severs and I both reviewed w/ his mother. Will continue to follow him and continue procrit for his MDS.  He has a low grade lymphoma and could be treated with rituxan.  -retacrit here t, th, sat with plans to continue as outpt  - having portacath placed today to help expedite his getting treated in timely manner as his neutropenia is going to become problematic if his MDS worsens.   - Follow-up with Dr. Lonni Severs as outpt scheduled for 5/6/22 2:15pm. May move earlier if he goes to long term care and not SNF      *) neutropenia - his ANC is down to 600 and unlikely to improve without starting treatment soon. Discussed his diagnoses with his mother, who is 81 and building ramp to house on Friday to get him in and out of house more easily. *) Colitis:  - CT scan showing diffuse colitis. completed IV flagyl 4/15/22  - EGD on 4/5 without acute findings. - GI  Was following had multiple polyps and GI recommended colectomy that was declined.     *) Fever: (presumed aspiration pneumonia)  - fever resolved, treated with  Levaquin and IV flagyl    *) intellectual disability    *) DM    *) Seizure disorder  - on depakote, topamax    *) innumerable adenomatous polyps seen on colonoscopy in Dec 2021 - partial colectomy recommended. Pt's family declines.  EGD 4/5 normal.     *) Lower extremity edema, anasarca  - receiving IV albumin and lasix periodically   - attributed to hypoalbuminemia         Jimmy Cruz MD [facility] office  [address]  [address] [facility] [address], [address]  Phone [phone]  Fax [phone]

## 2022-04-20 NOTE — ROUTINE PROCESS
TRANSFER - OUT REPORT:    Verbal report given to SHABNAM Duvall(name) on Noelle Rowland  being transferred to oncology, ext [phone](unit) for routine progression of care       Report consisted of patients Situation, Background, Assessment and   Recommendations(SBAR). Information from the following report(s) Procedure Summary was reviewed with the receiving nurse. Lines:   Venous Access Device PowerPort Implantable Lenward Baez - 8 Fr - REF # [id number] - Lot# [id number] 04/20/22 Upper chest (subclavicular area, right (Active)   Central Line Being Utilized No 04/20/22 1601   Criteria for Appropriate Use Other (comment) 04/20/22 1601   Site Assessment Clean, dry, & intact 04/20/22 1601   Date of Last Dressing Change 04/20/22 04/20/22 1601   Dressing Status Clean, dry, & intact; New 04/20/22 1601   Dressing Type Gauze; Topical skin adhesive;Transparent 04/20/22 1601       Peripheral IV 04/15/22 Left;Posterior Forearm (Active)   Site Assessment Clean, dry, & intact 04/20/22 1527   Phlebitis Assessment 0 04/20/22 1527   Infiltration Assessment 0 04/20/22 1527   Dressing Status Clean, dry, & intact 04/20/22 1527   Dressing Type Transparent;Tape 04/20/22 1527   Hub Color/Line Status Patent; Flushed 04/20/22 1527   Action Taken Open ports on tubing capped 04/19/22 1545   Alcohol Cap Used Yes 04/20/22 1527        Opportunity for questions and clarification was provided. Name of procedure:   Insert tunnel Central Venous Access with Port placement     Vital Signs:  See connectcare    Any complications related to procedure:  None noted    Post Procedure Care Needed/order sets placed in connect care - see connectcare

## 2022-04-20 NOTE — PROGRESS NOTES
Hospitalist Progress Note    NAME: Bhaskar Ho   :  1955   MRN:  [id number]     HPI excerpted from admission H&P:  Sharron Andrews is a 73 y.o.   male with PMHx significant for epilepsy, intellectual disability, type 2 diabetes, seizure, presents to the ER for evaluation of abnormal labs. Patient was seen by his PCP yesterday with routine lab work performed then. He was notified today to come to the ER due to hemoglobin of 4.4. History is obtained by patient's mom at bedside. Patient had a colonoscopy in 2021 due to positive FOBT. He had multiple polyps removed. Biopsy showed adenoma. Partial colectomy has been recommended by his mom did not want for him to this. On arrival vitals are stable. Repeat hemoglobin was 4.2. He also has a leukocytosis wbc 15. Per patient's mom no obvious bright red blood per rectum or melena. \"    Assessment / Plan:  H/O colonic adenoma   ? Infectious colitis   Abdominal US 22:  1. Nonspecific hypoechoic lesion in the head of the pancreas measuring 1.4 cm in  size. 2.  Splenomegaly with length of 18.5 cm an estimated volume of 11 22 mL. 3.  Contracted gallbladder. 4.  Normal appearance of liver. CT abdomen/pelvis 22:  1. No definite pancreatic mass seen by this technique. 2.  Colitis diffuse with fat stranding in the peritoneal cavity. 3.  Splenomegaly. EGD 22:  Esophagus:  Normal.  Stomach:  Normal.  Duodenum:  Normal.  - GI input appreciated, now signed off.   - Patient completed empiric course of levofloxacin and metronidazole. MDS and splenic marginal zone lymphoma  Severe macrocytic anemia/Thrombocytopenia/lymphocytosis  Neutropenia  - Heme/Onc input appreciated. - Bone marrow bx completed 22.    - Hgb 4.2 on admission.  - Patient has received total of 4 units PRBCs so far this hospitalization.   - Patient received IV iron. - H/H overall stable since last transfusion.   - Continue to monitor blood count. - Continue cyanocobalamin 1000 mcg po daily. - Continue folic acid 1 mg po daily. - Continue epoetin 10,000 units sc 3 times weekly. - For Port-a-Cath insertion today. DM II  Hyperglycemia   - Continue FSBS with SSI correction scale. Malnutiriioin  Anasarca, improved  LE venous dopplers  04/02/22:  · No evidence of acute deep vein thrombosis in the right common femoral, femoral, popliteal, posterior tibial, and peroneal veins. The veins were imaged in the transverse and longitudinal planes. The vessels showed normal color filling and compressibility. Doppler interrogation showed phasic and spontaneous flow. · No evidence of acute deep vein thrombosis in the left common femoral, femoral, popliteal, posterior tibial, and peroneal veins. The veins were imaged in the transverse and longitudinal planes. The vessels showed normal color filling and compressibility. Doppler interrogation showed phasic and spontaneous flow. Echo 04/04/22:  Chris Thompsonier: Left ventricle is mildly dilated. Increased wall thickness. Normal wall motion. Low normal left ventricular systolic function with a visually estimated EF of 50 - 55%.   Left Atrium: Left atrium is mildly dilated.   Technical qualifiers: Echo study was technically difficult due to patient's heart rhythm. - Prealbumin 8.1  - Nutritional status likely contributing to edema. - Continue current diet and nutritional supplements. - Continue furosemide 20 mg po bid. Dyslipidemia   - Continue atorvastatin 40 mg po daily. Seizure disorder  - Continue divalproex  mg po daily and 1000 mg po daily at hs.  - Continue topiramate 200 mg po bid. GERD  - Continue pantoprazole 40 mg po daily. Psoriasis  - Continue ammonium lactate 12% top bid. Acute pain  Left foot x-ray 04/12/22:  No acute abnormality.  - Patient continues to complain of left foot pain   - Continue oxycodone/apap 5/325 mg 1/2 tab po tid (MME = 11.25). Intellectual disability   - Supportive care. Aspiration PNA vs HACP, resolved  Leucocytosis, resolved  Fever, resolved  CXR 04/06/22:  Small left pleural effusion. Hazy left basilar atelectasis/airspace disease. CXR 04/10/22:  Mild diffuse bilateral airspace disease, similar to the prior study.  - Patient completed abx course as noted above. - No further respiratory symptoms. 25.0 - 29.9 Overweight / Body mass index is 26.45 kg/m². Estimated discharge date: April 12  Barriers:    Code status: Full  Prophylaxis: SCD's  Recommended Disposition: SNF/LTC     Subjective:     Chief Complaint / Reason for Physician Visit  Patient continues to complain of LLE pain. Plan of care and pertinent events reviewed with bedside nurse. Review of Systems:  Symptom Y/N Comments  Symptom Y/N Comments   Fever/Chills N   Chest Pain N    Poor Appetite Y   Edema Y    Cough N   Abdominal Pain N    Sputum N   Joint Pain N    SOB/GRULLON N   Pruritis/Rash N    Nausea/vomit N   Tolerating PT/OT     Diarrhea N   Tolerating Diet Y    Constipation    Other       Could NOT obtain due to:      Objective:     VITALS:   Last 24hrs VS reviewed since prior progress note. Most recent are:  Patient Vitals for the past 24 hrs:   Temp Pulse Resp BP SpO2   04/20/22 0744 97.8 °F (36.6 °C) 66 18 (!) 97/50 97 %   04/19/22 2325 98.9 °F (37.2 °C) 80 18 122/62 95 %   04/19/22 1926 98.2 °F (36.8 °C) 73 17 (!) 113/57 95 %   04/19/22 1613 98 °F (36.7 °C) 71 18 (!) 102/59 94 %     No intake or output data in the 24 hours ending 04/20/22 0926     I had a face to face encounter and independently examined this patient on 4/20/2022, as outlined below:  PHYSICAL EXAM:  General:  A/A/O to person and place. NAD. HEENT:  Normocephalic. Sclera anicteric. Mucous membranes moist.    Chest:  Resps even/unlabored with symmetrical CWE. Air entry diminished at bases. Lungs CTA. No use of accessory muscles. CV:  RRR. Normal S1/S2.   No M/C/R appreciated. No JVD. Generalized edema/anasarca which is imprved. Cap refill < 3 sec. Peripheral pulses 1+. GI:  Abdomen soft/NT/ND. ABT X 4.    :  Voiding. Neurologic:  Nonfocal.  Face symmetrical.  Speech normal.     Psych:  Cooperative. No anxiety or agitation. Skin:  Warm and color appropriate. No rashes or jaundice. Turgor elastic. Reviewed most current lab test results and cultures  YES  Reviewed most current radiology test results   YES  Review and summation of old records today    NO  Reviewed patient's current orders and MAR    YES  PMH/SH reviewed - no change compared to H&P  ________________________________________________________________________  Care Plan discussed with:    Comments   Patient [address]     Consultant                        Multidiciplinary team rounds were held today with , nursing, pharmacist and clinical coordinator. Patient's plan of care was discussed; medications were reviewed and discharge planning was addressed. ________________________________________________________________________  Gwenith Nyhan, NP     Procedures: see electronic medical records for all procedures/Xrays and details which were not copied into this note but were reviewed prior to creation of Plan. LABS:  I reviewed today's most current labs and imaging studies.   Pertinent labs include:  Recent Labs     04/19/22  1126 04/18/22 0222   WBC 10.2 14.1*   HGB 7.2* 8.0*   HCT 23.7* 25.9*   PLT 87* 88*     Recent Labs     04/18/22 0222      K 4.1   *   CO2 27   *   BUN 29*   CREA 0.76   CA 7.8*       Signed: Gwenith Nyhan, NP

## 2022-04-20 NOTE — PROGRESS NOTES
Problem: Falls - Risk of  Goal: *Absence of Falls  Description: Document Denny Brewer Fall Risk and appropriate interventions in the flowsheet.   Outcome: Progressing Towards Goal  Note: Fall Risk Interventions:  Mobility Interventions: Bed/chair exit alarm,Communicate number of staff needed for ambulation/transfer    Mentation Interventions: Door open when patient unattended,Increase mobility,Room close to nurse's station    Medication Interventions: Bed/chair exit alarm    Elimination Interventions: Call light in reach,Patient to call for help with toileting needs    History of Falls Interventions: Bed/chair exit alarm

## 2022-04-20 NOTE — PROGRESS NOTES
Participated in Palliative IDR where pt was discussed.   Martell Ibrahim M.Div, [facility]   Paging Service [phone]

## 2022-04-20 NOTE — PROGRESS NOTES
Physical Therapy    Pt for port-a-cath placement this afternoon. Will defer at this time and follow.     Douglas Gonzalesr, PT

## 2022-04-20 NOTE — PROGRESS NOTES
Repeat blood pressures on arrival to unit 95/42 with congested cough. Reviewed pt.'s cough/lung assessments/blood pressure with doctor and Dr. Brittney Smith agreed that moderate sedation is cancelled and he would do local anesthetic.

## 2022-04-21 LAB
ANION GAP SERPL CALC-SCNC: 5 MMOL/L (ref 5–15)
BASOPHILS # BLD: 0 K/UL (ref 0–0.1)
BASOPHILS NFR BLD: 0 % (ref 0–1)
BUN SERPL-MCNC: 27 MG/DL (ref 6–20)
BUN/CREAT SERPL: 39 (ref 12–20)
CALCIUM SERPL-MCNC: 8.2 MG/DL (ref 8.5–10.1)
CHLORIDE SERPL-SCNC: 111 MMOL/L (ref 97–108)
CO2 SERPL-SCNC: 25 MMOL/L (ref 21–32)
COVID-19 RAPID TEST, COVR: NOT DETECTED
CREAT SERPL-MCNC: 0.69 MG/DL (ref 0.7–1.3)
DIFFERENTIAL METHOD BLD: ABNORMAL
EOSINOPHIL # BLD: 0.1 K/UL (ref 0–0.4)
EOSINOPHIL NFR BLD: 1 % (ref 0–7)
ERYTHROCYTE [DISTWIDTH] IN BLOOD BY AUTOMATED COUNT: 22.6 % (ref 11.5–14.5)
GLUCOSE BLD STRIP.AUTO-MCNC: 146 MG/DL (ref 65–117)
GLUCOSE BLD STRIP.AUTO-MCNC: 205 MG/DL (ref 65–117)
GLUCOSE BLD STRIP.AUTO-MCNC: 215 MG/DL (ref 65–117)
GLUCOSE BLD STRIP.AUTO-MCNC: 275 MG/DL (ref 65–117)
GLUCOSE SERPL-MCNC: 125 MG/DL (ref 65–100)
HCT VFR BLD AUTO: 22.1 % (ref 36.6–50.3)
HGB BLD-MCNC: 6.8 G/DL (ref 12.1–17)
HISTORY CHECKED?,CKHIST: NORMAL
IMM GRANULOCYTES # BLD AUTO: 0 K/UL (ref 0–0.04)
IMM GRANULOCYTES NFR BLD AUTO: 0 % (ref 0–0.5)
LYMPHOCYTES # BLD: 6.2 K/UL (ref 0.8–3.5)
LYMPHOCYTES NFR BLD: 61 % (ref 12–49)
MCH RBC QN AUTO: 35.2 PG (ref 26–34)
MCHC RBC AUTO-ENTMCNC: 30.8 G/DL (ref 30–36.5)
MCV RBC AUTO: 114.5 FL (ref 80–99)
MONOCYTES # BLD: 2.6 K/UL (ref 0–1)
MONOCYTES NFR BLD: 25 % (ref 5–13)
NEUTS BAND NFR BLD MANUAL: 1 %
NEUTS SEG # BLD: 1.3 K/UL (ref 1.8–8)
NEUTS SEG NFR BLD: 12 % (ref 32–75)
NRBC # BLD: 0 K/UL (ref 0–0.01)
NRBC BLD-RTO: 0 PER 100 WBC
PLATELET # BLD AUTO: 101 K/UL (ref 150–400)
PMV BLD AUTO: 9.2 FL (ref 8.9–12.9)
POTASSIUM SERPL-SCNC: 4.5 MMOL/L (ref 3.5–5.1)
RBC # BLD AUTO: 1.93 M/UL (ref 4.1–5.7)
RBC MORPH BLD: ABNORMAL
SERVICE CMNT-IMP: ABNORMAL
SODIUM SERPL-SCNC: 141 MMOL/L (ref 136–145)
SOURCE, COVRS: NORMAL
WBC # BLD AUTO: 10.2 K/UL (ref 4.1–11.1)
WBC MORPH BLD: ABNORMAL

## 2022-04-21 PROCEDURE — 82962 GLUCOSE BLOOD TEST: CPT

## 2022-04-21 PROCEDURE — 74011250637 HC RX REV CODE- 250/637: Performed by: INTERNAL MEDICINE

## 2022-04-21 PROCEDURE — 85025 COMPLETE CBC W/AUTO DIFF WBC: CPT

## 2022-04-21 PROCEDURE — 80048 BASIC METABOLIC PNL TOTAL CA: CPT

## 2022-04-21 PROCEDURE — 74011636637 HC RX REV CODE- 636/637: Performed by: INTERNAL MEDICINE

## 2022-04-21 PROCEDURE — 74011250636 HC RX REV CODE- 250/636: Performed by: INTERNAL MEDICINE

## 2022-04-21 PROCEDURE — P9016 RBC LEUKOCYTES REDUCED: HCPCS

## 2022-04-21 PROCEDURE — 86900 BLOOD TYPING SEROLOGIC ABO: CPT

## 2022-04-21 PROCEDURE — 65270000046 HC RM TELEMETRY

## 2022-04-21 PROCEDURE — 74011250637 HC RX REV CODE- 250/637: Performed by: NURSE PRACTITIONER

## 2022-04-21 PROCEDURE — 74011000250 HC RX REV CODE- 250: Performed by: INTERNAL MEDICINE

## 2022-04-21 PROCEDURE — 36430 TRANSFUSION BLD/BLD COMPNT: CPT

## 2022-04-21 PROCEDURE — 86922 COMPATIBILITY TEST ANTIGLOB: CPT

## 2022-04-21 PROCEDURE — 86921 COMPATIBILITY TEST INCUBATE: CPT

## 2022-04-21 PROCEDURE — 36415 COLL VENOUS BLD VENIPUNCTURE: CPT

## 2022-04-21 PROCEDURE — 86902 BLOOD TYPE ANTIGEN DONOR EA: CPT

## 2022-04-21 PROCEDURE — 86920 COMPATIBILITY TEST SPIN: CPT

## 2022-04-21 PROCEDURE — 86870 RBC ANTIBODY IDENTIFICATION: CPT

## 2022-04-21 PROCEDURE — 87635 SARS-COV-2 COVID-19 AMP PRB: CPT

## 2022-04-21 RX ORDER — ACETAMINOPHEN 325 MG/1
650 TABLET ORAL
Status: DISCONTINUED | OUTPATIENT
Start: 2022-04-21 | End: 2022-04-25 | Stop reason: HOSPADM

## 2022-04-21 RX ORDER — DIPHENHYDRAMINE HCL 25 MG
25 CAPSULE ORAL
Status: DISCONTINUED | OUTPATIENT
Start: 2022-04-21 | End: 2022-04-25 | Stop reason: HOSPADM

## 2022-04-21 RX ORDER — SODIUM CHLORIDE 9 MG/ML
250 INJECTION, SOLUTION INTRAVENOUS AS NEEDED
Status: DISCONTINUED | OUTPATIENT
Start: 2022-04-21 | End: 2022-04-25 | Stop reason: SDUPTHER

## 2022-04-21 RX ADMIN — Medication: at 09:11

## 2022-04-21 RX ADMIN — GUAIFENESIN 200 MG: 200 SOLUTION ORAL at 15:24

## 2022-04-21 RX ADMIN — DIPHENHYDRAMINE HYDROCHLORIDE 25 MG: 25 CAPSULE ORAL at 19:02

## 2022-04-21 RX ADMIN — Medication: at 17:37

## 2022-04-21 RX ADMIN — SODIUM CHLORIDE, PRESERVATIVE FREE 10 ML: 5 INJECTION INTRAVENOUS at 21:31

## 2022-04-21 RX ADMIN — FOLIC ACID 1 MG: 1 TABLET ORAL at 09:03

## 2022-04-21 RX ADMIN — GUAIFENESIN 200 MG: 200 SOLUTION ORAL at 04:50

## 2022-04-21 RX ADMIN — DIVALPROEX SODIUM 500 MG: 500 TABLET, DELAYED RELEASE ORAL at 09:02

## 2022-04-21 RX ADMIN — TOPIRAMATE 200 MG: 100 TABLET, FILM COATED ORAL at 17:36

## 2022-04-21 RX ADMIN — Medication 5 UNITS: at 12:24

## 2022-04-21 RX ADMIN — Medication 3 UNITS: at 16:30

## 2022-04-21 RX ADMIN — Medication: at 09:12

## 2022-04-21 RX ADMIN — Medication 2 UNITS: at 07:30

## 2022-04-21 RX ADMIN — SODIUM CHLORIDE, PRESERVATIVE FREE 10 ML: 5 INJECTION INTRAVENOUS at 06:28

## 2022-04-21 RX ADMIN — PANTOPRAZOLE SODIUM 40 MG: 40 TABLET, DELAYED RELEASE ORAL at 09:03

## 2022-04-21 RX ADMIN — OXYCODONE AND ACETAMINOPHEN 0.5 TABLET: 5; 325 TABLET ORAL at 15:24

## 2022-04-21 RX ADMIN — GUAIFENESIN 200 MG: 200 SOLUTION ORAL at 21:32

## 2022-04-21 RX ADMIN — FUROSEMIDE 20 MG: 20 TABLET ORAL at 17:36

## 2022-04-21 RX ADMIN — OXYCODONE AND ACETAMINOPHEN 0.5 TABLET: 5; 325 TABLET ORAL at 09:03

## 2022-04-21 RX ADMIN — FUROSEMIDE 20 MG: 20 TABLET ORAL at 09:02

## 2022-04-21 RX ADMIN — DIVALPROEX SODIUM 1000 MG: 500 TABLET, DELAYED RELEASE ORAL at 21:28

## 2022-04-21 RX ADMIN — SODIUM CHLORIDE, PRESERVATIVE FREE 10 ML: 5 INJECTION INTRAVENOUS at 15:11

## 2022-04-21 RX ADMIN — ATORVASTATIN CALCIUM 40 MG: 40 TABLET, FILM COATED ORAL at 09:03

## 2022-04-21 RX ADMIN — TOPIRAMATE 200 MG: 100 TABLET, FILM COATED ORAL at 09:02

## 2022-04-21 RX ADMIN — Medication: at 21:30

## 2022-04-21 RX ADMIN — OXYCODONE AND ACETAMINOPHEN 0.5 TABLET: 5; 325 TABLET ORAL at 21:29

## 2022-04-21 RX ADMIN — EPOETIN ALFA-EPBX 10000 UNITS: 10000 INJECTION, SOLUTION INTRAVENOUS; SUBCUTANEOUS at 22:03

## 2022-04-21 RX ADMIN — Medication 2 UNITS: at 22:00

## 2022-04-21 RX ADMIN — CYANOCOBALAMIN TAB 500 MCG 1000 MCG: 500 TAB at 09:02

## 2022-04-21 NOTE — PROGRESS NOTES
Transition of Care Plan:    RUR: 18%  Disposition: SNF [facility] (re-auth insurance pending), Level 2 completed  Follow up appointments: Follow up with PCP and/or Specialist   DME needed: will require at the facility  Transportation at Discharge:[facility] transport-CM will arrange   Keys or means to access home:   N/A    IM Medicare Letter: 2nd  Medicare Letter to be given  Is patient a BCPI-A Bundle:  CM will provide if required    If yes, was Bundle Letter given?:    Is patient a [place] and connected with the [place]? N/A            If yes, was [employer] transfer form completed and [facility] notified? Caregiver Contact:Melly Lawrence (mother) [phone]  Discharge Caregiver contacted prior to discharge? Family to be contacted-mother by bedside  Care Conference needed?:     Not at this time      CM informed by clinical team that pt will be medially stable to d/c once transfusion is complete. CM made aware that level 2 has been completed by [facility]. Pt will transition to snf: [facility]. CM spoke with admin coordinator: Pat Marin at [facility], via telephone regarding pt's possible d/c on today. Pat Marin reported that a new insurance auth was initiated on 4/20, and auth is currently pending. CM will receive a call once [place] has been approved. CM placed pt's transport on will call, and will enter time once pt has been approved and medically stable to d/c. CM will continue to follow.     SHAAN Dodson, [address] [place] [address]

## 2022-04-21 NOTE — PROGRESS NOTES
Problem: Falls - Risk of  Goal: *Absence of Falls  Description: Document Dameon Bautista Fall Risk and appropriate interventions in the flowsheet.   Outcome: Progressing Towards Goal  Note: Fall Risk Interventions:  Mobility Interventions: Bed/chair exit alarm,Communicate number of staff needed for ambulation/transfer,Patient to call before getting OOB,PT Consult for mobility concerns,OT consult for ADLs,Assess mobility with egress test    Mentation Interventions: Bed/chair exit alarm,Door open when patient unattended,More frequent rounding,Reorient patient,Toileting rounds,Room close to nurse's station,Adequate sleep, hydration, pain control    Medication Interventions: Bed/chair exit alarm,Evaluate medications/consider consulting pharmacy,Patient to call before getting OOB,Teach patient to arise slowly    Elimination Interventions: Call light in reach,Patient to call for help with toileting needs,Toileting schedule/hourly rounds,Toilet paper/wipes in reach,Stay With Me (per policy),Bed/chair exit alarm    History of Falls Interventions: Bed/chair exit alarm,Door open when patient unattended,Room close to nurse's station

## 2022-04-21 NOTE — PROGRESS NOTES
End of Shift Note    Bedside shift change report given to Kelsey Truong RN (oncoming nurse) by Mj Gtz RN (offgoing nurse).   Report included the following information SBAR, Kardex, Intake/Output, MAR and Recent Results    Shift worked:  7p-7a     Shift summary and any significant changes:     Pt stable, scheduled meds given, Lispro 2 unit coverage given for , labs drawn, PRN robitussin given twice for cough, incontinence care performed with full CHG wipe and Condom cath reapplied  Possible DC today to [address]   Concerns for physician to address:       Zone phone for oncoming shift:   [phone]           Mj Gtz, RN

## 2022-04-21 NOTE — DISCHARGE INSTRUCTIONS
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)  Severe macrocytic anemia/Thrombocytopenia/lymphocytosis (abnormal blood counts)  Neutropenia  Diabetes  Malnutiriioin  Anasarca (generalized swelling which was improving at the time of hospital discharge)  Dyslipidemia (abnormal blood lipids/fats)  Seizure disorder  GERD (gastroesophageal reflux disease, reflux of stomach contents into the esophagus)  Psoriasis  Acute pain  Intellectual disability   Aspiration PNA vs HACP (infection in the lungs which resolved after antibiotic treatment)  Leucocytosis (elevated white blood cell count)  Fever (elevated body temperature which resolved prior to hospital discharge)      Medications: Per above medication reconciliation. Pain Management: per above medications    Recommended diet: Diabetic Diet and easy to chew with low calorie high-protein nutritional supplements twice daily    Recommended activity: PT/OT Eval and Treat    Wound care: Monitor Port-A-Cath site for signs of infection    Indwelling devices:  Port-A-Cath placed 22    Supplemental Oxygen: None    Required Lab work: Complete blood count (CBC) and comprehensive metabolic profile (CMP)    Glucose management:  Accucheck ACHS with sliding scale per SNF protocol    Code status: Full     Take all discharge paperwork with you to all of your follow up doctor appointments. Take your medications exactly as outlined in your discharge paperwork. Do not take any other medications unless specifically prescribed after your hospital stay.     If your symptoms return/worsen seek medical attention immediately. Outside physician follow up: Follow-up Information     Follow up With Specialties Details Why Franky Noel MD Hematology and Oncology On 5/6/2022 at 2:15 P.M. [address]  [address]  [address] [address] [facility]  [phone]    Viviane Townsend MD Internal Medicine In 3 days Plan for hospital follow-up with primary care provider within 3 days of discharge from [address]  [phone]                 Skilled nursing facility/ SNF MD responsible for above on discharge. Information obtained by :  I understand that if any problems occur once I am at home I am to contact my physician. I understand and acknowledge receipt of the instructions indicated above.                                                                                                                                            Physician's or R.N.'s Signature                                                                  Date/Time                                                                                                                                              Patient or Representative

## 2022-04-21 NOTE — PROGRESS NOTES
Received call from Micah Huynh in blood bank. Patients type and screen positive and will have to be sent out to [facility] for further evaluation. There will be a delay inpatient receiving blood. Mr. Jackie Velasquez NP notified. 1930-Bedside shift change report given to Doctor Salvatore Atkinson  (oncoming nurse) by Nikki Rosales RN (offgoing nurse). Report included the following information SBAR, Kardex, MAR and Recent Results.

## 2022-04-21 NOTE — PROGRESS NOTES
Problem: Falls - Risk of  Goal: *Absence of Falls  Description: Document Amber Mclain Fall Risk and appropriate interventions in the flowsheet.   Outcome: Progressing Towards Goal  Note: Fall Risk Interventions:  Mobility Interventions: Bed/chair exit alarm    Mentation Interventions: Bed/chair exit alarm,Reorient patient,More frequent rounding    Medication Interventions: Teach patient to arise slowly,Patient to call before getting OOB,Bed/chair exit alarm    Elimination Interventions: Bed/chair exit alarm,Call light in reach,Toileting schedule/hourly rounds    History of Falls Interventions: Bed/chair exit alarm

## 2022-04-21 NOTE — PROGRESS NOTES
Hospitalist Progress Note    NAME: Jojo Sherwood   :  1955   MRN:  [id number]     HPI excerpted from admission H&P:  Ira Marie is a 73 y.o.   male with PMHx significant for epilepsy, intellectual disability, type 2 diabetes, seizure, presents to the ER for evaluation of abnormal labs. Patient was seen by his PCP yesterday with routine lab work performed then. He was notified today to come to the ER due to hemoglobin of 4.4. History is obtained by patient's mom at bedside. Patient had a colonoscopy in 2021 due to positive FOBT. He had multiple polyps removed. Biopsy showed adenoma. Partial colectomy has been recommended by his mom did not want for him to this. On arrival vitals are stable. Repeat hemoglobin was 4.2. He also has a leukocytosis wbc 15. Per patient's mom no obvious bright red blood per rectum or melena. \"    Assessment / Plan:  H/O colonic adenoma   ? Infectious colitis   Abdominal US 22:  1. Nonspecific hypoechoic lesion in the head of the pancreas measuring 1.4 cm in  size. 2.  Splenomegaly with length of 18.5 cm an estimated volume of 11 22 mL. 3.  Contracted gallbladder. 4.  Normal appearance of liver. CT abdomen/pelvis 22:  1. No definite pancreatic mass seen by this technique. 2.  Colitis diffuse with fat stranding in the peritoneal cavity. 3.  Splenomegaly. EGD 22:  Esophagus:  Normal.  Stomach:  Normal.  Duodenum:  Normal.  - GI input appreciated, now signed off.   - Patient completed empiric course of levofloxacin and metronidazole. MDS and splenic marginal zone lymphoma  Severe macrocytic anemia/Thrombocytopenia/lymphocytosis  Neutropenia  - Heme/Onc input appreciated. - Bone marrow bx completed 22.    - Hgb 4.2 on admission.  - Patient has received total of 4 units PRBCs so far this hospitalization.   - Patient received IV iron. - H/H down slightly this a.m., transfuse 1 unit PRBCs.     - Continue to monitor blood count. - Continue cyanocobalamin 1000 mcg po daily. - Continue folic acid 1 mg po daily. - Continue epoetin 10,000 units sc 3 times weekly. - Port-a-Cath inserted 02/20/22. DM II  Hyperglycemia   - Continue FSBS with SSI correction scale. Malnutiriioin  Anasarca, improved  LE venous dopplers  04/02/22:  · No evidence of acute deep vein thrombosis in the right common femoral, femoral, popliteal, posterior tibial, and peroneal veins. The veins were imaged in the transverse and longitudinal planes. The vessels showed normal color filling and compressibility. Doppler interrogation showed phasic and spontaneous flow. · No evidence of acute deep vein thrombosis in the left common femoral, femoral, popliteal, posterior tibial, and peroneal veins. The veins were imaged in the transverse and longitudinal planes. The vessels showed normal color filling and compressibility. Doppler interrogation showed phasic and spontaneous flow. Echo 04/04/22:  Dianna Sabot: Left ventricle is mildly dilated. Increased wall thickness. Normal wall motion. Low normal left ventricular systolic function with a visually estimated EF of 50 - 55%.   Left Atrium: Left atrium is mildly dilated.   Technical qualifiers: Echo study was technically difficult due to patient's heart rhythm. - Prealbumin 8.1  - Nutritional status likely contributing to edema. - Continue current diet and nutritional supplements. - Continue furosemide 20 mg po bid. Dyslipidemia   - Continue atorvastatin 40 mg po daily. Seizure disorder  - Continue divalproex  mg po daily and 1000 mg po daily at hs.  - Continue topiramate 200 mg po bid. GERD  - Continue pantoprazole 40 mg po daily. Psoriasis  - Continue ammonium lactate 12% top bid. Acute pain  Left foot x-ray 04/12/22:  No acute abnormality.  - Patient continues to complain of left foot pain   - Continue oxycodone/apap 5/325 mg 1/2 tab po tid (MME = 11.25). Intellectual disability   - Supportive care. Aspiration PNA vs HACP, resolved  Leucocytosis, resolved  Fever, resolved  CXR 04/06/22:  Small left pleural effusion. Hazy left basilar atelectasis/airspace disease. CXR 04/10/22:  Mild diffuse bilateral airspace disease, similar to the prior study.  - Patient completed abx course as noted above. - No further respiratory symptoms. 25.0 - 29.9 Overweight / Body mass index is 26.45 kg/m². Estimated discharge date: April 12  Barriers:    Code status: Full  Prophylaxis: SCD's  Recommended Disposition: SNF/LTC     Subjective:     Chief Complaint / Reason for Physician Visit  Patient continues to complain of LLE pain. Plan of care and pertinent events reviewed with bedside nurse. Review of Systems:  Symptom Y/N Comments  Symptom Y/N Comments   Fever/Chills N   Chest Pain N    Poor Appetite Y   Edema Y    Cough N   Abdominal Pain N    Sputum N   Joint Pain N    SOB/GRULLON N   Pruritis/Rash N    Nausea/vomit N   Tolerating PT/OT     Diarrhea N   Tolerating Diet Y    Constipation    Other       Could NOT obtain due to:      Objective:     VITALS:   Last 24hrs VS reviewed since prior progress note.  Most recent are:  Patient Vitals for the past 24 hrs:   Temp Pulse Resp BP SpO2   04/21/22 0742 98.4 °F (36.9 °C) 73 17 (!) 111/57 96 %   04/20/22 2315 98.3 °F (36.8 °C) 66 18 121/73 98 %   04/20/22 2012 99.8 °F (37.7 °C) 79 18 (!) 96/55 95 %   04/20/22 1632 97.5 °F (36.4 °C) 80 -- (!) 107/53 92 %   04/20/22 1600 -- 85 -- (!) 119/50 97 %   04/20/22 1550 -- 86 -- 120/61 98 %   04/20/22 1545 -- 80 -- (!) 132/56 --   04/20/22 1540 -- 79 -- (!) 107/53 97 %   04/20/22 1530 -- 82 22 (!) 121/50 98 %   04/20/22 1447 98 °F (36.7 °C) 79 24 (!) 108/40 99 %       Intake/Output Summary (Last 24 hours) at 4/21/2022 1232  Last data filed at 4/21/2022 0500  Gross per 24 hour   Intake 200 ml   Output 500 ml   Net -300 ml        I had a face to face encounter and independently examined this patient on 4/21/2022, as outlined below:  PHYSICAL EXAM:  General:  A/A/O to person and place. NAD. HEENT:  Normocephalic. Sclera anicteric. Mucous membranes moist.    Chest:  Resps even/unlabored with symmetrical CWE. Air entry diminished at bases. Lungs CTA. No use of accessory muscles. CV:  RRR. Normal S1/S2. No M/C/R appreciated. No JVD. Generalized edema/anasarca which is imprved. Cap refill < 3 sec. Peripheral pulses 1+. GI:  Abdomen soft/NT/ND. ABT X 4.    :  Voiding. Neurologic:  Nonfocal.  Face symmetrical.  Speech normal.     Psych:  Cooperative. No anxiety or agitation. Skin:  Warm and color appropriate. No rashes or jaundice. Turgor elastic. Reviewed most current lab test results and cultures  YES  Reviewed most current radiology test results   YES  Review and summation of old records today    NO  Reviewed patient's current orders and MAR    YES  PMH/SH reviewed - no change compared to H&P  ________________________________________________________________________  Care Plan discussed with:    Comments   Patient [address]     Consultant                        Multidiciplinary team rounds were held today with , nursing, pharmacist and clinical coordinator. Patient's plan of care was discussed; medications were reviewed and discharge planning was addressed. ________________________________________________________________________  Bianca Sanderson NP     Procedures: see electronic medical records for all procedures/Xrays and details which were not copied into this note but were reviewed prior to creation of Plan. LABS:  I reviewed today's most current labs and imaging studies.   Pertinent labs include:  Recent Labs     04/21/22  0453 04/19/22  1126   WBC 10.2 10.2   HGB 6.8* 7.2*   HCT 22.1* 23.7*   * 87*     Recent Labs     04/21/22  0453      K 4.5   *   CO2 25   *   BUN 27*   CREA 0.69*   CA 8.2*       Signed: Harvel Boeck, NP

## 2022-04-21 NOTE — PROGRESS NOTES
Physical Therapy    Chart reviewed. Pt's Hgb dropping now at 6.8. Awaiting transfusion. Will defer at this time and follow.     Rekha Fisher, PT

## 2022-04-21 NOTE — PROGRESS NOTES
Hematology Oncology Progress Note           Follow up for: MDS, splenic marginal zone lymphoma     Chart notes reviewed since last visit. Case discussed with following: patient. No family at bedside at present. Patient complains of the following: no complaints at present. Additional concerns noted by the staff: none    Patient Vitals for the past 24 hrs:   BP Temp Pulse Resp SpO2 Height   04/21/22 0742 (!) 111/57 98.4 °F (36.9 °C) 73 17 96 % --   04/20/22 2315 121/73 98.3 °F (36.8 °C) 66 18 98 % --   04/20/22 2012 (!) 96/55 99.8 °F (37.7 °C) 79 18 95 % --   04/20/22 1632 (!) 107/53 97.5 °F (36.4 °C) 80 -- 92 % --   04/20/22 1600 (!) 119/50 -- 85 -- 97 % --   04/20/22 1550 120/61 -- 86 -- 98 % --   04/20/22 1545 (!) 132/56 -- 80 -- -- --   04/20/22 1540 (!) 107/53 -- 79 -- 97 % --   04/20/22 1530 (!) 121/50 -- 82 22 98 % --   04/20/22 1447 (!) 108/40 98 °F (36.7 °C) 79 24 99 % --   04/20/22 1424 -- -- -- -- -- 6' (1.829 m)       Review of Systems: negative for 11 organ systems except as noted above. Physical Examination:  Constitutional Alert, cooperative. Mood and affect appropriate. Appears close to chronological age. Well nourished. Well developed. Head Normocephalic; no scars   Eyes Conjunctivae and sclerae are clear and without icterus. Pupils are round   ENMT Sinuses are nontender. No oral exudates, ulcers, masses, thrush or mucositis. Neck Supple without masses or thyromegaly. No jugular venous distension. Hematologic/Lymphatic No petechiae or purpura. No tender or palpable lymph nodes noted. Respiratory Lungs are clear to auscultation without rhonchi or wheezing. Cardiovascular Regular rate and rhythm of heart without murmurs, gallops or rubs. Chest / Line Site Chest is symmetric with no chest wall deformities. Abdomen Non-tender, non-distended, no masses, ascites or hepatosplenomegaly. Good bowel sounds. .   Musculoskeletal No tenderness or swelling, normal range of motion without obvious weakness. Extremities No visible deformities, no cyanosis, clubbing or edema. Chronic venous stasis changes. Skin No rashes, scars, or lesions suggestive of malignancy. No petechiae, purpura, or ecchymoses. No excoriations. Neurologic No sensory or motor deficits noted but not specifically tested. Does appear that legs are weak, right more than left this AM.    Psychiatric Alert. Coherent speech. Verbalizes understanding of our discussions today.        Labs:  Recent Results (from the past 24 hour(s))   GLUCOSE, POC    Collection Time: 04/20/22 11:16 AM   Result Value Ref Range    Glucose (POC) 139 (H) 65 - 117 mg/dL    Performed by [address] [place] [address], POC    Collection Time: 04/20/22  4:27 PM   Result Value Ref Range    Glucose (POC) 147 (H) 65 - 117 mg/dL    Performed by Waldo Quiroz RN    GLUCOSE, POC    Collection Time: 04/20/22  8:45 PM   Result Value Ref Range    Glucose (POC) 235 (H) 65 - 117 mg/dL    Performed by Maira Ayers (PCT)    METABOLIC PANEL, BASIC    Collection Time: 04/21/22  4:53 AM   Result Value Ref Range    Sodium 141 136 - 145 mmol/L    Potassium 4.5 3.5 - 5.1 mmol/L    Chloride 111 (H) 97 - 108 mmol/L    CO2 25 21 - 32 mmol/L    Anion gap 5 5 - 15 mmol/L    Glucose 125 (H) 65 - 100 mg/dL    BUN 27 (H) 6 - 20 MG/DL    Creatinine 0.69 (L) 0.70 - 1.30 MG/DL    BUN/Creatinine ratio 39 (H) 12 - 20      GFR est AA >60 >60 ml/min/1.73m2    GFR est non-AA >60 >60 ml/min/1.73m2    Calcium 8.2 (L) 8.5 - 10.1 MG/DL   CBC WITH AUTOMATED DIFF    Collection Time: 04/21/22  4:53 AM   Result Value Ref Range    WBC 10.2 4.1 - 11.1 K/uL    RBC 1.93 (L) 4.10 - 5.70 M/uL    HGB 6.8 (L) 12.1 - 17.0 g/dL    HCT 22.1 (L) 36.6 - 50.3 %    .5 (H) 80.0 - 99.0 FL    MCH 35.2 (H) 26.0 - 34.0 PG    MCHC 30.8 30.0 - 36.5 g/dL    RDW 22.6 (H) 11.5 - 14.5 %    PLATELET 398 (L) 314 - 400 K/uL    MPV 9.2 8.9 - 12.9 FL    NRBC 0.0 0  WBC    ABSOLUTE NRBC 0.00 0.00 - 0.01 K/uL    NEUTROPHILS 12 (L) 32 - 75 %    BAND NEUTROPHILS 1 %    LYMPHOCYTES 61 (H) 12 - 49 %    MONOCYTES 25 (H) 5 - 13 %    EOSINOPHILS 1 0 - 7 %    BASOPHILS 0 0 - 1 %    IMMATURE GRANULOCYTES 0 0.0 - 0.5 %    ABS. NEUTROPHILS 1.3 (L) 1.8 - 8.0 K/UL    ABS. LYMPHOCYTES 6.2 (H) 0.8 - 3.5 K/UL    ABS. MONOCYTES 2.6 (H) 0.0 - 1.0 K/UL    ABS. EOSINOPHILS 0.1 0.0 - 0.4 K/UL    ABS. BASOPHILS 0.0 0.0 - 0.1 K/UL    ABS. IMM. GRANS. 0.0 0.00 - 0.04 K/UL    DF MANUAL      RBC COMMENTS ANISOCYTOSIS  1+        WBC COMMENTS SMUDGE CELLS SEEN     GLUCOSE, POC    Collection Time: 04/21/22  7:46 AM   Result Value Ref Range    Glucose (POC) 146 (H) 65 - 117 mg/dL    Performed by [employer] PCT      -Imaging-   4/3/22 CT A/P:  IMPRESSION  . No definite pancreatic mass seen by this technique. 2. Colitis diffuse with fat stranding in the peritoneal cavity. 3. Splenomegaly. Assessment and Plan:    *) Severe macrocytic anemia, thrombocytopenia, lymphocytosis:  - MCV markedly elevated to 142 on admit with severe anemia(hgb 4.2). - Iron studies after transfusion not markedly elevated as would be expected. Given Venofer on 4/4 and 4/5.  - hgb in 6's today- will transfuse a unit of PRBCs this Am - this will be his 5th I believe. - B12 on low end of normal, MMA wnl, so not B12 deficient. - Peripheral blood smear was concerning for lymphoproliferative disorder. 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 - SPEP 0.2 with IgG Kappa specificity and elevated serum free light chain ratio. Monoclonal gammopathies typically are seen with myeloma, low grade lymphoma, CLL, Waldenstrom's macroglobulinemia and amyloidosis. In his particular case, it would seem to be associated with the low grade lymphoma seen in the BM (splenic MZL) with circulating lymphoma cells seen in the peripheral blood  - continue folic acid daily. 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 FISH panel negative for t(11:14), N(55;63), bcl6 rearrangement and MALT1 rearrangement. Cytogenetics normal male karyotype. - Dr. Leandro Siegel and I both reviewed w/ his mother. Will continue to follow him and continue procrit for his MDS. He has a low grade lymphoma and could be treated with rituxan.  -retacrit here t, th, sat with plans to continue as outpt  - having portacath placed today to help expedite his getting treated in timely manner as his neutropenia is going to become problematic if his MDS worsens.   - Follow-up with Dr. Leandro Siegel as outpt scheduled for 5/6/22 2:15pm. May move earlier if he goes to long term care and not SNF      *) neutropenia - his ANC dropped to 600 and is 1300 today. *) Colitis:  - CT scan showing diffuse colitis. completed IV flagyl 4/15/22  - EGD on 4/5 without acute findings. - GI  Was following had multiple polyps and GI recommended colectomy that was declined. *) Fever: (presumed aspiration pneumonia)  - fever resolved, treated with  Levaquin and IV flagyl    *) intellectual disability    *) DM    *) Seizure disorder  - on depakote, topamax    *) innumerable adenomatous polyps seen on colonoscopy in Dec 2021 - partial colectomy recommended. Pt's family declines.  EGD 4/5 normal.     *) Lower extremity edema, anasarca  - receiving IV albumin and lasix periodically   - attributed to hypoalbuminemia         Princess Ancelmo PIMENTEL [facility] office  [address]  [place], [facility] [address]  Phone [phone]  Fax [phone]

## 2022-04-21 NOTE — DISCHARGE SUMMARY
Hospitalist Discharge Summary     Patient ID:  Sushant Laura  [id number]  73 y.o.  1955  4/2/2022    PCP on record: Viv Patton MD    Admit date: 4/2/2022  Discharge date and time: 4/25/2022    DISCHARGE DIAGNOSIS:  H/O colonic adenoma   ? Infectious colitis   MDS and splenic marginal zone lymphoma  Severe macrocytic anemia/Thrombocytopenia/lymphocytosis  Neutropenia  DM II  Hyperglycemia   Malnutiriioin  Anasarca, improved  Dyslipidemia   Seizure disorder  GERD  Psoriasis  Acute pain  Intellectual disability   Aspiration PNA vs HACP, resolved  Leucocytosis, resolved  Fever, resolved    CONSULTATIONS:  IP CONSULT TO HOSPITALIST  IP CONSULT TO GASTROENTEROLOGY  IP CONSULT TO HEMATOLOGY  IP CONSULT TO PALLIATIVE CARE - PROVIDER    Excerpted HPI from H&P of Preeti Schultz MD:  Sharda Arcos is a 73 y.o.   male with PMHx significant for epilepsy, intellectual disability, type 2 diabetes, seizure, presents to the ER for evaluation of abnormal labs. Patient was seen by his PCP yesterday with routine lab work performed then. He was notified today to come to the ER due to hemoglobin of 4.4. History is obtained by patient's mom at bedside. Patient had a colonoscopy in December 2021 due to positive FOBT. He had multiple polyps removed. Biopsy showed adenoma. Partial colectomy has been recommended by his mom did not want for him to this. On arrival vitals are stable. Repeat hemoglobin was 4.2. He also has a leukocytosis wbc 15. Per patient's mom no obvious bright red blood per rectum or melena. \"    ______________________________________________________________________  DISCHARGE SUMMARY/HOSPITAL COURSE:  for full details see H&P, daily progress notes, labs, consult notes. H/O colonic adenoma   ? Infectious colitis   Abdominal US 04/02/22:  1. Nonspecific hypoechoic lesion in the head of the pancreas measuring 1.4 cm in  size.   2.  Splenomegaly with length of 18.5 cm an estimated volume of 11 22 mL. 3.  Contracted gallbladder. 4.  Normal appearance of liver. CT abdomen/pelvis 04/06/22:  1. No definite pancreatic mass seen by this technique. 2.  Colitis diffuse with fat stranding in the peritoneal cavity. 3.  Splenomegaly. EGD 04/05/22:  Esophagus:  Normal.  Stomach:  Normal.  Duodenum:  Normal.  - Patient was evaluated by GI while hospitalized. - Patient completed course of levofloxacin and metronidazole.     MDS and splenic marginal zone lymphoma  Severe macrocytic anemia/Thrombocytopenia/lymphocytosis  Neutropenia  - Patient was followed by Heme/Onc while hospitalized. - Bone marrow bx completed 04/06/22.    - Hgb 4.2 on admission, 7.8 on day of hospital discharge. - Patient received total of 6 units PRBCs while hospitalized. - Patient received IV iron. - Continue cyanocobalamin 1000 mcg po daily. - Continue folic acid 1 mg po daily. - Continue epoetin 10,000 units sc 3 times weekly. - Port-a-Cath inserted 02/20/22.    - Plan for Rituxan with next week as PO, will need to go to Dr. Elham Burns office for consent later this week.       DM II  Hyperglycemia   - Resume po diabetes meds as noted below. - Continue FSBS with SSI correction scale.      Malnutiriioin  Anasarca, improved  LE venous dopplers  04/02/22:  · No evidence of acute deep vein thrombosis in the right common femoral, femoral, popliteal, posterior tibial, and peroneal veins. The veins were imaged in the transverse and longitudinal planes. The vessels showed normal color filling and compressibility. Doppler interrogation showed phasic and spontaneous flow. · No evidence of acute deep vein thrombosis in the left common femoral, femoral, popliteal, posterior tibial, and peroneal veins. The veins were imaged in the transverse and longitudinal planes. The vessels showed normal color filling and compressibility. Doppler interrogation showed phasic and spontaneous flow.   Echo 04/04/22:  Wilhelminia Blazing  Left Ventricle: Left ventricle is mildly dilated. Increased wall thickness. Normal wall motion. Low normal left ventricular systolic function with a visually estimated EF of 50 - 55%.   Left Atrium: Left atrium is mildly dilated.   Technical qualifiers: Echo study was technically difficult due to patient's heart rhythm. - Nutritional status likely contributing to edema. - Continue current diet and nutritional supplements. - Continue furosemide 20 mg po daily, PTA dose.       Dyslipidemia   - Continue atorvastatin 40 mg po daily.       Seizure disorder  - Continue divalproex  mg po daily and 1000 mg po daily at hs.  - Continue topiramate 200 mg po bid.       GERD  - Continue pantoprazole 40 mg po daily.      Psoriasis  - Continue ammonium lactate 12% top bid.     Left foot pain  Left foot x-ray 04/12/22:  No acute abnormality. MRI 04/22/22:  Diffuse subcutaneous edema surrounding the foot. No evidence of a focal drainable fluid collection. No evidence of osteomyelitis. - Patient reports ongoing pain, left foot. - Uric acid 5.3  - Continue oxycodone/apap 5/325 mg 1/2 tab po tid (MME = 11.25).       Intellectual disability   - Supportive care.      Aspiration PNA vs HACP, resolved  Leucocytosis, resolved  Fever, resolved  CXR 04/06/22:  Small left pleural effusion. Hazy left basilar atelectasis/airspace disease. CXR 04/10/22:  Mild diffuse bilateral airspace disease, similar to the prior study.  - Patient completed abx course as noted above. - No further respiratory symptoms. At the time of this dictation patient's vital signs were as follows:  T 98.5, /48, HR 68, RR 16, SpO2 95% on room air.    _______________________________________________________________________  Patient seen and examined by me on discharge day. Pertinent Findings:    General:  Awake and alert. NAD. HEENT:  Normocephalic. Sclera anicteric.   Mucous membranes moist.    Chest:  Resps even/unlabored with symmetrical CWE. Air entry diminished at bases. Lungs CTA. No use of accessory muscles. CV:  RRR. Normal S1/S2. No M/C/R appreciated. No JVD. Mild generalized edema, improving from prior days. Cap refill < 3 sec. Peripheral pulses 1+. GI:  Abdomen soft/NT/ND. ABT X 4.    :  Voiding. Neurologic:  Nonfocal.  Face symmetrical.  Speech normal.     Psych:  Cooperative. No anxiety or agitation. Skin:  Warm and color appropriate. No rashes or jaundice. Turgor elastic. Left 5th toe with mild erythema and Tiny Area of Necrosis at just under toenail. Minimally TTP.    _______________________________________________________________________  DISCHARGE MEDICATIONS:   Current Discharge Medication List      START taking these medications    Details   ammonium lactate (LAC-HYDRIN) 12 % lotion rub in to affected area well  Qty: 120 mL, Refills: 0  Start date: 4/25/2022      balsam peru-castor oiL (VENELEX) ointment Apply  to affected area two (2) times a day. Qty: 1 g, Refills: 0  Start date: 4/25/2022      cyanocobalamin 1,000 mcg tablet Take 1 Tablet by mouth daily. Qty: 30 Tablet, Refills: 0  Start date: 4/26/2022      epoetin camilo-epbx (RETACRIT) 10,000 unit/mL injection 1 mL by SubCUTAneous route Every Tues, Thur & Sat. Indications: anemia due to kidney failure  Qty: 1 Each, Refills: 0  Start date: 4/26/2022      glucose 4 gram chewable tablet Take 4 Tablets by mouth as needed for PRN Reason (Other) (Hypoglycemia). Qty: 20 Tablet, Refills: 0  Start date: 4/25/2022      melatonin 3 mg tablet Take 1 Tablet by mouth nightly as needed for Insomnia. Qty: 30 Tablet, Refills: 0  Start date: 4/25/2022      oxyCODONE-acetaminophen (PERCOCET) 5-325 mg per tablet Take 0.5 Tablets by mouth three (3) times daily for 3 days. Max Daily Amount: 1.5 Tablets.   Qty: 45 Tablet, Refills: 0  Start date: 4/25/2022, End date: 4/28/2022    Associated Diagnoses: Pain in both lower legs      pantoprazole (PROTONIX) 40 mg tablet Take 1 Tablet by mouth Daily (before breakfast). Qty: 30 Tablet, Refills: 0  Start date: 4/26/2022      polyethylene glycol (MIRALAX) 17 gram packet Take 1 Packet by mouth daily as needed for Constipation. Qty: 10 Packet, Refills: 0  Start date: 4/25/2022      acetaminophen (TYLENOL) 325 mg tablet Take 2 Tablets by mouth every four (4) hours as needed for Pain or Fever. Qty: 50 Tablet, Refills: 0  Start date: 4/25/2022         CONTINUE these medications which have NOT CHANGED    Details   !! divalproex DR (DEPAKOTE) 500 mg tablet Take 1,000 mg by mouth nightly. topiramate (TOPAMAX) 200 mg tablet Take 300 mg by mouth two (2) times a day. !! divalproex sodium (DEPAKOTE PO) Take 500 mg by mouth daily. metFORMIN (GLUCOPHAGE) 500 mg tablet Take 1,000 mg by mouth two (2) times daily (with meals). glimepiride (AMARYL) 4 mg tablet Take 8 mg by mouth every morning. linaGLIPtin (Tradjenta) 5 mg tablet Take 5 mg by mouth daily. pioglitazone (ACTOS) 15 mg tablet Take 45 mg by mouth. folic acid (FOLVITE) 1 mg tablet Take  by mouth daily. Calcium-Cholecalciferol, D3, 600 mg(1,500mg) -400 unit chew Take  by mouth. furosemide (LASIX) 40 mg tablet Take 20 mg by mouth daily. atorvastatin (Lipitor) 40 mg tablet Take 40 mg by mouth daily. chlorhexidine (PERIDEX) 0.12 % solution 15 mL by Swish and Spit route every twelve (12) hours. !! - Potential duplicate medications found. Please discuss with provider. STOP taking these medications       triamcinolone acetonide 0.025 % lotion Comments:   Reason for Stopping:                 Patient Follow Up Instructions:    Activity: PT/OT Eval and Treat  Diet: Diabetic Diet and easy to chew with low calorie high-protein nutritional supplements twice daily with meals  Wound Care: None needed    Follow-up as noted below  Follow-up tests/labs: CBC/CMP    Follow-up Information     Follow up With Specialties Details Why Contact Arsenio Leary MD Hematology and Oncology On 5/6/2022 at 2:15 P.M. [address]  [address] [facility] Girish Donovan   [facility]  [phone]    Viv Patton MD Internal Medicine In 3 days Plan for hospital follow-up with primary care provider within 3 days of discharge from [address] [place]      Dann Braun MD Hematology and Oncology  4/28/22 at 9:00 A. M. for procrit and rituxan (consent) [address]  [address]  [facility]  [phone]          ________________________________________________________________    Risk of deterioration: Moderate    Condition at Discharge:  Stable  __________________________________________________________________    Disposition  SNF/LTC    ____________________________________________________________________    Code Status: Full Code  ___________________________________________________________________      Total time in minutes spent coordinating this discharge (includes going over instructions, follow-up, prescriptions, and preparing report for sign off to her PCP) :  >30 minutes    Signed:  Micki Reed NP

## 2022-04-22 ENCOUNTER — APPOINTMENT (OUTPATIENT)
Dept: MRI IMAGING | Age: 67
DRG: 823 | End: 2022-04-22
Attending: NURSE PRACTITIONER
Payer: MEDICARE

## 2022-04-22 LAB
BASOPHILS # BLD: 0.1 K/UL (ref 0–0.1)
BASOPHILS NFR BLD: 1 % (ref 0–1)
DIFFERENTIAL METHOD BLD: ABNORMAL
EOSINOPHIL # BLD: 0.5 K/UL (ref 0–0.4)
EOSINOPHIL NFR BLD: 5 % (ref 0–7)
ERYTHROCYTE [DISTWIDTH] IN BLOOD BY AUTOMATED COUNT: 23.7 % (ref 11.5–14.5)
GLUCOSE BLD STRIP.AUTO-MCNC: 154 MG/DL (ref 65–117)
GLUCOSE BLD STRIP.AUTO-MCNC: 174 MG/DL (ref 65–117)
GLUCOSE BLD STRIP.AUTO-MCNC: 186 MG/DL (ref 65–117)
GLUCOSE BLD STRIP.AUTO-MCNC: 282 MG/DL (ref 65–117)
GLUCOSE BLD STRIP.AUTO-MCNC: 299 MG/DL (ref 65–117)
HCT VFR BLD AUTO: 22.1 % (ref 36.6–50.3)
HGB BLD-MCNC: 7 G/DL (ref 12.1–17)
IMM GRANULOCYTES # BLD AUTO: 0 K/UL (ref 0–0.04)
IMM GRANULOCYTES NFR BLD AUTO: 0 % (ref 0–0.5)
LYMPHOCYTES # BLD: 5.1 K/UL (ref 0.8–3.5)
LYMPHOCYTES NFR BLD: 54 % (ref 12–49)
MCH RBC QN AUTO: 34.1 PG (ref 26–34)
MCHC RBC AUTO-ENTMCNC: 31.7 G/DL (ref 30–36.5)
MCV RBC AUTO: 107.8 FL (ref 80–99)
MONOCYTES # BLD: 0.7 K/UL (ref 0–1)
MONOCYTES NFR BLD: 7 % (ref 5–13)
NEUTS SEG # BLD: 3.1 K/UL (ref 1.8–8)
NEUTS SEG NFR BLD: 33 % (ref 32–75)
NRBC # BLD: 0 K/UL (ref 0–0.01)
NRBC BLD-RTO: 0 PER 100 WBC
PLATELET # BLD AUTO: 94 K/UL (ref 150–400)
PMV BLD AUTO: 9.2 FL (ref 8.9–12.9)
RBC # BLD AUTO: 2.05 M/UL (ref 4.1–5.7)
RBC MORPH BLD: ABNORMAL
SERVICE CMNT-IMP: ABNORMAL
WBC # BLD AUTO: 9.5 K/UL (ref 4.1–11.1)
WBC MORPH BLD: ABNORMAL

## 2022-04-22 PROCEDURE — 82962 GLUCOSE BLOOD TEST: CPT

## 2022-04-22 PROCEDURE — 74011250636 HC RX REV CODE- 250/636: Performed by: INTERNAL MEDICINE

## 2022-04-22 PROCEDURE — A9576 INJ PROHANCE MULTIPACK: HCPCS | Performed by: INTERNAL MEDICINE

## 2022-04-22 PROCEDURE — 73720 MRI LWR EXTREMITY W/O&W/DYE: CPT

## 2022-04-22 PROCEDURE — 74011250637 HC RX REV CODE- 250/637: Performed by: INTERNAL MEDICINE

## 2022-04-22 PROCEDURE — 36415 COLL VENOUS BLD VENIPUNCTURE: CPT

## 2022-04-22 PROCEDURE — 74011636637 HC RX REV CODE- 636/637: Performed by: INTERNAL MEDICINE

## 2022-04-22 PROCEDURE — 85025 COMPLETE CBC W/AUTO DIFF WBC: CPT

## 2022-04-22 PROCEDURE — 74011000250 HC RX REV CODE- 250: Performed by: INTERNAL MEDICINE

## 2022-04-22 PROCEDURE — 74011636637 HC RX REV CODE- 636/637: Performed by: NURSE PRACTITIONER

## 2022-04-22 PROCEDURE — 65270000046 HC RM TELEMETRY

## 2022-04-22 RX ORDER — INSULIN GLARGINE 100 [IU]/ML
5 INJECTION, SOLUTION SUBCUTANEOUS DAILY
Status: DISCONTINUED | OUTPATIENT
Start: 2022-04-22 | End: 2022-04-24

## 2022-04-22 RX ADMIN — FOLIC ACID 1 MG: 1 TABLET ORAL at 09:00

## 2022-04-22 RX ADMIN — Medication: at 17:34

## 2022-04-22 RX ADMIN — SODIUM CHLORIDE, PRESERVATIVE FREE 10 ML: 5 INJECTION INTRAVENOUS at 21:17

## 2022-04-22 RX ADMIN — OXYCODONE AND ACETAMINOPHEN 0.5 TABLET: 5; 325 TABLET ORAL at 09:00

## 2022-04-22 RX ADMIN — OXYCODONE AND ACETAMINOPHEN 0.5 TABLET: 5; 325 TABLET ORAL at 21:08

## 2022-04-22 RX ADMIN — Medication: at 21:17

## 2022-04-22 RX ADMIN — FUROSEMIDE 20 MG: 20 TABLET ORAL at 15:34

## 2022-04-22 RX ADMIN — FUROSEMIDE 20 MG: 20 TABLET ORAL at 09:04

## 2022-04-22 RX ADMIN — DIVALPROEX SODIUM 1000 MG: 500 TABLET, DELAYED RELEASE ORAL at 21:09

## 2022-04-22 RX ADMIN — TOPIRAMATE 200 MG: 100 TABLET, FILM COATED ORAL at 08:59

## 2022-04-22 RX ADMIN — Medication 2 UNITS: at 09:05

## 2022-04-22 RX ADMIN — Medication 5 UNITS: at 13:11

## 2022-04-22 RX ADMIN — CYANOCOBALAMIN TAB 500 MCG 1000 MCG: 500 TAB at 09:00

## 2022-04-22 RX ADMIN — OXYCODONE AND ACETAMINOPHEN 0.5 TABLET: 5; 325 TABLET ORAL at 15:34

## 2022-04-22 RX ADMIN — ATORVASTATIN CALCIUM 40 MG: 40 TABLET, FILM COATED ORAL at 09:00

## 2022-04-22 RX ADMIN — DIVALPROEX SODIUM 500 MG: 500 TABLET, DELAYED RELEASE ORAL at 08:59

## 2022-04-22 RX ADMIN — Medication 2 UNITS: at 17:35

## 2022-04-22 RX ADMIN — PANTOPRAZOLE SODIUM 40 MG: 40 TABLET, DELAYED RELEASE ORAL at 09:00

## 2022-04-22 RX ADMIN — SODIUM CHLORIDE, PRESERVATIVE FREE 10 ML: 5 INJECTION INTRAVENOUS at 13:18

## 2022-04-22 RX ADMIN — GADOTERIDOL 18 ML: 279.3 INJECTION, SOLUTION INTRAVENOUS at 19:47

## 2022-04-22 RX ADMIN — Medication 3 UNITS: at 22:00

## 2022-04-22 RX ADMIN — Medication: at 13:14

## 2022-04-22 RX ADMIN — TOPIRAMATE 200 MG: 100 TABLET, FILM COATED ORAL at 17:34

## 2022-04-22 RX ADMIN — INSULIN GLARGINE 5 UNITS: 100 INJECTION, SOLUTION SUBCUTANEOUS at 13:12

## 2022-04-22 NOTE — PROGRESS NOTES
Physical Therapy    Attempted to see pt for PT services. Pt is having increased foot pain and is pending MRI. Will defer but continue to follow.     Chandra Shone, PT

## 2022-04-22 NOTE — PROGRESS NOTES
.End of Shift Note    Bedside shift change report given to Mickey Briseno (oncoming nurse) by Zaida Aguilar RN (offgoing nurse). Report included the following information SBAR, Intake/Output, MAR, Recent Results and Med Rec Status    Shift worked:  [id number]     Shift summary and any significant changes:     patient given prescribed medications per MAR. Patient awaiting MRI on left foot toe. MRI form already done and faxed. Family at bedside.      Concerns for physician to address:  none     Zone phone for oncoming shift:   [phone] Ef[address] Hinojosa, RN

## 2022-04-22 NOTE — PROGRESS NOTES
Hospitalist Progress Note    NAME: Misty Pierre   :  1955   MRN:  [id number]     HPI excerpted from admission H&P:  Shea Watson is a 73 y.o.   male with PMHx significant for epilepsy, intellectual disability, type 2 diabetes, seizure, presents to the ER for evaluation of abnormal labs. Patient was seen by his PCP yesterday with routine lab work performed then. He was notified today to come to the ER due to hemoglobin of 4.4. History is obtained by patient's mom at bedside. Patient had a colonoscopy in 2021 due to positive FOBT. He had multiple polyps removed. Biopsy showed adenoma. Partial colectomy has been recommended by his mom did not want for him to this. On arrival vitals are stable. Repeat hemoglobin was 4.2. He also has a leukocytosis wbc 15. Per patient's mom no obvious bright red blood per rectum or melena. \"    Assessment / Plan:  H/O colonic adenoma   ? Infectious colitis   Abdominal US 22:  1. Nonspecific hypoechoic lesion in the head of the pancreas measuring 1.4 cm in  size. 2.  Splenomegaly with length of 18.5 cm an estimated volume of 11 22 mL. 3.  Contracted gallbladder. 4.  Normal appearance of liver. CT abdomen/pelvis 22:  1. No definite pancreatic mass seen by this technique. 2.  Colitis diffuse with fat stranding in the peritoneal cavity. 3.  Splenomegaly. EGD 22:  Esophagus:  Normal.  Stomach:  Normal.  Duodenum:  Normal.  - GI input appreciated, now signed off.   - Patient completed empiric course of levofloxacin and metronidazole. MDS and splenic marginal zone lymphoma  Severe macrocytic anemia/Thrombocytopenia/lymphocytosis  Neutropenia  - Heme/Onc input appreciated. - Bone marrow bx completed 22.    - Hgb 4.2 on admission.  - Patient has received total of 5 units PRBCs so far this hospitalization.   - Patient received IV iron.      - H/H down to 6.8/22.1 on 22, 1 unit PRBCs transfused - repeat H/H 7.0/22.1 - - Continue to monitor blood count. - Continue cyanocobalamin 1000 mcg po daily. - Continue folic acid 1 mg po daily. - Continue epoetin 10,000 units sc 3 times weekly. - Port-a-Cath inserted 02/20/22. DM II  Hyperglycemia   - Continue to hold po diabetes meds. - Add basal (glargine) inulin 5 units sc daily. - Continue FSBS with SSI correction scale. Malnutiriioin  Anasarca, improved  LE venous dopplers  04/02/22:  · No evidence of acute deep vein thrombosis in the right common femoral, femoral, popliteal, posterior tibial, and peroneal veins. The veins were imaged in the transverse and longitudinal planes. The vessels showed normal color filling and compressibility. Doppler interrogation showed phasic and spontaneous flow. · No evidence of acute deep vein thrombosis in the left common femoral, femoral, popliteal, posterior tibial, and peroneal veins. The veins were imaged in the transverse and longitudinal planes. The vessels showed normal color filling and compressibility. Doppler interrogation showed phasic and spontaneous flow. Echo 04/04/22:  Arcostaa Nocona: Left ventricle is mildly dilated. Increased wall thickness. Normal wall motion. Low normal left ventricular systolic function with a visually estimated EF of 50 - 55%.   Left Atrium: Left atrium is mildly dilated.   Technical qualifiers: Echo study was technically difficult due to patient's heart rhythm. - Prealbumin 8.1  - Nutritional status likely contributing to edema. - Continue current diet and nutritional supplements. - Continue furosemide 20 mg po bid. Dyslipidemia   - Continue atorvastatin 40 mg po daily. Seizure disorder  - Continue divalproex  mg po daily and 1000 mg po daily at hs.  - Continue topiramate 200 mg po bid. GERD  - Continue pantoprazole 40 mg po daily. Psoriasis  - Continue ammonium lactate 12% top bid.     Acute left foot/5h toe pain  Left foot x-ray 04/12/22:  No acute abnormality.  - Patient reports severe left 5th toe pain, toe erythemic with necrotic area at tip. - Check uric acid. - Obtain MRI left foot. - Continue oxycodone/apap 5/325 mg 1/2 tab po tid (MME = 11.25). Intellectual disability   - Supportive care. Aspiration PNA vs HACP, resolved  Leucocytosis, resolved  Fever, resolved  CXR 04/06/22:  Small left pleural effusion. Hazy left basilar atelectasis/airspace disease. CXR 04/10/22:  Mild diffuse bilateral airspace disease, similar to the prior study.  - Patient completed abx course as noted above. - No further respiratory symptoms. 25.0 - 29.9 Overweight / Body mass index is 26.45 kg/m². Estimated discharge date: April 12  Barriers:    Code status: Full  Prophylaxis: SCD's  Recommended Disposition: SNF/LTC     Subjective:     Chief Complaint / Reason for Physician Visit  Patient continues of severe left 5th toe pain, screams out in pain when toe is touched. Plan of care and pertinent events reviewed with bedside nurse. Review of Systems:  Symptom Y/N Comments  Symptom Y/N Comments   Fever/Chills N   Chest Pain N    Poor Appetite Y   Edema Y    Cough N   Abdominal Pain N    Sputum N   Joint Pain N    SOB/GRULLON N   Pruritis/Rash N    Nausea/vomit N   Tolerating PT/OT     Diarrhea N   Tolerating Diet Y    Constipation    Other       Could NOT obtain due to:      Objective:     VITALS:   Last 24hrs VS reviewed since prior progress note.  Most recent are:  Patient Vitals for the past 24 hrs:   Temp Pulse Resp BP SpO2   04/22/22 0730 97.7 °F (36.5 °C) 71 18 (!) 114/46 96 %   04/21/22 2341 98.3 °F (36.8 °C) 70 18 (!) 114/54 98 %   04/21/22 2101 99.7 °F (37.6 °C) 66 18 (!) 126/55 100 %   04/21/22 1858 97.6 °F (36.4 °C) 74 18 (!) 114/59 98 %   04/21/22 1836 97.7 °F (36.5 °C) 77 18 120/60 98 %   04/21/22 1739 -- 78 -- (!) 114/55 --   04/21/22 1525 98.9 °F (37.2 °C) 76 19 103/60 93 %       Intake/Output Summary (Last 24 hours) at 4/22/2022 [place] filed at 4/21/2022 2111  Gross per 24 hour   Intake 478.8 ml   Output 600 ml   Net -121.2 ml        I had a face to face encounter and independently examined this patient on 4/22/2022, as outlined below:  PHYSICAL EXAM:  General:  A/A.  NAD. HEENT:  Normocephalic. Sclera anicteric. Mucous membranes moist.    Chest:  Resps even/unlabored with symmetrical CWE. Air entry diminished at bases. Lungs CTA. No use of accessory muscles. CV:  RRR. Normal S1/S2. No M/C/R appreciated. No JVD. Generalized edema which is imprved. Cap refill < 3 sec. Peripheral pulses 1+. GI:  Abdomen soft/NT/ND. ABT X 4.    :  Voiding. Neurologic:  Nonfocal.  Face symmetrical.  Speech normal.     Psych:  Cooperative. No anxiety or agitation. Skin:  Warm and color appropriate. No rashes or jaundice. Turgor elastic. Left 5th toe erythema with Tiny Area of Necrosis at just under toenail. TTP. Reviewed most current lab test results and cultures  YES  Reviewed most current radiology test results   YES  Review and summation of old records today    NO  Reviewed patient's current orders and MAR    YES  PMH/SH reviewed - no change compared to H&P  ________________________________________________________________________  Care Plan discussed with:    Comments   Patient [address]     Consultant                        Multidiciplinary team rounds were held today with , nursing, pharmacist and clinical coordinator. Patient's plan of care was discussed; medications were reviewed and discharge planning was addressed. ________________________________________________________________________  Re Lockhart NP     Procedures: see electronic medical records for all procedures/Xrays and details which were not copied into this note but were reviewed prior to creation of Plan. LABS:  I reviewed today's most current labs and imaging studies.   Pertinent labs include:  Recent Labs     04/22/22  0410 04/21/22  0453 04/19/22  1126   WBC 9.5 10.2 10.2   HGB 7.0* 6.8* 7.2*   HCT 22.1* 22.1* 23.7*   PLT 94* 101* 87*     Recent Labs     04/21/22  0453      K 4.5   *   CO2 25   *   BUN 27*   CREA 0.69*   CA 8.2*       Signed: Noah Barnard, NP

## 2022-04-22 NOTE — PROGRESS NOTES
MRI is PENDING. Waiting on family for screening form to be filled out--patient's RN contacting family. Please fax to [phone] when completed.  TY, MRI

## 2022-04-22 NOTE — PROGRESS NOTES
Transition of Care Plan:    RUR: 18%  Disposition: SNF placement-[facility] (auth approved), Level 2 completed  Follow up appointments: Follow up with PCP and/or Specialist   DME needed: will require at the facility  Transportation at Discharge:[facility] transport-CM will arrange   Keys or means to access home:   N/A    IM Medicare Letter: 2nd IM Medicare Letter to be given  Is patient a BCPI-A Bundle:  CM will provide if required    If yes, was Bundle Letter given?:    Is patient a [place] and connected with the [place]? N/A            If yes, was [place] transfer form completed and [facility] notified? Caregiver Contact:Melly Fang (mother) [phone]  Discharge Caregiver contacted prior to discharge? Family to be contacted-mother by bedside  Care Conference needed?:     Not at this time      CM received call from liaison at [facility], via telephone regarding pt's Bruna Mess. Pt is known to received approval for insurance auth on today. CM informed clinical team of the following. CM informed of concerns of redness on pt's toe. Pt will have MRI scheduled of left toe, to determine pt's medically stability.     Call report at [facility]; [phone], [address] [place] [address], [address] [place] [address]

## 2022-04-22 NOTE — PROGRESS NOTES
Attempted to see pt for OT services. Pt is having increased foot pain and is pending MRI. Will defer but continue to follow.

## 2022-04-22 NOTE — PROGRESS NOTES
Hematology Oncology Progress Note           Follow up for: MDS, splenic marginal zone lymphoma     Chart notes reviewed since last visit. Case discussed with following: patient. Caryn Solorzano    Patient complains of the following: left pinky toe is quite painful - yelped when I touched it inadvertently. Additional concerns noted by the staff: none    Patient Vitals for the past 24 hrs:   BP Temp Pulse Resp SpO2   04/22/22 0730 (!) 114/46 97.7 °F (36.5 °C) 71 18 96 %   04/21/22 2341 (!) 114/54 98.3 °F (36.8 °C) 70 18 98 %   04/21/22 2101 (!) 126/55 99.7 °F (37.6 °C) 66 18 100 %   04/21/22 1858 (!) 114/59 97.6 °F (36.4 °C) 74 18 98 %   04/21/22 1836 120/60 97.7 °F (36.5 °C) 77 18 98 %   04/21/22 1739 (!) 114/55 -- 78 -- --   04/21/22 1525 103/60 98.9 °F (37.2 °C) 76 19 93 %       Review of Systems: negative for 11 organ systems except as noted above. Physical Examination:  Constitutional Alert, cooperative. Mood and affect appropriate. Appears close to chronological age. Well nourished. Well developed. Head Normocephalic; no scars   Eyes Conjunctivae and sclerae are clear and without icterus. Pupils are round   ENMT Sinuses are nontender. No oral exudates, ulcers, masses, thrush or mucositis. Neck Supple without masses or thyromegaly. No jugular venous distension. Hematologic/Lymphatic No petechiae or purpura. No tender or palpable lymph nodes noted. Respiratory Lungs are clear to auscultation without rhonchi or wheezing. Cardiovascular Regular rate and rhythm of heart without murmurs, gallops or rubs. Chest / Line Site Chest is symmetric with no chest wall deformities. Abdomen Non-tender, non-distended, no masses, ascites or hepatosplenomegaly. Good bowel sounds. .   Musculoskeletal No tenderness or swelling, normal range of motion without obvious weakness. Extremities No visible deformities, no cyanosis, clubbing or edema. Chronic venous stasis changes.  Left 5th digit eryhtematous tender. Skin No rashes, scars, or lesions suggestive of malignancy. No petechiae, purpura, or ecchymoses. No excoriations. Neurologic No sensory or motor deficits noted but not specifically tested. Does appear that legs are weak, right more than left this AM.    Psychiatric Alert. Coherent speech. Verbalizes understanding of our discussions today.        Labs:  Recent Results (from the past 24 hour(s))   TYPE & SCREEN    Collection Time: 04/21/22 11:21 AM   Result Value Ref Range    Crossmatch Expiration 04/24/2022,2359     ABO/Rh(D) A POSITIVE     Antibody screen POS     Antibody ID ANTI-JK(B)     Unit number [id number]     Blood component type RC LR     Unit division 00     Status of unit ISSUED     ANTIGEN/ANTIBODY INFO Jk(B) NEGATIVE,     Crossmatch result Compatible    COVID-19 RAPID TEST    Collection Time: 04/21/22 11:29 AM   Result Value Ref Range    Specimen source Nasopharyngeal      COVID-19 rapid test Not detected NOTD     GLUCOSE, POC    Collection Time: 04/21/22 11:42 AM   Result Value Ref Range    Glucose (POC) 275 (H) 65 - 117 mg/dL    Performed by Gautam Sarkar PCT    GLUCOSE, POC    Collection Time: 04/21/22  5:01 PM   Result Value Ref Range    Glucose (POC) 205 (H) 65 - 117 mg/dL    Performed by [facility]    GLUCOSE, POC    Collection Time: 04/21/22  9:13 PM   Result Value Ref Range    Glucose (POC) 215 (H) 65 - 117 mg/dL    Performed by Matthew Rangel (PCT)    CBC WITH AUTOMATED DIFF    Collection Time: 04/22/22  4:10 AM   Result Value Ref Range    WBC 9.5 4.1 - 11.1 K/uL    RBC 2.05 (L) 4.10 - 5.70 M/uL    HGB 7.0 (L) 12.1 - 17.0 g/dL    HCT 22.1 (L) 36.6 - 50.3 %    .8 (H) 80.0 - 99.0 FL    MCH 34.1 (H) 26.0 - 34.0 PG    MCHC 31.7 30.0 - 36.5 g/dL    RDW 23.7 (H) 11.5 - 14.5 %    PLATELET 94 (L) 353 - 400 K/uL    MPV 9.2 8.9 - 12.9 FL    NRBC 0.0 0  WBC    ABSOLUTE NRBC 0.00 0.00 - 0.01 K/uL    NEUTROPHILS 33 32 - 75 %    LYMPHOCYTES 54 (H) 12 - 49 %    MONOCYTES 7 5 - 13 %    EOSINOPHILS 5 0 - 7 %    BASOPHILS 1 0 - 1 %    IMMATURE GRANULOCYTES 0 0.0 - 0.5 %    ABS. NEUTROPHILS 3.1 1.8 - 8.0 K/UL    ABS. LYMPHOCYTES 5.1 (H) 0.8 - 3.5 K/UL    ABS. MONOCYTES 0.7 0.0 - 1.0 K/UL    ABS. EOSINOPHILS 0.5 (H) 0.0 - 0.4 K/UL    ABS. BASOPHILS 0.1 0.0 - 0.1 K/UL    ABS. IMM. GRANS. 0.0 0.00 - 0.04 K/UL    DF MANUAL      RBC COMMENTS ANISOCYTOSIS  1+        WBC COMMENTS SMUDGE CELLS SEEN     GLUCOSE, POC    Collection Time: 04/22/22  7:03 AM   Result Value Ref Range    Glucose (POC) 154 (H) 65 - 117 mg/dL    Performed by Lesly Dates (PCT)    GLUCOSE, POC    Collection Time: 04/22/22  7:35 AM   Result Value Ref Range    Glucose (POC) 186 (H) 65 - 117 mg/dL    Performed by [facility]-   4/3/22 CT A/P:  IMPRESSION  . No definite pancreatic mass seen by this technique. 2. Colitis diffuse with fat stranding in the peritoneal cavity. 3. Splenomegaly. Assessment and Plan:    *) Severe macrocytic anemia, thrombocytopenia, lymphocytosis:  - MCV markedly elevated to 142 on admit with severe anemia(hgb 4.2). - Iron studies after transfusion not markedly elevated as would be expected. Given Venofer on 4/4 and 4/5.  - hgb in 6's 4/21 and transfused a unit of PRBCs this AM - this was his 5th I believe. - B12 on low end of normal, MMA wnl, so not B12 deficient. - Peripheral blood smear was concerning for lymphoproliferative disorder. 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 - SPEP 0.2 with IgG Kappa specificity and elevated serum free light chain ratio. Monoclonal gammopathies typically are seen with myeloma, low grade lymphoma, CLL, Waldenstrom's macroglobulinemia and amyloidosis.  In his particular case, it would seem to be associated with the low grade lymphoma seen in the BM (splenic MZL) with circulating lymphoma cells seen in the peripheral blood  - continue folic acid daily.   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 FISH panel negative for t(11:14), B(26;86), bcl6 rearrangement and MALT1 rearrangement. Cytogenetics normal male karyotype. - Dr. Lonni Severs and I both reviewed w/ his mother. Will continue to follow him and continue procrit for his MDS. He has a low grade lymphoma and could be treated with rituxan.  -retacrit here t, th, sat with plans to continue as outpt weekly in office.   - having portacath placed as inpatient to help expedite his getting treated in timely manner as his neutropenia is going to become problematic if his MDS worsens.   - Follow-up with Dr. Lonni Severs as outpt scheduled for 5/6/22 2:15pm. May move earlier if he goes to long term care and not SNF      *) neutropenia - his ANC dropped to 600 and is 3100 today. *) Colitis:  - CT scan showing diffuse colitis. completed IV flagyl 4/15/22  - EGD on 4/5 without acute findings. - GI  Was following had multiple polyps and GI recommended colectomy that was declined. *) Fever: (presumed aspiration pneumonia)  - fever resolved, treated with  Levaquin and IV flagyl    *) intellectual disability    *) DM    *) Seizure disorder  - on depakote, topamax    *) innumerable adenomatous polyps seen on colonoscopy in Dec 2021 - partial colectomy recommended. Pt's family declines. EGD 4/5 normal.     *) Lower extremity edema, anasarca  - receiving IV albumin and lasix periodically   - attributed to hypoalbuminemia    *) pain in toe - ? Gout. Discussed with Jeff Zuñiga NP who will evaluate and determine optimal tx.           Suraj Lopez MD [facility] office  [address]  [place], [facility] [address]  Phone [phone]  Fax [phone]

## 2022-04-23 LAB
ANION GAP SERPL CALC-SCNC: 5 MMOL/L (ref 5–15)
BASOPHILS # BLD: 0.1 K/UL (ref 0–0.1)
BASOPHILS NFR BLD: 1 % (ref 0–1)
BUN SERPL-MCNC: 18 MG/DL (ref 6–20)
BUN/CREAT SERPL: 32 (ref 12–20)
CALCIUM SERPL-MCNC: 8.1 MG/DL (ref 8.5–10.1)
CHLORIDE SERPL-SCNC: 111 MMOL/L (ref 97–108)
CO2 SERPL-SCNC: 26 MMOL/L (ref 21–32)
CREAT SERPL-MCNC: 0.56 MG/DL (ref 0.7–1.3)
DIFFERENTIAL METHOD BLD: ABNORMAL
EOSINOPHIL # BLD: 0.7 K/UL (ref 0–0.4)
EOSINOPHIL NFR BLD: 6 % (ref 0–7)
ERYTHROCYTE [DISTWIDTH] IN BLOOD BY AUTOMATED COUNT: 23.9 % (ref 11.5–14.5)
GLUCOSE BLD STRIP.AUTO-MCNC: 138 MG/DL (ref 65–117)
GLUCOSE BLD STRIP.AUTO-MCNC: 195 MG/DL (ref 65–117)
GLUCOSE BLD STRIP.AUTO-MCNC: 200 MG/DL (ref 65–117)
GLUCOSE BLD STRIP.AUTO-MCNC: 276 MG/DL (ref 65–117)
GLUCOSE BLD STRIP.AUTO-MCNC: 298 MG/DL (ref 65–117)
GLUCOSE SERPL-MCNC: 97 MG/DL (ref 65–100)
HCT VFR BLD AUTO: 23.6 % (ref 36.6–50.3)
HGB BLD-MCNC: 7.3 G/DL (ref 12.1–17)
IMM GRANULOCYTES # BLD AUTO: 0 K/UL (ref 0–0.04)
IMM GRANULOCYTES NFR BLD AUTO: 0 % (ref 0–0.5)
LYMPHOCYTES # BLD: 7.6 K/UL (ref 0.8–3.5)
LYMPHOCYTES NFR BLD: 68 % (ref 12–49)
MCH RBC QN AUTO: 34 PG (ref 26–34)
MCHC RBC AUTO-ENTMCNC: 30.9 G/DL (ref 30–36.5)
MCV RBC AUTO: 109.8 FL (ref 80–99)
MONOCYTES # BLD: 0.4 K/UL (ref 0–1)
MONOCYTES NFR BLD: 4 % (ref 5–13)
NEUTS BAND NFR BLD MANUAL: 1 %
NEUTS SEG # BLD: 2.2 K/UL (ref 1.8–8)
NEUTS SEG NFR BLD: 19 % (ref 32–75)
NRBC # BLD: 0 K/UL (ref 0–0.01)
NRBC BLD-RTO: 0 PER 100 WBC
PLATELET # BLD AUTO: 98 K/UL (ref 150–400)
PMV BLD AUTO: 9.2 FL (ref 8.9–12.9)
POTASSIUM SERPL-SCNC: 4.2 MMOL/L (ref 3.5–5.1)
PROMYELOCYTES NFR BLD MANUAL: 1 %
RBC # BLD AUTO: 2.15 M/UL (ref 4.1–5.7)
RBC MORPH BLD: ABNORMAL
RBC MORPH BLD: ABNORMAL
SERVICE CMNT-IMP: ABNORMAL
SODIUM SERPL-SCNC: 142 MMOL/L (ref 136–145)
URATE SERPL-MCNC: 5.3 MG/DL (ref 3.5–7.2)
WBC # BLD AUTO: 11.2 K/UL (ref 4.1–11.1)
WBC MORPH BLD: ABNORMAL

## 2022-04-23 PROCEDURE — 84550 ASSAY OF BLOOD/URIC ACID: CPT

## 2022-04-23 PROCEDURE — 74011250637 HC RX REV CODE- 250/637: Performed by: INTERNAL MEDICINE

## 2022-04-23 PROCEDURE — 74011636637 HC RX REV CODE- 636/637: Performed by: INTERNAL MEDICINE

## 2022-04-23 PROCEDURE — 74011250636 HC RX REV CODE- 250/636: Performed by: INTERNAL MEDICINE

## 2022-04-23 PROCEDURE — 65270000046 HC RM TELEMETRY

## 2022-04-23 PROCEDURE — 82962 GLUCOSE BLOOD TEST: CPT

## 2022-04-23 PROCEDURE — 80048 BASIC METABOLIC PNL TOTAL CA: CPT

## 2022-04-23 PROCEDURE — 85025 COMPLETE CBC W/AUTO DIFF WBC: CPT

## 2022-04-23 PROCEDURE — 74011000250 HC RX REV CODE- 250: Performed by: INTERNAL MEDICINE

## 2022-04-23 PROCEDURE — 74011636637 HC RX REV CODE- 636/637: Performed by: NURSE PRACTITIONER

## 2022-04-23 PROCEDURE — 36415 COLL VENOUS BLD VENIPUNCTURE: CPT

## 2022-04-23 RX ADMIN — INSULIN GLARGINE 5 UNITS: 100 INJECTION, SOLUTION SUBCUTANEOUS at 09:26

## 2022-04-23 RX ADMIN — Medication 3 UNITS: at 13:00

## 2022-04-23 RX ADMIN — Medication 3 UNITS: at 23:08

## 2022-04-23 RX ADMIN — Medication: at 17:47

## 2022-04-23 RX ADMIN — ATORVASTATIN CALCIUM 40 MG: 40 TABLET, FILM COATED ORAL at 09:25

## 2022-04-23 RX ADMIN — TOPIRAMATE 200 MG: 100 TABLET, FILM COATED ORAL at 09:25

## 2022-04-23 RX ADMIN — DIVALPROEX SODIUM 500 MG: 500 TABLET, DELAYED RELEASE ORAL at 09:26

## 2022-04-23 RX ADMIN — Medication: at 09:28

## 2022-04-23 RX ADMIN — OXYCODONE AND ACETAMINOPHEN 0.5 TABLET: 5; 325 TABLET ORAL at 09:25

## 2022-04-23 RX ADMIN — TOPIRAMATE 200 MG: 100 TABLET, FILM COATED ORAL at 17:38

## 2022-04-23 RX ADMIN — Medication: at 09:27

## 2022-04-23 RX ADMIN — PANTOPRAZOLE SODIUM 40 MG: 40 TABLET, DELAYED RELEASE ORAL at 09:25

## 2022-04-23 RX ADMIN — Medication 2 UNITS: at 17:46

## 2022-04-23 RX ADMIN — SODIUM CHLORIDE, PRESERVATIVE FREE 10 ML: 5 INJECTION INTRAVENOUS at 13:00

## 2022-04-23 RX ADMIN — OXYCODONE AND ACETAMINOPHEN 0.5 TABLET: 5; 325 TABLET ORAL at 23:00

## 2022-04-23 RX ADMIN — DIVALPROEX SODIUM 1000 MG: 500 TABLET, DELAYED RELEASE ORAL at 21:05

## 2022-04-23 RX ADMIN — Medication: at 22:58

## 2022-04-23 RX ADMIN — CYANOCOBALAMIN TAB 500 MCG 1000 MCG: 500 TAB at 09:25

## 2022-04-23 RX ADMIN — FOLIC ACID 1 MG: 1 TABLET ORAL at 09:26

## 2022-04-23 RX ADMIN — SODIUM CHLORIDE, PRESERVATIVE FREE 10 ML: 5 INJECTION INTRAVENOUS at 23:00

## 2022-04-23 RX ADMIN — OXYCODONE AND ACETAMINOPHEN 0.5 TABLET: 5; 325 TABLET ORAL at 17:38

## 2022-04-23 RX ADMIN — EPOETIN ALFA-EPBX 10000 UNITS: 10000 INJECTION, SOLUTION INTRAVENOUS; SUBCUTANEOUS at 22:58

## 2022-04-23 RX ADMIN — FUROSEMIDE 20 MG: 20 TABLET ORAL at 17:38

## 2022-04-23 NOTE — PROGRESS NOTES
End of Shift Note    Bedside shift change report given to Narendra Anthony RN (oncoming nurse) by Gregory Lantigua RN (offgoing nurse). Report included the following information SBAR, Kardex and MAR    Shift worked:  7a - 7p     Shift summary and any significant changes:     -Pt pleasant, asked multiple times to call his mom, which we did   -Pt's mom present at bedside     Concerns for physician to address:       Zone phone for oncoming shift:   [phone]       Activity:  Activity Level: Bed Rest  Number times ambulated in hallways past shift: 0  Number of times OOB to chair past shift: 0    Cardiac:   Cardiac Monitoring: No      Cardiac Rhythm: Sinus Rhythm    Access:   Current line(s): PIV     Genitourinary:   Urinary status: voiding and incontinent    Respiratory:   O2 Device: None (Room air)  Chronic home O2 use?: NO  Incentive spirometer at bedside: NO       GI:  Last Bowel Movement Date: 04/22/22  Current diet:  ADULT ORAL NUTRITION SUPPLEMENT Breakfast, Dinner; Low Calorie/High Protein  ADULT DIET Easy to Chew; 3 carb choices (45 gm/meal)  Passing flatus: YES  Tolerating current diet: YES       Pain Management:   Patient states pain is manageable on current regimen: YES    Skin:  Troy Score: 13  Interventions: turn team, float heels, increase time out of bed, PT/OT consult, limit briefs, internal/external urinary devices and nutritional support     Patient Safety:  Fall Score:  Total Score: 4  Interventions: bed/chair alarm, gripper socks, pt to call before getting OOB, stay with me (per policy) and gait belt  High Fall Risk: Yes    Length of Stay:  Expected LOS: 2d 16h  Actual LOS: 21      Gregory Lantigua RN

## 2022-04-23 NOTE — PROGRESS NOTES
Transition of Care Plan:     RUR: 18%  Disposition: SNF placement-[facility] Nurse (auth approved), Level 2 completed    Number to call report: [phone]   STEPHANI . 10:51 AM  -CM talked to NP and he indicated that he is stable. CM called SNF: [facility]: [phone] and had to leave a  to see if Pt could come over the weekend or if Pt would need to come on Monday? Follow up appointments: Follow up with PCP and/or Specialist   DME needed: will require at the facility  Transportation at Discharge:[facility] transport-CM will arrange   Keys or means to access home:   N/A    IM Medicare Letter: 2nd  Medicare Letter to be given  Is patient a BCPI-A Bundle:  CM will provide if required               If yes, was Bundle Letter given?:    Is patient a  and connected with the [place]? N/A            If yes, was [employer] transfer form completed and [facility] notified? Caregiver Contact:Melly Barnes (mother) [phone]  Discharge Caregiver contacted prior to discharge?  Family to be contacted-mother by bedside  Care Conference needed?:     Not at this time

## 2022-04-23 NOTE — PROGRESS NOTES
Problem: Falls - Risk of  Goal: *Absence of Falls  Description: Document Marciana Distance Fall Risk and appropriate interventions in the flowsheet. Outcome: Progressing Towards Goal  Note: Fall Risk Interventions:  Mobility Interventions: Bed/chair exit alarm    Mentation Interventions: Bed/chair exit alarm    Medication Interventions: Bed/chair exit alarm    Elimination Interventions: Call light in reach    History of Falls Interventions:  Investigate reason for fall

## 2022-04-23 NOTE — PROGRESS NOTES
Hospitalist Progress Note    NAME: Dayanara Hernadez   :  1955   MRN:  [id number]     HPI excerpted from admission H&P:  Marla Barragan is a 73 y.o.   male with PMHx significant for epilepsy, intellectual disability, type 2 diabetes, seizure, presents to the ER for evaluation of abnormal labs. Patient was seen by his PCP yesterday with routine lab work performed then. He was notified today to come to the ER due to hemoglobin of 4.4. History is obtained by patient's mom at bedside. Patient had a colonoscopy in 2021 due to positive FOBT. He had multiple polyps removed. Biopsy showed adenoma. Partial colectomy has been recommended by his mom did not want for him to this. On arrival vitals are stable. Repeat hemoglobin was 4.2. He also has a leukocytosis wbc 15. Per patient's mom no obvious bright red blood per rectum or melena. \"    Assessment / Plan:  H/O colonic adenoma   ? Infectious colitis   Abdominal US 22:  1. Nonspecific hypoechoic lesion in the head of the pancreas measuring 1.4 cm in  size. 2.  Splenomegaly with length of 18.5 cm an estimated volume of 11 22 mL. 3.  Contracted gallbladder. 4.  Normal appearance of liver. CT abdomen/pelvis 22:  1. No definite pancreatic mass seen by this technique. 2.  Colitis diffuse with fat stranding in the peritoneal cavity. 3.  Splenomegaly. EGD 22:  Esophagus:  Normal.  Stomach:  Normal.  Duodenum:  Normal.  - GI input appreciated, now signed off.   - Patient completed course of levofloxacin and metronidazole. MDS and splenic marginal zone lymphoma  Severe macrocytic anemia/Thrombocytopenia/lymphocytosis  Neutropenia  - Heme/Onc input appreciated. - Bone marrow bx completed 22.    - Hgb 4.2 on admission, 7.3 today. - Patient has received total of 5 units PRBCs so far this hospitalization.   - Patient received IV iron. - Continue cyanocobalamin 1000 mcg po daily.    - Continue folic acid 1 mg po daily.    - Continue epoetin 10,000 units sc 3 times weekly. - Port-a-Cath inserted 02/20/22. DM II  Hyperglycemia   - Continue to hold po diabetes meds. - Continue basal (glargine) inulin 5 units sc daily. - Continue FSBS with SSI correction scale. Malnutiriioin  Anasarca, improved  LE venous dopplers  04/02/22:  · No evidence of acute deep vein thrombosis in the right common femoral, femoral, popliteal, posterior tibial, and peroneal veins. The veins were imaged in the transverse and longitudinal planes. The vessels showed normal color filling and compressibility. Doppler interrogation showed phasic and spontaneous flow. · No evidence of acute deep vein thrombosis in the left common femoral, femoral, popliteal, posterior tibial, and peroneal veins. The veins were imaged in the transverse and longitudinal planes. The vessels showed normal color filling and compressibility. Doppler interrogation showed phasic and spontaneous flow. Echo 04/04/22:  Dolph Twila: Left ventricle is mildly dilated. Increased wall thickness. Normal wall motion. Low normal left ventricular systolic function with a visually estimated EF of 50 - 55%.   Left Atrium: Left atrium is mildly dilated.   Technical qualifiers: Echo study was technically difficult due to patient's heart rhythm. - Nutritional status likely contributing to edema. - Continue current diet and nutritional supplements. - Continue furosemide 20 mg po bid. Dyslipidemia   - Continue atorvastatin 40 mg po daily. Seizure disorder  - Continue divalproex  mg po daily and 1000 mg po daily at hs.  - Continue topiramate 200 mg po bid. GERD  - Continue pantoprazole 40 mg po daily. Psoriasis  - Continue ammonium lactate 12% top bid. Left foot pain  Left foot x-ray 04/12/22:  No acute abnormality. MRI 04/22/22:  Diffuse subcutaneous edema surrounding the foot. No evidence of a focal drainable fluid collection.   No evidence of osteomyelitis. - Patient reports ongoing pain, left foot. - Uric acid 5.3  - Continue oxycodone/apap 5/325 mg 1/2 tab po tid (MME = 11.25). Intellectual disability   - Supportive care. Aspiration PNA vs HACP, resolved  Leucocytosis, resolved  Fever, resolved  CXR 04/06/22:  Small left pleural effusion. Hazy left basilar atelectasis/airspace disease. CXR 04/10/22:  Mild diffuse bilateral airspace disease, similar to the prior study.  - Patient completed abx course as noted above. - No further respiratory symptoms. 25.0 - 29.9 Overweight / Body mass index is 26.45 kg/m². Estimated discharge date: April 12  Barriers:    Code status: Full  Prophylaxis: SCD's  Recommended Disposition: SNF/LTC     Subjective:     Chief Complaint / Reason for Physician Visit  Patient continues to report left foot pain. Plan of care and pertinent events reviewed with bedside nurse. Review of Systems:  Symptom Y/N Comments  Symptom Y/N Comments   Fever/Chills N   Chest Pain N    Poor Appetite N   Edema Y    Cough N   Abdominal Pain N    Sputum N   Joint Pain N    SOB/GRULLON N   Pruritis/Rash N    Nausea/vomit N   Tolerating PT/OT     Diarrhea N   Tolerating Diet Y    Constipation    Other       Could NOT obtain due to:      Objective:     VITALS:   Last 24hrs VS reviewed since prior progress note. Most recent are:  Patient Vitals for the past 24 hrs:   Temp Pulse Resp BP SpO2   04/23/22 0736 97.4 °F (36.3 °C) 73 17 103/60 93 %   04/23/22 0021 98.4 °F (36.9 °C) 66 -- 113/60 97 %   04/22/22 2058 98.1 °F (36.7 °C) 80 -- (!) 101/57 97 %   04/22/22 1552 97.8 °F (36.6 °C) 90 18 (!) 101/51 96 %       Intake/Output Summary (Last 24 hours) at 4/23/2022 1145  Last data filed at 4/23/2022 1035  Gross per 24 hour   Intake --   Output 150 ml   Net -150 ml        I had a face to face encounter and independently examined this patient on 4/23/2022, as outlined below:  PHYSICAL EXAM:  General:  A/A.  NAD.       HEENT: Normocephalic. Sclera anicteric. Mucous membranes moist.    Chest:  Resps even/unlabored with symmetrical CWE. Air entry diminished at bases. Lungs CTA. No use of accessory muscles. CV:  RRR. Normal S1/S2. No M/C/R appreciated. No JVD. Generalized edema which is imprved. Cap refill < 3 sec. Peripheral pulses 1+. GI:  Abdomen soft/NT/ND. ABT X 4.    :  Voiding. Neurologic:  Nonfocal.  Face symmetrical.  Speech normal.     Psych:  Cooperative. No anxiety or agitation. Skin:  Warm and color appropriate. No rashes or jaundice. Turgor elastic. Left 5th toe with mild erythema and Tiny Area of Necrosis at just under toenail. TTP. Reviewed most current lab test results and cultures  YES  Reviewed most current radiology test results   YES  Review and summation of old records today    NO  Reviewed patient's current orders and MAR    YES  PMH/SH reviewed - no change compared to H&P  ________________________________________________________________________  Care Plan discussed with:    Comments   Patient [address] [place]     Consultant                        Multidiciplinary team rounds were held today with , nursing, pharmacist and clinical coordinator. Patient's plan of care was discussed; medications were reviewed and discharge planning was addressed. ________________________________________________________________________  Micki Reed NP     Procedures: see electronic medical records for all procedures/Xrays and details which were not copied into this note but were reviewed prior to creation of Plan. LABS:  I reviewed today's most current labs and imaging studies.   Pertinent labs include:  Recent Labs     04/23/22  0405 04/22/22  0410 04/21/22  0453   WBC 11.2* 9.5 10.2   HGB 7.3* 7.0* 6.8*   HCT 23.6* 22.1* 22.1*   PLT 98* 94* 101*     Recent Labs     04/23/22  0405 04/21/22  0453    141   K 4.2 4.5   * 111*   CO2 26 25   GLU 97 125*   BUN 18 27*   CREA 0.56* 0.69*   CA 8.1* 8.2*       Signed: Phuong Reinoso NP

## 2022-04-24 LAB
BASOPHILS # BLD: 0 K/UL (ref 0–0.1)
BASOPHILS NFR BLD: 0 % (ref 0–1)
DIFFERENTIAL METHOD BLD: ABNORMAL
EOSINOPHIL # BLD: 0.1 K/UL (ref 0–0.4)
EOSINOPHIL NFR BLD: 1 % (ref 0–7)
ERYTHROCYTE [DISTWIDTH] IN BLOOD BY AUTOMATED COUNT: 23.6 % (ref 11.5–14.5)
GLUCOSE BLD STRIP.AUTO-MCNC: 141 MG/DL (ref 65–117)
GLUCOSE BLD STRIP.AUTO-MCNC: 191 MG/DL (ref 65–117)
GLUCOSE BLD STRIP.AUTO-MCNC: 256 MG/DL (ref 65–117)
GLUCOSE BLD STRIP.AUTO-MCNC: 264 MG/DL (ref 65–117)
HCT VFR BLD AUTO: 22 % (ref 36.6–50.3)
HGB BLD-MCNC: 6.9 G/DL (ref 12.1–17)
HISTORY CHECKED?,CKHIST: NORMAL
IMM GRANULOCYTES # BLD AUTO: 0 K/UL (ref 0–0.04)
IMM GRANULOCYTES NFR BLD AUTO: 0 % (ref 0–0.5)
LYMPHOCYTES # BLD: 6.4 K/UL (ref 0.8–3.5)
LYMPHOCYTES NFR BLD: 65 % (ref 12–49)
MCH RBC QN AUTO: 34.8 PG (ref 26–34)
MCHC RBC AUTO-ENTMCNC: 31.4 G/DL (ref 30–36.5)
MCV RBC AUTO: 111.1 FL (ref 80–99)
MONOCYTES # BLD: 0.7 K/UL (ref 0–1)
MONOCYTES NFR BLD: 7 % (ref 5–13)
NEUTS SEG # BLD: 2.7 K/UL (ref 1.8–8)
NEUTS SEG NFR BLD: 27 % (ref 32–75)
NRBC # BLD: 0 K/UL (ref 0–0.01)
NRBC BLD-RTO: 0 PER 100 WBC
PLATELET # BLD AUTO: 69 K/UL (ref 150–400)
PMV BLD AUTO: 9.4 FL (ref 8.9–12.9)
RBC # BLD AUTO: 1.98 M/UL (ref 4.1–5.7)
RBC MORPH BLD: ABNORMAL
RBC MORPH BLD: ABNORMAL
SERVICE CMNT-IMP: ABNORMAL
WBC # BLD AUTO: 9.9 K/UL (ref 4.1–11.1)
WBC MORPH BLD: ABNORMAL

## 2022-04-24 PROCEDURE — 74011250637 HC RX REV CODE- 250/637: Performed by: INTERNAL MEDICINE

## 2022-04-24 PROCEDURE — P9016 RBC LEUKOCYTES REDUCED: HCPCS

## 2022-04-24 PROCEDURE — 82962 GLUCOSE BLOOD TEST: CPT

## 2022-04-24 PROCEDURE — 74011636637 HC RX REV CODE- 636/637: Performed by: NURSE PRACTITIONER

## 2022-04-24 PROCEDURE — 86902 BLOOD TYPE ANTIGEN DONOR EA: CPT

## 2022-04-24 PROCEDURE — 85025 COMPLETE CBC W/AUTO DIFF WBC: CPT

## 2022-04-24 PROCEDURE — 74011000250 HC RX REV CODE- 250: Performed by: INTERNAL MEDICINE

## 2022-04-24 PROCEDURE — 36415 COLL VENOUS BLD VENIPUNCTURE: CPT

## 2022-04-24 PROCEDURE — 74011250637 HC RX REV CODE- 250/637: Performed by: NURSE PRACTITIONER

## 2022-04-24 PROCEDURE — 74011636637 HC RX REV CODE- 636/637: Performed by: INTERNAL MEDICINE

## 2022-04-24 PROCEDURE — 86922 COMPATIBILITY TEST ANTIGLOB: CPT

## 2022-04-24 PROCEDURE — 77030027138 HC INCENT SPIROMETER -A

## 2022-04-24 PROCEDURE — 86921 COMPATIBILITY TEST INCUBATE: CPT

## 2022-04-24 PROCEDURE — 65270000046 HC RM TELEMETRY

## 2022-04-24 PROCEDURE — 36430 TRANSFUSION BLD/BLD COMPNT: CPT

## 2022-04-24 RX ORDER — INSULIN GLARGINE 100 [IU]/ML
8 INJECTION, SOLUTION SUBCUTANEOUS DAILY
Status: DISCONTINUED | OUTPATIENT
Start: 2022-04-25 | End: 2022-04-25 | Stop reason: HOSPADM

## 2022-04-24 RX ORDER — INSULIN GLARGINE 100 [IU]/ML
3 INJECTION, SOLUTION SUBCUTANEOUS
Status: COMPLETED | OUTPATIENT
Start: 2022-04-24 | End: 2022-04-24

## 2022-04-24 RX ORDER — SODIUM CHLORIDE 9 MG/ML
250 INJECTION, SOLUTION INTRAVENOUS AS NEEDED
Status: DISCONTINUED | OUTPATIENT
Start: 2022-04-24 | End: 2022-04-25 | Stop reason: HOSPADM

## 2022-04-24 RX ADMIN — PANTOPRAZOLE SODIUM 40 MG: 40 TABLET, DELAYED RELEASE ORAL at 09:05

## 2022-04-24 RX ADMIN — Medication 1 LOZENGE: at 22:38

## 2022-04-24 RX ADMIN — Medication 5 UNITS: at 14:23

## 2022-04-24 RX ADMIN — CYANOCOBALAMIN TAB 500 MCG 1000 MCG: 500 TAB at 09:05

## 2022-04-24 RX ADMIN — OXYCODONE AND ACETAMINOPHEN 0.5 TABLET: 5; 325 TABLET ORAL at 22:01

## 2022-04-24 RX ADMIN — Medication: at 18:26

## 2022-04-24 RX ADMIN — DIVALPROEX SODIUM 500 MG: 500 TABLET, DELAYED RELEASE ORAL at 09:04

## 2022-04-24 RX ADMIN — INSULIN GLARGINE 3 UNITS: 100 INJECTION, SOLUTION SUBCUTANEOUS at 14:23

## 2022-04-24 RX ADMIN — FOLIC ACID 1 MG: 1 TABLET ORAL at 09:05

## 2022-04-24 RX ADMIN — SODIUM CHLORIDE, PRESERVATIVE FREE 10 ML: 5 INJECTION INTRAVENOUS at 14:24

## 2022-04-24 RX ADMIN — OXYCODONE AND ACETAMINOPHEN 0.5 TABLET: 5; 325 TABLET ORAL at 09:04

## 2022-04-24 RX ADMIN — Medication 2 UNITS: at 09:03

## 2022-04-24 RX ADMIN — TOPIRAMATE 200 MG: 100 TABLET, FILM COATED ORAL at 09:05

## 2022-04-24 RX ADMIN — SODIUM CHLORIDE, PRESERVATIVE FREE 10 ML: 5 INJECTION INTRAVENOUS at 22:06

## 2022-04-24 RX ADMIN — OXYCODONE AND ACETAMINOPHEN 0.5 TABLET: 5; 325 TABLET ORAL at 18:22

## 2022-04-24 RX ADMIN — GUAIFENESIN 200 MG: 200 SOLUTION ORAL at 01:05

## 2022-04-24 RX ADMIN — Medication 5 UNITS: at 18:23

## 2022-04-24 RX ADMIN — ATORVASTATIN CALCIUM 40 MG: 40 TABLET, FILM COATED ORAL at 09:05

## 2022-04-24 RX ADMIN — INSULIN GLARGINE 5 UNITS: 100 INJECTION, SOLUTION SUBCUTANEOUS at 09:04

## 2022-04-24 RX ADMIN — DIVALPROEX SODIUM 1000 MG: 500 TABLET, DELAYED RELEASE ORAL at 22:01

## 2022-04-24 RX ADMIN — Medication: at 22:08

## 2022-04-24 RX ADMIN — Medication 1 LOZENGE: at 01:30

## 2022-04-24 RX ADMIN — SODIUM CHLORIDE, PRESERVATIVE FREE 20 ML: 5 INJECTION INTRAVENOUS at 06:03

## 2022-04-24 RX ADMIN — Medication: at 09:09

## 2022-04-24 RX ADMIN — TOPIRAMATE 200 MG: 100 TABLET, FILM COATED ORAL at 18:23

## 2022-04-24 NOTE — PROGRESS NOTES
Hospitalist Progress Note    NAME: Hernan Batista   :  1955   MRN:  [id number]     HPI excerpted from admission H&P:  Elvis Rae is a 73 y.o.   male with PMHx significant for epilepsy, intellectual disability, type 2 diabetes, seizure, presents to the ER for evaluation of abnormal labs. Patient was seen by his PCP yesterday with routine lab work performed then. He was notified today to come to the ER due to hemoglobin of 4.4. History is obtained by patient's mom at bedside. Patient had a colonoscopy in 2021 due to positive FOBT. He had multiple polyps removed. Biopsy showed adenoma. Partial colectomy has been recommended by his mom did not want for him to this. On arrival vitals are stable. Repeat hemoglobin was 4.2. He also has a leukocytosis wbc 15. Per patient's mom no obvious bright red blood per rectum or melena. \"    Assessment / Plan:  H/O colonic adenoma   ? Infectious colitis   Abdominal US 22:  1. Nonspecific hypoechoic lesion in the head of the pancreas measuring 1.4 cm in  size. 2.  Splenomegaly with length of 18.5 cm an estimated volume of 11 22 mL. 3.  Contracted gallbladder. 4.  Normal appearance of liver. CT abdomen/pelvis 22:  1. No definite pancreatic mass seen by this technique. 2.  Colitis diffuse with fat stranding in the peritoneal cavity. 3.  Splenomegaly. EGD 22:  Esophagus:  Normal.  Stomach:  Normal.  Duodenum:  Normal.  - GI input appreciated, now signed off.   - Patient completed course of levofloxacin and metronidazole. MDS and splenic marginal zone lymphoma  Severe macrocytic anemia/Thrombocytopenia/lymphocytosis  Neutropenia  - Heme/Onc input appreciated. - Bone marrow bx completed 22.    - Hgb 4.2 on admission.  - Patient has received total of 5 units PRBCs so far this hospitalization and Hgb again below 7.0, transfuse 1 unit PRBCs today. - Patient received IV iron.      - Continue cyanocobalamin 1000 mcg po daily. - Continue folic acid 1 mg po daily. - Continue epoetin 10,000 units sc 3 times weekly. - Port-a-Cath inserted 02/20/22. DM II  Hyperglycemia   - Continue to hold po diabetes meds. - Increase basal (glargine) inulin to 8 units sc daily. - Continue FSBS with SSI correction scale. Malnutiriioin  Anasarca, improved  LE venous dopplers  04/02/22:  · No evidence of acute deep vein thrombosis in the right common femoral, femoral, popliteal, posterior tibial, and peroneal veins. The veins were imaged in the transverse and longitudinal planes. The vessels showed normal color filling and compressibility. Doppler interrogation showed phasic and spontaneous flow. · No evidence of acute deep vein thrombosis in the left common femoral, femoral, popliteal, posterior tibial, and peroneal veins. The veins were imaged in the transverse and longitudinal planes. The vessels showed normal color filling and compressibility. Doppler interrogation showed phasic and spontaneous flow. Echo 04/04/22:  Mayda Josephumann: Left ventricle is mildly dilated. Increased wall thickness. Normal wall motion. Low normal left ventricular systolic function with a visually estimated EF of 50 - 55%.   Left Atrium: Left atrium is mildly dilated.   Technical qualifiers: Echo study was technically difficult due to patient's heart rhythm. - Nutritional status likely contributing to edema. - Continue current diet and nutritional supplements. - Continue furosemide 20 mg po bid. Dyslipidemia   - Continue atorvastatin 40 mg po daily. Seizure disorder  - Continue divalproex  mg po daily and 1000 mg po daily at hs.  - Continue topiramate 200 mg po bid. GERD  - Continue pantoprazole 40 mg po daily. Psoriasis  - Continue ammonium lactate 12% top bid. Left foot pain  Left foot x-ray 04/12/22:  No acute abnormality. MRI 04/22/22:  Diffuse subcutaneous edema surrounding the foot.   No evidence of a focal drainable fluid collection. No evidence of osteomyelitis. - Patient reports ongoing pain, left foot. - Uric acid 5.3  - Continue oxycodone/apap 5/325 mg 1/2 tab po tid (MME = 11.25). Intellectual disability   - Supportive care. Aspiration PNA vs HACP, resolved  Leucocytosis, resolved  Fever, resolved  CXR 04/06/22:  Small left pleural effusion. Hazy left basilar atelectasis/airspace disease. CXR 04/10/22:  Mild diffuse bilateral airspace disease, similar to the prior study.  - Patient completed abx course as noted above. - No further respiratory symptoms. 25.0 - 29.9 Overweight / Body mass index is 26.45 kg/m². Estimated discharge date: April 12  Barriers:    Code status: Full  Prophylaxis: SCD's  Recommended Disposition: SNF/LTC     Subjective:     Chief Complaint / Reason for Physician Visit  Patient reports that left foot pain is \"better\" today. Plan of care and pertinent events reviewed with bedside nurse. Review of Systems:  Symptom Y/N Comments  Symptom Y/N Comments   Fever/Chills N   Chest Pain N    Poor Appetite N   Edema Y    Cough N   Abdominal Pain N    Sputum N   Joint Pain N    SOB/GRULLON N   Pruritis/Rash N    Nausea/vomit N   Tolerating PT/OT     Diarrhea N   Tolerating Diet Y    Constipation    Other       Could NOT obtain due to:      Objective:     VITALS:   Last 24hrs VS reviewed since prior progress note.  Most recent are:  Patient Vitals for the past 24 hrs:   Temp Pulse Resp BP SpO2   04/24/22 0745 98.7 °F (37.1 °C) 73 17 (!) 105/54 93 %   04/23/22 2337 97.9 °F (36.6 °C) 73 18 (!) 123/55 99 %   04/23/22 1925 98.6 °F (37 °C) 75 18 (!) 115/55 96 %   04/23/22 1511 98.3 °F (36.8 °C) 78 18 125/65 98 %       Intake/Output Summary (Last 24 hours) at 4/24/2022 1120  Last data filed at 4/24/2022 1106  Gross per 24 hour   Intake 1050 ml   Output 455 ml   Net 595 ml        I had a face to face encounter and independently examined this patient on 4/24/2022, as outlined below:  PHYSICAL EXAM:  General:  A/A.  NAD. HEENT:  Normocephalic. Sclera anicteric. Mucous membranes moist.    Chest:  Resps even/unlabored with symmetrical CWE. Air entry diminished at bases. Lungs CTA. No use of accessory muscles. CV:  RRR. Normal S1/S2. No M/C/R appreciated. No JVD. Generalized edema which is imprved. Cap refill < 3 sec. Peripheral pulses 1+. GI:  Abdomen soft/NT/ND. ABT X 4.    :  Voiding. Neurologic:  Nonfocal.  Face symmetrical.  Speech normal.     Psych:  Cooperative. No anxiety or agitation. Skin:  Warm and color appropriate. No rashes or jaundice. Turgor elastic. Left 5th toe with mild erythema and Tiny Area of Necrosis at just under toenail. TTP. Reviewed most current lab test results and cultures  YES  Reviewed most current radiology test results   YES  Review and summation of old records today    NO  Reviewed patient's current orders and MAR    YES  PMH/SH reviewed - no change compared to H&P  ________________________________________________________________________  Care Plan discussed with:    Comments   Patient [address] [place] [address]     Consultant                        Multidiciplinary team rounds were held today with , nursing, pharmacist and clinical coordinator. Patient's plan of care was discussed; medications were reviewed and discharge planning was addressed. ________________________________________________________________________  Radha Maharaj, NP     Procedures: see electronic medical records for all procedures/Xrays and details which were not copied into this note but were reviewed prior to creation of Plan. LABS:  I reviewed today's most current labs and imaging studies.   Pertinent labs include:  Recent Labs     04/24/22  0600 04/23/22  0405 04/22/22  0410   WBC 9.9 11.2* 9.5   HGB 6.9* 7.3* 7.0*   HCT 22.0* 23.6* 22.1*   PLT 69* 98* 94*     Recent Labs     04/23/22  0405      K 4.2   CL 111*   CO2 26   GLU 97   BUN 18   CREA 0.56*   CA 8.1*       Signed: Marcos Kunz NP

## 2022-04-24 NOTE — PROGRESS NOTES
Problem: Falls - Risk of  Goal: *Absence of Falls  Description: Document Lianna Bean Fall Risk and appropriate interventions in the flowsheet. Outcome: Progressing Towards Goal  Note: Fall Risk Interventions:  Mobility Interventions: Bed/chair exit alarm    Mentation Interventions: Bed/chair exit alarm    Medication Interventions: Bed/chair exit alarm    Elimination Interventions: Call light in reach    History of Falls Interventions: Investigate reason for fall         Problem: Pressure Injury - Risk of  Goal: *Prevention of pressure injury  Description: Document Troy Scale and appropriate interventions in the flowsheet.   Outcome: Progressing Towards Goal  Note: Pressure Injury Interventions:  Sensory Interventions: Assess changes in LOC    Moisture Interventions: Absorbent underpads    Activity Interventions: PT/OT evaluation    Mobility Interventions: PT/OT evaluation    Nutrition Interventions: Document food/fluid/supplement intake    Friction and Shear Interventions: Lift sheet

## 2022-04-24 NOTE — PROGRESS NOTES
End of Shift Note    Bedside shift change report given to Mindy Villegas RN (oncoming nurse) by Martha Sheffield RN (offgoing nurse). Report included the following information SBAR, Kardex and MAR    Shift worked:  7a - 7p     Shift summary and any significant changes:     -1 unit PRBCs ordered by NP Ying Wyatt for Hgb 6.9; blood arrived from [facility] late, to be given by night shift RN   -Pt's mom present at bedside   -BG remains elevated ~250 despite receiving insulin   -Incentive spirometry performed at bedside   -Sore developing on pt's bottom lip, possibly a cold sore     Concerns for physician to address:       Zone phone for oncoming shift:   [phone]       Activity:  Activity Level: Bed Rest  Number times ambulated in hallways past shift: 0  Number of times OOB to chair past shift: 0    Cardiac:   Cardiac Monitoring: No      Cardiac Rhythm: Sinus Rhythm    Access:   Current line(s): port     Genitourinary:   Urinary status: incontinent    Respiratory:   O2 Device: None (Room air)  Chronic home O2 use?: NO  Incentive spirometer at bedside: YES  Actual Volume (ml): 250 ml    GI:  Last Bowel Movement Date: 04/24/22  Current diet:  ADULT ORAL NUTRITION SUPPLEMENT Breakfast, Dinner; Low Calorie/High Protein  ADULT DIET Easy to Chew; 3 carb choices (45 gm/meal)  DIET ONE TIME MESSAGE  DIET ONE TIME MESSAGE  Passing flatus: YES  Tolerating current diet: YES       Pain Management:   Patient states pain is manageable on current regimen: YES    Skin:  Troy Score: 14  Interventions: turn team, float heels, increase time out of bed, PT/OT consult, limit briefs and nutritional support     Patient Safety:  Fall Score:  Total Score: 4  Interventions: bed/chair alarm, gripper socks, pt to call before getting OOB, stay with me (per policy) and gait belt  High Fall Risk: Yes    Length of Stay:  Expected LOS: 2d 16h  Actual LOS: 22      Martha Sheffield RN

## 2022-04-24 NOTE — PROGRESS NOTES
Problem: Falls - Risk of  Goal: *Absence of Falls  Description: Document Lazaro Tao Fall Risk and appropriate interventions in the flowsheet.   Outcome: Progressing Towards Goal  Note: Fall Risk Interventions:  Mobility Interventions: Mechanical lift,OT consult for ADLs,Patient to call before getting OOB,PT Consult for mobility concerns,PT Consult for assist device competence    Mentation Interventions: Bed/chair exit alarm,Door open when patient unattended,Increase mobility,More frequent rounding,Reorient patient,Room close to nurse's station,Toileting rounds    Medication Interventions: Bed/chair exit alarm,Patient to call before getting OOB,Teach patient to arise slowly    Elimination Interventions: Bed/chair exit alarm,Call light in reach,Patient to call for help with toileting needs,Toileting schedule/hourly rounds,Urinal in reach    History of Falls Interventions: Bed/chair exit alarm,Door open when patient unattended,Room close to nurse's station         Problem: Patient Education: Go to Patient Education Activity  Goal: Patient/Family Education  Outcome: Progressing Towards Goal     Problem: Patient Education: Go to Patient Education Activity  Goal: Patient/Family Education  Outcome: Progressing Towards Goal     Problem: Patient Education: Go to Patient Education Activity  Goal: Patient/Family Education  Outcome: Progressing Towards Goal

## 2022-04-24 NOTE — PROGRESS NOTES
End of Shift Note    Bedside shift change report given to Shellie Mosqueda (oncoming nurse) by Tay Fletcher RN (offgoing nurse). Report included the following information Lona SIMS, [facility]    Shift worked: [id number]   Shift summary and any significant changes:     Patient BM x 2. Incontinent QS. Patient vitals WNL. Tolerating PO. Non productive cough. Robitussin and IS given. Instruct use of IS. Concerns for physician to address:  see above     Zone phone for oncoming shift:  [phone]     Activity:  Activity Level: Bed Rest  Number times ambulated in hallways past shift: 0  Number of times OOB to chair past shift: 0    Cardiac:   Cardiac Monitoring: No      Cardiac Rhythm: Sinus Rhythm, SBAR,     Access:   Current line(s): port     Genitourinary:   Urinary status: incontinent    Respiratory:   O2 Device: None (Room air)  Chronic home O2 use?: NO  Incentive spirometer at bedside: YES  Actual Volume (ml): 250 ml    GI:  Last Bowel Movement Date: 04/22/22  Current diet:  ADULT ORAL NUTRITION SUPPLEMENT Breakfast, Dinner; Low Calorie/High Protein  ADULT DIET Easy to Chew; 3 carb choices (45 gm/meal)  DIET ONE TIME MESSAGE  DIET ONE TIME MESSAGE  Passing flatus: YES  Tolerating current diet: YES       Pain Management:   Patient states pain is manageable on current regimen: YES    Skin:  Troy Score: 13  Interventions: turn team, speciality bed, float heels, increase time out of bed and PT/OT consult    Patient Safety:  Fall Score:  Total Score: 4  Interventions: bed/chair alarm, assistive device (walker, cane, etc), gripper socks and pt to call before getting OOB  High Fall Risk: Yes    Length of Stay:  Expected LOS: 2d 16h  Actual LOS: 22      Rylee Henry RN

## 2022-04-25 VITALS
BODY MASS INDEX: 26.41 KG/M2 | HEIGHT: 72 IN | OXYGEN SATURATION: 95 % | WEIGHT: 195 LBS | RESPIRATION RATE: 16 BRPM | HEART RATE: 68 BPM | TEMPERATURE: 98.5 F | SYSTOLIC BLOOD PRESSURE: 119 MMHG | DIASTOLIC BLOOD PRESSURE: 48 MMHG

## 2022-04-25 LAB
ABO + RH BLD: NORMAL
ANION GAP SERPL CALC-SCNC: 3 MMOL/L (ref 5–15)
ANTIGENS PRESENT RBC DONR: NORMAL
ANTIGENS PRESENT RBC DONR: NORMAL
BASOPHILS # BLD: 0 K/UL (ref 0–0.1)
BASOPHILS NFR BLD: 0 % (ref 0–1)
BLD PROD TYP BPU: NORMAL
BLD PROD TYP BPU: NORMAL
BLOOD GROUP ANTIBODIES SERPL: NORMAL
BLOOD GROUP ANTIBODIES SERPL: NORMAL
BPU ID: NORMAL
BPU ID: NORMAL
BUN SERPL-MCNC: 23 MG/DL (ref 6–20)
BUN/CREAT SERPL: 37 (ref 12–20)
CALCIUM SERPL-MCNC: 8 MG/DL (ref 8.5–10.1)
CHLORIDE SERPL-SCNC: 111 MMOL/L (ref 97–108)
CO2 SERPL-SCNC: 26 MMOL/L (ref 21–32)
CREAT SERPL-MCNC: 0.63 MG/DL (ref 0.7–1.3)
CROSSMATCH RESULT,%XM: NORMAL
CROSSMATCH RESULT,%XM: NORMAL
DIFFERENTIAL METHOD BLD: ABNORMAL
EOSINOPHIL # BLD: 0.2 K/UL (ref 0–0.4)
EOSINOPHIL NFR BLD: 2 % (ref 0–7)
ERYTHROCYTE [DISTWIDTH] IN BLOOD BY AUTOMATED COUNT: 23.5 % (ref 11.5–14.5)
GLUCOSE BLD STRIP.AUTO-MCNC: 163 MG/DL (ref 65–117)
GLUCOSE BLD STRIP.AUTO-MCNC: 264 MG/DL (ref 65–117)
GLUCOSE SERPL-MCNC: 125 MG/DL (ref 65–100)
HCT VFR BLD AUTO: 24.7 % (ref 36.6–50.3)
HGB BLD-MCNC: 7.8 G/DL (ref 12.1–17)
IMM GRANULOCYTES # BLD AUTO: 0 K/UL (ref 0–0.04)
IMM GRANULOCYTES NFR BLD AUTO: 0 % (ref 0–0.5)
LYMPHOCYTES # BLD: 6.3 K/UL (ref 0.8–3.5)
LYMPHOCYTES NFR BLD: 63 % (ref 12–49)
MCH RBC QN AUTO: 33.6 PG (ref 26–34)
MCHC RBC AUTO-ENTMCNC: 31.6 G/DL (ref 30–36.5)
MCV RBC AUTO: 106.5 FL (ref 80–99)
MONOCYTES # BLD: 0.3 K/UL (ref 0–1)
MONOCYTES NFR BLD: 3 % (ref 5–13)
NEUTS BAND NFR BLD MANUAL: 1 %
NEUTS SEG # BLD: 3.2 K/UL (ref 1.8–8)
NEUTS SEG NFR BLD: 31 % (ref 32–75)
NRBC # BLD: 0 K/UL (ref 0–0.01)
NRBC BLD-RTO: 0 PER 100 WBC
PLATELET # BLD AUTO: 92 K/UL (ref 150–400)
PMV BLD AUTO: 9.3 FL (ref 8.9–12.9)
POTASSIUM SERPL-SCNC: 4.6 MMOL/L (ref 3.5–5.1)
RBC # BLD AUTO: 2.32 M/UL (ref 4.1–5.7)
RBC MORPH BLD: ABNORMAL
RBC MORPH BLD: ABNORMAL
SERVICE CMNT-IMP: ABNORMAL
SERVICE CMNT-IMP: ABNORMAL
SODIUM SERPL-SCNC: 140 MMOL/L (ref 136–145)
SPECIMEN EXP DATE BLD: NORMAL
STATUS OF UNIT,%ST: NORMAL
STATUS OF UNIT,%ST: NORMAL
UNIT DIVISION, %UDIV: 0
UNIT DIVISION, %UDIV: 0
WBC # BLD AUTO: 10 K/UL (ref 4.1–11.1)
WBC MORPH BLD: ABNORMAL

## 2022-04-25 PROCEDURE — 74011250637 HC RX REV CODE- 250/637: Performed by: INTERNAL MEDICINE

## 2022-04-25 PROCEDURE — 74011000250 HC RX REV CODE- 250: Performed by: INTERNAL MEDICINE

## 2022-04-25 PROCEDURE — 74011636637 HC RX REV CODE- 636/637: Performed by: NURSE PRACTITIONER

## 2022-04-25 PROCEDURE — 82962 GLUCOSE BLOOD TEST: CPT

## 2022-04-25 PROCEDURE — 74011250636 HC RX REV CODE- 250/636

## 2022-04-25 PROCEDURE — 85025 COMPLETE CBC W/AUTO DIFF WBC: CPT

## 2022-04-25 PROCEDURE — 36415 COLL VENOUS BLD VENIPUNCTURE: CPT

## 2022-04-25 PROCEDURE — 80048 BASIC METABOLIC PNL TOTAL CA: CPT

## 2022-04-25 PROCEDURE — 74011636637 HC RX REV CODE- 636/637: Performed by: INTERNAL MEDICINE

## 2022-04-25 RX ORDER — LANOLIN ALCOHOL/MO/W.PET/CERES
3 CREAM (GRAM) TOPICAL
Qty: 30 TABLET | Refills: 0 | Status: SHIPPED
Start: 2022-04-25

## 2022-04-25 RX ORDER — ACETAMINOPHEN 325 MG/1
650 TABLET ORAL
Qty: 50 TABLET | Refills: 0 | Status: SHIPPED
Start: 2022-04-25

## 2022-04-25 RX ORDER — BALSAM PERU/CASTOR OIL
OINTMENT (GRAM) TOPICAL 2 TIMES DAILY
Qty: 1 G | Refills: 0 | Status: SHIPPED
Start: 2022-04-25

## 2022-04-25 RX ORDER — ACETAMINOPHEN 325 MG/1
650 TABLET ORAL
Qty: 50 TABLET | Refills: 0 | Status: SHIPPED
Start: 2022-04-25 | End: 2022-04-25 | Stop reason: SDUPTHER

## 2022-04-25 RX ORDER — POLYETHYLENE GLYCOL 3350 17 G/17G
17 POWDER, FOR SOLUTION ORAL
Qty: 10 PACKET | Refills: 0 | Status: SHIPPED
Start: 2022-04-25

## 2022-04-25 RX ORDER — FUROSEMIDE 20 MG/1
20 TABLET ORAL 2 TIMES DAILY
Qty: 60 TABLET | Refills: 0 | Status: SHIPPED
Start: 2022-04-25 | End: 2022-04-25

## 2022-04-25 RX ORDER — PANTOPRAZOLE SODIUM 40 MG/1
40 TABLET, DELAYED RELEASE ORAL
Qty: 30 TABLET | Refills: 0 | Status: SHIPPED
Start: 2022-04-26

## 2022-04-25 RX ORDER — OXYCODONE AND ACETAMINOPHEN 5; 325 MG/1; MG/1
0.5 TABLET ORAL 3 TIMES DAILY
Qty: 45 TABLET | Refills: 0 | Status: SHIPPED
Start: 2022-04-25 | End: 2022-04-28

## 2022-04-25 RX ORDER — MAGNESIUM SULFATE 100 %
4 CRYSTALS MISCELLANEOUS AS NEEDED
Qty: 20 TABLET | Refills: 0 | Status: SHIPPED
Start: 2022-04-25

## 2022-04-25 RX ORDER — AMMONIUM LACTATE 12 G/100G
LOTION TOPICAL
Qty: 120 ML | Refills: 0 | Status: SHIPPED
Start: 2022-04-25

## 2022-04-25 RX ORDER — LANOLIN ALCOHOL/MO/W.PET/CERES
1000 CREAM (GRAM) TOPICAL DAILY
Qty: 30 TABLET | Refills: 0 | Status: SHIPPED
Start: 2022-04-26

## 2022-04-25 RX ORDER — HEPARIN 100 UNIT/ML
SYRINGE INTRAVENOUS
Status: COMPLETED
Start: 2022-04-25 | End: 2022-04-25

## 2022-04-25 RX ADMIN — Medication: at 08:29

## 2022-04-25 RX ADMIN — ATORVASTATIN CALCIUM 40 MG: 40 TABLET, FILM COATED ORAL at 08:26

## 2022-04-25 RX ADMIN — SODIUM CHLORIDE, PRESERVATIVE FREE 20 ML: 5 INJECTION INTRAVENOUS at 05:08

## 2022-04-25 RX ADMIN — FUROSEMIDE 20 MG: 20 TABLET ORAL at 08:25

## 2022-04-25 RX ADMIN — INSULIN GLARGINE 8 UNITS: 100 INJECTION, SOLUTION SUBCUTANEOUS at 08:31

## 2022-04-25 RX ADMIN — Medication 2 UNITS: at 08:31

## 2022-04-25 RX ADMIN — FOLIC ACID 1 MG: 1 TABLET ORAL at 08:25

## 2022-04-25 RX ADMIN — Medication: at 08:30

## 2022-04-25 RX ADMIN — Medication 300 UNITS: at 13:02

## 2022-04-25 RX ADMIN — OXYCODONE AND ACETAMINOPHEN 0.5 TABLET: 5; 325 TABLET ORAL at 08:26

## 2022-04-25 RX ADMIN — TOPIRAMATE 200 MG: 100 TABLET, FILM COATED ORAL at 08:26

## 2022-04-25 RX ADMIN — DIVALPROEX SODIUM 500 MG: 500 TABLET, DELAYED RELEASE ORAL at 08:25

## 2022-04-25 RX ADMIN — CYANOCOBALAMIN TAB 500 MCG 1000 MCG: 500 TAB at 08:25

## 2022-04-25 RX ADMIN — PANTOPRAZOLE SODIUM 40 MG: 40 TABLET, DELAYED RELEASE ORAL at 08:26

## 2022-04-25 NOTE — PROGRESS NOTES
End of Shift Note    Bedside shift change report given to Angelina HAQUE (oncoming nurse) by Rome Perez RN (offgoing nurse). Report included the following information SBAR, Kardex, ED Summary, Procedure Summary, Intake/Output, MAR and Recent Results    Shift worked:  [id number]     Shift summary and any significant changes:    Patient 1 unit BRBC complete. [address] = 7.8. Patient tolerating PO. Voidid g QS per urinal/ancontinent. Vitals stable. Concerns for physician to address:  see above     Zone phone for oncoming shift:  [phone]     Activity:  Activity Level: Bed Rest  Number times ambulated in hallways past shift: 0  Number of times OOB to chair past shift: 0    Cardiac:   Cardiac Monitoring: No      Cardiac Rhythm: Sinus Rhythm    Access:   Current line(s): port     Genitourinary:   Urinary status: voiding and incontinent    Respiratory:   O2 Device: None (Room air)  Chronic home O2 use?: NO  Incentive spirometer at bedside: NO  Actual Volume (ml): 250 ml    GI:  Last Bowel Movement Date: 04/25/22  Current diet:  ADULT ORAL NUTRITION SUPPLEMENT Breakfast, Dinner; Low Calorie/High Protein  ADULT DIET Easy to Chew; 3 carb choices (45 gm/meal)  DIET ONE TIME MESSAGE  DIET ONE TIME MESSAGE  Passing flatus: YES  Tolerating current diet: YES       Pain Management:   Patient states pain is manageable on current regimen: YES    Skin:  Troy Score: 14  Interventions: turn team, increase time out of bed, PT/OT consult and internal/external urinary devices    Patient Safety:  Fall Score:  Total Score: 4  Interventions: bed/chair alarm, assistive device (walker, cane, etc), gripper socks, pt to call before getting OOB and stay with me (per policy)  High Fall Risk: Yes    Length of Stay:  Expected LOS: 2d 16h  Actual LOS: 23      Rylee Henry RN

## 2022-04-25 NOTE — PROGRESS NOTES
Hematology Oncology Progress Note           Follow up for: MDS, splenic marginal zone lymphoma     Chart notes reviewed since last visit. Case discussed with following: patient. No family at bedside at present. Patient complains of the following: no complaints at present, asking if he is leaving today, transfused yesterday again. Additional concerns noted by the staff: none    Patient Vitals for the past 24 hrs:   BP Temp Pulse Resp SpO2   04/25/22 0718 (!) 119/48 98.5 °F (36.9 °C) 68 16 95 %   04/25/22 0302 (!) 108/48 97.6 °F (36.4 °C) 64 16 94 %   04/25/22 0006 (!) 106/52 98.2 °F (36.8 °C) 67 16 94 %   04/24/22 2352 (!) 99/54 98 °F (36.7 °C) 71 16 95 %   04/24/22 2335 (!) 118/51 97.7 °F (36.5 °C) 70 18 96 %   04/24/22 2007 (!) 108/59 98.9 °F (37.2 °C) 71 18 95 %   04/24/22 1550 (!) 112/54 98.6 °F (37 °C) 70 19 95 %       Review of Systems: negative for 11 organ systems except as noted above. Physical Examination:  Constitutional Alert, cooperative. Mood and affect appropriate. Pale and frail in appearance. Head Normocephalic; no scars   Eyes Conjunctivae and sclerae are clear and without icterus. Pupils are round   ENMT Sinuses are nontender. No oral exudates, ulcers, masses, thrush or mucositis. Neck Supple without masses or thyromegaly. No jugular venous distension. Hematologic/Lymphatic No petechiae or purpura. No tender or palpable lymph nodes noted. Respiratory Lungs are clear to auscultation without rhonchi or wheezing. Cardiovascular Regular rate and rhythm of heart without murmurs, gallops or rubs. Chest / Line Site Chest is symmetric with no chest wall deformities. Abdomen Non-tender, non-distended, no masses, ascites or hepatosplenomegaly. Good bowel sounds. .   Musculoskeletal No tenderness or swelling, normal range of motion without obvious weakness. Extremities No visible deformities, no cyanosis, clubbing or edema. Chronic venous stasis changes.     Skin No rashes, scars, or lesions suggestive of malignancy. No petechiae, purpura, or ecchymoses. No excoriations. Labs:  Recent Results (from the past 24 hour(s))   GLUCOSE, POC    Collection Time: 04/24/22 11:20 AM   Result Value Ref Range    Glucose (POC) 264 (H) 65 - 117 mg/dL    Performed by [facility] PCT    RBC, ALLOCATE    Collection Time: 04/24/22 11:30 AM   Result Value Ref Range    HISTORY CHECKED? Historical check performed    GLUCOSE, POC    Collection Time: 04/24/22  5:01 PM   Result Value Ref Range    Glucose (POC) 256 (H) 65 - 117 mg/dL    Performed by [facility] PCT    GLUCOSE, POC    Collection Time: 04/24/22 10:15 PM   Result Value Ref Range    Glucose (POC) 191 (H) 65 - 117 mg/dL    Performed by Jaya Mckee    CBC WITH AUTOMATED DIFF    Collection Time: 04/25/22  5:05 AM   Result Value Ref Range    WBC 10.0 4.1 - 11.1 K/uL    RBC 2.32 (L) 4.10 - 5.70 M/uL    HGB 7.8 (L) 12.1 - 17.0 g/dL    HCT 24.7 (L) 36.6 - 50.3 %    .5 (H) 80.0 - 99.0 FL    MCH 33.6 26.0 - 34.0 PG    MCHC 31.6 30.0 - 36.5 g/dL    RDW 23.5 (H) 11.5 - 14.5 %    PLATELET 92 (L) 726 - 400 K/uL    MPV 9.3 8.9 - 12.9 FL    NRBC 0.0 0  WBC    ABSOLUTE NRBC 0.00 0.00 - 0.01 K/uL    NEUTROPHILS 31 (L) 32 - 75 %    BAND NEUTROPHILS 1 %    LYMPHOCYTES 63 (H) 12 - 49 %    MONOCYTES 3 (L) 5 - 13 %    EOSINOPHILS 2 0 - 7 %    BASOPHILS 0 0 - 1 %    IMMATURE GRANULOCYTES 0 0.0 - 0.5 %    ABS. NEUTROPHILS 3.2 1.8 - 8.0 K/UL    ABS. LYMPHOCYTES 6.3 (H) 0.8 - 3.5 K/UL    ABS. MONOCYTES 0.3 0.0 - 1.0 K/UL    ABS. EOSINOPHILS 0.2 0.0 - 0.4 K/UL    ABS. BASOPHILS 0.0 0.0 - 0.1 K/UL    ABS. IMM.  GRANS. 0.0 0.00 - 0.04 K/UL    DF MANUAL      RBC COMMENTS ANISOCYTOSIS  2+        RBC COMMENTS MACROCYTOSIS  1+        WBC COMMENTS SMUDGE CELLS SEEN     METABOLIC PANEL, BASIC    Collection Time: 04/25/22  5:05 AM   Result Value Ref Range    Sodium 140 136 - 145 mmol/L    Potassium 4.6 3.5 - 5.1 mmol/L    Chloride 111 (H) 97 - 108 mmol/L    CO2 26 21 - 32 mmol/L    Anion gap 3 (L) 5 - 15 mmol/L    Glucose 125 (H) 65 - 100 mg/dL    BUN 23 (H) 6 - 20 MG/DL    Creatinine 0.63 (L) 0.70 - 1.30 MG/DL    BUN/Creatinine ratio 37 (H) 12 - 20      GFR est AA >60 >60 ml/min/1.73m2    GFR est non-AA >60 >60 ml/min/1.73m2    Calcium 8.0 (L) 8.5 - 10.1 MG/DL   GLUCOSE, POC    Collection Time: 04/25/22  7:21 AM   Result Value Ref Range    Glucose (POC) 163 (H) 65 - 117 mg/dL    Performed by [address] [place] [address]-   4/3/22 CT A/P:  IMPRESSION  . No definite pancreatic mass seen by this technique. 2. Colitis diffuse with fat stranding in the peritoneal cavity. 3. Splenomegaly. Assessment and Plan:    *) Severe macrocytic anemia, thrombocytopenia, lymphocytosis:  - MCV markedly elevated to 142 on admit with severe anemia(hgb 4.2). - Iron studies after transfusion not markedly elevated as would be expected. Given Venofer on 4/4 and 4/5.  - Hgb 7.8 s/p transfusion yesterday  - B12 on low end of normal, MMA wnl, so not B12 deficient. - Peripheral blood smear was concerning for lymphoproliferative disorder. 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 - SPEP 0.2 with IgG Kappa specificity and elevated serum free light chain ratio. Monoclonal gammopathies typically are seen with myeloma, low grade lymphoma, CLL, Waldenstrom's macroglobulinemia and amyloidosis. In his particular case, it would seem to be associated with the low grade lymphoma seen in the BM (splenic MZL) with circulating lymphoma cells seen in the peripheral blood  - continue folic acid daily.   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 FISH panel negative for t(11:14), B(19;52), bcl6 rearrangement and MALT1 rearrangement.  Cytogenetics normal male karyotype. -retacrit here t, th, sat with plans to continue as outpt-my nurse is setting this up  - Plan for Rituxan within next week as outpt, appt later this week for consent in office.  - Follow-up with me as outpt scheduled for 5/6/22 2:15pm. May move earlier if he goes to long term care and not SNF      *) neutropenia - his ANC improved to 3200 today    *) Colitis:  - CT scan showing diffuse colitis. completed IV flagyl 4/15/22  - EGD on 4/5 without acute findings. - GI  Was following had multiple polyps and GI recommended colectomy that was declined. *) Fever: (presumed aspiration pneumonia)  - fever resolved, treated with  Levaquin and IV flagyl    *) intellectual disability    *) DM    *) Seizure disorder  - on depakote, topamax    *) innumerable adenomatous polyps seen on colonoscopy in Dec 2021 - partial colectomy recommended. Pt's family declines.  EGD 4/5 normal.     *) Lower extremity edema, anasarca  - receiving IV albumin and lasix periodically   - attributed to hypoalbuminemia

## 2022-04-25 NOTE — PROGRESS NOTES
Transition of Care Plan to SNF/Rehab    Communication to Patient/Family:  Met with patient and family and they are agreeable to the transition plan. The Plan for Transition of Care is related to the following treatment goals:     CM informed that pt will be d/c on today and will transition to snf: [facility]. CM spoke with liaison at facility and they are willing to accept pt on today. CM arranged transport at 12:45P.  CM spoke with pt's mother, and she is agreeable to pt's d/c and transport at this time. The Patient and/or patient representative was provided with a choice of provider and agrees  with the discharge plan. Yes [x] No []    A Freedom of choice list was provided with basic dialogue that supports the patient's individualized plan of care/goals and shares the quality data associated with the providers. Yes [x] No []    SNF/Rehab Transition:  Patient has been accepted to [facility] SNF/Rehab and meets criteria for admission. Patient will transported by [facility] and expected to leave at 12:45P. Communication to SNF/Rehab:  Bedside RN, Julee Bedolla , has been notified to update the transition plan to the facility and call report ([phone]). Discharge information has been updated on the AVS. And communicated to facility via [employer], or CC link. Discharge instructions to be fax'd to facility at [facility] #). [] BCPI-A  Patient has been identified as part of the BCPI-A Program.  For Care Coordination associated with that Bundle Program, please contact   Bundle information has been communication to . Nursing Please include all hard scripts for controlled substances, med rec and dc summary, and AVS in packet.      Reviewed and confirmed with facility, [facility], can manage the patient care needs for the following:     Andreia Schultz with (X) only those applicable:  Medication:  [x]Medications are available at the facility  []IV Antibiotics    []Controlled Substance - hard copies available sent. []Weekly Labs    Equipment:  []CPAP/BiPAP  []Wound Vacuum  []Boles or Urinary Device  []PICC/Central Line  []Nebulizer  []Ventilator    Treatment:  []Isolation (for MRSA, VRE, etc.)  []Surgical Drain Management  []Tracheostomy Care  []Dressing Changes  []Dialysis with transportation  []PEG Care  []Oxygen  []Daily Weights for Heart Failure    Dietary:  []Any diet limitations  []Tube Feedings   []Total Parenteral Management (TPN)    Financial Resources:  [x]Medicaid Application Completed    [x]UAI Completed and copy given to pt/family  and copy given to pt/family  []A screening has previously been completed. [x]Level II Completed    [] Private pay individual who will not become   financially eligible for Medicaid within 6 months from admission to a [address] [place] [address] facility. [] Individual refused to have screening conducted. []Medicaid Application Completed    []The screening denied because it was determined individual did not need/did not qualify for nursing facility level of care. [] Out of state residents seeking direct admission to a [address] facility. [] Individuals who are inpatients of an out of state hospital, or in state or out of state veterans/ hospital and seek direct admission to a [address] facility  [] Individuals who are pateints or residents of a state owned/operated facility that is licensed by [employer] and seek direct admission to [address] [place] [address]  [] A screening not required for enrollment in [address] [address] [address] services as set out in [address] [place] [address] 34-  [] [facility]) staff shall perform screenings of the [facility] clients. Advanced Care Plan:  []Surrogate Decision Maker of Care  []POA  []Communicated Code Status and copy sent.     Other:

## 2022-04-25 NOTE — PROGRESS NOTES
Problem: Falls - Risk of  Goal: *Absence of Falls  Description: Document Houston Phillips Fall Risk and appropriate interventions in the flowsheet.   Outcome: Resolved/Met  Note: Fall Risk Interventions:  Mobility Interventions: Bed/chair exit alarm    Mentation Interventions: Bed/chair exit alarm    Medication Interventions: Bed/chair exit alarm    Elimination Interventions: Bed/chair exit alarm    History of Falls Interventions: Bed/chair exit alarm

## 2022-04-25 NOTE — PROGRESS NOTES
Problem: Falls - Risk of  Goal: *Absence of Falls  Description: Document Miguel Lin Fall Risk and appropriate interventions in the flowsheet.   4/25/2022 0042 by Mckay Carrillo RN  Outcome: Progressing Towards Goal  Note: Fall Risk Interventions:  Mobility Interventions: Bed/chair exit alarm,Communicate number of staff needed for ambulation/transfer,OT consult for ADLs,Patient to call before getting OOB,PT Consult for mobility concerns,PT Consult for assist device competence,Utilize walker, cane, or other assistive device    Mentation Interventions: Bed/chair exit alarm,Door open when patient unattended,Increase mobility,More frequent rounding,Reorient patient,Room close to nurse's station,Toileting rounds    Medication Interventions: Bed/chair exit alarm,Patient to call before getting OOB,Teach patient to arise slowly    Elimination Interventions: Bed/chair exit alarm,Call light in reach,Patient to call for help with toileting needs,Toileting schedule/hourly rounds,Urinal in reach    History of Falls Interventions: Bed/chair exit alarm,Door open when patient unattended,Room close to nurse's station      4/25/2022 0041 by Rylee Gonzalez RN  Outcome: Progressing Towards Goal  Note: Fall Risk Interventions:  Mobility Interventions: Bed/chair exit alarm,Communicate number of staff needed for ambulation/transfer,OT consult for ADLs,Patient to call before getting OOB,PT Consult for mobility concerns,PT Consult for assist device competence,Utilize walker, cane, or other assistive device    Mentation Interventions: Bed/chair exit alarm,Door open when patient unattended,Increase mobility,More frequent rounding,Reorient patient,Room close to nurse's station,Toileting rounds    Medication Interventions: Bed/chair exit alarm,Patient to call before getting OOB,Teach patient to arise slowly    Elimination Interventions: Bed/chair exit alarm,Call light in reach,Patient to call for help with toileting needs,Toileting schedule/hourly rounds,Urinal in reach    History of Falls Interventions: Bed/chair exit alarm,Door open when patient unattended,Room close to nurse's station         Problem: Patient Education: Go to Patient Education Activity  Goal: Patient/Family Education  4/25/2022 0042 by Martha Sifuentes RN  Outcome: Progressing Towards Goal  4/25/2022 0041 by David WELCH RN  Outcome: Progressing Towards Goal     Problem: Anemia Care Plan (Adult and Pediatric)  Goal: *Labs within defined limits  Outcome: Progressing Towards Goal     Problem: Pressure Injury - Risk of  Goal: *Prevention of pressure injury  Description: Document Troy Scale and appropriate interventions in the flowsheet. 4/25/2022 0042 by Martha Sifuentes RN  Outcome: Progressing Towards Goal  Note: Pressure Injury Interventions:  Sensory Interventions: Assess changes in LOC,Check visual cues for pain,Discuss PT/OT consult with provider,Float heels,Keep linens dry and wrinkle-free,Minimize linen layers,Monitor skin under medical devices,Pad between skin to skin,Pressure redistribution bed/mattress (bed type),Turn and reposition approx.  every two hours (pillows and wedges if needed)    Moisture Interventions: Absorbent underpads,Limit adult briefs,Minimize layers,Moisture barrier    Activity Interventions: Pressure redistribution bed/mattress(bed type),PT/OT evaluation    Mobility Interventions: Float heels,HOB 30 degrees or less,Pressure redistribution bed/mattress (bed type),PT/OT evaluation    Nutrition Interventions: Document food/fluid/supplement intake    Friction and Shear Interventions: HOB 30 degrees or less,Lift sheet,Minimize layers             4/25/2022 0041 by Rylee Solares RN  Outcome: Progressing Towards Goal  Note: Pressure Injury Interventions:  Sensory Interventions: Assess changes in LOC,Check visual cues for pain,Discuss PT/OT consult with provider,Float heels,Keep linens dry and wrinkle-free,Minimize linen layers,Monitor skin under medical devices,Pad between skin to skin,Pressure redistribution bed/mattress (bed type),Turn and reposition approx.  every two hours (pillows and wedges if needed)    Moisture Interventions: Absorbent underpads,Limit adult briefs,Minimize layers,Moisture barrier    Activity Interventions: Pressure redistribution bed/mattress(bed type),PT/OT evaluation    Mobility Interventions: Float heels,HOB 30 degrees or less,Pressure redistribution bed/mattress (bed type),PT/OT evaluation    Nutrition Interventions: Document food/fluid/supplement intake    Friction and Shear Interventions: HOB 30 degrees or less,Lift sheet,Minimize layers                Problem: Breathing Pattern - Ineffective  Goal: *Absence of hypoxia  4/25/2022 0042 by Jane Alicea RN  Outcome: Progressing Towards Goal  4/25/2022 0041 by Jacob WELCH RN  Outcome: Progressing Towards Goal     Problem: Anemia Care Plan (Adult and Pediatric)  Goal: *Tolerates increased activity  Outcome: Progressing Towards Goal

## 2022-04-25 NOTE — PROGRESS NOTES
[facility] TRANSFER - OUT REPORT:    Verbal report given to Kristine(name) on Reginald Miller  being transferred to [facility](unit) for routine progression of care       Report consisted of patients Situation, Background, Assessment and   Recommendations(SBAR). Information from the following report(s) SBAR, Intake/Output, MAR, Recent Results and Med Rec Status was reviewed with the receiving nurse. Lines:   Venous Access Device PowerPort Implantable Joe Huger - 8 Fr - REF # [id number] - Lot# [id number] 04/20/22 Upper chest (subclavicular area, right (Active)   Central Line Being Utilized Yes 04/25/22 0747   Criteria for Appropriate Use Limited/no vessel suitable for conventional peripheral access 04/25/22 0747   Site Assessment Clean, dry, & intact 04/25/22 0747   Date of Last Dressing Change 04/21/22 04/24/22 0745   Dressing Status Clean, dry, & intact 04/25/22 0302   Dressing Type Disk with Chlorhexadine gluconate (CHG) 04/25/22 0302   Action Taken Open ports on tubing capped 04/25/22 0302   Positive Blood Return (Medial Site) Yes 04/24/22 0745   Action Taken (Medial Site) Flushed 04/24/22 0604   Date Needle Changed (Medial Site) 04/21/22 04/21/22 1105   Positive Blood Return (Lateral Site) Yes 04/24/22 2206   Alcohol Cap Used Yes 04/25/22 0302        Opportunity for questions and clarification was provided. Port flushed with Heparin and de-accessed.      Patient transported with:   Registered Nurse

## 2022-06-21 ENCOUNTER — TELEPHONE (OUTPATIENT)
Dept: INTERNAL MEDICINE CLINIC | Age: 67
End: 2022-06-21

## 2022-10-21 ENCOUNTER — DOCUMENTATION ONLY (OUTPATIENT)
Dept: HEMATOLOGY | Age: 67
End: 2022-10-21

## 2023-10-30 NOTE — PROGRESS NOTES
Problem: Mobility Impaired (Adult and Pediatric)  Goal: *Acute Goals and Plan of Care (Insert Text)  Description: FUNCTIONAL STATUS PRIOR TO ADMISSION: Patient was modified independent using a walker and wheelchair for functional mobility. HOME SUPPORT PRIOR TO ADMISSION: The patient lived with mother. Physical Therapy Goals  Initiated 12/6/2020  1. Patient will move from supine to sit and sit to supine  in bed with moderate assistance  within 7 day(s). 2.  Patient will transfer from bed to chair and chair to bed with supervision/set-up using the least restrictive device within 7 day(s). 3.  Patient will perform sit to stand with supervision/set-up within 7 day(s). 4.  Patient will ambulate with supervision/set-up for 100 feet with the least restrictive device within 7 day(s). Outcome: Progressing Towards Goal   PHYSICAL THERAPY TREATMENT  Patient: Jeri Gallego (1 y.o. male)  Date: 12/8/2020  Diagnosis: Lumbar stenosis [M48.061]  Cirrhosis ([address] [K74.60]  Paraplegia (HCC) [G82.20]  Fever of unknown origin (FUO) [R50.9]  Back pain [M54.9]   <principal problem not specified>       Precautions: spinal due to pain   Chart, physical therapy assessment, plan of care and goals were reviewed. ASSESSMENT  Patient continues with skilled PT services and is progressing towards goals. Patient is sleeping soundly at the start of treatment, but his mother is able to wake him with verbal/tactile stimulus, and he is eager to participate. Patient log-rolls to the right and comes to sit with minimal assist. Static sitting balance is good. Multiple reps of sit to/from stand performed, initially contact guard assist needed and he improves to stand-by assist by the end of the session.  Mother states that patient has always pulled up to walker to stand and keeps hands on walker to sit; patient is receptive to new learning of safer technique and performs new technique with verbal cues only by end of the treatment. He is contact guard assist with static > dynamic standing tasks for 3-5 minutes/trial. Patient ambulates 85 feet x 2 in hallway with rolling walker and contact guard assist, good ronal. He is left sitting up in chair with mother present and all needs met. Mother is eager for patient to get follow-up Rehab closer to their home and is hoping for [facility]. Patient is able to tolerate 3 hours of therapy 5-7 days/week. .     Current Level of Function Impacting Discharge (mobility/balance): as above, minimal assist bed mobility, CGA/SBA with transfers, and contact guard assist to ambulate 85 feet x 2 with rolling walker in hallway    Other factors to consider for discharge: modified independent PLOF         PLAN :  Patient continues to benefit from skilled intervention to address the above impairments. Continue treatment per established plan of care. to address goals. Recommendation for discharge: (in order for the patient to meet his/her long term goals)  Therapy 3 hours per day 5-7 days per week    This discharge recommendation:  Has been made in collaboration with the attending provider and/or case management    IF patient discharges home will need the following DME: patient owns DME required for discharge       SUBJECTIVE:   Patient stated I want to go walking today.     OBJECTIVE DATA SUMMARY:   Critical Behavior:  Neurologic State: Alert, Confused  Orientation Level: Oriented to person, Oriented to place, Oriented to situation, Disoriented to time  Cognition: Impaired decision making, Follows commands, Poor safety awareness  Safety/Judgement: Decreased awareness of need for safety, Decreased insight into deficits  Functional Mobility Training:  Bed Mobility:  Rolling: Minimum assistance;Assist x1  Supine to Sit: Minimum assistance;Assist x1              Transfers:  Sit to Stand: Contact guard assistance;Stand-by assistance(contact guard improving to stand-by assist with practice, verbal cues for hand placement)  Stand to Sit: Contact guard assistance(verbal cues for hand placement)                             Balance:  Sitting: Intact  Standing: Impaired; Without support  Standing - Static: Constant support;Good  Standing - Dynamic : Constant support; Fair  Ambulation/Gait Training:  Distance (ft): 85 Feet (ft)(x 2)  Assistive Device: Gait belt;Walker, rolling  Ambulation - Level of Assistance: Contact guard assistance;Assist x1        Gait Abnormalities: Decreased step clearance        Base of Support: Narrowed     Speed/Doris: Pace decreased (<100 feet/min)  Step Length: Left shortened;Right shortened         Therapeutic Exercises:   Dynamic standing/reaching/level changing, 2 reps x 3-5 minutes each  Pain Rating:  Patient does not complain of pain today    Activity Tolerance:   Good and tolerates ADLs without rest breaks    After treatment patient left in no apparent distress:   Sitting in chair, Call bell within reach, and Caregiver / family present    COMMUNICATION/COLLABORATION:   The patients plan of care was discussed with: Occupational therapist and Registered nurse.      Salima Payan, PT   Time Calculation: 28 mins 10 10
